# Patient Record
Sex: MALE | Race: WHITE | URBAN - METROPOLITAN AREA
[De-identification: names, ages, dates, MRNs, and addresses within clinical notes are randomized per-mention and may not be internally consistent; named-entity substitution may affect disease eponyms.]

---

## 2019-03-28 ENCOUNTER — OUTPATIENT (OUTPATIENT)
Dept: OUTPATIENT SERVICES | Facility: HOSPITAL | Age: 59
LOS: 1 days | Discharge: HOME | End: 2019-03-28

## 2019-03-29 DIAGNOSIS — N18.3 CHRONIC KIDNEY DISEASE, STAGE 3 (MODERATE): ICD-10-CM

## 2019-08-03 PROBLEM — Z00.00 ENCOUNTER FOR PREVENTIVE HEALTH EXAMINATION: Status: ACTIVE | Noted: 2019-08-03

## 2019-10-18 ENCOUNTER — APPOINTMENT (OUTPATIENT)
Dept: CARDIOLOGY | Facility: CLINIC | Age: 59
End: 2019-10-18
Payer: COMMERCIAL

## 2019-10-18 VITALS
DIASTOLIC BLOOD PRESSURE: 80 MMHG | WEIGHT: 158 LBS | SYSTOLIC BLOOD PRESSURE: 124 MMHG | BODY MASS INDEX: 24.8 KG/M2 | HEIGHT: 67 IN

## 2019-10-18 DIAGNOSIS — Z87.2 PERSONAL HISTORY OF DISEASES OF THE SKIN AND SUBCUTANEOUS TISSUE: ICD-10-CM

## 2019-10-18 DIAGNOSIS — Z87.891 PERSONAL HISTORY OF NICOTINE DEPENDENCE: ICD-10-CM

## 2019-10-18 DIAGNOSIS — Z82.3 FAMILY HISTORY OF STROKE: ICD-10-CM

## 2019-10-18 DIAGNOSIS — R91.1 SOLITARY PULMONARY NODULE: ICD-10-CM

## 2019-10-18 DIAGNOSIS — Z82.49 FAMILY HISTORY OF ISCHEMIC HEART DISEASE AND OTHER DISEASES OF THE CIRCULATORY SYSTEM: ICD-10-CM

## 2019-10-18 PROCEDURE — 99214 OFFICE O/P EST MOD 30 MIN: CPT

## 2019-10-18 PROCEDURE — 93000 ELECTROCARDIOGRAM COMPLETE: CPT

## 2019-10-18 NOTE — PHYSICAL EXAM
[General Appearance - Well Developed] : well developed [General Appearance - Well Nourished] : well nourished [Well Groomed] : well groomed [Normal Appearance] : normal appearance [Normal Conjunctiva] : the conjunctiva exhibited no abnormalities [General Appearance - In No Acute Distress] : no acute distress [No Deformities] : no deformities [Normal Oral Mucosa] : normal oral mucosa [FreeTextEntry1] : no JVD [No Oral Pallor] : no oral pallor [Heart Sounds] : normal S1 and S2 [Heart Rate And Rhythm] : heart rate and rhythm were normal [Murmurs] : no murmurs present [Respiration, Rhythm And Depth] : normal respiratory rhythm and effort [Edema] : no peripheral edema present [] : no respiratory distress [Auscultation Breath Sounds / Voice Sounds] : lungs were clear to auscultation bilaterally [Bowel Sounds] : normal bowel sounds [Abdomen Tenderness] : non-tender [Abdomen Soft] : soft [Abnormal Walk] : normal gait [Nail Clubbing] : no clubbing of the fingernails [Cyanosis, Localized] : no localized cyanosis [Skin Color & Pigmentation] : normal skin color and pigmentation [No Venous Stasis] : no venous stasis [Oriented To Time, Place, And Person] : oriented to person, place, and time [Affect] : the affect was normal

## 2019-10-18 NOTE — ASSESSMENT
[FreeTextEntry1] : Echo noted - moderate aortic valve disease - will repeat next year.\par BP control. C/w current therapy.\par No recurrent syncope. Continue to monitor clinically. If recurrent events will obtain an event monitor.\par Dyslipidemia - diet discussed. F/u labs with the PMD - if still elevated, may need to add statin.\par MORALES, abnormal ECG - obtain stress test. Will use stress echo, as baseline ECG is abnormal.\par F/u after the stress test.

## 2019-10-18 NOTE — HISTORY OF PRESENT ILLNESS
[FreeTextEntry1] : 60 y/o male presenting for f/u of syncope. Had several episodes of syncope or near syncope, no palpitations, no CP, no dyspnea. No edema. Has systolic murmur. He restarted Norvasc and his BP is now controlled. Echo revealed normal LV function, mild AS and mod AI. He reports fatigue and MORALES. No active chest pain. Abnormal ECG - ST-T changes laterally. Chest CT noted.

## 2019-11-01 ENCOUNTER — APPOINTMENT (OUTPATIENT)
Dept: CARDIOLOGY | Facility: CLINIC | Age: 59
End: 2019-11-01
Payer: COMMERCIAL

## 2019-11-01 VITALS — HEART RATE: 70 BPM | SYSTOLIC BLOOD PRESSURE: 118 MMHG | DIASTOLIC BLOOD PRESSURE: 82 MMHG

## 2019-11-01 PROCEDURE — 93351 STRESS TTE COMPLETE: CPT

## 2019-11-01 PROCEDURE — 99213 OFFICE O/P EST LOW 20 MIN: CPT | Mod: 25

## 2019-11-01 NOTE — ASSESSMENT
[FreeTextEntry1] : Echo noted - moderate aortic valve disease - will repeat next year (May 2020).\par Stress test today - negative for ischemia.\par BP control. C/w current therapy.\par No recurrent syncope. Continue to monitor clinically. If recurrent events will obtain an event monitor.\par Dyslipidemia - diet discussed. F/u labs with the PMD - if still elevated, may need to add statin.\par F/u in 3-4 months.

## 2019-11-01 NOTE — HISTORY OF PRESENT ILLNESS
[FreeTextEntry1] : 60 y/o male presenting for f/u of syncope. Had several episodes of syncope or near syncope, no palpitations, no CP, no dyspnea. No edema. Has systolic murmur. He restarted Norvasc and his BP is now controlled. Echo revealed normal LV function, mild AS and mod AI. He reports fatigue and MORALES. No active chest pain. Abnormal ECG - ST-T changes laterally. Chest CT noted. had a stress echo today - no ischemia, preserved LV function.

## 2019-11-01 NOTE — PHYSICAL EXAM
[General Appearance - Well Developed] : well developed [Normal Appearance] : normal appearance [Well Groomed] : well groomed [General Appearance - Well Nourished] : well nourished [No Deformities] : no deformities [General Appearance - In No Acute Distress] : no acute distress [Normal Conjunctiva] : the conjunctiva exhibited no abnormalities [Normal Oral Mucosa] : normal oral mucosa [No Oral Pallor] : no oral pallor [FreeTextEntry1] : no JVD [Heart Rate And Rhythm] : heart rate and rhythm were normal [Heart Sounds] : normal S1 and S2 [Murmurs] : no murmurs present [Edema] : no peripheral edema present [] : no respiratory distress [Respiration, Rhythm And Depth] : normal respiratory rhythm and effort [Auscultation Breath Sounds / Voice Sounds] : lungs were clear to auscultation bilaterally [Bowel Sounds] : normal bowel sounds [Abdomen Soft] : soft [Abdomen Tenderness] : non-tender [Abnormal Walk] : normal gait [Nail Clubbing] : no clubbing of the fingernails [Cyanosis, Localized] : no localized cyanosis [Skin Color & Pigmentation] : normal skin color and pigmentation [No Venous Stasis] : no venous stasis [Oriented To Time, Place, And Person] : oriented to person, place, and time [Affect] : the affect was normal

## 2020-04-27 ENCOUNTER — INPATIENT (INPATIENT)
Facility: HOSPITAL | Age: 60
LOS: 1 days | Discharge: HOME HEALTH | End: 2020-04-29
Attending: INTERNAL MEDICINE | Admitting: INTERNAL MEDICINE
Payer: COMMERCIAL

## 2020-04-27 VITALS
OXYGEN SATURATION: 100 % | HEART RATE: 62 BPM | HEIGHT: 65 IN | DIASTOLIC BLOOD PRESSURE: 93 MMHG | WEIGHT: 154.98 LBS | SYSTOLIC BLOOD PRESSURE: 208 MMHG | RESPIRATION RATE: 18 BRPM | TEMPERATURE: 97 F

## 2020-04-27 DIAGNOSIS — E83.52 HYPERCALCEMIA: ICD-10-CM

## 2020-04-27 DIAGNOSIS — I63.9 CEREBRAL INFARCTION, UNSPECIFIED: ICD-10-CM

## 2020-04-27 DIAGNOSIS — I10 ESSENTIAL (PRIMARY) HYPERTENSION: ICD-10-CM

## 2020-04-27 DIAGNOSIS — L40.50 ARTHROPATHIC PSORIASIS, UNSPECIFIED: ICD-10-CM

## 2020-04-27 LAB
ALBUMIN SERPL ELPH-MCNC: 4.9 G/DL — SIGNIFICANT CHANGE UP (ref 3.5–5.2)
ALP SERPL-CCNC: 112 U/L — SIGNIFICANT CHANGE UP (ref 30–115)
ALT FLD-CCNC: 16 U/L — SIGNIFICANT CHANGE UP (ref 0–41)
ANION GAP SERPL CALC-SCNC: 13 MMOL/L — SIGNIFICANT CHANGE UP (ref 7–14)
APTT BLD: 34.5 SEC — SIGNIFICANT CHANGE UP (ref 27–39.2)
AST SERPL-CCNC: 14 U/L — SIGNIFICANT CHANGE UP (ref 0–41)
BASOPHILS # BLD AUTO: 0.1 K/UL — SIGNIFICANT CHANGE UP (ref 0–0.2)
BASOPHILS NFR BLD AUTO: 1 % — SIGNIFICANT CHANGE UP (ref 0–1)
BILIRUB SERPL-MCNC: 0.5 MG/DL — SIGNIFICANT CHANGE UP (ref 0.2–1.2)
BUN SERPL-MCNC: 20 MG/DL — SIGNIFICANT CHANGE UP (ref 10–20)
CALCIUM SERPL-MCNC: 10.3 MG/DL — HIGH (ref 8.5–10.1)
CHLORIDE SERPL-SCNC: 101 MMOL/L — SIGNIFICANT CHANGE UP (ref 98–110)
CO2 SERPL-SCNC: 28 MMOL/L — SIGNIFICANT CHANGE UP (ref 17–32)
CREAT SERPL-MCNC: 1.4 MG/DL — SIGNIFICANT CHANGE UP (ref 0.7–1.5)
EOSINOPHIL # BLD AUTO: 0.27 K/UL — SIGNIFICANT CHANGE UP (ref 0–0.7)
EOSINOPHIL NFR BLD AUTO: 2.6 % — SIGNIFICANT CHANGE UP (ref 0–8)
GLUCOSE SERPL-MCNC: 95 MG/DL — SIGNIFICANT CHANGE UP (ref 70–99)
HCT VFR BLD CALC: 48.8 % — SIGNIFICANT CHANGE UP (ref 42–52)
HGB BLD-MCNC: 15.6 G/DL — SIGNIFICANT CHANGE UP (ref 14–18)
IMM GRANULOCYTES NFR BLD AUTO: 0.4 % — HIGH (ref 0.1–0.3)
INR BLD: 1.1 RATIO — SIGNIFICANT CHANGE UP (ref 0.65–1.3)
LYMPHOCYTES # BLD AUTO: 3.67 K/UL — HIGH (ref 1.2–3.4)
LYMPHOCYTES # BLD AUTO: 35.7 % — SIGNIFICANT CHANGE UP (ref 20.5–51.1)
MAGNESIUM SERPL-MCNC: 2.2 MG/DL — SIGNIFICANT CHANGE UP (ref 1.8–2.4)
MCHC RBC-ENTMCNC: 28.3 PG — SIGNIFICANT CHANGE UP (ref 27–31)
MCHC RBC-ENTMCNC: 32 G/DL — SIGNIFICANT CHANGE UP (ref 32–37)
MCV RBC AUTO: 88.4 FL — SIGNIFICANT CHANGE UP (ref 80–94)
MONOCYTES # BLD AUTO: 0.82 K/UL — HIGH (ref 0.1–0.6)
MONOCYTES NFR BLD AUTO: 8 % — SIGNIFICANT CHANGE UP (ref 1.7–9.3)
NEUTROPHILS # BLD AUTO: 5.39 K/UL — SIGNIFICANT CHANGE UP (ref 1.4–6.5)
NEUTROPHILS NFR BLD AUTO: 52.3 % — SIGNIFICANT CHANGE UP (ref 42.2–75.2)
NRBC # BLD: 0 /100 WBCS — SIGNIFICANT CHANGE UP (ref 0–0)
PLATELET # BLD AUTO: 346 K/UL — SIGNIFICANT CHANGE UP (ref 130–400)
POTASSIUM SERPL-MCNC: 3.9 MMOL/L — SIGNIFICANT CHANGE UP (ref 3.5–5)
POTASSIUM SERPL-SCNC: 3.9 MMOL/L — SIGNIFICANT CHANGE UP (ref 3.5–5)
PROT SERPL-MCNC: 8.1 G/DL — HIGH (ref 6–8)
PROTHROM AB SERPL-ACNC: 12.6 SEC — SIGNIFICANT CHANGE UP (ref 9.95–12.87)
RBC # BLD: 5.52 M/UL — SIGNIFICANT CHANGE UP (ref 4.7–6.1)
RBC # FLD: 12.9 % — SIGNIFICANT CHANGE UP (ref 11.5–14.5)
SODIUM SERPL-SCNC: 142 MMOL/L — SIGNIFICANT CHANGE UP (ref 135–146)
TROPONIN T SERPL-MCNC: <0.01 NG/ML — SIGNIFICANT CHANGE UP
WBC # BLD: 10.29 K/UL — SIGNIFICANT CHANGE UP (ref 4.8–10.8)
WBC # FLD AUTO: 10.29 K/UL — SIGNIFICANT CHANGE UP (ref 4.8–10.8)

## 2020-04-27 PROCEDURE — 70498 CT ANGIOGRAPHY NECK: CPT | Mod: 26

## 2020-04-27 PROCEDURE — 99232 SBSQ HOSP IP/OBS MODERATE 35: CPT

## 2020-04-27 PROCEDURE — 99223 1ST HOSP IP/OBS HIGH 75: CPT

## 2020-04-27 PROCEDURE — 99285 EMERGENCY DEPT VISIT HI MDM: CPT

## 2020-04-27 PROCEDURE — 71045 X-RAY EXAM CHEST 1 VIEW: CPT | Mod: 26

## 2020-04-27 PROCEDURE — 70496 CT ANGIOGRAPHY HEAD: CPT | Mod: 26

## 2020-04-27 PROCEDURE — 70450 CT HEAD/BRAIN W/O DYE: CPT | Mod: 26,59

## 2020-04-27 RX ORDER — ATORVASTATIN CALCIUM 80 MG/1
80 TABLET, FILM COATED ORAL AT BEDTIME
Refills: 0 | Status: DISCONTINUED | OUTPATIENT
Start: 2020-04-28 | End: 2020-04-29

## 2020-04-27 RX ORDER — LOSARTAN POTASSIUM 100 MG/1
25 TABLET, FILM COATED ORAL DAILY
Refills: 0 | Status: DISCONTINUED | OUTPATIENT
Start: 2020-04-27 | End: 2020-04-29

## 2020-04-27 RX ORDER — ATORVASTATIN CALCIUM 80 MG/1
80 TABLET, FILM COATED ORAL ONCE
Refills: 0 | Status: COMPLETED | OUTPATIENT
Start: 2020-04-27 | End: 2020-04-27

## 2020-04-27 RX ORDER — HYDROCHLOROTHIAZIDE 25 MG
12.5 TABLET ORAL DAILY
Refills: 0 | Status: DISCONTINUED | OUTPATIENT
Start: 2020-04-27 | End: 2020-04-29

## 2020-04-27 RX ORDER — ASPIRIN/CALCIUM CARB/MAGNESIUM 324 MG
325 TABLET ORAL ONCE
Refills: 0 | Status: COMPLETED | OUTPATIENT
Start: 2020-04-27 | End: 2020-04-27

## 2020-04-27 RX ORDER — HEPARIN SODIUM 5000 [USP'U]/ML
5000 INJECTION INTRAVENOUS; SUBCUTANEOUS
Refills: 0 | Status: DISCONTINUED | OUTPATIENT
Start: 2020-04-27 | End: 2020-04-29

## 2020-04-27 RX ORDER — AMLODIPINE BESYLATE 2.5 MG/1
2.5 TABLET ORAL DAILY
Refills: 0 | Status: DISCONTINUED | OUTPATIENT
Start: 2020-04-27 | End: 2020-04-29

## 2020-04-27 RX ORDER — ASPIRIN/CALCIUM CARB/MAGNESIUM 324 MG
81 TABLET ORAL DAILY
Refills: 0 | Status: DISCONTINUED | OUTPATIENT
Start: 2020-04-28 | End: 2020-04-29

## 2020-04-27 RX ADMIN — ATORVASTATIN CALCIUM 80 MILLIGRAM(S): 80 TABLET, FILM COATED ORAL at 22:05

## 2020-04-27 RX ADMIN — Medication 325 MILLIGRAM(S): at 22:05

## 2020-04-27 NOTE — ED PROVIDER NOTE - CHPI ED SYMPTOMS NEG
no nausea/no numbness/no confusion/no dizziness/no fever/no vomiting/no blurred vision/no loss of consciousness/no change in level of consciousness

## 2020-04-27 NOTE — ED PROVIDER NOTE - CLINICAL SUMMARY MEDICAL DECISION MAKING FREE TEXT BOX
D/w neuro and rec adm for cont eval and tx.  Pt with cont sx during ed stay.  Adm to monitored setting.

## 2020-04-27 NOTE — H&P ADULT - CRANIAL NERVE
2-12 to me are grossly intact 2-12 to me are grossly intact except for element of left lower facial droop

## 2020-04-27 NOTE — H&P ADULT - NSHPLABSRESULTS_GEN_ALL_CORE
< from: CT Head No Cont (04.27.20 @ 20:10) >      EXAM:  CT BRAIN            PROCEDURE DATE:  04/27/2020        < from: CT Head No Cont (04.27.20 @ 20:10) >    Impression:     1. Bilateral caudate lobe/basal ganglia lacunar infarcts, chronic appearing.   2. Small left cerebellar infarct of indeterminate age.  3. Additional chronic microvascular ischemic changes.    Dr. Marisol Donald discussed preliminary findingswith GUSTAVO ASENCIO on 4/27/2020 8:03 PM with readback.      MARISOL DONALD M.D., RESIDENT RADIOLOGIST  This document has been electronically signed.  BAKARI GREEN M.D., ATTENDING RADIOLOGIST  This document has been electronically signed. Apr 27 2020  8:33PM      < end of copied text > < from: CT Head No Cont (04.27.20 @ 20:10) >      EXAM:  CT BRAIN            PROCEDURE DATE:  04/27/2020        < from: CT Head No Cont (04.27.20 @ 20:10) >    Impression:     1. Bilateral caudate lobe/basal ganglia lacunar infarcts, chronic appearing.   2. Small left cerebellar infarct of indeterminate age.  3. Additional chronic microvascular ischemic changes.    Dr. Marisol Donald discussed preliminary findingswith GUSTAVO ASENCIO on 4/27/2020 8:03 PM with readback.      MARISOL DONALD M.D., RESIDENT RADIOLOGIST  This document has been electronically signed.    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    No large vessel occlusions.              MARISOL DONALD M.D., RESIDENT RADIOLOGIST  This document has been electronically signed.  MONICA ESTES M.D., ATTENDING RADIOLOGIST  This document has been electronically signed. Apr 272020  8:44PM              < end of copied text >                          15.6   10.29 )-----------( 346      ( 27 Apr 2020 18:19 )             48.8     04-27    142  |  101  |  20  ----------------------------<  95  3.9   |  28  |  1.4    Ca    10.3<H>      27 Apr 2020 18:19  Mg     2.2     04-27    TPro  8.1<H>  /  Alb  4.9  /  TBili  0.5  /  DBili  x   /  AST  14  /  ALT  16  /  AlkPhos  112  04-27            PT/INR - ( 27 Apr 2020 18:19 )   PT: 12.60 sec;   INR: 1.10 ratio         PTT - ( 27 Apr 2020 18:19 )  PTT:34.5 sec  Lactate Trend    CARDIAC MARKERS ( 27 Apr 2020 18:19 )  x     / <0.01 ng/mL / x     / x     / x          CAPILLARY BLOOD GLUCOSE  114 (27 Apr 2020 17:41)          BAKARI GREEN M.D., ATTENDING RADIOLOGIST  This document has been electronically signed. Apr 27 2020  8:33PM      < end of copied text >

## 2020-04-27 NOTE — H&P ADULT - HISTORY OF PRESENT ILLNESS
[age]yo male whose PMH includes hypertension, presents to the ER due to left sided weakness associated with left sided facial droop over 3 day period 61yo male whose PMH includes psoriatic arthritis and hypertension, presents to the ER due to left sided weakness associated with left sided facial droop over 3 day period 59yo male whose PMH includes psoriatic arthritis and hypertension, presents to the ER due to left sided weakness associated with left sided facial droop over 3 day period. He denies headache, chest pain, palpitations or shortness of breath however walking is now difficult. No modifying factors identified 59yo male whose PMH includes psoriatic arthritis and hypertension, presents to the ER due to left sided weakness associated with left sided facial droop over 3 day period. He denies headache, chest pain, palpitations, fevers or shortness of breath however walking is now difficult. No modifying factors identified. Currently no pain

## 2020-04-27 NOTE — H&P ADULT - PROBLEM SELECTOR PLAN 1
To me seems subacute, continue aspirin and Lipitor (will use instead of patient's Zocor) with rehab consult pending neurology rview

## 2020-04-27 NOTE — ED PROVIDER NOTE - OBJECTIVE STATEMENT
59 yo male, pmh of htn, presents to ed for weakness. Pt states 3 days ago, noticed weakness to left arm/leg, and lef tlower face droop, did not want to come to ed at that time. Pt denies fever, chills, cp, le swelling, nvd, abd pain, dizziness, visual changes, neck pain. 61 yo male, pmh of htn, presents to ed for weakness. Pt states 3 days ago, noticed weakness to left arm/leg, and left lower face droop, did not want to come to ed at that time. Pt denies fever, chills, cp, le swelling, nvd, abd pain, dizziness, visual changes, neck pain.

## 2020-04-27 NOTE — ED PROVIDER NOTE - ATTENDING CONTRIBUTION TO CARE
Pt with c/o left weakness for several days with difficulty standing.  LUE 4/5 mm str.  LLE 3/5.  NL sens.  a/p: labs, imaging, reassess

## 2020-04-27 NOTE — ED PROVIDER NOTE - NS ED ROS FT
Constitutional: (-) fever, (-) chills, (+) weakness  Eyes: (-) visual changes  ENT: (-) nasal congestions  Cardiovascular: (-) chest pain, (-) syncope  Respiratory: (-) cough, (-) shortness of breath, (-) dyspnea,   Gastrointestinal: (-) vomiting, (-) diarrhea, (-)nausea,  Musculoskeletal: (-) neck pain, (-) back pain, (-) joint pain,  Integumentary: (-) rash, (-) edema, (-) bruises  Neurological: (-) headache, (-) loc, (-) dizziness, (-) tingling, (-)numbness,  Peripheral Vascular: (-) leg swelling  :  (-)dysuria,  (-) hematuria  Allergic/Immunologic: (-) pruritus

## 2020-04-27 NOTE — ED ADULT TRIAGE NOTE - CHIEF COMPLAINT QUOTE
Per wife, patient has left arm and leg weakness and numbness since Friday. Pt also fell on Friday and Sunday, no LOC. FS in ER is 114

## 2020-04-27 NOTE — ED PROVIDER NOTE - PHYSICAL EXAMINATION
Physical Exam    Vital Signs: I have reviewed the initial vital signs.  Constitutional: well-nourished, appears stated age, no acute distress  Eyes: Conjunctiva pink, Sclera clear, PERRLA, EOMI.  Cardiovascular: S1 and S2, regular rate, regular rhythm, well-perfused extremities, radial pulses equal and 2+  Respiratory: unlabored respiratory effort, clear to auscultation bilaterally no wheezing, rales and rhonchi  Gastrointestinal: soft, non-tender abdomen, no pulsatile mass, normal bowl sounds  Musculoskeletal: supple neck, no lower extremity edema, no midline tenderness  Integumentary: warm, dry, no rash  Neurologic: awake, alert, cranial nerves II-XII grossly intact, normal gait, left upper arm with mild drift, left lower face with droop, normal speech, left aerm and leg strength decreased Physical Exam    Vital Signs: I have reviewed the initial vital signs.  Constitutional: well-nourished, appears stated age, no acute distress  Eyes: Conjunctiva pink, Sclera clear, PERRLA, EOMI.  Cardiovascular: S1 and S2, regular rate, regular rhythm, well-perfused extremities, radial pulses equal and 2+  Respiratory: unlabored respiratory effort, clear to auscultation bilaterally no wheezing, rales and rhonchi  Gastrointestinal: soft, non-tender abdomen, no pulsatile mass, normal bowl sounds  Musculoskeletal: supple neck, no lower extremity edema, no midline tenderness  Integumentary: warm, dry, no rash  Neurologic: awake, alert, cranial nerves II-XII grossly intact, left upper arm with mild drift, left lower face with droop, normal speech, left arm and leg strength decreased

## 2020-04-28 LAB
ANION GAP SERPL CALC-SCNC: 12 MMOL/L — SIGNIFICANT CHANGE UP (ref 7–14)
BUN SERPL-MCNC: 21 MG/DL — HIGH (ref 10–20)
CALCIUM SERPL-MCNC: 9.7 MG/DL — SIGNIFICANT CHANGE UP (ref 8.5–10.1)
CHLORIDE SERPL-SCNC: 102 MMOL/L — SIGNIFICANT CHANGE UP (ref 98–110)
CHOLEST SERPL-MCNC: 201 MG/DL — HIGH (ref 100–200)
CO2 SERPL-SCNC: 27 MMOL/L — SIGNIFICANT CHANGE UP (ref 17–32)
CREAT SERPL-MCNC: 1.3 MG/DL — SIGNIFICANT CHANGE UP (ref 0.7–1.5)
GLUCOSE SERPL-MCNC: 84 MG/DL — SIGNIFICANT CHANGE UP (ref 70–99)
HCT VFR BLD CALC: 43.8 % — SIGNIFICANT CHANGE UP (ref 42–52)
HCV AB S/CO SERPL IA: 0.03 COI — SIGNIFICANT CHANGE UP
HCV AB SERPL-IMP: SIGNIFICANT CHANGE UP
HDLC SERPL-MCNC: 39 MG/DL — LOW
HGB BLD-MCNC: 14.2 G/DL — SIGNIFICANT CHANGE UP (ref 14–18)
LIPID PNL WITH DIRECT LDL SERPL: 149 MG/DL — HIGH (ref 4–129)
MCHC RBC-ENTMCNC: 28.7 PG — SIGNIFICANT CHANGE UP (ref 27–31)
MCHC RBC-ENTMCNC: 32.4 G/DL — SIGNIFICANT CHANGE UP (ref 32–37)
MCV RBC AUTO: 88.7 FL — SIGNIFICANT CHANGE UP (ref 80–94)
NRBC # BLD: 0 /100 WBCS — SIGNIFICANT CHANGE UP (ref 0–0)
PLATELET # BLD AUTO: 308 K/UL — SIGNIFICANT CHANGE UP (ref 130–400)
POTASSIUM SERPL-MCNC: 4.5 MMOL/L — SIGNIFICANT CHANGE UP (ref 3.5–5)
POTASSIUM SERPL-SCNC: 4.5 MMOL/L — SIGNIFICANT CHANGE UP (ref 3.5–5)
RBC # BLD: 4.94 M/UL — SIGNIFICANT CHANGE UP (ref 4.7–6.1)
RBC # FLD: 13 % — SIGNIFICANT CHANGE UP (ref 11.5–14.5)
SODIUM SERPL-SCNC: 141 MMOL/L — SIGNIFICANT CHANGE UP (ref 135–146)
TOTAL CHOLESTEROL/HDL RATIO MEASUREMENT: 5.2 RATIO — SIGNIFICANT CHANGE UP (ref 4–5.5)
TRIGL SERPL-MCNC: 78 MG/DL — SIGNIFICANT CHANGE UP (ref 10–149)
WBC # BLD: 10.84 K/UL — HIGH (ref 4.8–10.8)
WBC # FLD AUTO: 10.84 K/UL — HIGH (ref 4.8–10.8)

## 2020-04-28 PROCEDURE — 99233 SBSQ HOSP IP/OBS HIGH 50: CPT

## 2020-04-28 PROCEDURE — 99254 IP/OBS CNSLTJ NEW/EST MOD 60: CPT

## 2020-04-28 RX ADMIN — HEPARIN SODIUM 5000 UNIT(S): 5000 INJECTION INTRAVENOUS; SUBCUTANEOUS at 17:12

## 2020-04-28 RX ADMIN — AMLODIPINE BESYLATE 2.5 MILLIGRAM(S): 2.5 TABLET ORAL at 05:56

## 2020-04-28 RX ADMIN — LOSARTAN POTASSIUM 25 MILLIGRAM(S): 100 TABLET, FILM COATED ORAL at 05:56

## 2020-04-28 RX ADMIN — ATORVASTATIN CALCIUM 80 MILLIGRAM(S): 80 TABLET, FILM COATED ORAL at 21:07

## 2020-04-28 RX ADMIN — HEPARIN SODIUM 5000 UNIT(S): 5000 INJECTION INTRAVENOUS; SUBCUTANEOUS at 05:56

## 2020-04-28 RX ADMIN — Medication 12.5 MILLIGRAM(S): at 05:56

## 2020-04-28 RX ADMIN — Medication 81 MILLIGRAM(S): at 11:45

## 2020-04-28 NOTE — PROGRESS NOTE ADULT - ASSESSMENT
61 Y/O M with a past medical Hx of HTN and psoriatic arthritis presents with transient Left sided weakness and falls     CVA     - aspirin, Lipitor   - tele   - pt consult   - check echo   - neurology consult     HTN / psoriatic arthritis     - continue home meds

## 2020-04-28 NOTE — CONSULT NOTE ADULT - SUBJECTIVE AND OBJECTIVE BOX
HPI: 60 y o male whose PMH includes psoriatic arthritis and hypertension, presents to the ER due to left sided weakness associated with left sided facial droop over 3 day period. He denies headache, chest pain, palpitations, fevers or shortness of breath however walking is now difficult. No modifying factors identified. Currently no pain .ptn  seen and  examed  with  PT team  ptn  is  aox3 NAD f/u  command  .    PTN  REFERRED TO ACUTE  REHAB  FOR  EVAL AND  TX   PAST MEDICAL & SURGICAL HISTORY:  Psoriasis  HTN (hypertension)  Hospital Course:    TODAY'S SUBJECTIVE & REVIEW OF SYMPTOMS:     Constitutional WNL   Cardio WNL   Resp WNL   GI WNL  Heme WNL  Endo WNL  Skin WNL  MSK WNL  Neuro WNL  Cognitive WNL  Psych WNL      MEDICATIONS  (STANDING):  amLODIPine   Tablet 2.5 milliGRAM(s) Oral daily  aspirin  chewable 81 milliGRAM(s) Oral daily  atorvastatin 80 milliGRAM(s) Oral at bedtime  heparin   Injectable 5000 Unit(s) SubCutaneous two times a day  hydrochlorothiazide 12.5 milliGRAM(s) Oral daily  losartan 25 milliGRAM(s) Oral daily    MEDICATIONS  (PRN):      FAMILY HISTORY:      Allergies    No Known Allergies    Intolerances        SOCIAL HISTORY:    [  ] Etoh  [  ] Smoking  [  ] Substance abuse     Home Environment:  [  ] Home Alone  [ x ] Lives with Family wife  [  ] Home Health Aid    Dwelling:  [  ] Apartment  [x  ] Private House  [  ] Adult Home  [  ] Skilled Nursing Facility      [  ] Short Term  [  ] Long Term  [ x ] Stairs       Elevator [  ]    FUNCTIONAL STATUS PTA: (Check all that apply)  Ambulation: [ x  ]Independent    [  ] Dependent     [  ] Non-Ambulatory  Assistive Device: [  ] SA Cane  [  ]  Q Cane  [  ] Walker  [  ]  Wheelchair  ADL : [x  ] Independent  [  ]  Dependent       Vital Signs Last 24 Hrs  T(C): 36.2 (28 Apr 2020 05:00), Max: 36.7 (27 Apr 2020 23:30)  T(F): 97.1 (28 Apr 2020 05:00), Max: 98.1 (27 Apr 2020 23:30)  HR: 45 (28 Apr 2020 05:00) (45 - 62)  BP: 143/86 (28 Apr 2020 05:00) (143/86 - 208/93)  BP(mean): --  RR: 18 (28 Apr 2020 05:00) (18 - 18)  SpO2: 99% (27 Apr 2020 22:07) (99% - 100%)      PHYSICAL EXAM: Alert & Oriented X3  GENERAL: NAD, well-groomed, well-developed  HEAD:  Atraumatic, Normocephalic  EYES: EOMI, PERRLA, conjunctiva and sclera clear  NECK: Supple, No JVD, Normal thyroid  CHEST/LUNG: Clear to percussion bilaterally; No rales, rhonchi, wheezing, or rubs  HEART: Regular rate and rhythm; No murmurs, rubs, or gallops  ABDOMEN: Soft, Nontender, Nondistended; Bowel sounds present  EXTREMITIES:  2+ Peripheral Pulses, No clubbing, cyanosis, or edema    NERVOUS SYSTEM:  Cranial Nerves 2-12 intact [x  ] Abnormal  [  ]  ROM: WFL all extremities [ x ]  Abnormal [  ]  Motor Strength: WFL all extremities  [ x ]  Abnormal [  ]  Sensation: intact to light touch [ x ] Abnormal [  ]  Reflexes: Symmetric [ x ]  Abnormal [  ]    FUNCTIONAL STATUS:  Bed Mobility: Independent [  ]  Supervision [ x ]  Needs Assistance [  ]  N/A [  ]  Transfers: Independent [  ]  Supervision [  ]  Needs Assistance [ cga ]  N/A [  ]   Ambulation: Independent [  ]  Supervision [  ]  Needs Assistance [ cga ]  N/A [  ]  ADL: Independent [  ] Requires Assistance [ cg ] N/A [  ]  SEE PT/OT IE NOTES    LABS:                        14.2   10.84 )-----------( 308      ( 28 Apr 2020 05:44 )             43.8     04-28    141  |  102  |  21<H>  ----------------------------<  84  4.5   |  27  |  1.3    Ca    9.7      28 Apr 2020 05:44  Mg     2.2     04-27    TPro  8.1<H>  /  Alb  4.9  /  TBili  0.5  /  DBili  x   /  AST  14  /  ALT  16  /  AlkPhos  112  04-27    PT/INR - ( 27 Apr 2020 18:19 )   PT: 12.60 sec;   INR: 1.10 ratio         PTT - ( 27 Apr 2020 18:19 )  PTT:34.5 sec      RADIOLOGY & ADDITIONAL STUDIES:< from: CT Head No Cont (04.27.20 @ 20:10) >  Impression:     1. Bilateral caudate lobe/basal ganglia lacunar infarcts, chronic appearing.   2. Small left cerebellar infarct of indeterminate age.  3. Additional chronic microvascular ischemic changes.    Dr. Otis Chavez discussed preliminary findingswith GUSTAVO ASENCIO on 4/27/2020 8:03 PM with readback.        < end of copied text >      Assesment:

## 2020-04-28 NOTE — CONSULT NOTE ADULT - ASSESSMENT
59 yo M w/ h/o HTN currently p/w 3 days LHP s/o acute CVA.      Recommendations:  - ASA 81 mg QD  - Lipitor 80 mg QD  - Echo   - cardiac telemetry  - PT/OT/SLP eval  - check HgbA1c 61 yo LHM w/ h/o HTN currently p/w 3 days LHP s/o acute lacunar CVA.      Recommendations:  - ASA 81 mg QD  - Lipitor 80 mg QD  - Echo   - cardiac telemetry  - PT/OT/SLP eval  - check HgbA1c  - MRI Brain NC

## 2020-04-28 NOTE — CONSULT NOTE ADULT - SUBJECTIVE AND OBJECTIVE BOX
Neurology Consult    Patient is a 60y old  Male who presents with a chief complaint of weakness and fall (28 Apr 2020 09:37)    HPI:  59yo male whose PMH includes psoriatic arthritis and hypertension, presents to the ER due to left sided weakness associated with left sided facial droop over 3 day period. He denies headache, chest pain, palpitations, fevers or shortness of breath however walking is now difficult. No modifying factors identified. Currently no pain (27 Apr 2020 22:15)    PAST MEDICAL & SURGICAL HISTORY:  Psoriasis  HTN (hypertension)    FAMILY HISTORY:    Social History: (-) x 3    Allergies    No Known Allergies    Intolerances    MEDICATIONS  (STANDING):  amLODIPine   Tablet 2.5 milliGRAM(s) Oral daily  aspirin  chewable 81 milliGRAM(s) Oral daily  atorvastatin 80 milliGRAM(s) Oral at bedtime  heparin   Injectable 5000 Unit(s) SubCutaneous two times a day  hydrochlorothiazide 12.5 milliGRAM(s) Oral daily  losartan 25 milliGRAM(s) Oral daily    MEDICATIONS  (PRN):    Review of systems:    Constitutional: as per HPI  Eyes: No eye pain or discharge  ENMT:  No difficulty hearing; No sinus or throat pain  Neck: No pain or stiffness  Respiratory: No cough, wheezing, chills or hemoptysis  Cardiovascular: No chest pain, palpitations, shortness of breath, dyspnea on exertion  Gastrointestinal: No abdominal pain, nausea, vomiting or hematemesis; No diarrhea or constipation.   Genitourinary: No dysuria, frequency, hematuria or incontinence  Neurological: As per HPI  Skin: No rashes or lesions   Endocrine: No heat or cold intolerance; No hair loss  Musculoskeletal: No joint pain or swelling  Psychiatric: No depression, anxiety, mood swings  Heme/Lymph: No easy bruising or bleeding gums    Vital Signs Last 24 Hrs  T(C): 35.8 (28 Apr 2020 14:04), Max: 36.7 (27 Apr 2020 23:30)  T(F): 96.5 (28 Apr 2020 14:04), Max: 98.1 (27 Apr 2020 23:30)  HR: 50 (28 Apr 2020 14:04) (45 - 62)  BP: 112/59 (28 Apr 2020 14:04) (112/59 - 208/93)  BP(mean): --  RR: 18 (28 Apr 2020 05:00) (18 - 18)  SpO2: 99% (27 Apr 2020 22:07) (99% - 100%)    Examination:  General:  Appearance is consistent with chronologic age.  No abnormal facies.  Gross skin survey within normal limits.    Cognitive/Language:  The patient is oriented to person, place, time and date.  Recent and remote memory intact.  Fund of knowledge is intact and normal.  Language with normal repetition, comprehension and naming.  Nondysarthric.    Eyes: intact VA, VFF.  EOMI w/o nystagmus, skew or reported double vision.  PERRL.  No ptosis/weakness of eyelid closure.    Face:  Facial sensation normal V1 - 3, no facial asymmetry.    Ears/Nose/Throat:  Hearing grossly intact b/l.  Palate elevates midline.  Tongue and uvula midline.   Motor examination:   Normal tone, bulk and range of motion.  No tenderness, twitching, tremors or involuntary movements.  Formal Muscle Strength Testing: (MRC grade R/L) 5/5 UE; 5/5 LE.  No observable drift.  Reflexes:   2+ b/l pectoralis, biceps, triceps, brachioradialis, patella and Achilles.  Plantar response downgoing b/l.  Jaw jerk, Scarlet, clonus absent.  Sensory examination:   Intact to light touch and pinprick, pain, temperature and proprioception and vibration in all extremities.  Cerebellum:   FTN/HKS intact with normal CHET in all limbs.  No dysmetria or dysdiadokinesia.  Gait narrow based and normal.    Respiratory:  no audible wheezing or inspiratory stridor.  no use of accessory muscles.   Cardiac: pulse palpable, no audible bruits  Abdomen: supple, no guarding, no TTP    Labs:   CBC Full  -  ( 28 Apr 2020 05:44 )  WBC Count : 10.84 K/uL  RBC Count : 4.94 M/uL  Hemoglobin : 14.2 g/dL  Hematocrit : 43.8 %  Platelet Count - Automated : 308 K/uL  Mean Cell Volume : 88.7 fL  Mean Cell Hemoglobin : 28.7 pg  Mean Cell Hemoglobin Concentration : 32.4 g/dL    04-28    141  |  102  |  21<H>  ----------------------------<  84  4.5   |  27  |  1.3    Ca    9.7      28 Apr 2020 05:44  Mg     2.2     04-27    TPro  8.1<H>  /  Alb  4.9  /  TBili  0.5  /  DBili  x   /  AST  14  /  ALT  16  /  AlkPhos  112  04-27    LIVER FUNCTIONS - ( 27 Apr 2020 18:19 )  Alb: 4.9 g/dL / Pro: 8.1 g/dL / ALK PHOS: 112 U/L / ALT: 16 U/L / AST: 14 U/L / GGT: x           PT/INR - ( 27 Apr 2020 18:19 )   PT: 12.60 sec;   INR: 1.10 ratio       PTT - ( 27 Apr 2020 18:19 )  PTT:34.5 sec    Neuroimaging:  NCHCT: CT Head No Cont:   EXAM:  CT BRAIN      PROCEDURE DATE:  04/27/2020      INTERPRETATION:  Clinical History: Left-sided weakness for 4 days.    Technique: Multiple contiguous axial CT images of the head were obtained  from the base of the skull to the vertex without administration of intravenous contrast. Coronal and sagittal reformats were obtained.    Comparison: None available.    Findings:     The ventricles, basal cisterns and sulcal pattern are prominent consistent with parenchymal volume loss.    Bilateral caudate lobe/basal ganglia lacunar infarcts, chronic appearing. Small left cerebellar infarct of indeterminate age. Patchy areas of hypoattenuation in the periventricular and subcortical white matter compatible with chronic microvascular ischemic changes.    No significant mass effect, midline shift or intracranial hemorrhage.      The calvarium is intact.    Visualized paranasal sinuses and bilateral mastoid complexes are unremarkable. The globes and orbits are unremarkable.    Impression:     1. Bilateral caudate lobe/basal ganglia lacunar infarcts, chronic appearing.   2. Small left cerebellar infarct of indeterminate age.  3. Additional chronic microvascular ischemic changes.    Dr. Marisol Donald discussed preliminary findingswith GUSTAVO ASENCIO on 4/27/2020 8:03 PM with readback.      MARISOL DONALD M.D., RESIDENT RADIOLOGIST  This document has been electronically signed.  BAKARI GREEN M.D., ATTENDING RADIOLOGIST  This document has been electronically signed. Apr 27 2020  8:33PM         (04-27-20 @ 20:10)      04-28-20 @ 15:05    < from: CT Angio Neck w/ IV Cont (04.27.20 @ 20:11) >  IMPRESSION:     The proximal right MCA (M1 segment) and right NORMAN (A1 segment) are hypoplastic.    No large vessel occlusions.    MARISOL DONALD M.D., RESIDENT RADIOLOGIST  This document has been electronically signed.  MONICA ESTES M.D., ATTENDING RADIOLOGIST  This document has been electronically signed. Apr 272020  8:44PM        < end of copied text >    < from: CT Angio Head w/ IV Cont (04.27.20 @ 20:11) >    IMPRESSION:     The proximal right MCA (M1 segment) and right NORMAN (A1 segment) are hypoplastic.    No large vessel occlusions.    MARISOL DONALD M.D., RESIDENT RADIOLOGIST  This document has been electronically signed.  MONICA ESTES M.D., ATTENDING RADIOLOGIST  This document has been electronically signed. Apr 272020  8:44PM    < end of copied text >    Lipid Profile in AM (04.28.20 @ 05:44)    Total Cholesterol/HDL Ratio Measurement: 5.2 Ratio    Cholesterol, Serum: 201 mg/dL    Triglycerides, Serum: 78 mg/dL    HDL Cholesterol, Serum: 39: HDL Levels >/= 60 mg/dL are considered beneficial and a "negative" risk  factor.  Effective 08/15/2018: New reference range and interpretive comment. mg/dL    Direct LDL: 149: LDL Cholesterol (mg/dL) --- Interpretive Comment (for adults 18 and over)  Optimal LDL Level may vary based on clinical situation  Below 70                   Ideal for people at very high risk of heart  disease  Below 100                  Ideal for people at risk of heart disease  100 - 129                    [place]  130 - 159                    Borderline high  160 - 189                    High  190 and Above           Very high mg/dL Neurology Consult    Patient is a 60y old  Male who presents with a chief complaint of weakness and fall (28 Apr 2020 09:37)    HPI:  59yo male whose PMH includes psoriatic arthritis and hypertension p/w acute L-sided ataxia/incoordination that started while he was in the bathroom 4 days ago.  Symptoms persistent with some improvement last few days but not back to baseline.  Still unable to ambulate unassisted due to incoordination.  Denies any diplopia, visual loss, vertigo of focal weakness/numbness.  Denies any prior episodes.  Was otherwise in usual state of health.  COVID screening questions negative.  No CP or palpitations.     PAST MEDICAL & SURGICAL HISTORY:  Psoriasis  HTN (hypertension)    FAMILY HISTORY:    Social History: (-) x 3    Allergies    No Known Allergies    Intolerances    MEDICATIONS  (STANDING):  amLODIPine   Tablet 2.5 milliGRAM(s) Oral daily  aspirin  chewable 81 milliGRAM(s) Oral daily  atorvastatin 80 milliGRAM(s) Oral at bedtime  heparin   Injectable 5000 Unit(s) SubCutaneous two times a day  hydrochlorothiazide 12.5 milliGRAM(s) Oral daily  losartan 25 milliGRAM(s) Oral daily    MEDICATIONS  (PRN):    Review of systems:    Constitutional: as per HPI  Eyes: No eye pain or discharge  ENMT:  No difficulty hearing; No sinus or throat pain  Neck: No pain or stiffness  Respiratory: No cough, wheezing, chills or hemoptysis  Cardiovascular: No chest pain, palpitations, shortness of breath, dyspnea on exertion  Gastrointestinal: No abdominal pain, nausea, vomiting or hematemesis; No diarrhea or constipation.   Genitourinary: No dysuria, frequency, hematuria or incontinence  Neurological: As per HPI  Skin: No rashes or lesions   Endocrine: No heat or cold intolerance; No hair loss  Musculoskeletal: No joint pain or swelling  Psychiatric: No depression, anxiety, mood swings  Heme/Lymph: No easy bruising or bleeding gums    Vital Signs Last 24 Hrs  T(C): 35.8 (28 Apr 2020 14:04), Max: 36.7 (27 Apr 2020 23:30)  T(F): 96.5 (28 Apr 2020 14:04), Max: 98.1 (27 Apr 2020 23:30)  HR: 50 (28 Apr 2020 14:04) (45 - 62)  BP: 112/59 (28 Apr 2020 14:04) (112/59 - 208/93)  BP(mean): --  RR: 18 (28 Apr 2020 05:00) (18 - 18)  SpO2: 99% (27 Apr 2020 22:07) (99% - 100%)    Examination:  General:  Appearance is consistent with chronologic age.  No abnormal facies.  Gross skin survey within normal limits.    Cognitive/Language:  The patient is oriented to person, place, time and date.  Recent and remote memory intact.  Fund of knowledge is intact and normal.  Language with normal repetition, comprehension and naming.  Nondysarthric.    Eyes: intact VA, VFF.  EOMI w/o nystagmus, skew or reported double vision.  PERRL.  No ptosis/weakness of eyelid closure.    Face:  Facial sensation normal V1 - 3, no facial asymmetry.    Ears/Nose/Throat:  Hearing grossly intact b/l.  Palate elevates midline.  Tongue and uvula midline.   Motor examination:   Normal tone, bulk and range of motion.  No tenderness, twitching, tremors or involuntary movements.  Formal Muscle Strength Testing: (MRC grade R/L) 5/5 UE; 5/5 LE. LUE pronation, LLE dysdiadokinesia.   Reflexes:   2+ b/l pectoralis, biceps, triceps, brachioradialis, patella and Achilles.  Plantar response downgoing b/l.  Jaw jerk, Scarlet, clonus absent.  Sensory examination:   Intact to light touch and pinprick, pain, temperature and proprioception and vibration in all extremities.  Cerebellum:   FTN/HKS intact with normal CHET in all limbs.  No dysmetria.  Gait narrow based and normal.    NIHSS 2    Labs:   CBC Full  -  ( 28 Apr 2020 05:44 )  WBC Count : 10.84 K/uL  RBC Count : 4.94 M/uL  Hemoglobin : 14.2 g/dL  Hematocrit : 43.8 %  Platelet Count - Automated : 308 K/uL  Mean Cell Volume : 88.7 fL  Mean Cell Hemoglobin : 28.7 pg  Mean Cell Hemoglobin Concentration : 32.4 g/dL    04-28    141  |  102  |  21<H>  ----------------------------<  84  4.5   |  27  |  1.3    Ca    9.7      28 Apr 2020 05:44  Mg     2.2     04-27    TPro  8.1<H>  /  Alb  4.9  /  TBili  0.5  /  DBili  x   /  AST  14  /  ALT  16  /  AlkPhos  112  04-27    LIVER FUNCTIONS - ( 27 Apr 2020 18:19 )  Alb: 4.9 g/dL / Pro: 8.1 g/dL / ALK PHOS: 112 U/L / ALT: 16 U/L / AST: 14 U/L / GGT: x           PT/INR - ( 27 Apr 2020 18:19 )   PT: 12.60 sec;   INR: 1.10 ratio       PTT - ( 27 Apr 2020 18:19 )  PTT:34.5 sec    Neuroimaging:  NCHCT: CT Head No Cont:   EXAM:  CT BRAIN      PROCEDURE DATE:  04/27/2020      INTERPRETATION:  Clinical History: Left-sided weakness for 4 days.    Technique: Multiple contiguous axial CT images of the head were obtained  from the base of the skull to the vertex without administration of intravenous contrast. Coronal and sagittal reformats were obtained.    Comparison: None available.    Findings:     The ventricles, basal cisterns and sulcal pattern are prominent consistent with parenchymal volume loss.    Bilateral caudate lobe/basal ganglia lacunar infarcts, chronic appearing. Small left cerebellar infarct of indeterminate age. Patchy areas of hypoattenuation in the periventricular and subcortical white matter compatible with chronic microvascular ischemic changes.    No significant mass effect, midline shift or intracranial hemorrhage.      The calvarium is intact.    Visualized paranasal sinuses and bilateral mastoid complexes are unremarkable. The globes and orbits are unremarkable.    Impression:     1. Bilateral caudate lobe/basal ganglia lacunar infarcts, chronic appearing.   2. Small left cerebellar infarct of indeterminate age.  3. Additional chronic microvascular ischemic changes.    Dr. Marisol Donald discussed preliminary findingswith GUSTAVO ASENCIO on 4/27/2020 8:03 PM with readback.      MARISOL DONALD M.D., RESIDENT RADIOLOGIST  This document has been electronically signed.  BAKARI GREEN M.D., ATTENDING RADIOLOGIST  This document has been electronically signed. Apr 27 2020  8:33PM         (04-27-20 @ 20:10)      04-28-20 @ 15:05    < from: CT Angio Neck w/ IV Cont (04.27.20 @ 20:11) >  IMPRESSION:     The proximal right MCA (M1 segment) and right NORMAN (A1 segment) are hypoplastic.    No large vessel occlusions.    MARISOL DONALD M.D., RESIDENT RADIOLOGIST  This document has been electronically signed.  MONICA ESTES M.D., ATTENDING RADIOLOGIST  This document has been electronically signed. Apr 272020  8:44PM        < end of copied text >    < from: CT Angio Head w/ IV Cont (04.27.20 @ 20:11) >    IMPRESSION:     The proximal right MCA (M1 segment) and right NORMAN (A1 segment) are hypoplastic.    No large vessel occlusions.    MARISOL DONALD M.D., RESIDENT RADIOLOGIST  This document has been electronically signed.  MONICA ESTES M.D., ATTENDING RADIOLOGIST  This document has been electronically signed. Apr 272020  8:44PM    < end of copied text >    Lipid Profile in AM (04.28.20 @ 05:44)    Total Cholesterol/HDL Ratio Measurement: 5.2 Ratio    Cholesterol, Serum: 201 mg/dL    Triglycerides, Serum: 78 mg/dL    HDL Cholesterol, Serum: 39: HDL Levels >/= 60 mg/dL are considered beneficial and a "negative" risk  factor.  Effective 08/15/2018: New reference range and interpretive comment. mg/dL    Direct LDL: 149: LDL Cholesterol (mg/dL) --- Interpretive Comment (for adults 18 and over)  Optimal LDL Level may vary based on clinical situation  Below 70                   Ideal for people at very high risk of heart  disease  Below 100                  Ideal for people at risk of heart disease  100 - 129                    [place]  130 - 159                    Borderline high  160 - 189                    High  190 and Above           Very high mg/dL

## 2020-04-28 NOTE — PHYSICAL THERAPY INITIAL EVALUATION ADULT - ADDITIONAL COMMENTS
pt reports living in a house with his wife +stairs pt reports being independent with ADL's and no AD required

## 2020-04-28 NOTE — PHYSICAL THERAPY INITIAL EVALUATION ADULT - PLANNED THERAPY INTERVENTIONS, PT EVAL
neuromuscular re-education/transfer training/bed mobility training/postural re-education/strengthening/balance training/gait training

## 2020-04-28 NOTE — CONSULT NOTE ADULT - ASSESSMENT
IMPRESSION: Rehab of 59 y/o  m  rehab  for  cva  lt  side  weakness    PRECAUTIONS: [  ] Cardiac  [  ] Respiratory  [  ] Seizures [  ] Contact Isolation  [  ] Droplet Isolation  [ FALL ] Other    Weight Bearing Status:     RECOMMENDATION:    Out of Bed to Chair     DVT/Decubiti Prophylaxis    REHAB PLAN:     [  xx ] Bedside P/T 3-5 times a week   [   ]   Bedside O/T  2-3 times a week             [   ] No Rehab Therapy Indicated                   [   ]  Speech Therapy   Conditioning/ROM                                    ADL  Bed Mobility                                               Conditioning/ROM  Transfers                                                     Bed Mobility  Sitting /Standing Balance                         Transfers                                        Gait Training                                               Sitting/Standing Balance  Stair Training [   ]Applicable                    Home equipment Eval                                                                        Splinting  [   ] Only      GOALS:   ADL   [ x  ]   Independent                    Transfers  [ x ] Independent                          Ambulation  [ x  ] Independent     [x    ] With device                            [  x ]  CG                                                         [   ]  CG                                                                  [  x ] CG                            [    ] Min A                                                   [   ] Min A                                                              [   ] Min  A          DISCHARGE PLAN:   [   ]  Good candidate for Intensive Rehabilitation/Hospital based-[facility]A [facility]                                             Will tolerate 3hrs Intensive Rehab Daily                                       [    ]  Short Term Rehab in Skilled Nursing Facility                                       [   xx ]  Home with Outpatient or  services cont current care                                          [    ]  Possible Candidate for Intensive Hospital based Rehab

## 2020-04-28 NOTE — PROGRESS NOTE ADULT - SUBJECTIVE AND OBJECTIVE BOX
Patient reported left sided weakness 4 days ago which resolved after one day, but he had 2 falls over past 2 days and his wife brought him into the hospital.     T(F): 97.1 (04-28-20 @ 05:00), Max: 98.1 (04-27-20 @ 23:30)  HR: 45 (04-28-20 @ 05:00)  BP: 143/86 (04-28-20 @ 05:00)  RR: 18 (04-28-20 @ 05:00)  SpO2: 99% (04-27-20 @ 22:07) (99% - 100%)    PHYSICAL EXAM:  GENERAL: NAD  HEAD:  Atraumatic, Normocephalic  NERVOUS SYSTEM:  Alert & Oriented x 3; mild left sided weakness 4/5 motor   CHEST/LUNG: Clear to percussion bilaterally; No rales, rhonchi, wheezing, or rubs  HEART: Regular rate and rhythm; No murmurs, rubs, or gallops  ABDOMEN: Soft, Nontender, Nondistended; Bowel sounds present  EXTREMITIES:  2+ Peripheral Pulses, No clubbing, cyanosis, or edema  LYMPH: No lymphadenopathy noted  SKIN: No rashes or lesions    LABS  04-28    141  |  102  |  21<H>  ----------------------------<  84  4.5   |  27  |  1.3    Ca    9.7      28 Apr 2020 05:44  Mg     2.2     04-27    TPro  8.1<H>  /  Alb  4.9  /  TBili  0.5  /  DBili  x   /  AST  14  /  ALT  16  /  AlkPhos  112  04-27                          14.2   10.84 )-----------( 308      ( 28 Apr 2020 05:44 )             43.8     PT/INR - ( 27 Apr 2020 18:19 )   PT: 12.60 sec;   INR: 1.10 ratio         PTT - ( 27 Apr 2020 18:19 )  PTT:34.5 sec    CARDIAC ENZYMES    Troponin T, Serum: <0.01 ng/mL (04-27-20 @ 18:19)    < from: 12 Lead ECG (04.27.20 @ 17:46) >  Diagnosis Line    Sinus bradycardia  Otherwise normal ECG    < end of copied text >    RADIOLOGY  < from: CT Head No Cont (04.27.20 @ 20:10) >  Impression:     1. Bilateral caudate lobe/basal ganglia lacunar infarcts, chronic appearing.   2. Small left cerebellar infarct of indeterminate age.  3. Additional chronic microvascular ischemic changes.      < end of copied text >  < from: CT Angio Neck w/ IV Cont (04.27.20 @ 20:11) >  No large vessel occlusions.    < end of copied text >    MEDICATIONS  (STANDING):  amLODIPine   Tablet 2.5 milliGRAM(s) Oral daily  aspirin  chewable 81 milliGRAM(s) Oral daily  atorvastatin 80 milliGRAM(s) Oral at bedtime  heparin   Injectable 5000 Unit(s) SubCutaneous two times a day  hydrochlorothiazide 12.5 milliGRAM(s) Oral daily  losartan 25 milliGRAM(s) Oral daily    MEDICATIONS  (PRN):

## 2020-04-28 NOTE — PHYSICAL THERAPY INITIAL EVALUATION ADULT - GENERAL OBSERVATIONS, REHAB EVAL
8:40-8:57. Chart reviewed. pt available to be seen by PT, confirmed with nurse. pt encountered semi-reclined in bed, denies pain at rest, and would like to work with PT now.

## 2020-04-29 ENCOUNTER — TRANSCRIPTION ENCOUNTER (OUTPATIENT)
Age: 60
End: 2020-04-29

## 2020-04-29 VITALS
DIASTOLIC BLOOD PRESSURE: 73 MMHG | TEMPERATURE: 97 F | SYSTOLIC BLOOD PRESSURE: 172 MMHG | RESPIRATION RATE: 18 BRPM | HEART RATE: 50 BPM

## 2020-04-29 PROCEDURE — 70551 MRI BRAIN STEM W/O DYE: CPT | Mod: 26

## 2020-04-29 PROCEDURE — 99232 SBSQ HOSP IP/OBS MODERATE 35: CPT

## 2020-04-29 PROCEDURE — 99233 SBSQ HOSP IP/OBS HIGH 50: CPT

## 2020-04-29 RX ORDER — ENOXAPARIN SODIUM 100 MG/ML
40 INJECTION SUBCUTANEOUS DAILY
Refills: 0 | Status: DISCONTINUED | OUTPATIENT
Start: 2020-04-29 | End: 2020-04-29

## 2020-04-29 RX ORDER — ATORVASTATIN CALCIUM 80 MG/1
1 TABLET, FILM COATED ORAL
Qty: 30 | Refills: 0
Start: 2020-04-29

## 2020-04-29 RX ORDER — ASPIRIN/CALCIUM CARB/MAGNESIUM 324 MG
1 TABLET ORAL
Qty: 30 | Refills: 0
Start: 2020-04-29

## 2020-04-29 RX ADMIN — HEPARIN SODIUM 5000 UNIT(S): 5000 INJECTION INTRAVENOUS; SUBCUTANEOUS at 05:45

## 2020-04-29 RX ADMIN — Medication 81 MILLIGRAM(S): at 11:42

## 2020-04-29 RX ADMIN — AMLODIPINE BESYLATE 2.5 MILLIGRAM(S): 2.5 TABLET ORAL at 05:45

## 2020-04-29 RX ADMIN — Medication 12.5 MILLIGRAM(S): at 05:45

## 2020-04-29 RX ADMIN — LOSARTAN POTASSIUM 25 MILLIGRAM(S): 100 TABLET, FILM COATED ORAL at 05:45

## 2020-04-29 RX ADMIN — ENOXAPARIN SODIUM 40 MILLIGRAM(S): 100 INJECTION SUBCUTANEOUS at 11:43

## 2020-04-29 NOTE — DISCHARGE NOTE PROVIDER - NSDCCPCAREPLAN_GEN_ALL_CORE_FT
PRINCIPAL DISCHARGE DIAGNOSIS  Diagnosis: CVA (cerebral vascular accident)  Assessment and Plan of Treatment: Take your medications as prescribed. Follow up with your primary care doctor in 1 week. Reutrn to hospital if increasing dizziness, loss of balance, abnormal speech, or weakness/numbness. PRINCIPAL DISCHARGE DIAGNOSIS  Diagnosis: CVA (cerebral vascular accident)  Assessment and Plan of Treatment: Take your medications as prescribed. Follow up with your primary care doctor in 1 week. Follow up with neurology in 2 weeks. Reutrn to hospital if increasing dizziness, loss of balance, abnormal speech, or weakness/numbness.      SECONDARY DISCHARGE DIAGNOSES  Diagnosis: Aortic regurgitation  Assessment and Plan of Treatment: Follow up with a cardiologist in 1-2  weeks. Call doctor if any chest pain, swelling, or shortness of breath.

## 2020-04-29 NOTE — PROGRESS NOTE ADULT - ASSESSMENT
59 yo LHM w/ h/o HTN currently p/w 3 days LHP s/o acute lacunar CVA.      Recommendations:  - ASA 81 mg QD  - Lipitor 80 mg QD  - f/u Echo   - continue cardiac telemetry  - PT/OT/SLP eval  - if Echo (-), may d/c w/ outpt f/u in stroke clinic in 2 wks ([phone])

## 2020-04-29 NOTE — PROGRESS NOTE ADULT - SUBJECTIVE AND OBJECTIVE BOX
Neurology Progress Note    Interval History:      HPI:  59yo male whose PMH includes psoriatic arthritis and hypertension, presents to the ER due to left sided weakness associated with left sided facial droop over 3 day period. He denies headache, chest pain, palpitations, fevers or shortness of breath however walking is now difficult. No modifying factors identified. Currently no pain (27 Apr 2020 22:15)      PAST MEDICAL & SURGICAL HISTORY:  Psoriasis  HTN (hypertension)          Medications:  amLODIPine   Tablet 2.5 milliGRAM(s) Oral daily  aspirin  chewable 81 milliGRAM(s) Oral daily  atorvastatin 80 milliGRAM(s) Oral at bedtime  enoxaparin Injectable 40 milliGRAM(s) SubCutaneous daily  hydrochlorothiazide 12.5 milliGRAM(s) Oral daily  LORazepam   Injectable 1 milliGRAM(s) IV Push every 5 minutes PRN  losartan 25 milliGRAM(s) Oral daily      Vital Signs Last 24 Hrs  T(C): 36.1 (29 Apr 2020 14:36), Max: 36.2 (29 Apr 2020 05:15)  T(F): 97 (29 Apr 2020 14:36), Max: 97.2 (29 Apr 2020 05:15)  HR: 50 (29 Apr 2020 14:36) (50 - 51)  BP: 172/73 (29 Apr 2020 14:36) (130/63 - 172/73)  BP(mean): --  RR: 18 (29 Apr 2020 14:36) (17 - 18)  SpO2: --    Neurological Exam:   Mental status: Awake, alert and oriented x3.  Recent and remote memory intact.  Naming, repetition and comprehension intact.  Attention/concentration intact.  No dysarthria, no aphasia.  Fund of knowledge appropriate.    Cranial nerves: Pupils equally round and reactive to light, visual fields full, no nystagmus, extraocular muscles intact, V1 through V3 intact bilaterally and symmetric, face symmetric, hearing intact to finger rub, palate elevation symmetric, tongue was midline.  Motor:  MRC grading 5/5 b/l UE/LE.   strength 5/5.  Normal tone and bulk.  No abnormal movements.    Sensation: Intact to light touch, proprioception, and pinprick.   Coordination: No dysmetria on finger-to-nose and heel-to-shin.  No dysdiadokinesia.  Reflexes: 2+ in bilateral UE/LE, downgoing toes bilaterally. (-) [place].  Gait: Narrow and steady. No ataxia.  Romberg negative    Labs:  CBC Full  -  ( 28 Apr 2020 05:44 )  WBC Count : 10.84 K/uL  RBC Count : 4.94 M/uL  Hemoglobin : 14.2 g/dL  Hematocrit : 43.8 %  Platelet Count - Automated : 308 K/uL  Mean Cell Volume : 88.7 fL  Mean Cell Hemoglobin : 28.7 pg  Mean Cell Hemoglobin Concentration : 32.4 g/dL    04-28    141  |  102  |  21<H>  ----------------------------<  84  4.5   |  27  |  1.3    Ca    9.7      28 Apr 2020 05:44  Mg     2.2     04-27    TPro  8.1<H>  /  Alb  4.9  /  TBili  0.5  /  DBili  x   /  AST  14  /  ALT  16  /  AlkPhos  112  04-27    LIVER FUNCTIONS - ( 27 Apr 2020 18:19 )  Alb: 4.9 g/dL / Pro: 8.1 g/dL / ALK PHOS: 112 U/L / ALT: 16 U/L / AST: 14 U/L / GGT: x           PT/INR - ( 27 Apr 2020 18:19 )   PT: 12.60 sec;   INR: 1.10 ratio         PTT - ( 27 Apr 2020 18:19 )  PTT:34.5 sec    < from: MR Head No Cont (04.29.20 @ 14:02) >  IMPRESSION:     1.  Multiple small and punctate acute infarcts within the right medial frontal and parietal lobes (NORMAN distribution). No significant mass effect. No acute intracranial hemorrhage.    2.  Chronic lacunar infarcts within bilateral caudate/periventricular regions, the left cerebellum and the right aspect of the madhu.    RAHAT BREWER M.D., ATTENDING RADIOLOGIST  This document has been electronically signed. Apr 29 2020  2:31PM    < end of copied text >    < from: CT Angio Neck w/ IV Cont (04.27.20 @ 20:11) >  IMPRESSION:     The proximal right MCA (M1 segment) and right NORMAN (A1 segment) are hypoplastic.    No large vessel occlusions.    MARISOL DONALD M.D., RESIDENT RADIOLOGIST  This document has been electronically signed.  MONICA ESTES M.D., ATTENDING RADIOLOGIST  This document has been electronically signed. Apr 272020  8:44PM        < end of copied text >    Lipid Profile in AM (04.28.20 @ 05:44)    Total Cholesterol/HDL Ratio Measurement: 5.2 Ratio    Cholesterol, Serum: 201 mg/dL    Triglycerides, Serum: 78 mg/dL    HDL Cholesterol, Serum: 39: HDL Levels >/= 60 mg/dL are considered beneficial and a "negative" risk  factor.  Effective 08/15/2018: New reference range and interpretive comment. mg/dL    Direct LDL: 149: LDL Cholesterol (mg/dL) --- Interpretive Comment (for adults 18 and over)  Optimal LDL Level may vary based on clinical situation  Below 70                   Ideal for people at very high risk of heart  disease  Below 100                  Ideal for people at risk of heart disease  100 - 129                    Near [place]  130 - 159                    Borderline high  160 - 189                    High  190 and Above           Very high mg/dL Neurology Progress Note    Interval History:  Pt stable.  No events on monitor.  Ambulated with PT.      HPI:  61yo male whose PMH includes psoriatic arthritis and hypertension, presents to the ER due to left sided weakness associated with left sided facial droop over 3 day period. He denies headache, chest pain, palpitations, fevers or shortness of breath however walking is now difficult. No modifying factors identified. Currently no pain (27 Apr 2020 22:15)      PAST MEDICAL & SURGICAL HISTORY:  Psoriasis  HTN (hypertension)          Medications:  amLODIPine   Tablet 2.5 milliGRAM(s) Oral daily  aspirin  chewable 81 milliGRAM(s) Oral daily  atorvastatin 80 milliGRAM(s) Oral at bedtime  enoxaparin Injectable 40 milliGRAM(s) SubCutaneous daily  hydrochlorothiazide 12.5 milliGRAM(s) Oral daily  LORazepam   Injectable 1 milliGRAM(s) IV Push every 5 minutes PRN  losartan 25 milliGRAM(s) Oral daily      Vital Signs Last 24 Hrs  T(C): 36.1 (29 Apr 2020 14:36), Max: 36.2 (29 Apr 2020 05:15)  T(F): 97 (29 Apr 2020 14:36), Max: 97.2 (29 Apr 2020 05:15)  HR: 50 (29 Apr 2020 14:36) (50 - 51)  BP: 172/73 (29 Apr 2020 14:36) (130/63 - 172/73)  BP(mean): --  RR: 18 (29 Apr 2020 14:36) (17 - 18)  SpO2: --    Examination:  General:  Appearance is consistent with chronologic age.  No abnormal facies.  Gross skin survey within normal limits.    Cognitive/Language:  The patient is oriented to person, place, time and date.  Recent and remote memory intact.  Fund of knowledge is intact and normal.  Language with normal repetition, comprehension and naming.  Nondysarthric.    Eyes: intact VA, VFF.  EOMI w/o nystagmus, skew or reported double vision.  PERRL.  No ptosis/weakness of eyelid closure.    Face:  Facial sensation normal V1 - 3, no facial asymmetry.    Ears/Nose/Throat:  Hearing grossly intact b/l.  Palate elevates midline.  Tongue and uvula midline.   Motor examination:   Normal tone, bulk and range of motion.  No tenderness, twitching, tremors or involuntary movements.  Formal Muscle Strength Testing: (MRC grade R/L) 5/5 UE; 5/5 LE. LUE pronation, LLE dysdiadokinesia.   Reflexes:   2+ b/l pectoralis, biceps, triceps, brachioradialis, patella and Achilles.  Plantar response downgoing b/l.  Jaw jerk, Scarlet, clonus absent.  Sensory examination:   Intact to light touch and pinprick, pain, temperature and proprioception and vibration in all extremities.  Cerebellum:   FTN/HKS intact with normal CHET in all limbs.  No dysmetria.  Gait narrow based and normal.    NIHSS 2    Labs:  CBC Full  -  ( 28 Apr 2020 05:44 )  WBC Count : 10.84 K/uL  RBC Count : 4.94 M/uL  Hemoglobin : 14.2 g/dL  Hematocrit : 43.8 %  Platelet Count - Automated : 308 K/uL  Mean Cell Volume : 88.7 fL  Mean Cell Hemoglobin : 28.7 pg  Mean Cell Hemoglobin Concentration : 32.4 g/dL    04-28    141  |  102  |  21<H>  ----------------------------<  84  4.5   |  27  |  1.3    Ca    9.7      28 Apr 2020 05:44  Mg     2.2     04-27    TPro  8.1<H>  /  Alb  4.9  /  TBili  0.5  /  DBili  x   /  AST  14  /  ALT  16  /  AlkPhos  112  04-27    LIVER FUNCTIONS - ( 27 Apr 2020 18:19 )  Alb: 4.9 g/dL / Pro: 8.1 g/dL / ALK PHOS: 112 U/L / ALT: 16 U/L / AST: 14 U/L / GGT: x           PT/INR - ( 27 Apr 2020 18:19 )   PT: 12.60 sec;   INR: 1.10 ratio         PTT - ( 27 Apr 2020 18:19 )  PTT:34.5 sec    < from: MR Head No Cont (04.29.20 @ 14:02) >  IMPRESSION:     1.  Multiple small and punctate acute infarcts within the right medial frontal and parietal lobes (NORMAN distribution). No significant mass effect. No acute intracranial hemorrhage.    2.  Chronic lacunar infarcts within bilateral caudate/periventricular regions, the left cerebellum and the right aspect of the madhu.    RAHAT BREWER M.D., ATTENDING RADIOLOGIST  This document has been electronically signed. Apr 29 2020  2:31PM    < end of copied text >    < from: CT Angio Neck w/ IV Cont (04.27.20 @ 20:11) >  IMPRESSION:     The proximal right MCA (M1 segment) and right NORMAN (A1 segment) are hypoplastic.    No large vessel occlusions.    MARISOL DONALD M.D., RESIDENT RADIOLOGIST  This document has been electronically signed.  MONICA ESTES M.D., ATTENDING RADIOLOGIST  This document has been electronically signed. Apr 272020  8:44PM        < end of copied text >    Lipid Profile in AM (04.28.20 @ 05:44)    Total Cholesterol/HDL Ratio Measurement: 5.2 Ratio    Cholesterol, Serum: 201 mg/dL    Triglycerides, Serum: 78 mg/dL    HDL Cholesterol, Serum: 39: HDL Levels >/= 60 mg/dL are considered beneficial and a "negative" risk  factor.  Effective 08/15/2018: New reference range and interpretive comment. mg/dL    Direct LDL: 149: LDL Cholesterol (mg/dL) --- Interpretive Comment (for adults 18 and over)  Optimal LDL Level may vary based on clinical situation  Below 70                   Ideal for people at very high risk of heart  disease  Below 100                  Ideal for people at risk of heart disease  100 - 129                    Near [place]  130 - 159                    Borderline high  160 - 189                    High  190 and Above           Very high mg/dL

## 2020-04-29 NOTE — DISCHARGE NOTE PROVIDER - HOSPITAL COURSE
61 Y/O M with a past medical Hx of HTN and psoriatic arthritis presents with transient Left sided weakness and falls         CVA         - aspirin, Lipitor     - tele     - pt      - echo      - MR brain     -          HTN / psoriatic arthritis         - continue home meds 61 Y/O M with a past medical Hx of HTN and psoriatic arthritis presents with transient Left sided weakness and falls         CVA         - aspirin, Lipitor     - tele     - pt -arranged for home     - echo-normal LV function, moderate to severe AR     - MR brain- shows multiple small acute infarcts within the medial right frontal and parietal obes.      - Spoke with neurology and patient cleared for discharge. Patient will need to follow up with a cardiologist as outpt.          HTN / psoriatic arthritis         - continue home meds

## 2020-04-29 NOTE — PROGRESS NOTE ADULT - SUBJECTIVE AND OBJECTIVE BOX
Patient feels a little better today, ambulated with physical therapy       T(F): 97.2 (04-29-20 @ 05:15), Max: 97.2 (04-29-20 @ 05:15)  HR: 51 (04-29-20 @ 05:15)  BP: 130/63 (04-29-20 @ 05:15)  RR: 18 (04-29-20 @ 05:15)      PHYSICAL EXAM:  GENERAL: NAD  HEAD:  Atraumatic, Normocephalic  EYES: EOMI, PERRLA, conjunctiva and sclera clear  NERVOUS SYSTEM:  Alert & Oriented X3, no focal deficits   CHEST/LUNG: Clear to percussion bilaterally; No rales, rhonchi, wheezing, or rubs  HEART: Regular rate and rhythm; No murmurs, rubs, or gallops  ABDOMEN: Soft, Nontender, Nondistended; Bowel sounds present  EXTREMITIES:  2+ Peripheral Pulses, No clubbing, cyanosis, or edema  LYMPH: No lymphadenopathy noted  SKIN: No rashes or lesions    LABS  04-28    141  |  102  |  21<H>  ----------------------------<  84  4.5   |  27  |  1.3    Ca    9.7      28 Apr 2020 05:44  Mg     2.2     04-27    TPro  8.1<H>  /  Alb  4.9  /  TBili  0.5  /  DBili  x   /  AST  14  /  ALT  16  /  AlkPhos  112  04-27                          14.2   10.84 )-----------( 308      ( 28 Apr 2020 05:44 )             43.8     PT/INR - ( 27 Apr 2020 18:19 )   PT: 12.60 sec;   INR: 1.10 ratio         PTT - ( 27 Apr 2020 18:19 )  PTT:34.5 sec    CARDIAC ENZYMES    Troponin T, Serum: <0.01 ng/mL (04-27-20 @ 18:19)    RADIOLOGY  < from: CT Head No Cont (04.27.20 @ 20:10) >  Impression:     1. Bilateral caudate lobe/basal ganglia lacunar infarcts, chronic appearing.   2. Small left cerebellar infarct of indeterminate age.    < end of copied text >    MEDICATIONS  (STANDING):  amLODIPine   Tablet 2.5 milliGRAM(s) Oral daily  aspirin  chewable 81 milliGRAM(s) Oral daily  atorvastatin 80 milliGRAM(s) Oral at bedtime  hydrochlorothiazide 12.5 milliGRAM(s) Oral daily  losartan 25 milliGRAM(s) Oral daily    MEDICATIONS  (PRN):

## 2020-04-29 NOTE — DISCHARGE NOTE NURSING/CASE MANAGEMENT/SOCIAL WORK - PATIENT PORTAL LINK FT
You can access the FollowMyHealth Patient Portal offered by [facility] by registering at the following website: [email or web address]. By joining [facility]’s FollowMyHealth portal, you will also be able to view your health information using other applications (apps) compatible with our system.

## 2020-04-29 NOTE — DISCHARGE NOTE NURSING/CASE MANAGEMENT/SOCIAL WORK - NSDCPEPTSTRK_GEN_ALL_CORE
Risk factors for stroke/Prescribed medications/Need for follow up after discharge/Call 911 for stroke/Stroke education booklet/Stroke warning signs and symptoms/Stroke support groups for patients, families, and friends/Signs and symptoms of stroke

## 2020-04-29 NOTE — DISCHARGE NOTE PROVIDER - PROVIDER TOKENS
FREE:[LAST:[domenic],FIRST:[toma],PHONE:[(   )    -],FAX:[(   )    -]] PROVIDER:[TOKEN:[[id number]]],FREE:[LAST:[domenic],FIRST:[toma],PHONE:[(   )    -],FAX:[(   )    -]],PROVIDER:[TOKEN:[[id number]]]

## 2020-04-29 NOTE — PROGRESS NOTE ADULT - ASSESSMENT
61 Y/O M with a past medical Hx of HTN and psoriatic arthritis presents with transient Left sided weakness and falls     CVA     - aspirin, Lipitor   - tele   - pt    - check echo and MR brain   - possible dc later today if above ok      HTN / psoriatic arthritis     - continue home meds

## 2020-04-29 NOTE — DISCHARGE NOTE PROVIDER - CARE PROVIDER_API CALL
toma sheth  Phone: (   )    -  Fax: (   )    -  Follow Up Time: Michael Sweet)  Neuromuscular Medicine  [address], [address]  [place], [place] [id number]  Phone: [phone]  Fax: [phone]  Follow Up Time:     toma sheth  Phone: (   )    -  Fax: (   )    -  Follow Up Time:     Christian Washington)  Cardiovascular Disease; Internal Medicine; Interventional Cardiology  [address] [place] [address], [address]  [place] [address]  Phone: [phone]  Fax: [phone]  Follow Up Time:

## 2020-04-29 NOTE — DISCHARGE NOTE PROVIDER - NSDCMRMEDTOKEN_GEN_ALL_CORE_FT
amlodipine/hydrochlorothiazide/olmesartan: orally once a day  Humira Pre-Filled Syringe: subcutaneous 2 times a month amlodipine/hydrochlorothiazide/olmesartan: orally once a day  Aspir 81 oral delayed release tablet: 1 tab(s) orally once a day   atorvastatin 80 mg oral tablet: 1 tab(s) orally once a day (at bedtime)  Humira Pre-Filled Syringe: subcutaneous 2 times a month

## 2020-04-29 NOTE — DISCHARGE NOTE PROVIDER - CARE PROVIDERS DIRECT ADDRESSES
,DirectAddress_Unknown ,[email or web address],DirectAddress_Unknown,[email or web address]

## 2020-04-29 NOTE — DISCHARGE NOTE PROVIDER - NSDCFUSCHEDAPPT_GEN_ALL_CORE_FT
VEENA BRANDON ; 05/22/2020 ; [facility] [address] VENEA BRANDON ; 05/22/2020 ; [facility] [address]

## 2020-05-01 DIAGNOSIS — I63.9 CEREBRAL INFARCTION, UNSPECIFIED: ICD-10-CM

## 2020-05-01 DIAGNOSIS — G81.94 HEMIPLEGIA, UNSPECIFIED AFFECTING LEFT NONDOMINANT SIDE: ICD-10-CM

## 2020-05-01 DIAGNOSIS — I35.1 NONRHEUMATIC AORTIC (VALVE) INSUFFICIENCY: ICD-10-CM

## 2020-05-01 DIAGNOSIS — E83.52 HYPERCALCEMIA: ICD-10-CM

## 2020-05-01 DIAGNOSIS — I63.81 OTHER CEREBRAL INFARCTION DUE TO OCCLUSION OR STENOSIS OF SMALL ARTERY: ICD-10-CM

## 2020-05-01 DIAGNOSIS — F17.210 NICOTINE DEPENDENCE, CIGARETTES, UNCOMPLICATED: ICD-10-CM

## 2020-05-01 DIAGNOSIS — L40.50 ARTHROPATHIC PSORIASIS, UNSPECIFIED: ICD-10-CM

## 2020-05-01 DIAGNOSIS — I10 ESSENTIAL (PRIMARY) HYPERTENSION: ICD-10-CM

## 2020-05-01 DIAGNOSIS — R29.6 REPEATED FALLS: ICD-10-CM

## 2020-05-01 DIAGNOSIS — R29.702 NIHSS SCORE 2: ICD-10-CM

## 2020-05-02 ENCOUNTER — EMERGENCY (EMERGENCY)
Facility: HOSPITAL | Age: 60
LOS: 0 days | Discharge: HOME | End: 2020-05-02
Attending: EMERGENCY MEDICINE | Admitting: EMERGENCY MEDICINE
Payer: COMMERCIAL

## 2020-05-02 VITALS
DIASTOLIC BLOOD PRESSURE: 73 MMHG | SYSTOLIC BLOOD PRESSURE: 135 MMHG | OXYGEN SATURATION: 100 % | HEART RATE: 49 BPM | WEIGHT: 154.98 LBS | HEIGHT: 66 IN | TEMPERATURE: 98 F | RESPIRATION RATE: 20 BRPM

## 2020-05-02 VITALS
DIASTOLIC BLOOD PRESSURE: 58 MMHG | HEART RATE: 65 BPM | RESPIRATION RATE: 18 BRPM | OXYGEN SATURATION: 99 % | SYSTOLIC BLOOD PRESSURE: 124 MMHG

## 2020-05-02 DIAGNOSIS — R19.7 DIARRHEA, UNSPECIFIED: ICD-10-CM

## 2020-05-02 DIAGNOSIS — R55 SYNCOPE AND COLLAPSE: ICD-10-CM

## 2020-05-02 DIAGNOSIS — R42 DIZZINESS AND GIDDINESS: ICD-10-CM

## 2020-05-02 DIAGNOSIS — Z79.891 LONG TERM (CURRENT) USE OF OPIATE ANALGESIC: ICD-10-CM

## 2020-05-02 DIAGNOSIS — Z79.82 LONG TERM (CURRENT) USE OF ASPIRIN: ICD-10-CM

## 2020-05-02 DIAGNOSIS — I10 ESSENTIAL (PRIMARY) HYPERTENSION: ICD-10-CM

## 2020-05-02 LAB
ALBUMIN SERPL ELPH-MCNC: 4.4 G/DL — SIGNIFICANT CHANGE UP (ref 3.5–5.2)
ALP SERPL-CCNC: 106 U/L — SIGNIFICANT CHANGE UP (ref 30–115)
ALT FLD-CCNC: 48 U/L — HIGH (ref 0–41)
ANION GAP SERPL CALC-SCNC: 13 MMOL/L — SIGNIFICANT CHANGE UP (ref 7–14)
AST SERPL-CCNC: 51 U/L — HIGH (ref 0–41)
BASOPHILS # BLD AUTO: 0.28 K/UL — HIGH (ref 0–0.2)
BASOPHILS NFR BLD AUTO: 1.8 % — HIGH (ref 0–1)
BILIRUB SERPL-MCNC: 0.3 MG/DL — SIGNIFICANT CHANGE UP (ref 0.2–1.2)
BUN SERPL-MCNC: 22 MG/DL — HIGH (ref 10–20)
CALCIUM SERPL-MCNC: 9.7 MG/DL — SIGNIFICANT CHANGE UP (ref 8.5–10.1)
CHLORIDE SERPL-SCNC: 106 MMOL/L — SIGNIFICANT CHANGE UP (ref 98–110)
CO2 SERPL-SCNC: 26 MMOL/L — SIGNIFICANT CHANGE UP (ref 17–32)
CREAT SERPL-MCNC: 1.6 MG/DL — HIGH (ref 0.7–1.5)
EOSINOPHIL # BLD AUTO: 0.81 K/UL — HIGH (ref 0–0.7)
EOSINOPHIL NFR BLD AUTO: 5.2 % — SIGNIFICANT CHANGE UP (ref 0–8)
GIANT PLATELETS BLD QL SMEAR: PRESENT — SIGNIFICANT CHANGE UP
GLUCOSE SERPL-MCNC: 103 MG/DL — HIGH (ref 70–99)
HCT VFR BLD CALC: 45.8 % — SIGNIFICANT CHANGE UP (ref 42–52)
HGB BLD-MCNC: 14.8 G/DL — SIGNIFICANT CHANGE UP (ref 14–18)
LYMPHOCYTES # BLD AUTO: 26.1 % — SIGNIFICANT CHANGE UP (ref 20.5–51.1)
LYMPHOCYTES # BLD AUTO: 4.07 K/UL — HIGH (ref 1.2–3.4)
MANUAL SMEAR VERIFICATION: SIGNIFICANT CHANGE UP
MCHC RBC-ENTMCNC: 28.9 PG — SIGNIFICANT CHANGE UP (ref 27–31)
MCHC RBC-ENTMCNC: 32.3 G/DL — SIGNIFICANT CHANGE UP (ref 32–37)
MCV RBC AUTO: 89.5 FL — SIGNIFICANT CHANGE UP (ref 80–94)
MONOCYTES # BLD AUTO: 1.22 K/UL — HIGH (ref 0.1–0.6)
MONOCYTES NFR BLD AUTO: 7.8 % — SIGNIFICANT CHANGE UP (ref 1.7–9.3)
NEUTROPHILS # BLD AUTO: 8.95 K/UL — HIGH (ref 1.4–6.5)
NEUTROPHILS NFR BLD AUTO: 57.4 % — SIGNIFICANT CHANGE UP (ref 42.2–75.2)
PLAT MORPH BLD: NORMAL — SIGNIFICANT CHANGE UP
PLATELET # BLD AUTO: 321 K/UL — SIGNIFICANT CHANGE UP (ref 130–400)
POLYCHROMASIA BLD QL SMEAR: SLIGHT — SIGNIFICANT CHANGE UP
POTASSIUM SERPL-MCNC: 4.2 MMOL/L — SIGNIFICANT CHANGE UP (ref 3.5–5)
POTASSIUM SERPL-SCNC: 4.2 MMOL/L — SIGNIFICANT CHANGE UP (ref 3.5–5)
PROT SERPL-MCNC: 7.6 G/DL — SIGNIFICANT CHANGE UP (ref 6–8)
RBC # BLD: 5.12 M/UL — SIGNIFICANT CHANGE UP (ref 4.7–6.1)
RBC # FLD: 12.9 % — SIGNIFICANT CHANGE UP (ref 11.5–14.5)
RBC BLD AUTO: NORMAL — SIGNIFICANT CHANGE UP
SODIUM SERPL-SCNC: 145 MMOL/L — SIGNIFICANT CHANGE UP (ref 135–146)
TROPONIN T SERPL-MCNC: <0.01 NG/ML — SIGNIFICANT CHANGE UP
TROPONIN T SERPL-MCNC: <0.01 NG/ML — SIGNIFICANT CHANGE UP
VARIANT LYMPHS # BLD: 1.7 % — SIGNIFICANT CHANGE UP (ref 0–5)
WBC # BLD: 15.59 K/UL — HIGH (ref 4.8–10.8)
WBC # FLD AUTO: 15.59 K/UL — HIGH (ref 4.8–10.8)

## 2020-05-02 PROCEDURE — 93306 TTE W/DOPPLER COMPLETE: CPT | Mod: 26

## 2020-05-02 PROCEDURE — 99236 HOSP IP/OBS SAME DATE HI 85: CPT

## 2020-05-02 PROCEDURE — 71045 X-RAY EXAM CHEST 1 VIEW: CPT | Mod: 26

## 2020-05-02 PROCEDURE — 93010 ELECTROCARDIOGRAM REPORT: CPT

## 2020-05-02 PROCEDURE — 70450 CT HEAD/BRAIN W/O DYE: CPT | Mod: 26

## 2020-05-02 RX ORDER — SODIUM CHLORIDE 9 MG/ML
1000 INJECTION INTRAMUSCULAR; INTRAVENOUS; SUBCUTANEOUS ONCE
Refills: 0 | Status: COMPLETED | OUTPATIENT
Start: 2020-05-02 | End: 2020-05-02

## 2020-05-02 RX ORDER — SIMVASTATIN 20 MG/1
1 TABLET, FILM COATED ORAL
Qty: 30 | Refills: 0
Start: 2020-05-02 | End: 2020-05-31

## 2020-05-02 RX ADMIN — SODIUM CHLORIDE 1000 MILLILITER(S): 9 INJECTION INTRAMUSCULAR; INTRAVENOUS; SUBCUTANEOUS at 07:46

## 2020-05-02 NOTE — ED CDU PROVIDER DISPOSITION NOTE - PATIENT PORTAL LINK FT
You can access the FollowMyHealth Patient Portal offered by [facility] by registering at the following website: [email or web address]. By joining [facility]’s FollowMyHealth portal, you will also be able to view your health information using other applications (apps) compatible with our system.

## 2020-05-02 NOTE — ED ADULT NURSE NOTE - NS_NURSE_DISC_TEACHING_YN_ED_ALL_ED
Is the increased dose working better? If yes, can change pill to the 20 mg dose instead of two 10 mg pills.   Yes

## 2020-05-02 NOTE — ED ADULT TRIAGE NOTE - CHIEF COMPLAINT QUOTE
[place] [facility] [place] [facility], at 3 AM he said he have to go to bathroom, water coming out, I clean him and he stand up from toilet, he said he feels very bad, I hold him and make him sit and he did not respond for few seconds - wife  EMS reports patient initially cool, pale, diaphoretic, and slightly hypotensive

## 2020-05-02 NOTE — ED PROVIDER NOTE - CLINICAL SUMMARY MEDICAL DECISION MAKING FREE TEXT BOX
60 y.o. male, PMH of HTN, psoriatic arthritis, CVA, discharged from the hospital 2 days ago where he was admitted for Left sided weakness and falls. Comes in today after he had syncopal episode earlier today witnessed by wife. Pt felt dizzy and lightheaded, felt as if going to pass out, lowered himself down. No seizure like activity. No incontinence. Pt is asymptomatic now. No HA, blurry/double vision, CP/SOB, abdominal pain, n/v/c/d. No weakness. On exam, pt in NAD, AAOx3, head NC/AT, CN II-XII intact, lungs CTA B/L, CV S1S2 regular, abdomen soft/NT/ND/(+)BS, ext (-) edema, motor 5/5x4, sensation intact, finger to nose intact. Will place pt in OBS for further evaluation.

## 2020-05-02 NOTE — ED CDU PROVIDER DISPOSITION NOTE - NSFOLLOWUPCLINICS_GEN_ALL_ED_FT
[facility]  Neurology  [address] [place] [address], [facility] [address]  [place] [address]  Phone: [phone]  Fax:   Follow Up Time:

## 2020-05-02 NOTE — ED ADULT NURSE NOTE - OBJECTIVE STATEMENT
Patient complaints of syncopy while using the bathroom. Patient denies hitting his head. Patient unknown LOC but thinks for a few seconds. Patient A&Ox4.  Patient denies chest pain, n/v, no cough and no SOB.  No fever noted

## 2020-05-02 NOTE — ED ADULT NURSE REASSESSMENT NOTE - NS ED NURSE REASSESS COMMENT FT1
pt endorsed from previous nurse. pt vitals stable. pt A&Ox3-4. no s/s of acute distress at this time. cardiac monitor and safety measures maintained. will continue to monitor.

## 2020-05-02 NOTE — ED PROVIDER NOTE - ATTENDING CONTRIBUTION TO CARE
60 y.o. male, PMH of HTN, psoriatic arthritis, CVA, discharged from the hospital 2 days ago where he was admitted for Left sided weakness and falls. Comes in today after he had syncopal episode earlier today witnessed by wife. Pt felt dizzy and lightheaded, felt as if going to pass out, lowered himself down. No seizure like activity. No incontinence. Pt is asymptomatic now. No HA, blurry/double vision, CP/SOB, abdominal pain, n/v/c/d. No weakness. On exam, pt in NAD, AAOx3, head NC/AT, CN II-XII intact, lungs CTA B/L, CV S1S2 regular, abdomen soft/NT/ND/(+)BS, ext (-) edema, motor 5/5x4, sensation intact, finger to nose intact. Will do EKG, CXR, labs, CT and reevaluate.

## 2020-05-02 NOTE — ED PROVIDER NOTE - PHYSICAL EXAMINATION
CONSTITUTIONAL: well-appearing, in NAD  SKIN: Warm dry, normal skin turgor  HEAD: NCAT  EYES: EOMI, PERRLA, no scleral icterus, conjunctiva pink  ENT: normal pharynx with no erythema or exudates  NECK: Supple; non tender. Full ROM.  CARD: RRR, no murmurs.  RESP: clear to ausculation b/l. No crackles or wheezing.  ABD: soft, non-tender, non-distended, no rebound or guarding.  EXT: Full ROM, no bony tenderness, no pedal edema, no calf tenderness  NEURO: normal motor. normal sensory. CN II-XII intact. Cerebellar testing normal.  PSYCH: Cooperative, appropriate.

## 2020-05-02 NOTE — ED ADULT NURSE NOTE - FINAL NURSING ELECTRONIC SIGNATURE
Consult to Pulmonology  Consult performed by: Mandi Whitfield ordered by: Court Amin        Pulmonary Medicine  Consult Note      Reason for consultation: COPD exacerbation      HISTORY OF PRESENT ILLNESS:    This is a 78-year-old gentleman with known history of severe COPD, atrial flutter/fibrillation, who has regular bronchodilator therapy at home which she uses when necessary. He is seen a pulmonologist at [facility]. Patient states that he had been doing well until he developed cold-like symptoms mainly affecting the upper airway a few days ago but then suddenly developed significant chest congestion and wheezing and cough. He did not improve with his usual course of treatment as an outpatient which included antibiotic and prednisone hence he came to the hospital and was admitted. Patient improved rapidly with treatment since yesterday. Past Medical History:        Diagnosis Date    A-fib [facility])     2013    [facility]rial flutter [facility]     Atrial flutter with rapid ventricular response (HCC)     BMI 40.0-44.9, adult ([facility]     CAD (coronary artery disease)     COPD (chronic obstructive pulmonary disease) ([facility]     History of tobacco abuse     Hypertension     Substance abuse     Tobacco abuse        Past Surgical History:        Procedure Laterality Date    GASTROSTOMY TUBE PLACEMENT      removed 02/11/2015    TRACHEOTOMY      removed 01/2015    TRANSTHORACIC ECHOCARDIOGRAM  5/22/2013       Social History:     reports that he quit smoking about 3 years ago. He has a 25.00 pack-year smoking history. He has never used smokeless tobacco. He reports that he drinks about 1.8 oz of alcohol per week . He reports that he does not use drugs.     Family History:       Problem Relation Age of Onset   [facility] Cancer Mother      breast    Cancer Sister      colon    Cancer Brother      colon       Allergies:  Levofloxacin        MEDICATIONS during current hospitalization:    Continuous Infusions:    Scheduled Meds:   sodium chloride flush  10 mL Intravenous 2 times per day    famotidine  20 mg Oral BID    methylPREDNISolone  40 mg Intravenous Q8H    azithromycin  500 mg Intravenous Q24H    carvedilol  12.5 mg Oral BID    apixaban  5 mg Oral BID    furosemide  40 mg Oral Daily    mometasone-formoterol  2 puff Inhalation BID    amiodarone  200 mg Oral Daily    oseltamivir  75 mg Oral BID    ipratropium-albuterol  1 ampule Inhalation 4x daily       PRN Meds:sodium chloride flush, magnesium hydroxide, ondansetron, albuterol        REVIEW OF SYSTEMS:  As in history of present illness  Other 14 point review of system is negative. PHYSICAL EXAM:    Vitals:  BP (!) 152/90   Pulse 75   Temp 97.3 °F (36.3 °C) (Oral)   Resp 20   Ht 6' (1.829 m)   Wt (!) 338 lb 3 oz (153.4 kg)   SpO2 93%   BMI 45.87 kg/m²   General: Patient is  Alert, awake . comfortable in bed, No distress. Head: Atraumatic , Normocephalic   Eyes: PERRL. No icteric sclera. No conjunctival injection. No discharge   ENT: No nasal  discharge. Pharynx clear. Neck:  Trachea midline. No thyromegaly, no JVD, No cervical adenopathy. Chest : Adequate effort, symmetric bilateral excursions  Lung : Diminished breath sounds bilaterally, inspiratory rhonchi in the right base posteriorly  Heart[de-identified] Regular rhythm and rate. No mumur ,  Rub or gallop  ABD: Benign. Non-tender. Non-distended. No masses or organmegaly. Normal bowel sounds. EXT: Mild Pitting edema both lower extremities , No Cyanosis No clubbing  Neuro: no focal weakness  Skin: Warm and dry. No erythema or rash on exposed extremities. .      Data Review  Recent Labs      02/25/18   0340  02/26/18   0509   WBC  13.5*  11.3*   HGB  15.3  14.7   HCT  45.4  44.0   PLT  171  191      Recent Labs      02/25/18   0340  02/26/18   0509   NA  138  140   K  3.5  4.1   CL  99  100   CO2  27  30*   BUN  17  17   CREATININE  0.72  0.66*   GLUCOSE  130*  198*       MV Settings: 02-May-2020 15:50

## 2020-05-02 NOTE — ED CDU PROVIDER INITIAL DAY NOTE - OBJECTIVE STATEMENT
61 y/o male with PMHX of CVA, HTN, Psoriatic Arthritis presenting for syncopal episode. Pt was discahrged April 29th from [facility] with diagnosis of CVA - (pt's initial symptoms were left sided ataxia/weakness with left sided facial droop). Pt was discharged home on Lipitor 80mg and ASA 81mg. Pt states this morning he went to the bathroom, had diarrhea, felt lightheaded and lowered himself to the ground. Pt lost consciousness. Episode witnessed by wife. Pt states since being on Lipitor he has had diarrhea - 2 episodes/day, denies blood. Denies any other associated symptoms, chest pain, palpitations, change in vision, SOB, melena ,BRBPR, head trauma, tinnitus 61 y/o male with PMHX of CVA, HTN, Psoriatic Arthritis presenting for syncopal episode. Pt was discharged April 29th from [facility] with diagnosis of CVA - (pt's initial symptoms were left sided ataxia/weakness with left sided facial droop). Pt was discharged home on Lipitor 80mg and ASA 81mg. Pt states this morning he went to the bathroom, had diarrhea, felt lightheaded and lowered himself to the ground. Pt lost consciousness. Episode witnessed by wife. Pt states since being on Lipitor he has had diarrhea - 2 episodes/day, denies blood. Denies any other associated symptoms, chest pain, palpitations, change in vision, SOB, melena ,BRBPR, head trauma, tinnitus

## 2020-05-02 NOTE — ED CDU PROVIDER DISPOSITION NOTE - CLINICAL COURSE
pt p/w syncope after diarrhea, no chi, EKG NSR, echo no acute findings, hx recent stroke, neuro cleared for outpt f/u, CTH neg, per neuro recommendations switch atorvastatin to simvastatin, strict return precautions provided. NV intact. appears mild dehydrated on exam w mild amador, ivf given and pt feels back to baseline.

## 2020-05-02 NOTE — ED CDU PROVIDER INITIAL DAY NOTE - ATTENDING CONTRIBUTION TO CARE
60M PMH HTN, psoriatic arthritis, CVA 1 week ago (presented w L sided deficits, now no residual deficits per pt), p/w syncope. states he was having diarrhea, felt lightheaded and lowered himself to ground and then had +LOC. no chi. witnessed by wife. no cp, sob. no fever, cough. no ha, neck pain, numbness, weakness, tingling, blurry vision, slurred speech, trouble ambulating. no abd pain, n/v. pt attributes his syncope to diarrhea which started after discharged on lipitor 80 mg. watery diarrhea, nonbloody, approx 2 episodes per day.     on exam, AFVSS, well mary nad, ncat, eomi, perrla, mmm, lctab, rrr nl s1s2 no mrg, abd soft ntnd, aaox3, CN 2-12 intact, No nystagmus.  5/5 motor x 4 ext, SILT x 4 extremities, No facial droop or slurred speech. No pronator drift.  Normal rapid alternating movement and finger nose finger bilaterally. No midline C/T/L tenderness to palpation or step off. Normal gait, No ataxia. no le edema or calf ttp,     a/p; Syncope while having diarrhea, recent stroke, will get labs, ekg/trop, cxr, cth, neuro consult,   EDOU for syncope pathway, echo 60M PMH HTN, psoriatic arthritis, CVA 1 week ago (presented w L sided deficits, now no residual deficits per pt), p/w syncope. states he was having diarrhea, felt lightheaded and lowered himself to ground and then had +LOC. no chi. witnessed by wife. no cp, sob. no fever, cough. no ha, neck pain, numbness, weakness, tingling, blurry vision, slurred speech, trouble ambulating. no abd pain, n/v. pt attributes his syncope to diarrhea which started after discharged on lipitor 80 mg. watery diarrhea, nonbloody, approx 2 episodes per day. pt states he feels dehydrated.     on exam, AFVSS, well mary nad, ncat, eomi, perrla, DRY mm, lctab, rrr nl s1s2 no mrg, abd soft ntnd, aaox3, CN 2-12 intact, No nystagmus.  5/5 motor x 4 ext, SILT x 4 extremities, No facial droop or slurred speech. No pronator drift.  Normal rapid alternating movement and finger nose finger bilaterally. No midline C/T/L tenderness to palpation or step off. Normal gait, No ataxia. no le edema or calf ttp,     a/p; Syncope while having diarrhea, mild amador, recent stroke, will get labs, ekg/trop, cxr, cth, neuro consult,   EDOU for syncope pathway, echo

## 2020-05-02 NOTE — ED CDU PROVIDER DISPOSITION NOTE - NSFOLLOWUPINSTRUCTIONS_ED_ALL_ED_FT
Follow up with Neurology in 1 week  Continue your home medications  YOUR MEDICATION HAS BEEN CHANGED TO SIMVASTATIN 80MG DAILY AT BEDTIME. PRESCRIPTION HAS BEEN SENT TO YOUR PHARMACY  Return to the ED if your symptoms return/worsen  Follow up with your PMD in 1 week

## 2020-05-02 NOTE — ED PROVIDER NOTE - OBJECTIVE STATEMENT
60 y.o M w/ pmhx CVA, HTN, psoriatic arthritis p/w syncopal episode at home 1 hr pta. Pt was sitting on toilet and felt lightheaded, told his wife he was about to pass out, lowered himself to the floor, and then went unconscious. Wife witnessed. Pt eventually woke up. Unknown down time. No seizure activity. Pt was d/c'd 3 days ago from [facility] for CVA. Pt states he has minimal deficits, and no gross motor loss from CVA, just slow to walk. No CP, no SOB, no n/v, no dizziness currently, no abd pain, no fevers, no cough, no HT.

## 2020-05-03 ENCOUNTER — TRANSCRIPTION ENCOUNTER (OUTPATIENT)
Age: 60
End: 2020-05-03

## 2020-05-11 ENCOUNTER — APPOINTMENT (OUTPATIENT)
Dept: NEUROLOGY | Facility: CLINIC | Age: 60
End: 2020-05-11

## 2020-05-14 ENCOUNTER — APPOINTMENT (OUTPATIENT)
Dept: NEUROLOGY | Facility: CLINIC | Age: 60
End: 2020-05-14

## 2020-05-18 ENCOUNTER — APPOINTMENT (OUTPATIENT)
Dept: NEUROLOGY | Facility: CLINIC | Age: 60
End: 2020-05-18
Payer: COMMERCIAL

## 2020-05-18 PROCEDURE — 99203 OFFICE O/P NEW LOW 30 MIN: CPT | Mod: 95

## 2020-05-20 NOTE — PLAN
[FreeTextEntry1] : VEENA BRANDON is a 60 year old M with medical history hypertension and psoriatic arthritis who is being seen via TeleHealth visit after recent hospital admission for right NORMAN stroke. He still reports left arm weakness. He is currently on ASA 81 and Lipitor 80. CT angiogram showed hypoplastic right NORMAN. The stroke etiology is likely due to hypoperfusion to right NORMAN territory in the setting of hypotension or A-A thromboembolism. Recommended to continue ASA and Lipitor. He was also recommended to avoid hypotension and dehydration. Given the recent syncopal episodes, I will obtain EEG to r/o seizure and asked them to discuss Holter monitoring this Friday at their cardiology appointment.  I will refer the patient to stroke clinic for further evaluation and care.

## 2020-05-20 NOTE — HISTORY OF PRESENT ILLNESS
[Verbal consent obtained from patient] : the patient, [unfilled] [Time Spent: ___ minutes] : I have spent [unfilled] minutes with the patient on the telephone [FreeTextEntry1] : VEENA BRANDON is a 60 year old M w history of hypertension, dyspnea on excretion and psoriatic arthritis is being seen via Tele Health service following up his recent discharge from hospital after stroke. He presented to [facility] on April 27th for left facial and left arm weakness developed over three days. Brain MRI showed Multiple small and punctate acute infarcts within the right medial frontal\par and parietal lobes (NOMRAN distribution) and chronic lacunar infarcts within bilateral caudate/periventricular regions in the left cerebellum and the right aspect of the madhu. 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 He was discharge on April 29th  with home PT. On May 2nd patient presented to ED for syncopal episode. He was sitting on toilet and felt\par lightheaded, told his wife he was about to pass out, lowered himself to the floor, and then went unconscious. Wife witnessed. Pt eventually woke up.Unknown down time with no seizure activity. He was observed in the hospital and discharged home. Today his wife stated that he probably has another episode last night since he wet his pants. His has labile blood pressure when works with PT as his SBP drops down from 140s to 90s. He has stable left arm weakness but denies any acute changes. During the interview with the camera he has no facial weakness with no pronator drift and was able to hold his legs against the gravity.

## 2020-05-22 ENCOUNTER — RESULT CHARGE (OUTPATIENT)
Age: 60
End: 2020-05-22

## 2020-05-22 ENCOUNTER — APPOINTMENT (OUTPATIENT)
Dept: CARDIOLOGY | Facility: CLINIC | Age: 60
End: 2020-05-22
Payer: COMMERCIAL

## 2020-05-22 VITALS
DIASTOLIC BLOOD PRESSURE: 80 MMHG | HEIGHT: 67 IN | SYSTOLIC BLOOD PRESSURE: 122 MMHG | BODY MASS INDEX: 24.8 KG/M2 | HEART RATE: 79 BPM | TEMPERATURE: 97.9 F | WEIGHT: 158 LBS

## 2020-05-22 PROCEDURE — 93000 ELECTROCARDIOGRAM COMPLETE: CPT

## 2020-05-22 PROCEDURE — 99214 OFFICE O/P EST MOD 30 MIN: CPT

## 2020-05-22 NOTE — ASSESSMENT
[FreeTextEntry1] : Echo noted - mild aortic valve disease\par Stress test November 2019 - negative for ischemia.\par CVA - f/u with neurology, c/w ASA.\par High dose statin.\par Obtian 3 week event monitor to rule out AF and assess for syncope.\par Syncope - likely vagal. F/u Biotel.\par BP control. D/c HCTZ - likely orthostatic. C/w current therapy otherwise. If BP is high, will increase Norvasc to 5 mg.\par \par F/u in 3-4 weeks

## 2020-05-22 NOTE — PHYSICAL EXAM
[General Appearance - Well Developed] : well developed [Normal Appearance] : normal appearance [Well Groomed] : well groomed [No Deformities] : no deformities [General Appearance - In No Acute Distress] : no acute distress [General Appearance - Well Nourished] : well nourished [Normal Conjunctiva] : the conjunctiva exhibited no abnormalities [Normal Oral Mucosa] : normal oral mucosa [No Oral Pallor] : no oral pallor [] : no respiratory distress [FreeTextEntry1] : no JVD [Respiration, Rhythm And Depth] : normal respiratory rhythm and effort [Auscultation Breath Sounds / Voice Sounds] : lungs were clear to auscultation bilaterally [Heart Rate And Rhythm] : heart rate and rhythm were normal [Heart Sounds] : normal S1 and S2 [Murmurs] : no murmurs present [Bowel Sounds] : normal bowel sounds [Edema] : no peripheral edema present [Abdomen Tenderness] : non-tender [Abdomen Soft] : soft [Cyanosis, Localized] : no localized cyanosis [Nail Clubbing] : no clubbing of the fingernails [Abnormal Walk] : normal gait [Skin Color & Pigmentation] : normal skin color and pigmentation [Oriented To Time, Place, And Person] : oriented to person, place, and time [No Venous Stasis] : no venous stasis [Affect] : the affect was normal

## 2020-05-22 NOTE — HISTORY OF PRESENT ILLNESS
[FreeTextEntry1] : 59 y/o male presenting for f/u of syncope. Had several episodes of syncope or near syncope, no palpitations, no CP, no dyspnea. Since the last visit patient had suffered a CVA, was managed in the [facility], started on high dose statin and ASA 81 mg. MRA c/w intracranial vascular disease. No edema. He restarted Norvasc and his BP is now controlled. He had repeat echo 05/2020 at [facility].  Echo revealed normal LV function, mild AS and mild AI. He reports fatigue and MORALES. No active chest pain. Abnormal ECG. He had a stress echo last year - no ischemia, preserved LV function.

## 2020-05-28 ENCOUNTER — APPOINTMENT (OUTPATIENT)
Dept: NEUROLOGY | Facility: CLINIC | Age: 60
End: 2020-05-28

## 2020-05-28 ENCOUNTER — APPOINTMENT (OUTPATIENT)
Dept: NEUROLOGY | Facility: CLINIC | Age: 60
End: 2020-05-28
Payer: COMMERCIAL

## 2020-05-28 PROCEDURE — 95816 EEG AWAKE AND DROWSY: CPT

## 2020-06-09 ENCOUNTER — APPOINTMENT (OUTPATIENT)
Dept: CARDIOLOGY | Facility: CLINIC | Age: 60
End: 2020-06-09
Payer: COMMERCIAL

## 2020-06-09 PROCEDURE — 99442: CPT

## 2020-06-11 ENCOUNTER — APPOINTMENT (OUTPATIENT)
Dept: CARDIOLOGY | Facility: CLINIC | Age: 60
End: 2020-06-11
Payer: COMMERCIAL

## 2020-06-11 VITALS
WEIGHT: 156 LBS | HEIGHT: 66 IN | TEMPERATURE: 97.3 F | BODY MASS INDEX: 25.07 KG/M2 | HEART RATE: 76 BPM | DIASTOLIC BLOOD PRESSURE: 84 MMHG | SYSTOLIC BLOOD PRESSURE: 134 MMHG

## 2020-06-11 PROCEDURE — 99213 OFFICE O/P EST LOW 20 MIN: CPT

## 2020-06-11 RX ORDER — HYDROCHLOROTHIAZIDE 12.5 MG/1
12.5 TABLET ORAL
Qty: 30 | Refills: 0 | Status: COMPLETED | COMMUNITY
Start: 2019-08-02 | End: 2020-06-11

## 2020-06-11 NOTE — PHYSICAL EXAM
[No Oral Pallor] : no oral pallor [No Oral Cyanosis] : no oral cyanosis [Normal Oral Mucosa] : normal oral mucosa [Normal Jugular Venous A Waves Present] : normal jugular venous A waves present [Normal Jugular Venous V Waves Present] : normal jugular venous V waves present [Heart Rate And Rhythm] : heart rate and rhythm were normal [Heart Sounds] : normal S1 and S2 [No Jugular Venous [place] A Waves] : no jugular venous [place] A waves [Murmurs] : no murmurs present [Respiration, Rhythm And Depth] : normal respiratory rhythm and effort [Exaggerated Use Of Accessory Muscles For Inspiration] : no accessory muscle use [Auscultation Breath Sounds / Voice Sounds] : lungs were clear to auscultation bilaterally [Cyanosis, Localized] : no localized cyanosis [Nail Clubbing] : no clubbing of the fingernails [Petechial Hemorrhages (___cm)] : no petechial hemorrhages [] : no ischemic changes

## 2020-06-19 ENCOUNTER — NON-APPOINTMENT (OUTPATIENT)
Age: 60
End: 2020-06-19

## 2020-06-25 ENCOUNTER — OUTPATIENT (OUTPATIENT)
Dept: ADMINISTRATIVE | Facility: HOSPITAL | Age: 60
LOS: 1 days | Discharge: HOME | End: 2020-06-25
Payer: COMMERCIAL

## 2020-06-25 ENCOUNTER — RESULT REVIEW (OUTPATIENT)
Age: 60
End: 2020-06-25

## 2020-06-25 DIAGNOSIS — I47.2 VENTRICULAR TACHYCARDIA: ICD-10-CM

## 2020-06-25 PROCEDURE — 75574 CT ANGIO HRT W/3D IMAGE: CPT | Mod: 26

## 2020-07-10 ENCOUNTER — APPOINTMENT (OUTPATIENT)
Dept: CARDIOLOGY | Facility: CLINIC | Age: 60
End: 2020-07-10
Payer: COMMERCIAL

## 2020-07-10 VITALS
WEIGHT: 155 LBS | SYSTOLIC BLOOD PRESSURE: 132 MMHG | HEART RATE: 76 BPM | DIASTOLIC BLOOD PRESSURE: 80 MMHG | BODY MASS INDEX: 25.02 KG/M2 | TEMPERATURE: 97.6 F

## 2020-07-10 PROCEDURE — 93000 ELECTROCARDIOGRAM COMPLETE: CPT

## 2020-07-10 PROCEDURE — 99214 OFFICE O/P EST MOD 30 MIN: CPT

## 2020-07-10 NOTE — ASSESSMENT
[FreeTextEntry1] : Echo noted - mild aortic valve disease\par Stress test November 2019 - negative for ischemia.\par CCTA - non-obstructive CAD\par NSVT on MCOT - EP evaluation appreciated.\par C/w Metoprolol. Will use 12.5 mg, given the bradycardia episodes. Restart Norvasc 2.5 mg QD.\par CVA - f/u with neurology, c/w ASA.\par High dose statin.\par C/w current therapy otherwise. If BP is high, will increase Norvasc to 5 mg.\par \par F/u in 3 months.

## 2020-07-10 NOTE — HISTORY OF PRESENT ILLNESS
[FreeTextEntry1] : 59 y/o male presenting for f/u of NSVT. S/p CVA, was managed in the [facility], started on high dose statin and ASA 81 mg. MRA c/w intracranial vascular disease. Had no further syncope or near syncope, no palpitations, no CP, no dyspnea. MCOT revealed NSVT.  He had a stress echo last year - no ischemia, preserved LV function. CCTA was done to further assess for CAD and revealed mild stenosis only. No edema. He is off Norvasc and on Metoprolol and his BP is now occasionally elevated, with HR down to 50 range, on one occasion down to 40's as per his wife. He had repeat echo 05/2020 at [facility].  Echo revealed normal LV function, mild AS and mild AI. He reports fatigue and MORALES.

## 2020-07-10 NOTE — PHYSICAL EXAM
[General Appearance - Well Developed] : well developed [Normal Appearance] : normal appearance [Well Groomed] : well groomed [General Appearance - Well Nourished] : well nourished [No Deformities] : no deformities [General Appearance - In No Acute Distress] : no acute distress [Normal Conjunctiva] : the conjunctiva exhibited no abnormalities [Normal Oral Mucosa] : normal oral mucosa [No Oral Pallor] : no oral pallor [FreeTextEntry1] : no JVD [] : no respiratory distress [Respiration, Rhythm And Depth] : normal respiratory rhythm and effort [Auscultation Breath Sounds / Voice Sounds] : lungs were clear to auscultation bilaterally [Heart Rate And Rhythm] : heart rate and rhythm were normal [Heart Sounds] : normal S1 and S2 [Murmurs] : no murmurs present [Edema] : no peripheral edema present [Bowel Sounds] : normal bowel sounds [Abdomen Soft] : soft [Abdomen Tenderness] : non-tender [Abnormal Walk] : normal gait [Nail Clubbing] : no clubbing of the fingernails [Cyanosis, Localized] : no localized cyanosis [Skin Color & Pigmentation] : normal skin color and pigmentation [No Venous Stasis] : no venous stasis [Oriented To Time, Place, And Person] : oriented to person, place, and time [Affect] : the affect was normal

## 2020-07-14 DIAGNOSIS — I47.2 VENTRICULAR TACHYCARDIA: ICD-10-CM

## 2020-07-15 RX ORDER — SIMVASTATIN 20 MG/1
0 TABLET, FILM COATED ORAL
Qty: 0 | Refills: 0 | DISCHARGE

## 2020-07-15 RX ORDER — OLMESARTAN MEDOXOMIL / AMLODIPINE BESYLATE / HYDROCHLOROTHIAZIDE 40; 10; 25 MG/1; MG/1; MG/1
0 TABLET, FILM COATED ORAL
Qty: 0 | Refills: 0 | DISCHARGE

## 2020-07-20 ENCOUNTER — APPOINTMENT (OUTPATIENT)
Dept: NEUROLOGY | Facility: CLINIC | Age: 60
End: 2020-07-20
Payer: COMMERCIAL

## 2020-07-20 VITALS
HEART RATE: 70 BPM | WEIGHT: 155 LBS | HEIGHT: 66 IN | SYSTOLIC BLOOD PRESSURE: 142 MMHG | OXYGEN SATURATION: 98 % | DIASTOLIC BLOOD PRESSURE: 86 MMHG | BODY MASS INDEX: 24.91 KG/M2 | TEMPERATURE: 97.6 F

## 2020-07-20 PROBLEM — I10 ESSENTIAL (PRIMARY) HYPERTENSION: Chronic | Status: ACTIVE | Noted: 2020-04-27

## 2020-07-20 PROBLEM — I63.9 CEREBRAL INFARCTION, UNSPECIFIED: Chronic | Status: ACTIVE | Noted: 2020-05-02

## 2020-07-20 PROCEDURE — 99214 OFFICE O/P EST MOD 30 MIN: CPT

## 2020-07-20 NOTE — HISTORY OF PRESENT ILLNESS
[FreeTextEntry1] : Mr. Oscar is a 59yo man here for follow up for lacunar stroke back in April 2020.  He was amdited to the hospital on 4/27-4/29/2020 and was found to have acute lacunar infarcts in the right MCA territory.  He has hypoplastic right MCA.  He was placed on aspirin and high dose statin and discharged with PT/OT.  He has been doing well since then however over last few days he had episodes of left hand weakness transiently and feels he is weaker on the left.  He has been worked up for syncope and recently has been having low blood pressure and episodes of bradycardia.  He is not drinking much fluids daily and is not eating well according to his wife.

## 2020-07-20 NOTE — DISCUSSION/SUMMARY
[Antithrombotic therapy with ___] : antithrombotic therapy with  [unfilled] [Intensive Blood Pressure Control] : intensive blood pressure control [Goals and Counseling] : I have reviewed the goals of stroke risk factor modification. I counseled the patient on measures to reduce stroke risk, including the importance of medication compliance, risk factor control, exercise, healthy diet and avoidance of smoking. I reviewed stroke warning signs and symptoms and appropriate actions to take if such occur. [Patient encouraged to discuss with Primary MD] : I encouraged the patient to discuss these important issues with ~his/her~ primary care doctor [Lipid Lowering Therapy] : lipid lowering therapy [FreeTextEntry1] : Mr. Oscar is  59yo man with recent stroke in 4/2020 thought to be due to his vascular risk factors and hyperlipidemia and blood pressure.\par 1. Continue aspirin and statin goal LDL 70\par 2. BP management (goal SBP <140\par 3. OT for gait improvement\par 4. f/u in 3-4 months\par 5. 48 hour aEEG (due too abnormal REEG and multiple syncopal episodes)

## 2020-07-20 NOTE — PHYSICAL EXAM
[FreeTextEntry1] : a+Ox3 language and attention chaparrita\par Left faical tongue midline\par EOMI, facial sensation symmetric\par No drift power 5-/5 in LUE proximally otherwise 5/5\par LLE 5/5\par Sensory symmetric to Temp, Vib\par FTN NL\par Gait slightly hemiparetic mostly due too speed of left leg when walking\par \par NIHSS 1\par mrankin 2\par

## 2020-09-06 NOTE — ASSESSMENT
[FreeTextEntry1] : NSVT- asymptomatic\par - pt has preserved LV function\par - r/o CAD with CTA\par - cont monitoring\par - cont BB

## 2020-09-06 NOTE — HISTORY OF PRESENT ILLNESS
[FreeTextEntry1] : 61 y/o male presenting for f/u of syncope. Had several episodes of syncope or near syncope, no palpitations, no CP, no dyspnea. No edema. Has systolic murmur. He restarted Norvasc and his BP is now controlled. Echo revealed normal LV function, mild AS and mod AI. He reports fatigue and MORALES. No active chest pain. Abnormal ECG - ST-T changes laterally. Chest CT noted. had a stress echo today - no ischemia, preserved LV function. \par \par EKG NSR

## 2020-09-25 ENCOUNTER — APPOINTMENT (OUTPATIENT)
Dept: CARDIOLOGY | Facility: CLINIC | Age: 60
End: 2020-09-25

## 2020-10-09 ENCOUNTER — APPOINTMENT (OUTPATIENT)
Dept: CARDIOLOGY | Facility: CLINIC | Age: 60
End: 2020-10-09
Payer: COMMERCIAL

## 2020-10-09 VITALS
RESPIRATION RATE: 12 BRPM | BODY MASS INDEX: 25.99 KG/M2 | OXYGEN SATURATION: 100 % | WEIGHT: 161 LBS | HEART RATE: 63 BPM | SYSTOLIC BLOOD PRESSURE: 130 MMHG | DIASTOLIC BLOOD PRESSURE: 75 MMHG

## 2020-10-09 PROCEDURE — 99214 OFFICE O/P EST MOD 30 MIN: CPT

## 2020-10-09 PROCEDURE — 93000 ELECTROCARDIOGRAM COMPLETE: CPT

## 2020-10-09 NOTE — ASSESSMENT
[FreeTextEntry1] : Echo noted - mild to moderate aortic valve disease\par Stress test November 2019 - negative for ischemia.\par CCTA - non-obstructive CAD\par NSVT on MCOT - EP evaluation appreciated.\par C/w Metoprolol. Will use 12.5 mg, given the bradycardia episodes. Restart Norvasc 2.5 mg QD (at night).\par CVA - f/u with neurology, c/w ASA.\par High dose statin.\par C/w current therapy otherwise.\par Repeat 2d echo to re-assess the dasha function (AI), assess for any change in LV function, given the NSVT.\par EP follow-up - consider Loop recorder, given CVA, NSVT and Event monitor findings.\par F/u after  the echo and EP evaluation.\par \par

## 2020-10-09 NOTE — HISTORY OF PRESENT ILLNESS
[FreeTextEntry1] : 59 y/o male presenting for f/u of NSVT. S/p CVA, was managed in the [facility], started on high dose statin and ASA 81 mg. MRA c/w intracranial vascular disease. Had no further syncope or near syncope, no palpitations, no CP, no dyspnea. MCOT revealed NSVT.  He had a stress echo last year - no ischemia, preserved LV function. CCTA was done to further assess for CAD and revealed mild stenosis only. No edema.He had repeat echo 05/2020 at [facility].  Echo revealed normal LV function, mild AS and moderate AI. He reports fatigue and MORALES, worse with exertion. + palpitations, near-syncope, but no syncope. He has a neurology f/u scheduled.

## 2020-10-09 NOTE — PHYSICAL EXAM
[General Appearance - Well Developed] : well developed [Normal Appearance] : normal appearance [Well Groomed] : well groomed [General Appearance - Well Nourished] : well nourished [No Deformities] : no deformities [General Appearance - In No Acute Distress] : no acute distress [Normal Conjunctiva] : the conjunctiva exhibited no abnormalities [FreeTextEntry1] : no JVD [] : no respiratory distress [Respiration, Rhythm And Depth] : normal respiratory rhythm and effort [Auscultation Breath Sounds / Voice Sounds] : lungs were clear to auscultation bilaterally [Heart Rate And Rhythm] : heart rate and rhythm were normal [Heart Sounds] : normal S1 and S2 [Murmurs] : no murmurs present [Edema] : no peripheral edema present [Bowel Sounds] : normal bowel sounds [Abdomen Soft] : soft [Abdomen Tenderness] : non-tender [Abnormal Walk] : normal gait [Nail Clubbing] : no clubbing of the fingernails [Cyanosis, Localized] : no localized cyanosis [Skin Color & Pigmentation] : normal skin color and pigmentation [No Venous Stasis] : no venous stasis [Oriented To Time, Place, And Person] : oriented to person, place, and time [Affect] : the affect was normal

## 2020-10-12 ENCOUNTER — APPOINTMENT (OUTPATIENT)
Dept: NEUROLOGY | Facility: CLINIC | Age: 60
End: 2020-10-12
Payer: COMMERCIAL

## 2020-10-12 PROCEDURE — 95721 EEG PHY/QHP>36<60 HR W/O VID: CPT

## 2020-10-12 PROCEDURE — 95708 EEG WO VID EA 12-26HR UNMNTR: CPT

## 2020-10-12 PROCEDURE — 95700 EEG CONT REC W/VID EEG TECH: CPT

## 2020-10-14 ENCOUNTER — APPOINTMENT (OUTPATIENT)
Dept: NEUROLOGY | Facility: CLINIC | Age: 60
End: 2020-10-14
Payer: COMMERCIAL

## 2020-10-14 PROCEDURE — ZZZZZ: CPT

## 2020-10-19 ENCOUNTER — APPOINTMENT (OUTPATIENT)
Dept: NEUROLOGY | Facility: CLINIC | Age: 60
End: 2020-10-19
Payer: COMMERCIAL

## 2020-10-19 VITALS
OXYGEN SATURATION: 98 % | HEIGHT: 66 IN | DIASTOLIC BLOOD PRESSURE: 85 MMHG | SYSTOLIC BLOOD PRESSURE: 156 MMHG | BODY MASS INDEX: 25.88 KG/M2 | TEMPERATURE: 97.8 F | WEIGHT: 161 LBS | HEART RATE: 58 BPM

## 2020-10-19 PROCEDURE — 99214 OFFICE O/P EST MOD 30 MIN: CPT

## 2020-10-19 PROCEDURE — 99072 ADDL SUPL MATRL&STAF TM PHE: CPT

## 2020-10-19 NOTE — PHYSICAL EXAM
[FreeTextEntry1] : a+Ox3 language and attention normal\par CN 2-12 normal\par power 5/5\par Temp, Vib symmetric\par FTN NL gait slow and cautious\par No neglect \par \par NIHSS 0\par mrankin 0\par

## 2020-10-19 NOTE — HISTORY OF PRESENT ILLNESS
[FreeTextEntry1] : Mr. Oscar is a 61yo man here for follow up of his right MCA-NORMAN watershed infarcts.  He was found to have stenosis in his right mca and NORMAN origins.  Since his last visit his wife has said he has had multiple episodes of syncope and multiple episodes of transient weakness on the left side and also episodes of slurred speech.  He has been monitoring his BP and pulse frequently and sometime he will hold his metoprolol if his pulse is less than 55.  He is not drinking much water because he says it makes him urinate frequently.\par He is compliant with his medications

## 2020-10-19 NOTE — REVIEW OF SYSTEMS
[Feeling Tired] : feeling tired [As Noted in HPI] : as noted in HPI [Depression] : depression [Negative] : Eyes [de-identified] : recently started effexor 3 weeks ago

## 2020-10-19 NOTE — DISCUSSION/SUMMARY
[Antithrombotic therapy with ___] : antithrombotic therapy with  [unfilled] [Patient encouraged to discuss with Primary MD] : I encouraged the patient to discuss these important issues with ~his/her~ primary care doctor [Goals and Counseling] : I have reviewed the goals of stroke risk factor modification. I counseled the patient on measures to reduce stroke risk, including the importance of medication compliance, risk factor control, exercise, healthy diet and avoidance of smoking. I reviewed stroke warning signs and symptoms and appropriate actions to take if such occur. [FreeTextEntry1] : Mr. Oscar is a 59yo man with right MCA/NORMAN watershed stroke in 4/2020 who has been having multiple TIA episodes involving weakness on the left side and slurred speech. He has also had multiple syncopal episodes over last few months.  I educated on adequate hydration and potential risk of new strokes given these syncopal episodes.\par 1. MRI brain w/o JOSE\par 2. Repeat CTA H+N\par 3. Refer to Dr. Guajardo neuroendovascular\par 4. If any progression of disease in vessels would switch to plavix 75mg QD\par 5. Drink 3L of water per day\par 6. f/u with Dr. Staton for BP and pulse evaluation\par

## 2020-11-11 ENCOUNTER — APPOINTMENT (OUTPATIENT)
Dept: CARDIOLOGY | Facility: CLINIC | Age: 60
End: 2020-11-11
Payer: COMMERCIAL

## 2020-11-11 PROCEDURE — 93306 TTE W/DOPPLER COMPLETE: CPT

## 2020-11-11 PROCEDURE — 99072 ADDL SUPL MATRL&STAF TM PHE: CPT

## 2020-11-11 PROCEDURE — 99213 OFFICE O/P EST LOW 20 MIN: CPT | Mod: 25

## 2020-11-11 NOTE — ASSESSMENT
[FreeTextEntry1] : Echo noted - mild to moderate aortic valve disease - stable.\par Stress test November 2019 - negative for ischemia.\par CCTA - non-obstructive CAD\par NSVT on MCOT - EP evaluation appreciated.\par Off Metoprolol - could not tolerate due to bradycardia. C/w Norvasc 2.5 mg QD (at night), increase to 5 mg if BP is elevated.\par CVA - f/u with neurology, c/w Plavix.\par High dose statin.\par C/w current therapy otherwise.\par \par EP follow-up - consider Loop recorder, given CVA, NSVT and Event monitor findings. F/u with Dr. Santoyo.\par F/u after  the EP evaluation.\par \par

## 2020-11-11 NOTE — HISTORY OF PRESENT ILLNESS
[FreeTextEntry1] : 59 y/o male presenting for f/u of NSVT, s/p CVA, was managed in the [facility], started on high dose statin and ASA 81 mg. Switched to Plavix by neurology recently. MRA c/w intracranial vascular disease. Had no further syncope or near syncope, no palpitations, no CP, no dyspnea. MCOT revealed NSVT, but no AF.  He was started on b-blocker, but this was stoopped due to sinus bradycardia. He had a stress echo last year - no ischemia, preserved LV function. CCTA was done to further assess for CAD and revealed mild stenosis only. No edema.He had repeat echo 05/2020 at [facility].  Echo revealed normal LV function, mild AS and moderate AI. He reports fatigue and MORALES, worse with exertion. + palpitations, near-syncope, but no syncope. He has a neurology and EP f/u scheduled.\par Had echo today - results reviewed with the patient and wife. No significant change from prior.

## 2020-11-17 ENCOUNTER — APPOINTMENT (OUTPATIENT)
Dept: NEUROLOGY | Facility: CLINIC | Age: 60
End: 2020-11-17

## 2020-12-15 ENCOUNTER — APPOINTMENT (OUTPATIENT)
Dept: NEUROLOGY | Facility: CLINIC | Age: 60
End: 2020-12-15
Payer: COMMERCIAL

## 2020-12-15 VITALS
HEIGHT: 66 IN | BODY MASS INDEX: 27.16 KG/M2 | SYSTOLIC BLOOD PRESSURE: 131 MMHG | OXYGEN SATURATION: 99 % | WEIGHT: 169 LBS | HEART RATE: 91 BPM | DIASTOLIC BLOOD PRESSURE: 93 MMHG | TEMPERATURE: 97.7 F

## 2020-12-15 PROCEDURE — 99072 ADDL SUPL MATRL&STAF TM PHE: CPT

## 2020-12-15 PROCEDURE — 99204 OFFICE O/P NEW MOD 45 MIN: CPT

## 2020-12-15 RX ORDER — ASPIRIN 81 MG/1
81 TABLET, COATED ORAL
Qty: 30 | Refills: 0 | Status: DISCONTINUED | COMMUNITY
Start: 2020-06-02 | End: 2020-12-15

## 2021-01-18 ENCOUNTER — OUTPATIENT (OUTPATIENT)
Dept: OUTPATIENT SERVICES | Facility: HOSPITAL | Age: 61
LOS: 1 days | Discharge: HOME | End: 2021-01-18
Payer: MEDICAID

## 2021-01-18 VITALS
RESPIRATION RATE: 18 BRPM | TEMPERATURE: 98 F | WEIGHT: 160.94 LBS | OXYGEN SATURATION: 100 % | HEIGHT: 66 IN | DIASTOLIC BLOOD PRESSURE: 83 MMHG | SYSTOLIC BLOOD PRESSURE: 139 MMHG | HEART RATE: 73 BPM

## 2021-01-18 DIAGNOSIS — Z01.818 ENCOUNTER FOR OTHER PREPROCEDURAL EXAMINATION: ICD-10-CM

## 2021-01-18 DIAGNOSIS — I67.9 CEREBROVASCULAR DISEASE, UNSPECIFIED: ICD-10-CM

## 2021-01-18 LAB
ALBUMIN SERPL ELPH-MCNC: 4.3 G/DL — SIGNIFICANT CHANGE UP (ref 3.5–5.2)
ALP SERPL-CCNC: 113 U/L — SIGNIFICANT CHANGE UP (ref 30–115)
ALT FLD-CCNC: 30 U/L — SIGNIFICANT CHANGE UP (ref 0–41)
ANION GAP SERPL CALC-SCNC: 12 MMOL/L — SIGNIFICANT CHANGE UP (ref 7–14)
APTT BLD: 31.3 SEC — SIGNIFICANT CHANGE UP (ref 27–39.2)
AST SERPL-CCNC: 17 U/L — SIGNIFICANT CHANGE UP (ref 0–41)
BASOPHILS # BLD AUTO: 0.1 K/UL — SIGNIFICANT CHANGE UP (ref 0–0.2)
BASOPHILS NFR BLD AUTO: 1 % — SIGNIFICANT CHANGE UP (ref 0–1)
BILIRUB SERPL-MCNC: <0.2 MG/DL — SIGNIFICANT CHANGE UP (ref 0.2–1.2)
BUN SERPL-MCNC: 25 MG/DL — HIGH (ref 10–20)
CALCIUM SERPL-MCNC: 9.9 MG/DL — SIGNIFICANT CHANGE UP (ref 8.5–10.1)
CHLORIDE SERPL-SCNC: 105 MMOL/L — SIGNIFICANT CHANGE UP (ref 98–110)
CO2 SERPL-SCNC: 25 MMOL/L — SIGNIFICANT CHANGE UP (ref 17–32)
CREAT SERPL-MCNC: 1.2 MG/DL — SIGNIFICANT CHANGE UP (ref 0.7–1.5)
EOSINOPHIL # BLD AUTO: 0.18 K/UL — SIGNIFICANT CHANGE UP (ref 0–0.7)
EOSINOPHIL NFR BLD AUTO: 1.8 % — SIGNIFICANT CHANGE UP (ref 0–8)
GLUCOSE SERPL-MCNC: 74 MG/DL — SIGNIFICANT CHANGE UP (ref 70–99)
HCT VFR BLD CALC: 46.8 % — SIGNIFICANT CHANGE UP (ref 42–52)
HGB BLD-MCNC: 14.5 G/DL — SIGNIFICANT CHANGE UP (ref 14–18)
IMM GRANULOCYTES NFR BLD AUTO: 0.9 % — HIGH (ref 0.1–0.3)
INR BLD: 0.99 RATIO — SIGNIFICANT CHANGE UP (ref 0.65–1.3)
LYMPHOCYTES # BLD AUTO: 3.4 K/UL — SIGNIFICANT CHANGE UP (ref 1.2–3.4)
LYMPHOCYTES # BLD AUTO: 34.6 % — SIGNIFICANT CHANGE UP (ref 20.5–51.1)
MCHC RBC-ENTMCNC: 28.6 PG — SIGNIFICANT CHANGE UP (ref 27–31)
MCHC RBC-ENTMCNC: 31 G/DL — LOW (ref 32–37)
MCV RBC AUTO: 92.3 FL — SIGNIFICANT CHANGE UP (ref 80–94)
MONOCYTES # BLD AUTO: 0.72 K/UL — HIGH (ref 0.1–0.6)
MONOCYTES NFR BLD AUTO: 7.3 % — SIGNIFICANT CHANGE UP (ref 1.7–9.3)
NEUTROPHILS # BLD AUTO: 5.33 K/UL — SIGNIFICANT CHANGE UP (ref 1.4–6.5)
NEUTROPHILS NFR BLD AUTO: 54.4 % — SIGNIFICANT CHANGE UP (ref 42.2–75.2)
NRBC # BLD: 0 /100 WBCS — SIGNIFICANT CHANGE UP (ref 0–0)
PLATELET # BLD AUTO: 384 K/UL — SIGNIFICANT CHANGE UP (ref 130–400)
POTASSIUM SERPL-MCNC: 4.5 MMOL/L — SIGNIFICANT CHANGE UP (ref 3.5–5)
POTASSIUM SERPL-SCNC: 4.5 MMOL/L — SIGNIFICANT CHANGE UP (ref 3.5–5)
PROT SERPL-MCNC: 7.5 G/DL — SIGNIFICANT CHANGE UP (ref 6–8)
PROTHROM AB SERPL-ACNC: 11.4 SEC — SIGNIFICANT CHANGE UP (ref 9.95–12.87)
RBC # BLD: 5.07 M/UL — SIGNIFICANT CHANGE UP (ref 4.7–6.1)
RBC # FLD: 13.9 % — SIGNIFICANT CHANGE UP (ref 11.5–14.5)
SODIUM SERPL-SCNC: 142 MMOL/L — SIGNIFICANT CHANGE UP (ref 135–146)
WBC # BLD: 9.82 K/UL — SIGNIFICANT CHANGE UP (ref 4.8–10.8)
WBC # FLD AUTO: 9.82 K/UL — SIGNIFICANT CHANGE UP (ref 4.8–10.8)

## 2021-01-18 PROCEDURE — 93010 ELECTROCARDIOGRAM REPORT: CPT

## 2021-01-18 RX ORDER — OLMESARTAN MEDOXOMIL / AMLODIPINE BESYLATE / HYDROCHLOROTHIAZIDE 40; 10; 25 MG/1; MG/1; MG/1
0 TABLET, FILM COATED ORAL
Qty: 0 | Refills: 0 | DISCHARGE

## 2021-01-18 NOTE — H&P PST ADULT - HISTORY OF PRESENT ILLNESS
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 Currently he is on Plavix and Statin for stroke. On reviewing his CTA of head and neck performed on April 2020, to my eyes he has an excentric filling defect of proximal right M1, concentric point stenosis of distal right M1, and occlusion or very severe stenosis of right A1. These CTA findings are isolated and was not seen in the other cerebrovasculature. His Coronary Ct on 6/25/2020 reported mild atherosclerosis. He denied any head trauma, infection or inflammation explaining the above solitary findings.  I reviewed the brain MRI images and CTA images with the patient and his son and explained to them what the problem is. They expressed their understanding and agreed to do the following plans:    -Avoid dehydration, diuretics and hypotension.  -Continue Plavix and Statin for now  -Daily aerobic exercise for about 30min and stop smoking.  -Obtain the copy of brain MRI obtained in the other Center for comparison with the brain MRI in April 2020.  -Undergo diagnostic cerebral angiogram for better characterization of the lesion and plan of management.   -Medical management per PMD.    1/18/2021  pt w hx cva, neuro wants to know how bad cerebral stenosis is so now for planned procedure    PATIENT CURRENTLY DENIES CHEST PAIN  SHORTNESS OF BREATH  PALPITATIONS,  DYSURIA, OR UPPER RESPIRATORY INFECTION IN PAST 2 WEEKS  EXERCISE  TOLERANCE  1-2 FLIGHT OF STAIRS  WITHOUT SHORTNESS OF BREATH  denies travel outside the [place] in the past 30 days  pt denies any covid s/s, or tested positive in the past  pt advised self quarantine till day of procedure  Anesthesia Alert  NO--Difficult Airway  NO--History of neck surgery or radiation  NO--Limited ROM of neck  NO--History of Malignant hyperthermia  NO--No personal or family history of Pseudocholinesterase deficiency.  NO--Prior Anesthesia Complication  NO--Latex Allergy  NO--Loose teeth  NO--History of Rheumatoid Arthritis  NO--BENSON  NO--Other_____     Encounter for other preprocedural examination    Cerebrovascular disease    ^CEREBRAL ANGIOGRAM I67.9                                                      RADIOLOGY 2/3/21    No pertinent family history in first degree relatives    Encounter for other preprocedural examination    Cerebrovascular disease    CVA (cerebral vascular accident)    Psoriasis    HTN (hypertension)    No significant past surgical history

## 2021-01-18 NOTE — H&P PST ADULT - NSANTHOSAYNRD_GEN_A_CORE
No. BENSON screening performed.  STOP BANG Legend: 0-2 = LOW Risk; 3-4 = INTERMEDIATE Risk; 5-8 = HIGH Risk

## 2021-01-31 ENCOUNTER — LABORATORY RESULT (OUTPATIENT)
Age: 61
End: 2021-01-31

## 2021-01-31 ENCOUNTER — OUTPATIENT (OUTPATIENT)
Dept: OUTPATIENT SERVICES | Facility: HOSPITAL | Age: 61
LOS: 1 days | Discharge: HOME | End: 2021-01-31

## 2021-01-31 DIAGNOSIS — Z11.59 ENCOUNTER FOR SCREENING FOR OTHER VIRAL DISEASES: ICD-10-CM

## 2021-02-03 ENCOUNTER — RESULT REVIEW (OUTPATIENT)
Age: 61
End: 2021-02-03

## 2021-02-03 ENCOUNTER — OUTPATIENT (OUTPATIENT)
Dept: OUTPATIENT SERVICES | Facility: HOSPITAL | Age: 61
LOS: 1 days | Discharge: HOME | End: 2021-02-03

## 2021-02-03 ENCOUNTER — APPOINTMENT (OUTPATIENT)
Dept: NEUROLOGY | Facility: HOSPITAL | Age: 61
End: 2021-02-03
Payer: COMMERCIAL

## 2021-02-03 VITALS — HEIGHT: 66 IN | WEIGHT: 160.94 LBS

## 2021-02-03 PROCEDURE — 36224 PLACE CATH CAROTD ART: CPT | Mod: 50

## 2021-02-03 PROCEDURE — 36226 PLACE CATH VERTEBRAL ART: CPT | Mod: 50

## 2021-02-03 PROCEDURE — 36227 PLACE CATH XTRNL CAROTID: CPT | Mod: 50

## 2021-02-03 PROCEDURE — 76377 3D RENDER W/INTRP POSTPROCES: CPT | Mod: 26

## 2021-02-03 PROCEDURE — 76937 US GUIDE VASCULAR ACCESS: CPT | Mod: 26

## 2021-02-03 NOTE — CHART NOTE - NSCHARTNOTEFT_GEN_A_CORE
PACU ANESTHESIA ADMISSION NOTE      Procedure:   Post op diagnosis:      ____  Intubated  TV:______       Rate: ______      FiO2: ______    __x__  Patent Airway    __x__  Full return of protective reflexes    __x__  Full recovery from anesthesia / back to baseline status    Vitals:  T(C): --  HR: --  BP: --  RR: --  SpO2: --    Mental Status:  __x__ Awake   ___x__ Alert   _____ Drowsy   _____ Sedated    Nausea/Vomiting:  __x__ NO  ______Yes,   See Post - Op Orders          Pain Scale (0-10):  _____    Treatment: ____ None    __x__ See Post - Op/PCA Orders    Post - Operative Fluids:   ____ Oral   __x__ See Post - Op Orders    Plan: Discharge:   __X__Home       _____Floor     _____Critical Care    _____  Other:_________________    Comments: Patient had smooth intraoperative event, no anesthesia complication.  PACU Vital signs: HR:  91            BP:  147     /86          RR:  18           O2 Sat:   98    %     Temp 97.5f

## 2021-02-05 NOTE — ASSESSMENT
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 Currently he is on Plavix and Statin for stroke. On reviewing his CTA of head and neck performed on April 2020, to my eyes he has an excentric filling defect of proximal right M1, concentric point stenosis of distal right M1, and occlusion or very severe stenosis of right A1. These CTA findings are isolated and was not seen in the other cerebrovasculature. His Coronary Ct on 6/25/2020 reported mild atherosclerosis. He denied any head trauma, infection or inflammation explaining the above solitary findings.\par I reviewed the brain MRI images and CTA images with the patient and his son and explained to them what the problem is. They expressed their understanding and agreed to do the following plans:\par \par -Avoid dehydration, diuretics and hypotension.\par -Continue Plavix and Statin for now\par -Daily aerobic exercise for about 30min and stop smoking.\par -Obtain the copy of brain MRI obtained in the other Center for comparison with the brain MRI in April 2020.\par -Undergo diagnostic cerebral angiogram for better characterization of the lesion and plan of management. \par -Medical management per PMD.\par -Stroke management per Dr. Martínez.\par -Call 911 if any acute neurological or medical problems. \par \par

## 2021-02-05 NOTE — PHYSICAL EXAM
[FreeTextEntry1] : NIH STROKE SCALE\par \par Item	                                                        Score\par 1 a.	Level of Consciousness	            0\par 1 b.          LOC Questions	                            0\par 1 c.	LOC Commands	                            0\par 2.	Best Gaze	                            0\par 3.	Visual	                                            0\par 4.	Facial Palsy                                         0\par 5 a.	Motor Arm - Left	                            0\par 5 b.	Motor Arm - Right	                            0\par 6 a.	Motor Leg - Left	                            0\par 6 b.	Motor Leg - Right	                            0\par 7.	Limb Ataxia	                            0\par 8.	Sensory	                                            0\par 9.	Language	                            0\par 10.	Dysarthria	                            0\par 11.	Extinction and Inattention  	            0\par ___________________________________________\par TOTAL	                                                            0\par \par mRS: 0 No symptoms at all\par \par Neurologic Exam:\par Mental status: Awake, alert and oriented x 4. Recent and remote memory intact. \par Language: Follows all commands. Naming, repetition and comprehension intact. Attention/concentration intact. No dysarthria, no aphasia.  Fund of knowledge appropriate.  \par Cranial nerves: Pupils equally round and reactive to light, visual fields full, no nystagmus, EOMI, face symmetric, hearing intact bilaterally, palate elevation symmetric, tongue was midline, sternocleidomastoid/ shoulder shrug strength bilaterally 5/5.  \par \par

## 2021-02-09 ENCOUNTER — APPOINTMENT (OUTPATIENT)
Dept: NEUROLOGY | Facility: CLINIC | Age: 61
End: 2021-02-09

## 2021-02-10 DIAGNOSIS — I10 ESSENTIAL (PRIMARY) HYPERTENSION: ICD-10-CM

## 2021-02-10 DIAGNOSIS — Z79.02 LONG TERM (CURRENT) USE OF ANTITHROMBOTICS/ANTIPLATELETS: ICD-10-CM

## 2021-02-10 DIAGNOSIS — I35.1 NONRHEUMATIC AORTIC (VALVE) INSUFFICIENCY: ICD-10-CM

## 2021-02-10 DIAGNOSIS — I66.9 OCCLUSION AND STENOSIS OF UNSPECIFIED CEREBRAL ARTERY: ICD-10-CM

## 2021-02-23 ENCOUNTER — APPOINTMENT (OUTPATIENT)
Dept: NEUROLOGY | Facility: CLINIC | Age: 61
End: 2021-02-23
Payer: COMMERCIAL

## 2021-02-23 VITALS
BODY MASS INDEX: 27 KG/M2 | SYSTOLIC BLOOD PRESSURE: 145 MMHG | TEMPERATURE: 96.9 F | HEART RATE: 101 BPM | WEIGHT: 168 LBS | OXYGEN SATURATION: 99 % | DIASTOLIC BLOOD PRESSURE: 97 MMHG | HEIGHT: 66 IN

## 2021-02-23 PROCEDURE — 99072 ADDL SUPL MATRL&STAF TM PHE: CPT

## 2021-02-23 PROCEDURE — 99214 OFFICE O/P EST MOD 30 MIN: CPT

## 2021-02-23 NOTE — PHYSICAL EXAM
[FreeTextEntry1] : NIH STROKE SCALE\par \par Item	                                                        Score\par 1 a.	Level of Consciousness	            0\par 1 b.          LOC Questions	                            0\par 1 c.	LOC Commands	                            0\par 2.	Best Gaze	                            0\par 3.	Visual	                                            0\par 4.	Facial Palsy                                         0\par 5 a.	Motor Arm - Left	                            0\par 5 b.	Motor Arm - Right	                            0\par 6 a.	Motor Leg - Left	                            0\par 6 b.	Motor Leg - Right	                            0\par 7.	Limb Ataxia	                            0\par 8.	Sensory	                                            0\par 9.	Language	                            0\par 10.	Dysarthria	                            0\par 11.	Extinction and Inattention  	            0\par ___________________________________________\par TOTAL	                                                            0\par \par mRS: 0 No symptoms at all\par \par Neurologic Exam:\par Mental status: Awake, alert and oriented x 4. Recent and remote memory intact. \par Language: Follows all commands. Naming, repetition and comprehension intact. Attention/concentration intact. No dysarthria, no aphasia.  Fund of knowledge appropriate.  \par Cranial nerves: Pupils equally round and reactive to light, visual fields full, no nystagmus, EOMI, face symmetric, hearing intact bilaterally, palate elevation symmetric, tongue was midline, sternocleidomastoid/ shoulder shrug strength bilaterally 5/5.  \par \par No groin tenderness or hematoma.\par

## 2021-02-23 NOTE — ASSESSMENT
[FreeTextEntry1] : Mr. Oscar is a 61yo right handed man w/h/o HTN and smoking who suffered from right hemispheric watershed infarction on April 2020, when his non-invasive cerebrovascular imaging showed right M1 and right A1 steno-occlussive disease. He has been experiencing tandem TIAs of the same side during past months. He underwent diagnostic cerebral imaging which not only confirmed the noninvasive imaging findings but also showed small short segment severe stenotic lesions in the left MCA territory as well. Today he is in the clinc with his wife and he denies any groin/leg problem or new neurological issues. Per his wife he is getting fatigue easily and not exercisising or drinking enough water. He denies FH of ischemic disease in young age and lately he had a cardiac test which has been reported normal. However he was found to have arhythmia and is receiving loop recorder.  He denies h/o head trauma, any type serious or chronic infection of the head or chronic inflammatory disease. His exam is unchanged in compare to prior exam.\par \par -Avoid dehydration, diuretics and hypotension.\par -Continue Plavix and Statin for now\par -Daily aerobic exercise for about 30min and stop smoking.\par -High resolution MR study of the right MCA/NORMAN as well as left M2/3 segment and Return To Clinic then. If patient cannot obtain the test within 2w we need to know.\par -Medical management per PMD.\par -Stroke management per Dr. Martínez.\par -Call 911 if any acute neurological or medical problems. \par \par

## 2021-02-23 NOTE — HISTORY OF PRESENT ILLNESS
[FreeTextEntry1] : Mr. Oscar is a 61yo right handed man w/h/o HTN and smoking who suffered from right hemispheric watershed infarction on April 2020, when his non-invasive cerebrovascular imaging showed right M1 and right A1 steno-occlussive disease. He has been experiencing tandem TIAs of the same side during past months. He underwent diagnostic cerebral imaging which not only confirmed the noninvasive imaging findings but also showed small short segment severe stenotic lesions in the left MCA territory as well. Today he is in the clinc with his wife and he denies any groin/leg problem or new neurological issues. Per his wife he is getting fatigue easily and not exercisising or drinking enough water. He denies FH of ischemic disease in young age and lately he had a cardiac test which has been reported normal. However he was found to have arhythmia and is receiving loop recorder.  He denies h/o head trauma, any type serious or chronic infection of the head or chronic inflammatory disease.\par

## 2021-02-24 NOTE — PHYSICAL THERAPY INITIAL EVALUATION ADULT - ASSISTIVE DEVICE:SUPINE/SIT, REHAB EVAL
Spoke with patient.  Labs are reassuring.  Feet seem to be stable or improving.  Will keep appointment next week.   bed rails

## 2021-04-15 ENCOUNTER — RX RENEWAL (OUTPATIENT)
Age: 61
End: 2021-04-15

## 2021-06-23 ENCOUNTER — INPATIENT (INPATIENT)
Facility: HOSPITAL | Age: 61
LOS: 13 days | Discharge: NURSING, REHAB OR LONG-TERM CARE | End: 2021-07-07
Attending: INTERNAL MEDICINE | Admitting: INTERNAL MEDICINE
Payer: MEDICAID

## 2021-06-23 VITALS
DIASTOLIC BLOOD PRESSURE: 91 MMHG | SYSTOLIC BLOOD PRESSURE: 137 MMHG | HEART RATE: 90 BPM | OXYGEN SATURATION: 97 % | HEIGHT: 66 IN | TEMPERATURE: 98 F | RESPIRATION RATE: 19 BRPM

## 2021-06-23 LAB
ALBUMIN SERPL ELPH-MCNC: 3.9 G/DL — SIGNIFICANT CHANGE UP (ref 3.5–5.2)
ALP SERPL-CCNC: 93 U/L — SIGNIFICANT CHANGE UP (ref 30–115)
ALT FLD-CCNC: 24 U/L — SIGNIFICANT CHANGE UP (ref 0–41)
ANION GAP SERPL CALC-SCNC: 6 MMOL/L — LOW (ref 7–14)
AST SERPL-CCNC: 19 U/L — SIGNIFICANT CHANGE UP (ref 0–41)
BASOPHILS # BLD AUTO: 0.1 K/UL — SIGNIFICANT CHANGE UP (ref 0–0.2)
BASOPHILS NFR BLD AUTO: 1.2 % — HIGH (ref 0–1)
BILIRUB SERPL-MCNC: <0.2 MG/DL — SIGNIFICANT CHANGE UP (ref 0.2–1.2)
BUN SERPL-MCNC: 18 MG/DL — SIGNIFICANT CHANGE UP (ref 10–20)
CALCIUM SERPL-MCNC: 9.1 MG/DL — SIGNIFICANT CHANGE UP (ref 8.5–10.1)
CHLORIDE SERPL-SCNC: 107 MMOL/L — SIGNIFICANT CHANGE UP (ref 98–110)
CO2 SERPL-SCNC: 28 MMOL/L — SIGNIFICANT CHANGE UP (ref 17–32)
CREAT SERPL-MCNC: 1.2 MG/DL — SIGNIFICANT CHANGE UP (ref 0.7–1.5)
EOSINOPHIL # BLD AUTO: 0.29 K/UL — SIGNIFICANT CHANGE UP (ref 0–0.7)
EOSINOPHIL NFR BLD AUTO: 3.6 % — SIGNIFICANT CHANGE UP (ref 0–8)
GLUCOSE SERPL-MCNC: 84 MG/DL — SIGNIFICANT CHANGE UP (ref 70–99)
HCT VFR BLD CALC: 40.9 % — LOW (ref 42–52)
HGB BLD-MCNC: 13.2 G/DL — LOW (ref 14–18)
IMM GRANULOCYTES NFR BLD AUTO: 0.4 % — HIGH (ref 0.1–0.3)
LYMPHOCYTES # BLD AUTO: 3.02 K/UL — SIGNIFICANT CHANGE UP (ref 1.2–3.4)
LYMPHOCYTES # BLD AUTO: 37.5 % — SIGNIFICANT CHANGE UP (ref 20.5–51.1)
MCHC RBC-ENTMCNC: 28.1 PG — SIGNIFICANT CHANGE UP (ref 27–31)
MCHC RBC-ENTMCNC: 32.3 G/DL — SIGNIFICANT CHANGE UP (ref 32–37)
MCV RBC AUTO: 87 FL — SIGNIFICANT CHANGE UP (ref 80–94)
MONOCYTES # BLD AUTO: 0.79 K/UL — HIGH (ref 0.1–0.6)
MONOCYTES NFR BLD AUTO: 9.8 % — HIGH (ref 1.7–9.3)
NEUTROPHILS # BLD AUTO: 3.83 K/UL — SIGNIFICANT CHANGE UP (ref 1.4–6.5)
NEUTROPHILS NFR BLD AUTO: 47.5 % — SIGNIFICANT CHANGE UP (ref 42.2–75.2)
NRBC # BLD: 0 /100 WBCS — SIGNIFICANT CHANGE UP (ref 0–0)
PLATELET # BLD AUTO: 344 K/UL — SIGNIFICANT CHANGE UP (ref 130–400)
POTASSIUM SERPL-MCNC: 4.4 MMOL/L — SIGNIFICANT CHANGE UP (ref 3.5–5)
POTASSIUM SERPL-SCNC: 4.4 MMOL/L — SIGNIFICANT CHANGE UP (ref 3.5–5)
PROT SERPL-MCNC: 6.1 G/DL — SIGNIFICANT CHANGE UP (ref 6–8)
RBC # BLD: 4.7 M/UL — SIGNIFICANT CHANGE UP (ref 4.7–6.1)
RBC # FLD: 13.4 % — SIGNIFICANT CHANGE UP (ref 11.5–14.5)
SARS-COV-2 RNA SPEC QL NAA+PROBE: SIGNIFICANT CHANGE UP
SODIUM SERPL-SCNC: 141 MMOL/L — SIGNIFICANT CHANGE UP (ref 135–146)
TROPONIN T SERPL-MCNC: <0.01 NG/ML — SIGNIFICANT CHANGE UP
WBC # BLD: 8.06 K/UL — SIGNIFICANT CHANGE UP (ref 4.8–10.8)
WBC # FLD AUTO: 8.06 K/UL — SIGNIFICANT CHANGE UP (ref 4.8–10.8)

## 2021-06-23 PROCEDURE — 70450 CT HEAD/BRAIN W/O DYE: CPT | Mod: 26,MA

## 2021-06-23 PROCEDURE — 0042T: CPT

## 2021-06-23 PROCEDURE — 99285 EMERGENCY DEPT VISIT HI MDM: CPT

## 2021-06-23 PROCEDURE — 93010 ELECTROCARDIOGRAM REPORT: CPT

## 2021-06-23 PROCEDURE — 70498 CT ANGIOGRAPHY NECK: CPT | Mod: 26,MA

## 2021-06-23 PROCEDURE — 70496 CT ANGIOGRAPHY HEAD: CPT | Mod: 26,MA

## 2021-06-23 RX ORDER — SODIUM CHLORIDE 9 MG/ML
1000 INJECTION INTRAMUSCULAR; INTRAVENOUS; SUBCUTANEOUS
Refills: 0 | Status: DISCONTINUED | OUTPATIENT
Start: 2021-06-23 | End: 2021-06-25

## 2021-06-23 RX ORDER — VENLAFAXINE HCL 75 MG
37.5 CAPSULE, EXT RELEASE 24 HR ORAL AT BEDTIME
Refills: 0 | Status: DISCONTINUED | OUTPATIENT
Start: 2021-06-23 | End: 2021-07-07

## 2021-06-23 RX ORDER — SODIUM CHLORIDE 9 MG/ML
1000 INJECTION INTRAMUSCULAR; INTRAVENOUS; SUBCUTANEOUS ONCE
Refills: 0 | Status: COMPLETED | OUTPATIENT
Start: 2021-06-23 | End: 2021-06-23

## 2021-06-23 RX ORDER — ASPIRIN/CALCIUM CARB/MAGNESIUM 324 MG
325 TABLET ORAL ONCE
Refills: 0 | Status: COMPLETED | OUTPATIENT
Start: 2021-06-23 | End: 2021-06-23

## 2021-06-23 RX ORDER — ASPIRIN/CALCIUM CARB/MAGNESIUM 324 MG
325 TABLET ORAL DAILY
Refills: 0 | Status: DISCONTINUED | OUTPATIENT
Start: 2021-06-24 | End: 2021-07-07

## 2021-06-23 RX ORDER — CLOPIDOGREL BISULFATE 75 MG/1
75 TABLET, FILM COATED ORAL DAILY
Refills: 0 | Status: DISCONTINUED | OUTPATIENT
Start: 2021-06-23 | End: 2021-07-07

## 2021-06-23 RX ORDER — PANTOPRAZOLE SODIUM 20 MG/1
40 TABLET, DELAYED RELEASE ORAL
Refills: 0 | Status: DISCONTINUED | OUTPATIENT
Start: 2021-06-23 | End: 2021-07-07

## 2021-06-23 RX ORDER — ATORVASTATIN CALCIUM 80 MG/1
80 TABLET, FILM COATED ORAL AT BEDTIME
Refills: 0 | Status: DISCONTINUED | OUTPATIENT
Start: 2021-06-23 | End: 2021-06-24

## 2021-06-23 RX ADMIN — Medication 325 MILLIGRAM(S): at 21:55

## 2021-06-23 RX ADMIN — ATORVASTATIN CALCIUM 80 MILLIGRAM(S): 80 TABLET, FILM COATED ORAL at 21:55

## 2021-06-23 RX ADMIN — Medication 37.5 MILLIGRAM(S): at 21:55

## 2021-06-23 RX ADMIN — SODIUM CHLORIDE 100 MILLILITER(S): 9 INJECTION INTRAMUSCULAR; INTRAVENOUS; SUBCUTANEOUS at 21:54

## 2021-06-23 NOTE — ED PROVIDER NOTE - PROGRESS NOTE DETAILS
CODE STROKE ACTIVATED Dr. Olivia (neurologist/telestroke) aware of the patient CODE NI ALERT ACTIVATED

## 2021-06-23 NOTE — ED PROVIDER NOTE - ATTENDING CONTRIBUTION TO CARE
I personally evaluated the patient. I reviewed the Resident’s or Physician Assistant’s note (as assigned above), and agree with the findings and plan except as documented in my note.    61 year old M with hx of HTN, CVA x 1 year ago with residual LUE weakness on plavix c/o gradually worsening LUE weakness x past month. Pt also sts has had episode of fluctuating LLE weakness x 1 hour ago. Sts had 2 similar episodes on Saturday. No facial droop, speech changes, headache, dizziness, visual changes, nausea, vomiting, decreased sensation, abd pain. CODE STROKE ACTIVATED IMMEDIATELY.    Patient taken to CT. NI alert also activated. Tele-neurology and neuro-interventiolist involved in care as well.     CONSTITUTIONAL: Well-developed; well-nourished; in no acute distress. Sitting up and providing appropriate history and physical examination  SKIN: skin exam is warm and dry, no acute rash.  HEAD: Normocephalic; atraumatic.  EYES: PERRL, 3 mm bilateral, no nystagmus, EOM intact; conjunctiva and sclera clear.  ENT: No nasal discharge; airway clear.  NECK: Supple; non tender.+ full passive ROM in all directions. No JVD  CARD: S1, S2 normal; no murmurs, gallops, or rubs. Regular rate and rhythm. + Symmetric Strong Pulses  RESP: No wheezes, rales or rhonchi. Good air movement bilaterally  ABD: soft; non-distended; non-tender. No Rebound, No Gaurding, No signs of peritnitis, No CVA tenderness  EXT: Normal ROM. No clubbing, cyanosis or edema. Dp and Pt Pulses intact. Cap refill less than 3 seconds  NEURO: Left sided facial droop (chronic accoridng to the wife). LUE 4/5 (baseline according to the wife). LLE 5/5. RLE 5/5. LUE 5/5 Intact sensation.     Plan-f/u CT, CT-A, discuss the case w neuro team and reassess

## 2021-06-23 NOTE — ED ADULT NURSE NOTE - OBJECTIVE STATEMENT
Pt progressively weaker over past week, as per wife R sided weakness when she came home after work. Has history of CVA w left sided weakness.

## 2021-06-23 NOTE — CONSULT NOTE ADULT - SUBJECTIVE AND OBJECTIVE BOX
Stroke Consult Note:    VEENA BRANDON    1. Chief Complaint: worsening left sided weakness    HPI: 60 yo male with PMH of R NORMAN stroke in 2020, and R M1 and A1 severe stenosis follows outpatient with Dr. Guajardo, (last angiogram on 2021), on Plavix 75 presents with worsening left arm weakness over janet past month, and episode of fluctuating left leg weakness to the point of stumbling, as per wife had 2 episodes on Saturday and 1 episode today.  In ED stroke code was called with NIHSS of 3, not a tpa candidate due to unknown LWN. CTP shows large penumbra in R MCA territory , code NI alert was activated and images are being reviewed by Dr. Marroquin.      2. Relevant PMH:   Prior ischemic stroke/TIA[ ], Afib [ ], CAD [ ], HTN [ ], DLD [ ], DM [ ], PVD [ ], Obesity [ ],   Sedentary lifestyle [ ], CHF [ ], BENSON [ ], Cancer Hx [ ].    3. Social History: Smoking [ ], Drug Use [ ], Alcohol Use [ ], Other [ ]    4. Possible Location of Stroke:    5. Relevant Brain Tissue Imagin. Relevant Cerebrovascular Imagin. Relevant blood tests:      8. Relevant cardiac rhythm monitorin. Relevant Cardiac Structure: (TTE/GIULIANA +/-):[ ]No intracardiac thrombus/[ ] no vegetation/[ ]no [place]ia/EF:    Home Medications:  amLODIPine 2.5 mg oral tablet: 1 tab(s) orally once a day (2021 09:40)  clopidogrel 75 mg oral tablet: 1 tab(s) orally once a day (2021 09:40)  Humira Pre-Filled Syringe: subcutaneous 2 times a month (2021 09:40)  losartan 50 mg oral tablet: 1 tab(s) orally once a day (2021 09:40)  venlafaxine 37.5 mg oral tablet: 1 tab(s) orally once a day (at bedtime) (2021 09:40)  Vitamin B12:  (2021 09:40)      MEDICATIONS  (STANDING):      10. PT/OT/Speech/Rehab/S&Sw/ Cognitive eval results and recommendations:    11. Exam:    Vital Signs Last 24 Hrs  T(C): 36.6 (2021 17:43), Max: 36.6 (2021 17:43)  T(F): 97.8 (2021 17:43), Max: 97.8 (2021 17:43)  HR: 90 (2021 17:43) (90 - 90)  BP: 137/91 (2021 17:43) (137/91 - 137/91)  BP(mean): --  RR: 19 (2021 17:43) (19 - 19)  SpO2: 97% (2021 17:43) (97% - 97%)    12.   NIH STROKE SCALE  Item	                                                        Score  1 a.	Level of Consciousness	               	0  1 b. LOC Questions	                                0  1 c.	LOC Commands	                               	0  2.	Best Gaze	                                        0  3.	Visual	                                                0  4.	Facial Palsy	                                        1  5 a.	Motor Arm - Left	                                2  5 b.	Motor Arm - Right	                        0  6 a.	Motor Leg - Left	                                0  6 b.	Motor Leg - Right	                                0  7.	Limb Ataxia	                                        0  8.	Sensory	                                                0  9.	Language	                                        0  10.	Dysarthria	                                        0  11.	Extinction and Inattention  	        0  ______________________________________  TOTAL	                                                        0    Total NIHSS on admission:      NIHSS yesterday:      NIHSS today: 3    mRS:  0 No symptoms at all  1 No significant disability despite symptoms; able to carry out all usual duties and activities without assistance  2 Slight disability; unable to carry out all previous activities, but able to look after own affairs  3 Moderate disability; requiring some help, but able to walk without assistance  4 Moderately severe disability; unable to walk without assistance and unable to attend to own bodily needs without assistance  5 Severe disability; bedridden, incontinent and requiring constant nursing care and attention  6 Dead      13. Impression:      14. Probable cause/s of Stroke:      15. Suggestions:   Routine stroke workup includin. Disposition:

## 2021-06-23 NOTE — ED PROVIDER NOTE - NS ED ROS FT
Constitutional: no fever, chills, no recent weight loss, change in appetite or malaise  Cardiac: No chest pain, SOB or edema.  Respiratory: No cough or respiratory distress  GI: No nausea, vomiting, diarrhea or abdominal pain.  : No dysuria, frequency, urgency or hematuria  MS: no pain to back or extremities, no loss of ROM, no weakness  Neuro: + LUE weakness x 1 month. + LLE weakness. No headache. No LOC.  Skin: No skin rash.  Endocrine: No history of thyroid disease or diabetes.  Except as documented in the HPI, all other systems are negative.

## 2021-06-23 NOTE — H&P ADULT - NSHPLABSRESULTS_GEN_ALL_CORE
13.2   8.06  )-----------( 344      ( 23 Jun 2021 19:10 )             40.9     06-23    141  |  107  |  18  ----------------------------<  84  4.4   |  28  |  1.2    Ca    9.1      23 Jun 2021 19:10    TPro  6.1  /  Alb  3.9  /  TBili  <0.2  /  DBili  x   /  AST  19  /  ALT  24  /  AlkPhos  93  06-23

## 2021-06-23 NOTE — H&P ADULT - HISTORY OF PRESENT ILLNESS
62 yo male with PMHx of R NORMAN stroke (April 2020) and R M1 and A1 severe stenosis follows outpatient with Dr. Guajardo, (last angiogram on February 2021), on Plavix     CC: fluctuating left leg weakness to the point of stumbling today    History goes back to: 1 month ago when patient started notice episodic left arm weakness    ROS is positive for:     ROS is negative for:     Of note:    In the ED: VS all WNL  Labs significant for: WNL  Troponin: negative  CT head: Acute infarct involving the right frontal lobe in the precentral gyrus.  Chronic infarct of the right periventricular white matter, new from prior exams detailed above.  Additional chronic infarcts of the bilateral basal ganglia and left cerebellum, stable.    CTA head and neck: Severe stenosis and/or nonocclusive thrombus of the M1 segment of the right MCA, with diminished flow of the distal right MCA circulation, new from prior CTA 4/27/2020. 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    CT PERFUSION:, there are 229 cc of ischemic penumbra in the right frontoparietal lobes, partially accounted for by chronic infarcts of the right frontoparietal white matter.    NIHSS in ED=3  stroke code called. Not a candidate for tPA due to unknown last normal LWN       62 yo male with PMHx of R NORMAN stroke (April 2020) and R M1 and A1 severe stenosis follows outpatient with Dr. Guajardo, (last angiogram on February 2021), on Plavix     CC: fluctuating left leg weakness resulting in stumbling.      History goes back to: 1 month ago when patient started notice episodic left arm weakness.     ROS is negative for: no fever, no chills, no SOB, chest pains or palpitations, no abdominal pain, nausea or vomiting. Patient denies facial droop, difficulty swallowing     In the ED: VS all WNL  Labs significant for: WNL  Troponin: negative  CT head: Acute infarct involving the right frontal lobe in the precentral gyrus.  Chronic infarct of the right periventricular white matter, new from prior exams detailed above.  Additional chronic infarcts of the bilateral basal ganglia and left cerebellum, stable.    CTA head and neck: Severe stenosis and/or nonocclusive thrombus of the M1 segment of the right MCA, with diminished flow of the distal right MCA circulation, new from prior CTA 4/27/2020. 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    CT PERFUSION:, there are 229 cc of ischemic penumbra in the right frontoparietal lobes, partially accounted for by chronic infarcts of the right frontoparietal white matter.    NIHSS in ED=3  stroke code called. Not a candidate for tPA due to unknown last normal LWN

## 2021-06-23 NOTE — ED PROVIDER NOTE - CLINICAL SUMMARY MEDICAL DECISION MAKING FREE TEXT BOX
Pt is not a tpa candidate according to neuro team. They recommend admission to ICU for q1 hr neuro checks. Pt admitted.

## 2021-06-23 NOTE — H&P ADULT - ASSESSMENT
62 yo male with PMHx of R NORMAN stroke (April 2020) and R M1 and A1 severe stenosis follows outpatient with Dr. Guajardo, (last angiogram on February 2021), on Plavix.   Presenting for left arm and leg weakness    IMPRESSION:   # acute right MCS stroke      PLAN:    CNS: Avoid CNS depressants.  - admit to ICU for q1 neuro checks  - Keep syst -180  - Start Aspirin 325 in addition to Plavix  - NS IVF at 100 cc/hour  - MRI brain NC  - TTE  - lipid profile and hba1c in AM  - PT/OT, Speech and swallow eval   - F/u NI recommendation, as of now, no plan for acute intervention     HEENT:  Oral care.     PULMONARY:  HOB @ 45 degree.    CARDIOVASCULAR: Rate control.  I=O.  NS 10 100cc/hr    GI: GI prophylaxis.  NGT.                   RENAL:  F/u  lytes.  Correct as needed. accurate I/O, repeat CMP, RENAL F/UP    INFECTIOUS DISEASE: N/A     HEMATOLOGICAL:  holding DVT prophylaxis given large penumbra in R MCA territory    ENDOCRINE:  Follow up FS.  Insulin protocol if needed.    DISPOSITION: ICU     62 yo male with PMHx of R NORMAN stroke (April 2020) and R M1 and A1 severe stenosis follows outpatient with Dr. Guajardo, (last angiogram on February 2021), on Plavix.   Presenting for left arm and leg weakness    IMPRESSION:   # acute right MCS stroke      PLAN:    CNS: Avoid CNS depressants.  - admit to ICU for q1 neuro checks  - Keep syst -180, hold home losartan and amlodipine   - Start Aspirin 325 in addition to Plavix  - NS IVF at 100 cc/hour  - MRI brain NC  - TTE  - lipid profile and hba1c in AM  - PT/OT, Speech and swallow eval   - F/u NI recommendation, as of now, no plan for acute intervention     HEENT:  Oral care.     PULMONARY:  HOB @ 45 degree.    CARDIOVASCULAR: Rate control.  I=O.  NS 10 100cc/hr    GI: GI prophylaxis.  NGT.                   RENAL:  F/u  lytes.  Correct as needed. accurate I/O, repeat CMP, RENAL F/UP    INFECTIOUS DISEASE: N/A     HEMATOLOGICAL:  holding DVT prophylaxis given large penumbra in R MCA territory    ENDOCRINE:  Follow up FS.  Insulin protocol if needed.    DISPOSITION: ICU     60 yo male with PMHx of R NORMAN stroke (April 2020) and R M1 and A1 severe stenosis follows outpatient with Dr. Guajardo, (last angiogram on February 2021), on Plavix.   Presenting for left arm and leg weakness    IMPRESSION:   # acute right MCS stroke      PLAN:    CNS: Avoid CNS depressants.  - admit to ICU for q1 neuro checks  - Keep syst -180, hold home losartan and amlodipine   - Start Aspirin 325 in addition to Plavix  - NS IVF at 100 cc/hour  - MRI brain NC  - TTE  - lipid profile and hba1c in AM  - PT/OT, Speech and swallow eval   - F/u NI recommendation, as of now, no plan for acute intervention, but likely warrants neurointervention during this admission     HEENT:  Oral care.     PULMONARY:  HOB @ 45 degree.    CARDIOVASCULAR: Rate control.  I=O.  NS 10 100cc/hr    GI: GI prophylaxis.  NGT.                   RENAL:  F/u  lytes.  Correct as needed. accurate I/O, repeat CMP, RENAL F/UP    INFECTIOUS DISEASE: N/A     HEMATOLOGICAL:  holding DVT prophylaxis given large penumbra in R MCA territory    ENDOCRINE:  Follow up FS.  Insulin protocol if needed.    DISPOSITION: ICU     60 yo male with PMHx of R NORMAN stroke (April 2020) and R M1 and A1 severe stenosis follows outpatient with Dr. Guajardo, (last angiogram on February 2021), on Plavix.   Presenting for left arm and leg weakness    IMPRESSION:   # acute right MCA stroke  # Severe stenosis and/or nonocclusive thrombus of the M1 segment of the right MCA,      PLAN:    CNS: acute stroke, etiology likely thrombotic given CTA findings   - admit to ICU for q1 neuro checks  - Keep syst -180, hold home losartan and amlodipine   - Start Aspirin 325 in addition to Plavix  - NS IVF at 100 cc/hour  - MRI brain NC  - TTE  - lipid profile and hba1c in AM  - PT/OT, Speech and swallow eval   - F/u NI recommendation, as of now, no plan for acute intervention, but likely warrants neurointervention during this admission     HEENT:  Oral care.     PULMONARY:  HOB @ 45 degree.    CARDIOVASCULAR: Rate control.  I=O.  NS 10 100cc/hr    GI: GI prophylaxis.  NGT.                   RENAL:  F/u  lytes.  Correct as needed. accurate I/O, repeat CMP, RENAL F/UP    INFECTIOUS DISEASE: N/A     HEMATOLOGICAL:  holding DVT prophylaxis given large penumbra in R MCA territory    ENDOCRINE:  Follow up FS.  Insulin protocol if needed.    DISPOSITION: ICU

## 2021-06-23 NOTE — H&P ADULT - NSHPPHYSICALEXAM_GEN_ALL_CORE
Physical Exam:  General: NAD,   Neurology: A&Ox3, nonfocal, follows commands  Eyes: PERRLA/ EOMI  ENT/Neck: Neck supple, trachea midline, No JVD  Respiratory: B/L basilar rales, No wheezing, rhonchi  Cardiovascular: Normal rate regular rhythm, S1S2, no murmurs, rubs or gallops  Abdominal: Soft, non-tender, non-distended  Extremities: no pedal edema, + peripheral pulses  Musculoskeletal: 5/5 BUE and BLE muscle strength  Skin: No Rashes, Hematoma, Ecchymosis Physical Exam:  General: NAD,   Neurology: A&Ox3, nonfocal, follows commands  Eyes: PERRLA/ EOMI  ENT/Neck: Neck supple, trachea midline, No JVD  Respiratory: B/L basilar rales, No wheezing, rhonchi  Cardiovascular: Normal rate regular rhythm, S1S2, no murmurs, rubs or gallops  Abdominal: Soft, non-tender, non-distended  Extremities: no pedal edema, + peripheral pulses  Musculoskeletal: 5/5 RUE and RLE muscle strength, 4/5 LUE AND LLE muscle strength  Skin: No Rashes, Hematoma, Ecchymosis

## 2021-06-23 NOTE — STROKE CODE NOTE - MRS SCORE
(1) No significant disability. Able to carry out all usual activities, despite some symptoms.
(1) No significant disability. Able to carry out all usual activities, despite some symptoms.

## 2021-06-23 NOTE — ED PROVIDER NOTE - OBJECTIVE STATEMENT
61 year old M with hx of HTN, CVA x 1 year ago with residual LUE weakness on plavix c/o gradually worsening LUE weakness x past month. Pt also sts has had episode of fluctuating LLE weakness x 1 hour ago. Sts had 2 similar episodes on Saturday. No facial droop, speech changes, headache, dizziness, visual changes, nausea, vomiting, decreased sensation, abd pain.

## 2021-06-23 NOTE — ED PROVIDER NOTE - PHYSICAL EXAMINATION
CONSTITUTIONAL: Well-appearing; well-nourished; in no apparent distress.   EYES: PERRL; EOM intact.   ENT: normal nose; no rhinorrhea; normal pharynx with no tonsillar hypertrophy.   NECK: Supple; non-tender; no cervical lymphadenopathy.   CARDIOVASCULAR: Normal S1, S2; no murmurs, rubs, or gallops.   RESPIRATORY: Normal chest excursion with respiration; breath sounds clear and equal bilaterally; no wheezes, rhonchi, or rales.  GI/: Normal bowel sounds; non-distended; non-tender; no palpable organomegaly.   MS: No evidence of trauma or deformity.  Normal ROM in all four extremities; non-tender to palpation; distal pulses are normal.   SKIN: Normal for age and race; warm; dry; good turgor; no apparent lesions or exudate.   NEURO/PSYCH: A & O x 4; + LUE pronator drift. + decreased strength 4/5 to LLE. No facial droop. No tongue deviation. Cerebellar intact. Sensation intact. Normal gait.

## 2021-06-23 NOTE — STROKE CODE NOTE - NIH STROKE SCALE: 5A. MOTOR ARM, LEFT, QM
(1) Drift; limb holds 90 (or 45) degrees, but drifts down before full 10 seconds; does not hit bed or other support
(2) Some effort against gravity; limb cannot get to or maintain (if cued) 90 (or 45) degrees, drifts down to bed, but has some effort against gravity

## 2021-06-23 NOTE — STROKE CODE NOTE - ASSESSMENT/PLAN
I was able to conference with the patient and his significant other. Patient reports he began having progressive L UE weakness 3-4 weeks ago. About 4 days ago, he had a transient episode of L LE weakness that made it difficult for him to walk. Symptoms resolved. He had another episode of LE weakness today which lasted 2-3 minutes before resolving. Thus he presented to the ED.     Note the patient is on Plavix for prior R MCA stroke several years ago related to intracranial stenosis. He is also on high-intensity statin. I was able to conference with the patient and his significant other via tele device. Patient is a 61 year old male with HTN and prior R MCA/NORMAN stroke with residual L UE weakness.  He reports he began having progressive L UE weakness 3-4 weeks ago. About 4 days ago, he had a transient episode of L LE weakness that made it difficult for him to walk. Symptoms resolved. He had another episode of LE weakness today which lasted 2-3 minutes before resolving. Thus he presented to the ED.  CTH showed chronic infarcts in a watershed distribution. CTA shows severe narrowing of the R MCA and NORMAN. CTP shows significant perfusion abnormality without core infarct.     Note the patient is on Plavix for prior R NORMAN stroke in April 2020 ago related to intracranial stenosis (R NORMAN and R MCA hypoplastic/stenotic). He is also on high-intensity statin.    NIHSS as above.  /91    ASSESSMENT/RECOMMENDATIONS    The patient is a 61 year old male with suspected stroke (progressive UE weakness) and TIA (transient LE weakness) secondary to severe high-grade stenosis of the R MCA/NORMAN.  His CTP shows significant hypoperfusion but clinically he is at his recent baseline so no urgent intervention is necessary at this time.  I have recommended he be given 325 mg of Aspirin, continue home Plavix and statin for now. He should be given IVF and have bed rest with frequent (q1h) neuro checks. Permissive HTN.  Stroke ACP, ED ACP, and Neuro-interventionalist all aware of patient and agree with the plan.  Additional workup per admitting team but consider MRI brain, angiogram, and Plavix metabolism testing. I was able to conference with the patient and his significant other via tele device. Patient is a 61 year old male with HTN and prior R MCA/NORMAN stroke with residual L UE weakness.  He reports he began having progressive L UE weakness 3-4 weeks ago. About 4 days ago, he had a transient episode of L LE weakness that made it difficult for him to walk. Symptoms resolved. He had another episode of LE weakness today which lasted 2-3 minutes before resolving. Thus he presented to the ED.  CTH showed chronic infarcts in a watershed distribution. CTA shows severe narrowing of the R MCA and NORMAN. CTP shows significant perfusion abnormality without core infarct.     Note the patient is on Plavix for prior R NORMAN stroke in April 2020 ago related to intracranial stenosis (R NORMAN and R MCA hypoplastic/stenotic). He is also on high-intensity statin.    NIHSS as above.  /91    ASSESSMENT/RECOMMENDATIONS    The patient is a 61 year old male with suspected stroke (progressive UE weakness) and TIA (transient LE weakness) secondary to severe high-grade stenosis of the R MCA/NORMAN.  His CTP shows significant hypoperfusion but clinically he is at his recent baseline.  I spoke with neuro intervention. Given NIHSS of 3 and patient at recent baseline, no urgent intervention is necessary at this time.  I have recommended he be given 325 mg of Aspirin, continue home Plavix and statin for now. He should be given IVF and have bed rest with frequent (q1h) neuro checks. Permissive HTN.  Stroke ACP, ED ACP, and Neuro-interventionalist all aware of patient and agree with the plan.  Additional workup per admitting team but consider MRI brain, angiogram, and Plavix metabolism testing. I was able to conference with the patient and his significant other via tele device. Patient is a 61 year old male with HTN and prior R MCA/NORMAN stroke with residual L UE weakness.  He reports he began having progressive L UE weakness 3-4 weeks ago. About 4 days ago, he had a transient episode of L LE weakness that made it difficult for him to walk. Symptoms resolved. He had another episode of LE weakness today which lasted 2-3 minutes before resolving. Thus he presented to the ED.  CTH showed acute R frontal/precentral gyrus infarct and more chronic infarcts in a watershed distribution. CTA shows severe narrowing of the R MCA and NORMAN. CTP shows significant perfusion abnormality without core infarct.     Note the patient is on Plavix for prior R NORMAN stroke in April 2020 ago related to intracranial stenosis (R NORMAN and R MCA hypoplastic/stenotic). He is also on high-intensity statin.    NIHSS as above.  /91    ASSESSMENT/RECOMMENDATIONS    The patient is a 61 year old male with suspected stroke (progressive UE weakness) and TIA (transient LE weakness) secondary to severe high-grade stenosis of the R MCA/NORMAN.  His CTP shows significant hypoperfusion but clinically he is at his recent baseline.  I spoke with neuro intervention. Given NIHSS of 3 and patient at recent baseline, no urgent intervention is necessary at this time.  I have recommended he be given 325 mg of Aspirin, continue home Plavix and statin for now. He should be given IVF and have bed rest with frequent (q1h) neuro checks. Permissive HTN.  Stroke ACP, ED ACP, and Neuro-interventionalist all aware of patient and agree with the plan.  Additional workup per admitting team but consider MRI brain, angiogram, and Plavix metabolism testing. I was able to conference with the patient and his significant other via tele device. Patient is a 61 year old male with HTN and prior R MCA/NORMAN stroke with residual L UE weakness.  He reports he began having progressive L UE weakness 3-4 weeks ago. About 4 days ago, he had a transient episode of L LE weakness that made it difficult for him to walk. Symptoms resolved. He had another episode of LE weakness today which lasted 2-3 minutes before resolving. Thus he presented to the ED.  CTH showed acute/subacute R frontal/precentral gyrus infarct and more chronic infarcts in a watershed distribution and basal ganglia. CTA shows severe narrowing of the R MCA and NORMAN. CTP shows significant perfusion abnormality without core infarct.     Note the patient is on Plavix for prior R NORMAN stroke in April 2020 ago related to intracranial stenosis (R NORMAN and R MCA hypoplastic/stenotic). He is also on high-intensity statin.    NIHSS as above.  /91    ASSESSMENT/RECOMMENDATIONS    The patient is a 61 year old male with suspected stroke (progressive UE weakness) and TIA (transient LE weakness) secondary to severe high-grade stenosis of the R MCA/NORMAN.  His CTP shows significant hypoperfusion but clinically he is at his recent baseline.  I spoke with neuro intervention. Given NIHSS of 3 and patient at recent baseline, no urgent intervention is necessary at this time.  I have recommended he be given 325 mg of Aspirin, continue home Plavix and statin for now. He should be given IVF and have bed rest with frequent (q1h) neuro checks. Permissive HTN.  Stroke ACP, ED ACP, and Neuro-interventionalist all aware of patient and agree with the plan.  Additional workup per admitting team but consider MRI brain, angiogram, and Plavix metabolism testing. I was able to conference with the patient and his significant other via tele device. Patient is a 61 year old male with HTN and prior R MCA/NORMAN stroke with residual L UE weakness.  He reports he began having progressive L UE weakness 3-4 weeks ago. About 4 days ago, he had a transient episode of L LE weakness that made it difficult for him to walk. Symptoms resolved. He had another episode of LE weakness today which lasted 2-3 minutes before resolving. Thus he presented to the ED.  CTH showed acute/subacute R frontal/precentral gyrus infarct and more chronic infarcts in a watershed distribution and basal ganglia. CTA shows severe narrowing of the R MCA and NORMAN. CTP shows significant perfusion abnormality without core infarct.     Note the patient is on Plavix for prior R NORMAN stroke in April 2020 ago related to intracranial stenosis (R NORMAN and R MCA hypoplastic/stenotic). He is also on high-intensity statin.    NIHSS as above.  /91    ASSESSMENT/RECOMMENDATIONS    The patient is a 61 year old male with suspected stroke (progressive UE weakness) and TIA (transient LE weakness) secondary to severe high-grade stenosis of the R MCA/NORMAN.  His CTP shows significant hypoperfusion but clinically he is at his recent baseline.  I spoke with neuro intervention on call (Dr. Guajardo). Given NIHSS of 3 and patient at recent baseline, no urgent intervention is necessary at this time.  I have recommended he be given 325 mg of Aspirin, continue home Plavix and statin for now. He should be given IVF and have bed rest with frequent (q1h) neuro checks. Permissive HTN.  Stroke ACP, ED ACP, and Neuro-interventionalist all aware of patient and agree with the plan.  Additional workup per admitting team but consider MRI brain, angiogram, and Plavix metabolism testing.

## 2021-06-23 NOTE — ED ADULT TRIAGE NOTE - CHIEF COMPLAINT QUOTE
as per ems, patient's wife states patient has been getting progressively weaker over the past 6-7 days. has hx of cva last year so has left side weakness at baseline

## 2021-06-24 LAB
ANION GAP SERPL CALC-SCNC: 8 MMOL/L — SIGNIFICANT CHANGE UP (ref 7–14)
BUN SERPL-MCNC: 15 MG/DL — SIGNIFICANT CHANGE UP (ref 10–20)
CALCIUM SERPL-MCNC: 9 MG/DL — SIGNIFICANT CHANGE UP (ref 8.5–10.1)
CHLORIDE SERPL-SCNC: 108 MMOL/L — SIGNIFICANT CHANGE UP (ref 98–110)
CHOLEST SERPL-MCNC: 135 MG/DL — SIGNIFICANT CHANGE UP
CO2 SERPL-SCNC: 24 MMOL/L — SIGNIFICANT CHANGE UP (ref 17–32)
COVID-19 SPIKE DOMAIN AB INTERP: NEGATIVE — SIGNIFICANT CHANGE UP
COVID-19 SPIKE DOMAIN ANTIBODY RESULT: 0.4 U/ML — SIGNIFICANT CHANGE UP
CREAT SERPL-MCNC: 1.1 MG/DL — SIGNIFICANT CHANGE UP (ref 0.7–1.5)
GLUCOSE SERPL-MCNC: 79 MG/DL — SIGNIFICANT CHANGE UP (ref 70–99)
HCT VFR BLD CALC: 41 % — LOW (ref 42–52)
HDLC SERPL-MCNC: 48 MG/DL — SIGNIFICANT CHANGE UP
HGB BLD-MCNC: 13.4 G/DL — LOW (ref 14–18)
LIPID PNL WITH DIRECT LDL SERPL: 79 MG/DL — SIGNIFICANT CHANGE UP
MCHC RBC-ENTMCNC: 28.5 PG — SIGNIFICANT CHANGE UP (ref 27–31)
MCHC RBC-ENTMCNC: 32.7 G/DL — SIGNIFICANT CHANGE UP (ref 32–37)
MCV RBC AUTO: 87 FL — SIGNIFICANT CHANGE UP (ref 80–94)
NON HDL CHOLESTEROL: 87 MG/DL — SIGNIFICANT CHANGE UP
NRBC # BLD: 0 /100 WBCS — SIGNIFICANT CHANGE UP (ref 0–0)
PLATELET # BLD AUTO: 336 K/UL — SIGNIFICANT CHANGE UP (ref 130–400)
POTASSIUM SERPL-MCNC: 4.4 MMOL/L — SIGNIFICANT CHANGE UP (ref 3.5–5)
POTASSIUM SERPL-SCNC: 4.4 MMOL/L — SIGNIFICANT CHANGE UP (ref 3.5–5)
RBC # BLD: 4.71 M/UL — SIGNIFICANT CHANGE UP (ref 4.7–6.1)
RBC # FLD: 13.2 % — SIGNIFICANT CHANGE UP (ref 11.5–14.5)
SARS-COV-2 IGG+IGM SERPL QL IA: 0.4 U/ML — SIGNIFICANT CHANGE UP
SARS-COV-2 IGG+IGM SERPL QL IA: NEGATIVE — SIGNIFICANT CHANGE UP
SODIUM SERPL-SCNC: 140 MMOL/L — SIGNIFICANT CHANGE UP (ref 135–146)
TRIGL SERPL-MCNC: 47 MG/DL — SIGNIFICANT CHANGE UP
WBC # BLD: 10.84 K/UL — HIGH (ref 4.8–10.8)
WBC # FLD AUTO: 10.84 K/UL — HIGH (ref 4.8–10.8)

## 2021-06-24 PROCEDURE — 71045 X-RAY EXAM CHEST 1 VIEW: CPT | Mod: 26

## 2021-06-24 PROCEDURE — 70450 CT HEAD/BRAIN W/O DYE: CPT | Mod: 26

## 2021-06-24 PROCEDURE — 99222 1ST HOSP IP/OBS MODERATE 55: CPT

## 2021-06-24 PROCEDURE — 99232 SBSQ HOSP IP/OBS MODERATE 35: CPT

## 2021-06-24 PROCEDURE — 99291 CRITICAL CARE FIRST HOUR: CPT

## 2021-06-24 RX ORDER — CHLORHEXIDINE GLUCONATE 213 G/1000ML
1 SOLUTION TOPICAL DAILY
Refills: 0 | Status: DISCONTINUED | OUTPATIENT
Start: 2021-06-24 | End: 2021-07-07

## 2021-06-24 RX ORDER — SODIUM CHLORIDE 9 MG/ML
1000 INJECTION INTRAMUSCULAR; INTRAVENOUS; SUBCUTANEOUS ONCE
Refills: 0 | Status: COMPLETED | OUTPATIENT
Start: 2021-06-24 | End: 2021-06-24

## 2021-06-24 RX ORDER — SIMVASTATIN 20 MG/1
40 TABLET, FILM COATED ORAL AT BEDTIME
Refills: 0 | Status: DISCONTINUED | OUTPATIENT
Start: 2021-06-24 | End: 2021-07-07

## 2021-06-24 RX ORDER — CLOPIDOGREL BISULFATE 75 MG/1
75 TABLET, FILM COATED ORAL ONCE
Refills: 0 | Status: COMPLETED | OUTPATIENT
Start: 2021-06-24 | End: 2021-06-24

## 2021-06-24 RX ADMIN — SODIUM CHLORIDE 1000 MILLILITER(S): 9 INJECTION INTRAMUSCULAR; INTRAVENOUS; SUBCUTANEOUS at 00:10

## 2021-06-24 RX ADMIN — Medication 37.5 MILLIGRAM(S): at 23:29

## 2021-06-24 RX ADMIN — CLOPIDOGREL BISULFATE 75 MILLIGRAM(S): 75 TABLET, FILM COATED ORAL at 12:26

## 2021-06-24 RX ADMIN — SODIUM CHLORIDE 1000 MILLILITER(S): 9 INJECTION INTRAMUSCULAR; INTRAVENOUS; SUBCUTANEOUS at 05:51

## 2021-06-24 RX ADMIN — Medication 325 MILLIGRAM(S): at 12:26

## 2021-06-24 RX ADMIN — SODIUM CHLORIDE 100 MILLILITER(S): 9 INJECTION INTRAMUSCULAR; INTRAVENOUS; SUBCUTANEOUS at 16:35

## 2021-06-24 RX ADMIN — CLOPIDOGREL BISULFATE 75 MILLIGRAM(S): 75 TABLET, FILM COATED ORAL at 00:24

## 2021-06-24 RX ADMIN — SIMVASTATIN 40 MILLIGRAM(S): 20 TABLET, FILM COATED ORAL at 23:28

## 2021-06-24 RX ADMIN — PANTOPRAZOLE SODIUM 40 MILLIGRAM(S): 20 TABLET, DELAYED RELEASE ORAL at 07:06

## 2021-06-24 NOTE — PROGRESS NOTE ADULT - SUBJECTIVE AND OBJECTIVE BOX
LENGTH OF HOSPITAL STAY: 1d      CHIEF COMPLAINT: Patient is a 61y old  Male who presents with a chief complaint of left leg weakness (24 Jun 2021 11:46)      OVER Past 24hrs:  No acute overnight events.     HISTORY OF PRESENTING ILLNESS:   62 yo male with PMHx of R NORMAN stroke (April 2020) and R M1 and A1 severe stenosis follows outpatient with Dr. Guajardo, (last angiogram on February 2021), on Plavix     CC: fluctuating left leg weakness resulting in stumbling.      History goes back to: 1 month ago when patient started notice episodic left arm weakness.     ROS is negative for: no fever, no chills, no SOB, chest pains or palpitations, no abdominal pain, nausea or vomiting. Patient denies facial droop, difficulty swallowing     In the ED: VS all WNL  Labs significant for: WNL  Troponin: negative  CT head: Acute infarct involving the right frontal lobe in the precentral gyrus.  Chronic infarct of the right periventricular white matter, new from prior exams detailed above.  Additional chronic infarcts of the bilateral basal ganglia and left cerebellum, stable.    CTA head and neck: Severe stenosis and/or nonocclusive thrombus of the M1 segment of the right MCA, with diminished flow of the distal right MCA circulation, new from prior CTA 4/27/2020. 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    CT PERFUSION:, there are 229 cc of ischemic penumbra in the right frontoparietal lobes, partially accounted for by chronic infarcts of the right frontoparietal white matter.    NIHSS in ED=3  stroke code called. Not a candidate for tPA due to unknown last normal LWN       (23 Jun 2021 20:39)    PAST MEDICAL & SURGICAL HISTORY  PAST MEDICAL & SURGICAL HISTORY:  HTN (hypertension)    Psoriasis    CVA (cerebral vascular accident)    No significant past surgical history          REVIEW OF SYSTEMS  Negative, except as in Physical exam    ALLERGIES:  No Known Allergies    MEDICATIONS:  STANDING MEDICATIONS  aspirin 325 milliGRAM(s) Oral daily  atorvastatin 80 milliGRAM(s) Oral at bedtime  chlorhexidine 4% Liquid 1 Application(s) Topical daily  clopidogrel Tablet 75 milliGRAM(s) Oral daily  pantoprazole    Tablet 40 milliGRAM(s) Oral before breakfast  sodium chloride 0.9%. 1000 milliLiter(s) IV Continuous <Continuous>  venlafaxine 37.5 milliGRAM(s) Oral at bedtime      PRN MEDICATIONS    VITALS:   T(F): 96  HR: 74  BP: 161/88  RR: 29  SpO2: 96%    PHYSICAL EXAM:  General: No acute distress.  AOx3  HEENT: Sclear in clear;  PULM: Clear to auscultation bilaterally.  CVS: RRR; No murmurs  Abdomen: Soft, nondistended, nontender.  Extremities: No edema, nontender; Peripheral pulse  Neuro:  LUE weakness;  slight L sided facial asymmetry    LABS:                        13.4   10.84 )-----------( 336      ( 24 Jun 2021 05:08 )             41.0     06-24    140  |  108  |  15  ----------------------------<  79  4.4   |  24  |  1.1    Ca    9.0      24 Jun 2021 05:08    TPro  6.1  /  Alb  3.9  /  TBili  <0.2  /  DBili  x   /  AST  19  /  ALT  24  /  AlkPhos  93  06-23          Troponin T, Serum: <0.01 ng/mL (06-23-21 @ 19:10)      CARDIAC MARKERS ( 23 Jun 2021 19:10 )  x     / <0.01 ng/mL / x     / x     / x          RADIOLOGY:    Assessment/Plan:  62 yo male with PMHx of R NORMAN stroke (April 2020) and R M1 and A1 severe stenosis on Plavix; p/w LLE weakness;     ED: VS wnl; Labs unremarkable; CTH Acute Rt. frontal infarct; CTA Rt. MCA severe stenosis; CTP showed ischemic area in the Rt. frontoparietal lobes; NIHSS=3 >>CODE STROKE called>> Out of window for tPA.    # Acute Rt. Frontal lobe Infarct - In the precentral gyrus.   CTA H&N(6/23): New severe stenoses along Rt. NORMAN & Rt. MCA; Worsening chonic moderate stenosis along Lf. NORMAN;  CT Perfusion (6/23): Ischemic penumbra in Rt. MCA territory without core infarct.   - c/w Neurochecks q1hr  - c/w Aspirin 81m Daily, Plavix 75mg Daily. Lipitor 80mg Daily  - LDL(6/24): 79    > fu Neuro IR consult  > fu MRI Brain & TTE    # hx of Depression/Anxiety - c/w Venlafaxine 37.5mg Daily    # GERD - c/w Protonix 40mg Daily    # DLD - c/w Lipitor 80mg Daily      Diet: Dysphagia 3 w/ Thins  Activity: OOB w/ Assistance  DVT ppx: n/a  GI ppx: Protonix 40mg Daily  FULL CODE    Dispo: Acute

## 2021-06-24 NOTE — PHYSICAL THERAPY INITIAL EVALUATION ADULT - GENERAL OBSERVATIONS, REHAB EVAL
pt. encountered in bed in NAD +tele, +IV, locked by DARRIAN Christianson prior to session. Pt speaks english and appropriately responds to commands. Yemeni speaking speech therapy student was available to confirm that pt was appropriately understand me.  [phone]

## 2021-06-24 NOTE — OCCUPATIONAL THERAPY INITIAL EVALUATION ADULT - PLANNED THERAPY INTERVENTIONS, OT EVAL
ADL retraining/balance training/bed mobility training/cognitive, visual perceptual/fine motor coordination training/motor coordination training/neuromuscular re-education/parent/caregiver training.../ROM/strengthening/stretching/transfer training

## 2021-06-24 NOTE — CHART NOTE - NSCHARTNOTEFT_GEN_A_CORE
Discussed patients ct angio and ctp results with neurointerventional Dr Pleitez . Patient with right a1 and m1 segment severe stenosis which has worsened on this scan and possible spasms of these vessels which could be the reason for patients fluctuating left sided symptoms. Has a large ischemic penumbra off 229cc in the right frontoparietal lobe partially accounted for by chronic infarcts  in this region. There is no acute thrombus. As per Dr pleitez , no acute neurointervention for now. Code NI alert was initially activated but was stopped after discussion with Dr pleitez.      Plan  Patient will need ICU monitoring with q 1 neurochecks   keep sbp 160-180  Hydration  Call for any acute change in neuro status Discussed patients ct angio and ctp results with neurointerventional Dr Pleitez . Patient with right a1 and m1 segment severe stenosis which has worsened on this scan and possible spasms of these vessels which could be the reason for patients fluctuating left sided symptoms. Has a large ischemic penumbra off 229cc in the right frontoparietal lobe partially accounted for by chronic infarcts  in this region. There is no acute thrombus. As per Dr pleitez , no acute neurointervention for now. Code NI alert was initially activated but was stopped after discussion with Dr pleitez.      Plan  Patient will need ICU monitoring with q 1 neurochecks   keep sbp 160-180  Hydration  Neuroendovascular consult for for right M1 and A1 stenosis  Call for any acute change in neuro status

## 2021-06-24 NOTE — PHYSICAL THERAPY INITIAL EVALUATION ADULT - GAIT TRAINING, PT EVAL
Goal: pt will ambulate with least restrictive assistive device 50 feet with supervision by discharge to facilitate return to PLOF.

## 2021-06-24 NOTE — SWALLOW BEDSIDE ASSESSMENT ADULT - SLP GENERAL OBSERVATIONS
Pt encountered awake and alert on RA. Pt able to engage in verbal discourse, follow commands, speech is mildly dysarthric + left facial dropp Pt encountered awake and alert on RA. Pt able to engage in verbal discourse, follow commands, speech is mildly dysarthric + left facial droop

## 2021-06-24 NOTE — PHYSICAL THERAPY INITIAL EVALUATION ADULT - PERTINENT HX OF CURRENT PROBLEM, REHAB EVAL
60 yo male with PMHx of R NORMAN stroke (April 2020) and R M1 and A1 severe stenosis presented to ED with  cc fluctuating left leg weakness resulting in stumbling. 1 month ago when patient started notice episodic left arm weakness

## 2021-06-24 NOTE — CONSULT NOTE ADULT - SUBJECTIVE AND OBJECTIVE BOX
HPI:  60 yo male with PMHx of R NORMAN stroke (April 2020) and R M1 and A1 severe stenosis follows outpatient with Dr. Guajardo, (last angiogram on February 2021), on Plavix     CC: fluctuating left leg weakness resulting in stumbling.      History goes back to: 1 month ago when patient started notice episodic left arm weakness.     ROS is negative for: no fever, no chills, no SOB, chest pains or palpitations, no abdominal pain, nausea or vomiting. Patient denies facial droop, difficulty swallowing     In the ED: VS all WNL  Labs significant for: WNL  Troponin: negative  CT head: Acute infarct involving the right frontal lobe in the precentral gyrus.  Chronic infarct of the right periventricular white matter, new from prior exams detailed above.  Additional chronic infarcts of the bilateral basal ganglia and left cerebellum, stable.    CTA head and neck: Severe stenosis and/or nonocclusive thrombus of the M1 segment of the right MCA, with diminished flow of the distal right MCA circulation, new from prior CTA 4/27/2020. 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    CT PERFUSION:, there are 229 cc of ischemic penumbra in the right frontoparietal lobes, partially accounted for by chronic infarcts of the right frontoparietal white matter.    NIHSS in ED=3  stroke code called. Not a candidate for tPA due to unknown last normal LWN             PAST MEDICAL & SURGICAL HISTORY:  HTN (hypertension)    Psoriasis    CVA (cerebral vascular accident)    No significant past surgical history        Hospital Course:    TODAY'S SUBJECTIVE & REVIEW OF SYMPTOMS:     Constitutional WNL   Cardio WNL   Resp WNL   GI WNL  Heme WNL  Endo WNL  Skin WNL  MSK WNL  Neuro left side weakness  Cognitive WNL  Psych WNL      MEDICATIONS  (STANDING):  aspirin 325 milliGRAM(s) Oral daily  atorvastatin 80 milliGRAM(s) Oral at bedtime  chlorhexidine 4% Liquid 1 Application(s) Topical daily  clopidogrel Tablet 75 milliGRAM(s) Oral daily  pantoprazole    Tablet 40 milliGRAM(s) Oral before breakfast  sodium chloride 0.9%. 1000 milliLiter(s) (100 mL/Hr) IV Continuous <Continuous>  venlafaxine 37.5 milliGRAM(s) Oral at bedtime    MEDICATIONS  (PRN):      FAMILY HISTORY:  No pertinent family history in first degree relatives        Allergies    No Known Allergies    Intolerances        SOCIAL HISTORY:    [  ] Etoh  [  ] Smoking  [  ] Substance abuse     Home Environment:  [   ] Home Alone  [  x ] Lives with Family  [   ] Home Health Aid    Dwelling:  [   ] Apartment  [x   ] Private House  [   ] Adult Home  [   ] Skilled Nursing Facility      [   ] Short Term  [   ] Long Term  [ x  ] Stairs       Elevator [   ]    FUNCTIONAL STATUS PTA: (Check all that apply)  Ambulation: [   x ]Independent    [   ] Dependent     [   ] Non-Ambulatory  Assistive Device: [   ] SA Cane  [   ]  Q Cane  [   ] Walker  [   ]  Wheelchair  ADL : [   x] Independent  [    ]  Dependent       Vital Signs Last 24 Hrs  T(C): 35.6 (24 Jun 2021 08:00), Max: 36.6 (23 Jun 2021 17:43)  T(F): 96 (24 Jun 2021 08:00), Max: 97.8 (23 Jun 2021 17:43)  HR: 52 (24 Jun 2021 10:00) (50 - 90)  BP: 181/81 (24 Jun 2021 10:00) (137/91 - 181/81)  BP(mean): 123 (24 Jun 2021 10:00) (98 - 134)  RR: 12 (24 Jun 2021 10:00) (9 - 25)  SpO2: 99% (24 Jun 2021 10:00) (96% - 99%)      PHYSICAL EXAM: Awake & Alert  GENERAL: NAD  HEAD:  Normocephalic  CHEST/LUNG: Clear   HEART: S1S2+  ABDOMEN: Soft, Nontender  EXTREMITIES:  no calf tenderness    NERVOUS SYSTEM:  Cranial Nerves 2-12 intact [   ] Abnormal  [   ]  ROM: WFL all extremities [   ]  Abnormal [x   ]limited LUE  Motor Strength: WFL all extremities  [   ]  Abnormal [x   ]2-4/5 left side LE stronger  Sensation: intact to light touch [x   ] Abnormal [   ]    FUNCTIONAL STATUS:  Bed Mobility: Independent [   ]  Supervision [   ]  Needs Assistance [  x ]  N/A [   ]  Transfers: Independent [   ]  Supervision [   ]  Needs Assistance [ x  ]  N/A [   ]   Ambulation: Independent [   ]  Supervision [   ]  Needs Assistance [   ]  N/A [   ]  ADL: Independent [   ] Requires Assistance [   ] N/A [   ]      LABS:                        13.4   10.84 )-----------( 336      ( 24 Jun 2021 05:08 )             41.0     06-24    140  |  108  |  15  ----------------------------<  79  4.4   |  24  |  1.1    Ca    9.0      24 Jun 2021 05:08    TPro  6.1  /  Alb  3.9  /  TBili  <0.2  /  DBili  x   /  AST  19  /  ALT  24  /  AlkPhos  93  06-23          RADIOLOGY & ADDITIONAL STUDIES:

## 2021-06-24 NOTE — OCCUPATIONAL THERAPY INITIAL EVALUATION ADULT - TRANSFER TRAINING, PT EVAL
Patient will perform all transfers with supervision with use of appropriate assistive device by discharge.

## 2021-06-24 NOTE — CONSULT NOTE ADULT - ATTENDING COMMENTS
Patient is a 62 yo Turkmen speaking male with hx of known severe stenosis of R M1, R A1, L M2 vessels based on cerebral DSA performed on 2/3/21. He was supposed to obtain high resolution vessel wall imaging MRI after the angiogram to better understand the reason of those solitary cerebral stenosis without having obvious atherosclerotic disease in other organs, including the coronary arteries. Per patient statement today, he has been feeling weaker on the left arm for past two weeks which has been fluctuating. Therefore he may benefit from recanalization of the right MCA or NORMAN if MRI confirms that, based on the [facility] Registry. The imaging findings was explained to Dr. Mcclendon as well as the plan of management. This also was discussed with our NI team, Steff, as well as NCC team, Dr. Garcia.

## 2021-06-24 NOTE — PROGRESS NOTE ADULT - ATTENDING COMMENTS
62 yo gentleman known to have R NORMAN stroke in April 2020, and R M1 and A1 severe stenosis, presents with worsening left arm weakness over  and episode of fluctuating left leg weakness to the point of stumbling. At present, patient appears to be at baseline (of last 1 year) at SBP of 150. Plan is to continue on dual antiplatelets and avoid all antihypertensives. NI consultations have already been called by primary team and we will wait for recommendations.

## 2021-06-24 NOTE — OCCUPATIONAL THERAPY INITIAL EVALUATION ADULT - GENERAL OBSERVATIONS, REHAB EVAL
Pt received semi marie in bed in NAD, agreeable to OT evaluation, +tele, +BP cuff, +pulse oxi, +IV drop, requesting to return to bed 2* to not sleeping last night, left semi marie in bed, RN aware

## 2021-06-24 NOTE — OCCUPATIONAL THERAPY INITIAL EVALUATION ADULT - ADL RETRAINING, OT EVAL
Patient will perform upper body dressing with minimal assistance by discharge. ; Patient will perform lower body dressing with minimal assistance with use of appropriate adaptive equipment as needed by discharge.

## 2021-06-24 NOTE — PROGRESS NOTE ADULT - SUBJECTIVE AND OBJECTIVE BOX
HPI:  60 yo male with PMHx of R NORMAN stroke (April 2020) and R M1 and A1 severe stenosis follows outpatient with Dr. Guajardo, (last angiogram on February 2021), on Plavix     CC: fluctuating left leg weakness resulting in stumbling.      History goes back to: 1 month ago when patient started notice episodic left arm weakness.     ROS is negative for: no fever, no chills, no SOB, chest pains or palpitations, no abdominal pain, nausea or vomiting. Patient denies facial droop, difficulty swallowing     In the ED: VS all WNL  Labs significant for: WNL  Troponin: negative  CT head: Acute infarct involving the right frontal lobe in the precentral gyrus.  Chronic infarct of the right periventricular white matter, new from prior exams detailed above.  Additional chronic infarcts of the bilateral basal ganglia and left cerebellum, stable.    CTA head and neck: Severe stenosis and/or nonocclusive thrombus of the M1 segment of the right MCA, with diminished flow of the distal right MCA circulation, new from prior CTA 4/27/2020. 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    CT PERFUSION:, there are 229 cc of ischemic penumbra in the right frontoparietal lobes, partially accounted for by chronic infarcts of the right frontoparietal white matter.    NIHSS in ED=3  stroke code called. Not a candidate for tPA due to unknown last normal LWN       (23 Jun 2021 20:39)    Overnight Events: Patient transferred to ICU for further work up.    T(C): 35.6 (06-24-21 @ 08:00), Max: 36.6 (06-23-21 @ 17:43)  HR: 58 (06-24-21 @ 13:00) (50 - 90)  BP: 174/86 (06-24-21 @ 13:00) (130/68 - 181/81)  BP(mean): 105 (06-24-21 @ 13:00) (88 - 134)  ABP: --  ABP(mean): --  RR: 21 (06-24-21 @ 13:00) (9 - 29)  SpO2: 98% (06-24-21 @ 13:00) (96% - 99%)      I&O's Detail    23 Jun 2021 07:01  -  24 Jun 2021 07:00  --------------------------------------------------------  IN:    sodium chloride 0.9%: 800 mL    Sodium Chloride 0.9% Bolus: 2000 mL  Total IN: 2800 mL    OUT:    Voided (mL): 1200 mL  Total OUT: 1200 mL    Total NET: 1600 mL      24 Jun 2021 07:01  -  24 Jun 2021 13:19  --------------------------------------------------------  IN:    sodium chloride 0.9%: 300 mL  Total IN: 300 mL    OUT:    Voided (mL): 900 mL  Total OUT: 900 mL    Total NET: -600 mL    MEDICATIONS  (STANDING):  aspirin 325 milliGRAM(s) Oral daily  atorvastatin 80 milliGRAM(s) Oral at bedtime  chlorhexidine 4% Liquid 1 Application(s) Topical daily  clopidogrel Tablet 75 milliGRAM(s) Oral daily  pantoprazole    Tablet 40 milliGRAM(s) Oral before breakfast  sodium chloride 0.9%. 1000 milliLiter(s) (100 mL/Hr) IV Continuous <Continuous>  venlafaxine 37.5 milliGRAM(s) Oral at bedtime    MEDICATIONS  (PRN):      Labs:                        13.4   10.84 )-----------( 336      ( 24 Jun 2021 05:08 )             41.0     06-24    140  |  108  |  15  ----------------------------<  79  4.4   |  24  |  1.1    Ca    9.0      24 Jun 2021 05:08    TPro  6.1  /  Alb  3.9  /  TBili  <0.2  /  DBili  x   /  AST  19  /  ALT  24  /  AlkPhos  93  06-23    LIVER FUNCTIONS - ( 23 Jun 2021 19:10 )  Alb: 3.9 g/dL / Pro: 6.1 g/dL / ALK PHOS: 93 U/L / ALT: 24 U/L / AST: 19 U/L / GGT: x             06-24 Chol 135 LDL -- HDL 48 Trig 47    Radiology:  CT Perfusion w/ Maps w/ IV Cont:   EXAM:  CT ANGIO BRAIN (W)AW IC        EXAM:  CT ANGIO NECK (W)AW IC        EXAM:  CT PERFUSION W MAPS IC            PROCEDURE DATE:  06/23/2021      INTERPRETATION:  CLINICAL HISTORY / REASON FOR EXAM: Stroke code. Worsening left sided weakness.    TECHNIQUE: CT angiogram of the head and neck including RAPID perfusion study of the brain. Contiguous CT axial images of the head and neck were obtained following the administration of intravenous contrast with perfusion mapping, and multiple 3D and MIP reformats.    COMPARISON: CTA Head/Neck 4/27/2020    FINDINGS:    CT PERFUSION:    Utilizing Tmax > 6.0s and CBF < 30%, there is 229 cc of ischemic penumbra in the right frontoparietal lobes. There is a 0 cc with CBF less than 30%.. Mismatchratio is infinite.    CTA HEAD/NECK:    AORTIC ARCH:    Unremarkable patent origin of the great vessels.    RIGHT ANTERIOR CIRCULATION:    The right common carotid and external carotid arteries are patent. Calcified plaque of the carotid siphon without flow limiting stenosis. There is severe stenosis and/or nonocclusive thrombus of the M1 segment of the right MCA, with diminished flow of the distal MCA branch vasculature new from prior CTA 4/27/2020. The right A1 segment is hypoplastic though there are new multifocal severe stenoses involving the A2 segment of the NORMAN.    LEFT ANTERIOR CIRCULATION:    The left common carotid artery is patent. There is approximately 60% short segment stenosis involving the proximal left internal carotid arterydue to mixed calcified and noncalcified atherosclerotic plaque. The internal carotid artery is patent to the intracranial circulation.    Calcified plaque of the carotid siphon without flow limiting stenosis. The left anterior and middle cerebral arteries are patent.    POSTERIOR CIRCULATION:    There is moderate stenosis at the origin of the left vertebral artery which is diminutive along its course though patent. The right vertebral artery is unremarkable The basilar artery is patent. The branch vasculature of the posterior circulation is within normal limits. The bilateral posterior cerebral arteries are patent. There is a small developmental venous anomaly within the right cerebellar hemisphere      IMPRESSION:    CT PERFUSION:  UtilizingTmax > 6.0s and CBF < 30%, there are 229 cc of ischemic penumbra in the right MCA territory without core infarct.    CTA HEAD/NECK:  High-grade stenosis versus nonocclusive thrombus is noted of the M1 segment of the right MCA, with diminished distal MCA branch opacification, new from prior CTA 4/27/2020.    Multifocal severe stenoses are noted along the right A2 segment of the NORMAN which are new since the prior examination.    Moderate (approximately 60%) short segment stenosis of the proximal left internal carotid artery due to mixed calcified and noncalcified atherosclerotic plaque which is worsened since the prior CTA of 4/27/2020.      Dr. Reginaldo Huang discussed preliminary findings with Stephy Ramirez on 6/23/2021 at 8:21 PM with readback.    REGINALDO HUANG MD; Resident Radiologist  This document has been electronically signed.  MONTY TAM MD; Attending Radiologist  This document has been electronically signed. Jun 24 2021  8:51AM (06-23-21 @ 19:06)  CT Angio Head w/ IV Cont:   EXAM:  CT ANGIO BRAIN (W)AW IC        EXAM:  CT ANGIO NECK (W)AW IC        EXAM:  CT PERFUSION W MAPS IC            PROCEDURE DATE:  06/23/2021      INTERPRETATION:  CLINICAL HISTORY / REASON FOR EXAM: Stroke code. Worsening left sided weakness.    TECHNIQUE: CT angiogram of the head and neck including RAPID perfusion study of the brain. Contiguous CT axial images of the head and neck were obtained following the administration of intravenous contrast with perfusion mapping, and multiple 3D and MIP reformats.    COMPARISON: CTA Head/Neck 4/27/2020    FINDINGS:    CT PERFUSION:    Utilizing Tmax > 6.0s and CBF < 30%, there is 229 cc of ischemic penumbra in the right frontoparietal lobes. There is a 0 cc with CBF less than 30%.. Mismatchratio is infinite.    CTA HEAD/NECK:    AORTIC ARCH:    Unremarkable patent origin of the great vessels.    RIGHT ANTERIOR CIRCULATION:    The right common carotid and external carotid arteries are patent. Calcified plaque of the carotid siphon without flow limiting stenosis. There is severe stenosis and/or nonocclusive thrombus of the M1 segment of the right MCA, with diminished flow of the distal MCA branch vasculature new from prior CTA 4/27/2020. The right A1 segment is hypoplastic though there are new multifocal severe stenoses involving the A2 segment of the NORMAN.    LEFT ANTERIOR CIRCULATION:    The left common carotid artery is patent. There is approximately 60% short segment stenosis involving the proximal left internal carotid arterydue to mixed calcified and noncalcified atherosclerotic plaque. The internal carotid artery is patent to the intracranial circulation.    Calcified plaque of the carotid siphon without flow limiting stenosis. The left anterior and middle cerebral arteries are patent.    POSTERIOR CIRCULATION:    There is moderate stenosis at the origin of the left vertebral artery which is diminutive along its course though patent. The right vertebral artery is unremarkable The basilar artery is patent. The branch vasculature of the posterior circulation is within normal limits. The bilateral posterior cerebral arteries are patent. There is a small developmental venous anomaly within the right cerebellar hemisphere    IMPRESSION:    CT PERFUSION:  UtilizingTmax > 6.0s and CBF < 30%, there are 229 cc of ischemic penumbra in the right MCA territory without core infarct.    CTA HEAD/NECK:  High-grade stenosis versus nonocclusive thrombus is noted of the M1 segment of the right MCA, with diminished distal MCA branch opacification, new from prior CTA 4/27/2020.    Multifocal severe stenoses are noted along the right A2 segment of the NORMAN which are new since the prior examination.    Moderate (approximately 60%) short segment stenosis of the proximal left internal carotid artery due to mixed calcified and noncalcified atherosclerotic plaque which is worsened since the prior CTA of 4/27/2020.      Dr. Reginaldo Huang discussed preliminary findings with Stephy Ramirez on 6/23/2021 at 8:21 PM with readback.    REGINALDO HUANG MD; Resident Radiologist  This document has been electronically signed.  MONTY TAM MD; Attending Radiologist  This document has been electronically signed. Jun 24 2021  8:51AM (06-23-21 @ 19:17)      ROS: Negative except for that of HPI      Physical Exam:    General: Well appearing male patient, NAD.    Neurological:    Mental Status: Alert and Oriented to person, place, and time, cooperative, pleasant. Affect appropriate, thought, speech, and language coherent.     Cranial Nerves:  I: Deferred  II, III, IV, VI: 4 mm PERRLA, EOM intact. Serrato intact by confrontation. No cranial nerve palsy or nystagmus noted.  V: facial sensation intact; masseter/ temporal muscles intact, corneal reflex intact bilaterally  VII: Left facial  VIII: Deferred  IX and X: no hoarseness, gag intact, palate/ uvula rise symmetrically  XI: SCM/ trapezius strength intact bilateral  XII: no dysarthria, tongue weakness/fasiculation     Motor: Normal muscle bulk/tone. Good posture. Strength - Right UE:   5/5  Right LE:  5 /5  Left UE:  4 /5  Left LE:  5 /5    Sensory: Sensation to light touch and painful stimuli.     Cerebellar Function: Normal gait including tandem, heel and toe walking. Pronator drift negative. Normal rapid alternating movements and point to point test.    Reflexes: No clonus    NIHSS:    1A: LOC 0   1B: LOC Questions 0   1C: Performs Tasks 0   2: Horizontal EOMs 0  3: Visual Fields 0   4: Facial Palsy 1  5A: LUE Drift 1  5B: RUE Drift 0   6A: LLE Drift 0   6B: RLE Drift 0   7: Limb Ataxia (heel-shin, FNF) 0   8: Sensation 0   9: Language/Aphasia 0   10: Dysarthria 0   11: Extinction/Inattention 0     NIHSS Total: 2    Assessment & Plan: 60 yo male with PMH of R NORMAN stroke in April 2020, and R M1 and A1 severe stenosis, presents with worsening left arm weakness over janet past month, and episode of fluctuating left leg weakness to the point of stumbling. In ED stroke code was called with NIHSS of 3, not a tpa candidate due to unknown LWN. CTP shows large penumbra in R MCA territory , code NI alert was activated but patient was not a NI candidate. Patient admitted to ICU for close monitoring and workup.       Plan:    Neuro:  - Neuro Checks Q1H  - STAT CT Head with change in Neuro exam  - MRI Brain  - Daily Statin  - Continue Dual AntiPLT  - Sodium Goal: Normonatremia  - Please obtain accumetrics (PRU and ARU) to assess utility of Brilinta vs Plavix   - PT/OT    CV:  - SBP goal: Permissive HTN to  (do not treat unless > 200).  - Normovolemia  - Normothermia    - TTE    Resp:  - HOB 45    GI/:  - EN as per Nutrition  - Strict Is/Os    Electrolytes:  - Replete as needed.  - Normoglycemia  - Please obtain A1c    ID:  - Per Primary    Hematology:  - SCDs and SQ Heparin    Stroke Work-up Summary:    1. Diagnostic:  - CT Head [x]  - CTA Head/Neck [x]  - CTP [x]  - MRI [ ]  - MRA w/o [ ]  - Carotid Dopplers [ ]  - TTE [ ]  - GIULIANA [ ]  - EKG [x]  - Labs: Lipids, A1c, TSH, Coags [ ]    2. Therapeutic: n/a    3. Secondary Prevention:  - Statin [x]  - AC [x]  - AntiPLT [x]  - Repeat CT head with change in exam   - Glycemic Control [x]  - Normothermia [x]  - Sodium Goal:    4. Further Management:  - SBP < 200 [x]  - Decrease use of sedatives  - Serial Neuro Exams [x]  - PT/OT/SLP [ ]  - Stroke Education [x]  - DVT Prophylaxis [x]  - Delirium Prevention [x]      Thank you for involving NCC in this patients plan of care. Please call with any questions or concerns.     Johnny Weber NP    #[id number]

## 2021-06-24 NOTE — CONSULT NOTE ADULT - ASSESSMENT
IMPRESSION: Rehab of R CVA / left hemiparesis    PRECAUTIONS: [   ] Cardiac  [   ] Respiratory  [   ] Seizures [   ] Contact Isolation  [   ] Droplet Isolation  [   ] Other    Weight Bearing Status:     RECOMMENDATION:    Out of Bed to Chair     DVT/Decubiti Prophylaxis    REHAB PLAN:     [ x   ] Bedside P/T 3-5 times a week   [x    ]   Bedside O/T  2-3 times a week             [    ] Speech Therapy               [    ]  No Rehab Therapy Indicated   Conditioning/ROM                                    ADL  Bed Mobility                                               Conditioning/ROM  Transfers                                                     Bed Mobility  Sitting /Standing Balance                         Transfers                                        Gait Training                                               Sitting/Standing Balance  Stair Training [   ]Applicable                    Home equipment Eval                                                                        Splinting  [   ] Only      GOALS:   ADL   [  x  ]   Independent                    Transfers  [  x  ] Independent                          Ambulation  [  x  ] Independent     [ x    ] With device                            [    ]  CG                                                         [    ]  CG                                                                  [    ] CG                            [    ] Min A                                                   [    ] Min A                                                              [    ] Min  A          DISCHARGE PLAN:   [    ]  Good candidate for Intensive Rehabilitation/Hospital based                                             Will tolerate 3hrs Intensive Rehab Daily                                       [     ]  Short Term Rehab in Skilled Nursing Facility                                       [     ]  Home with Outpatient or  services                                         [ x    ]  Possible Candidate for Intensive Hospital based Rehab                                       
60 yo male with PMH of R NORMAN stroke in April 2020, and R M1 and A1 severe stenosis follows outpatient with Dr. Guajardo, (last angiogram on February 2021), on Plavix 75 presents with worsening left arm weakness over janet past month, and episode of fluctuating left leg weakness to the point of stumbling, as per wife had 2 episodes on Saturday and 1 episode today.  In ED stroke code was called with NIHSS of 3, not a tpa candidate due to unknown LWN. CTP shows large penumbra in R MCA territory , code NI alert was activated and images are being reviewed by Dr. Marroquin.      Plan:  Q 1 neurochecks  Keep syst -180  Start Aspirin 325 in addition to Plavix  NS IVF at 100 cc/hour  MRI brain NC  F/u NI recommendation       Plan was discussed with Centralized telestroke doctor on call, Dr. Marroquin and ED team  
IMPRESSION:    CVA  HO NORMAN stenosis / MCA stenosis     PLAN:     CNS:  FU with Neuro IR.  Neurochecks.  repeat CTh     HEENT: Oral care    PULMONARY:  HOB @ 45 degrees.  Aspiration precautions     CARDIOVASCULAR:  I=O.      GI: GI prophylaxis.  Feeding.  Bowel regimen     RENAL:  Follow up lytes.  Correct as needed    INFECTIOUS DISEASE: Follow up cultures    HEMATOLOGICAL:  DVT prophylaxis.    ENDOCRINE:  Follow up FS.     MUSCULOSKELETAL:  Bed chair position     PT     MICU today

## 2021-06-24 NOTE — OCCUPATIONAL THERAPY INITIAL EVALUATION ADULT - NS ASR FOLLOW COMMAND OT EVAL
slightly increased processing time, however possibly due to language barrier (pt speaks English, however [place] is first language, declined need for )/100% of the time/able to follow single-step instructions

## 2021-06-24 NOTE — SWALLOW BEDSIDE ASSESSMENT ADULT - SLP PERTINENT HISTORY OF CURRENT PROBLEM
60 yo male primarily Zimbabwean speakin  presented to ED with worsening LUE weakness over 1 mo, with fluctuating LLE weakness with unsteady gait., PMHx R NORMAN stroke (Apr 2020), with known R M1,A1 severe stenosis. Patient is a current patient of Dr. Guajardo's Presents on this admission with worsening LUE weakness over 1 mo, with fluctuating LLE weakness with unsteady gait. Stroke code activated.  CTA head and neck: Severe stenosis and/or nonocclusive thrombus of the M1 segment of the right MCA, with diminished flow of the distal right MCA circulation. Pt with  PMHx of R NORMNA stroke (April 2020) and R M1 and A1 severe stenosis follows outpatient with Dr. Guajardo, on Plavix . 60 yo male primarily Guamanian speaking  presented to ED with worsening LUE weakness over 1 mo, with fluctuating LLE weakness with unsteady gait., PMHx R NORMAN stroke (Apr 2020), with known R M1,A1 severe stenosis. Patient is a current patient of Dr. Guajardo's Presents on this admission with worsening LUE weakness over 1 mo, with fluctuating LLE weakness with unsteady gait. Stroke code activated.  CTA head and neck: Severe stenosis and/or nonocclusive thrombus of the M1 segment of the right MCA, with diminished flow of the distal right MCA circulation.

## 2021-06-24 NOTE — CONSULT NOTE ADULT - SUBJECTIVE AND OBJECTIVE BOX
Neurointerventional Radiology Consult:     HPI:  Patient is a 60 yo M primarily New Zealander speaking, PMHx R NORMAN stroke (Apr 2020), with known R M1,A1 severe stenosis. Patient is a current patient of Dr. Guajardo's, last cerebral angiogram February 2021 which demonstrated R M1, A1, L inferior M2 bifurcation severe stenosis. Presents on this admission with worsening LUE weakness over 1 mo, with fluctuating LLE weakness with unsteady gait. Stroke code was activated in ED, NIHSS 3, not TPA candidate due to unknown LKW, CTP with large penumbra in R MCA territory, code NI alert activated, case reviewed by Dr. Marroquin, determined not candidate for intervention as there was no acute thrombus seen on image review. Patient was admitted to ICU for monitoring.   Neuroendovascular consulted for R M1, A1 stenosis demonstrated on imaging.  Video  service used at the time of this consult for [place]ander communication- #[id number]    PAST MEDICAL & SURGICAL HISTORY:  HTN (hypertension)  Psoriasis  CVA (cerebral vascular accident)  Diagnostic cerebral angiogram 02/03/21    MEDICATIONS  (STANDING):  aspirin 325 milliGRAM(s) Oral daily  atorvastatin 80 milliGRAM(s) Oral at bedtime  chlorhexidine 4% Liquid 1 Application(s) Topical daily  clopidogrel Tablet 75 milliGRAM(s) Oral daily  pantoprazole    Tablet 40 milliGRAM(s) Oral before breakfast  sodium chloride 0.9%. 1000 milliLiter(s) (100 mL/Hr) IV Continuous <Continuous>  venlafaxine 37.5 milliGRAM(s) Oral at bedtime    Allergies  No Known Allergies    Social History:   Smoking: Yes [ ]  No [x ]    FAMILY HISTORY:  No pertinent family history in first degree relatives    Physical Exam:   Vital Signs Last 24 Hrs  T(C): 35.6 (24 Jun 2021 08:00), Max: 36.6 (23 Jun 2021 17:43)  T(F): 96 (24 Jun 2021 08:00), Max: 97.8 (23 Jun 2021 17:43)  HR: 74 (24 Jun 2021 11:00) (50 - 90)  BP: 161/88 (24 Jun 2021 11:00) (137/91 - 181/81)  BP(mean): 113 (24 Jun 2021 11:00) (98 - 134)  RR: 29 (24 Jun 2021 11:00) (9 - 29)  SpO2: 96% (24 Jun 2021 11:00) (96% - 99%)    NIHSS:   DATE: 6/24/21  TIME: 0830   1A: Level of consciousness (0-3): 0  1B: Questions (0-2): 0    1C: Commands (0-2): 0  2: Gaze (0-2): 0  3: Visual fields (0-3): 0  4: Facial palsy (0-3): 1 - mild L facial droop   MOTOR:  5A: Left arm motor drift (0-4): 1 - mild drift, not to bed  5B: Right arm motor drift (0-4): 0  6A: Left leg motor drift (0-4): 0  6B: Right leg motor drift (0-4): 0  7: Limb ataxia (0-2): 0  SENSORY:  8: Sensation (0-2): 0  SPEECH:  9: Language (0-3): 0  10: Dysarthria (0-2): 0  EXTINCTION:  11: Extinction/inattention (0-2): 0    TOTAL SCORE: 2  General - NAD   Neuro - AAOx3, no dysarthria or aphasia, no visual deficits, no sensory deficits, slight L sided facial asymmetry, LUE weakness minor drift, not to bed, RUE/LLE/RLE no deficits, LUE 4/5 strength, RUE 5/5 strength.     Labs:                         13.4   10.84 )-----------( 336      ( 24 Jun 2021 05:08 )             41.0     06-24    140  |  108  |  15  ----------------------------<  79  4.4   |  24  |  1.1    Ca    9.0      24 Jun 2021 05:08    TPro  6.1  /  Alb  3.9  /  TBili  <0.2  /  DBili  x   /  AST  19  /  ALT  24  /  AlkPhos  93  06-23      Radiology & Additional Studies:   Radiology imaging reviewed.     < from: CT Angio Neck w/ IV Cont (06.23.21 @ 19:20) >    CT PERFUSION:  UtilizingTmax > 6.0s and CBF < 30%, there are 229 cc of ischemic penumbra in the right MCA territory without core infarct.    CTA HEAD/NECK:  High-grade stenosis versus nonocclusive thrombus is noted of the M1 segment of the right MCA, with diminished distal MCA branch opacification, new from prior CTA 4/27/2020.    Multifocal severe stenoses are noted along the right A2 segment of the NORMAN which are new since the prior examination.    Moderate (approximately 60%) short segment stenosis of the proximal left internal carotid artery due to mixed calcified and noncalcified atherosclerotic plaque which is worsened since the prior CTA of 4/27/2020.    < end of copied text >    < from: IR Neuro (02.03.21 @ 16:55) >    FINDINGS:  -The right CCA runs showed a small non-flow-limiting web at the posterior wall of the origin of the ICA.  -The right ICA runs showed a 5mm tortuous segment of concentric severe stenosis of proximal M1 segment (0.5mm vs 1.8mm). Also there was a 3mm segment of concentric severe stenosis of proximal A1 segment (0.5mm vs 1.8mm). A mild post-stenotic dilatation seen in both M1 and A1 segments. No other significant steno-occlusive disease seen during these runs.  -B/L ECA runs failed to show significant vascular abnormalities or collaterals to ICA or Vertebrobasilar system.  -The dominant right VA runs showed expansion of bilateral PCA pial territory towards the bilateral MCA territories, especially on the right side. No PCOM seen.  -The left CCA runs seemed to be unremarkable in cervical region.  -The left ICA runs showed a severe short segment (1mm) stenosis of the inferior M2 segment at its bifurcationto M3 segments (0.4mm vs 2mm). Those M3 segment seemed to have post stenosis dilatation.  -The left SCA runs showed a small caliber VA with a mild stenosis in the proximal segment.  -The left VA runs showed a small VA which is contributing in BA blood flow.    VENOUS PHASE: No significant venous abnormality observed.    IMPRESSION:  -  Severe stenosis of the right M1, right A1 and left inferior M2 bifurcation as described above.    RECOMMENDATIONS:  Avoid dehydration, hypovolemia, and hypotension.  Inflammatory, infectious process as well as dissection and atypical Moyamoya need to be considered in addition to atherosclerosis.  -High resolution MR imaging of the vessel wall is needed.  Maximal medical management and risk factor modification per stroke team.  -F/up with NI clinic in 2 to 3 weeks.    < end of copied text >    ASSESSMENT/ PLAN:     Patient is a 60 yo [place] Zealander speaking male with hx of known severe stenosis of R M1, R A1, L M2 vessels, presenting with worsening L sided weakness. Neuroendovascular consulted to evaluate intracranial stenosis.     Recommendations remain unchanged since after previous cerebral angiogram, please refer to previous IR neuro note for full recommendations regarding findings.   Patient would benefit from MRI brain. Further recommendations will follow imaging.   Avoid dehydration, hypovolemia, hypotension.   Management per NCC/ICU.     Risks, benefits, and alternatives to treatment discussed. All questions answered with understanding.  Thank you for the courtesy of this consult, please call ADRIENNE SZYMANSKI [phone] with any further questions.    Neurointerventional Radiology Consult:     HPI:  Patient is a 60 yo M primarily Georgian speaking, PMHx R NORMAN stroke (Apr 2020), with known R M1,A1 severe stenosis. Patient is a current patient of Dr. Guajardo's, last cerebral angiogram February 2021 which demonstrated R M1, A1, L inferior M2 bifurcation severe stenosis. Presents on this admission with worsening LUE weakness over 1 mo, with fluctuating LLE weakness with unsteady gait. Stroke code was activated in ED, NIHSS 3, not TPA candidate due to unknown LKW, CTP with large penumbra in R MCA territory, code NI alert activated, case reviewed by Dr. Marroquin, determined not candidate for intervention as there was no acute thrombus seen on image review. Patient was admitted to ICU for monitoring.   Neuroendovascular consulted for R M1, A1 stenosis demonstrated on imaging.  Video  service used at the time of this consult for Georgian communication- #[id number]    PAST MEDICAL & SURGICAL HISTORY:  HTN (hypertension)  Psoriasis  CVA (cerebral vascular accident)  Diagnostic cerebral angiogram 02/03/21    MEDICATIONS  (STANDING):  aspirin 325 milliGRAM(s) Oral daily  atorvastatin 80 milliGRAM(s) Oral at bedtime  chlorhexidine 4% Liquid 1 Application(s) Topical daily  clopidogrel Tablet 75 milliGRAM(s) Oral daily  pantoprazole    Tablet 40 milliGRAM(s) Oral before breakfast  sodium chloride 0.9%. 1000 milliLiter(s) (100 mL/Hr) IV Continuous <Continuous>  venlafaxine 37.5 milliGRAM(s) Oral at bedtime    Allergies  No Known Allergies    Social History:   Smoking: Yes [ ]  No [x ]    FAMILY HISTORY:  No pertinent family history in first degree relatives    Physical Exam:   Vital Signs Last 24 Hrs  T(C): 35.6 (24 Jun 2021 08:00), Max: 36.6 (23 Jun 2021 17:43)  T(F): 96 (24 Jun 2021 08:00), Max: 97.8 (23 Jun 2021 17:43)  HR: 74 (24 Jun 2021 11:00) (50 - 90)  BP: 161/88 (24 Jun 2021 11:00) (137/91 - 181/81)  BP(mean): 113 (24 Jun 2021 11:00) (98 - 134)  RR: 29 (24 Jun 2021 11:00) (9 - 29)  SpO2: 96% (24 Jun 2021 11:00) (96% - 99%)    NIHSS:   DATE: 6/24/21  TIME: 0830   1A: Level of consciousness (0-3): 0  1B: Questions (0-2): 0    1C: Commands (0-2): 0  2: Gaze (0-2): 0  3: Visual fields (0-3): 0  4: Facial palsy (0-3): 1 - mild L facial droop   MOTOR:  5A: Left arm motor drift (0-4): 1 - mild drift, not to bed  5B: Right arm motor drift (0-4): 0  6A: Left leg motor drift (0-4): 0  6B: Right leg motor drift (0-4): 0  7: Limb ataxia (0-2): 0  SENSORY:  8: Sensation (0-2): 0  SPEECH:  9: Language (0-3): 0  10: Dysarthria (0-2): 0  EXTINCTION:  11: Extinction/inattention (0-2): 0    TOTAL SCORE: 2  General - NAD   Neuro - AAOx3, no dysarthria or aphasia, no visual deficits, no sensory deficits, slight L sided facial asymmetry, LUE weakness minor drift, not to bed, RUE/LLE/RLE no deficits, LUE 4/5 strength, RUE 5/5 strength.     Labs:                         13.4   10.84 )-----------( 336      ( 24 Jun 2021 05:08 )             41.0     06-24    140  |  108  |  15  ----------------------------<  79  4.4   |  24  |  1.1    Ca    9.0      24 Jun 2021 05:08    TPro  6.1  /  Alb  3.9  /  TBili  <0.2  /  DBili  x   /  AST  19  /  ALT  24  /  AlkPhos  93  06-23      Radiology & Additional Studies:   Radiology imaging reviewed.     < from: CT Angio Neck w/ IV Cont (06.23.21 @ 19:20) >    CT PERFUSION:  UtilizingTmax > 6.0s and CBF < 30%, there are 229 cc of ischemic penumbra in the right MCA territory without core infarct.    CTA HEAD/NECK:  High-grade stenosis versus nonocclusive thrombus is noted of the M1 segment of the right MCA, with diminished distal MCA branch opacification, new from prior CTA 4/27/2020.    Multifocal severe stenoses are noted along the right A2 segment of the NORMAN which are new since the prior examination.    Moderate (approximately 60%) short segment stenosis of the proximal left internal carotid artery due to mixed calcified and noncalcified atherosclerotic plaque which is worsened since the prior CTA of 4/27/2020.    < end of copied text >    < from: IR Neuro (02.03.21 @ 16:55) >    FINDINGS:  -The right CCA runs showed a small non-flow-limiting web at the posterior wall of the origin of the ICA.  -The right ICA runs showed a 5mm tortuous segment of concentric severe stenosis of proximal M1 segment (0.5mm vs 1.8mm). Also there was a 3mm segment of concentric severe stenosis of proximal A1 segment (0.5mm vs 1.8mm). A mild post-stenotic dilatation seen in both M1 and A1 segments. No other significant steno-occlusive disease seen during these runs.  -B/L ECA runs failed to show significant vascular abnormalities or collaterals to ICA or Vertebrobasilar system.  -The dominant right VA runs showed expansion of bilateral PCA pial territory towards the bilateral MCA territories, especially on the right side. No PCOM seen.  -The left CCA runs seemed to be unremarkable in cervical region.  -The left ICA runs showed a severe short segment (1mm) stenosis of the inferior M2 segment at its bifurcationto M3 segments (0.4mm vs 2mm). Those M3 segment seemed to have post stenosis dilatation.  -The left SCA runs showed a small caliber VA with a mild stenosis in the proximal segment.  -The left VA runs showed a small VA which is contributing in BA blood flow.    VENOUS PHASE: No significant venous abnormality observed.    IMPRESSION:  -  Severe stenosis of the right M1, right A1 and left inferior M2 bifurcation as described above.    RECOMMENDATIONS:  Avoid dehydration, hypovolemia, and hypotension.  Inflammatory, infectious process as well as dissection and atypical Moyamoya need to be considered in addition to atherosclerosis.  -High resolution MR imaging of the vessel wall is needed.  Maximal medical management and risk factor modification per stroke team.  -F/up with NI clinic in 2 to 3 weeks.    < end of copied text >    ASSESSMENT/ PLAN:     Patient is a 60 yo Georgian speaking male with hx of known severe stenosis of R M1, R A1, L M2 vessels, presenting with worsening L sided weakness. Neuroendovascular consulted to evaluate intracranial stenosis.     Recommendations remain unchanged since after previous cerebral angiogram, please refer to previous diagnostic cerebral angiogram report, on 2/3/21, for full recommendations regarding findings.   Patient would benefit from MRI brain. Further recommendations will follow imaging.   Avoid dehydration, hypovolemia, hypotension.   Management per NCC/ICU.     Risks, benefits, and alternatives to treatment discussed. All questions answered with understanding.  Thank you for the courtesy of this consult, please call ADRIENNE SZYMANSKI [phone] with any further questions.

## 2021-06-24 NOTE — CONSULT NOTE ADULT - SUBJECTIVE AND OBJECTIVE BOX
Patient is a 61y old  Male who presents with a chief complaint of left leg weakness (23 Jun 2021 20:39)      HPI:  62 yo male with PMHx of R NORMAN stroke (April 2020) and R M1 and A1 severe stenosis follows outpatient with Dr. Guajardo, (last angiogram on February 2021), on Plavix     CC: fluctuating left leg weakness resulting in stumbling.      History goes back to: 1 month ago when patient started notice episodic left arm weakness.     ROS is negative for: no fever, no chills, no SOB, chest pains or palpitations, no abdominal pain, nausea or vomiting. Patient denies facial droop, difficulty swallowing     In the ED: VS all WNL  Labs significant for: WNL  Troponin: negative  CT head: Acute infarct involving the right frontal lobe in the precentral gyrus.  Chronic infarct of the right periventricular white matter, new from prior exams detailed above.  Additional chronic infarcts of the bilateral basal ganglia and left cerebellum, stable.    CTA head and neck: Severe stenosis and/or nonocclusive thrombus of the M1 segment of the right MCA, with diminished flow of the distal right MCA circulation, new from prior CTA 4/27/2020. 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    CT PERFUSION:, there are 229 cc of ischemic penumbra in the right frontoparietal lobes, partially accounted for by chronic infarcts of the right frontoparietal white matter.    NIHSS in ED=3  stroke code called. Not a candidate for tPA due to unknown last normal LWN       (23 Jun 2021 20:39)      PAST MEDICAL & SURGICAL HISTORY:  HTN (hypertension)    Psoriasis    CVA (cerebral vascular accident)    No significant past surgical history        SOCIAL HX:   Smoking     No                     ETOH                            Other    FAMILY HISTORY:  No pertinent family history in first degree relatives    :  No known cardiovacular family hisotry     Review Of Systems:     All ROS are negative except per HPI       Allergies    No Known Allergies    Intolerances          PHYSICAL EXAM    ICU Vital Signs Last 24 Hrs  T(C): 35.6 (24 Jun 2021 08:00), Max: 36.6 (23 Jun 2021 17:43)  T(F): 96 (24 Jun 2021 08:00), Max: 97.8 (23 Jun 2021 17:43)  HR: 60 (24 Jun 2021 08:00) (50 - 90)  BP: 173/85 (24 Jun 2021 08:00) (137/91 - 178/105)  BP(mean): 110 (24 Jun 2021 08:00) (98 - 134)  ABP: --  ABP(mean): --  RR: 15 (24 Jun 2021 08:00) (9 - 25)  SpO2: 99% (24 Jun 2021 08:00) (96% - 99%)      CONSTITUTIONAL:  Well nourished.   NAD    ENT:   Airway patent,   Mouth with normal mucosa.   No thrush      CARDIAC:   Normal rate,   Regular rhythm.    No edema      Vascular:   normal systolic impulse  no bruits    RESPIRATORY:   No wheezing  Bilateral BS   Not tachypneic,  No use of accessory muscles    GASTROINTESTINAL:  Abdomen soft,   Non-tender,   No guarding,   + BS      NEUROLOGICAL:   Alert and oriented   LUE drift     SKIN:   Skin normal color for race,   No evidence of rash.      HEME LYMPH: .  No cervical  lymphadenopathy.  No inguinal lymphadenopathy            06-23-21 @ 07:01  -  06-24-21 @ 07:00  --------------------------------------------------------  IN:    sodium chloride 0.9%: 800 mL    Sodium Chloride 0.9% Bolus: 2000 mL  Total IN: 2800 mL    OUT:    Voided (mL): 1200 mL  Total OUT: 1200 mL    Total NET: 1600 mL      06-24-21 @ 07:01  -  06-24-21 @ 09:38  --------------------------------------------------------  IN:    sodium chloride 0.9%: 100 mL  Total IN: 100 mL    OUT:    Voided (mL): 300 mL  Total OUT: 300 mL    Total NET: -200 mL          LABS:                          13.4   10.84 )-----------( 336      ( 24 Jun 2021 05:08 )             41.0                                               06-24    140  |  108  |  15  ----------------------------<  79  4.4   |  24  |  1.1    Ca    9.0      24 Jun 2021 05:08    TPro  6.1  /  Alb  3.9  /  TBili  <0.2  /  DBili  x   /  AST  19  /  ALT  24  /  AlkPhos  93  06-23                                                 CARDIAC MARKERS ( 23 Jun 2021 19:10 )  x     / <0.01 ng/mL / x     / x     / x                                                LIVER FUNCTIONS - ( 23 Jun 2021 19:10 )  Alb: 3.9 g/dL / Pro: 6.1 g/dL / ALK PHOS: 93 U/L / ALT: 24 U/L / AST: 19 U/L / GGT: x                                                                                                                                       X-Rays reviewed                                                                                     ECHO    CXR interpreted by me NOt done     MEDICATIONS  (STANDING):  aspirin 325 milliGRAM(s) Oral daily  atorvastatin 80 milliGRAM(s) Oral at bedtime  chlorhexidine 4% Liquid 1 Application(s) Topical daily  clopidogrel Tablet 75 milliGRAM(s) Oral daily  pantoprazole    Tablet 40 milliGRAM(s) Oral before breakfast  sodium chloride 0.9%. 1000 milliLiter(s) (100 mL/Hr) IV Continuous <Continuous>  venlafaxine 37.5 milliGRAM(s) Oral at bedtime    MEDICATIONS  (PRN):

## 2021-06-25 LAB
ALBUMIN SERPL ELPH-MCNC: 3.9 G/DL — SIGNIFICANT CHANGE UP (ref 3.5–5.2)
ALP SERPL-CCNC: 87 U/L — SIGNIFICANT CHANGE UP (ref 30–115)
ALT FLD-CCNC: 22 U/L — SIGNIFICANT CHANGE UP (ref 0–41)
ANION GAP SERPL CALC-SCNC: 10 MMOL/L — SIGNIFICANT CHANGE UP (ref 7–14)
AST SERPL-CCNC: 18 U/L — SIGNIFICANT CHANGE UP (ref 0–41)
BASOPHILS # BLD AUTO: 0.1 K/UL — SIGNIFICANT CHANGE UP (ref 0–0.2)
BASOPHILS NFR BLD AUTO: 0.9 % — SIGNIFICANT CHANGE UP (ref 0–1)
BILIRUB SERPL-MCNC: 0.3 MG/DL — SIGNIFICANT CHANGE UP (ref 0.2–1.2)
BUN SERPL-MCNC: 16 MG/DL — SIGNIFICANT CHANGE UP (ref 10–20)
CALCIUM SERPL-MCNC: 9 MG/DL — SIGNIFICANT CHANGE UP (ref 8.5–10.1)
CHLORIDE SERPL-SCNC: 109 MMOL/L — SIGNIFICANT CHANGE UP (ref 98–110)
CO2 SERPL-SCNC: 22 MMOL/L — SIGNIFICANT CHANGE UP (ref 17–32)
CREAT SERPL-MCNC: 1.2 MG/DL — SIGNIFICANT CHANGE UP (ref 0.7–1.5)
EOSINOPHIL # BLD AUTO: 0.32 K/UL — SIGNIFICANT CHANGE UP (ref 0–0.7)
EOSINOPHIL NFR BLD AUTO: 2.9 % — SIGNIFICANT CHANGE UP (ref 0–8)
GLUCOSE SERPL-MCNC: 79 MG/DL — SIGNIFICANT CHANGE UP (ref 70–99)
HCT VFR BLD CALC: 40.8 % — LOW (ref 42–52)
HGB BLD-MCNC: 13.4 G/DL — LOW (ref 14–18)
IMM GRANULOCYTES NFR BLD AUTO: 0.4 % — HIGH (ref 0.1–0.3)
LYMPHOCYTES # BLD AUTO: 3.75 K/UL — HIGH (ref 1.2–3.4)
LYMPHOCYTES # BLD AUTO: 33.4 % — SIGNIFICANT CHANGE UP (ref 20.5–51.1)
MAGNESIUM SERPL-MCNC: 2 MG/DL — SIGNIFICANT CHANGE UP (ref 1.8–2.4)
MCHC RBC-ENTMCNC: 28.7 PG — SIGNIFICANT CHANGE UP (ref 27–31)
MCHC RBC-ENTMCNC: 32.8 G/DL — SIGNIFICANT CHANGE UP (ref 32–37)
MCV RBC AUTO: 87.4 FL — SIGNIFICANT CHANGE UP (ref 80–94)
MONOCYTES # BLD AUTO: 0.96 K/UL — HIGH (ref 0.1–0.6)
MONOCYTES NFR BLD AUTO: 8.6 % — SIGNIFICANT CHANGE UP (ref 1.7–9.3)
NEUTROPHILS # BLD AUTO: 6.04 K/UL — SIGNIFICANT CHANGE UP (ref 1.4–6.5)
NEUTROPHILS NFR BLD AUTO: 53.8 % — SIGNIFICANT CHANGE UP (ref 42.2–75.2)
NRBC # BLD: 0 /100 WBCS — SIGNIFICANT CHANGE UP (ref 0–0)
PLATELET # BLD AUTO: 315 K/UL — SIGNIFICANT CHANGE UP (ref 130–400)
POTASSIUM SERPL-MCNC: 4.2 MMOL/L — SIGNIFICANT CHANGE UP (ref 3.5–5)
POTASSIUM SERPL-SCNC: 4.2 MMOL/L — SIGNIFICANT CHANGE UP (ref 3.5–5)
PROT SERPL-MCNC: 6.3 G/DL — SIGNIFICANT CHANGE UP (ref 6–8)
RBC # BLD: 4.67 M/UL — LOW (ref 4.7–6.1)
RBC # FLD: 13.2 % — SIGNIFICANT CHANGE UP (ref 11.5–14.5)
SODIUM SERPL-SCNC: 141 MMOL/L — SIGNIFICANT CHANGE UP (ref 135–146)
WBC # BLD: 11.22 K/UL — HIGH (ref 4.8–10.8)
WBC # FLD AUTO: 11.22 K/UL — HIGH (ref 4.8–10.8)

## 2021-06-25 PROCEDURE — 99232 SBSQ HOSP IP/OBS MODERATE 35: CPT

## 2021-06-25 PROCEDURE — 70551 MRI BRAIN STEM W/O DYE: CPT | Mod: 26

## 2021-06-25 RX ADMIN — CLOPIDOGREL BISULFATE 75 MILLIGRAM(S): 75 TABLET, FILM COATED ORAL at 12:31

## 2021-06-25 RX ADMIN — SIMVASTATIN 40 MILLIGRAM(S): 20 TABLET, FILM COATED ORAL at 21:49

## 2021-06-25 RX ADMIN — PANTOPRAZOLE SODIUM 40 MILLIGRAM(S): 20 TABLET, DELAYED RELEASE ORAL at 06:57

## 2021-06-25 RX ADMIN — Medication 325 MILLIGRAM(S): at 12:31

## 2021-06-25 RX ADMIN — Medication 37.5 MILLIGRAM(S): at 21:50

## 2021-06-25 NOTE — PROGRESS NOTE ADULT - SUBJECTIVE AND OBJECTIVE BOX
Specialty: Neurocritical Care     HPI:  60 yo male with PMHx of R NORMAN stroke (April 2020) and R M1 and A1 severe stenosis follows outpatient with Dr. Guajardo, (last angiogram on February 2021), on Plavix     CC: fluctuating left leg weakness resulting in stumbling.      History goes back to: 1 month ago when patient started notice episodic left arm weakness.     ROS is negative for: no fever, no chills, no SOB, chest pains or palpitations, no abdominal pain, nausea or vomiting. Patient denies facial droop, difficulty swallowing     In the ED: VS all WNL  Labs significant for: WNL  Troponin: negative  CT head: Acute infarct involving the right frontal lobe in the precentral gyrus.  Chronic infarct of the right periventricular white matter, new from prior exams detailed above.  Additional chronic infarcts of the bilateral basal ganglia and left cerebellum, stable.    CTA head and neck: Severe stenosis and/or nonocclusive thrombus of the M1 segment of the right MCA, with diminished flow of the distal right MCA circulation, new from prior CTA 4/27/2020. 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    CT PERFUSION:, there are 229 cc of ischemic penumbra in the right frontoparietal lobes, partially accounted for by chronic infarcts of the right frontoparietal white matter.    NIHSS in ED=3  stroke code called. Not a candidate for tPA due to unknown last normal LWN        Past Medical and Surgical Hx:  PAST MEDICAL & SURGICAL HISTORY:  HTN (hypertension)  Psoriasis  CVA (cerebral vascular accident)    No significant past surgical history      Allergies: No Known Allergies      ROS: Patient endorses no complaints at this time. Otherwise, 10-point ROS is negative.     Physical Exam:  Neurological:  Mental Status: Alert and Oriented to person, place, and time, cooperative, pleasant. Affect appropriate, thought, speech, and language coherent. Short- and long-term memory, abstract thinking and calculation intact.  Cranial Nerves:  II, III, IV, VI: PERRLA, EOM intact. No cranial nerve palsy or nystagmus noted.  V: facial sensation intact; masseter/ temporal muscles intact, corneal reflex intact bilaterally  VII: face symmetrical at rest and with expression  IX and X: no hoarseness, gag intact, palate/ uvula rise symmetrically  XI: SCM/ trapezius strength intact bilateral  XII: no dysarthria, tongue weakness/fasiculation   Motor: Normal muscle bulk/tone. Good posture. Strength 5+/5+ LE b/l. 5/5 strength for RUE. 3/5 strength for LUE.   Sensory: Sensation to light touch, painintact to trunk and bilaterally to both upper and lower extremities.   Cerebellar Function:  pronator drift + for LUE and mild dysmetria noted on finger to nose test notable with LUE.      Vital Signs:  ICU Vital Signs Last 24 Hrs  T(C): 36 (25 Jun 2021 08:00), Max: 36.6 (24 Jun 2021 20:00)  T(F): 96.8 (25 Jun 2021 08:00), Max: 97.9 (24 Jun 2021 20:00)  HR: 88 (25 Jun 2021 12:00) (50 - 88)  BP: 198/97 (25 Jun 2021 12:00) (137/60 - 198/97)  BP(mean): 132 (25 Jun 2021 12:00) (92 - 132)  ABP: --  ABP(mean): --  RR: 28 (25 Jun 2021 12:00) (9 - 28)  SpO2: 98% (25 Jun 2021 12:00) (97% - 99%)      Ventilator:      I/Os:  I&O's Detail    24 Jun 2021 07:01  -  25 Jun 2021 07:00  --------------------------------------------------------  IN:    sodium chloride 0.9%: 2000 mL  Total IN: 2000 mL    OUT:    Voided (mL): 2400 mL  Total OUT: 2400 mL    Total NET: -400 mL      25 Jun 2021 07:01  -  25 Jun 2021 14:41  --------------------------------------------------------  IN:    sodium chloride 0.9%: 300 mL  Total IN: 300 mL    OUT:    Voided (mL): 700 mL  Total OUT: 700 mL    Total NET: -400 mL          Labs:  06-25    141  |  109  |  16  ----------------------------<  79  4.2   |  22  |  1.2    Ca    9.0      25 Jun 2021 04:45  Mg     2.0     06-25    TPro  6.3  /  Alb  3.9  /  TBili  0.3  /  DBili  x   /  AST  18  /  ALT  22  /  AlkPhos  87  06-25    CBC Full  -  ( 25 Jun 2021 04:45 )  WBC Count : 11.22 K/uL  RBC Count : 4.67 M/uL  Hemoglobin : 13.4 g/dL  Hematocrit : 40.8 %  Platelet Count - Automated : 315 K/uL  Mean Cell Volume : 87.4 fL  Mean Cell Hemoglobin : 28.7 pg  Mean Cell Hemoglobin Concentration : 32.8 g/dL  Auto Neutrophil # : 6.04 K/uL  Auto Lymphocyte # : 3.75 K/uL  Auto Monocyte # : 0.96 K/uL  Auto Eosinophil # : 0.32 K/uL  Auto Basophil # : 0.10 K/uL  Auto Neutrophil % : 53.8 %  Auto Lymphocyte % : 33.4 %  Auto Monocyte % : 8.6 %  Auto Eosinophil % : 2.9 %  Auto Basophil % : 0.9 %      LIVER FUNCTIONS - ( 25 Jun 2021 04:45 )  Alb: 3.9 g/dL / Pro: 6.3 g/dL / ALK PHOS: 87 U/L / ALT: 22 U/L / AST: 18 U/L / GGT: x                 Microbiology:        Medications Current and PRN:  MEDICATIONS  (STANDING):  aspirin 325 milliGRAM(s) Oral daily  chlorhexidine 4% Liquid 1 Application(s) Topical daily  clopidogrel Tablet 75 milliGRAM(s) Oral daily  pantoprazole    Tablet 40 milliGRAM(s) Oral before breakfast  simvastatin 40 milliGRAM(s) Oral at bedtime  sodium chloride 0.9%. 1000 milliLiter(s) (100 mL/Hr) IV Continuous <Continuous>  venlafaxine 37.5 milliGRAM(s) Oral at bedtime    MEDICATIONS  (PRN):      Radiology:  CT 6/24/2021  IMPRESSION:  Evolving acute infarcts involving the right frontal lobe likely in an MCA and NORMAN distribution. No evidence ofhemorrhagic transformation or significant mass effect.  Chronic bilateral basal ganglia as well as left cerebellar lacunar infarcts.      Assessment: 60 yo male with PMH of R NORMAN stroke in April 2020, and R M1 and A1 severe stenosis, presents with worsening left arm weakness over janet past month, and episode of fluctuating left leg weakness to the point of stumbling. In ED stroke code was called with NIHSS of 3, not a tpa candidate due to unknown LWN. CTP shows large penumbra in R MCA territory , code NI alert was activated but patient was not a NI candidate. Patient admitted to ICU for close monitoring and workup. Repeat CTH from yesterday revealed evolving acute infarct of the R. frontal lobe in the MCA and NORMAN distribution but no hemorrhagic transformation.     Plan:  Neuro:  - Neuro Checks Q1H  - STAT CT Head with change in Neuro exam  - MRI Brain w/o shayne   - Daily Statin  - Continue Dual AntiPLT  - Sodium Goal: Normonatremia  - Please obtain accumetrics (PRU and ARU) to assess utility of Brilinta vs Plavix  (completed)  - PT/OT    CV:  - SBP goal: Permissive HTN to  (do not treat unless > 200).  - Normovolemia  - Normothermia    - TTE    Resp:  - HOB 45    GI/:  - EN as per Nutrition  - Strict Is/Os    Electrolytes:  - Replete as needed.  - Normoglycemia  - Please obtain A1c    ID:  - Per Primary    Hematology:  - SCDs and SQ Heparin    Stroke Work-up Summary:    1. Diagnostic:  - CT Head [x]  - CTA Head/Neck [x]  - CTP [x]  - MRA w/o [ ]  - TTE [x ]  - EKG [x]    2. Therapeutic: n/a    3. Secondary Prevention:  - Statin [x]  - AC [x]  - AntiPLT [x]  - Repeat CT head with change in exam   - Glycemic Control [x]  - Normothermia [x]  - Sodium Goal:    4. Further Management:  - SBP < 200 [x]  - Decrease use of sedatives  - Serial Neuro Exams [x]  - PT/OT/SLP   - Stroke Education [x]  - DVT Prophylaxis [x]  - Delirium Prevention [x]    Dispo: ICU    STEPHON Hopson  Please feel free to contact for any questions or concerns. Thank you. Please see attending attestation.  Extension: #[phone]

## 2021-06-25 NOTE — PROGRESS NOTE ADULT - ATTENDING COMMENTS
IMPRESSION:    CVA  HO NORMAN stenosis / MCA stenosis s/p CTH- shows evolving acute infarcts in right frontal lobe     Plan as outlined above

## 2021-06-25 NOTE — PROGRESS NOTE ADULT - ASSESSMENT
IMPRESSION:    CVA  HO NORMAN stenosis / MCA stenosis s/p CTH- shows evolving acute infarcts in right frontal lobe     PLAN:     CNS:  FU with Neuro IR.  Neurochecks. f/u MRI as per neuro IR     HEENT: Oral care    PULMONARY:  HOB @ 45 degrees.  Aspiration precautions     CARDIOVASCULAR:  NS @100     GI: GI prophylaxis.  Feeding.  Bowel regimen      RENAL:  Follow up lytes.  Correct as needed    INFECTIOUS DISEASE: Follow up cultures    HEMATOLOGICAL:  DVT prophylaxis.    ENDOCRINE:  Follow up FS.     MUSCULOSKELETAL:  Bed chair position     PT     MICU today          IMPRESSION:    CVA  HO NORMAN stenosis / MCA stenosis s/p CTH- shows evolving acute infarcts in right frontal lobe     PLAN:     CNS:  FU with Neuro IR.  Neurochecks. f/u MRI as per neuro IR     HEENT: Oral care    PULMONARY:  HOB @ 45 degrees.  Aspiration precautions     CARDIOVASCULAR:  Avoid volume overload     GI: GI prophylaxis.  Feeding.  Bowel regimen      RENAL:  Follow up lytes.  Correct as needed    INFECTIOUS DISEASE: Follow up cultures    HEMATOLOGICAL:  DVT prophylaxis.    ENDOCRINE:  Follow up FS.     MUSCULOSKELETAL:  Bed chair position     PT     MICU today

## 2021-06-25 NOTE — PROGRESS NOTE ADULT - SUBJECTIVE AND OBJECTIVE BOX
Patient is a 61y old  Male who presents with a chief complaint of left leg weakness (24 Jun 2021 13:19)        Over Night Events: No acute events overnight; on NS @100      ROS:     All ROS are negative except HPI         PHYSICAL EXAM    ICU Vital Signs Last 24 Hrs  T(C): 36 (25 Jun 2021 08:00), Max: 36.6 (24 Jun 2021 20:00)  T(F): 96.8 (25 Jun 2021 08:00), Max: 97.9 (24 Jun 2021 20:00)  HR: 60 (25 Jun 2021 10:00) (52 - 74)  BP: 161/83 (25 Jun 2021 10:00) (130/68 - 184/88)  BP(mean): 122 (25 Jun 2021 10:00) (88 - 144)  ABP: --  ABP(mean): --  RR: 17 (25 Jun 2021 10:00) (10 - 29)  SpO2: 97% (25 Jun 2021 10:00) (96% - 99%)      CONSTITUTIONAL:  Well nourished.  NAD    ENT:   Airway patent,   Mouth with normal mucosa.   No thrush    EYES:   Pupils equal,   Round and reactive to light.    CARDIAC:   Normal rate,   Regular rhythm.    No edema    Vascular:  Normal systolic impulse  No Carotid bruits    RESPIRATORY:   No wheezing  Bilateral BS  Normal chest expansion  Not tachypneic,  No use of accessory muscles    GASTROINTESTINAL:  Abdomen soft,   Non-tender,   No guarding,   + BS    MUSCULOSKELETAL:   Range of motion is not limited,  No clubbing, cyanosis    NEUROLOGICAL:   Alert and awake   Non focal     SKIN:   Skin normal color for race,   Warm and dry and intact.   No evidence of rash.    PSYCHIATRIC:   Normal mood and affect.   No apparent risk to self or others.    06-24-21 @ 07:01  -  06-25-21 @ 07:00  --------------------------------------------------------  IN:    sodium chloride 0.9%: 2000 mL  Total IN: 2000 mL    OUT:    Voided (mL): 2400 mL  Total OUT: 2400 mL    Total NET: -400 mL      06-25-21 @ 07:01  -  06-25-21 @ 10:50  --------------------------------------------------------  IN:    sodium chloride 0.9%: 300 mL  Total IN: 300 mL    OUT:    Voided (mL): 700 mL  Total OUT: 700 mL    Total NET: -400 mL          LABS:                            13.4   11.22 )-----------( 315      ( 25 Jun 2021 04:45 )             40.8                                               06-25    141  |  109  |  16  ----------------------------<  79  4.2   |  22  |  1.2    Ca    9.0      25 Jun 2021 04:45  Mg     2.0     06-25    TPro  6.3  /  Alb  3.9  /  TBili  0.3  /  DBili  x   /  AST  18  /  ALT  22  /  AlkPhos  87  06-25                                                 CARDIAC MARKERS ( 23 Jun 2021 19:10 )  x     / <0.01 ng/mL / x     / x     / x                                                LIVER FUNCTIONS - ( 25 Jun 2021 04:45 )  Alb: 3.9 g/dL / Pro: 6.3 g/dL / ALK PHOS: 87 U/L / ALT: 22 U/L / AST: 18 U/L / GGT: x                                                                                                                                       MEDICATIONS  (STANDING):  aspirin 325 milliGRAM(s) Oral daily  chlorhexidine 4% Liquid 1 Application(s) Topical daily  clopidogrel Tablet 75 milliGRAM(s) Oral daily  pantoprazole    Tablet 40 milliGRAM(s) Oral before breakfast  simvastatin 40 milliGRAM(s) Oral at bedtime  sodium chloride 0.9%. 1000 milliLiter(s) (100 mL/Hr) IV Continuous <Continuous>  venlafaxine 37.5 milliGRAM(s) Oral at bedtime    MEDICATIONS  (PRN):                                                                                             ECHO < from: TTE Echo Complete w/o Contrast w/ Doppler (06.24.21 @ 12:27) >   1. LV Ejection Fraction by Poe's Method with a biplane EF of 58 %.   2. Mildly increased LV wall thickness.   3. Spectral Doppler shows impaired relaxation pattern of left ventricular myocardial filling (Grade I diastolic dysfunction).   4. Normal left atrial size.   5. Normal right atrial size.   6. Mildmitral valve regurgitation.   7. Mild thickening of the anter< from: Xray Chest 1 View- PORTABLE-Urgent (Xray Chest 1 View- PORTABLE-Urgent .) (06.24.21 @ 12:14) >  No radiographic evidence of acute cardiopulmonary disease.  ior and posterior mitral valve leaflets.   8. Mild tricuspid regurgitation.   9. Aortic valve is bicuspid.  10. Mild to moderate aortic regurgitation.  11. Mild dilatation of the aortic root.    < end of copied text >    < from: CT Head No Cont (06.24.21 @ 23:18) >  Evolving acute infarcts involving the right frontal lobe likely in an MCA and NORMAN distribution. No evidence ofhemorrhagic transformation or significant mass effect.    Chronic bilateral basal ganglia as well as left cerebellar lacunar infarcts.    < end of copied text >          Patient is a 61y old  Male who presents with a chief complaint of left leg weakness (24 Jun 2021 13:19)        Over Night Events: No acute events overnight;      ROS:     All ROS are negative except HPI         PHYSICAL EXAM    ICU Vital Signs Last 24 Hrs  T(C): 36 (25 Jun 2021 08:00), Max: 36.6 (24 Jun 2021 20:00)  T(F): 96.8 (25 Jun 2021 08:00), Max: 97.9 (24 Jun 2021 20:00)  HR: 60 (25 Jun 2021 10:00) (52 - 74)  BP: 161/83 (25 Jun 2021 10:00) (130/68 - 184/88)  BP(mean): 122 (25 Jun 2021 10:00) (88 - 144)  ABP: --  ABP(mean): --  RR: 17 (25 Jun 2021 10:00) (10 - 29)  SpO2: 97% (25 Jun 2021 10:00) (96% - 99%)      CONSTITUTIONAL:  Well nourished.  NAD    ENT:   Airway patent,   Mouth with normal mucosa.   No thrush    EYES:   Pupils equal,   Round and reactive to light.    CARDIAC:   Normal rate,   Regular rhythm.    No edema    Vascular:  Normal systolic impulse  No Carotid bruits    RESPIRATORY:   No wheezing  Bilateral BS  Normal chest expansion  Not tachypneic,  No use of accessory muscles    GASTROINTESTINAL:  Abdomen soft,   Non-tender,   No guarding,   + BS    MUSCULOSKELETAL:   Range of motion is not limited,  No clubbing, cyanosis    NEUROLOGICAL:   Alert and awake   Non focal     SKIN:   Skin normal color for race,   Warm and dry and intact.   No evidence of rash.    PSYCHIATRIC:   Normal mood and affect.   No apparent risk to self or others.    06-24-21 @ 07:01  -  06-25-21 @ 07:00  --------------------------------------------------------  IN:    sodium chloride 0.9%: 2000 mL  Total IN: 2000 mL    OUT:    Voided (mL): 2400 mL  Total OUT: 2400 mL    Total NET: -400 mL      06-25-21 @ 07:01  -  06-25-21 @ 10:50  --------------------------------------------------------  IN:    sodium chloride 0.9%: 300 mL  Total IN: 300 mL    OUT:    Voided (mL): 700 mL  Total OUT: 700 mL    Total NET: -400 mL          LABS:                            13.4   11.22 )-----------( 315      ( 25 Jun 2021 04:45 )             40.8                                               06-25    141  |  109  |  16  ----------------------------<  79  4.2   |  22  |  1.2    Ca    9.0      25 Jun 2021 04:45  Mg     2.0     06-25    TPro  6.3  /  Alb  3.9  /  TBili  0.3  /  DBili  x   /  AST  18  /  ALT  22  /  AlkPhos  87  06-25                                                 CARDIAC MARKERS ( 23 Jun 2021 19:10 )  x     / <0.01 ng/mL / x     / x     / x                                                LIVER FUNCTIONS - ( 25 Jun 2021 04:45 )  Alb: 3.9 g/dL / Pro: 6.3 g/dL / ALK PHOS: 87 U/L / ALT: 22 U/L / AST: 18 U/L / GGT: x                                                                                                                                       MEDICATIONS  (STANDING):  aspirin 325 milliGRAM(s) Oral daily  chlorhexidine 4% Liquid 1 Application(s) Topical daily  clopidogrel Tablet 75 milliGRAM(s) Oral daily  pantoprazole    Tablet 40 milliGRAM(s) Oral before breakfast  simvastatin 40 milliGRAM(s) Oral at bedtime  sodium chloride 0.9%. 1000 milliLiter(s) (100 mL/Hr) IV Continuous <Continuous>  venlafaxine 37.5 milliGRAM(s) Oral at bedtime    MEDICATIONS  (PRN):                                                                                             ECHO < from: TTE Echo Complete w/o Contrast w/ Doppler (06.24.21 @ 12:27) >   1. LV Ejection Fraction by Poe's Method with a biplane EF of 58 %.   2. Mildly increased LV wall thickness.   3. Spectral Doppler shows impaired relaxation pattern of left ventricular myocardial filling (Grade I diastolic dysfunction).   4. Normal left atrial size.   5. Normal right atrial size.   6. Mildmitral valve regurgitation.   7. Mild thickening of the anter< from: Xray Chest 1 View- PORTABLE-Urgent (Xray Chest 1 View- PORTABLE-Urgent .) (06.24.21 @ 12:14) >  No radiographic evidence of acute cardiopulmonary disease.  ior and posterior mitral valve leaflets.   8. Mild tricuspid regurgitation.   9. Aortic valve is bicuspid.  10. Mild to moderate aortic regurgitation.  11. Mild dilatation of the aortic root.    < end of copied text >    < from: CT Head No Cont (06.24.21 @ 23:18) >  Evolving acute infarcts involving the right frontal lobe likely in an MCA and NORMAN distribution. No evidence ofhemorrhagic transformation or significant mass effect.    Chronic bilateral basal ganglia as well as left cerebellar lacunar infarcts.    < end of copied text >

## 2021-06-25 NOTE — PROGRESS NOTE ADULT - SUBJECTIVE AND OBJECTIVE BOX
LENGTH OF HOSPITAL STAY: 2d      CHIEF COMPLAINT: Patient is a 61y old  Male who presents with a chief complaint of left leg weakness (25 Jun 2021 10:49)      OVER Past 24hrs:  No acute overnight events.     HISTORY OF PRESENTING ILLNESS:   60 yo male with PMHx of R NORMAN stroke (April 2020) and R M1 and A1 severe stenosis follows outpatient with Dr. Guajardo, (last angiogram on February 2021), on Plavix     CC: fluctuating left leg weakness resulting in stumbling.      History goes back to: 1 month ago when patient started notice episodic left arm weakness.     ROS is negative for: no fever, no chills, no SOB, chest pains or palpitations, no abdominal pain, nausea or vomiting. Patient denies facial droop, difficulty swallowing     In the ED: VS all WNL  Labs significant for: WNL  Troponin: negative  CT head: Acute infarct involving the right frontal lobe in the precentral gyrus.  Chronic infarct of the right periventricular white matter, new from prior exams detailed above.  Additional chronic infarcts of the bilateral basal ganglia and left cerebellum, stable.    CTA head and neck: Severe stenosis and/or nonocclusive thrombus of the M1 segment of the right MCA, with diminished flow of the distal right MCA circulation, new from prior CTA 4/27/2020. 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    CT PERFUSION:, there are 229 cc of ischemic penumbra in the right frontoparietal lobes, partially accounted for by chronic infarcts of the right frontoparietal white matter.    NIHSS in ED=3  stroke code called. Not a candidate for tPA due to unknown last normal LWN       (23 Jun 2021 20:39)    PAST MEDICAL & SURGICAL HISTORY  PAST MEDICAL & SURGICAL HISTORY:  HTN (hypertension)    Psoriasis    CVA (cerebral vascular accident)    No significant past surgical history          REVIEW OF SYSTEMS  Negative, except as in Physical exam    ALLERGIES:  No Known Allergies    MEDICATIONS:  STANDING MEDICATIONS  aspirin 325 milliGRAM(s) Oral daily  chlorhexidine 4% Liquid 1 Application(s) Topical daily  clopidogrel Tablet 75 milliGRAM(s) Oral daily  pantoprazole    Tablet 40 milliGRAM(s) Oral before breakfast  simvastatin 40 milliGRAM(s) Oral at bedtime  sodium chloride 0.9%. 1000 milliLiter(s) IV Continuous <Continuous>  venlafaxine 37.5 milliGRAM(s) Oral at bedtime      PRN MEDICATIONS    VITALS:   T(F): 96.8  HR: 60  BP: 161/83  RR: 17  SpO2: 97%    PHYSICAL EXAM:  General: No acute distress.  AOx3  HEENT: Sclear in clear;  PULM: Clear to auscultation bilaterally.  CVS: RRR; No murmurs  Abdomen: Soft, nondistended, nontender.  Neuro: No focal sensory/motor deficits;     LABS:                        13.4   11.22 )-----------( 315      ( 25 Jun 2021 04:45 )             40.8     06-25    141  |  109  |  16  ----------------------------<  79  4.2   |  22  |  1.2    Ca    9.0      25 Jun 2021 04:45  Mg     2.0     06-25    TPro  6.3  /  Alb  3.9  /  TBili  0.3  /  DBili  x   /  AST  18  /  ALT  22  /  AlkPhos  87  06-25              CARDIAC MARKERS ( 23 Jun 2021 19:10 )  x     / <0.01 ng/mL / x     / x     / x          RADIOLOGY:    Assessment/Plan:  60 yo male with PMHx of R NORMAN stroke (April 2020) and R M1 and A1 severe stenosis on Plavix; p/w LLE weakness;     ED: VS wnl; Labs unremarkable; CTH Acute Rt. frontal infarct; CTA Rt. MCA severe stenosis; CTP showed ischemic area in the Rt. frontoparietal lobes; NIHSS=3 >>CODE STROKE called>> Out of window for tPA.    # Evolving Acute Rt. Frontal lobe Infarct - In the precentral gyrus.   CTA H&N(6/23): New severe stenoses along Rt. NORMAN & Rt. MCA; Worsening chonic moderate stenosis along Lf. NORMAN;  CT Perfusion (6/23): Ischemic penumbra in Rt. MCA territory without core infarct.   Repeat CTH on 6/24>> Evolving acute infarct;   - Neurology & NeuroIR on board>> As per Dr. Ugarte, decision for intervention pending MRI confirmation of stenoses.  - LDL(6/24): 79 / TTE(6/24): EF 58%.  - c/w Neurochecks q1hr  - c/w Aspirin 81m Daily, Plavix 75mg Daily. Lipitor 80mg Daily  > fu NeuroIR  > fu MRI Brain     # Grade I Diastolic HFpEF - Compensated; EF 58%    # hx of Depression/Anxiety - c/w Venlafaxine 37.5mg Daily    # GERD - c/w Protonix 40mg Daily    # DLD - c/w Lipitor 80mg Daily      Diet: Dysphagia 3 w/ Thins  Activity: OOB w/ Assistance  DVT ppx: n/a  GI ppx: Protonix 40mg Daily  FULL CODE    Dispo: Acute

## 2021-06-25 NOTE — PROGRESS NOTE ADULT - ATTENDING COMMENTS
60 yo gentleman with PMH of R NORMAN stroke in April 2020, and R M1 and A1 severe stenosis, presents with worsening left arm weakness over the past month, and episode of fluctuating left leg weakness to the point of stumbling. Plan is to continue ICU monitoring for neurochecks and hemodynamic monitoring. MRI brain to further delineate stroke distribution before NI procedure as clinically he has symptomatic stenosis of both R MCA and NORMAN.

## 2021-06-26 LAB
ALBUMIN SERPL ELPH-MCNC: 4 G/DL — SIGNIFICANT CHANGE UP (ref 3.5–5.2)
ALP SERPL-CCNC: 91 U/L — SIGNIFICANT CHANGE UP (ref 30–115)
ALT FLD-CCNC: 22 U/L — SIGNIFICANT CHANGE UP (ref 0–41)
ANION GAP SERPL CALC-SCNC: 11 MMOL/L — SIGNIFICANT CHANGE UP (ref 7–14)
AST SERPL-CCNC: 19 U/L — SIGNIFICANT CHANGE UP (ref 0–41)
BASOPHILS # BLD AUTO: 0.09 K/UL — SIGNIFICANT CHANGE UP (ref 0–0.2)
BASOPHILS NFR BLD AUTO: 0.8 % — SIGNIFICANT CHANGE UP (ref 0–1)
BILIRUB SERPL-MCNC: 0.5 MG/DL — SIGNIFICANT CHANGE UP (ref 0.2–1.2)
BLD GP AB SCN SERPL QL: SIGNIFICANT CHANGE UP
BUN SERPL-MCNC: 26 MG/DL — HIGH (ref 10–20)
CALCIUM SERPL-MCNC: 9.1 MG/DL — SIGNIFICANT CHANGE UP (ref 8.5–10.1)
CHLORIDE SERPL-SCNC: 107 MMOL/L — SIGNIFICANT CHANGE UP (ref 98–110)
CO2 SERPL-SCNC: 22 MMOL/L — SIGNIFICANT CHANGE UP (ref 17–32)
CREAT SERPL-MCNC: 1.2 MG/DL — SIGNIFICANT CHANGE UP (ref 0.7–1.5)
EOSINOPHIL # BLD AUTO: 0.41 K/UL — SIGNIFICANT CHANGE UP (ref 0–0.7)
EOSINOPHIL NFR BLD AUTO: 3.6 % — SIGNIFICANT CHANGE UP (ref 0–8)
GLUCOSE SERPL-MCNC: 83 MG/DL — SIGNIFICANT CHANGE UP (ref 70–99)
HCT VFR BLD CALC: 41 % — LOW (ref 42–52)
HGB BLD-MCNC: 13.5 G/DL — LOW (ref 14–18)
IMM GRANULOCYTES NFR BLD AUTO: 0.3 % — SIGNIFICANT CHANGE UP (ref 0.1–0.3)
LYMPHOCYTES # BLD AUTO: 3.54 K/UL — HIGH (ref 1.2–3.4)
LYMPHOCYTES # BLD AUTO: 31.1 % — SIGNIFICANT CHANGE UP (ref 20.5–51.1)
MAGNESIUM SERPL-MCNC: 2 MG/DL — SIGNIFICANT CHANGE UP (ref 1.8–2.4)
MCHC RBC-ENTMCNC: 28.6 PG — SIGNIFICANT CHANGE UP (ref 27–31)
MCHC RBC-ENTMCNC: 32.9 G/DL — SIGNIFICANT CHANGE UP (ref 32–37)
MCV RBC AUTO: 86.9 FL — SIGNIFICANT CHANGE UP (ref 80–94)
MONOCYTES # BLD AUTO: 1.03 K/UL — HIGH (ref 0.1–0.6)
MONOCYTES NFR BLD AUTO: 9 % — SIGNIFICANT CHANGE UP (ref 1.7–9.3)
NEUTROPHILS # BLD AUTO: 6.29 K/UL — SIGNIFICANT CHANGE UP (ref 1.4–6.5)
NEUTROPHILS NFR BLD AUTO: 55.2 % — SIGNIFICANT CHANGE UP (ref 42.2–75.2)
NRBC # BLD: 0 /100 WBCS — SIGNIFICANT CHANGE UP (ref 0–0)
PLATELET # BLD AUTO: 318 K/UL — SIGNIFICANT CHANGE UP (ref 130–400)
POTASSIUM SERPL-MCNC: 4 MMOL/L — SIGNIFICANT CHANGE UP (ref 3.5–5)
POTASSIUM SERPL-SCNC: 4 MMOL/L — SIGNIFICANT CHANGE UP (ref 3.5–5)
PROT SERPL-MCNC: 6.5 G/DL — SIGNIFICANT CHANGE UP (ref 6–8)
RBC # BLD: 4.72 M/UL — SIGNIFICANT CHANGE UP (ref 4.7–6.1)
RBC # FLD: 13.1 % — SIGNIFICANT CHANGE UP (ref 11.5–14.5)
SODIUM SERPL-SCNC: 140 MMOL/L — SIGNIFICANT CHANGE UP (ref 135–146)
WBC # BLD: 11.39 K/UL — HIGH (ref 4.8–10.8)
WBC # FLD AUTO: 11.39 K/UL — HIGH (ref 4.8–10.8)

## 2021-06-26 PROCEDURE — 99232 SBSQ HOSP IP/OBS MODERATE 35: CPT

## 2021-06-26 PROCEDURE — 99233 SBSQ HOSP IP/OBS HIGH 50: CPT

## 2021-06-26 RX ORDER — PHENYLEPHRINE HYDROCHLORIDE 10 MG/ML
0.1 INJECTION INTRAVENOUS
Qty: 160 | Refills: 0 | Status: DISCONTINUED | OUTPATIENT
Start: 2021-06-26 | End: 2021-06-28

## 2021-06-26 RX ADMIN — Medication 325 MILLIGRAM(S): at 12:21

## 2021-06-26 RX ADMIN — CLOPIDOGREL BISULFATE 75 MILLIGRAM(S): 75 TABLET, FILM COATED ORAL at 12:21

## 2021-06-26 RX ADMIN — Medication 37.5 MILLIGRAM(S): at 21:54

## 2021-06-26 RX ADMIN — PANTOPRAZOLE SODIUM 40 MILLIGRAM(S): 20 TABLET, DELAYED RELEASE ORAL at 12:21

## 2021-06-26 RX ADMIN — SIMVASTATIN 40 MILLIGRAM(S): 20 TABLET, FILM COATED ORAL at 21:53

## 2021-06-26 NOTE — PROGRESS NOTE ADULT - ASSESSMENT
IMPRESSION:    CVA  HO NORMAN stenosis / MCA stenosis s/p CTH- shows evolving acute infarcts in right frontal lobe MRI done further plans as per neurology     PLAN:     CNS:  FU with Neuro IR.  Neurochecks. f/u MRI as per neuro IR     HEENT: Oral care    PULMONARY:  HOB @ 45 degrees.  Aspiration precautions     CARDIOVASCULAR:  Avoid volume overload     GI: GI prophylaxis.  Feeding.  Bowel regimen      RENAL:  Follow up lytes.  Correct as needed    INFECTIOUS DISEASE: Follow up cultures    HEMATOLOGICAL:  DVT prophylaxis.    ENDOCRINE:  Follow up FS.     MUSCULOSKELETAL:  Bed chair position     PT     MICU today

## 2021-06-26 NOTE — PROGRESS NOTE ADULT - SUBJECTIVE AND OBJECTIVE BOX
VEENA BRANDON 61y Male  MRN#: [id number]   CODE STATUSL: FULL   Hospital Day: 3d    Pt is currently admitted with the primary diagnosis of CVA    SUBJECTIVE  Hospital Course: 60 yo male with PMHx of R NORMAN stroke (April 2020) and R M1 and A1 severe stenosis follows outpatient with Dr. Guajardo, (last angiogram on February 2021), on Plavix. Pt has had episodic left arm weakness for the last month that progressed to worsening left arm and leg weekness for a few days PTA. On presentation stroke code was activated, CTA demonstrated high grade stenosis vs nonocclusive thrombus in segments of the RCA as well as the MCA and some moderate stenosis of the left internal carotid artery. CTP showed large penumbra in R MCA territory. AT this time, neurointerventional decided pt was not a candidate for acute intervention since there was no acute thrombus seen on imaging. Pt was then admitted to ICU for stroke monitoring. MRI was done and showed acute right MCA infarcts.     Overnight events: No overnight events     Subjective complaints: Pt feels well and has no complaints. He denies any headaches, dizziness, CP, SOB, or palpitations. He also denies any transient neurological deficits since admission     Present Today:   - Carola:  No [ x ], Yes [   ] : Indication:     - Type of IV Access:       .. CVC/Piccline:  No [ x ], Yes [   ] : Indication:       .. Midline: No [ x ], Yes [   ] : Indication:                                             ----------------------------------------------------------  OBJECTIVE  PAST MEDICAL & SURGICAL HISTORY  HTN (hypertension)    Psoriasis    CVA (cerebral vascular accident)    No significant past surgical history                                              -----------------------------------------------------------  ALLERGIES:  No Known Allergies                                            ------------------------------------------------------------    HOME MEDICATIONS  Home Medications:  amLODIPine 2.5 mg oral tablet: 1 tab(s) orally once a day (18 Jan 2021 09:40)  clopidogrel 75 mg oral tablet: 1 tab(s) orally once a day (18 Jan 2021 09:40)  Humira Pre-Filled Syringe: subcutaneous 2 times a month (18 Jan 2021 09:40)  losartan 50 mg oral tablet: 1 tab(s) orally once a day (18 Jan 2021 09:40)  venlafaxine 37.5 mg oral tablet: 1 tab(s) orally once a day (at bedtime) (18 Jan 2021 09:40)  Vitamin B12:  (18 Jan 2021 09:40)                           MEDICATIONS:  STANDING MEDICATIONS  aspirin 325 milliGRAM(s) Oral daily  chlorhexidine 4% Liquid 1 Application(s) Topical daily  clopidogrel Tablet 75 milliGRAM(s) Oral daily  pantoprazole    Tablet 40 milliGRAM(s) Oral before breakfast  simvastatin 40 milliGRAM(s) Oral at bedtime  venlafaxine 37.5 milliGRAM(s) Oral at bedtime    PRN MEDICATIONS                                            ------------------------------------------------------------  VITAL SIGNS: Last 24 Hours  T(C): 36.1 (26 Jun 2021 08:00), Max: 36.7 (25 Jun 2021 20:00)  T(F): 97 (26 Jun 2021 08:00), Max: 98 (25 Jun 2021 20:00)  HR: 50 (26 Jun 2021 09:30) (48 - 76)  BP: 161/81 (26 Jun 2021 09:00) (137/78 - 182/93)  BP(mean): 119 (26 Jun 2021 09:00) (99 - 140)  RR: 15 (26 Jun 2021 09:30) (10 - 27)  SpO2: 96% (26 Jun 2021 09:30) (91% - 99%)      06-25-21 @ 07:01  -  06-26-21 @ 07:00  --------------------------------------------------------  IN: 900 mL / OUT: 1950 mL / NET: -1050 mL                                             --------------------------------------------------------------  LABS:                        13.5   11.39 )-----------( 318      ( 26 Jun 2021 04:35 )             41.0     06-26    140  |  107  |  26<H>  ----------------------------<  83  4.0   |  22  |  1.2    Ca    9.1      26 Jun 2021 04:35  Mg     2.0     06-26    TPro  6.5  /  Alb  4.0  /  TBili  0.5  /  DBili  x   /  AST  19  /  ALT  22  /  AlkPhos  91  06-26                                              -------------------------------------------------------------  RADIOLOGY:  < from: MR Head No Cont (06.25.21 @ 16:58) >  IMPRESSION: Acute right MCA infarcts.    < end of copied text >  < from: CT Head No Cont (06.24.21 @ 23:18) >    IMPRESSION:  Evolving acute infarcts involving the right frontal lobe likely in an MCA and NORMAN distribution. No evidence ofhemorrhagic transformation or significant mass effect.    Chronic bilateral basal ganglia as well as left cerebellar lacunar infarcts.      < end of copied text >                                            --------------------------------------------------------------    PHYSICAL EXAM:  General: Well appearing man laying in bed in NAD   HEENT: NC/AT, PERRL, MMM, no thrush   LUNGS: CTAB  HEART: RRR, no m/r/g  ABDOMEN: soft, nt/nd, BS+, no hsm appreciated   EXT: WWP, no cyanosis, clubbing, or edema  NEURO: AOx4, strength 5/5 all extremities, no FND   SKIN: Intact                                            --------------------------------------------------------------    ASSESSMENT & PLAN  60 yo male with PMHx of R NORMAN stroke (April 2020) and R M1 and A1 severe stenosis on Plavix; p/w LLE weakness    # Evolving Acute Rt. Frontal lobe Infarct   - In the precentral gyrus.   - CTA H&N(6/23): New severe stenoses along Rt. NORMAN & Rt. MCA; Worsening chonic moderate stenosis along Lf. NORMAN;  - CT Perfusion (6/23): Ischemic penumbra in Rt. MCA territory without core infarct.   - Repeat CTH on 6/24>> Evolving acute infarct;   - MRI 6/25: Acute right MCA infarcts.  - Neurology & NeuroIR on board, MRI completed, possible intervention planned for monday, follow neuroIR recommendations   - LDL(6/24): 79 / TTE(6/24): EF 58%.  - c/w Neurochecks q1hr  - c/w Aspirin 81m Daily, Plavix 75mg Daily. Lipitor 80mg Daily    # Grade I Diastolic HFpEF   - Compensated; EF 58%    # hx of Depression/Anxiety   - c/w Venlafaxine 37.5mg Daily    # GERD   - c/w Protonix 40mg Daily    # DLD   - c/w Lipitor 80mg Daily      Diet: Dysphagia 3 w/ Thins  Activity: OOB w/ Assistance  DVT ppx: n/a  GI ppx: Protonix 40mg Daily  FULL CODE  Dispo: MICU for monitoring

## 2021-06-26 NOTE — PROGRESS NOTE ADULT - SUBJECTIVE AND OBJECTIVE BOX
Specialty: Neurocritical Care    HPI: 62 yo male with PMHx of R NORMAN stroke (April 2020) and R M1 and A1 severe stenosis follows outpatient with Dr. Guajardo, (last angiogram on February 2021), on Plavix   CC: fluctuating left leg weakness resulting in stumbling.   History goes back to: 1 month ago when patient started notice episodic left arm weakness.     In the ED: VS all WNL  Labs significant for: WNL  Troponin: negative  CT head: Acute infarct involving the right frontal lobe in the precentral gyrus.  Chronic infarct of the right periventricular white matter, new from prior exams detailed above.  Additional chronic infarcts of the bilateral basal ganglia and left cerebellum, stable.    CTA head and neck: Severe stenosis and/or nonocclusive thrombus of the M1 segment of the right MCA, with diminished flow of the distal right MCA circulation, new from prior CTA 4/27/2020. 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    CT PERFUSION:, there are 229 cc of ischemic penumbra in the right frontoparietal lobes, partially accounted for by chronic infarcts of the right frontoparietal white matter.    NIHSS in ED=3  stroke code called. Not a candidate for tPA due to unknown last normal LWN        Past Medical and Surgical Hx:  PAST MEDICAL & SURGICAL HISTORY:  HTN (hypertension)  Psoriasis  CVA (cerebral vascular accident)    No significant past surgical history        Allergies: No Known Allergies      ROS: Patient endorses  no complaints at this time. Otherwise, 10-point ROS is negative.     PE:  Neurological:  Mental Status: Alert and Oriented to person, place, and time, cooperative, pleasant. Affect appropriate, thought, speech, and language coherent. Short- and long-term memory, abstract thinking and calculation intact.  Cranial Nerves:  II, III, IV, VI: PERRLA, EOM intact. No cranial nerve palsy or nystagmus noted.  V: facial sensation intact; masseter/ temporal muscles intact, corneal reflex intact bilaterally  VII: face symmetrical at rest and with expression  IX and X: no hoarseness, gag intact, palate/ uvula rise symmetrically  XI: SCM/ trapezius strength intact bilateral  XII: no dysarthria, tongue weakness/fasiculation   Motor: Normal muscle bulk/tone. Good posture. Strength 5+/5+ LE b/l. 5/5 strength for RUE. 4-5/5 strength for LUE.   Sensory: Sensation to light touch, pain intact to trunk and bilaterally to both upper and lower extremities.   Cerebellar Function:  no pronator drift      Vital Signs:  ICU Vital Signs Last 24 Hrs  T(C): 36.1 (26 Jun 2021 08:00), Max: 36.7 (25 Jun 2021 20:00)  T(F): 97 (26 Jun 2021 08:00), Max: 98 (25 Jun 2021 20:00)  HR: 50 (26 Jun 2021 09:30) (48 - 76)  BP: 161/81 (26 Jun 2021 09:00) (137/78 - 182/93)  BP(mean): 119 (26 Jun 2021 09:00) (99 - 140)  ABP: --  ABP(mean): --  RR: 15 (26 Jun 2021 09:30) (10 - 27)  SpO2: 96% (26 Jun 2021 09:30) (91% - 99%)          I/Os:  I&O's Detail    25 Jun 2021 07:01  -  26 Jun 2021 07:00  --------------------------------------------------------  IN:    sodium chloride 0.9%: 900 mL  Total IN: 900 mL    OUT:    Voided (mL): 1950 mL  Total OUT: 1950 mL    Total NET: -1050 mL      Labs:  06-26    140  |  107  |  26<H>  ----------------------------<  83  4.0   |  22  |  1.2    Ca    9.1      26 Jun 2021 04:35  Mg     2.0     06-26    TPro  6.5  /  Alb  4.0  /  TBili  0.5  /  DBili  x   /  AST  19  /  ALT  22  /  AlkPhos  91  06-26    CBC Full  -  ( 26 Jun 2021 04:35 )  WBC Count : 11.39 K/uL  RBC Count : 4.72 M/uL  Hemoglobin : 13.5 g/dL  Hematocrit : 41.0 %  Platelet Count - Automated : 318 K/uL  Mean Cell Volume : 86.9 fL  Mean Cell Hemoglobin : 28.6 pg  Mean Cell Hemoglobin Concentration : 32.9 g/dL  Auto Neutrophil # : 6.29 K/uL  Auto Lymphocyte # : 3.54 K/uL  Auto Monocyte # : 1.03 K/uL  Auto Eosinophil # : 0.41 K/uL  Auto Basophil # : 0.09 K/uL  Auto Neutrophil % : 55.2 %  Auto Lymphocyte % : 31.1 %  Auto Monocyte % : 9.0 %  Auto Eosinophil % : 3.6 %  Auto Basophil % : 0.8 %      LIVER FUNCTIONS - ( 26 Jun 2021 04:35 )  Alb: 4.0 g/dL / Pro: 6.5 g/dL / ALK PHOS: 91 U/L / ALT: 22 U/L / AST: 19 U/L / GGT: x             Medications Current and PRN:  MEDICATIONS  (STANDING):  aspirin 325 milliGRAM(s) Oral daily  chlorhexidine 4% Liquid 1 Application(s) Topical daily  clopidogrel Tablet 75 milliGRAM(s) Oral daily  pantoprazole    Tablet 40 milliGRAM(s) Oral before breakfast  simvastatin 40 milliGRAM(s) Oral at bedtime  venlafaxine 37.5 milliGRAM(s) Oral at bedtime    MEDICATIONS  (PRN):      Radiology:  MR Head No Contrast 6/25/2021  IMPRESSION: Acute right MCA infarcts.    CT Head No Contrast 6/24/2021    IMPRESSION:  Evolving acute infarcts involving the right frontal lobe likely in an MCA and NORMAN distribution. No evidence ofhemorrhagic transformation or significant mass effect.  Chronic bilateral basal ganglia as well as left cerebellar lacunar infarcts.    Echo 6/24/2021  Summary:   1. LV Ejection Fraction by Poe's Method with a biplane EF of 58 %.   2. Mildly increased LV wall thickness.   3. Spectral Doppler shows impaired relaxation pattern of left ventricular myocardial filling (Grade I diastolic dysfunction).   4. Normal left atrial size.   5. Normal right atrial size.   6. Mildmitral valve regurgitation.   7. Mild thickening of the anterior and posterior mitral valve leaflets.   8. Mild tricuspid regurgitation.   9. Aortic valve is bicuspid.  10. Mild to moderate aortic regurgitation.  11. Mild dilatation of the aortic root.    PHYSICIAN INTERPRETATION:  Left Ventricle: The left ventricular internal cavity size is normal. Left ventricular wall thickness is mildly increased. Spectral Doppler shows impaired relaxation pattern of left ventricular myocardial filling (GradeI diastolic dysfunction).  Right Ventricle: Normal right ventricular size and function.  Left Atrium: Normal left atrial size.  Right Atrium: Normal right atrial size.  Pericardium: There is no evidence of pericardial effusion. There is a significantpericardial fat pad present.  Mitral Valve: Structurally normal mitral valve, with normal leaflet excursion. The mitral valve is normal in structure. Mild thickening of the anterior and posterior mitral valve leaflets. Mild mitral valve regurgitationis seen.  Tricuspid Valve: Structurally normal tricuspid valve, with normal leaflet excursion. The tricuspid valve is normal in structure. Mild tricuspid regurgitation is visualized.  Aortic Valve: The aortic valve is bicuspid. No evidence of aortic stenosis. Aortic valve thickening with normal leaflet opening. Peak transaortic gradient equals 11.5 mmHg, mean transaortic gradient equals 6.7 mmHg, the calculated aortic valve area equals 3.37 cm² by the continuity equation consistent with normally opening aortic valve. Mild to moderate aortic valve regurgitation is seen.  Pulmonic Valve: Structurally normal pulmonic valve, with normal leaflet excursion. The pulmonic valve is normal. No indication of pulmonic valve regurgitation.  Aorta: There is mild dilatation of the aortic root.  Pulmonary Artery: The main pulmonary artery is normal in size. Normal pulmonary artery pressure.      Assessment: 62 yo male with PMH of R NORMAN stroke in April 2020, and R M1 and A1 severe stenosis, presents with worsening left arm weakness over janet past month, and episode of fluctuating left leg weakness to the point of stumbling. In ED stroke code was called with NIHSS of 3, not a tpa candidate due to unknown LWN. CTP shows large penumbra in R MCA territory , code NI alert was activated but patient was not a NI candidate. Patient admitted to ICU for close monitoring and workup. MRI performed today revealed acute R MCA infarcts and the wife at bedside revealed MRI results that were performed recently at [place] which revealed stroke within that same territory. Medical management does not seem to benefit the patient at this time and the patient would benefit from NI recommendations.    Plan:  Neuro:  - Neuro Checks Q1H  - STAT CT Head with change in Neuro exam  - Daily Statin  - Continue Dual AntiPLT  - Sodium Goal: Normonatremia  - PT/OT  - SBP goal: 140 - 180 mm Hg  - Normovolemia  - Normothermia      Ischemic Stroke Work-up:  CTH: completed   Lipid panel:  Serum Cholesterol: 135  Triglycerides: 47  HDL: 48  Non-HDL Cholesterol: 87  LDL: 79  HgA1C: please obtain A1C  2D echo/TTE: completed   A/C: dual antiplatelet with aspirin and clopidogrel  Statin: atorvastatin       STEPHON Hopson  Please feel free to contact for any questions or concerns. Thank you.  Extension: #[phone]

## 2021-06-26 NOTE — PROGRESS NOTE ADULT - ATTENDING COMMENTS
Patient seen and examined and agree with above except as noted.  Patients history, notes, labs, imaging and vitals reviewed by me personally.  Patient has no new complaints and feels slightly better.  MRI brain reviewed and shows strokes in the right MCA territory.  Plan was for possible neurointervention on Monday with angioplasty and stenting of right MCA.  If patient does have procedure would then send for repeat CTP to see if NORMAN territory also reperfuses with right MCA stenting.    Plan as above

## 2021-06-26 NOTE — PROGRESS NOTE ADULT - SUBJECTIVE AND OBJECTIVE BOX
ICU Attending Progress Daily Note     26 Jun 2021 12:53  MRI done seen by neurology today  He has history of HTN (hypertension)    Psoriasis    CVA (cerebral vascular accident)      Interval event for past 24 hr:  VEENA BRANDON  61y had no event.   Current Complains:  VEENA BRANDON has no new complains  HPI:  60 yo male with PMHx of R NORMAN stroke (April 2020) and R M1 and A1 severe stenosis follows outpatient with Dr. Guajardo, (last angiogram on February 2021), on Plavix     CC: fluctuating left leg weakness resulting in stumbling.      History goes back to: 1 month ago when patient started notice episodic left arm weakness.     ROS is negative for: no fever, no chills, no SOB, chest pains or palpitations, no abdominal pain, nausea or vomiting. Patient denies facial droop, difficulty swallowing     In the ED: VS all WNL  Labs significant for: WNL  Troponin: negative  CT head: Acute infarct involving the right frontal lobe in the precentral gyrus.  Chronic infarct of the right periventricular white matter, new from prior exams detailed above.  Additional chronic infarcts of the bilateral basal ganglia and left cerebellum, stable.    CTA head and neck: Severe stenosis and/or nonocclusive thrombus of the M1 segment of the right MCA, with diminished flow of the distal right MCA circulation, new from prior CTA 4/27/2020. 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    CT PERFUSION:, there are 229 cc of ischemic penumbra in the right frontoparietal lobes, partially accounted for by chronic infarcts of the right frontoparietal white matter.    NIHSS in ED=3  stroke code called. Not a candidate for tPA due to unknown last normal LWN       (23 Jun 2021 20:39)    OBJECTIVE:  ICU Vital Signs Last 24 Hrs  T(C): 36.1 (26 Jun 2021 08:00), Max: 36.7 (25 Jun 2021 20:00)  T(F): 97 (26 Jun 2021 08:00), Max: 98 (25 Jun 2021 20:00)  HR: 50 (26 Jun 2021 09:30) (48 - 76)  BP: 161/81 (26 Jun 2021 09:00) (137/78 - 182/93)  BP(mean): 119 (26 Jun 2021 09:00) (99 - 140)  ABP: --  ABP(mean): --  RR: 15 (26 Jun 2021 09:30) (10 - 27)  SpO2: 96% (26 Jun 2021 09:30) (91% - 99%)    I&O's Summary    25 Jun 2021 07:01  -  26 Jun 2021 07:00  --------------------------------------------------------  IN: 900 mL / OUT: 1950 mL / NET: -1050 mL      I&O's Detail    25 Jun 2021 07:01  -  26 Jun 2021 07:00  --------------------------------------------------------  IN:    sodium chloride 0.9%: 900 mL  Total IN: 900 mL    OUT:    Voided (mL): 1950 mL  Total OUT: 1950 mL    Total NET: -1050 mL        Adult Advanced Hemodynamics Last 24 Hrs  CVP(mm Hg): --      CAPILLARY BLOOD GLUCOSE        LABS:                          13.5   11.39 )-----------( 318      ( 26 Jun 2021 04:35 )             41.0     06-26    140  |  107  |  26<H>  ----------------------------<  83  4.0   |  22  |  1.2    Ca    9.1      26 Jun 2021 04:35  Mg     2.0     06-26    TPro  6.5  /  Alb  4.0  /  TBili  0.5  /  DBili  x   /  AST  19  /  ALT  22  /  AlkPhos  91  06-26          Home Medications:  amLODIPine 2.5 mg oral tablet: 1 tab(s) orally once a day (18 Jan 2021 09:40)  clopidogrel 75 mg oral tablet: 1 tab(s) orally once a day (18 Jan 2021 09:40)  Humira Pre-Filled Syringe: subcutaneous 2 times a month (18 Jan 2021 09:40)  losartan 50 mg oral tablet: 1 tab(s) orally once a day (18 Jan 2021 09:40)  venlafaxine 37.5 mg oral tablet: 1 tab(s) orally once a day (at bedtime) (18 Jan 2021 09:40)  Vitamin B12:  (18 Jan 2021 09:40)    HOSPITAL MEDICATIONS:  MEDICATIONS  (STANDING):  aspirin 325 milliGRAM(s) Oral daily  chlorhexidine 4% Liquid 1 Application(s) Topical daily  clopidogrel Tablet 75 milliGRAM(s) Oral daily  pantoprazole    Tablet 40 milliGRAM(s) Oral before breakfast  simvastatin 40 milliGRAM(s) Oral at bedtime  venlafaxine 37.5 milliGRAM(s) Oral at bedtime    MEDICATIONS  (PRN):      REVIEW OF SYSTEMS:  CONSTITUTIONAL: [X] all negative; [ ] weakness, [ ] fevers, [ ] chills  EYES/ENT: [X] all negative; [ ] visual changes, [ ] vertigo, [ ] throat pain   NECK: [X] all negative; [ ] pain, [ ] stiffness  RESPIRATORY: [] all negative, [ ] cough, [ ] wheezing, [ ] hemoptysis, [ ] shortness of breath  CARDIOVASCULAR: [] all negative; [ ] chest pain, [ ] palpitations, [ ] orthopnea  GASTROINTESTINAL: [X] all negative; [ ]abdominal pain, [ ] nausea, [ ] vomiting, [ ] hematemesis, [ ] diarrhea, [ ] constipation, [ ] melena, [ ] hematochezia.  GENITOURINARY: [X] all negative; [ ] dysuria, [ ] frequency, [ ] hematuria  NEUROLOGICAL: [X] all negative; [ ] numbness, [ ] weakness  SKIN: [X] all negative; [ ] itching, [ ] burning, [ ] rashes, [ ] lesions   All other review of systems is negative unless indicated above.    [  ] Unable to assess ROS because     PHYSICAL EXAM:          CONSTITUTIONAL: Well-developed; well-nourished; in no acute distress.   	SKIN: warm, dry  	HEAD: Normocephalic; atraumatic.  	EYES: PERRL, EOM, no conj injection, sclera clear  	ENT: No nasal discharge; airway clear.  	NECK: Supple; non tender.  No midline ttp ctls  	CARD: S1, S2 normal; no murmurs, gallops, or rubs. Regular rate and rhythm. 2+ RPs and DPs bilat, no carotid bruits, no pedal   edema, no calf pain b/l  	RESP: CTA  bilat good air movement No wheezes, rales or rhonchi.  	ABD: Soft, not tender, not distended, no CVA ttp no rebound or guarding, bowel sounds present  	EXT: Normal ROM.  No clubbing, cyanosis or edema.   	  	NEURO: Alert, awake, motor 5/5 R, 5/5 L        RADIOLOGY:  xray  < from: MR Head No Cont (06.25.21 @ 16:58) >      INTERPRETATION:  Clinical History / Reason for exam: Clinical indication: Left-sided weakness.    MRI of the brain was performed using sagittal T1, axial T1 T2 T2 FLAIR diffusion andgradient echo sequence.    This exam is compared with prior head CT performed on June 24, 2021 and prior brain MRI performed on April 29, 2020.    Old infarcts involving the bilateral basal ganglia/corona radiata region identified. Old lacunar infarcts are also seen involving the left cerebellum, posterior right corona radiata and right centrum semiovale region.    There are small areas of abnormal T2 prolongation with restricted diffusion seen involving the right frontal subcortical and right posterior frontal subcortical region. Involvement of the precentral gyrus region is also identified. These findings are compatible areas of acute right MCA infarcts. No hemorrhagic transformation is seen. No significant shift or herniation seen.    The large vessels demonstrate normal flow voids from    Right maxillary sinus mucosal thickening with small polyp versus retention cyst. Left frontal sinus mucosal thickening is seen.    Inflammatory change is seen involving the right mastoid.    IMPRESSION: Acute right MCA infarcts.          < end of copied text >    I spent 45 minutes of critical care time examining patient, reviewing vitals, labs, medications, imaging and discussing with the team goals of care to prevent life-threatening in this patient who is at high risk for deterioration or death due to:

## 2021-06-27 LAB
ALBUMIN SERPL ELPH-MCNC: 3.8 G/DL — SIGNIFICANT CHANGE UP (ref 3.5–5.2)
ALP SERPL-CCNC: 104 U/L — SIGNIFICANT CHANGE UP (ref 30–115)
ALT FLD-CCNC: 30 U/L — SIGNIFICANT CHANGE UP (ref 0–41)
ANION GAP SERPL CALC-SCNC: 12 MMOL/L — SIGNIFICANT CHANGE UP (ref 7–14)
ANISOCYTOSIS BLD QL: SLIGHT — SIGNIFICANT CHANGE UP
AST SERPL-CCNC: 30 U/L — SIGNIFICANT CHANGE UP (ref 0–41)
BASOPHILS # BLD AUTO: 0.09 K/UL — SIGNIFICANT CHANGE UP (ref 0–0.2)
BASOPHILS NFR BLD AUTO: 0.9 % — SIGNIFICANT CHANGE UP (ref 0–1)
BILIRUB SERPL-MCNC: 0.3 MG/DL — SIGNIFICANT CHANGE UP (ref 0.2–1.2)
BUN SERPL-MCNC: 25 MG/DL — HIGH (ref 10–20)
CALCIUM SERPL-MCNC: 9.2 MG/DL — SIGNIFICANT CHANGE UP (ref 8.5–10.1)
CHLORIDE SERPL-SCNC: 107 MMOL/L — SIGNIFICANT CHANGE UP (ref 98–110)
CO2 SERPL-SCNC: 22 MMOL/L — SIGNIFICANT CHANGE UP (ref 17–32)
CREAT SERPL-MCNC: 1.2 MG/DL — SIGNIFICANT CHANGE UP (ref 0.7–1.5)
EOSINOPHIL # BLD AUTO: 0.81 K/UL — HIGH (ref 0–0.7)
EOSINOPHIL NFR BLD AUTO: 7.8 % — SIGNIFICANT CHANGE UP (ref 0–8)
GIANT PLATELETS BLD QL SMEAR: PRESENT — SIGNIFICANT CHANGE UP
GLUCOSE SERPL-MCNC: 81 MG/DL — SIGNIFICANT CHANGE UP (ref 70–99)
HCT VFR BLD CALC: 40.9 % — LOW (ref 42–52)
HGB BLD-MCNC: 13.3 G/DL — LOW (ref 14–18)
LYMPHOCYTES # BLD AUTO: 3.81 K/UL — HIGH (ref 1.2–3.4)
LYMPHOCYTES # BLD AUTO: 36.5 % — SIGNIFICANT CHANGE UP (ref 20.5–51.1)
MACROCYTES BLD QL: SLIGHT — SIGNIFICANT CHANGE UP
MAGNESIUM SERPL-MCNC: 2.1 MG/DL — SIGNIFICANT CHANGE UP (ref 1.8–2.4)
MANUAL SMEAR VERIFICATION: SIGNIFICANT CHANGE UP
MCHC RBC-ENTMCNC: 28.2 PG — SIGNIFICANT CHANGE UP (ref 27–31)
MCHC RBC-ENTMCNC: 32.5 G/DL — SIGNIFICANT CHANGE UP (ref 32–37)
MCV RBC AUTO: 86.7 FL — SIGNIFICANT CHANGE UP (ref 80–94)
MONOCYTES # BLD AUTO: 0.64 K/UL — HIGH (ref 0.1–0.6)
MONOCYTES NFR BLD AUTO: 6.1 % — SIGNIFICANT CHANGE UP (ref 1.7–9.3)
NEUTROPHILS # BLD AUTO: 4.81 K/UL — SIGNIFICANT CHANGE UP (ref 1.4–6.5)
NEUTROPHILS NFR BLD AUTO: 46.1 % — SIGNIFICANT CHANGE UP (ref 42.2–75.2)
PLAT MORPH BLD: ABNORMAL
PLATELET # BLD AUTO: 319 K/UL — SIGNIFICANT CHANGE UP (ref 130–400)
POLYCHROMASIA BLD QL SMEAR: SLIGHT — SIGNIFICANT CHANGE UP
POTASSIUM SERPL-MCNC: 4.3 MMOL/L — SIGNIFICANT CHANGE UP (ref 3.5–5)
POTASSIUM SERPL-SCNC: 4.3 MMOL/L — SIGNIFICANT CHANGE UP (ref 3.5–5)
PROT SERPL-MCNC: 6.5 G/DL — SIGNIFICANT CHANGE UP (ref 6–8)
RBC # BLD: 4.72 M/UL — SIGNIFICANT CHANGE UP (ref 4.7–6.1)
RBC # FLD: 13.2 % — SIGNIFICANT CHANGE UP (ref 11.5–14.5)
RBC BLD AUTO: ABNORMAL
SODIUM SERPL-SCNC: 141 MMOL/L — SIGNIFICANT CHANGE UP (ref 135–146)
VARIANT LYMPHS # BLD: 2.6 % — SIGNIFICANT CHANGE UP (ref 0–5)
WBC # BLD: 10.44 K/UL — SIGNIFICANT CHANGE UP (ref 4.8–10.8)
WBC # FLD AUTO: 10.44 K/UL — SIGNIFICANT CHANGE UP (ref 4.8–10.8)

## 2021-06-27 PROCEDURE — 99232 SBSQ HOSP IP/OBS MODERATE 35: CPT

## 2021-06-27 PROCEDURE — 99231 SBSQ HOSP IP/OBS SF/LOW 25: CPT

## 2021-06-27 RX ORDER — LANOLIN ALCOHOL/MO/W.PET/CERES
5 CREAM (GRAM) TOPICAL AT BEDTIME
Refills: 0 | Status: DISCONTINUED | OUTPATIENT
Start: 2021-06-27 | End: 2021-07-07

## 2021-06-27 RX ORDER — SODIUM CHLORIDE 9 MG/ML
1000 INJECTION INTRAMUSCULAR; INTRAVENOUS; SUBCUTANEOUS
Refills: 0 | Status: DISCONTINUED | OUTPATIENT
Start: 2021-06-27 | End: 2021-07-01

## 2021-06-27 RX ADMIN — SODIUM CHLORIDE 50 MILLILITER(S): 9 INJECTION INTRAMUSCULAR; INTRAVENOUS; SUBCUTANEOUS at 18:44

## 2021-06-27 RX ADMIN — Medication 37.5 MILLIGRAM(S): at 22:03

## 2021-06-27 RX ADMIN — Medication 325 MILLIGRAM(S): at 12:01

## 2021-06-27 RX ADMIN — CLOPIDOGREL BISULFATE 75 MILLIGRAM(S): 75 TABLET, FILM COATED ORAL at 12:01

## 2021-06-27 RX ADMIN — SIMVASTATIN 40 MILLIGRAM(S): 20 TABLET, FILM COATED ORAL at 22:03

## 2021-06-27 RX ADMIN — PANTOPRAZOLE SODIUM 40 MILLIGRAM(S): 20 TABLET, DELAYED RELEASE ORAL at 06:21

## 2021-06-27 NOTE — PROGRESS NOTE ADULT - ATTENDING COMMENTS
Patient seen and examined and agree with above except as noted.  Patients history, notes, labs, imaging, vitals and meds reviewed personally.  Patient has no new complaints.  Spoke with neurointerventional to check if he is scheduled tomorrow and will go for DSA sometime tomorrow.  Should be NPO except meds and maintain IVF and slightly elevated BP    Plan as above

## 2021-06-27 NOTE — PROGRESS NOTE ADULT - SUBJECTIVE AND OBJECTIVE BOX
Stroke Progress Note:    VEENA BRANDON    1. Chief Complaint: left sided weakness    HPI:  60 yo male with PMHx of R NORMAN stroke (2020) and R M1 and A1 severe stenosis follows outpatient with Dr. Guajardo, (last angiogram on 2021), on Plavix     CC: fluctuating left leg weakness resulting in stumbling.      History goes back to: 1 month ago when patient started notice episodic left arm weakness.     ROS is negative for: no fever, no chills, no SOB, chest pains or palpitations, no abdominal pain, nausea or vomiting. Patient denies facial droop, difficulty swallowing     In the ED: VS all WNL  Labs significant for: WNL  Troponin: negative  CT head: Acute infarct involving the right frontal lobe in the precentral gyrus.  Chronic infarct of the right periventricular white matter, new from prior exams detailed above.  Additional chronic infarcts of the bilateral basal ganglia and left cerebellum, stable.    CTA head and neck: Severe stenosis and/or nonocclusive thrombus of the M1 segment of the right MCA, with diminished flow of the distal right MCA circulation, new from prior CTA 2020. 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    CT PERFUSION:, there are 229 cc of ischemic penumbra in the right frontoparietal lobes, partially accounted for by chronic infarcts of the right frontoparietal white matter.    NIHSS in ED=3  stroke code called. Not a candidate for tPA due to unknown last normal LWN       (2021 20:39)      2. Relevant PMH:   Prior ischemic stroke/TIA[ ], Afib [ ], CAD [ ], HTN [ ], DLD [ ], DM [ ], PVD [ ], Obesity [ ],   Sedentary lifestyle [ ], CHF [ ], BENSON [ ], Cancer Hx [ ].    3. Social History: Smoking [ ], Drug Use [ ], Alcohol Use [ ], Other [ ]    4. Possible Location of Stroke:    5. Relevant Brain Tissue Imaging: < from: MR Head No Cont (21 @ 16:58) >    EXAM:  MR BRAIN            PROCEDURE DATE:  2021            INTERPRETATION:  Clinical History / Reason for exam: Clinical indication: Left-sided weakness.    MRI of the brain was performed using sagittal T1, axial T1 T2 T2 FLAIR diffusion andgradient echo sequence.    This exam is compared with prior head CT performed on 2021 and prior brain MRI performed on 2020.    Old infarcts involving the bilateral basal ganglia/corona radiata region identified. Old lacunar infarcts are also seen involving the left cerebellum, posterior right corona radiata and right centrum semiovale region.    There are small areas of abnormal T2 prolongation with restricted diffusion seen involving the right frontal subcortical and right posterior frontal subcortical region. Involvement of the precentral gyrus region is also identified. These findings are compatible areas of acute right MCA infarcts. No hemorrhagic transformation is seen. No significant shift or herniation seen.    The large vessels demonstrate normal flow voids from    Right maxillary sinus mucosal thickening with small polyp versus retention cyst. Left frontal sinus mucosal thickening is seen.    Inflammatory change is seen involving the right mastoid.    IMPRESSION: Acute right MCA infarcts.      < end of copied text >      6. Relevant Cerebrovascular Imaging:   CT Angio Neck w/ IV Cont:   EXAM:  CT ANGIO BRAIN (W)AW IC        EXAM:  CT ANGIO NECK (W)AW IC        EXAM:  CT PERFUSION W MAPS IC            PROCEDURE DATE:  2021            INTERPRETATION:  CLINICAL HISTORY / REASON FOR EXAM: Stroke code. Worsening left sided weakness.    TECHNIQUE: CT angiogram of the head and neck including RAPID perfusion study of the brain. Contiguous CT axial images of the head and neck were obtained following the administration of intravenous contrast with perfusion mapping, and multiple 3D and MIP reformats.    COMPARISON: CTA Head/Neck 2020    FINDINGS:    CT PERFUSION:    Utilizing Tmax > 6.0s and CBF < 30%, there is 229 cc of ischemic penumbra in the right frontoparietal lobes. There is a 0 cc with CBF less than 30%.. Mismatchratio is infinite.    CTA HEAD/NECK:    AORTIC ARCH:    Unremarkable patent origin of the great vessels.    RIGHT ANTERIOR CIRCULATION:    The right common carotid and external carotid arteries are patent. Calcified plaque of the carotid siphon without flow limiting stenosis. There is severe stenosis and/or nonocclusive thrombus of the M1 segment of the right MCA, with diminished flow of the distal MCA branch vasculature new from prior CTA 2020. The right A1 segment is hypoplastic though there are new multifocal severe stenoses involving the A2 segment of the NORMAN.    LEFT ANTERIOR CIRCULATION:    The left common carotid artery is patent. There is approximately 60% short segment stenosis involving the proximal left internal carotid arterydue to mixed calcified and noncalcified atherosclerotic plaque. The internal carotid artery is patent to the intracranial circulation.    Calcified plaque of the carotid siphon without flow limiting stenosis. The left anterior and middle cerebral arteries are patent.    POSTERIOR CIRCULATION:    There is moderate stenosis at the origin of the left vertebral artery which is diminutive along its course though patent. The right vertebral artery is unremarkable The basilar artery is patent. The branch vasculature of the posterior circulation is within normal limits. The bilateral posterior cerebral arteries are patent. There is a small developmental venous anomaly within the right cerebellar hemisphere      IMPRESSION:    CT PERFUSION:  UtilizingTmax > 6.0s and CBF < 30%, there are 229 cc of ischemic penumbra in the right MCA territory without core infarct.    CTA HEAD/NECK:  High-grade stenosis versus nonocclusive thrombus is noted of the M1 segment of the right MCA, with diminished distal MCA branch opacification, new from prior CTA 2020.    Multifocal severe stenoses are noted along the right A2 segment of the NORMAN which are new since the prior examination.    Moderate (approximately 60%) short segment stenosis of the proximal left internal carotid artery due to mixed calcified and noncalcified atherosclerotic plaque which is worsened since the prior CTA of 2020.      Dr. Shreyas Huang discussed preliminary findings with Stephy Ramirez on 2021 at 8:21 PM with readback.            SHREYAS HUANG MD; Resident Radiologist  This document has been electronically signed.  MOTNY TAM MD; Attending Radiologist  This document has been electronically signed. 2021  8:51AM (21 @ 19:20)     CT Angio Head w/ IV Cont:   EXAM:  CT ANGIO BRAIN (W)AW IC        EXAM:  CT ANGIO NECK (W)AW IC        EXAM:  CT PERFUSION W MAPS IC            PROCEDURE DATE:  2021            INTERPRETATION:  CLINICAL HISTORY / REASON FOR EXAM: Stroke code. Worsening left sided weakness.    TECHNIQUE: CT angiogram of the head and neck including RAPID perfusion study of the brain. Contiguous CT axial images of the head and neck were obtained following the administration of intravenous contrast with perfusion mapping, and multiple 3D and MIP reformats.    COMPARISON: CTA Head/Neck 2020    FINDINGS:    CT PERFUSION:    Utilizing Tmax > 6.0s and CBF < 30%, there is 229 cc of ischemic penumbra in the right frontoparietal lobes. There is a 0 cc with CBF less than 30%.. Mismatchratio is infinite.    CTA HEAD/NECK:    AORTIC ARCH:    Unremarkable patent origin of the great vessels.    RIGHT ANTERIOR CIRCULATION:    The right common carotid and external carotid arteries are patent. Calcified plaque of the carotid siphon without flow limiting stenosis. There is severe stenosis and/or nonocclusive thrombus of the M1 segment of the right MCA, with diminished flow of the distal MCA branch vasculature new from prior CTA 2020. The right A1 segment is hypoplastic though there are new multifocal severe stenoses involving the A2 segment of the NORMAN.    LEFT ANTERIOR CIRCULATION:    The left common carotid artery is patent. There is approximately 60% short segment stenosis involving the proximal left internal carotid arterydue to mixed calcified and noncalcified atherosclerotic plaque. The internal carotid artery is patent to the intracranial circulation.    Calcified plaque of the carotid siphon without flow limiting stenosis. The left anterior and middle cerebral arteries are patent.    POSTERIOR CIRCULATION:    There is moderate stenosis at the origin of the left vertebral artery which is diminutive along its course though patent. The right vertebral artery is unremarkable The basilar artery is patent. The branch vasculature of the posterior circulation is within normal limits. The bilateral posterior cerebral arteries are patent. There is a small developmental venous anomaly within the right cerebellar hemisphere      IMPRESSION:    CT PERFUSION:  UtilizingTmax > 6.0s and CBF < 30%, there are 229 cc of ischemic penumbra in the right MCA territory without core infarct.    CTA HEAD/NECK:  High-grade stenosis versus nonocclusive thrombus is noted of the M1 segment of the right MCA, with diminished distal MCA branch opacification, new from prior CTA 2020.    Multifocal severe stenoses are noted along the right A2 segment of the NORMAN which are new since the prior examination.    Moderate (approximately 60%) short segment stenosis of the proximal left internal carotid artery due to mixed calcified and noncalcified atherosclerotic plaque which is worsened since the prior CTA of 2020.      Dr. Shreyas Huang discussed preliminary findings with Stephy Ramirez on 2021 at 8:21 PM with readback.            SHREYAS HUANG MD; Resident Radiologist  This document has been electronically signed.  MONYT TAM MD; Attending Radiologist  This document has been electronically signed. 2021  8:51AM (21 @ 19:17)     CT Perfusion w/ Maps w/ IV Cont:   EXAM:  CT ANGIO BRAIN (W)AW IC        EXAM:  CT ANGIO NECK (W)AW IC        EXAM:  CT PERFUSION W MAPS IC            PROCEDURE DATE:  2021            INTERPRETATION:  CLINICAL HISTORY / REASON FOR EXAM: Stroke code. Worsening left sided weakness.    TECHNIQUE: CT angiogram of the head and neck including RAPID perfusion study of the brain. Contiguous CT axial images of the head and neck were obtained following the administration of intravenous contrast with perfusion mapping, and multiple 3D and MIP reformats.    COMPARISON: CTA Head/Neck 2020    FINDINGS:    CT PERFUSION:    Utilizing Tmax > 6.0s and CBF < 30%, there is 229 cc of ischemic penumbra in the right frontoparietal lobes. There is a 0 cc with CBF less than 30%.. Mismatchratio is infinite.    CTA HEAD/NECK:    AORTIC ARCH:    Unremarkable patent origin of the great vessels.    RIGHT ANTERIOR CIRCULATION:    The right common carotid and external carotid arteries are patent. Calcified plaque of the carotid siphon without flow limiting stenosis. There is severe stenosis and/or nonocclusive thrombus of the M1 segment of the right MCA, with diminished flow of the distal MCA branch vasculature new from prior CTA 2020. The right A1 segment is hypoplastic though there are new multifocal severe stenoses involving the A2 segment of the NORMAN.    LEFT ANTERIOR CIRCULATION:    The left common carotid artery is patent. There is approximately 60% short segment stenosis involving the proximal left internal carotid arterydue to mixed calcified and noncalcified atherosclerotic plaque. The internal carotid artery is patent to the intracranial circulation.    Calcified plaque of the carotid siphon without flow limiting stenosis. The left anterior and middle cerebral arteries are patent.    POSTERIOR CIRCULATION:    There is moderate stenosis at the origin of the left vertebral artery which is diminutive along its course though patent. The right vertebral artery is unremarkable The basilar artery is patent. The branch vasculature of the posterior circulation is within normal limits. The bilateral posterior cerebral arteries are patent. There is a small developmental venous anomaly within the right cerebellar hemisphere      IMPRESSION:    CT PERFUSION:  UtilizingTmax > 6.0s and CBF < 30%, there are 229 cc of ischemic penumbra in the right MCA territory without core infarct.    CTA HEAD/NECK:  High-grade stenosis versus nonocclusive thrombus is noted of the M1 segment of the right MCA, with diminished distal MCA branch opacification, new from prior CTA 2020.    Multifocal severe stenoses are noted along the right A2 segment of the NORMAN which are new since the prior examination.    Moderate (approximately 60%) short segment stenosis of the proximal left internal carotid artery due to mixed calcified and noncalcified atherosclerotic plaque which is worsened since the prior CTA of 2020.      Dr. Shreyas Huang discussed preliminary findings with Stephy Ramirez on 2021 at 8:21 PM with readback.      SHREYAS HUANG MD; Resident Radiologist  This document has been electronically signed.  MONTY TAM MD; Attending Radiologist  This document has been electronically signed. 2021  8:51AM (21 @ 19:06)         7. Relevant blood tests:      8. Relevant cardiac rhythm monitorin. Relevant Cardiac Structure: (TTE/GIULIANA +/-):[ ]No intracardiac thrombus/[ ] no vegetation/[ ]no [place]/EF:    Home Medications:  amLODIPine 2.5 mg oral tablet: 1 tab(s) orally once a day (2021 09:40)  clopidogrel 75 mg oral tablet: 1 tab(s) orally once a day (2021 09:40)  Humira Pre-Filled Syringe: subcutaneous 2 times a month (2021 09:40)  losartan 50 mg oral tablet: 1 tab(s) orally once a day (2021 09:40)  venlafaxine 37.5 mg oral tablet: 1 tab(s) orally once a day (at bedtime) (2021 09:40)  Vitamin B12:  (2021 09:40)      MEDICATIONS  (STANDING):  aspirin 325 milliGRAM(s) Oral daily  chlorhexidine 4% Liquid 1 Application(s) Topical daily  clopidogrel Tablet 75 milliGRAM(s) Oral daily  pantoprazole    Tablet 40 milliGRAM(s) Oral before breakfast  phenylephrine    Infusion 0.1 MICROgram(s)/kG/Min (1.41 mL/Hr) IV Continuous <Continuous>  simvastatin 40 milliGRAM(s) Oral at bedtime  venlafaxine 37.5 milliGRAM(s) Oral at bedtime      10. PT/OT/Speech/Rehab/S&Sw/ Cognitive eval results and recommendations:    11. Exam:    Vital Signs Last 24 Hrs  T(C): 36.4 (2021 04:00), Max: 36.7 (2021 16:00)  T(F): 97.6 (2021 04:00), Max: 98 (2021 16:00)  HR: 54 (2021 11:00) (46 - 70)  BP: 154/70 (2021 10:30) (119/64 - 174/76)  BP(mean): 105 (2021 10:30) (82 - 130)  RR: 19 (2021 11:00) (11 - 34)  SpO2: 99% (2021 11:00) (83% - 99%)    12.   NIH STROKE SCALE  Item	                                                        Score  1 a.	Level of Consciousness	               	0  1 b. LOC Questions	                                0  1 c.	LOC Commands	                               	0  2.	Best Gaze	                                        0  3.	Visual	                                                0  4.	Facial Palsy	                                        1  5 a.	Motor Arm - Left	                                0  5 b.	Motor Arm - Right	                        0  6 a.	Motor Leg - Left	                                1  6 b.	Motor Leg - Right	                                0  7.	Limb Ataxia	                                        0  8.	Sensory	                                                0  9.	Language	                                        0  10.	Dysarthria	                                        0  11.	Extinction and Inattention  	        0  ______________________________________  TOTAL	                                                        0    Total NIHSS on admission:      NIHSS yesterday:      NIHSS today: 2    mRS:  0 No symptoms at all  1 No significant disability despite symptoms; able to carry out all usual duties and activities without assistance  2 Slight disability; unable to carry out all previous activities, but able to look after own affairs  3 Moderate disability; requiring some help, but able to walk without assistance  4 Moderately severe disability; unable to walk without assistance and unable to attend to own bodily needs without assistance  5 Severe disability; bedridden, incontinent and requiring constant nursing care and attention  6 Dead      13. Impression:  60 yo male with PMH of R NORMAN stroke in 2020, and R M1 and A1 severe stenosis, presents with worsening left arm weakness over janet past month, and episode of fluctuating left leg weakness to the point of stumbling. In ED stroke code was called with NIHSS of 3, not a tpa candidate due to unknown LWN. CTP shows large penumbra in R MCA territory , code NI alert was activated but patient was not a NI candidate. Patient admitted to ICU for close monitoring and workup. MRI performed today revealed acute R MCA infarcts and the wife at bedside revealed MRI results that were performed recently at [place] which revealed stroke within that same territory. Medical management does not seem to benefit the patient at this time and the patient would benefit from NI recommendations.    Plan:  - NPO after midnight except meds, continue IVF NS  - Neuro Checks Q1H  - STAT CT Head with change in Neuro exam  - Daily Statin  - Continue Dual AntiPLT and high dose statin  - Sodium Goal: Normonatremia  - SBP goal: 140 - 180 mm Hg      Plan discussed with neuroattending Dr. Memo Colón

## 2021-06-27 NOTE — PROGRESS NOTE ADULT - SUBJECTIVE AND OBJECTIVE BOX
Over Night Events:        ROS:  See HPI    PHYSICAL EXAM    ICU Vital Signs Last 24 Hrs  T(C): 36.4 (27 Jun 2021 04:00), Max: 36.7 (26 Jun 2021 16:00)  T(F): 97.6 (27 Jun 2021 04:00), Max: 98 (26 Jun 2021 16:00)  HR: 54 (27 Jun 2021 11:00) (46 - 70)  BP: 154/70 (27 Jun 2021 10:30) (119/64 - 174/76)  BP(mean): 105 (27 Jun 2021 10:30) (82 - 130)  ABP: --  ABP(mean): --  RR: 19 (27 Jun 2021 11:00) (11 - 34)  SpO2: 99% (27 Jun 2021 11:00) (83% - 99%)      General:  HEENT:                Lymph Nodes: NO cervical LN   Lungs: Bilateral BS  Cardiovascular: Regular   Abdomen: Soft, Positive BS  Extremities: No clubbing   Skin:   Neurological:       LABS:                          13.3   10.44 )-----------( 319      ( 27 Jun 2021 04:31 )             40.9                                               06-27    141  |  107  |  25<H>  ----------------------------<  81  4.3   |  22  |  1.2    Ca    9.2      27 Jun 2021 04:31  Mg     2.1     06-27    TPro  6.5  /  Alb  3.8  /  TBili  0.3  /  DBili  x   /  AST  30  /  ALT  30  /  AlkPhos  104  06-27                                                                                           LIVER FUNCTIONS - ( 27 Jun 2021 04:31 )  Alb: 3.8 g/dL / Pro: 6.5 g/dL / ALK PHOS: 104 U/L / ALT: 30 U/L / AST: 30 U/L / GGT: x                                                                                                                                       MEDICATIONS  (STANDING):  aspirin 325 milliGRAM(s) Oral daily  chlorhexidine 4% Liquid 1 Application(s) Topical daily  clopidogrel Tablet 75 milliGRAM(s) Oral daily  pantoprazole    Tablet 40 milliGRAM(s) Oral before breakfast  phenylephrine    Infusion 0.1 MICROgram(s)/kG/Min (1.41 mL/Hr) IV Continuous <Continuous>  simvastatin 40 milliGRAM(s) Oral at bedtime  venlafaxine 37.5 milliGRAM(s) Oral at bedtime    MEDICATIONS  (PRN):      Xrays:

## 2021-06-27 NOTE — PROGRESS NOTE ADULT - ASSESSMENT
Assessment/Plan:  continue current ICU management  Neurology f/u-for possible neuro intervention-angioplasty/stent R-MCA

## 2021-06-28 LAB
ALBUMIN SERPL ELPH-MCNC: 4.2 G/DL — SIGNIFICANT CHANGE UP (ref 3.5–5.2)
ALP SERPL-CCNC: 100 U/L — SIGNIFICANT CHANGE UP (ref 30–115)
ALT FLD-CCNC: 35 U/L — SIGNIFICANT CHANGE UP (ref 0–41)
ANION GAP SERPL CALC-SCNC: 11 MMOL/L — SIGNIFICANT CHANGE UP (ref 7–14)
APTT BLD: 21.2 SEC — CRITICAL LOW (ref 27–39.2)
AST SERPL-CCNC: 33 U/L — SIGNIFICANT CHANGE UP (ref 0–41)
BASOPHILS # BLD AUTO: 0.11 K/UL — SIGNIFICANT CHANGE UP (ref 0–0.2)
BASOPHILS NFR BLD AUTO: 1 % — SIGNIFICANT CHANGE UP (ref 0–1)
BILIRUB SERPL-MCNC: 0.4 MG/DL — SIGNIFICANT CHANGE UP (ref 0.2–1.2)
BLD GP AB SCN SERPL QL: SIGNIFICANT CHANGE UP
BUN SERPL-MCNC: 23 MG/DL — HIGH (ref 10–20)
CALCIUM SERPL-MCNC: 9.3 MG/DL — SIGNIFICANT CHANGE UP (ref 8.5–10.1)
CHLORIDE SERPL-SCNC: 106 MMOL/L — SIGNIFICANT CHANGE UP (ref 98–110)
CO2 SERPL-SCNC: 22 MMOL/L — SIGNIFICANT CHANGE UP (ref 17–32)
CREAT SERPL-MCNC: 1.3 MG/DL — SIGNIFICANT CHANGE UP (ref 0.7–1.5)
EOSINOPHIL # BLD AUTO: 0.49 K/UL — SIGNIFICANT CHANGE UP (ref 0–0.7)
EOSINOPHIL NFR BLD AUTO: 4.6 % — SIGNIFICANT CHANGE UP (ref 0–8)
GLUCOSE SERPL-MCNC: 82 MG/DL — SIGNIFICANT CHANGE UP (ref 70–99)
HCT VFR BLD CALC: 42.1 % — SIGNIFICANT CHANGE UP (ref 42–52)
HGB BLD-MCNC: 13.6 G/DL — LOW (ref 14–18)
IMM GRANULOCYTES NFR BLD AUTO: 0.4 % — HIGH (ref 0.1–0.3)
INR BLD: 1.09 RATIO — SIGNIFICANT CHANGE UP (ref 0.65–1.3)
LYMPHOCYTES # BLD AUTO: 38.2 % — SIGNIFICANT CHANGE UP (ref 20.5–51.1)
LYMPHOCYTES # BLD AUTO: 4.05 K/UL — HIGH (ref 1.2–3.4)
MAGNESIUM SERPL-MCNC: 2.1 MG/DL — SIGNIFICANT CHANGE UP (ref 1.8–2.4)
MCHC RBC-ENTMCNC: 27.6 PG — SIGNIFICANT CHANGE UP (ref 27–31)
MCHC RBC-ENTMCNC: 32.3 G/DL — SIGNIFICANT CHANGE UP (ref 32–37)
MCV RBC AUTO: 85.6 FL — SIGNIFICANT CHANGE UP (ref 80–94)
MONOCYTES # BLD AUTO: 1.06 K/UL — HIGH (ref 0.1–0.6)
MONOCYTES NFR BLD AUTO: 10 % — HIGH (ref 1.7–9.3)
NEUTROPHILS # BLD AUTO: 4.86 K/UL — SIGNIFICANT CHANGE UP (ref 1.4–6.5)
NEUTROPHILS NFR BLD AUTO: 45.8 % — SIGNIFICANT CHANGE UP (ref 42.2–75.2)
NRBC # BLD: 0 /100 WBCS — SIGNIFICANT CHANGE UP (ref 0–0)
PHOSPHATE SERPL-MCNC: 4.3 MG/DL — SIGNIFICANT CHANGE UP (ref 2.1–4.9)
PLATELET # BLD AUTO: 282 K/UL — SIGNIFICANT CHANGE UP (ref 130–400)
POTASSIUM SERPL-MCNC: 4.8 MMOL/L — SIGNIFICANT CHANGE UP (ref 3.5–5)
POTASSIUM SERPL-SCNC: 4.8 MMOL/L — SIGNIFICANT CHANGE UP (ref 3.5–5)
PROT SERPL-MCNC: 6.8 G/DL — SIGNIFICANT CHANGE UP (ref 6–8)
PROTHROM AB SERPL-ACNC: 12.5 SEC — SIGNIFICANT CHANGE UP (ref 9.95–12.87)
RBC # BLD: 4.92 M/UL — SIGNIFICANT CHANGE UP (ref 4.7–6.1)
RBC # FLD: 13.2 % — SIGNIFICANT CHANGE UP (ref 11.5–14.5)
SARS-COV-2 RNA SPEC QL NAA+PROBE: SIGNIFICANT CHANGE UP
SODIUM SERPL-SCNC: 139 MMOL/L — SIGNIFICANT CHANGE UP (ref 135–146)
WBC # BLD: 10.61 K/UL — SIGNIFICANT CHANGE UP (ref 4.8–10.8)
WBC # FLD AUTO: 10.61 K/UL — SIGNIFICANT CHANGE UP (ref 4.8–10.8)

## 2021-06-28 PROCEDURE — 99232 SBSQ HOSP IP/OBS MODERATE 35: CPT

## 2021-06-28 PROCEDURE — 71045 X-RAY EXAM CHEST 1 VIEW: CPT | Mod: 26

## 2021-06-28 RX ORDER — HEPARIN SODIUM 5000 [USP'U]/ML
5000 INJECTION INTRAVENOUS; SUBCUTANEOUS EVERY 8 HOURS
Refills: 0 | Status: DISCONTINUED | OUTPATIENT
Start: 2021-06-28 | End: 2021-07-07

## 2021-06-28 RX ADMIN — Medication 37.5 MILLIGRAM(S): at 21:55

## 2021-06-28 RX ADMIN — HEPARIN SODIUM 5000 UNIT(S): 5000 INJECTION INTRAVENOUS; SUBCUTANEOUS at 21:54

## 2021-06-28 RX ADMIN — HEPARIN SODIUM 5000 UNIT(S): 5000 INJECTION INTRAVENOUS; SUBCUTANEOUS at 14:15

## 2021-06-28 RX ADMIN — PANTOPRAZOLE SODIUM 40 MILLIGRAM(S): 20 TABLET, DELAYED RELEASE ORAL at 06:23

## 2021-06-28 RX ADMIN — CLOPIDOGREL BISULFATE 75 MILLIGRAM(S): 75 TABLET, FILM COATED ORAL at 11:46

## 2021-06-28 RX ADMIN — Medication 5 MILLIGRAM(S): at 21:54

## 2021-06-28 RX ADMIN — SIMVASTATIN 40 MILLIGRAM(S): 20 TABLET, FILM COATED ORAL at 21:54

## 2021-06-28 RX ADMIN — Medication 5 MILLIGRAM(S): at 00:33

## 2021-06-28 RX ADMIN — CHLORHEXIDINE GLUCONATE 1 APPLICATION(S): 213 SOLUTION TOPICAL at 06:23

## 2021-06-28 RX ADMIN — Medication 325 MILLIGRAM(S): at 11:46

## 2021-06-28 NOTE — PROGRESS NOTE ADULT - SUBJECTIVE AND OBJECTIVE BOX
HPI:  60 yo M primarily Stateless speaking, PMHx R NORMAN stroke (April 2020), known M1,A1 severe stenosis, current outpatient of Dr. Guajardo's, last cerebral angiogram Feb 2021 which demonstrated R M1,A1,L M2 bifurcation severe stenosis. Presents on this admission with worsening LUE weakness over 1 mo, with fluctuating LLE weakness with unsteady gait. Stroke code activated in ED NIHSS 3, CTP with large penumbra  in R MCA territory code NI alert activated, not candidate per Dr. Marroquin, admitted to ICU for monitoring.   Neuroendovascular consulted for R M1,A1 stenosis demonstrated on imaging.   MR head demonstrating acute R MCA infarcts.     PAST MEDICAL AND SURGICAL HISTORY:   HTN (hypertension)  Psoriasis  CVA (cerebral vascular accident)    Overnight Events: None    MEDICATIONS:   aspirin 325 milliGRAM(s) Oral daily  chlorhexidine 4% Liquid 1 Application(s) Topical daily  clopidogrel Tablet 75 milliGRAM(s) Oral daily  heparin   Injectable 5000 Unit(s) SubCutaneous every 8 hours  melatonin 5 milliGRAM(s) Oral at bedtime  pantoprazole    Tablet 40 milliGRAM(s) Oral before breakfast  simvastatin 40 milliGRAM(s) Oral at bedtime  sodium chloride 0.9%. 1000 milliLiter(s) IV Continuous <Continuous>  venlafaxine 37.5 milliGRAM(s) Oral at bedtime    T(F): 96.5 (06-28-21 @ 08:00), Max: 98.7 (06-27-21 @ 15:45)  HR: 58 (06-28-21 @ 11:00) (48 - 68)  BP: 154/72 (06-28-21 @ 11:00) (117/71 - 173/94)  RR: 14 (06-28-21 @ 11:00) (9 - 40)  SpO2: 98% (06-28-21 @ 11:00) (96% - 100%)                        13.6   10.61 )-----------( 282      ( 28 Jun 2021 04:33 )             42.1     06-28    139  |  106  |  23<H>  ----------------------------<  82  4.8   |  22  |  1.3    Ca    9.3      28 Jun 2021 04:33  Phos  4.3     06-28  Mg     2.1     06-28    TPro  6.8  /  Alb  4.2  /  TBili  0.4  /  DBili  x   /  AST  33  /  ALT  35  /  AlkPhos  100  06-28  PT/INR - ( 28 Jun 2021 04:33 )   PT: 12.50 sec;   INR: 1.09 ratio    PTT - ( 28 Jun 2021 04:33 )  PTT:21.2 sec  LIVER FUNCTIONS - ( 28 Jun 2021 04:33 )  Alb: 4.2 g/dL / Pro: 6.8 g/dL / ALK PHOS: 100 U/L / ALT: 35 U/L / AST: 33 U/L / GGT: x           EXAM  General - NAD   Neuro - AAOx3, no sensory deficits, no visual deficits, CN intact, mild left facial asymmetry, no aphasia or dysarthria, mild LUE drift.     Suggestions:    Patient is to be for cerebral angiogram + angioplasty with NI team this upcoming Wednesday 6/30 PENDING IR availability and anesthesia availability. Patient will need to return to ICU for monitoring overnight following procedure.   NPO after midnight except meds tomorrow 6/29 starting 23:59, on IVF 50-75 ml/hr after midnight tomorrow 6/29 starting 23:59.   Patient will need RAPID COVID PCR per IR policy, collecting team will obtain swab today.     Please contact [phone] with any questions.  HPI:  62 yo M primarily Belarusian speaking, PMHx R NORMAN stroke (April 2020), known M1,A1 severe stenosis, current outpatient of Dr. Guajardo's, last cerebral angiogram Feb 2021 which demonstrated R M1,A1,L M2 bifurcation severe stenosis. Presents on this admission with worsening LUE weakness over 1 mo, with fluctuating LLE weakness with unsteady gait. Stroke code activated in ED NIHSS 3, CTP with large penumbra  in R MCA territory code NI alert activated, not candidate per Dr. Marroquin, admitted to ICU for monitoring.   Neuroendovascular consulted for R M1,A1 stenosis demonstrated on imaging.   MR head demonstrating acute R MCA infarcts.     PAST MEDICAL AND SURGICAL HISTORY:   HTN (hypertension)  Psoriasis  CVA (cerebral vascular accident)    Overnight Events: None    MEDICATIONS:   aspirin 325 milliGRAM(s) Oral daily  chlorhexidine 4% Liquid 1 Application(s) Topical daily  clopidogrel Tablet 75 milliGRAM(s) Oral daily  heparin   Injectable 5000 Unit(s) SubCutaneous every 8 hours  melatonin 5 milliGRAM(s) Oral at bedtime  pantoprazole    Tablet 40 milliGRAM(s) Oral before breakfast  simvastatin 40 milliGRAM(s) Oral at bedtime  sodium chloride 0.9%. 1000 milliLiter(s) IV Continuous <Continuous>  venlafaxine 37.5 milliGRAM(s) Oral at bedtime    T(F): 96.5 (06-28-21 @ 08:00), Max: 98.7 (06-27-21 @ 15:45)  HR: 58 (06-28-21 @ 11:00) (48 - 68)  BP: 154/72 (06-28-21 @ 11:00) (117/71 - 173/94)  RR: 14 (06-28-21 @ 11:00) (9 - 40)  SpO2: 98% (06-28-21 @ 11:00) (96% - 100%)                        13.6   10.61 )-----------( 282      ( 28 Jun 2021 04:33 )             42.1     06-28    139  |  106  |  23<H>  ----------------------------<  82  4.8   |  22  |  1.3    Ca    9.3      28 Jun 2021 04:33  Phos  4.3     06-28  Mg     2.1     06-28    TPro  6.8  /  Alb  4.2  /  TBili  0.4  /  DBili  x   /  AST  33  /  ALT  35  /  AlkPhos  100  06-28  PT/INR - ( 28 Jun 2021 04:33 )   PT: 12.50 sec;   INR: 1.09 ratio    PTT - ( 28 Jun 2021 04:33 )  PTT:21.2 sec  LIVER FUNCTIONS - ( 28 Jun 2021 04:33 )  Alb: 4.2 g/dL / Pro: 6.8 g/dL / ALK PHOS: 100 U/L / ALT: 35 U/L / AST: 33 U/L / GGT: x           EXAM  General - NAD   Neuro - AAOx3, no sensory deficits, no visual deficits, CN intact, mild left facial asymmetry, no aphasia or dysarthria, mild LUE drift.     Suggestions:    Patient is to be for cerebral angiogram + angioplasty with NI team this upcoming Wednesday 6/30 PENDING IR availability and anesthesia availability. Patient will need to return to ICU for monitoring overnight following procedure.   NPO after midnight except meds tomorrow 6/29 starting 23:59, on IVF 50-75 ml/hr after midnight tomorrow 6/29 starting 23:59.   Patient will need RAPID COVID PCR per IR policy, collecting team will obtain swab today.     Plan discussed with Dr. Guajardo.     Management per NCC/ICU.   Please contact [phone] with any questions.

## 2021-06-28 NOTE — PROGRESS NOTE ADULT - ATTENDING COMMENTS
Patient with severe right MCA and NORMAN repeated ischemic stroke despite of being on maximal medical management is to be for cerebral angiogram + angioplasty with NI team this upcoming Wednesday 6/30 PENDING IR availability and anesthesia availability. He needs to continue dual antiplatelets and Statine. Avid dehydration, hypovolemia, and hypotension. Please check the ARU/PRU TODAY OR TOMORROW MORNING.

## 2021-06-28 NOTE — PROGRESS NOTE ADULT - SUBJECTIVE AND OBJECTIVE BOX
HPI:60 yo male with PMHx of R NORMAN stroke (April 2020) and R M1 and A1 severe stenosis follows outpatient with Dr. Guajardo, (last angiogram on February 2021), on Plavix. CC: fluctuating left leg weakness resulting in stumbling.      History goes back to: 1 month ago when patient started notice episodic left arm weakness.     ROS is negative for: no fever, no chills, no SOB, chest pains or palpitations, no abdominal pain, nausea or vomiting. Patient denies facial droop, difficulty swallowing CTA head and neck: Severe stenosis and/or nonocclusive thrombus of the M1 segment of the right MCA, with diminished flow of the distal right MCA circulation, new from prior CTA 4/27/2020. 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 CT PERFUSION:, there are 229 cc of ischemic penumbra in the right frontoparietal lobes, partially accounted for by chronic infarcts of the right frontoparietal white matter.NIHSS in ED=3. stroke code called. Not a candidate for tPA due to unknown last normal LWN    NCC following   (23 Jun 2021 20:39)    Overnight Events: No acute events overnight, patient remains stable.     T(C): 35.8 (06-28-21 @ 08:00), Max: 37.1 (06-27-21 @ 15:45)  HR: 62 (06-28-21 @ 10:00) (48 - 68)  BP: 173/80 (06-28-21 @ 10:00) (117/71 - 173/94)  BP(mean): 124 (06-28-21 @ 10:00) (85 - 128)  ABP: --  ABP(mean): --  RR: 17 (06-28-21 @ 10:00) (9 - 40)  SpO2: 99% (06-28-21 @ 10:00) (96% - 100%)    I&O's Detail    27 Jun 2021 07:01  -  28 Jun 2021 07:00  --------------------------------------------------------  IN:    Oral Fluid: 290 mL    sodium chloride 0.9%: 650 mL  Total IN: 940 mL    OUT:    Voided (mL): 741 mL  Total OUT: 741 mL    Total NET: 199 mL      28 Jun 2021 07:01  -  28 Jun 2021 11:02  --------------------------------------------------------  IN:    sodium chloride 0.9%: 200 mL  Total IN: 200 mL    OUT:    Voided (mL): 760 mL  Total OUT: 760 mL    Total NET: -560 mL    MEDICATIONS  (STANDING):  aspirin 325 milliGRAM(s) Oral daily  chlorhexidine 4% Liquid 1 Application(s) Topical daily  clopidogrel Tablet 75 milliGRAM(s) Oral daily  melatonin 5 milliGRAM(s) Oral at bedtime  pantoprazole    Tablet 40 milliGRAM(s) Oral before breakfast  simvastatin 40 milliGRAM(s) Oral at bedtime  sodium chloride 0.9%. 1000 milliLiter(s) (50 mL/Hr) IV Continuous <Continuous>  venlafaxine 37.5 milliGRAM(s) Oral at bedtime    MEDICATIONS  (PRN):      Labs:                        13.6   10.61 )-----------( 282      ( 28 Jun 2021 04:33 )             42.1     06-28    139  |  106  |  23<H>  ----------------------------<  82  4.8   |  22  |  1.3    Ca    9.3      28 Jun 2021 04:33  Phos  4.3     06-28  Mg     2.1     06-28    TPro  6.8  /  Alb  4.2  /  TBili  0.4  /  DBili  x   /  AST  33  /  ALT  35  /  AlkPhos  100  06-28    LIVER FUNCTIONS - ( 28 Jun 2021 04:33 )  Alb: 4.2 g/dL / Pro: 6.8 g/dL / ALK PHOS: 100 U/L / ALT: 35 U/L / AST: 33 U/L / GGT: x           PT/INR - ( 28 Jun 2021 04:33 )   PT: 12.50 sec;   INR: 1.09 ratio         PTT - ( 28 Jun 2021 04:33 )  PTT:21.2 sec  06-24 Chol 135 LDL -- HDL 48 Trig 47      Radiology: No new imaging.       ROS: Negative except for that of HPI      Physical Exam:    General: Middle aged male, lying supine in bed, NAD.     Neurological:    Mental Status: Alert and Oriented to person, place, and time, cooperative, pleasant. Affect appropriate, thought, speech, and language coherent.     Cranial Nerves:  I: Deferred  II, III, IV, VI: 3 mm PERRLA, EOM intact. Serrato intact by confrontation. No cranial nerve palsy or nystagmus noted.  V: facial sensation intact; masseter/ temporal muscles intact, corneal reflex intact bilaterally  VII: Mild left facial  VIII: Deferred  IX and X: no hoarseness, gag intact, palate/ uvula rise symmetrically  XI: SCM/ trapezius strength intact bilateral  XII: no dysarthria, tongue weakness/fasiculation     Motor: Normal muscle bulk/tone. Good posture. Strength - Right UE:  5 /5  Right LE:  5 /5  Left UE:  5 /5  Left LE:   5/5    Sensory: Sensation to light touch and painful stimuli.     Cerebellar Function: + left pronator drift (mild)    Reflexes: No clonus    NIHSS:    1A: LOC 0   1B: LOC Questions 0   1C: Performs Tasks 0   2: Horizontal EOMs 0  3: Visual Fields 0  4: Facial Palsy 1  5A: LUE Drift 1  5B: RUE Drift 0  6A: LLE Drift 0   6B: RLE Drift 0   7: Limb Ataxia (heel-shin, FNF) 0   8: Sensation 0   9: Language/Aphasia 0   10: Dysarthria 0   11: Extinction/Inattention 0     NIHSS Total: 2    Assessment & Plan:  60 yo male with PMH of R NORMAN stroke in April 2020, and R M1 and A1 severe stenosis, presents with worsening left arm weakness over the past month, and episode of fluctuating left leg weakness to the point of stumbling. In ED stroke code was called with NIHSS of 3, not a tpa candidate due to unknown LWN. CTP shows large penumbra in R MCA territory , code NI alert was activated but patient was not a NI candidate. Patient admitted to ICU for close monitoring and workup. MRI revealed acute R MCA infarcts. Patient remains with stable NIH, pending NI.       Plan:    Neuro:  - Neuro Checks Q4H  - STAT CT Head with change in Neuro exam  - Daily Statin  - Continue Dual AntiPLT  - Sodium Goal: Normonatremia  - SBP goal: 140 - 180  - Normovolemia  - Normothermia    - F/u NI recs: Pending Neurointervention on wednesday 6/30- Please see NI note regarding COVID swab and NPO status  - SCDs and SQ Heparin (Hold heparin for procedure day)  - Dispo: Stroke unit      Thank you for involving [facility] in this patients plan of care. Please call with any questions or concerns.     Johnny Weber NP    #[phone]

## 2021-06-28 NOTE — PROGRESS NOTE ADULT - SUBJECTIVE AND OBJECTIVE BOX
OVERNIGHT EVENTS: events noted, NS 50 CC/H, no events overnight    Vital Signs Last 24 Hrs  T(C): 36.2 (28 Jun 2021 04:00), Max: 37.1 (27 Jun 2021 15:45)  T(F): 97.2 (28 Jun 2021 04:00), Max: 98.7 (27 Jun 2021 15:45)  HR: 48 (28 Jun 2021 06:00) (48 - 68)  BP: 163/92 (28 Jun 2021 06:00) (117/71 - 174/76)  BP(mean): 118 (28 Jun 2021 06:00) (85 - 130)  RR: 11 (28 Jun 2021 06:00) (11 - 40)  SpO2: 98% (28 Jun 2021 06:00) (83% - 100%)    PHYSICAL EXAMINATION:    GENERAL: comfortable    HEENT: Head is normocephalic and atraumatic. Extraocular muscles are intact. Mucous membranes are moist.    NECK: Supple.    LUNGS: Clear to auscultation without wheezing, rales or rhonchi; respirations unlabored    HEART: Regular rate and rhythm without murmur.    ABDOMEN: Soft, nontender, and nondistended.      EXTREMITIES: Without any cyanosis, clubbing, rash, lesions or edema.    NEUROLOGIC: non focal    SKIN: No ulceration or induration present.      LABS:                        13.6   10.61 )-----------( 282      ( 28 Jun 2021 04:33 )             42.1     06-28    139  |  106  |  23<H>  ----------------------------<  82  4.8   |  22  |  1.3    Ca    9.3      28 Jun 2021 04:33  Phos  4.3     06-28  Mg     2.1     06-28    TPro  6.8  /  Alb  4.2  /  TBili  0.4  /  DBili  x   /  AST  33  /  ALT  35  /  AlkPhos  100  06-28    PT/INR - ( 28 Jun 2021 04:33 )   PT: 12.50 sec;   INR: 1.09 ratio         PTT - ( 28 Jun 2021 04:33 )  PTT:21.2 sec                      06-27-21 @ 07:01  -  06-28-21 @ 07:00  --------------------------------------------------------  IN: 940 mL / OUT: 741 mL / NET: 199 mL        MICROBIOLOGY:      MEDICATIONS  (STANDING):  aspirin 325 milliGRAM(s) Oral daily  chlorhexidine 4% Liquid 1 Application(s) Topical daily  clopidogrel Tablet 75 milliGRAM(s) Oral daily  melatonin 5 milliGRAM(s) Oral at bedtime  pantoprazole    Tablet 40 milliGRAM(s) Oral before breakfast  phenylephrine    Infusion 0.1 MICROgram(s)/kG/Min (1.41 mL/Hr) IV Continuous <Continuous>  simvastatin 40 milliGRAM(s) Oral at bedtime  sodium chloride 0.9%. 1000 milliLiter(s) (50 mL/Hr) IV Continuous <Continuous>  venlafaxine 37.5 milliGRAM(s) Oral at bedtime    MEDICATIONS  (PRN):      RADIOLOGY & ADDITIONAL STUDIES:

## 2021-06-28 NOTE — CHART NOTE - NSCHARTNOTEFT_GEN_A_CORE
`62 yo male with PMHx of R NORMAN stroke (April 2020) and R M1 and A1 severe stenosis follows outpatient with Dr. Guajardo, (last angiogram on February 2021), on Plavix     CC: fluctuating left leg weakness resulting in stumbling.      History goes back to: 1 month ago when patient started notice episodic left arm weakness.     ROS is negative for: no fever, no chills, no SOB, chest pains or palpitations, no abdominal pain, nausea or vomiting. Patient denies facial droop, difficulty swallowing     ED course:   VS all WNL  Labs significant for: WNL  Troponin: negative  CT head: Acute infarct involving the right frontal lobe in the precentral gyrus.  Chronic infarct of the right periventricular white matter, new from prior exams detailed above.  Additional chronic infarcts of the bilateral basal ganglia and left cerebellum, stable.    CTA head and neck: Severe stenosis and/or nonocclusive thrombus of the M1 segment of the right MCA, with diminished flow of the distal right MCA circulation, new from prior CTA 4/27/2020. 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    CT PERFUSION:, there are 229 cc of ischemic penumbra in the right frontoparietal lobes, partially accounted for by chronic infarcts of the right frontoparietal white matter.    NIHSS in ED=3  stroke code called. Not a candidate for tPA due to unknown last normal LWN    ICU course:  Initially patient was started on phenylephrine infusion then discontinued. He is on dual antiplatelet, protonix.   He was held NPO and on no DVT prophylaxis for possible NI today.   However Neurology team `recommended cerebral angiogram + angioplasty with NI team this upcoming Wednesday 6/30 PENDING IR availability and anesthesia availability. Patient will need to return to ICU for monitoring overnight following procedure.   Patient is put on SQ Heparin and diet upgraded to regular diet.     To do :   - NPO after midnight except meds tomorrow 6/29 starting 23:59, on IVF 50-75 ml/hr after midnight tomorrow 6/29 starting 23:59.   - Follow up COVID PCR   - Neuro Checks Q4H  - STAT CT Head with change in Neuro exam  - Daily Statin  - Continue Dual AntiPLT  - Sodium Goal: Normonatremia  - SBP goal: 140 - 180  - Normovolemia  - Normothermia    - Hold heparin for procedure day    Signed out to `60 yo male with PMHx of R NORMAN stroke (April 2020) and R M1 and A1 severe stenosis follows outpatient with Dr. Guajardo, (last angiogram on February 2021), on Plavix     CC: fluctuating left leg weakness resulting in stumbling.      History goes back to: 1 month ago when patient started notice episodic left arm weakness.     ROS is negative for: no fever, no chills, no SOB, chest pains or palpitations, no abdominal pain, nausea or vomiting. Patient denies facial droop, difficulty swallowing     ED course:   VS all WNL  Labs significant for: WNL  Troponin: negative  CT head: Acute infarct involving the right frontal lobe in the precentral gyrus.  Chronic infarct of the right periventricular white matter, new from prior exams detailed above.  Additional chronic infarcts of the bilateral basal ganglia and left cerebellum, stable.    CTA head and neck: Severe stenosis and/or nonocclusive thrombus of the M1 segment of the right MCA, with diminished flow of the distal right MCA circulation, new from prior CTA 4/27/2020. 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    CT PERFUSION:, there are 229 cc of ischemic penumbra in the right frontoparietal lobes, partially accounted for by chronic infarcts of the right frontoparietal white matter.    NIHSS in ED=3  stroke code called. Not a candidate for tPA due to unknown last normal LWN    ICU course:  Initially patient was started on phenylephrine infusion then discontinued. He is on dual antiplatelet, protonix.   He was held NPO and on no DVT prophylaxis for possible NI today.   However Neurology team `recommended cerebral angiogram + angioplasty with NI team this upcoming Wednesday 6/30 PENDING IR availability and anesthesia availability. Patient will need to return to ICU for monitoring overnight following procedure.   Patient is put on SQ Heparin and diet upgraded to regular diet.     To do :   - NPO after midnight except meds tomorrow 6/29 starting 23:59, on IVF 50-75 ml/hr after midnight tomorrow 6/29 starting 23:59.   - Follow up COVID PCR   - Neuro Checks Q4H  - STAT CT Head with change in Neuro exam  - Daily Statin  - Continue Dual AntiPLT  - Sodium Goal: Normonatremia  - SBP goal: 140 - 180  - Normovolemia  - Normothermia    - Hold heparin for procedure day    Signed out to 3E.

## 2021-06-28 NOTE — SPEECH LANGUAGE PATHOLOGY EVALUATION - SLP PERTINENT HISTORY OF CURRENT PROBLEM
60 yo male primarily Indian speaking  presented to ED with worsening LUE weakness over 1 mo, with fluctuating LLE weakness with unsteady gait., PMHx R NORMAN stroke (Apr 2020), with known R M1,A1 severe stenosis. Patient is a current patient of Dr. Guajardo's Presents on this admission with worsening LUE weakness over 1 mo, with fluctuating LLE weakness with unsteady gait. Stroke code activated.  CTA head and neck: Severe stenosis and/or nonocclusive thrombus of the M1 segment of the right MCA, with diminished flow of the distal right MCA circulation.

## 2021-06-28 NOTE — PROGRESS NOTE ADULT - ASSESSMENT
61 year old male known to have R NORMAN stroke and R M1 and A1 severe stenosis on Plavix presented with left leg weakness.   Imaging showed:  `< from: CT Head No Cont (06.24.21 @ 23:18) >  Evolving acute infarcts involving the right frontal lobe likely in an MCA and NORMAN distribution. No evidence ofhemorrhagic transformation or significant mass effect.  Chronic bilateral basal ganglia as well as left cerebellar lacunar infarcts.    < from: MR Head No Cont (06.25.21 @ 16:58) >  Acute right MCA infarcts.    < from: CT Angio Neck w/ IV Cont (06.23.21 @ 19:20) >  CTA HEAD/NECK:  High-grade stenosis versus nonocclusive thrombus is noted of the M1 segment of the right MCA, with diminished distal MCA branch opacification, new from prior CTA 4/27/2020.  Multifocal severe stenoses are noted along the right A2 segment of the NORMAN which are new since the prior examination.  Moderate (approximately 60%) short segment stenosis of the proximal left internal carotid artery due to mixed calcified and noncalcified atherosclerotic plaque which is worsened since the prior CTA of 4/27/2020.    #Evolving acute right frontal lobe infarct  As per Neurology  - Patient is to be for cerebral angiogram + angioplasty with NI team this upcoming Wednesday 6/30.  Patient will need to return to ICU for monitoring overnight following procedure.   NPO after midnight except meds tomorrow 6/29 starting 23:59, on IVF 50-75 ml/hr after midnight tomorrow 6/29 starting 23:59.   - Patient will need RAPID COVID PCR per IR policy, collecting team will obtain swab today.    #HCM  - GI prophylaxis  - DVT prophylaxis  - SCD's  - Continue dual antiplatelet    61 year old male known to have R NORMAN stroke and R M1 and A1 severe stenosis on Plavix presented with left leg weakness.     # R MCA and NORMAN ischemic stroke  - CT head showed: Evolving acute infarcts involving the right frontal lobe likely in an MCA and NORMAN distribution.  - MR head showed - Acute right MCA infarcts.  - CTA HEAD     High-grade stenosis versus nonocclusive thrombus is noted of the M1 segment of the right MCA, with diminished distal MCA branch opacification,     Multifocal severe stenoses are noted along the right A2 segment of the NORMAN   - CTA neck: Moderate (approximately 60%) short segment stenosis of the proximal left internal carotid artery due to mixed calcified and noncalcified atherosclerotic  - LDL(6/24): 79 / TTE(6/24): EF 58%.  PLAN:  - NI following - recommended cerebral angiogram + angioplasty this upcoming Wednesday 6/30.  Patient will need to return to ICU for monitoring overnight following procedure.  - Patient is to be for NPO after midnight except meds tomorrow 6/29 starting 23:59, on IVF 50-75 ml/hr after midnight tomorrow 6/29 starting 23:59.   - Patient will need RAPID COVID PCR per IR policy, collecting team will obtain swab today.  - c/w Neurochecks q4 hr  - c/w Aspirin 81m Daily, Plavix 75mg Daily. Lipitor 80mg Daily  - Downgraded today to 3E, Patient will need to return to ICU for monitoring overnight following procedure.    # Grade I Diastolic HFpEF   - Compensated; EF 58%    # hx of Depression/Anxiety   - c/w Venlafaxine 37.5mg Daily    # GERD   - c/w Protonix 40mg Daily    # DLD   - c/w Lipitor 80mg Daily      Diet: Reg  Activity: IAT  DVT Prophylaxis: Hep Sc  GI Prophylaxis: PPI  CHG Ordered  Code Status: FULL  Disposition: MICU - > 3E today

## 2021-06-28 NOTE — PROGRESS NOTE ADULT - ASSESSMENT
IMPRESSION:    CVA/ R MCA infarct  HO NORMAN stenosis / MCA stenosis    PLAN:     CNS:  FU with Neuro IR.  Neurochecks q 1 h    HEENT: Oral care    PULMONARY:  HOB @ 45 degrees.  Aspiration precautions     CARDIOVASCULAR:  Avoid volume overload ,      GI: GI prophylaxis.  Feeding.  Bowel regimen      RENAL:  Follow up lytes.  Correct as needed    INFECTIOUS DISEASE: Follow up cultures    HEMATOLOGICAL:  DVT prophylaxis.    ENDOCRINE:  Follow up FS.     MUSCULOSKELETAL:  Bed chair position       MICU today

## 2021-06-28 NOTE — PROGRESS NOTE ADULT - ATTENDING COMMENTS
60 yo gentleman with PMH of R NORMAN stroke in April 2020, and R M1 and A1 severe stenosis, presents with worsening left arm weakness over the past month, and episode of fluctuating left leg weakness to the point of stumbling. I. MRI revealed acute R MCA infarcts. Patient remains with stable NIH, pending NI for possible angioplasty of intracranial symptomatic stenotic lesions.

## 2021-06-28 NOTE — PROGRESS NOTE ADULT - SUBJECTIVE AND OBJECTIVE BOX
LENGTH OF HOSPITAL STAY: 5d    CHIEF COMPLAINT:   Patient is a 61y old  Male who presents with a chief complaint of left leg weakness (28 Jun 2021 12:17)      HISTORY OF PRESENTING ILLNESS:    HPI:  60 yo male with PMHx of R NORMAN stroke (April 2020) and R M1 and A1 severe stenosis follows outpatient with Dr. Guajardo, (last angiogram on February 2021), on Plavix     CC: fluctuating left leg weakness resulting in stumbling.      History goes back to: 1 month ago when patient started notice episodic left arm weakness.     ROS is negative for: no fever, no chills, no SOB, chest pains or palpitations, no abdominal pain, nausea or vomiting. Patient denies facial droop, difficulty swallowing     In the ED: VS all WNL  Labs significant for: WNL  Troponin: negative  CT head: Acute infarct involving the right frontal lobe in the precentral gyrus.  Chronic infarct of the right periventricular white matter, new from prior exams detailed above.  Additional chronic infarcts of the bilateral basal ganglia and left cerebellum, stable.    CTA head and neck: Severe stenosis and/or nonocclusive thrombus of the M1 segment of the right MCA, with diminished flow of the distal right MCA circulation, new from prior CTA 4/27/2020. 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    CT PERFUSION:, there are 229 cc of ischemic penumbra in the right frontoparietal lobes, partially accounted for by chronic infarcts of the right frontoparietal white matter.    NIHSS in ED=3  stroke code called. Not a candidate for tPA due to unknown last normal LWN       (23 Jun 2021 20:39)    No overnight events.     Subjective: Patient was seen and examined at bedside.  He is doing well with no complaints. He states his left leg weakness improved since his admission. No vertigo, no weakness.   His diet was upgraded to Regular diet.   On PPI prophylaxis.     PAST MEDICAL & SURGICAL HISTORY  PAST MEDICAL & SURGICAL HISTORY:  HTN (hypertension)    Psoriasis    CVA (cerebral vascular accident)    No significant past surgical history      SOCIAL HISTORY:    ALLERGIES:  No Known Allergies    MEDICATIONS:  STANDING MEDICATIONS  aspirin 325 milliGRAM(s) Oral daily  chlorhexidine 4% Liquid 1 Application(s) Topical daily  clopidogrel Tablet 75 milliGRAM(s) Oral daily  heparin   Injectable 5000 Unit(s) SubCutaneous every 8 hours  melatonin 5 milliGRAM(s) Oral at bedtime  pantoprazole    Tablet 40 milliGRAM(s) Oral before breakfast  simvastatin 40 milliGRAM(s) Oral at bedtime  sodium chloride 0.9%. 1000 milliLiter(s) IV Continuous <Continuous>  venlafaxine 37.5 milliGRAM(s) Oral at bedtime    PRN MEDICATIONS    VITALS:   T(F): 97  HR: 54  BP: 157/81  RR: 22  SpO2: 99%    LABS:                        13.6   10.61 )-----------( 282      ( 28 Jun 2021 04:33 )             42.1     06-28    139  |  106  |  23<H>  ----------------------------<  82  4.8   |  22  |  1.3    Ca    9.3      28 Jun 2021 04:33  Phos  4.3     06-28  Mg     2.1     06-28    TPro  6.8  /  Alb  4.2  /  TBili  0.4  /  DBili  x   /  AST  33  /  ALT  35  /  AlkPhos  100  06-28    PT/INR - ( 28 Jun 2021 04:33 )   PT: 12.50 sec;   INR: 1.09 ratio         PTT - ( 28 Jun 2021 04:33 )  PTT:21.2 sec              RADIOLOGY:    PHYSICAL EXAM:  GEN: No acute distress  LUNGS: Clear to auscultation bilaterally   HEART: S1/S2 present. RRR.   ABD: Soft, non-tender, non-distended. Bowel sounds present  EXT: no edema, no swelling, no erythema  NEURO: AAOX3, reactive bilateral pupils, +2 motor power, no diziness no vertigo

## 2021-06-29 ENCOUNTER — APPOINTMENT (OUTPATIENT)
Dept: NEUROLOGY | Facility: CLINIC | Age: 61
End: 2021-06-29

## 2021-06-29 ENCOUNTER — TRANSCRIPTION ENCOUNTER (OUTPATIENT)
Age: 61
End: 2021-06-29

## 2021-06-29 PROCEDURE — 99232 SBSQ HOSP IP/OBS MODERATE 35: CPT

## 2021-06-29 RX ORDER — LANOLIN ALCOHOL/MO/W.PET/CERES
3 CREAM (GRAM) TOPICAL AT BEDTIME
Refills: 0 | Status: DISCONTINUED | OUTPATIENT
Start: 2021-06-29 | End: 2021-07-05

## 2021-06-29 RX ADMIN — SODIUM CHLORIDE 75 MILLILITER(S): 9 INJECTION INTRAMUSCULAR; INTRAVENOUS; SUBCUTANEOUS at 10:00

## 2021-06-29 RX ADMIN — Medication 37.5 MILLIGRAM(S): at 21:01

## 2021-06-29 RX ADMIN — HEPARIN SODIUM 5000 UNIT(S): 5000 INJECTION INTRAVENOUS; SUBCUTANEOUS at 21:00

## 2021-06-29 RX ADMIN — CHLORHEXIDINE GLUCONATE 1 APPLICATION(S): 213 SOLUTION TOPICAL at 06:07

## 2021-06-29 RX ADMIN — PANTOPRAZOLE SODIUM 40 MILLIGRAM(S): 20 TABLET, DELAYED RELEASE ORAL at 06:09

## 2021-06-29 RX ADMIN — Medication 325 MILLIGRAM(S): at 11:04

## 2021-06-29 RX ADMIN — HEPARIN SODIUM 5000 UNIT(S): 5000 INJECTION INTRAVENOUS; SUBCUTANEOUS at 06:08

## 2021-06-29 RX ADMIN — SODIUM CHLORIDE 50 MILLILITER(S): 9 INJECTION INTRAMUSCULAR; INTRAVENOUS; SUBCUTANEOUS at 06:37

## 2021-06-29 RX ADMIN — Medication 5 MILLIGRAM(S): at 21:00

## 2021-06-29 RX ADMIN — HEPARIN SODIUM 5000 UNIT(S): 5000 INJECTION INTRAVENOUS; SUBCUTANEOUS at 13:26

## 2021-06-29 RX ADMIN — CLOPIDOGREL BISULFATE 75 MILLIGRAM(S): 75 TABLET, FILM COATED ORAL at 11:04

## 2021-06-29 RX ADMIN — SIMVASTATIN 40 MILLIGRAM(S): 20 TABLET, FILM COATED ORAL at 21:00

## 2021-06-29 NOTE — PROGRESS NOTE ADULT - ASSESSMENT
--------------------------------------------------------------    ASSESSMENT & PLAN    Past medical history and hospital course                                                                                                           ----------------------------------------------------  # DVT prophylaxis     # GI prophylaxis     # Diet     # Activity Score (AM-PAC)    # Code status     # Disposition                                                                              --------------------------------------------------------    # Handoff        ASSESSMENT & PLAN  61 year old male known to have R NORMAN stroke and R M1 and A1 severe stenosis on Plavix presented with left leg weakness.     # R MCA and NORMAN ischemic stroke  > CT head showed: Evolving acute infarcts involving the right frontal lobe likely in an MCA and NORMAN distribution.  > MR head showed - Acute right MCA infarcts.  > CTA HEAD     High-grade stenosis versus nonocclusive thrombus is noted of the M1 segment of the right MCA, with diminished distal MCA branch opacification,     Multifocal severe stenoses are noted along the right A2 segment of the NORMAN   > CTA neck: Moderate (approximately 60%) short segment stenosis of the proximal left internal carotid artery due to mixed calcified and noncalcified atherosclerotic  > LDL(6/24): 79 / TTE(6/24): EF 58%.  > Keep BP in range : 145/74 - 185/87  > Scheduled for angioplasty 6/30 : NPO except med at Midnight   > ASA. Plavix sens done 6/29: Plavix : 50 , ASA : 394 (get report from cardio [place])    PLAN:  - NI following - recommended cerebral angiogram + angioplasty this upcoming Wednesday 6/30.  Patient will need to return to ICU for monitoring overnight following procedure.  - Patient is to be for NPO after midnight except meds tomorrow 6/29 starting 23:59, on IVF 50-75 ml/hr after midnight tomorrow 6/29 starting 23:59.   - Patient will need RAPID COVID PCR per IR policy, collecting team will obtain swab today.  - c/w Neurochecks q4 hr  - c/w Aspirin 81m Daily, Plavix 75mg Daily. Lipitor 80mg Daily  - Downgraded today to 3E, Patient will need to return to ICU for monitoring overnight following procedure.    # Grade I Diastolic HFpEF   > Compensated; EF 58%    # hx of Depression/Anxiety   > c/w Venlafaxine 37.5mg Daily    # GERD   >  c/w Protonix 40mg Daily    # DLD   > c/w Lipitor 80mg Daily      Diet: Reg  Activity: IAT  DVT Prophylaxis: Hep Sc  GI Prophylaxis: PPI  CHG Ordered  Code Status: FULL  Disposition: MICU - > 3E today                                                                             --------------------------------------------------------  # Handoff   > NPO after midnight except meds : angioplasty   > Keep BP in range : 145/74 - 185/87

## 2021-06-29 NOTE — PROGRESS NOTE ADULT - ATTENDING COMMENTS
Patient examined this am in stroke unit.  Neurologic exam stable.  Pending angiogram with angioplasty due to high grade intracranial stenosis and large area of ischemic penumbra.  Continue dual antiplatelet therapy and high intensity statin.  Continue BP target 145-185.

## 2021-06-29 NOTE — PROGRESS NOTE ADULT - SUBJECTIVE AND OBJECTIVE BOX
VEENA BRANDON 61y Male  MRN#: [id number]   CODE STATUS: full code    Hospital Day: 6d    Pt is currently admitted with the primary diagnosis of : leg weakness     SUBJECTIVE  Hospital Course  60 yo male   PMHx of R NORMAN stroke (April 2020) and R M1 and A1 severe stenosis follows outpatient with Dr. Guajardo, (last angiogram on February 2021), on Plavix   CC: fluctuating left leg weakness resulting in stumbling. 6/23  HPI : History goes back to: 1 month ago when patient started notice episodic left arm weakness.   ROS is negative for: no fever, no chills, no SOB, chest pains or palpitations, no abdominal pain, nausea or vomiting. Patient denies facial droop, difficulty swallowing     In the ED:   VS all WNL  Labs WNL  Troponin: negative  CT head: Acute infarct involving the right frontal lobe in the precentral gyrus.  Chronic infarct of the right periventricular white matter, new from prior exams detailed above.  Additional chronic infarcts of the bilateral basal ganglia and left cerebellum, stable.  CTA head and neck: Severe stenosis and/or nonocclusive thrombus of the M1 segment of the right MCA, with diminished flow of the distal right MCA circulation, new from prior CTA 4/27/2020. 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  CT PERFUSION:, there are 229 cc of ischemic penumbra in the right frontoparietal lobes, partially accounted for by chronic infarcts of the right frontoparietal white matter.  NIHSS in ED=3  stroke code called. Not a candidate for tPA due to unknown last normal LWN    Overnight events   None    Subjective complaints   None, able to walk without issue per patient    Present Today:   - Srivastava:  No [ x ], Yes [   ] : Indication:     - Type of IV Access:       .. CVC/Piccline:  No [ x ], Yes [   ] : Indication:       .. Midline: No [ x ], Yes [   ] : Indication:                                             ----------------------------------------------------------  OBJECTIVE  PAST MEDICAL & SURGICAL HISTORY  HTN (hypertension)    Psoriasis    CVA (cerebral vascular accident)    No significant past surgical history                                              -----------------------------------------------------------  ALLERGIES:  No Known Allergies                                            ------------------------------------------------------------    HOME MEDICATIONS  Home Medications:  amLODIPine 2.5 mg oral tablet: 1 tab(s) orally once a day (18 Jan 2021 09:40)  clopidogrel 75 mg oral tablet: 1 tab(s) orally once a day (18 Jan 2021 09:40)  Humira Pre-Filled Syringe: subcutaneous 2 times a month (18 Jan 2021 09:40)  losartan 50 mg oral tablet: 1 tab(s) orally once a day (18 Jan 2021 09:40)  venlafaxine 37.5 mg oral tablet: 1 tab(s) orally once a day (at bedtime) (18 Jan 2021 09:40)  Vitamin B12:  (18 Jan 2021 09:40)                           MEDICATIONS:  STANDING MEDICATIONS  aspirin 325 milliGRAM(s) Oral daily  chlorhexidine 4% Liquid 1 Application(s) Topical daily  clopidogrel Tablet 75 milliGRAM(s) Oral daily  heparin   Injectable 5000 Unit(s) SubCutaneous every 8 hours  melatonin 5 milliGRAM(s) Oral at bedtime  pantoprazole    Tablet 40 milliGRAM(s) Oral before breakfast  simvastatin 40 milliGRAM(s) Oral at bedtime  sodium chloride 0.9%. 1000 milliLiter(s) IV Continuous <Continuous>  venlafaxine 37.5 milliGRAM(s) Oral at bedtime    PRN MEDICATIONS                                            ------------------------------------------------------------  VITAL SIGNS: Last 24 Hours  T(C): 35.7 (29 Jun 2021 05:30), Max: 36.8 (29 Jun 2021 02:52)  T(F): 96.3 (29 Jun 2021 05:30), Max: 98.2 (29 Jun 2021 02:52)  HR: 68 (29 Jun 2021 05:30) (48 - 68)  BP: 179/86 (29 Jun 2021 05:30) (144/84 - 188/80)  BP(mean): 115 (29 Jun 2021 05:30) (91 - 134)  RR: 18 (29 Jun 2021 05:30) (9 - 35)  SpO2: 98% (29 Jun 2021 05:30) (97% - 100%)      06-28-21 @ 07:01  -  06-29-21 @ 07:00  --------------------------------------------------------  IN: 1230 mL / OUT: 2185 mL / NET: -955 mL                                             --------------------------------------------------------------  LABS:                        13.6   10.61 )-----------( 282      ( 28 Jun 2021 04:33 )             42.1     06-28    139  |  106  |  23<H>  ----------------------------<  82  4.8   |  22  |  1.3    Ca    9.3      28 Jun 2021 04:33  Phos  4.3     06-28  Mg     2.1     06-28    TPro  6.8  /  Alb  4.2  /  TBili  0.4  /  DBili  x   /  AST  33  /  ALT  35  /  AlkPhos  100  06-28    PT/INR - ( 28 Jun 2021 04:33 )   PT: 12.50 sec;   INR: 1.09 ratio         PTT - ( 28 Jun 2021 04:33 )  PTT:21.2 sec                                              -------------------------------------------------------------  RADIOLOGY:  < from: CT Brain Stroke Protocol (06.23.21 @ 18:50) >  Acute infarct involving the right frontal lobe in the precentral gyrus.  Chronic infarct of the right periventricular white matter, new from prior exams detailed above.  Additional chronic infarcts of the bilateral basal ganglia and left cerebellum, stable.  Dr. Reginaldo Elkins discussed preliminary findings with Dr. Kristian Hood on 6/23/2021 at 7:40 PM with readback.    < from: CT Perfusion w/ Maps w/ IV Cont (06.23.21 @ 19:06) >  CT PERFUSION:  UtilizingTmax > 6.0s and CBF < 30%, there are 229 cc of ischemic penumbra in the right MCA territory without core infarct.  CTA HEAD/NECK:  High-grade stenosis versus nonocclusive thrombus is noted of the M1 segment of the right MCA, with diminished distal MCA branch opacification, new from prior CTA 4/27/2020.  Multifocal severe stenoses are noted along the right A2 segment of the NORMAN which are new since the prior examination.  Moderate (approximately 60%) short segment stenosis of the proximal left internal carotid artery due to mixed calcified and noncalcified atherosclerotic plaque which is worsened since the prior CTA of 4/27/2020.  Dr. Reginaldo Elkins discussed preliminary findings with Stephy Ramirez on 6/23/2021 at 8:21 PM with readback.      < from: CT Angio Head w/ IV Cont (06.23.21 @ 19:17) >  CT PERFUSION:  UtilizingTmax > 6.0s and CBF < 30%, there are 229 cc of ischemic penumbra in the right MCA territory without core infarct.  CTA HEAD/NECK:  High-grade stenosis versus nonocclusive thrombus is noted of the M1 segment of the right MCA, with diminished distal MCA branch opacification, new from prior CTA 4/27/2020.  Multifocal severe stenoses are noted along the right A2 segment of the NORMAN which are new since the prior examination.  Moderate (approximately 60%) short segment stenosis of the proximal left internal carotid artery due to mixed calcified and noncalcified atherosclerotic plaque which is worsened since the prior CTA of 4/27/2020.        < from: CT Angio Neck w/ IV Cont (06.23.21 @ 19:20) >  CT PERFUSION:  UtilizingTmax > 6.0s and CBF < 30%, there are 229 cc of ischemic penumbra in the right MCA territory without core infarct.  CTA HEAD/NECK:  High-grade stenosis versus nonocclusive thrombus is noted of the M1 segment of the right MCA, with diminished distal MCA branch opacification, new from prior CTA 4/27/2020.  Multifocal severe stenoses are noted along the right A2 segment of the NORMAN which are new since the prior examination.  Moderate (approximately 60%) short segment stenosis of the proximal left internal carotid artery due to mixed calcified and noncalcified atherosclerotic plaque which is worsened since the prior CTA of 4/27/2020.    < from: Xray Chest 1 View- PORTABLE-Urgent (Xray Chest 1 View- PORTABLE-Urgent .) (06.24.21 @ 12:14) >  No radiographic evidence of acute cardiopulmonary disease.    < from: CT Head No Cont (06.24.21 @ 23:18) >  Evolving acute infarcts involving the right frontal lobe likely in an MCA and NORMAN distribution. No evidence ofhemorrhagic transformation or significant mass effect.    Chronic bilateral basal ganglia as well as left cerebellar lacunar infarcts.    < from: MR Head No Cont (06.25.21 @ 16:58) >  Acute right MCA infarcts.    < from: Xray Chest 1 View- PORTABLE-Routine (Xray Chest 1 View- PORTABLE-Routine in AM.) (06.28.21 @ 06:23) >  No radiographic evidence of acute cardiopulmonary disease.                                              --------------------------------------------------------------    PHYSICAL EXAM:  GENERAL: NAD, lying in bed comfortably  HEAD:  Atraumatic, Normocephalic  EYES:  NECK: Supple,  CHEST/LUNG: Clear to auscultation bilaterally; No rales, rhonchi, wheezing, or rubs. Unlabored respirations  HEART: Regular rate and rhythm; No murmurs, rubs, or gallops  ABDOMEN:  Soft, Nontender, Nondistended.   EXTREMITIES:  2+ Peripheral Pulses, brisk capillary refill. No clubbing, cyanosis, or edema  NERVOUS SYSTEM:  Alert & Oriented X3, speech clear.   MSK: L arm weakness on flexion, extension and                                          VEENA BRANDON 61y Male  MRN#: [id number]   CODE STATUS: full code    Hospital Day: 6d    Pt is currently admitted with the primary diagnosis of : leg weakness     SUBJECTIVE  Hospital Course  60 yo male   PMHx of R NORMAN stroke (April 2020) and R M1 and A1 severe stenosis follows outpatient with Dr. Guajardo, (last angiogram on February 2021), on Plavix   CC: fluctuating left leg weakness resulting in stumbling. 6/23  HPI : History goes back to: 1 month ago when patient started notice episodic left arm weakness.   ROS is negative for: no fever, no chills, no SOB, chest pains or palpitations, no abdominal pain, nausea or vomiting. Patient denies facial droop, difficulty swallowing     In the ED:   VS all WNL  Labs WNL  Troponin: negative  CT head: Acute infarct involving the right frontal lobe in the precentral gyrus.  Chronic infarct of the right periventricular white matter, new from prior exams detailed above.  Additional chronic infarcts of the bilateral basal ganglia and left cerebellum, stable.  CTA head and neck: Severe stenosis and/or nonocclusive thrombus of the M1 segment of the right MCA, with diminished flow of the distal right MCA circulation, new from prior CTA 4/27/2020. 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  CT PERFUSION:, there are 229 cc of ischemic penumbra in the right frontoparietal lobes, partially accounted for by chronic infarcts of the right frontoparietal white matter.  NIHSS in ED=3  stroke code called. Not a candidate for tPA due to unknown last normal LWN    Overnight events   None    Subjective complaints   None, able to walk without issue per patient    Present Today:   - Srivastava:  No [ x ], Yes [   ] : Indication:     - Type of IV Access:       .. CVC/Piccline:  No [ x ], Yes [   ] : Indication:       .. Midline: No [ x ], Yes [   ] : Indication:                                             ----------------------------------------------------------  OBJECTIVE  PAST MEDICAL & SURGICAL HISTORY  HTN (hypertension)    Psoriasis    CVA (cerebral vascular accident)    No significant past surgical history                                              -----------------------------------------------------------  ALLERGIES:  No Known Allergies                                            ------------------------------------------------------------    HOME MEDICATIONS  Home Medications:  amLODIPine 2.5 mg oral tablet: 1 tab(s) orally once a day (18 Jan 2021 09:40)  clopidogrel 75 mg oral tablet: 1 tab(s) orally once a day (18 Jan 2021 09:40)  Humira Pre-Filled Syringe: subcutaneous 2 times a month (18 Jan 2021 09:40)  losartan 50 mg oral tablet: 1 tab(s) orally once a day (18 Jan 2021 09:40)  venlafaxine 37.5 mg oral tablet: 1 tab(s) orally once a day (at bedtime) (18 Jan 2021 09:40)  Vitamin B12:  (18 Jan 2021 09:40)                           MEDICATIONS:  STANDING MEDICATIONS  aspirin 325 milliGRAM(s) Oral daily  chlorhexidine 4% Liquid 1 Application(s) Topical daily  clopidogrel Tablet 75 milliGRAM(s) Oral daily  heparin   Injectable 5000 Unit(s) SubCutaneous every 8 hours  melatonin 5 milliGRAM(s) Oral at bedtime  pantoprazole    Tablet 40 milliGRAM(s) Oral before breakfast  simvastatin 40 milliGRAM(s) Oral at bedtime  sodium chloride 0.9%. 1000 milliLiter(s) IV Continuous <Continuous>  venlafaxine 37.5 milliGRAM(s) Oral at bedtime    PRN MEDICATIONS                                            ------------------------------------------------------------  VITAL SIGNS: Last 24 Hours  T(C): 35.7 (29 Jun 2021 05:30), Max: 36.8 (29 Jun 2021 02:52)  T(F): 96.3 (29 Jun 2021 05:30), Max: 98.2 (29 Jun 2021 02:52)  HR: 68 (29 Jun 2021 05:30) (48 - 68)  BP: 179/86 (29 Jun 2021 05:30) (144/84 - 188/80)  BP(mean): 115 (29 Jun 2021 05:30) (91 - 134)  RR: 18 (29 Jun 2021 05:30) (9 - 35)  SpO2: 98% (29 Jun 2021 05:30) (97% - 100%)      06-28-21 @ 07:01  -  06-29-21 @ 07:00  --------------------------------------------------------  IN: 1230 mL / OUT: 2185 mL / NET: -955 mL                                             --------------------------------------------------------------  LABS:                        13.6   10.61 )-----------( 282      ( 28 Jun 2021 04:33 )             42.1     06-28    139  |  106  |  23<H>  ----------------------------<  82  4.8   |  22  |  1.3    Ca    9.3      28 Jun 2021 04:33  Phos  4.3     06-28  Mg     2.1     06-28    TPro  6.8  /  Alb  4.2  /  TBili  0.4  /  DBili  x   /  AST  33  /  ALT  35  /  AlkPhos  100  06-28    PT/INR - ( 28 Jun 2021 04:33 )   PT: 12.50 sec;   INR: 1.09 ratio         PTT - ( 28 Jun 2021 04:33 )  PTT:21.2 sec                                              -------------------------------------------------------------  RADIOLOGY:  < from: CT Brain Stroke Protocol (06.23.21 @ 18:50) >  Acute infarct involving the right frontal lobe in the precentral gyrus.  Chronic infarct of the right periventricular white matter, new from prior exams detailed above.  Additional chronic infarcts of the bilateral basal ganglia and left cerebellum, stable.  Dr. Reginaldo Elkins discussed preliminary findings with Dr. Kristian Hood on 6/23/2021 at 7:40 PM with readback.    < from: CT Perfusion w/ Maps w/ IV Cont (06.23.21 @ 19:06) >  CT PERFUSION:  UtilizingTmax > 6.0s and CBF < 30%, there are 229 cc of ischemic penumbra in the right MCA territory without core infarct.  CTA HEAD/NECK:  High-grade stenosis versus nonocclusive thrombus is noted of the M1 segment of the right MCA, with diminished distal MCA branch opacification, new from prior CTA 4/27/2020.  Multifocal severe stenoses are noted along the right A2 segment of the NORMAN which are new since the prior examination.  Moderate (approximately 60%) short segment stenosis of the proximal left internal carotid artery due to mixed calcified and noncalcified atherosclerotic plaque which is worsened since the prior CTA of 4/27/2020.  Dr. Reginaldo Elkins discussed preliminary findings with Stephy Ramirez on 6/23/2021 at 8:21 PM with readback.      < from: CT Angio Head w/ IV Cont (06.23.21 @ 19:17) >  CT PERFUSION:  UtilizingTmax > 6.0s and CBF < 30%, there are 229 cc of ischemic penumbra in the right MCA territory without core infarct.  CTA HEAD/NECK:  High-grade stenosis versus nonocclusive thrombus is noted of the M1 segment of the right MCA, with diminished distal MCA branch opacification, new from prior CTA 4/27/2020.  Multifocal severe stenoses are noted along the right A2 segment of the NORMAN which are new since the prior examination.  Moderate (approximately 60%) short segment stenosis of the proximal left internal carotid artery due to mixed calcified and noncalcified atherosclerotic plaque which is worsened since the prior CTA of 4/27/2020.        < from: CT Angio Neck w/ IV Cont (06.23.21 @ 19:20) >  CT PERFUSION:  UtilizingTmax > 6.0s and CBF < 30%, there are 229 cc of ischemic penumbra in the right MCA territory without core infarct.  CTA HEAD/NECK:  High-grade stenosis versus nonocclusive thrombus is noted of the M1 segment of the right MCA, with diminished distal MCA branch opacification, new from prior CTA 4/27/2020.  Multifocal severe stenoses are noted along the right A2 segment of the NORMAN which are new since the prior examination.  Moderate (approximately 60%) short segment stenosis of the proximal left internal carotid artery due to mixed calcified and noncalcified atherosclerotic plaque which is worsened since the prior CTA of 4/27/2020.    < from: Xray Chest 1 View- PORTABLE-Urgent (Xray Chest 1 View- PORTABLE-Urgent .) (06.24.21 @ 12:14) >  No radiographic evidence of acute cardiopulmonary disease.    < from: CT Head No Cont (06.24.21 @ 23:18) >  Evolving acute infarcts involving the right frontal lobe likely in an MCA and NORMAN distribution. No evidence ofhemorrhagic transformation or significant mass effect.    Chronic bilateral basal ganglia as well as left cerebellar lacunar infarcts.    < from: MR Head No Cont (06.25.21 @ 16:58) >  Acute right MCA infarcts.    < from: Xray Chest 1 View- PORTABLE-Routine (Xray Chest 1 View- PORTABLE-Routine in AM.) (06.28.21 @ 06:23) >  No radiographic evidence of acute cardiopulmonary disease.                                              --------------------------------------------------------------    PHYSICAL EXAM:  GENERAL: NAD, lying in bed comfortably  HEAD:  Atraumatic, Normocephalic, Mouth droop L side   EYES: full visual fields   NECK: Supple  CHEST/LUNG: Clear to auscultation bilaterally; No rales, rhonchi, wheezing, or rubs. Unlabored respirations  HEART: Regular rate and rhythm; No murmurs, rubs, or gallops  ABDOMEN:  Soft, Nontender, Nondistended.   EXTREMITIES:  2+ Peripheral Pulses, L upper extremity drift , Sensory deficit L arm   NERVOUS SYSTEM:  Alert & Oriented X3, speech clear. NIHSS 3  MSK: L arm weakness on flexion, extension and

## 2021-06-29 NOTE — DISCHARGE NOTE NURSING/CASE MANAGEMENT/SOCIAL WORK - PATIENT PORTAL LINK FT
You can access the FollowMyHealth Patient Portal offered by [facility] by registering at the following website: [email or web address]. By joining [email or web address]’s FollowMyHealth portal, you will also be able to view your health information using other applications (apps) compatible with our system.

## 2021-06-30 ENCOUNTER — APPOINTMENT (OUTPATIENT)
Dept: NEUROLOGY | Facility: HOSPITAL | Age: 61
End: 2021-06-30
Payer: MEDICAID

## 2021-06-30 PROCEDURE — 61635 INTRACRAN ANGIOPLSTY W/STENT: CPT

## 2021-06-30 PROCEDURE — 76377 3D RENDER W/INTRP POSTPROCES: CPT | Mod: 26

## 2021-06-30 PROCEDURE — 70450 CT HEAD/BRAIN W/O DYE: CPT | Mod: 26

## 2021-06-30 PROCEDURE — 99232 SBSQ HOSP IP/OBS MODERATE 35: CPT

## 2021-06-30 PROCEDURE — 36573 INSJ PICC RS&I 5 YR+: CPT | Mod: LT

## 2021-06-30 PROCEDURE — 70450 CT HEAD/BRAIN W/O DYE: CPT | Mod: 26,77

## 2021-06-30 RX ORDER — ACETAMINOPHEN 500 MG
650 TABLET ORAL ONCE
Refills: 0 | Status: COMPLETED | OUTPATIENT
Start: 2021-06-30 | End: 2021-06-30

## 2021-06-30 RX ORDER — AMLODIPINE BESYLATE 2.5 MG/1
5 TABLET ORAL ONCE
Refills: 0 | Status: COMPLETED | OUTPATIENT
Start: 2021-06-30 | End: 2021-06-30

## 2021-06-30 RX ORDER — NICARDIPINE HYDROCHLORIDE 30 MG/1
5 CAPSULE, EXTENDED RELEASE ORAL
Qty: 40 | Refills: 0 | Status: DISCONTINUED | OUTPATIENT
Start: 2021-06-30 | End: 2021-07-01

## 2021-06-30 RX ADMIN — Medication 37.5 MILLIGRAM(S): at 23:07

## 2021-06-30 RX ADMIN — Medication 650 MILLIGRAM(S): at 22:12

## 2021-06-30 RX ADMIN — Medication 650 MILLIGRAM(S): at 22:42

## 2021-06-30 RX ADMIN — Medication 5 MILLIGRAM(S): at 23:07

## 2021-06-30 RX ADMIN — AMLODIPINE BESYLATE 5 MILLIGRAM(S): 2.5 TABLET ORAL at 04:31

## 2021-06-30 RX ADMIN — SIMVASTATIN 40 MILLIGRAM(S): 20 TABLET, FILM COATED ORAL at 23:06

## 2021-06-30 NOTE — CHART NOTE - NSCHARTNOTEFT_GEN_A_CORE
1. Subarachnoid Hemorrhage  - Patient is status post stenting to right MCA  - I was contacted by radiologist over the phone regarding the repeat CT Head that was performed post operatively that was suggestive of subarachnoid hemorrhage  - Neurocritical (Dr Guajardo's) team was contacted      - Plan  --> Will hold off aspirin and plavix for now  --> Will repeat CT head without contrast 3 hours after the prior CT scan (at 20:00 PM)  --> Monitor BP closely and keep SBP between 120-140 mmHg  --> Will start Nicardipine drip to keep SBP between 120-140 mmHg  --> Will continue neurochecks Q1h at ICU

## 2021-06-30 NOTE — CHART NOTE - NSCHARTNOTEFT_GEN_A_CORE
PACU ANESTHESIA ADMISSION NOTE      Procedure: Diagnostic cerebral angio, recanalization angioplasty and stent   Post op diagnosis:  CVA    ____  Intubated  TV:______       Rate: ______      FiO2: ______    _x___  Patent Airway    _x___  Full return of protective reflexes    __  Full recovery from anesthesia / back to baseline status    Vitals:  T-37  HR: 53  BP: 119/58  RR: 18 (06-30-21 @ 07:44) (18 - 18)  SpO2: 97% (06-30-21 @ 07:44) (97% - 99%)    Mental Status:  _x___ Awake   _____ Alert   _____ Drowsy   _____ Sedated    Nausea/Vomiting:  _x___  NO       ______Yes,   See Post - Op Orders         Pain Scale (0-10):  __0___    Treatment: _x___ None    ____ See Post - Op/PCA Orders    Post - Operative Fluids:   __x__ Oral   ____ See Post - Op Orders    Plan: Discharge:   ___Home       _____Floor     __x___Critical Care    _____  Other:_________________    Comments: Pt brought to ICU Fully monitored, spontaneously breathing hemodynamically stable. Report giving to ICU team.    No anesthesia issues or complications noted.  Discharge when criteria met.

## 2021-06-30 NOTE — CHART NOTE - NSCHARTNOTEFT_GEN_A_CORE
Interventional Neuro Radiology  Pre-Procedure Note PA-C    This is a 61y R hand dominant Male      HPI:  62 yo male with PMHx of R NORMAN stroke (April 2020) and R M1 and A1 severe stenosis follows outpatient with Dr. Guajardo, (last angiogram on February 2021 which showed R M1,A1,LM2 bifurcation severe stenosis. Presented on this admission with worsening LUE weakness over 1 mo, fluctuating LLE weakness with unsteady gait.  Here in neurointervention today for a diagnostic cerebral angiogram +/- angioplasty of stenosed intracranial arteries.   NIHSS 0 pre-procedure, not appreciating LUE drift as per last exam nor L facial.   Patient has been NPO since midnight on IVF 75/hr. COVID negative from recent swab.      Allergies: No Known Allergies      PAST MEDICAL & SURGICAL HISTORY:  HTN (hypertension)  Psoriasis  CVA (cerebral vascular accident)  No significant past surgical history    FAMILY HISTORY:  No pertinent family history in first degree relatives    Current Medications: aspirin 325 milliGRAM(s) Oral daily  chlorhexidine 4% Liquid 1 Application(s) Topical daily  clopidogrel Tablet 75 milliGRAM(s) Oral daily  heparin   Injectable 5000 Unit(s) SubCutaneous every 8 hours  melatonin 3 milliGRAM(s) Oral at bedtime  melatonin 5 milliGRAM(s) Oral at bedtime  pantoprazole    Tablet 40 milliGRAM(s) Oral before breakfast  simvastatin 40 milliGRAM(s) Oral at bedtime  sodium chloride 0.9%. 1000 milliLiter(s) IV Continuous <Continuous>  venlafaxine 37.5 milliGRAM(s) Oral at bedtime    Blood Bank: 06-28-21  --  AB NEG  --    Assessment/Plan:     This is a 61y  year old right  hand dominant Male presents to neuro-IR for diagnostic cerebral angiogram + angioplasty.   Procedure, goals, risks, benefits and alternatives  were discussed with patient and patient's family.  All questions were answered to best understanding.   Risks discussed include but are not limited to stroke, vessel injury, hemorrhage, and or right  groin hematoma.  Patient demonstrates understanding  of all risks involved with this procedure and wishes to continue.     Appropriate consent was obtained from patient and consent is in the patient's chart.  Patient's wife Anny was spoken to as well during consent, with all appropriate questions answered. Her number is in the chart and will be contacted post-procedure for updates.   Patient will be admitted to ICU following procedure for monitoring, NCC aware. Interventional Neuro Radiology  Pre-Procedure Note PA-C    This is a 61y R hand dominant Male      HPI:  62 yo male with PMHx of R NORMAN stroke (April 2020) and R M1 and A1 severe stenosis follows outpatient with Dr. Guajardo, (last angiogram on February 2021 which showed R M1,A1,LM2 bifurcation severe stenosis. Presented on this admission with worsening LUE weakness over 1 mo, fluctuating LLE weakness with unsteady gait.  Here in neurointervention today for a diagnostic cerebral angiogram +/- angioplasty of stenosed intracranial arteries.   NIHSS 0 pre-procedure, not appreciating LUE drift as per last exam nor L facial.   Patient has been NPO since midnight on IVF 75/hr. COVID negative from recent swab.  ARU repeat taken this AM     Allergies: No Known Allergies      PAST MEDICAL & SURGICAL HISTORY:  HTN (hypertension)  Psoriasis  CVA (cerebral vascular accident)  No significant past surgical history    FAMILY HISTORY:  No pertinent family history in first degree relatives    Current Medications: aspirin 325 milliGRAM(s) Oral daily  chlorhexidine 4% Liquid 1 Application(s) Topical daily  clopidogrel Tablet 75 milliGRAM(s) Oral daily  heparin   Injectable 5000 Unit(s) SubCutaneous every 8 hours  melatonin 3 milliGRAM(s) Oral at bedtime  melatonin 5 milliGRAM(s) Oral at bedtime  pantoprazole    Tablet 40 milliGRAM(s) Oral before breakfast  simvastatin 40 milliGRAM(s) Oral at bedtime  sodium chloride 0.9%. 1000 milliLiter(s) IV Continuous <Continuous>  venlafaxine 37.5 milliGRAM(s) Oral at bedtime    Blood Bank: 06-28-21  --  AB NEG  --    Assessment/Plan:     This is a 61y  year old right  hand dominant Male presents to neuro-IR for diagnostic cerebral angiogram + angioplasty.   Procedure, goals, risks, benefits and alternatives  were discussed with patient and patient's family.  All questions were answered to best understanding.   Risks discussed include but are not limited to stroke, vessel injury, hemorrhage, and or right  groin hematoma.  Patient demonstrates understanding  of all risks involved with this procedure and wishes to continue.     Appropriate consent was obtained from patient and consent is in the patient's chart.  Patient's wife Anny was spoken to as well during consent, with all appropriate questions answered. Her number is in the chart and will be contacted post-procedure for updates.   Patient will be admitted to ICU following procedure for monitoring, NCC aware. Interventional Neuro Radiology  Pre-Procedure Note PA-C    This is a 61y R hand dominant Male      HPI:  60 yo male with PMHx of R NORMAN stroke (April 2020) and R M1 and A1 severe stenosis follows outpatient with Dr. Guajardo, (last angiogram on February 2021 which showed R M1,A1,LM2 bifurcation severe stenosis. Presented on this admission with worsening LUE weakness over 1 mo, fluctuating LLE weakness with unsteady gait.  Here in neurointervention today for a diagnostic cerebral angiogram +/- angioplasty of stenosed intracranial arteries.   NIHSS 0 pre-procedure, not appreciating LUE drift as per last exam nor L facial.   Patient has been NPO since midnight on IVF 75/hr. COVID negative from recent swab.  PRU repeat taken this AM resulting 90.    Patinet required midline placement in IR pre-procedure hold to obtain accurate blood draw for PRU.     Allergies: No Known Allergies      PAST MEDICAL & SURGICAL HISTORY:  HTN (hypertension)  Psoriasis  CVA (cerebral vascular accident)  No significant past surgical history    FAMILY HISTORY:  No pertinent family history in first degree relatives    Current Medications: aspirin 325 milliGRAM(s) Oral daily  chlorhexidine 4% Liquid 1 Application(s) Topical daily  clopidogrel Tablet 75 milliGRAM(s) Oral daily  heparin   Injectable 5000 Unit(s) SubCutaneous every 8 hours  melatonin 3 milliGRAM(s) Oral at bedtime  melatonin 5 milliGRAM(s) Oral at bedtime  pantoprazole    Tablet 40 milliGRAM(s) Oral before breakfast  simvastatin 40 milliGRAM(s) Oral at bedtime  sodium chloride 0.9%. 1000 milliLiter(s) IV Continuous <Continuous>  venlafaxine 37.5 milliGRAM(s) Oral at bedtime    Blood Bank: 06-28-21  --  AB NEG  --    Assessment/Plan:     This is a 61y  year old right  hand dominant Male presents to neuro-IR for diagnostic cerebral angiogram + angioplasty.   Procedure, goals, risks, benefits and alternatives  were discussed with patient and patient's family.  All questions were answered to best understanding.   Risks discussed include but are not limited to stroke, vessel injury, hemorrhage, and or right  groin hematoma.  Patient demonstrates understanding  of all risks involved with this procedure and wishes to continue.     Appropriate consent was obtained from patient and consent is in the patient's chart.  Patient's wife Anny was spoken to as well during consent, with all appropriate questions answered. Her number is in the chart and will be contacted post-procedure for updates.   Patient will be admitted to ICU following procedure for monitoring, NCC aware.

## 2021-06-30 NOTE — CHART NOTE - NSCHARTNOTEFT_GEN_A_CORE
Patient completed repeat CTH tonight to r/o hemorrhagic transformation vs contrast staining attributable to stenting procedure. Images reviewed by Dr. Guajardo with the following plan:    1) Continue to hold DAPT (ASA/Plavix) (restart pending tomorrows images)  2) Hold heparin SQ tonight's dose. May resume DVT prophylaxes dose in the AM. Patient already loaded in the procedure room and does not additional heparin dose for the rest of the night  3) Repeat CTH brain w/o tomorrow at 6AM [place] for continuation of assessment for blood vs contrast  4) Continue IVF hydration to flush contrast  5) Continue to keep SBP goal 120-140 w/ Cardene gtt PRN     < from: CT Head No Cont (06.30.21 @ 21:03) >    IMPRESSION:    Since CT head dated 6/30/2021 at 4:54 PM,    No significant interval change in right parietal sulcus and right sylvian fissure extra-axial hyperdensities, likely mixed extravasated contrast with subarachnoid hemorrhage.    Slightly decreased petechial hemorrhages along the right precentral gyrus.    Unchanged evolving infarct in the right frontal lobe.    < end of copied text > Patient completed repeat CTH tonight to r/o hemorrhagic transformation vs contrast staining attributable to stenting procedure. Images reviewed by Dr. Guajardo with the following plan:    1) Continue to hold DAPT (ASA/Plavix) (restart pending tomorrows images)  2) Hold heparin SQ tonight's dose. May resume DVT prophylaxes dose in the AM. Patient already loaded in the procedure room and does not additional heparin dose for the rest of the night  3) Repeat CTH brain w/o tomorrow at 6AM [place] for continuation of assessment for blood vs contrast  4) Continue IVF hydration to flush contrast  5) Continue to keep SBP goal 120-140 w/ Cardene gtt PRN   6) Continue neuro checks Q1 with STAT CTH brain if acute change in neurological status     < from: CT Head No Cont (06.30.21 @ 21:03) >    IMPRESSION:    Since CT head dated 6/30/2021 at 4:54 PM,    No significant interval change in right parietal sulcus and right sylvian fissure extra-axial hyperdensities, likely mixed extravasated contrast with subarachnoid hemorrhage.    Slightly decreased petechial hemorrhages along the right precentral gyrus.    Unchanged evolving infarct in the right frontal lobe.    < end of copied text >

## 2021-06-30 NOTE — BRIEF OPERATIVE NOTE - OPERATION/FINDINGS
Patient is a 62 yo M s/p cerebral angiogram + angioplasty with recanalization of R MCA and R NORMAN vessels by neuroendovascular team today 6/30/21. Stents were placed in R MCA and R NORMAN. Patient received 7000u Heparin during the procedure as well as an Integrilin bolus.   NIHSS 2 - LUE drift and mild L facial, consistent with exams during hospital course - exam done at bedside by NCC with team present.   R groin dressing is CDI with quick clot and tegaderm without signs of hematoma or bleeding at the site.   Keep patient in reverse Trendelenburg for 24 hours.   IVF 100ml/hr.   BP parameters 120-140.   CTH performed immediately post-procedure with preliminary read of possible subarachnoid hemorrhage which was read to [id number]. Plan for now to hold current orders of ASA/plavix. Repeat CTH noncon 3 hours post-initial CTH (8pm tonight). Following rpt CTH NCC ACP knows to contact Dr. Guajardo directly with results.   Please follow post NI orders for a minimum of 24 hr with close ICU monitoring.   Management per NCC and ICU.     [id number]   Anny, wife, was spoken to post-procedure with update.

## 2021-06-30 NOTE — PROGRESS NOTE ADULT - ATTENDING COMMENTS
61 year old gentleman immediate post endovascular procedure (angioplasty and stenting of symptomatic R MCA and NORMAN stenosis). Neurologically stable, unchanged. CT head showed small hemorrhagic conversion of R MCA recent infarction.  ICU monitoring for BP goals, groin checks and frequent neurochecks. Resumption of dual antiplatelets if subsequent CT negative for hematoma expansion.

## 2021-06-30 NOTE — BRIEF OPERATIVE NOTE - COMMENTS
Patient is to remain in ICU for at least 24 hours post procedure for monitoring.   Please follow recs above.     Pending:   jing CT 8pm tonight.     NCC/ICU aware of plan.

## 2021-06-30 NOTE — PROGRESS NOTE ADULT - SUBJECTIVE AND OBJECTIVE BOX
Pt. seen and examined post-angioplasty by Dr. Guajardo. Case discussed w/ NI __ chart reviewed/ no significant events reported overnight.     Vitals:   T(F): 98.2 (06-30-21 @ 05:41), Max: 98.2 (06-30-21 @ 05:41)  HR: 60 (06-30-21 @ 07:44) (53 - 67)  BP: 163/88 (06-30-21 @ 07:44) (159/83 - 188/92)  RR: 18 (06-30-21 @ 07:44) (18 - 18)  SpO2: 97% (06-30-21 @ 07:44) (97% - 99%)    24hr I/O's:   29 Jun 2021 07:01  -  30 Jun 2021 07:00  --------------------------------------------------------  IN:  Total IN: 0 mL    OUT:    Voided (mL): 600 mL  Total OUT: 600 mL    Total NET: -600 mL  - - - - - - - - - - - - - - - - - - - - - -    Labs: Reviewed __none for 6/3021    Neuroimaging:   < from: CT Angio Head w/ IV Cont (06.23.21 @ 19:17) >  IMPRESSION:    CT PERFUSION:  UtilizingTmax > 6.0s and CBF < 30%, there are 229 cc of ischemic penumbra in the right MCA territory without core infarct.    CTA HEAD/NECK:  High-grade stenosis versus nonocclusive thrombus is noted of the M1 segment of the right MCA, with diminished distal MCA branch opacification, new from prior CTA 4/27/2020.    Multifocal severe stenoses are noted along the right A2 segment of the NORMAN which are new since the prior examination.    Moderate (approximately 60%) short segment stenosis of the proximal left internal carotid artery due to mixed calcified and noncalcified atherosclerotic plaque which is worsened since the prior CTA of 4/27/2020.      Dr. Reginaldo Elkins discussed preliminary findings with Stephy Ramirez on 6/23/2021 at 8:21 PM with readback.    < end of copied text >  - - - - - - - - - - - - - - - - - - - - - - - - - -    Current Medications:  aspirin 325 milliGRAM(s) Oral daily  chlorhexidine 4% Liquid 1 Application(s) Topical daily  clopidogrel Tablet 75 milliGRAM(s) Oral daily  heparin   Injectable 5000 Unit(s) SubCutaneous every 8 hours  melatonin 3 milliGRAM(s) Oral at bedtime  melatonin 5 milliGRAM(s) Oral at bedtime  pantoprazole    Tablet 40 milliGRAM(s) Oral before breakfast  simvastatin 40 milliGRAM(s) Oral at bedtime  sodium chloride 0.9%. 1000 milliLiter(s) IV Continuous <Continuous>  venlafaxine 37.5 milliGRAM(s) Oral at bedtime  ________________________________________________________________      Neuro Examination:   Pt. awake, alert, attends to examiner, keenly/ appropriately responsive to questioning  Oriented to self/ surroundings/ time/ age   PERRL, EOMI, no visual field deficits noted  Face symmetric, normal speech fluidity/ enunciation  Full active ROM x4 extrm anti-grav to command w/ no drifts noted  No ataxia or dysmetria noted __ reflexes brisk/ symmetric    [ ] NIHSS:   - - - - - - - - - - - - - - - - - - - - - - - - - -    Impression:   62y/o M w/ known Rt. M1/ A1 severe stenosis, admitted 6/23 for worsening Lf. hemiparesis sec. to acute Rt. MCA territorial infarct on MRI/ CTH sec. to severe R1 stenosis on CTA. Pt. now POD # 0 s/p DSA w/ Rt. M1 angioplasty/ stenting by Dr. Guajardo earlier today.     Recommendations:   [ ] Cont. DAPT [ASA + Plavis] + statin [Lipitor 40mg qHS]   [ ] Cont. q1h neuro checks                                                 NIHSS:    Neuro Exam:    Neurological:  Mental Status: Alert and Oriented to person, place, and time, cooperative, pleasant. Affect appropriate, thought, speech, and language coherent. Short- and long-term memory, abstract thinking and calculation intact.  Cranial Nerves:  I: intact  II, III, IV, VI: PERRLA, EOM intact. Serrato intact by confrontation. No cranial nerve palsy or nystagmus noted.  V: facial sensation intact; masseter/ temporal muscles intact, corneal reflex intact bilaterally  VII: face symmetrical at rest and with expression  VIII: intact to finger rub in the right ear and left ear  IX and X: no hoarseness, gag intact, palate/ uvula rise symmetrically  XI: SCM/ trapezius strength intact bilateral  XII: no dysarthria, tongue weakness/fasiculation   Motor: Normal muscle bulk/tone. Good posture. Strength 5+/5+ upper & lower extremities  Sensory: Sensation to light touch, pain, vibration, proprioception, and stereognosis intact to trunk and bilaterally to both upper and lower extremities. Normal two point discrimination.   Cerebellar Function: Normal gait including tandem, heel and toe walking. Romberg and pronator drift negative. Normal rapid alternating movements and point to point test.  Reflexes: No clonus      EVD ICP:             Level(cm):          24hr(ml):                             CSF:   W:      R:     C:    P:     G:     LA:      Last CTH:    Last CTA/MRA:    Last CTP:    Last MRI:    Last EEG:    Last Echo:        ABG:            Prophalaxis:     GI:   DVT:     Pt. seen and examined this afternoon, post-angioplasty by Dr. Guajardo. Case discussed w/ NI and medical team __ intra-op report reviewed/ no untoward events or complications noted.     Vitals:   T(F): 98.2 (21 @ 05:41), Max: 98.2 (21 @ 05:41)  HR: 60 (21 @ 07:44) (53 - 67)  BP: 163/88 (21 @ 07:44) (159/83 - 188/92)  RR: 18 (21 @ 07:44) (18 - 18)  SpO2: 97% (21 @ 07:44) (97% - 99%)    24hr I/O's:   2021 07:01  -  2021 07:00  --------------------------------------------------------  IN:  Total IN: 0 mL    OUT:    Voided (mL): 600 mL  Total OUT: 600 mL    Total NET: -600 mL  - - - - - - - - - - - - - - - - - - - - - -    Labs: Reviewed __none for 3021    Neuroimagin/30 post-NI CTH (prelim): Rt. parietal hyperdensity extending into Rt. sylvian fissure concerning for hemorrhagic transformation  - - - - - - - - - - - - - -     < from: CT Angio Head w/ IV Cont (21 @ 19:17) >  IMPRESSION:    CT PERFUSION:  UtilizingTmax > 6.0s and CBF < 30%, there are 229 cc of ischemic penumbra in the right MCA territory without core infarct.    CTA HEAD/NECK:  High-grade stenosis versus nonocclusive thrombus is noted of the M1 segment of the right MCA, with diminished distal MCA branch opacification, new from prior CTA 2020.    Multifocal severe stenoses are noted along the right A2 segment of the NORMAN which are new since the prior examination.    Moderate (approximately 60%) short segment stenosis of the proximal left internal carotid artery due to mixed calcified and noncalcified atherosclerotic plaque which is worsened since the prior CTA of 2020.      Dr. Reginaldo Elkins discussed preliminary findings with Stephy Ramirez on 2021 at 8:21 PM with readback.    < end of copied text >  - - - - - - - - - - - - - - - - - - - - - - - - - -    Current Medications:  aspirin 325 milliGRAM(s) Oral daily  chlorhexidine 4% Liquid 1 Application(s) Topical daily  clopidogrel Tablet 75 milliGRAM(s) Oral daily  heparin   Injectable 5000 Unit(s) SubCutaneous every 8 hours  melatonin 3 milliGRAM(s) Oral at bedtime  melatonin 5 milliGRAM(s) Oral at bedtime  pantoprazole    Tablet 40 milliGRAM(s) Oral before breakfast  simvastatin 40 milliGRAM(s) Oral at bedtime  sodium chloride 0.9%. 1000 milliLiter(s) IV Continuous <Continuous>  venlafaxine 37.5 milliGRAM(s) Oral at bedtime  ________________________________________________________________      Neuro Examination:   Pt. awake, alert, attends to examiner, keenly/ appropriately responsive to questioning  Oriented to self/ surroundings/ time/ age   PERRL, EOMI, no visual field deficits noted  Flattened Lf. nasolabial fold, normal speech fluidity/ enunciation  Pt. moves BUE/ LLE anti-grav to command w/ slight LUE pronator drift noted   RLE extended/ immobilized post-NI __ no active bleeding or palpable hematoma at Rt. fem puncture site/ distal pulses palpable  Mild LUE dysmetria noted/ no apparent ataxia noted __ reflexes brisk/ symmetric    [ ] NIHSS: 2 [mild Lf. facial palsy; LUE pronato  - - - - - - - - - - - - - - - - - - - - - - - - - -    Impression:   60y/o M w/ known Rt. M1/ A1 severe stenosis, admitted  for worsening Lf. hemiparesis sec. to acute Rt. MCA territorial infarct on MRI/ CTH sec. to severe R1 stenosis on CTA. Pt. now POD # 0 s/p DSA w/ angioplasty + stenting of severely stenosed Rt. M1/ A1, by Dr. Guajardo earlier today. Post-op CTH revealed likely hemorrhagic transformation in the Rt. parietal lobe extending into the Rt. sylvian fissure, unlikely attributable to contrast staining.      Recommendations:   [ ] Hold DAPT  [ASA + Plavis] pending rpt. CTH at 8pm __ 3hr from post-op CTH to eval for worsening hemorrhage  [ ] Cont. post-angio neurovascular checks per protocol __ then cont. q1h neuro checks  [ ] Resume high-dose statin therapy  [ ] Obtain stat CTH if pt. has any interval deterioration in neurological status  [ ] Keep RLE fully extended for 6hr post-NI  [ ] Maintain  - 140mmHg w/ Cardene gtt as warranted    [ ] Nursing dysphagia evaluation prior to the resumption of diet  [ ] Maintain SpO2 > 92% w/ supplemental O2 as warranted  [ ] Maintain euglycemia 120 - 180mg/dL  [ ] Hold HSQ pending rpt. CTH __ SCDs for now                                                NIHSS:    Neuro Exam:    Neurological:  Mental Status: Alert and Oriented to person, place, and time, cooperative, pleasant. Affect appropriate, thought, speech, and language coherent. Short- and long-term memory, abstract thinking and calculation intact.  Cranial Nerves:  I: intact  II, III, IV, VI: PERRLA, EOM intact. Serrato intact by confrontation. No cranial nerve palsy or nystagmus noted.  V: facial sensation intact; masseter/ temporal muscles intact, corneal reflex intact bilaterally  VII: face symmetrical at rest and with expression  VIII: intact to finger rub in the right ear and left ear  IX and X: no hoarseness, gag intact, palate/ uvula rise symmetrically  XI: SCM/ trapezius strength intact bilateral  XII: no dysarthria, tongue weakness/fasiculation   Motor: Normal muscle bulk/tone. Good posture. Strength 5+/5+ upper & lower extremities  Sensory: Sensation to light touch, pain, vibration, proprioception, and stereognosis intact to trunk and bilaterally to both upper and lower extremities. Normal two point discrimination.   Cerebellar Function: Normal gait including tandem, heel and toe walking. Romberg and pronator drift negative. Normal rapid alternating movements and point to point test.  Reflexes: No clonus      EVD ICP:             Level(cm):          24hr(ml):                             CSF:   W:      R:     C:    P:     G:     LA:      Last CTH:    Last CTA/MRA:    Last CTP:    Last MRI:    Last EEG:    Last Echo:        ABG:            Prophalaxis:     GI:   DVT:

## 2021-07-01 LAB
ANION GAP SERPL CALC-SCNC: 10 MMOL/L — SIGNIFICANT CHANGE UP (ref 7–14)
BASOPHILS # BLD AUTO: 0.06 K/UL — SIGNIFICANT CHANGE UP (ref 0–0.2)
BASOPHILS NFR BLD AUTO: 0.5 % — SIGNIFICANT CHANGE UP (ref 0–1)
BUN SERPL-MCNC: 17 MG/DL — SIGNIFICANT CHANGE UP (ref 10–20)
CALCIUM SERPL-MCNC: 7.7 MG/DL — LOW (ref 8.5–10.1)
CHLORIDE SERPL-SCNC: 110 MMOL/L — SIGNIFICANT CHANGE UP (ref 98–110)
CO2 SERPL-SCNC: 21 MMOL/L — SIGNIFICANT CHANGE UP (ref 17–32)
CREAT SERPL-MCNC: 1.1 MG/DL — SIGNIFICANT CHANGE UP (ref 0.7–1.5)
EOSINOPHIL # BLD AUTO: 0.03 K/UL — SIGNIFICANT CHANGE UP (ref 0–0.7)
EOSINOPHIL NFR BLD AUTO: 0.2 % — SIGNIFICANT CHANGE UP (ref 0–8)
GLUCOSE SERPL-MCNC: 99 MG/DL — SIGNIFICANT CHANGE UP (ref 70–99)
HCT VFR BLD CALC: 34.3 % — LOW (ref 42–52)
HGB BLD-MCNC: 11 G/DL — LOW (ref 14–18)
IMM GRANULOCYTES NFR BLD AUTO: 0.4 % — HIGH (ref 0.1–0.3)
LYMPHOCYTES # BLD AUTO: 2.61 K/UL — SIGNIFICANT CHANGE UP (ref 1.2–3.4)
LYMPHOCYTES # BLD AUTO: 20.8 % — SIGNIFICANT CHANGE UP (ref 20.5–51.1)
MCHC RBC-ENTMCNC: 27.8 PG — SIGNIFICANT CHANGE UP (ref 27–31)
MCHC RBC-ENTMCNC: 32.1 G/DL — SIGNIFICANT CHANGE UP (ref 32–37)
MCV RBC AUTO: 86.6 FL — SIGNIFICANT CHANGE UP (ref 80–94)
MONOCYTES # BLD AUTO: 0.81 K/UL — HIGH (ref 0.1–0.6)
MONOCYTES NFR BLD AUTO: 6.4 % — SIGNIFICANT CHANGE UP (ref 1.7–9.3)
NEUTROPHILS # BLD AUTO: 9.01 K/UL — HIGH (ref 1.4–6.5)
NEUTROPHILS NFR BLD AUTO: 71.7 % — SIGNIFICANT CHANGE UP (ref 42.2–75.2)
NRBC # BLD: 0 /100 WBCS — SIGNIFICANT CHANGE UP (ref 0–0)
PLATELET # BLD AUTO: 271 K/UL — SIGNIFICANT CHANGE UP (ref 130–400)
POTASSIUM SERPL-MCNC: 4.1 MMOL/L — SIGNIFICANT CHANGE UP (ref 3.5–5)
POTASSIUM SERPL-SCNC: 4.1 MMOL/L — SIGNIFICANT CHANGE UP (ref 3.5–5)
RBC # BLD: 3.96 M/UL — LOW (ref 4.7–6.1)
RBC # FLD: 13.5 % — SIGNIFICANT CHANGE UP (ref 11.5–14.5)
SODIUM SERPL-SCNC: 141 MMOL/L — SIGNIFICANT CHANGE UP (ref 135–146)
WBC # BLD: 12.57 K/UL — HIGH (ref 4.8–10.8)
WBC # FLD AUTO: 12.57 K/UL — HIGH (ref 4.8–10.8)

## 2021-07-01 PROCEDURE — 70450 CT HEAD/BRAIN W/O DYE: CPT | Mod: 26

## 2021-07-01 PROCEDURE — 99232 SBSQ HOSP IP/OBS MODERATE 35: CPT

## 2021-07-01 PROCEDURE — 99233 SBSQ HOSP IP/OBS HIGH 50: CPT

## 2021-07-01 RX ORDER — ASPIRIN/CALCIUM CARB/MAGNESIUM 324 MG
325 TABLET ORAL ONCE
Refills: 0 | Status: COMPLETED | OUTPATIENT
Start: 2021-07-01 | End: 2021-07-01

## 2021-07-01 RX ORDER — NOREPINEPHRINE BITARTRATE/D5W 8 MG/250ML
0.05 PLASTIC BAG, INJECTION (ML) INTRAVENOUS
Qty: 8 | Refills: 0 | Status: DISCONTINUED | OUTPATIENT
Start: 2021-07-01 | End: 2021-07-01

## 2021-07-01 RX ORDER — SODIUM CHLORIDE 9 MG/ML
1000 INJECTION INTRAMUSCULAR; INTRAVENOUS; SUBCUTANEOUS
Refills: 0 | Status: DISCONTINUED | OUTPATIENT
Start: 2021-07-01 | End: 2021-07-05

## 2021-07-01 RX ORDER — SODIUM CHLORIDE 9 MG/ML
500 INJECTION INTRAMUSCULAR; INTRAVENOUS; SUBCUTANEOUS ONCE
Refills: 0 | Status: COMPLETED | OUTPATIENT
Start: 2021-07-01 | End: 2021-07-01

## 2021-07-01 RX ORDER — CLOPIDOGREL BISULFATE 75 MG/1
75 TABLET, FILM COATED ORAL ONCE
Refills: 0 | Status: COMPLETED | OUTPATIENT
Start: 2021-07-01 | End: 2021-07-01

## 2021-07-01 RX ORDER — SODIUM CHLORIDE 9 MG/ML
1000 INJECTION INTRAMUSCULAR; INTRAVENOUS; SUBCUTANEOUS
Refills: 0 | Status: DISCONTINUED | OUTPATIENT
Start: 2021-07-01 | End: 2021-07-01

## 2021-07-01 RX ADMIN — SODIUM CHLORIDE 75 MILLILITER(S): 9 INJECTION INTRAMUSCULAR; INTRAVENOUS; SUBCUTANEOUS at 01:01

## 2021-07-01 RX ADMIN — SODIUM CHLORIDE 1000 MILLILITER(S): 9 INJECTION INTRAMUSCULAR; INTRAVENOUS; SUBCUTANEOUS at 10:10

## 2021-07-01 RX ADMIN — PANTOPRAZOLE SODIUM 40 MILLIGRAM(S): 20 TABLET, DELAYED RELEASE ORAL at 05:18

## 2021-07-01 RX ADMIN — CHLORHEXIDINE GLUCONATE 1 APPLICATION(S): 213 SOLUTION TOPICAL at 05:07

## 2021-07-01 RX ADMIN — HEPARIN SODIUM 5000 UNIT(S): 5000 INJECTION INTRAVENOUS; SUBCUTANEOUS at 15:24

## 2021-07-01 RX ADMIN — SODIUM CHLORIDE 500 MILLILITER(S): 9 INJECTION INTRAMUSCULAR; INTRAVENOUS; SUBCUTANEOUS at 03:31

## 2021-07-01 RX ADMIN — Medication 5 MILLIGRAM(S): at 21:04

## 2021-07-01 RX ADMIN — HEPARIN SODIUM 5000 UNIT(S): 5000 INJECTION INTRAVENOUS; SUBCUTANEOUS at 21:04

## 2021-07-01 RX ADMIN — Medication 325 MILLIGRAM(S): at 07:55

## 2021-07-01 RX ADMIN — SODIUM CHLORIDE 500 MILLILITER(S): 9 INJECTION INTRAMUSCULAR; INTRAVENOUS; SUBCUTANEOUS at 01:01

## 2021-07-01 RX ADMIN — SIMVASTATIN 40 MILLIGRAM(S): 20 TABLET, FILM COATED ORAL at 21:04

## 2021-07-01 RX ADMIN — Medication 37.5 MILLIGRAM(S): at 21:06

## 2021-07-01 RX ADMIN — CLOPIDOGREL BISULFATE 75 MILLIGRAM(S): 75 TABLET, FILM COATED ORAL at 07:55

## 2021-07-01 RX ADMIN — HEPARIN SODIUM 5000 UNIT(S): 5000 INJECTION INTRAVENOUS; SUBCUTANEOUS at 05:18

## 2021-07-01 NOTE — PROGRESS NOTE ADULT - ATTENDING COMMENTS
events noted, sp cerebral angio sp stent R NORMAN/ MCA, repeat CT head reviewed, DAPT, dc pressors, IVF, stroke unit if cleared by Neuro

## 2021-07-01 NOTE — PROVIDER CONTACT NOTE (OTHER) - BACKGROUND
s/p rt jaquan & mca. pt s/p cardene and levo drip. pt to be downgraded to 3E, recommended to stay off of pressure medications as per MD Baird. s/p rt jaquan & mca. pt s/p cardene and levo drip. pt to be downgraded to 3E, recommended to be off pressors if possible as mentioned in rounds.

## 2021-07-01 NOTE — PROGRESS NOTE ADULT - SUBJECTIVE AND OBJECTIVE BOX
Over Night Events: s/p Angiogram and 2 stents placement by neuro IR. On NS @75; on levophed 0.04; s/p repeat CTH this am       ROS:  See HPI    PHYSICAL EXAM    ICU Vital Signs Last 24 Hrs  T(C): 36.5 (01 Jul 2021 08:00), Max: 36.5 (01 Jul 2021 08:00)  T(F): 97.7 (01 Jul 2021 08:00), Max: 97.7 (01 Jul 2021 08:00)  HR: 68 (01 Jul 2021 09:00) (54 - 78)  BP: 116/57 (01 Jul 2021 01:15) (116/57 - 116/57)  BP(mean): 74 (01 Jul 2021 01:15) (74 - 74)  ABP: 132/52 (01 Jul 2021 09:00) (110/52 - 146/64)  ABP(mean): 78 (01 Jul 2021 09:00) (70 - 90)  RR: 18 (01 Jul 2021 09:00) (6 - 30)  SpO2: 98% (01 Jul 2021 09:00) (94% - 99%)      General: NAD  HEENT: ASIA             Lungs: Bilateral BS  Cardiovascular: Regular   Abdomen: Soft, Positive BS  Extremities: No clubbing   Skin: Warm  Neurological: Left arm weakness and left facial droop      06-30-21 @ 07:01  -  07-01-21 @ 07:00  --------------------------------------------------------  IN:    NiCARdipine: 130 mL    Norepinephrine: 18.3 mL    sodium chloride 0.9%: 1125 mL    Sodium Chloride 0.9% Bolus: 1000 mL  Total IN: 2273.3 mL    OUT:    Indwelling Catheter - Urethral (mL): 1085 mL  Total OUT: 1085 mL    Total NET: 1188.3 mL      07-01-21 @ 07:01  -  07-01-21 @ 10:01  --------------------------------------------------------  IN:    Norepinephrine: 16.8 mL    Oral Fluid: 250 mL    sodium chloride 0.9%: 225 mL  Total IN: 491.8 mL    OUT:    Indwelling Catheter - Urethral (mL): 100 mL  Total OUT: 100 mL    Total NET: 391.8 mL          LABS:                          11.0   12.57 )-----------( 271      ( 01 Jul 2021 04:40 )             34.3                                               07-01    141  |  110  |  17  ----------------------------<  99  4.1   |  21  |  1.1    Ca    7.7<L>      01 Jul 2021 04:40                                                                                                                                                                                                                           MEDICATIONS  (STANDING):  aspirin 325 milliGRAM(s) Oral daily  chlorhexidine 4% Liquid 1 Application(s) Topical daily  clopidogrel Tablet 75 milliGRAM(s) Oral daily  heparin   Injectable 5000 Unit(s) SubCutaneous every 8 hours  melatonin 3 milliGRAM(s) Oral at bedtime  melatonin 5 milliGRAM(s) Oral at bedtime  niCARdipine Infusion 5 mG/Hr (25 mL/Hr) IV Continuous <Continuous>  norepinephrine Infusion 0.05 MICROgram(s)/kG/Min (7.05 mL/Hr) IV Continuous <Continuous>  pantoprazole    Tablet 40 milliGRAM(s) Oral before breakfast  simvastatin 40 milliGRAM(s) Oral at bedtime  sodium chloride 0.9%. 1000 milliLiter(s) (75 mL/Hr) IV Continuous <Continuous>  venlafaxine 37.5 milliGRAM(s) Oral at bedtime    MEDICATIONS  (PRN):                                                                                          ECHO < from: TTE Echo Complete w/o Contrast w/ Doppler (06.24.21 @ 12:27) >     1. LV Ejection Fraction by Poe's Method with a biplane EF of 58 %.   2. Mildly increased LV wall thickness.   3. Spectral Doppler shows impaired relaxation pattern of left ventricular myocardial filling (Grade I diastolic dysfunction).   4. Normal left atrial size.   5. Normal right atrial size.   6. Mildmitral valve regurgitation.   7. Mild thickening of the anterior and posterior mitral valve leaflets.   8. Mild tricuspid regurgitation.   9. Aortic valve is bicuspid.  10. Mild to moderate aortic regurgitation.  11. Mild dilatation of the aortic root.    < end of copied text >       Over Night Events: s/p Angiogram and 2 stents placement / R NORMAN/ MCA, by neuro IR. On NS @75; on levophed 0.04; s/p repeat CTH this am       ROS:  See HPI    PHYSICAL EXAM    ICU Vital Signs Last 24 Hrs  T(C): 36.5 (01 Jul 2021 08:00), Max: 36.5 (01 Jul 2021 08:00)  T(F): 97.7 (01 Jul 2021 08:00), Max: 97.7 (01 Jul 2021 08:00)  HR: 68 (01 Jul 2021 09:00) (54 - 78)  BP: 116/57 (01 Jul 2021 01:15) (116/57 - 116/57)  BP(mean): 74 (01 Jul 2021 01:15) (74 - 74)  ABP: 132/52 (01 Jul 2021 09:00) (110/52 - 146/64)  ABP(mean): 78 (01 Jul 2021 09:00) (70 - 90)  RR: 18 (01 Jul 2021 09:00) (6 - 30)  SpO2: 98% (01 Jul 2021 09:00) (94% - 99%)      General: NAD  HEENT: ASIA             Lungs: Bilateral BS  Cardiovascular: Regular   Abdomen: Soft, Positive BS  Extremities: No clubbing   Skin: Warm  Neurological: Left arm weakness and left facial droop      06-30-21 @ 07:01  -  07-01-21 @ 07:00  --------------------------------------------------------  IN:    NiCARdipine: 130 mL    Norepinephrine: 18.3 mL    sodium chloride 0.9%: 1125 mL    Sodium Chloride 0.9% Bolus: 1000 mL  Total IN: 2273.3 mL    OUT:    Indwelling Catheter - Urethral (mL): 1085 mL  Total OUT: 1085 mL    Total NET: 1188.3 mL      07-01-21 @ 07:01  -  07-01-21 @ 10:01  --------------------------------------------------------  IN:    Norepinephrine: 16.8 mL    Oral Fluid: 250 mL    sodium chloride 0.9%: 225 mL  Total IN: 491.8 mL    OUT:    Indwelling Catheter - Urethral (mL): 100 mL  Total OUT: 100 mL    Total NET: 391.8 mL          LABS:                          11.0   12.57 )-----------( 271      ( 01 Jul 2021 04:40 )             34.3                                               07-01    141  |  110  |  17  ----------------------------<  99  4.1   |  21  |  1.1    Ca    7.7<L>      01 Jul 2021 04:40                                                                                                                                                                                                                           MEDICATIONS  (STANDING):  aspirin 325 milliGRAM(s) Oral daily  chlorhexidine 4% Liquid 1 Application(s) Topical daily  clopidogrel Tablet 75 milliGRAM(s) Oral daily  heparin   Injectable 5000 Unit(s) SubCutaneous every 8 hours  melatonin 3 milliGRAM(s) Oral at bedtime  melatonin 5 milliGRAM(s) Oral at bedtime  niCARdipine Infusion 5 mG/Hr (25 mL/Hr) IV Continuous <Continuous>  norepinephrine Infusion 0.05 MICROgram(s)/kG/Min (7.05 mL/Hr) IV Continuous <Continuous>  pantoprazole    Tablet 40 milliGRAM(s) Oral before breakfast  simvastatin 40 milliGRAM(s) Oral at bedtime  sodium chloride 0.9%. 1000 milliLiter(s) (75 mL/Hr) IV Continuous <Continuous>  venlafaxine 37.5 milliGRAM(s) Oral at bedtime    MEDICATIONS  (PRN):                                                                                          ECHO < from: TTE Echo Complete w/o Contrast w/ Doppler (06.24.21 @ 12:27) >     1. LV Ejection Fraction by Poe's Method with a biplane EF of 58 %.   2. Mildly increased LV wall thickness.   3. Spectral Doppler shows impaired relaxation pattern of left ventricular myocardial filling (Grade I diastolic dysfunction).   4. Normal left atrial size.   5. Normal right atrial size.   6. Mildmitral valve regurgitation.   7. Mild thickening of the anterior and posterior mitral valve leaflets.   8. Mild tricuspid regurgitation.   9. Aortic valve is bicuspid.  10. Mild to moderate aortic regurgitation.  11. Mild dilatation of the aortic root.    < end of copied text >

## 2021-07-01 NOTE — PROGRESS NOTE ADULT - ASSESSMENT
# Evolving Acute Rt. Frontal lobe Infarct in the precentral gyrus s/p recanalization of R MCA and R NORMAN vessels by REINALDO (06/30)  - CTA H&N(6/23): New severe stenoses along Rt. NORMAN & Rt. MCA; Worsening chonic moderate stenosis along Lf. NORMAN  - CT Perfusion (6/23): Ischemic penumbra in Rt. MCA territory without core infarct.   - Repeat CTH on 6/24 -> Evolving acute infarct;   - MRI 6/25: Acute right MCA infarcts.  - Neurology & NeuroIR on board, MRI completed  - LDL(6/24): 79 / TTE(6/24): EF 58%.  - Continue Clopidogrel, ASA, Effexor   - PT/OT, physiatry c/s  - OOB to chair, ambulate as tolerated  - Stat CTH if any worsening or deterioration in neurological examination and call NCC/Neurology    # Grade I Diastolic HFpEF   - Compensated; EF 58%    # hx of Depression/Anxiety   - c/w Venlafaxine 37.5mg Daily    # GERD   - c/w Protonix 40mg Daily    # DLD   - Simvastatin 40 mg PO [id number]     Diet: Regular  Activity: OOB to chair  DVT ppx: Heparin 5000 units subq every 8 hours  GI ppx: PPI  FULL CODE  Dispo: MICU -> stroke unit     # Evolving Acute Rt. Frontal lobe Infarct in the precentral gyrus s/p recanalization of R MCA and R NORMAN vessels by REINALDO (06/30)  - CTA H&N(6/23): New severe stenoses along Rt. NORMAN & Rt. MCA; Worsening chonic moderate stenosis along Lf. NORMAN  - CT Perfusion (6/23): Ischemic penumbra in Rt. MCA territory without core infarct.   - Repeat CTH on 6/24 -> Evolving acute infarct;   - MRI 6/25: Acute right MCA infarcts.  - Neurology & NeuroIR on board, MRI completed  - LDL(6/24): 79 / TTE(6/24): EF 58%.  - Continue Clopidogrel, ASA  - PT/OT, physiatry c/s  - OOB to chair, ambulate as tolerated  - Stat CTH if any worsening or deterioration in neurological examination and call NCC/Neurology    # Grade I Diastolic HFpEF   - Compensated; EF 58%    # hx of Depression/Anxiety   - c/w Venlafaxine 37.5mg Daily    # GERD   - c/w Protonix 40mg Daily    # DLD   - Simvastatin 40 mg PO [id number]    Diet: Regular  Activity: OOB to chair  DVT ppx: Heparin 5000 units subq every 8 hours  GI ppx: PPI  FULL CODE  Dispo: MICU -> stroke unit

## 2021-07-01 NOTE — PROGRESS NOTE ADULT - ASSESSMENT
IMPRESSION:    CVA/ R MCA infarct   HO NORMAN stenosis / MCA stenosis s/p angiogram and stenting by Neuro IR   SAH     PLAN:     CNS:  FU with Neuro IR.  Neurology follow up    HEENT: Oral care     PULMONARY:  HOB @ 45 degrees.  Aspiration precautions     CARDIOVASCULAR:  Avoid volume overload , BP control. Bolus IVF and c/w NS @100.     GI: GI prophylaxis.  Feeding as tolerated.  Bowel regimen       RENAL:  Follow up lytes.  Correct as needed    INFECTIOUS DISEASE: Follow up cultures    HEMATOLOGICAL:  DVT prophylaxis.    ENDOCRINE:  Follow up FS.     MUSCULOSKELETAL:  OOB to chair .      D/C A-line   D/C jackson        Downgrade to stroke unit  D/W Dr. Guajardo         IMPRESSION:    CVA/ R MCA infarct   HO NORMAN stenosis / MCA stenosis s/p angiogram and stenting by Neuro IR       PLAN:     CNS:  FU with Neuro IR.  Neurology follow up, DAPT    HEENT: Oral care     PULMONARY:  HOB @ 45 degrees.  Aspiration precautions     CARDIOVASCULAR:  Avoid volume overload , BP control. Bolus IVF and c/w NS, Taper pressors    GI: GI prophylaxis.  Feeding as tolerated.  Bowel regimen       RENAL:  Follow up lytes.  Correct as needed    INFECTIOUS DISEASE: Follow up cultures    HEMATOLOGICAL:  DVT prophylaxis.    ENDOCRINE:  Follow up FS.     D/C A-line   D/C jackson        Downgrade to stroke unit when cleared by neuro  D/W Dr. Guajardo

## 2021-07-01 NOTE — PROGRESS NOTE ADULT - ATTENDING COMMENTS
62 yo patient with h/o psoriasis and multiple bihemispheric ischemic strokes who previously had an ischemic 60 yo patient with h/o psoriasis and multiple bihemispheric ischemic strokes who previously had an symptomatic ischemic stroke in the right MCA territory, had angiogram about 2m ago which showed severe stenosis of right M1 and right A1 as well as left A2. His HRMR vessel wall imaging showed eccentric enhancement around the stenotic segments. On this admission his CC was left arm weakness for few weeks as well as episodes of left leg weakness. His repeat angiogram on this admission showed significant worsening of the stenotic segments, especially the M1 segment. He is s/p angioplasty and stenting of A1 and M1, which resulted significant improvement of stenotic segments and distal perfusion. However given the history of psoriasis, not having significant atherosclerotic diseases anywhere else, and rapidly worsening of the MCA and NORMAN stenosis over two months, the likelihood of Secondary CNS vasculitis may be considered higher than atherosclerotic disease in this patient.

## 2021-07-01 NOTE — PROGRESS NOTE ADULT - SUBJECTIVE AND OBJECTIVE BOX
61yMale    HPI:  Patient is a 62 yo M s/p cerebral angiogram + angioplasty w recanalization of R MCA and R NORMAN with two stents with NI team POD 1. Patient received 7000u Heparin as well as an Integrilin bolus during procedure. NIHSS post-procedure 2 for mild LUE drift and mild L facial, consistent with exams during hospital course. R groin dressing CDI with quick clot and tegaderm without signs of hematoma or bleeding at the site.   Immediate post-procedure CTH showing likely mixed extravasated contrast with subarachnoid hemorrhage. Repeat CTH 3 hr afterwards showing no significant change.     Post-procedure SBP parameters 120-140, recommended IVF 100ml/hr, repeat CTH 3 hr post initial post-procedure CTH as well as again this AM at 6. ASA/Plavix held yesterday dt possible subarachnoid hemorrhage post-procedure.     Patient was admitted to ICU for post-procedure monitoring.   Today, exam remains the same, patient without HA, change in vision, dizziness.     PAST MEDICAL AND SURGICAL HISTORY:   HTN (hypertension)  Psoriasis  CVA (cerebral vascular accident)    MEDICATIONS:   aspirin 325 milliGRAM(s) Oral once  aspirin 325 milliGRAM(s) Oral daily  chlorhexidine 4% Liquid 1 Application(s) Topical daily  clopidogrel Tablet 75 milliGRAM(s) Oral daily  clopidogrel Tablet 75 milliGRAM(s) Oral once  heparin   Injectable 5000 Unit(s) SubCutaneous every 8 hours  melatonin 3 milliGRAM(s) Oral at bedtime  melatonin 5 milliGRAM(s) Oral at bedtime  niCARdipine Infusion 5 mG/Hr IV Continuous <Continuous>  norepinephrine Infusion 0.05 MICROgram(s)/kG/Min IV Continuous <Continuous>  pantoprazole    Tablet 40 milliGRAM(s) Oral before breakfast  simvastatin 40 milliGRAM(s) Oral at bedtime  sodium chloride 0.9%. 1000 milliLiter(s) IV Continuous <Continuous>  venlafaxine 37.5 milliGRAM(s) Oral at bedtime    VITALS:   T(F): 97 (07-01-21 @ 04:00), Max: 97.1 (07-01-21 @ 00:00)  HR: 66 (07-01-21 @ 07:00) (54 - 78)  BP: 116/57 (07-01-21 @ 01:15) (116/57 - 116/57)  RR: 20 (07-01-21 @ 07:00) (6 - 30)  SpO2: 97% (07-01-21 @ 07:00) (94% - 99%)    Labs:                      11.0   12.57 )-----------( 271      ( 01 Jul 2021 04:40 )             34.3     07-01    141  |  110  |  17  ----------------------------<  99  4.1   |  21  |  1.1    Ca    7.7<L>      01 Jul 2021 04:40    General: NAD   Neuro: NIHSS 2 for mild LUE drift and mild L facial, unchanged from yesterday. AAOx3, no visual deficits, no sensory deficits, no aphasia, no dysarthria, no dysmetria.   Extremities: R groin dressing CDI with quick clot and tegaderm, no evidence of bleeding or hematoma. Distal pulses intact b/l.   Abd: NTND     Radiology:   < from: CT Head No Cont (06.30.21 @ 17:04) >    IMPRESSION:    Interval placement of right middle cerebral artery M1 and right anterior cerebral artery A1 branch stents, with subarachnoid hemorrhage extending from the right parietal region superiorly to the right sylvian fissure inferiorly.    Increased attenuation of the arterial and venous vasculature, possibly related to same day neurointervention angiography.    Evolving infarct of theright frontal lobe.    Dr. Reginaldo Elkins discussed preliminary findings with medical ICU team on 6/30/2021 at 5:31 PM with readback.      ATTENDING ADDENDUM:  Agree with the resident report. The patient is status post interval stent placements in the right M1 and A1 segments. There is contrast staining of the intracranial vessels and dural leaflets.    Linear hyperdensity along the right precentral gyrus likely represents petechial hemorrhage associated with the known acute infarct. Hyperdense extra-axial collection along the right parietal sulcus and right sylvian fissure likely represents mixed extravasated contrast with subarachnoid hemorrhage.    Redemonstration of scattered evolving acute infarct in the right frontal lobe.    < end of copied text >    < from: CT Head No Cont (06.30.21 @ 21:03) >    IMPRESSION:    Since CT head dated 6/30/2021 at 4:54 PM,    No significant interval change in right parietal sulcus and right sylvian fissure extra-axial hyperdensities, likely mixed extravasated contrast with subarachnoid hemorrhage.    Slightly decreased petechial hemorrhages along the right precentral gyrus.    Unchanged evolving infarct in the right frontal lobe.    < end of copied text >      Suggestions:  Patient is now POD1 neuroendovascular recanalization of R MCA and R NORMAN with 2 stents by NI team.     F/u read on repeat CTH this AM.   ICU fellow informed by Dr. Guajardo to give  and Plavix 75 STAT now, orders appreciated.    Please continue to follow post NI order sets for total of 24 hr post-procedure.   -140.  Management per NCC and ICU.     [id number]      61yMale    HPI:  Patient is a 62 yo M s/p cerebral angiogram + angioplasty w recanalization of R MCA and R NORMAN with two stents with NI team POD 1. Patient received 7000u Heparin as well as an Integrilin bolus during procedure. NIHSS post-procedure 2 for mild LUE drift and mild L facial, consistent with exams during hospital course. R groin dressing CDI with quick clot and tegaderm without signs of hematoma or bleeding at the site.   Immediate post-procedure CTH showing likely mixed extravasated contrast with subarachnoid hemorrhage. Repeat CTH 3 hr afterwards showing no significant change.     Post-procedure SBP parameters 120-140, recommended IVF 100ml/hr, repeat CTH 3 hr post initial post-procedure CTH as well as again this AM at 6. ASA/Plavix held yesterday dt possible subarachnoid hemorrhage post-procedure.     Patient was admitted to ICU for post-procedure monitoring.   Today, exam remains the same, patient without HA, change in vision, dizziness.     PAST MEDICAL AND SURGICAL HISTORY:   HTN (hypertension)  Psoriasis  CVA (cerebral vascular accident)    MEDICATIONS:   aspirin 325 milliGRAM(s) Oral once  aspirin 325 milliGRAM(s) Oral daily  chlorhexidine 4% Liquid 1 Application(s) Topical daily  clopidogrel Tablet 75 milliGRAM(s) Oral daily  clopidogrel Tablet 75 milliGRAM(s) Oral once  heparin   Injectable 5000 Unit(s) SubCutaneous every 8 hours  melatonin 3 milliGRAM(s) Oral at bedtime  melatonin 5 milliGRAM(s) Oral at bedtime  niCARdipine Infusion 5 mG/Hr IV Continuous <Continuous>  norepinephrine Infusion 0.05 MICROgram(s)/kG/Min IV Continuous <Continuous>  pantoprazole    Tablet 40 milliGRAM(s) Oral before breakfast  simvastatin 40 milliGRAM(s) Oral at bedtime  sodium chloride 0.9%. 1000 milliLiter(s) IV Continuous <Continuous>  venlafaxine 37.5 milliGRAM(s) Oral at bedtime    VITALS:   T(F): 97 (07-01-21 @ 04:00), Max: 97.1 (07-01-21 @ 00:00)  HR: 66 (07-01-21 @ 07:00) (54 - 78)  BP: 116/57 (07-01-21 @ 01:15) (116/57 - 116/57)  RR: 20 (07-01-21 @ 07:00) (6 - 30)  SpO2: 97% (07-01-21 @ 07:00) (94% - 99%)    Labs:                      11.0   12.57 )-----------( 271      ( 01 Jul 2021 04:40 )             34.3     07-01    141  |  110  |  17  ----------------------------<  99  4.1   |  21  |  1.1    Ca    7.7<L>      01 Jul 2021 04:40    General: NAD   Neuro: NIHSS 2 for mild LUE drift and mild L facial, unchanged from yesterday. AAOx3, no visual deficits, no sensory deficits, no aphasia, no dysarthria, no dysmetria.   Extremities: R groin dressing CDI with quick clot and tegaderm, no evidence of bleeding or hematoma. Distal pulses intact b/l.   Abd: NTND     Radiology:   < from: CT Head No Cont (06.30.21 @ 17:04) >    IMPRESSION:    Interval placement of right middle cerebral artery M1 and right anterior cerebral artery A1 branch stents, with subarachnoid hemorrhage extending from the right parietal region superiorly to the right sylvian fissure inferiorly.    Increased attenuation of the arterial and venous vasculature, possibly related to same day neurointervention angiography.    Evolving infarct of theright frontal lobe.    Dr. Reginaldo Elkins discussed preliminary findings with medical ICU team on 6/30/2021 at 5:31 PM with readback.      ATTENDING ADDENDUM:  Agree with the resident report. The patient is status post interval stent placements in the right M1 and A1 segments. There is contrast staining of the intracranial vessels and dural leaflets.    Linear hyperdensity along the right precentral gyrus likely represents petechial hemorrhage associated with the known acute infarct. Hyperdense extra-axial collection along the right parietal sulcus and right sylvian fissure likely represents mixed extravasated contrast with subarachnoid hemorrhage.    Redemonstration of scattered evolving acute infarct in the right frontal lobe.    < end of copied text >    < from: CT Head No Cont (06.30.21 @ 21:03) >    IMPRESSION:    Since CT head dated 6/30/2021 at 4:54 PM,    No significant interval change in right parietal sulcus and right sylvian fissure extra-axial hyperdensities, likely mixed extravasated contrast with subarachnoid hemorrhage.    Slightly decreased petechial hemorrhages along the right precentral gyrus.    Unchanged evolving infarct in the right frontal lobe.    < end of copied text >    Suggestions:  Patient is now POD1 neuroendovascular recanalization of R MCA and R NORMAN with 2 stents by NI team.     F/u official report on repeat CTH this AM.   Patient has received ASA/Plavix this AM and can continue on DAPT.   Recommending reduce levophed gtt to monitor physiologic BP.    Please continue to follow post NI order sets for total of 24 hr post-procedure.   Management per NCC and ICU.     [id number]

## 2021-07-01 NOTE — PROGRESS NOTE ADULT - ASSESSMENT
Imp : R CVA / left hemiparesis / s/p cerebral angiogram with R MCA angioplasty  Plan : continue bedside therapy as tolerated           good 4a candidate once medically stable           will follow

## 2021-07-01 NOTE — PROGRESS NOTE ADULT - ATTENDING COMMENTS
61 y.o. gentlemen s/p R MCA and R NORMAN stent placement due to symptomatic stenosis in these vessels. Post op course is uneventful except for asymptomatic hemorrhagic transformation of recent MCA infarction. Continue on dual antiplatelets as follow CT is stable and patient is neurologically unchanged.

## 2021-07-01 NOTE — PROGRESS NOTE ADULT - SUBJECTIVE AND OBJECTIVE BOX
LENGTH OF HOSPITAL STAY: 8d    CHIEF COMPLAINT:   Patient is a 61y old  Male who presents with a chief complaint of left leg weakness (01 Jul 2021 13:30)      HISTORY OF PRESENTING ILLNESS:   HPI:  62 yo male with PMHx of R NORMAN stroke (April 2020) and R M1 and A1 severe stenosis follows outpatient with Dr. Guajardo, (last angiogram on February 2021), on Plavix     CC: fluctuating left leg weakness resulting in stumbling.      History goes back to: 1 month ago when patient started notice episodic left arm weakness.     ROS is negative for: no fever, no chills, no SOB, chest pains or palpitations, no abdominal pain, nausea or vomiting. Patient denies facial droop, difficulty swallowing     In the ED: VS all WNL  Labs significant for: WNL  Troponin: negative  CT head: Acute infarct involving the right frontal lobe in the precentral gyrus.  Chronic infarct of the right periventricular white matter, new from prior exams detailed above.  Additional chronic infarcts of the bilateral basal ganglia and left cerebellum, stable.    CTA head and neck: Severe stenosis and/or nonocclusive thrombus of the M1 segment of the right MCA, with diminished flow of the distal right MCA circulation, new from prior CTA 4/27/2020. 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    CT PERFUSION:, there are 229 cc of ischemic penumbra in the right frontoparietal lobes, partially accounted for by chronic infarcts of the right frontoparietal white matter.    NIHSS in ED=3  stroke code called. Not a candidate for tPA due to unknown last normal LWN  Patient underwent recanalization of R MCA and R NORMAN vessels by REINALDO (06/30). CTH performed immediately post-procedure with preliminary read of possible subarachnoid hemorrhage.ASA/plavix were held. Repeat CTH noncontrast 3 hours and 10 hours post-intial CT.    No overnight events.     Subjective: Patient is seen and examined at bedside.   Patient is doing better. His left side weakness improved post-recanalization of R MCA and R NORMAN vessels.     PAST MEDICAL & SURGICAL HISTORY  PAST MEDICAL & SURGICAL HISTORY:  HTN (hypertension)    Psoriasis    CVA (cerebral vascular accident)    No significant past surgical history      SOCIAL HISTORY:    ALLERGIES:  No Known Allergies    MEDICATIONS:  STANDING MEDICATIONS  aspirin 325 milliGRAM(s) Oral daily  chlorhexidine 4% Liquid 1 Application(s) Topical daily  clopidogrel Tablet 75 milliGRAM(s) Oral daily  heparin   Injectable 5000 Unit(s) SubCutaneous every 8 hours  melatonin 3 milliGRAM(s) Oral at bedtime  melatonin 5 milliGRAM(s) Oral at bedtime  pantoprazole    Tablet 40 milliGRAM(s) Oral before breakfast  simvastatin 40 milliGRAM(s) Oral at bedtime  sodium chloride 0.9%. 1000 milliLiter(s) IV Continuous <Continuous>  venlafaxine 37.5 milliGRAM(s) Oral at bedtime    PRN MEDICATIONS    VITALS:   T(F): 97.7  HR: 70  BP: 140/57  RR: 22  SpO2: 98%    LABS:                        11.0   12.57 )-----------( 271      ( 01 Jul 2021 04:40 )             34.3     07-01    141  |  110  |  17  ----------------------------<  99  4.1   |  21  |  1.1    Ca    7.7<L>      01 Jul 2021 04:40                    RADIOLOGY:  1. Patient is again noted to be status post stent placement within the right A1 and M1 segments.    2.  Slight interval decrease in the hyperdensity (likely combination of contrast and hemorrhage) within a right parietal sulcus and right sylvian fissure.    3.  Stable focal right frontal gyriform hyperdensity likely reflecting petechial hemorrhage of an acute infarct.    4.  Stable patchy hypodensities within the right frontal lobe consistent with scattered acute infarcts.    5.  Stable chronic lacunar infarcts within the basal ganglia and left cerebellum.      PHYSICAL EXAM:  GEN: No acute distress  LUNGS: Clear to auscultation bilaterally   HEART: S1/S2 present. RRR.   ABD: Soft, non-tender, non-distended. Bowel sounds present  EXT: no peripheral edema, +2 peripheral pulses  NEURO: AAOX2, Left side motor power +2 vs. right side +3, no vertigo, no confusion

## 2021-07-01 NOTE — CHART NOTE - NSCHARTNOTEFT_GEN_A_CORE
HPI:  62 yo male with recent right NORMAN CVA, 4/2020, with right M1/A1 severe stenosis, followed outpatient by Dr. Estrada, s/p angiogram, 2/2021, on Plavix, presented with fluctuating left leg weakness resulting in stumbling.     ROS is negative for: no fever, no chills, no SOB, chest pains or palpitations, no abdominal pain, nausea or vomiting. Patient denies facial droop, difficulty swallowing.    In the ED: VS all WNL  Labs significant for: WNL  Troponin: negative  CT head: Acute infarct involving the right frontal lobe in the precentral gyrus.  CTA head and neck: Severe stenosis and/or nonocclusive thrombus of the M1 segment of the right MCA, with diminished flow of the distal right MCA circulation.  CT PERFUSION:, there are 229 cc of ischemic penumbra in the right frontoparietal lobes, partially accounted for by chronic infarcts of the right frontoparietal white matter.  NIHSS in ED=3  Stroke code called. Not a candidate for tPA due to unknown last normal LWN.  He was admitted to the MICU from 06/23 till 28/06 where he was transferred to . On 06/30, patient underwent cerebral angiogram and angioplasty w/ recanalization of right MCA and right NORMAN with 2 stents. Immediate post-procedure CTH showing likely mixed extravasated contrast with subarachnoid hemorrhage. Repeat CTH 3 hr afterwards showing no significant change. He was sent to MICU for monitoring.     ICU course:  · Assessment	  # Evolving Acute Rt. Frontal lobe Infarct in the precentral gyrus s/p recanalization of R MCA and R NORMAN vessels by REINALDO (06/30)  - Continue Clopidogrel 75 mg q24 PO ,  mg q24 PO  - Follow up with PT/OT, physiatry c/s  - OOB to chair, ambulate as tolerated  - Stat CTH if any worsening or deterioration in neurological examination and call NCC/Neurology  - Keep -140    # Grade I Diastolic HFpEF   - Compensated; EF 58%    # hx of Depression/Anxiety   - c/w Venlafaxine 37.5mg Daily    # GERD   - c/w Protonix 40mg Daily    # DLD   - c/w Simvastatin 40 mg PO [id number]    #Resp:  - Aggressive chest PT/pulmonary toileting/NT suctioning as needed   - Incentive spirometry 10x/hr while awake  - HOB > 35 degrees  - Aspiration precautions  - Keep SaO2 > 95%    #Renal/Fluid/Electrolytes:  - Strict I/Os  - Daily weights  - Keep euvolemic  - Keep normoglycemic  - Keep normonatremic   - Keep Magnesium level > 2  - Monitor lytes, replete as needed     Diet: Regular  Activity: OOB to chair  DVT ppx: Heparin 5000 units subq every 8 hours  GI ppx: PPI  FULL CODE  Dispo: MICU -> stroke unit HPI:  62 yo male with recent right NORMAN CVA, 4/2020, with right M1/A1 severe stenosis, followed outpatient by Dr. Estrada, s/p angiogram, 2/2021, on Plavix, presented with fluctuating left leg weakness resulting in stumbling.     ROS is negative for: no fever, no chills, no SOB, chest pains or palpitations, no abdominal pain, nausea or vomiting. Patient denies facial droop, difficulty swallowing.    In the ED: VS all WNL  Labs significant for: WNL  Troponin: negative  CT head: Acute infarct involving the right frontal lobe in the precentral gyrus.  CTA head and neck: Severe stenosis and/or nonocclusive thrombus of the M1 segment of the right MCA, with diminished flow of the distal right MCA circulation.  CT PERFUSION:, there are 229 cc of ischemic penumbra in the right frontoparietal lobes, partially accounted for by chronic infarcts of the right frontoparietal white matter.  NIHSS in ED=3  Stroke code called. Not a candidate for tPA due to unknown last normal LWN.  He was admitted to the MICU from 06/23 till 28/06 where he was transferred to . On 06/30, patient underwent cerebral angiogram and angioplasty w/ recanalization of right MCA and right NORMAN with 2 stents. Immediate post-procedure CTH showing likely mixed extravasated contrast with subarachnoid hemorrhage. Repeat CTH 3 hr afterwards showing no significant change. He was sent to MICU for monitoring.     ICU course:  · Assessment	  # Evolving Acute Rt. Frontal lobe Infarct in the precentral gyrus s/p recanalization of R MCA and R NORMAN vessels by REINALDO (06/30)  - Continue Clopidogrel 75 mg q24 PO ,  mg q24 PO  - Follow up with PT/OT, physiatry c/s  - OOB to chair, ambulate as tolerated  - Stat CTH if any worsening or deterioration in neurological examination and call NCC/Neurology  - Keep -140    # Grade I Diastolic HFpEF   - Compensated; EF 58%    # hx of Depression/Anxiety   - c/w Venlafaxine 37.5mg Daily    # GERD   - c/w Protonix 40mg Daily    # DLD   - c/w Simvastatin 40 mg PO [id number]    #Respiratory  - Aggressive chest PT/pulmonary toileting/NT suctioning as needed   - Incentive spirometry 10x/hr while awake  - HOB > 35 degrees  - Aspiration precautions  - Keep SaO2 > 95%    Diet: Regular  Activity: OOB to chair  DVT ppx: Heparin 5000 units subq every 8 hours  GI ppx: PPI  FULL CODE  Dispo: MICU -> stroke unit HPI:  60 yo male with recent right NORMAN CVA, 4/2020, with right M1/A1 severe stenosis, followed outpatient by Dr. Estrada, s/p angiogram, 2/2021, on Plavix, presented with fluctuating left leg weakness resulting in stumbling.     ROS is negative for: no fever, no chills, no SOB, chest pains or palpitations, no abdominal pain, nausea or vomiting. Patient denies facial droop, difficulty swallowing.    In the ED: VS all WNL  Labs significant for: WNL  Troponin: negative  CT head: Acute infarct involving the right frontal lobe in the precentral gyrus.  CTA head and neck: Severe stenosis and/or nonocclusive thrombus of the M1 segment of the right MCA, with diminished flow of the distal right MCA circulation.  CT PERFUSION:, there are 229 cc of ischemic penumbra in the right frontoparietal lobes, partially accounted for by chronic infarcts of the right frontoparietal white matter.  NIHSS in ED=3  Stroke code called. Not a candidate for tPA due to unknown last normal LWN.  He was admitted to the MICU from 06/23 till 28/06 where he was transferred to . On 06/30, patient underwent cerebral angiogram and angioplasty w/ recanalization of right MCA and right NORMAN with 2 stents. Immediate post-procedure CTH showing likely mixed extravasated contrast with subarachnoid hemorrhage. Repeat CTH 3 hr afterwards showing no significant change. He was sent to MICU for monitoring.     ICU course:  · Assessment	  # Evolving Acute Rt. Frontal lobe Infarct in the precentral gyrus s/p recanalization of R MCA and R NORMAN vessels by REINALDO (06/30)  - Continue Clopidogrel 75 mg q24 PO ,  mg q24 PO  - Follow up with PT/OT, physiatry c/s  - OOB to chair, ambulate as tolerated  - Stat CTH if any worsening or deterioration in neurological examination and call NCC/Neurology  - Keep -180 mmHg    #HTN  - BP high - 171/82 - restarted on home medications (Amlodipine and Losartan)  - Keep -180 mmHg    # Grade I Diastolic HFpEF   - Compensated; EF 58%    # hx of Depression/Anxiety   - c/w Venlafaxine 37.5mg Daily    # GERD   - c/w Protonix 40mg Daily    # DLD   - c/w Simvastatin 40 mg PO [id number]    #Respiratory  - HOB > 35 degrees  - Aspiration precautions  - Keep SaO2 > 92%    Diet: Regular  Activity: OOB to chair  DVT ppx: Heparin 5000 units subq every 8 hours  GI ppx: PPI  FULL CODE  Dispo: MICU -> stroke unit

## 2021-07-01 NOTE — PROGRESS NOTE ADULT - SUBJECTIVE AND OBJECTIVE BOX
Specialty: Neuro Critical Care     Interval Hx: 61-year-old male with recent right NORMAN CVA, 4/2020, with right M1/A1 severe stenosis, followed outpatient by Dr. Guajardo, s/p angiogram, 2/2021, on Plavix, p/w fluctuating left leg weakness, resulting in stumbling. CTH, revealed acute infarct right frontal lobe. CTA head/neck, revealed high-grade stenosis versus thrombus M1 segment right MCA, multifocal severe stenoses are noted along the right A2 segment of the NORMAN, and moderate stenosis proximal left ICA. CTP, 6/23/2021, revealed 229 cc of ischemic penumbra in the right MCA territory without core infarct. Patient now POD 1 s/p cerebral angiogram and angioplasty w/ recanalization of right MCA and right NORMAN with 2 stents, 6/30/2021. Intra-procedure, received Heparin 7000 units and Integrilin bolus. Post-procedure NIHSS = 2 for left facial droop and LUE pronator drift. Immediate post-procedure CTH showing likely mixed extravasated contrast with subarachnoid hemorrhage. Repeat CTH 3 hr afterwards showing no significant change. No acute events overnight.     HPI:  62 yo male with PMHx of R NORMAN stroke (April 2020) and R M1 and A1 severe stenosis follows outpatient with Dr. Guajardo, (last angiogram on February 2021), on Plavix   CC: fluctuating left leg weakness resulting in stumbling.   History goes back to: 1 month ago when patient started notice episodic left arm weakness.   ROS is negative for: no fever, no chills, no SOB, chest pains or palpitations, no abdominal pain, nausea or vomiting. Patient denies facial droop, difficulty swallowing   In the ED: VS all WNL  Labs significant for: WNL  Troponin: negative  CT head: Acute infarct involving the right frontal lobe in the precentral gyrus.  Chronic infarct of the right periventricular white matter, new from prior exams detailed above.  Additional chronic infarcts of the bilateral basal ganglia and left cerebellum, stable.  CTA head and neck: Severe stenosis and/or nonocclusive thrombus of the M1 segment of the right MCA, with diminished flow of the distal right MCA circulation, new from prior CTA 4/27/2020. 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  CT PERFUSION:, there are 229 cc of ischemic penumbra in the right frontoparietal lobes, partially accounted for by chronic infarcts of the right frontoparietal white matter.  NIHSS in ED=3  stroke code called. Not a candidate for tPA due to unknown last normal LWN  (23 Jun 2021 20:39)    Past Medical and Surgical History:  HTN (hypertension)  Psoriasis  CVA (cerebral vascular accident)  No significant past surgical history    Allergies: No Known Allergies    ROS: Patient denies headache, nausea/vomiting, blurred vision, double vision, or dizziness. Otherwise, 10-point ROS is negative.     PE:  General: well-nourished, well-groomed male of appropriately stated age, lying supine in bed. NAD.   Neurologic:  Mental status: Awake, alert, oriented to person, place, and time. Speech is fluent, able to name objects. Follows two- and three-step commands. Attention/concentration intact. No dysarthria, no aphasia.  Cranial Nerves:  II: Visual fields are full to confrontation. Pupils equally round and reactive to light, 3mm brisk bilaterally  III, IV, VI: EOMI without nystagmus  V:  V1-V3 sensation intact bilaterally   VII: Slight left facial droop, with loss of nasolabial folds  XI: Head turning and shoulder shrug are intact  XII: Tongue midline with no deviation   Motor: Normal bulk and tone. Strength 5/5 in RUE and B/L LE,  strength 5/5. Slight LUE pronator drift noted, but able to hold antigravity for 10 seconds, strength 4-/5.   Sensation: Intact to light touch. No neglect.  Coordination: No dysmetria on finger-to-nose bilaterally     Vital Signs:  ICU Vital Signs Last 24 Hrs  T(C): 36.5 (01 Jul 2021 08:00), Max: 36.5 (01 Jul 2021 08:00)  T(F): 97.7 (01 Jul 2021 08:00), Max: 97.7 (01 Jul 2021 08:00)  HR: 70 (01 Jul 2021 12:00) (54 - 78)  BP: 124/65 (01 Jul 2021 12:00) (115/65 - 124/65)  BP(mean): 79 (01 Jul 2021 12:00) (74 - 92)  ABP: 134/56 (01 Jul 2021 12:00) (110/52 - 146/64)  ABP(mean): 84 (01 Jul 2021 12:00) (70 - 90)  RR: 14 (01 Jul 2021 12:00) (6 - 30)  SpO2: 97% (01 Jul 2021 12:00) (94% - 99%)    I&O's Detail:  30 Jun 2021 07:01  -  01 Jul 2021 07:00  --------------------------------------------------------  IN:    NiCARdipine: 130 mL    Norepinephrine: 18.3 mL    sodium chloride 0.9%: 1125 mL    Sodium Chloride 0.9% Bolus: 1000 mL  Total IN: 2273.3 mL    OUT:    Indwelling Catheter - Urethral (mL): 1085 mL  Total OUT: 1085 mL    Total NET: 1188.3 mL    01 Jul 2021 07:01  -  01 Jul 2021 13:30  --------------------------------------------------------  IN:    Norepinephrine: 14 mL    Oral Fluid: 500 mL    sodium chloride 0.9%: 450 mL  Total IN: 964 mL    OUT:    Indwelling Catheter - Urethral (mL): 165 mL  Total OUT: 165 mL    Total NET: 799 mL    Labs:  07-01    141  |  110  |  17  ----------------------------<  99  4.1   |  21  |  1.1    Ca    7.7<L>      01 Jul 2021 04:40                        11.0   12.57 )-----------( 271      ( 01 Jul 2021 04:40 )             34.3     Medications Current and PRN:  MEDICATIONS  (STANDING):  aspirin 325 milliGRAM(s) Oral daily  chlorhexidine 4% Liquid 1 Application(s) Topical daily  clopidogrel Tablet 75 milliGRAM(s) Oral daily  heparin   Injectable 5000 Unit(s) SubCutaneous every 8 hours  melatonin 3 milliGRAM(s) Oral at bedtime  melatonin 5 milliGRAM(s) Oral at bedtime  pantoprazole    Tablet 40 milliGRAM(s) Oral before breakfast  simvastatin 40 milliGRAM(s) Oral at bedtime  sodium chloride 0.9%. 1000 milliLiter(s) (100 mL/Hr) IV Continuous <Continuous>  venlafaxine 37.5 milliGRAM(s) Oral at bedtime    Imaging:  EXAM:  CT BRAIN (7/1/2021):   IMPRESSION:  Since the CT head performed on the prior day:  1.  Patient is again noted to be status post stent placement within the right A1 and M1 segments.  2.  Slight interval decrease in the hyperdensity (likely combination of contrast and hemorrhage) within a right parietal sulcus and right sylvian fissure.  3.  Stable focal right frontal gyriform hyperdensity likely reflecting petechial hemorrhage of an acute infarct.  4.  Stable patchy hypodensities within the right frontal lobe consistent with scattered acute infarcts.  5.  Stable chronic lacunar infarcts within the basal ganglia and left cerebellum.    EXAM:  CT BRAIN (6/30/2021):   IMPRESSION:  Since CT head dated 6/30/2021 at 4:54 PM,  No significant interval change in right parietal sulcus and right sylvian fissure extra-axial hyperdensities, likely mixed extravasated contrast with subarachnoid hemorrhage.  Slightly decreased petechial hemorrhages along the right precentral gyrus.  Unchanged evolving infarct in the right frontal lobe.    EXAM:  CT BRAIN (6/30/2021):   IMPRESSION:  Interval placement of right middle cerebral artery M1 and right anterior cerebral artery A1 branch stents, with subarachnoid hemorrhage extending from the right parietal region superiorly to the right sylvian fissure inferiorly.  Increased attenuation of the arterial and venous vasculature, possibly related to same day neurointervention angiography.  Evolving infarct of the right frontal lobe.  ATTENDING ADDENDUM:  Agree with the resident report. The patient is status post interval stent placements in the right M1 and A1 segments. There is contrast staining of the intracranial vessels and dural leaflets.  Linear hyperdensity along the right precentral gyrus likely represents petechial hemorrhage associated with the known acute infarct. Hyperdense extra-axial collection along the right parietal sulcus and right sylvian fissure likely represents mixed extravasated contrast with subarachnoid hemorrhage.  Redemonstration of scattered evolving acute infarct in the right frontal lobe.    EXAM:  MR BRAIN (6/25/2021):   IMPRESSION: Acute right MCA infarcts.    EXAM:  ECHO TTE WO CON COMP W DOPP (6/24/2021):   Summary:   1. LV Ejection Fraction by Poe's Method with a biplane EF of 58 %.   2. Mildly increased LV wall thickness.   3. Spectral Doppler shows impaired relaxation pattern of left ventricular myocardial filling (Grade I diastolic dysfunction).   4. Normal left atrial size.   5. Normal right atrial size.   6. Mildmitral valve regurgitation.   7. Mild thickening of the anterior and posterior mitral valve leaflets.   8. Mild tricuspid regurgitation.   9. Aortic valve is bicuspid.  10. Mild to moderate aortic regurgitation.  11. Mild dilatation of the aortic root.    EXAM:  CT BRAIN (6/24/2021):   IMPRESSION:  Evolving acute infarcts involving the right frontal lobe likely in an MCA and NORMAN distribution. No evidence ofhemorrhagic transformation or significant mass effect.  Chronic bilateral basal ganglia as well as left cerebellar lacunar infarcts.    EXAM:  CT ANGIO BRAIN (W)AW IC/CT ANGIO NECK (W)AW IC/CT PERFUSION W MAPS IC (6/23/2021):   IMPRESSION:  CT PERFUSION:  UtilizingTmax > 6.0s and CBF < 30%, there are 229 cc of ischemic penumbra in the right MCA territory without core infarct.  CTA HEAD/NECK:  High-grade stenosis versus nonocclusive thrombus is noted of the M1 segment of the right MCA, with diminished distal MCA branch opacification, new from prior CTA 4/27/2020.  Multifocal severe stenoses are noted along the right A2 segment of the NORMAN which are new since the prior examination.  Moderate (approximately 60%) short segment stenosis of the proximal left internal carotid artery due to mixed calcified and noncalcified atherosclerotic plaque which is worsened since the prior CTA of 4/27/2020.    EXAM:  CT BRAIN STROKE PROTOCOL (6/23/2021):   IMPRESSION:  Acute infarct involving the right frontal lobe in the precentral gyrus.  Chronic infarct of the right periventricular white matter, new from prior exams detailed above.  Additional chronic infarcts of the bilateral basal ganglia and left cerebellum, stable.    Assessment:  61-year-old male with PMHx HTN, psoriasis, and recent right NORMAN CVA, 4/2020, with right M1/A1 severe stenosis, followed outpatient by Dr. Guajardo, s/p angiogram, 2/2021, on Plavix, p/w fluctuating left leg weakness, resulting in stumbling. CTH, 6/23/2021, revealed an acute infarct right frontal lobe in the precentral gyrus, with chronic infarct of right periventricular white matter, with additional chronic infarcts of the bilateral basal ganglia and left cerebellum, stable. CTA head/neck, 6/23/2021, revealed high-grade stenosis versus nonocclusive thrombus is noted of the M1 segment of the right MCA, with diminished distal MCA branch opacification, new from prior CTA 4/27/2020, with multifocal severe stenoses are noted along the right A2 segment of the NORMAN which are new since the prior examination, and moderate (approximately 60%) short segment stenosis of the proximal left internal carotid artery due to mixed calcified and noncalcified atherosclerotic plaque which is worsened since the prior CTA of 4/27/2020. CTP, 6/23/2021, revealed 229 cc of ischemic penumbra in the right MCA territory without core infarct. Not a tPA candidate 2/2 unknown LKW. Patient now POD 1 s/p cerebral angiogram and angioplasty w/ recanalization of right MCA and right NORMAN with 2 stents, 6/30/2021. Intra-procedure, received Heparin 7000 units and Integrilin bolus. Post-procedure NIHSS = 2 for left facial droop and LUE pronator drift. Immediate post-procedure CTH showing likely mixed extravasated contrast with subarachnoid hemorrhage. Repeat CTH 3 hr afterwards showing no significant change. CTH, 7/1/2021, revealed s/p stent placement within the right A1 and M1 segments, with slight interval decrease in the hyperdensity (likely combination of contrast and hemorrhage) within a right parietal sulcus and right sylvian fissure, stable focal right frontal gyriform hyperdensity likely reflecting petechial hemorrhage of an acute infarct, stable patchy hypodensities within the right frontal lobe consistent with scattered acute infarcts, and stable chronic lacunar infarcts within the basal ganglia and left cerebellum. On examination today, patient is awake, A/Ox3, with slight left facial droop and LUE pronator drift, with no other neurological deficits appreciated.     Plan:  #Neuro:  - Okay to be downgraded to the stroke unit  - Continue Clopidogrel 75mg PO q24hrs   - Continue ASA 325mg PO q24hrs  - Continue Simvastatin 40mg PO q24hrs HS  - Continue Effexor 37.5mg PO q24hrs HS  - PT/OT, physiatry c/s  - OOB to chair, ambulate as tolerated  - Stat CTH if any worsening or deterioration in neurological examination and call [facility]/Neurology    #CV:  - Keep -140    #Resp:  - Aggressive chest PT/pulmonary toileting/NT suctioning as needed   - Incentive spirometry 10x/hr while awake  - HOB > 35 degrees  - Aspiration precautions  - Keep SaO2 > 95%    #Renal/Fluid/Electrolytes:  - Strict I/Os  - Daily weights  - Keep euvolemic  - Keep normoglycemic  - Keep normonatremic   - Keep Magnesium level > 2  - Monitor lytes, replete as needed    #GI:  - EN as per primary team  - Continue Protonix 40mg PO q24hrs    #Heme/Onc:  - SCS while in bed  - Continue Heparin 5000 units SQ q8hrs    #ID:  - Keep normothermic; avoid fevers      Ischemic Stroke Work-up:  CTH: completed, see above  MRI: completed, see above  Lipid panel: completed, 6/24/2021:  Serum Cholesterol: 135  Triglycerides: 47  HDL: 48  Non-HDL Cholesterol: 87  LDL: 79  HgA1C: pending  2D echo/TTE: completed, 6/24/2021:   Summary:   1. LV Ejection Fraction by Poe's Method with a biplane EF of 58 %.   2. Mildly increased LV wall thickness.   3. Spectral Doppler shows impaired relaxation pattern of left ventricular myocardial filling (Grade I diastolic dysfunction).   4. Normal left atrial size.   5. Normal right atrial size.   6. Mildmitral valve regurgitation.   7. Mild thickening of the anterior and posterior mitral valve leaflets.   8. Mild tricuspid regurgitation.   9. Aortic valve is bicuspid.  10. Mild to moderate aortic regurgitation.  11. Mild dilatation of the aortic root.  A/C: Clopidogrel 75mg PO q24hrs and ASA 325mg PO q24hrs  Statin: Simvastatin 40mg PO q24hrs       Yumiko Mckeon, [facility]-BC  Please feel free to contact for any questions or concerns   #[phone]

## 2021-07-01 NOTE — PROGRESS NOTE ADULT - SUBJECTIVE AND OBJECTIVE BOX
Patient is a 61y old  Male who presents with a chief complaint of left leg weakness (01 Jul 2021 14:24)      HPI:  62 yo male with PMHx of R NORMAN stroke (April 2020) and R M1 and A1 severe stenosis follows outpatient with Dr. Guajardo, (last angiogram on February 2021), on Plavix     CC: fluctuating left leg weakness resulting in stumbling.      History goes back to: 1 month ago when patient started notice episodic left arm weakness.     ROS is negative for: no fever, no chills, no SOB, chest pains or palpitations, no abdominal pain, nausea or vomiting. Patient denies facial droop, difficulty swallowing     In the ED: VS all WNL  Labs significant for: WNL  Troponin: negative  CT head: Acute infarct involving the right frontal lobe in the precentral gyrus.  Chronic infarct of the right periventricular white matter, new from prior exams detailed above.  Additional chronic infarcts of the bilateral basal ganglia and left cerebellum, stable.    CTA head and neck: Severe stenosis and/or nonocclusive thrombus of the M1 segment of the right MCA, with diminished flow of the distal right MCA circulation, new from prior CTA 4/27/2020. 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    CT PERFUSION:, there are 229 cc of ischemic penumbra in the right frontoparietal lobes, partially accounted for by chronic infarcts of the right frontoparietal white matter.    NIHSS in ED=3  stroke code called. Not a candidate for tPA, s/p cerebral angiogram and angioplasty of right mca          PHYSICAL EXAM    Vital Signs Last 24 Hrs  T(C): 36.1 (01 Jul 2021 12:00), Max: 36.5 (01 Jul 2021 08:00)  T(F): 97 (01 Jul 2021 12:00), Max: 97.7 (01 Jul 2021 08:00)  HR: 74 (01 Jul 2021 16:00) (54 - 78)  BP: 119/61 (01 Jul 2021 16:00) (116/56 - 140/57)  BP(mean): 77 (01 Jul 2021 16:00) (73 - 92)  RR: 20 (01 Jul 2021 16:00) (6 - 30)  SpO2: 97% (01 Jul 2021 16:00) (94% - 99%)    Constitutional - NAD  Chest - CTA  Cardiovascular - S1S2+  Abdomen -  Soft  Extremities -  No calf tenderness   Function : transfer CG / ambulate 30'RW CG      aspirin 325 milliGRAM(s) Oral daily  chlorhexidine 4% Liquid 1 Application(s) Topical daily  clopidogrel Tablet 75 milliGRAM(s) Oral daily  heparin   Injectable 5000 Unit(s) SubCutaneous every 8 hours  melatonin 3 milliGRAM(s) Oral at bedtime  melatonin 5 milliGRAM(s) Oral at bedtime  pantoprazole    Tablet 40 milliGRAM(s) Oral before breakfast  simvastatin 40 milliGRAM(s) Oral at bedtime  sodium chloride 0.9%. 1000 milliLiter(s) IV Continuous <Continuous>  venlafaxine 37.5 milliGRAM(s) Oral at bedtime      RECENT LABS/IMAGING                        11.0   12.57 )-----------( 271      ( 01 Jul 2021 04:40 )             34.3     07-01    141  |  110  |  17  ----------------------------<  99  4.1   |  21  |  1.1    Ca    7.7<L>      01 Jul 2021 04:40

## 2021-07-02 LAB
ALBUMIN SERPL ELPH-MCNC: 3.4 G/DL — LOW (ref 3.5–5.2)
ALP SERPL-CCNC: 93 U/L — SIGNIFICANT CHANGE UP (ref 30–115)
ALT FLD-CCNC: 26 U/L — SIGNIFICANT CHANGE UP (ref 0–41)
ANION GAP SERPL CALC-SCNC: 8 MMOL/L — SIGNIFICANT CHANGE UP (ref 7–14)
APTT BLD: 29.4 SEC — SIGNIFICANT CHANGE UP (ref 27–39.2)
AST SERPL-CCNC: 28 U/L — SIGNIFICANT CHANGE UP (ref 0–41)
BASOPHILS # BLD AUTO: 0.07 K/UL — SIGNIFICANT CHANGE UP (ref 0–0.2)
BASOPHILS NFR BLD AUTO: 0.7 % — SIGNIFICANT CHANGE UP (ref 0–1)
BILIRUB SERPL-MCNC: 0.3 MG/DL — SIGNIFICANT CHANGE UP (ref 0.2–1.2)
BUN SERPL-MCNC: 14 MG/DL — SIGNIFICANT CHANGE UP (ref 10–20)
CALCIUM SERPL-MCNC: 8.3 MG/DL — LOW (ref 8.5–10.1)
CHLORIDE SERPL-SCNC: 109 MMOL/L — SIGNIFICANT CHANGE UP (ref 98–110)
CO2 SERPL-SCNC: 23 MMOL/L — SIGNIFICANT CHANGE UP (ref 17–32)
CREAT SERPL-MCNC: 1.1 MG/DL — SIGNIFICANT CHANGE UP (ref 0.7–1.5)
EOSINOPHIL # BLD AUTO: 0.7 K/UL — SIGNIFICANT CHANGE UP (ref 0–0.7)
EOSINOPHIL NFR BLD AUTO: 6.7 % — SIGNIFICANT CHANGE UP (ref 0–8)
GLUCOSE SERPL-MCNC: 95 MG/DL — SIGNIFICANT CHANGE UP (ref 70–99)
HCT VFR BLD CALC: 35.7 % — LOW (ref 42–52)
HGB BLD-MCNC: 11.5 G/DL — LOW (ref 14–18)
IMM GRANULOCYTES NFR BLD AUTO: 0.4 % — HIGH (ref 0.1–0.3)
INR BLD: 1.09 RATIO — SIGNIFICANT CHANGE UP (ref 0.65–1.3)
LYMPHOCYTES # BLD AUTO: 2.84 K/UL — SIGNIFICANT CHANGE UP (ref 1.2–3.4)
LYMPHOCYTES # BLD AUTO: 27.3 % — SIGNIFICANT CHANGE UP (ref 20.5–51.1)
MAGNESIUM SERPL-MCNC: 1.7 MG/DL — LOW (ref 1.8–2.4)
MCHC RBC-ENTMCNC: 28.1 PG — SIGNIFICANT CHANGE UP (ref 27–31)
MCHC RBC-ENTMCNC: 32.2 G/DL — SIGNIFICANT CHANGE UP (ref 32–37)
MCV RBC AUTO: 87.3 FL — SIGNIFICANT CHANGE UP (ref 80–94)
MONOCYTES # BLD AUTO: 0.91 K/UL — HIGH (ref 0.1–0.6)
MONOCYTES NFR BLD AUTO: 8.8 % — SIGNIFICANT CHANGE UP (ref 1.7–9.3)
NEUTROPHILS # BLD AUTO: 5.84 K/UL — SIGNIFICANT CHANGE UP (ref 1.4–6.5)
NEUTROPHILS NFR BLD AUTO: 56.1 % — SIGNIFICANT CHANGE UP (ref 42.2–75.2)
NRBC # BLD: 0 /100 WBCS — SIGNIFICANT CHANGE UP (ref 0–0)
PLATELET # BLD AUTO: 262 K/UL — SIGNIFICANT CHANGE UP (ref 130–400)
POTASSIUM SERPL-MCNC: 3.9 MMOL/L — SIGNIFICANT CHANGE UP (ref 3.5–5)
POTASSIUM SERPL-SCNC: 3.9 MMOL/L — SIGNIFICANT CHANGE UP (ref 3.5–5)
PROT SERPL-MCNC: 5.4 G/DL — LOW (ref 6–8)
PROTHROM AB SERPL-ACNC: 12.5 SEC — SIGNIFICANT CHANGE UP (ref 9.95–12.87)
RBC # BLD: 4.09 M/UL — LOW (ref 4.7–6.1)
RBC # FLD: 13.8 % — SIGNIFICANT CHANGE UP (ref 11.5–14.5)
SODIUM SERPL-SCNC: 140 MMOL/L — SIGNIFICANT CHANGE UP (ref 135–146)
WBC # BLD: 10.4 K/UL — SIGNIFICANT CHANGE UP (ref 4.8–10.8)
WBC # FLD AUTO: 10.4 K/UL — SIGNIFICANT CHANGE UP (ref 4.8–10.8)

## 2021-07-02 PROCEDURE — 93306 TTE W/DOPPLER COMPLETE: CPT | Mod: 26

## 2021-07-02 PROCEDURE — 99233 SBSQ HOSP IP/OBS HIGH 50: CPT

## 2021-07-02 PROCEDURE — 99232 SBSQ HOSP IP/OBS MODERATE 35: CPT

## 2021-07-02 RX ORDER — LOSARTAN POTASSIUM 100 MG/1
50 TABLET, FILM COATED ORAL DAILY
Refills: 0 | Status: DISCONTINUED | OUTPATIENT
Start: 2021-07-02 | End: 2021-07-07

## 2021-07-02 RX ORDER — AMLODIPINE BESYLATE 2.5 MG/1
2.5 TABLET ORAL DAILY
Refills: 0 | Status: DISCONTINUED | OUTPATIENT
Start: 2021-07-02 | End: 2021-07-07

## 2021-07-02 RX ADMIN — HEPARIN SODIUM 5000 UNIT(S): 5000 INJECTION INTRAVENOUS; SUBCUTANEOUS at 05:38

## 2021-07-02 RX ADMIN — SODIUM CHLORIDE 50 MILLILITER(S): 9 INJECTION INTRAMUSCULAR; INTRAVENOUS; SUBCUTANEOUS at 16:57

## 2021-07-02 RX ADMIN — Medication 5 MILLIGRAM(S): at 21:10

## 2021-07-02 RX ADMIN — CLOPIDOGREL BISULFATE 75 MILLIGRAM(S): 75 TABLET, FILM COATED ORAL at 12:14

## 2021-07-02 RX ADMIN — AMLODIPINE BESYLATE 2.5 MILLIGRAM(S): 2.5 TABLET ORAL at 12:14

## 2021-07-02 RX ADMIN — HEPARIN SODIUM 5000 UNIT(S): 5000 INJECTION INTRAVENOUS; SUBCUTANEOUS at 21:10

## 2021-07-02 RX ADMIN — PANTOPRAZOLE SODIUM 40 MILLIGRAM(S): 20 TABLET, DELAYED RELEASE ORAL at 06:44

## 2021-07-02 RX ADMIN — Medication 37.5 MILLIGRAM(S): at 21:46

## 2021-07-02 RX ADMIN — LOSARTAN POTASSIUM 50 MILLIGRAM(S): 100 TABLET, FILM COATED ORAL at 12:14

## 2021-07-02 RX ADMIN — HEPARIN SODIUM 5000 UNIT(S): 5000 INJECTION INTRAVENOUS; SUBCUTANEOUS at 13:30

## 2021-07-02 RX ADMIN — CHLORHEXIDINE GLUCONATE 1 APPLICATION(S): 213 SOLUTION TOPICAL at 05:38

## 2021-07-02 RX ADMIN — SIMVASTATIN 40 MILLIGRAM(S): 20 TABLET, FILM COATED ORAL at 21:10

## 2021-07-02 RX ADMIN — Medication 325 MILLIGRAM(S): at 12:14

## 2021-07-02 NOTE — PROGRESS NOTE ADULT - ATTENDING COMMENTS
61 y.o. gentlemen s/p R MCA and R NORMAN stent placement due to symptomatic stenosis in these vessels. Post op course is uneventful except for asymptomatic hemorrhagic transformation of recent MCA infarction. Neurologically stable and unchanged and today complains of R groin pain since after angio. Continued observation as patient has stable h/h and good femoral and peripheral pulses.

## 2021-07-02 NOTE — PROGRESS NOTE ADULT - SUBJECTIVE AND OBJECTIVE BOX
LENGTH OF HOSPITAL STAY: 9d    CHIEF COMPLAINT:   Patient is a 61y old  Male who presents with a chief complaint of Lf. facial droop/ LUE wkness (02 Jul 2021 12:06)      HISTORY OF PRESENTING ILLNESS:    HPI:  60 yo male with PMHx of R NORMAN stroke (April 2020) and R M1 and A1 severe stenosis follows outpatient with Dr. Guajardo, (last angiogram on February 2021), on Plavix     CC: fluctuating left leg weakness resulting in stumbling.      History goes back to: 1 month ago when patient started notice episodic left arm weakness.     ROS is negative for: no fever, no chills, no SOB, chest pains or palpitations, no abdominal pain, nausea or vomiting. Patient denies facial droop, difficulty swallowing     In the ED: VS all WNL  Labs significant for: WNL  Troponin: negative  CT head: Acute infarct involving the right frontal lobe in the precentral gyrus.  Chronic infarct of the right periventricular white matter, new from prior exams detailed above.  Additional chronic infarcts of the bilateral basal ganglia and left cerebellum, stable.    CTA head and neck: Severe stenosis and/or nonocclusive thrombus of the M1 segment of the right MCA, with diminished flow of the distal right MCA circulation, new from prior CTA 4/27/2020. 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    CT PERFUSION:, there are 229 cc of ischemic penumbra in the right frontoparietal lobes, partially accounted for by chronic infarcts of the right frontoparietal white matter.    NIHSS in ED=3  stroke code called. Not a candidate for tPA due to unknown last normal LWN    No overnight events.    Subjective: Patient is seen and examined at bedside.  Patient is doing well, he has no complaints. His weakness improved compared to admission date.   He has no confusion, he is alert and conscious.     PAST MEDICAL & SURGICAL HISTORY  PAST MEDICAL & SURGICAL HISTORY:  HTN (hypertension)    Psoriasis    CVA (cerebral vascular accident)    No significant past surgical history      SOCIAL HISTORY:    ALLERGIES:  No Known Allergies    MEDICATIONS:  STANDING MEDICATIONS  amLODIPine   Tablet 2.5 milliGRAM(s) Oral daily  aspirin 325 milliGRAM(s) Oral daily  chlorhexidine 4% Liquid 1 Application(s) Topical daily  clopidogrel Tablet 75 milliGRAM(s) Oral daily  heparin   Injectable 5000 Unit(s) SubCutaneous every 8 hours  losartan 50 milliGRAM(s) Oral daily  melatonin 5 milliGRAM(s) Oral at bedtime  melatonin 3 milliGRAM(s) Oral at bedtime  pantoprazole    Tablet 40 milliGRAM(s) Oral before breakfast  simvastatin 40 milliGRAM(s) Oral at bedtime  sodium chloride 0.9%. 1000 milliLiter(s) IV Continuous <Continuous>  venlafaxine 37.5 milliGRAM(s) Oral at bedtime    PRN MEDICATIONS    VITALS:   T(F): 98.6  HR: 74  BP: 137/74  RR: 100  SpO2: 99%    LABS:                        11.5   10.40 )-----------( 262      ( 02 Jul 2021 04:30 )             35.7     07-02    140  |  109  |  14  ----------------------------<  95  3.9   |  23  |  1.1    Ca    8.3<L>      02 Jul 2021 04:30  Mg     1.7     07-02    TPro  5.4<L>  /  Alb  3.4<L>  /  TBili  0.3  /  DBili  x   /  AST  28  /  ALT  26  /  AlkPhos  93  07-02    PT/INR - ( 02 Jul 2021 04:30 )   PT: 12.50 sec;   INR: 1.09 ratio         PTT - ( 02 Jul 2021 04:30 )  PTT:29.4 sec              RADIOLOGY:    PHYSICAL EXAM:  GEN: No acute distress  LUNGS: Clear to auscultation bilaterally   HEART: S1/S2 present. RRR.   ABD: Soft, non-tender, non-distended. Bowel sounds present  EXT: no peripheral edema, +2 peripheral pulses  NEURO: AAOX2, Left side motor power +2 vs. right side +3, no vertigo, no confusion

## 2021-07-02 NOTE — CHART NOTE - NSCHARTNOTEFT_GEN_A_CORE
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 Estrada, s/p angiogram, 2/2021, on Plavix, presented with fluctuating left leg weakness resulting in stumbling.     ROS is negative for: no fever, no chills, no SOB, chest pains or palpitations, no abdominal pain, nausea or vomiting. Patient denies facial droop, difficulty swallowing.    In the ED: VS all WNL  Labs significant for: WNL  Troponin: negative  CT head: Acute infarct involving the right frontal lobe in the precentral gyrus.  CTA head and neck: Severe stenosis and/or nonocclusive thrombus of the M1 segment of the right MCA, with diminished flow of the distal right MCA circulation.  CT PERFUSION:, there are 229 cc of ischemic penumbra in the right frontoparietal lobes, partially accounted for by chronic infarcts of the right frontoparietal white matter.  NIHSS in ED=3  Stroke code called. Not a candidate for tPA due to unknown last normal LWN.  He was admitted to the MICU from 06/23 till 28/06 where he was transferred to . On 06/30, patient underwent cerebral angiogram and angioplasty w/ recanalization of right MCA and right NORMAN with 2 stents. Immediate post-procedure CTH showing likely mixed extravasated contrast with subarachnoid hemorrhage. Repeat CTH 3 hr afterwards showing no significant change. He was sent to MICU for monitoring.     ICU course:  · Assessment	  # Evolving Acute Rt. Frontal lobe Infarct in the precentral gyrus s/p recanalization of R MCA and R NORMAN vessels by REINALDO (06/30)  - Continue Clopidogrel 75 mg q24 PO ,  mg q24 PO  - Follow up with PT/OT, physiatry c/s  - OOB to chair, ambulate as tolerated  - Stat CTH if any worsening or deterioration in neurological examination and call NCC/Neurology  - Keep -180 mmHg    #HTN  - BP high - 171/82 - restarted on home medications (Amlodipine and Losartan)  - Keep -180 mmHg    # Grade I Diastolic HFpEF   - Compensated; EF 58%    # hx of Depression/Anxiety   - c/w Venlafaxine 37.5mg Daily    # GERD   - c/w Protonix 40mg Daily    # DLD   - c/w Simvastatin 40 mg PO [id number]    #Respiratory  - HOB > 35 degrees  - Aspiration precautions  - Keep SaO2 > 92%    Diet: Regular  Activity: OOB to chair  DVT ppx: Heparin 5000 units subq every 8 hours  GI ppx: PPI  FULL CODE  Dispo: MICU -> stroke unit.    Sign out to HPI:  62 yo male with recent right NORMAN CVA, 4/2020, with right M1/A1 severe stenosis, followed outpatient by Dr. Estrada, s/p angiogram, 2/2021, on Plavix, presented with fluctuating left leg weakness resulting in stumbling.     ROS is negative for: no fever, no chills, no SOB, chest pains or palpitations, no abdominal pain, nausea or vomiting. Patient denies facial droop, difficulty swallowing.    In the ED: VS all WNL  Labs significant for: WNL  Troponin: negative  CT head: Acute infarct involving the right frontal lobe in the precentral gyrus.  CTA head and neck: Severe stenosis and/or nonocclusive thrombus of the M1 segment of the right MCA, with diminished flow of the distal right MCA circulation.  CT PERFUSION:, there are 229 cc of ischemic penumbra in the right frontoparietal lobes, partially accounted for by chronic infarcts of the right frontoparietal white matter.  NIHSS in ED=3  Stroke code called. Not a candidate for tPA due to unknown last normal LWN.  He was admitted to the MICU from 06/23 till 28/06 where he was transferred to . On 06/30, patient underwent cerebral angiogram and angioplasty w/ recanalization of right MCA and right NORMAN with 2 stents. Immediate post-procedure CTH showing likely mixed extravasated contrast with subarachnoid hemorrhage. Repeat CTH 3 hr afterwards showing no significant change. He was sent to MICU for monitoring.     ICU course:  · Assessment	  # Evolving Acute Rt. Frontal lobe Infarct in the precentral gyrus s/p recanalization of R MCA and R NORMAN vessels by REINALDO (06/30)  - Continue Clopidogrel 75 mg q24 PO ,  mg q24 PO  - Follow up with PT/OT, physiatry c/s  - OOB to chair, ambulate as tolerated  - Stat CTH if any worsening or deterioration in neurological examination and call NCC/Neurology  - Keep -180 mmHg    #HTN  - BP high - 171/82 - restarted on home medications (Amlodipine and Losartan)  - Keep -180 mmHg    # Grade I Diastolic HFpEF   - Compensated; EF 58%    # hx of Depression/Anxiety   - c/w Venlafaxine 37.5mg Daily    # GERD   - c/w Protonix 40mg Daily    # DLD   - c/w Simvastatin 40 mg PO [id number]    #Respiratory  - HOB > 35 degrees  - Aspiration precautions  - Keep SaO2 > 92%    Diet: Regular  Activity: OOB to chair  DVT ppx: Heparin 5000 units subq every 8 hours  GI ppx: PPI  FULL CODE  Dispo: MICU -> stroke unit.    Sign out to Dr. Eli #[id number].

## 2021-07-02 NOTE — PROGRESS NOTE ADULT - SUBJECTIVE AND OBJECTIVE BOX
OVERNIGHT EVENTS: evens noted, NS 50 CC/H    Vital Signs Last 24 Hrs  T(C): 37.6 (02 Jul 2021 04:00), Max: 37.6 (01 Jul 2021 20:00)  T(F): 99.6 (02 Jul 2021 04:00), Max: 99.6 (01 Jul 2021 20:00)  HR: 68 (02 Jul 2021 07:00) (62 - 88)  BP: 131/67 (02 Jul 2021 06:00) (116/56 - 167/61)  BP(mean): 86 (02 Jul 2021 06:00) (73 - 101)  RR: 19 (02 Jul 2021 07:00) (10 - 46)  SpO2: 99% (02 Jul 2021 07:00) (96% - 99%)    PHYSICAL EXAMINATION:    GENERAL: ill looking  HEENT: Head is normocephalic and atraumatic.    NECK: Supple.    LUNGS: dec bs both bases    HEART: Regular rate and rhythm without murmur.    ABDOMEN: Soft, nontender, and nondistended.      EXTREMITIES: Without any cyanosis, clubbing, rash, lesions or edema.    NEUROLOGIC: Grossly intact.    SKIN: No ulceration or induration present.      LABS:                        11.5   10.40 )-----------( 262      ( 02 Jul 2021 04:30 )             35.7     07-02    140  |  109  |  14  ----------------------------<  95  3.9   |  23  |  1.1    Ca    8.3<L>      02 Jul 2021 04:30  Mg     1.7     07-02    TPro  5.4<L>  /  Alb  3.4<L>  /  TBili  0.3  /  DBili  x   /  AST  28  /  ALT  26  /  AlkPhos  93  07-02    PT/INR - ( 02 Jul 2021 04:30 )   PT: 12.50 sec;   INR: 1.09 ratio         PTT - ( 02 Jul 2021 04:30 )  PTT:29.4 sec                      07-01-21 @ 07:01  -  07-02-21 @ 07:00  --------------------------------------------------------  IN: 2599 mL / OUT: 1840 mL / NET: 759 mL        MICROBIOLOGY:      MEDICATIONS  (STANDING):  aspirin 325 milliGRAM(s) Oral daily  chlorhexidine 4% Liquid 1 Application(s) Topical daily  clopidogrel Tablet 75 milliGRAM(s) Oral daily  heparin   Injectable 5000 Unit(s) SubCutaneous every 8 hours  melatonin 3 milliGRAM(s) Oral at bedtime  melatonin 5 milliGRAM(s) Oral at bedtime  pantoprazole    Tablet 40 milliGRAM(s) Oral before breakfast  simvastatin 40 milliGRAM(s) Oral at bedtime  sodium chloride 0.9%. 1000 milliLiter(s) (50 mL/Hr) IV Continuous <Continuous>  venlafaxine 37.5 milliGRAM(s) Oral at bedtime    MEDICATIONS  (PRN):      RADIOLOGY & ADDITIONAL STUDIES:

## 2021-07-02 NOTE — PROGRESS NOTE ADULT - ASSESSMENT
61 year old male known to have HTN, psoriasis, R NORMAN CVA (4/2020) with severe R M1,A1 stenosis, presented with  LLE weakness, CTH revealing acute R frontal lobe infarct, CTA revealed high grade stenosis vs thrombus M1 segment R MCA, multifocal severe stenoses along R A2 segment of NORMAN and moderate stenosis proximal L ICA s/p cerebral angiogram + angioplasty with recanalization of R MCA and R NORMAN with two stents, s/p subarachnoid hemorrhage      #Evolving Acute Rt. Frontal lobe Infarct in the precentral gyrus s/p recanalization of R MCA and R NORMAN vessels by REINALDO (06/30) s/p subarachnoid hemorrhage (06/30)  - Repeat CT (07/01) -  Slight interval decrease in the hyperdensity (likely combination of contrast and hemorrhage) within a right parietal sulcus and right sylvian fissure. Stable focal right frontal gyriform hyperdensity likely reflecting petechial hemorrhage of an acute infarct  - Continue Clopidogrel 75 mg q24 PO ,  mg q24 PO  - Follow up with PT/OT, physiatry c/s  - OOB to chair, ambulate as tolerated  - Stat CTH if any worsening or deterioration in neurological examination and call NCC/Neurology  - Keep -180 mmHg    #HTN  - BP high - 171/82 - restarted on home medications (Amlodipine and Losartan)  - Keep -180 mmHg      # Grade I Diastolic HFpEF   - Compensated; EF 58%    # hx of Depression/Anxiety   - c/w Venlafaxine 37.5mg Daily    # GERD   - c/w Protonix 40mg Daily    # DLD   - Simvastatin 40 mg PO [id number]    Diet: Regular  Activity: OOB to chair  DVT ppx: Heparin 5000 units subq every 8 hours  GI ppx: PPI  FULL CODE  Dispo: MICU -> stroke unit

## 2021-07-02 NOTE — PROGRESS NOTE ADULT - SUBJECTIVE AND OBJECTIVE BOX
HPI:  Patient is a 60 yo M PMHx HTN, psoriasis, R NORMAN CVA 4/2020 with severe R M1,A1 stenosis as shown on previous diagnostic cerebral angiogram with Dr. Guajardo Feb 2021. P/w LLE weakness, gait disturbance, CTH revealing acute R frontal lobe infarct, CTA revealed high grade stenosis vs thrombus M1 segment R MCA, multifocal severe stenoses along R A2 segment of NORMAN and moderate stenosis proximal L ICA. Patient is now POD2 s/p cerebral angiogram + angioplasty w. recanalization of R MCA and R NORMAN with two stents. Patient received 7000u Heparin and an integrilin bolus intra-op. NIHSS post-procedure 2 for mild LUE drift and mild L facial, consistent with exams during hospital course. R groin dressing CDI following procedure without evidence of hematoma or bleeding. Postprocedure CTH showing likely mixed extravasated contrast with subarachnoid hemorrhage. Repeat CTH 3 hr afterwards showing no significant change. Repeat CTH the following AM revealing slight decrease in the hyperdensity.   No overnight events.   Neuroendovascular following for post-procedure monitoring. Patient denies HA, dizziness, change in vision, loss of sensation, sudden weakness, nausea, LE pain, and abd pain.     PAST MEDICAL AND SURGICAL HISTORY   HTN (hypertension)  Psoriasis  CVA (cerebral vascular accident)    Overnight Events: None    MEDICATIONS:   aspirin 325 milliGRAM(s) Oral daily  chlorhexidine 4% Liquid 1 Application(s) Topical daily  clopidogrel Tablet 75 milliGRAM(s) Oral daily  heparin   Injectable 5000 Unit(s) SubCutaneous every 8 hours  melatonin 3 milliGRAM(s) Oral at bedtime  melatonin 5 milliGRAM(s) Oral at bedtime  pantoprazole    Tablet 40 milliGRAM(s) Oral before breakfast  simvastatin 40 milliGRAM(s) Oral at bedtime  sodium chloride 0.9%. 1000 milliLiter(s) IV Continuous <Continuous>  venlafaxine 37.5 milliGRAM(s) Oral at bedtime    VITALS:   T(F): 98.3 (07-02-21 @ 07:43), Max: 99.6 (07-01-21 @ 20:00)  HR: 64 (07-02-21 @ 08:00) (62 - 88)  BP: 156/81 (07-02-21 @ 08:00) (120/59 - 170/84)  RR: 15 (07-02-21 @ 08:00) (10 - 46)  SpO2: 99% (07-02-21 @ 08:00) (96% - 100%)    LABS:                      11.5   10.40 )-----------( 262      ( 02 Jul 2021 04:30 )             35.7     07-02    140  |  109  |  14  ----------------------------<  95  3.9   |  23  |  1.1    Ca    8.3<L>      02 Jul 2021 04:30  Mg     1.7     07-02    TPro  5.4<L>  /  Alb  3.4<L>  /  TBili  0.3  /  DBili  x   /  AST  28  /  ALT  26  /  AlkPhos  93  07-02  PT/INR - ( 02 Jul 2021 04:30 )   PT: 12.50 sec;   INR: 1.09 ratio    PTT - ( 02 Jul 2021 04:30 )  PTT:29.4 sec  LIVER FUNCTIONS - ( 02 Jul 2021 04:30 )  Alb: 3.4 g/dL / Pro: 5.4 g/dL / ALK PHOS: 93 U/L / ALT: 26 U/L / AST: 28 U/L / GGT: x           Exam:   General - NAD.   Neuro - NIHSS 2 for LUE minor drift and mild L facial. AAOx3, no sensory deficits, no visual deficits, no aphasia, no dysarthria, no dysmetria. LUE strength 4/5, RUE/LLE/RLE stength 5/5   Extremities - RLE groin dressing CDI with quickclot and tegaderm. No evidence of bleeding or hematoma at site. Distal pulses palpable b/l.   Abd: NTND.     Radiology:     < from: CT Head No Cont (07.01.21 @ 07:18) >  IMPRESSION:  Since the CT head performed on the prior day:    1.  Patient is again noted to be status post stent placement within the right A1 and M1 segments.    2.  Slight interval decrease in the hyperdensity (likely combination of contrast and hemorrhage) within a right parietal sulcus and right sylvian fissure.    3.  Stable focal right frontal gyriform hyperdensity likely reflecting petechial hemorrhage of an acute infarct.    4.  Stable patchy hypodensities within the right frontal lobe consistent with scattered acute infarcts.    5.  Stable chronic lacunar infarcts within the basal ganglia and left cerebellum.    < end of copied text >  < from: CT Head No Cont (06.30.21 @ 21:03) >  IMPRESSION:    Since CT head dated 6/30/2021 at 4:54 PM,    No significant interval change in right parietal sulcus and right sylvian fissure extra-axial hyperdensities, likely mixed extravasated contrast with subarachnoid hemorrhage.    Slightly decreased petechial hemorrhages along the right precentral gyrus.    Unchanged evolving infarct in the right frontal lobe.    < end of copied text >  < from: CT Head No Cont (06.30.21 @ 17:04) >    IMPRESSION:    Interval placement of right middle cerebral artery M1 and right anterior cerebral artery A1 branch stents, with subarachnoid hemorrhage extending from the right parietal region superiorly to the right sylvian fissure inferiorly.    Increased attenuation of the arterial and venous vasculature, possibly related to same day neurointervention angiography.    Evolving infarct of theright frontal lobe.    Dr. Reginaldo Elkins discussed preliminary findings with medical ICU team on 6/30/2021 at 5:31 PM with readback.      ATTENDING ADDENDUM:  Agree with the resident report. The patient is status post interval stent placements in the right M1 and A1 segments. There is contrast staining of the intracranial vessels and dural leaflets.    Linear hyperdensity along the right precentral gyrus likely represents petechial hemorrhage associated with the known acute infarct. Hyperdense extra-axial collection along the right parietal sulcus and right sylvian fissure likely represents mixed extravasated contrast with subarachnoid hemorrhage.    Redemonstration of scattered evolving acute infarct in the right frontal lobe.    < end of copied text >  < from: CT Angio Neck w/ IV Cont (06.23.21 @ 19:20) >    IMPRESSION:    CT PERFUSION:  UtilizingTmax > 6.0s and CBF < 30%, there are 229 cc of ischemic penumbra in the right MCA territory without core infarct.    CTA HEAD/NECK:  High-grade stenosis versus nonocclusive thrombus is noted of the M1 segment of the right MCA, with diminished distal MCA branch opacification, new from prior CTA 4/27/2020.    Multifocal severe stenoses are noted along the right A2 segment of the NORMAN which are new since the prior examination.    Moderate (approximately 60%) short segment stenosis of the proximal left internal carotid artery due to mixed calcified and noncalcified atherosclerotic plaque which is worsened since the prior CTA of 4/27/2020.    < end of copied text >    Suggestions:    Patient stable from NI perspective based on labs, vitals, and clinical exam.   Patient is to remain on DAPT and high dose statin until clinic follow up.     Recommending OOB to chair and walking today.   Recommending healthy lifestyle, Mediterranean diet, smoking cessation, alcohol cessation, avoid caffeinated beverages, avoid dehydration.    Patient will f/u outpatient NI clinic in 1-2 weeks for evaluation and further management discussions. Patient and wife were notified that they will be contacted with an appointment.     Management per NCC and ICU.     [phone]

## 2021-07-02 NOTE — PROGRESS NOTE ADULT - ASSESSMENT
IMPRESSION:    CVA/ R MCA infarct   HO NORMAN stenosis / MCA stenosis s/p angiogram and stenting by Neuro IR       PLAN:     CNS:  FU with Neuro IR.  Neurology follow up, DAPT    HEENT: Oral care     PULMONARY:  HOB @ 45 degrees.  Aspiration precautions     CARDIOVASCULAR:  Avoid volume overload , restart BP meds, DC IVF    GI: GI prophylaxis.  Feeding as tolerated.  Bowel regimen       RENAL:  Follow up lytes.  Correct as needed    INFECTIOUS DISEASE: Follow up cultures    HEMATOLOGICAL:  DVT prophylaxis.    ENDOCRINE:  Follow up FS.     D/C A-line   D/C jackson        Downgrade to stroke unit/ FLOOR when cleared by neuro  D/W Dr. Guajardo

## 2021-07-02 NOTE — PROGRESS NOTE ADULT - ATTENDING COMMENTS
62 yo patient with h/o psoriasis and multiple bihemispheric ischemic strokes who previously had an symptomatic ischemic stroke in the right MCA territory, had angiogram about 2m ago which showed severe stenosis of right M1 and right A1 as well as left A2. His HRMR vessel wall imaging showed eccentric enhancement around the stenotic segments. On this admission his CC was left arm weakness for few weeks as well as episodes of left leg weakness. His repeat angiogram on this admission showed significant worsening of the stenotic segments, especially the M1 segment. He is s/p angioplasty and stenting of A1 and M1, which resulted significant improvement of stenotic segments and distal perfusion. However given the history of psoriasis, not having significant atherosclerotic diseases anywhere else, and rapidly worsening of the MCA and NORMAN stenosis over two months, the likelihood of Secondary CNS vasculitis may be considered higher than atherosclerotic disease in this patient. Today patient is moving the left hand more and using it. He also expressed that he feels much better today. Will try to contact his Rheumatologist for management.

## 2021-07-03 LAB
ALBUMIN SERPL ELPH-MCNC: 3.8 G/DL — SIGNIFICANT CHANGE UP (ref 3.5–5.2)
ALP SERPL-CCNC: 114 U/L — SIGNIFICANT CHANGE UP (ref 30–115)
ALT FLD-CCNC: 58 U/L — HIGH (ref 0–41)
ANION GAP SERPL CALC-SCNC: 10 MMOL/L — SIGNIFICANT CHANGE UP (ref 7–14)
AST SERPL-CCNC: 49 U/L — HIGH (ref 0–41)
BASOPHILS # BLD AUTO: 0.1 K/UL — SIGNIFICANT CHANGE UP (ref 0–0.2)
BASOPHILS NFR BLD AUTO: 1 % — SIGNIFICANT CHANGE UP (ref 0–1)
BILIRUB SERPL-MCNC: 0.4 MG/DL — SIGNIFICANT CHANGE UP (ref 0.2–1.2)
BUN SERPL-MCNC: 13 MG/DL — SIGNIFICANT CHANGE UP (ref 10–20)
CALCIUM SERPL-MCNC: 8.8 MG/DL — SIGNIFICANT CHANGE UP (ref 8.5–10.1)
CHLORIDE SERPL-SCNC: 107 MMOL/L — SIGNIFICANT CHANGE UP (ref 98–110)
CO2 SERPL-SCNC: 23 MMOL/L — SIGNIFICANT CHANGE UP (ref 17–32)
CREAT SERPL-MCNC: 1 MG/DL — SIGNIFICANT CHANGE UP (ref 0.7–1.5)
EOSINOPHIL # BLD AUTO: 0.99 K/UL — HIGH (ref 0–0.7)
EOSINOPHIL NFR BLD AUTO: 10.3 % — HIGH (ref 0–8)
GLUCOSE SERPL-MCNC: 89 MG/DL — SIGNIFICANT CHANGE UP (ref 70–99)
HCT VFR BLD CALC: 36.9 % — LOW (ref 42–52)
HGB BLD-MCNC: 11.9 G/DL — LOW (ref 14–18)
IMM GRANULOCYTES NFR BLD AUTO: 0.6 % — HIGH (ref 0.1–0.3)
LYMPHOCYTES # BLD AUTO: 3.17 K/UL — SIGNIFICANT CHANGE UP (ref 1.2–3.4)
LYMPHOCYTES # BLD AUTO: 33.1 % — SIGNIFICANT CHANGE UP (ref 20.5–51.1)
MAGNESIUM SERPL-MCNC: 1.8 MG/DL — SIGNIFICANT CHANGE UP (ref 1.8–2.4)
MCHC RBC-ENTMCNC: 28.1 PG — SIGNIFICANT CHANGE UP (ref 27–31)
MCHC RBC-ENTMCNC: 32.2 G/DL — SIGNIFICANT CHANGE UP (ref 32–37)
MCV RBC AUTO: 87 FL — SIGNIFICANT CHANGE UP (ref 80–94)
MONOCYTES # BLD AUTO: 0.93 K/UL — HIGH (ref 0.1–0.6)
MONOCYTES NFR BLD AUTO: 9.7 % — HIGH (ref 1.7–9.3)
NEUTROPHILS # BLD AUTO: 4.34 K/UL — SIGNIFICANT CHANGE UP (ref 1.4–6.5)
NEUTROPHILS NFR BLD AUTO: 45.3 % — SIGNIFICANT CHANGE UP (ref 42.2–75.2)
NRBC # BLD: 0 /100 WBCS — SIGNIFICANT CHANGE UP (ref 0–0)
PLATELET # BLD AUTO: 284 K/UL — SIGNIFICANT CHANGE UP (ref 130–400)
POTASSIUM SERPL-MCNC: 3.9 MMOL/L — SIGNIFICANT CHANGE UP (ref 3.5–5)
POTASSIUM SERPL-SCNC: 3.9 MMOL/L — SIGNIFICANT CHANGE UP (ref 3.5–5)
PROT SERPL-MCNC: 5.8 G/DL — LOW (ref 6–8)
RBC # BLD: 4.24 M/UL — LOW (ref 4.7–6.1)
RBC # FLD: 13.9 % — SIGNIFICANT CHANGE UP (ref 11.5–14.5)
SODIUM SERPL-SCNC: 140 MMOL/L — SIGNIFICANT CHANGE UP (ref 135–146)
WBC # BLD: 9.59 K/UL — SIGNIFICANT CHANGE UP (ref 4.8–10.8)
WBC # FLD AUTO: 9.59 K/UL — SIGNIFICANT CHANGE UP (ref 4.8–10.8)

## 2021-07-03 PROCEDURE — 99232 SBSQ HOSP IP/OBS MODERATE 35: CPT

## 2021-07-03 RX ORDER — SENNA PLUS 8.6 MG/1
2 TABLET ORAL AT BEDTIME
Refills: 0 | Status: DISCONTINUED | OUTPATIENT
Start: 2021-07-03 | End: 2021-07-07

## 2021-07-03 RX ORDER — POLYETHYLENE GLYCOL 3350 17 G/17G
17 POWDER, FOR SOLUTION ORAL DAILY
Refills: 0 | Status: DISCONTINUED | OUTPATIENT
Start: 2021-07-03 | End: 2021-07-07

## 2021-07-03 RX ORDER — MAGNESIUM SULFATE 500 MG/ML
2 VIAL (ML) INJECTION ONCE
Refills: 0 | Status: COMPLETED | OUTPATIENT
Start: 2021-07-03 | End: 2021-07-03

## 2021-07-03 RX ORDER — MAGNESIUM HYDROXIDE 400 MG/1
30 TABLET, CHEWABLE ORAL ONCE
Refills: 0 | Status: COMPLETED | OUTPATIENT
Start: 2021-07-03 | End: 2021-07-03

## 2021-07-03 RX ORDER — NICOTINE POLACRILEX 2 MG
1 GUM BUCCAL DAILY
Refills: 0 | Status: DISCONTINUED | OUTPATIENT
Start: 2021-07-03 | End: 2021-07-07

## 2021-07-03 RX ADMIN — HEPARIN SODIUM 5000 UNIT(S): 5000 INJECTION INTRAVENOUS; SUBCUTANEOUS at 21:00

## 2021-07-03 RX ADMIN — Medication 5 MILLIGRAM(S): at 21:00

## 2021-07-03 RX ADMIN — Medication 325 MILLIGRAM(S): at 11:32

## 2021-07-03 RX ADMIN — HEPARIN SODIUM 5000 UNIT(S): 5000 INJECTION INTRAVENOUS; SUBCUTANEOUS at 13:07

## 2021-07-03 RX ADMIN — MAGNESIUM HYDROXIDE 30 MILLILITER(S): 400 TABLET, CHEWABLE ORAL at 18:42

## 2021-07-03 RX ADMIN — SIMVASTATIN 40 MILLIGRAM(S): 20 TABLET, FILM COATED ORAL at 21:00

## 2021-07-03 RX ADMIN — PANTOPRAZOLE SODIUM 40 MILLIGRAM(S): 20 TABLET, DELAYED RELEASE ORAL at 06:10

## 2021-07-03 RX ADMIN — Medication 50 GRAM(S): at 13:02

## 2021-07-03 RX ADMIN — Medication 37.5 MILLIGRAM(S): at 21:00

## 2021-07-03 RX ADMIN — CLOPIDOGREL BISULFATE 75 MILLIGRAM(S): 75 TABLET, FILM COATED ORAL at 11:32

## 2021-07-03 RX ADMIN — LOSARTAN POTASSIUM 50 MILLIGRAM(S): 100 TABLET, FILM COATED ORAL at 05:39

## 2021-07-03 RX ADMIN — HEPARIN SODIUM 5000 UNIT(S): 5000 INJECTION INTRAVENOUS; SUBCUTANEOUS at 05:40

## 2021-07-03 RX ADMIN — SENNA PLUS 2 TABLET(S): 8.6 TABLET ORAL at 21:00

## 2021-07-03 RX ADMIN — Medication 1 PATCH: at 20:22

## 2021-07-03 RX ADMIN — SODIUM CHLORIDE 50 MILLILITER(S): 9 INJECTION INTRAMUSCULAR; INTRAVENOUS; SUBCUTANEOUS at 05:43

## 2021-07-03 RX ADMIN — AMLODIPINE BESYLATE 2.5 MILLIGRAM(S): 2.5 TABLET ORAL at 05:39

## 2021-07-03 RX ADMIN — CHLORHEXIDINE GLUCONATE 1 APPLICATION(S): 213 SOLUTION TOPICAL at 05:40

## 2021-07-03 RX ADMIN — Medication 1 PATCH: at 13:02

## 2021-07-03 NOTE — PROGRESS NOTE ADULT - ASSESSMENT
ASSESSMENT & PLAN    # Evolving Acute Rt. Frontal lobe Infarct in the precentral gyrus s/p recanalization of R MCA and R NORMAN vessels by REINALDO (06/30)  > CTA H&N(6/23): New severe stenoses along Rt. NORMAN & Rt. MCA; Worsening chonic moderate stenosis along Lf. NORMAN  > CT Perfusion (6/23): Ischemic penumbra in Rt. MCA territory without core infarct.   > Repeat CTH on 6/24 -> Evolving acute infarct;   > MRI 6/25: Acute right MCA infarcts.  > CT (3/60) : subarachnoid hemorrhage extending from the right parietal region superiorly to the right sylvian fissure inferiorly.  > CT(7/1) : Slight interval decrease in the hyperdensity,   > LDL(6/23): 79    > TTE(7/2): EF 66%, bicuspid aortic valve , mild mitral and tricuspid regurg   > Continue Clopidogrel, ASA  > PT/OT, physiatry c/s  > OOB to chair, ambulate as tolerated    # Grade I Diastolic HFpEF   - Compensated; EF 66%    # hx of Depression/Anxiety   - c/w Venlafaxine 37.5mg Daily    # GERD   - c/w Protonix 40mg Daily    # DLD   - Simvastatin 40 mg PO [id number]    # smoking cravings   - on nicotine patch     Diet: Regular  Activity: OOB to chair  DVT ppx: Heparin 5000 units subq every 8 hours  GI ppx: PPI  FULL CODE  Dispo:                                                                              --------------------------------------------------------    # Handoff   > ASSESSMENT & PLAN    # Evolving Acute Rt. Frontal lobe Infarct in the precentral gyrus s/p recanalization of R MCA and R NORMAN vessels by REINALDO (06/30)  > CTA H&N(6/23): New severe stenoses along Rt. NORMAN & Rt. MCA; Worsening chonic moderate stenosis along Lf. NORMAN  > CT Perfusion (6/23): Ischemic penumbra in Rt. MCA territory without core infarct.   > Repeat CTH on 6/24 -> Evolving acute infarct;   > MRI 6/25: Acute right MCA infarcts.  > CT (3/60) : subarachnoid hemorrhage extending from the right parietal region superiorly to the right sylvian fissure inferiorly.  > CT(7/1) : Slight interval decrease in the hyperdensity,   > LDL(6/23): 79    > TTE(7/2): EF 66%, bicuspid aortic valve , mild mitral and tricuspid regurg   > Continue Clopidogrel, ASA  > PT/OT, physiatry f/u  > OOB to chair, ambulate as tolerated      # Grade I Diastolic HFpEF   - Compensated; EF 66%    # hx of Depression/Anxiety   - c/w Venlafaxine 37.5mg Daily    # GERD   - c/w Protonix 40mg Daily    # DLD   - Simvastatin 40 mg PO [id number]    # smoking  - on nicotine patch     # Depression  - on venlafaxine     Diet: Regular  Activity: OOB to chair  DVT ppx: Heparin 5000 units subq every 8 hours  GI ppx: PPI  FULL CODE  Dispo: home                                                                              --------------------------------------------------------    # Handoff   > monitor BP , can go home 7/4 if stable

## 2021-07-03 NOTE — PROGRESS NOTE ADULT - SUBJECTIVE AND OBJECTIVE BOX
VEENA BRANDON 61y Male  MRN#: [id number]   CODE STATUS: full code     Hospital Day: 10d    Pt is currently admitted with the primary diagnosis of : Left Legg Weakness    SUBJECTIVE  Hospital Course  HPI:  60 yo male with PMHx of R NORMAN stroke (April 2020) and R M1 and A1 severe stenosis follows outpatient with Dr. Guajardo, (last angiogram on February 2021), on Plavix   CC: fluctuating left leg weakness resulting in stumbling.   HPI : 1 month ago when patient started notice episodic left arm weakness.   ROS is negative for: no fever, no chills, no SOB, chest pains or palpitations, no abdominal pain, nausea or vomiting. Patient denies facial droop, difficulty swallowing   In the ED: VS all WNL  Labs significant for: WNL  Troponin: negative  CT head:   Acute infarct involving the right frontal lobe in the precentral gyrus.  Chronic infarct of the right periventricular white matter, new from prior exams detailed above.  Additional chronic infarcts of the bilateral basal ganglia and left cerebellum, stable.  CTA head and neck:   Severe stenosis and/or nonocclusive thrombus of the M1 segment of the right MCA, with diminished flow of the distal right MCA circulation, new from prior CTA 4/27/2020. 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  CT PERFUSION:,   there are 229 cc of ischemic penumbra in the right frontoparietal lobes, partially accounted for by chronic infarcts of the right frontoparietal white matter.  NIHSS in ED=3  Stroke code called. Not a candidate for tPA due to unknown last normal LWN    Overnight events   None     Subjective complaints  Patient is doing well, he has no complaints. His weakness improved compared to admission date.   He has no confusion, he is alert and conscious.     Present Today:   - Srivastava:  No [ x ], Yes [   ] : Indication:     - Type of IV Access:       .. CVC/Piccline:  No [ x ], Yes [   ] : Indication:       .. Midline: No [ x ], Yes [   ] : Indication:                                             ----------------------------------------------------------  OBJECTIVE  PAST MEDICAL & SURGICAL HISTORY  HTN (hypertension)    Psoriasis    CVA (cerebral vascular accident)    No significant past surgical history                                              -----------------------------------------------------------  ALLERGIES:  No Known Allergies                                            ------------------------------------------------------------    HOME MEDICATIONS  Home Medications:  amLODIPine 2.5 mg oral tablet: 1 tab(s) orally once a day (18 Jan 2021 09:40)  clopidogrel 75 mg oral tablet: 1 tab(s) orally once a day (18 Jan 2021 09:40)  Humira Pre-Filled Syringe: subcutaneous 2 times a month (18 Jan 2021 09:40)  losartan 50 mg oral tablet: 1 tab(s) orally once a day (18 Jan 2021 09:40)  venlafaxine 37.5 mg oral tablet: 1 tab(s) orally once a day (at bedtime) (18 Jan 2021 09:40)  Vitamin B12:  (18 Jan 2021 09:40)                           MEDICATIONS:  STANDING MEDICATIONS  amLODIPine   Tablet 2.5 milliGRAM(s) Oral daily  aspirin 325 milliGRAM(s) Oral daily  chlorhexidine 4% Liquid 1 Application(s) Topical daily  clopidogrel Tablet 75 milliGRAM(s) Oral daily  heparin   Injectable 5000 Unit(s) SubCutaneous every 8 hours  losartan 50 milliGRAM(s) Oral daily  melatonin 3 milliGRAM(s) Oral at bedtime  melatonin 5 milliGRAM(s) Oral at bedtime  pantoprazole    Tablet 40 milliGRAM(s) Oral before breakfast  simvastatin 40 milliGRAM(s) Oral at bedtime  sodium chloride 0.9%. 1000 milliLiter(s) IV Continuous <Continuous>  venlafaxine 37.5 milliGRAM(s) Oral at bedtime    PRN MEDICATIONS                                            ------------------------------------------------------------  VITAL SIGNS: Last 24 Hours  T(C): 36.3 (03 Jul 2021 08:05), Max: 37.8 (02 Jul 2021 21:18)  T(F): 97.3 (03 Jul 2021 08:05), Max: 100.1 (02 Jul 2021 21:18)  HR: 69 (03 Jul 2021 08:05) (59 - 77)  BP: 151/72 (03 Jul 2021 08:05) (136/75 - 171/82)  BP(mean): 99 (03 Jul 2021 08:05) (89 - 136)  RR: 18 (03 Jul 2021 08:05) (16 - 20)  SpO2: 99% (03 Jul 2021 08:05) (98% - 99%)      07-02-21 @ 07:01  -  07-03-21 @ 07:00  --------------------------------------------------------  IN: 1000 mL / OUT: 2370 mL / NET: -1370 mL                                             --------------------------------------------------------------  LABS:                        11.9   9.59  )-----------( 284      ( 03 Jul 2021 06:28 )             36.9     07-03    140  |  107  |  13  ----------------------------<  89  3.9   |  23  |  1.0    Ca    8.8      03 Jul 2021 06:28  Mg     1.8     07-03    TPro  5.8<L>  /  Alb  3.8  /  TBili  0.4  /  DBili  x   /  AST  49<H>  /  ALT  58<H>  /  AlkPhos  114  07-03    PT/INR - ( 02 Jul 2021 04:30 )   PT: 12.50 sec;   INR: 1.09 ratio         PTT - ( 02 Jul 2021 04:30 )  PTT:29.4 sec                                              -------------------------------------------------------------  RADIOLOGY:  < from: CT Head No Cont (07.01.21 @ 07:18) >  IMPRESSION:  Since the CT head performed on the prior day:  1.  Patient is again noted to be status post stent placement within the right A1 and M1 segments.  2.  Slight interval decrease in the hyperdensity (likely combination of contrast and hemorrhage) within a right parietal sulcus and right sylvian fissure.  3.  Stable focal right frontal gyriform hyperdensity likely reflecting petechial hemorrhage of an acute infarct.  4.  Stable patchy hypodensities within the right frontal lobe consistent with scattered acute infarcts.  5.  Stable chronic lacunar infarcts within the basal ganglia and left cerebellum.    < from: CT Head No Cont (06.30.21 @ 17:04) >  Interval placement of right middle cerebral artery M1 and right anterior cerebral artery A1 branch stents, with subarachnoid hemorrhage extending from the right parietal region superiorly to the right sylvian fissure inferiorly.  Increased attenuation of the arterial and venous vasculature, possibly related to same day neurointervention angiography.  chronic lacunar infarcts within the basal ganglia and left cerebellum.    < from: CT Head No Cont (06.30.21 @ 21:03) >  No significant interval change in right parietal sulcus and right sylvian fissure extra-axial hyperdensities, likely mixed extravasated contrast with subarachnoid hemorrhage.    Slightly decreased petechial hemorrhages along the right precentral gyrus.    Unchanged evolving infarct in the right frontal lobe.    < end of copied text >    < from: CT Brain Stroke Protocol (06.23.21 @ 18:50) >  Acute infarct involving the right frontal lobe in the precentral gyrus.  Chronic infarct of the right periventricular white matter, new from prior exams detailed above.  Additional chronic infarcts of the bilateral basal ganglia and left cerebellum, stable.  Dr. Reginaldo Elkins discussed preliminary findings with Dr. Kristian Hood on 6/23/2021 at 7:40 PM with readback.    < from: CT Perfusion w/ Maps w/ IV Cont (06.23.21 @ 19:06) >  CT PERFUSION:  UtilizingTmax > 6.0s and CBF < 30%, there are 229 cc of ischemic penumbra in the right MCA territory without core infarct.  CTA HEAD/NECK:  High-grade stenosis versus nonocclusive thrombus is noted of the M1 segment of the right MCA, with diminished distal MCA branch opacification, new from prior CTA 4/27/2020.  Multifocal severe stenoses are noted along the right A2 segment of the NORMAN which are new since the prior examination.  Moderate (approximately 60%) short segment stenosis of the proximal left internal carotid artery due to mixed calcified and noncalcified atherosclerotic plaque which is worsened since the prior CTA of 4/27/2020.  Dr. Reginaldo Elkins discussed preliminary findings with Stephy Ramirez on 6/23/2021 at 8:21 PM with readback.      < from: CT Angio Head w/ IV Cont (06.23.21 @ 19:17) >  CT PERFUSION:  UtilizingTmax > 6.0s and CBF < 30%, there are 229 cc of ischemic penumbra in the right MCA territory without core infarct.  CTA HEAD/NECK:  High-grade stenosis versus nonocclusive thrombus is noted of the M1 segment of the right MCA, with diminished distal MCA branch opacification, new from prior CTA 4/27/2020.  Multifocal severe stenoses are noted along the right A2 segment of the NORMAN which are new since the prior examination.  Moderate (approximately 60%) short segment stenosis of the proximal left internal carotid artery due to mixed calcified and noncalcified atherosclerotic plaque which is worsened since the prior CTA of 4/27/2020.    < from: CT Angio Neck w/ IV Cont (06.23.21 @ 19:20) >  CT PERFUSION:  UtilizingTmax > 6.0s and CBF < 30%, there are 229 cc of ischemic penumbra in the right MCA territory without core infarct.  CTA HEAD/NECK:  High-grade stenosis versus nonocclusive thrombus is noted of the M1 segment of the right MCA, with diminished distal MCA branch opacification, new from prior CTA 4/27/2020.  Multifocal severe stenoses are noted along the right A2 segment of the NORMAN which are new since the prior examination.  Moderate (approximately 60%) short segment stenosis of the proximal left internal carotid artery due to mixed calcified and noncalcified atherosclerotic plaque which is worsened since the prior CTA of 4/27/2020.    < from: Xray Chest 1 View- PORTABLE-Urgent (Xray Chest 1 View- PORTABLE-Urgent .) (06.24.21 @ 12:14) >  No radiographic evidence of acute cardiopulmonary disease.    < from: CT Head No Cont (06.24.21 @ 23:18) >  Evolving acute infarcts involving the right frontal lobe likely in an MCA and NORMAN distribution. No evidence ofhemorrhagic transformation or significant mass effect.    Chronic bilateral basal ganglia as well as left cerebellar lacunar infarcts.    < from: MR Head No Cont (06.25.21 @ 16:58) >  Acute right MCA infarcts.    < from: Xray Chest 1 View- PORTABLE-Routine (Xray Chest 1 View- PORTABLE-Routine in AM.) (06.28.21 @ 06:23) >  No radiographic evidence of acute cardiopulmonary disease.                                                --------------------------------------------------------------    PHYSICAL EXAM:  GENERAL: NAD, lying in bed comfortably  HEAD:  Atraumatic, Normocephalic, no obvious mouth droop   EYES: full visual fields , PERRLA   NECK: Supple   CHEST/LUNG: Clear to auscultation bilaterally; No rales, rhonchi, wheezing, or rubs. Unlabored respirations  HEART: Regular rate and rhythm; No murmurs, rubs, or gallops  ABDOMEN:  Soft, Nontender, Nondistended.   EXTREMITIES:  2+ Peripheral Pulses, L upper extremity drift , Sensory deficit L arm , L arm drift   NERVOUS SYSTEM:  Alert & Oriented X3, speech clear. NIHSS 3  MSK: L arm weakness on flexion, extension and  , no noticeable weakness L leg                                           --------------------------------------------------------------    ASSESSMENT & PLAN    # Evolving Acute Rt. Frontal lobe Infarct in the precentral gyrus s/p recanalization of R MCA and R NORMAN vessels by REINALDO (06/30)  > CTA H&N(6/23): New severe stenoses along Rt. NORMAN & Rt. MCA; Worsening chonic moderate stenosis along Lf. NORMAN  > CT Perfusion (6/23): Ischemic penumbra in Rt. MCA territory without core infarct.   > Repeat CTH on 6/24 -> Evolving acute infarct;   > MRI 6/25: Acute right MCA infarcts.  > CT (3/60) : subarachnoid hemorrhage extending from the right parietal region superiorly to the right sylvian fissure inferiorly.  > CT(7/1) : Slight interval decrease in the hyperdensity,   > LDL(6): 79 /   > TTE(7/2): EF 66%, bicuspid aortic valve , mild mitral and tricuspid regurg   > Continue Clopidogrel, ASA  > PT/OT, physiatry c/s  > OOB to chair, ambulate as tolerated    # Grade I Diastolic HFpEF   - Compensated; EF 66%    # hx of Depression/Anxiety   - c/w Venlafaxine 37.5mg Daily    # GERD   - c/w Protonix 40mg Daily    # DLD   - Simvastatin 40 mg PO [id number]    Diet: Regular  Activity: OOB to chair  DVT ppx: Heparin 5000 units subq every 8 hours  GI ppx: PPI  FULL CODE  Dispo:                                                                              --------------------------------------------------------    # Handoff      VEENA BRANDON 61y Male  MRN#: [id number]   CODE STATUS: full code     Hospital Day: 10d    Pt is currently admitted with the primary diagnosis of : Left Legg Weakness    SUBJECTIVE  Hospital Course  HPI:  62 yo male with PMHx of R NORMAN stroke (April 2020) and R M1 and A1 severe stenosis follows outpatient with Dr. Guajardo, (last angiogram on February 2021), on Plavix   CC: fluctuating left leg weakness resulting in stumbling.   HPI : 1 month ago when patient started notice episodic left arm weakness.   ROS is negative for: no fever, no chills, no SOB, chest pains or palpitations, no abdominal pain, nausea or vomiting. Patient denies facial droop, difficulty swallowing   In the ED: VS all WNL  Labs significant for: WNL  Troponin: negative  CT head:   Acute infarct involving the right frontal lobe in the precentral gyrus.  Chronic infarct of the right periventricular white matter, new from prior exams detailed above.  Additional chronic infarcts of the bilateral basal ganglia and left cerebellum, stable.  CTA head and neck:   Severe stenosis and/or nonocclusive thrombus of the M1 segment of the right MCA, with diminished flow of the distal right MCA circulation, new from prior CTA 4/27/2020. 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  CT PERFUSION:,   there are 229 cc of ischemic penumbra in the right frontoparietal lobes, partially accounted for by chronic infarcts of the right frontoparietal white matter.  NIHSS in ED=3  Stroke code called. Not a candidate for tPA due to unknown last normal LWN    Overnight events   None     Subjective complaints  Patient is doing well, he has no complaints. His weakness improved compared to admission date.   He has no confusion, he is alert and conscious.     Present Today:   - Srivastava:  No [ x ], Yes [   ] : Indication:     - Type of IV Access:       .. CVC/Piccline:  No [ x ], Yes [   ] : Indication:       .. Midline: No [ x ], Yes [   ] : Indication:                                             ----------------------------------------------------------  OBJECTIVE  PAST MEDICAL & SURGICAL HISTORY  HTN (hypertension)    Psoriasis    CVA (cerebral vascular accident)    No significant past surgical history                                              -----------------------------------------------------------  ALLERGIES:  No Known Allergies                                            ------------------------------------------------------------    HOME MEDICATIONS  Home Medications:  amLODIPine 2.5 mg oral tablet: 1 tab(s) orally once a day (18 Jan 2021 09:40)  clopidogrel 75 mg oral tablet: 1 tab(s) orally once a day (18 Jan 2021 09:40)  Humira Pre-Filled Syringe: subcutaneous 2 times a month (18 Jan 2021 09:40)  losartan 50 mg oral tablet: 1 tab(s) orally once a day (18 Jan 2021 09:40)  venlafaxine 37.5 mg oral tablet: 1 tab(s) orally once a day (at bedtime) (18 Jan 2021 09:40)  Vitamin B12:  (18 Jan 2021 09:40)                           MEDICATIONS:  STANDING MEDICATIONS  amLODIPine   Tablet 2.5 milliGRAM(s) Oral daily  aspirin 325 milliGRAM(s) Oral daily  chlorhexidine 4% Liquid 1 Application(s) Topical daily  clopidogrel Tablet 75 milliGRAM(s) Oral daily  heparin   Injectable 5000 Unit(s) SubCutaneous every 8 hours  losartan 50 milliGRAM(s) Oral daily  melatonin 3 milliGRAM(s) Oral at bedtime  melatonin 5 milliGRAM(s) Oral at bedtime  pantoprazole    Tablet 40 milliGRAM(s) Oral before breakfast  simvastatin 40 milliGRAM(s) Oral at bedtime  sodium chloride 0.9%. 1000 milliLiter(s) IV Continuous <Continuous>  venlafaxine 37.5 milliGRAM(s) Oral at bedtime    PRN MEDICATIONS                                            ------------------------------------------------------------  VITAL SIGNS: Last 24 Hours  T(C): 36.3 (03 Jul 2021 08:05), Max: 37.8 (02 Jul 2021 21:18)  T(F): 97.3 (03 Jul 2021 08:05), Max: 100.1 (02 Jul 2021 21:18)  HR: 69 (03 Jul 2021 08:05) (59 - 77)  BP: 151/72 (03 Jul 2021 08:05) (136/75 - 171/82)  BP(mean): 99 (03 Jul 2021 08:05) (89 - 136)  RR: 18 (03 Jul 2021 08:05) (16 - 20)  SpO2: 99% (03 Jul 2021 08:05) (98% - 99%)      07-02-21 @ 07:01  -  07-03-21 @ 07:00  --------------------------------------------------------  IN: 1000 mL / OUT: 2370 mL / NET: -1370 mL                                             --------------------------------------------------------------  LABS:                        11.9   9.59  )-----------( 284      ( 03 Jul 2021 06:28 )             36.9     07-03    140  |  107  |  13  ----------------------------<  89  3.9   |  23  |  1.0    Ca    8.8      03 Jul 2021 06:28  Mg     1.8     07-03    TPro  5.8<L>  /  Alb  3.8  /  TBili  0.4  /  DBili  x   /  AST  49<H>  /  ALT  58<H>  /  AlkPhos  114  07-03    PT/INR - ( 02 Jul 2021 04:30 )   PT: 12.50 sec;   INR: 1.09 ratio         PTT - ( 02 Jul 2021 04:30 )  PTT:29.4 sec                                              -------------------------------------------------------------  RADIOLOGY:  < from: CT Head No Cont (07.01.21 @ 07:18) >  IMPRESSION:  Since the CT head performed on the prior day:  1.  Patient is again noted to be status post stent placement within the right A1 and M1 segments.  2.  Slight interval decrease in the hyperdensity (likely combination of contrast and hemorrhage) within a right parietal sulcus and right sylvian fissure.  3.  Stable focal right frontal gyriform hyperdensity likely reflecting petechial hemorrhage of an acute infarct.  4.  Stable patchy hypodensities within the right frontal lobe consistent with scattered acute infarcts.  5.  Stable chronic lacunar infarcts within the basal ganglia and left cerebellum.    < from: CT Head No Cont (06.30.21 @ 17:04) >  Interval placement of right middle cerebral artery M1 and right anterior cerebral artery A1 branch stents, with subarachnoid hemorrhage extending from the right parietal region superiorly to the right sylvian fissure inferiorly.  Increased attenuation of the arterial and venous vasculature, possibly related to same day neurointervention angiography.  chronic lacunar infarcts within the basal ganglia and left cerebellum.    < from: CT Head No Cont (06.30.21 @ 21:03) >  No significant interval change in right parietal sulcus and right sylvian fissure extra-axial hyperdensities, likely mixed extravasated contrast with subarachnoid hemorrhage.    Slightly decreased petechial hemorrhages along the right precentral gyrus.    Unchanged evolving infarct in the right frontal lobe.    < end of copied text >    < from: CT Brain Stroke Protocol (06.23.21 @ 18:50) >  Acute infarct involving the right frontal lobe in the precentral gyrus.  Chronic infarct of the right periventricular white matter, new from prior exams detailed above.  Additional chronic infarcts of the bilateral basal ganglia and left cerebellum, stable.  Dr. Reginaldo Elkins discussed preliminary findings with Dr. Kristian Hood on 6/23/2021 at 7:40 PM with readback.    < from: CT Perfusion w/ Maps w/ IV Cont (06.23.21 @ 19:06) >  CT PERFUSION:  UtilizingTmax > 6.0s and CBF < 30%, there are 229 cc of ischemic penumbra in the right MCA territory without core infarct.  CTA HEAD/NECK:  High-grade stenosis versus nonocclusive thrombus is noted of the M1 segment of the right MCA, with diminished distal MCA branch opacification, new from prior CTA 4/27/2020.  Multifocal severe stenoses are noted along the right A2 segment of the NORMAN which are new since the prior examination.  Moderate (approximately 60%) short segment stenosis of the proximal left internal carotid artery due to mixed calcified and noncalcified atherosclerotic plaque which is worsened since the prior CTA of 4/27/2020.  Dr. Reginaldo Elkins discussed preliminary findings with Stephy Ramirez on 6/23/2021 at 8:21 PM with readback.      < from: CT Angio Head w/ IV Cont (06.23.21 @ 19:17) >  CT PERFUSION:  UtilizingTmax > 6.0s and CBF < 30%, there are 229 cc of ischemic penumbra in the right MCA territory without core infarct.  CTA HEAD/NECK:  High-grade stenosis versus nonocclusive thrombus is noted of the M1 segment of the right MCA, with diminished distal MCA branch opacification, new from prior CTA 4/27/2020.  Multifocal severe stenoses are noted along the right A2 segment of the NORMAN which are new since the prior examination.  Moderate (approximately 60%) short segment stenosis of the proximal left internal carotid artery due to mixed calcified and noncalcified atherosclerotic plaque which is worsened since the prior CTA of 4/27/2020.    < from: CT Angio Neck w/ IV Cont (06.23.21 @ 19:20) >  CT PERFUSION:  UtilizingTmax > 6.0s and CBF < 30%, there are 229 cc of ischemic penumbra in the right MCA territory without core infarct.  CTA HEAD/NECK:  High-grade stenosis versus nonocclusive thrombus is noted of the M1 segment of the right MCA, with diminished distal MCA branch opacification, new from prior CTA 4/27/2020.  Multifocal severe stenoses are noted along the right A2 segment of the NORMAN which are new since the prior examination.  Moderate (approximately 60%) short segment stenosis of the proximal left internal carotid artery due to mixed calcified and noncalcified atherosclerotic plaque which is worsened since the prior CTA of 4/27/2020.    < from: Xray Chest 1 View- PORTABLE-Urgent (Xray Chest 1 View- PORTABLE-Urgent .) (06.24.21 @ 12:14) >  No radiographic evidence of acute cardiopulmonary disease.    < from: CT Head No Cont (06.24.21 @ 23:18) >  Evolving acute infarcts involving the right frontal lobe likely in an MCA and NORMAN distribution. No evidence ofhemorrhagic transformation or significant mass effect.    Chronic bilateral basal ganglia as well as left cerebellar lacunar infarcts.    < from: MR Head No Cont (06.25.21 @ 16:58) >  Acute right MCA infarcts.    < from: Xray Chest 1 View- PORTABLE-Routine (Xray Chest 1 View- PORTABLE-Routine in AM.) (06.28.21 @ 06:23) >  No radiographic evidence of acute cardiopulmonary disease.                                                --------------------------------------------------------------    PHYSICAL EXAM:  GENERAL: NAD, lying in bed comfortably  HEAD:  Atraumatic, Normocephalic, no obvious mouth droop   EYES: full visual fields , PERRLA   NECK: Supple   CHEST/LUNG: Clear to auscultation bilaterally; No rales, rhonchi, wheezing, or rubs. Unlabored respirations  HEART: Regular rate and rhythm; No murmurs, rubs, or gallops  ABDOMEN:  Soft, Nontender, Nondistended.   EXTREMITIES:  2+ Peripheral Pulses, L upper extremity drift , Sensory deficit L arm , L arm drift   NERVOUS SYSTEM:  Alert & Oriented X3, speech clear. NIHSS 3  MSK: L arm weakness on flexion, extension and  , no noticeable weakness L leg                                           --------------------------------------------------------------    ASSESSMENT & PLAN    # Evolving Acute Rt. Frontal lobe Infarct in the precentral gyrus s/p recanalization of R MCA and R NORMAN vessels by REINALDO (06/30)  > CTA H&N(6/23): New severe stenoses along Rt. NORMAN & Rt. MCA; Worsening chonic moderate stenosis along Lf. NORMAN  > CT Perfusion (6/23): Ischemic penumbra in Rt. MCA territory without core infarct.   > Repeat CTH on 6/24 -> Evolving acute infarct;   > MRI 6/25: Acute right MCA infarcts.  > CT (3/60) : subarachnoid hemorrhage extending from the right parietal region superiorly to the right sylvian fissure inferiorly.  > CT(7/1) : Slight interval decrease in the hyperdensity,   > LDL(6): 79 /   > TTE(7/2): EF 66%, bicuspid aortic valve , mild mitral and tricuspid regurg   > Continue Clopidogrel, ASA  > PT/OT, physiatry c/s  > OOB to chair, ambulate as tolerated    # Grade I Diastolic HFpEF   - Compensated; EF 66%    # hx of Depression/Anxiety   - c/w Venlafaxine 37.5mg Daily    # GERD   - c/w Protonix 40mg Daily    # DLD   - Simvastatin 40 mg PO [id number]    Diet: Regular  Activity: OOB to chair  DVT ppx: Heparin 5000 units subq every 8 hours  GI ppx: PPI  FULL CODE  Dispo:                                                                              --------------------------------------------------------    # Handoff   >     VEENA BRANDON 61y Male  MRN#: [id number]   CODE STATUS: full code     Hospital Day: 10d    Pt is currently admitted with the primary diagnosis of : Left Legg Weakness    SUBJECTIVE  Hospital Course  HPI:  60 yo male with PMHx of R NORMAN stroke (April 2020) and R M1 and A1 severe stenosis follows outpatient with Dr. Guajardo, (last angiogram on February 2021), on Plavix   CC: fluctuating left leg weakness resulting in stumbling.   HPI : 1 month ago when patient started notice episodic left arm weakness.   ROS is negative for: no fever, no chills, no SOB, chest pains or palpitations, no abdominal pain, nausea or vomiting. Patient denies facial droop, difficulty swallowing   In the ED: VS all WNL  Labs significant for: WNL  Troponin: negative  CT head:   Acute infarct involving the right frontal lobe in the precentral gyrus.  Chronic infarct of the right periventricular white matter, new from prior exams detailed above.  Additional chronic infarcts of the bilateral basal ganglia and left cerebellum, stable.  CTA head and neck:   Severe stenosis and/or nonocclusive thrombus of the M1 segment of the right MCA, with diminished flow of the distal right MCA circulation, new from prior CTA 4/27/2020. 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  CT PERFUSION:,   there are 229 cc of ischemic penumbra in the right frontoparietal lobes, partially accounted for by chronic infarcts of the right frontoparietal white matter.  NIHSS in ED=3  Stroke code called. Not a candidate for tPA due to unknown last normal LWN    Overnight events   None     Subjective complaints  Patient is doing well, he has no complaints. His weakness improved compared to admission date.   He has no confusion, he is alert and conscious.     Present Today:   - Srivastava:  No [ x ], Yes [   ] : Indication:     - Type of IV Access:       .. CVC/Piccline:  No [ x ], Yes [   ] : Indication:       .. Midline: No [ x ], Yes [   ] : Indication:                                             ----------------------------------------------------------  OBJECTIVE  PAST MEDICAL & SURGICAL HISTORY  HTN (hypertension)    Psoriasis    CVA (cerebral vascular accident)    No significant past surgical history                                              -----------------------------------------------------------  ALLERGIES:  No Known Allergies                                            ------------------------------------------------------------    HOME MEDICATIONS  Home Medications:  amLODIPine 2.5 mg oral tablet: 1 tab(s) orally once a day (18 Jan 2021 09:40)  clopidogrel 75 mg oral tablet: 1 tab(s) orally once a day (18 Jan 2021 09:40)  Humira Pre-Filled Syringe: subcutaneous 2 times a month (18 Jan 2021 09:40)  losartan 50 mg oral tablet: 1 tab(s) orally once a day (18 Jan 2021 09:40)  venlafaxine 37.5 mg oral tablet: 1 tab(s) orally once a day (at bedtime) (18 Jan 2021 09:40)  Vitamin B12:  (18 Jan 2021 09:40)                           MEDICATIONS:  STANDING MEDICATIONS  amLODIPine   Tablet 2.5 milliGRAM(s) Oral daily  aspirin 325 milliGRAM(s) Oral daily  chlorhexidine 4% Liquid 1 Application(s) Topical daily  clopidogrel Tablet 75 milliGRAM(s) Oral daily  heparin   Injectable 5000 Unit(s) SubCutaneous every 8 hours  losartan 50 milliGRAM(s) Oral daily  melatonin 3 milliGRAM(s) Oral at bedtime  melatonin 5 milliGRAM(s) Oral at bedtime  pantoprazole    Tablet 40 milliGRAM(s) Oral before breakfast  simvastatin 40 milliGRAM(s) Oral at bedtime  sodium chloride 0.9%. 1000 milliLiter(s) IV Continuous <Continuous>  venlafaxine 37.5 milliGRAM(s) Oral at bedtime    PRN MEDICATIONS                                            ------------------------------------------------------------  VITAL SIGNS: Last 24 Hours  T(C): 36.3 (03 Jul 2021 08:05), Max: 37.8 (02 Jul 2021 21:18)  T(F): 97.3 (03 Jul 2021 08:05), Max: 100.1 (02 Jul 2021 21:18)  HR: 69 (03 Jul 2021 08:05) (59 - 77)  BP: 151/72 (03 Jul 2021 08:05) (136/75 - 171/82)  BP(mean): 99 (03 Jul 2021 08:05) (89 - 136)  RR: 18 (03 Jul 2021 08:05) (16 - 20)  SpO2: 99% (03 Jul 2021 08:05) (98% - 99%)      07-02-21 @ 07:01  -  07-03-21 @ 07:00  --------------------------------------------------------  IN: 1000 mL / OUT: 2370 mL / NET: -1370 mL                                             --------------------------------------------------------------  LABS:                        11.9   9.59  )-----------( 284      ( 03 Jul 2021 06:28 )             36.9     07-03    140  |  107  |  13  ----------------------------<  89  3.9   |  23  |  1.0    Ca    8.8      03 Jul 2021 06:28  Mg     1.8     07-03    TPro  5.8<L>  /  Alb  3.8  /  TBili  0.4  /  DBili  x   /  AST  49<H>  /  ALT  58<H>  /  AlkPhos  114  07-03    PT/INR - ( 02 Jul 2021 04:30 )   PT: 12.50 sec;   INR: 1.09 ratio         PTT - ( 02 Jul 2021 04:30 )  PTT:29.4 sec                                              -------------------------------------------------------------  RADIOLOGY:  < from: CT Head No Cont (07.01.21 @ 07:18) >  IMPRESSION:  Since the CT head performed on the prior day:  1.  Patient is again noted to be status post stent placement within the right A1 and M1 segments.  2.  Slight interval decrease in the hyperdensity (likely combination of contrast and hemorrhage) within a right parietal sulcus and right sylvian fissure.  3.  Stable focal right frontal gyriform hyperdensity likely reflecting petechial hemorrhage of an acute infarct.  4.  Stable patchy hypodensities within the right frontal lobe consistent with scattered acute infarcts.  5.  Stable chronic lacunar infarcts within the basal ganglia and left cerebellum.    < from: CT Head No Cont (06.30.21 @ 17:04) >  Interval placement of right middle cerebral artery M1 and right anterior cerebral artery A1 branch stents, with subarachnoid hemorrhage extending from the right parietal region superiorly to the right sylvian fissure inferiorly.  Increased attenuation of the arterial and venous vasculature, possibly related to same day neurointervention angiography.  chronic lacunar infarcts within the basal ganglia and left cerebellum.    < from: CT Head No Cont (06.30.21 @ 21:03) >  No significant interval change in right parietal sulcus and right sylvian fissure extra-axial hyperdensities, likely mixed extravasated contrast with subarachnoid hemorrhage.    Slightly decreased petechial hemorrhages along the right precentral gyrus.    Unchanged evolving infarct in the right frontal lobe.    < end of copied text >    < from: CT Brain Stroke Protocol (06.23.21 @ 18:50) >  Acute infarct involving the right frontal lobe in the precentral gyrus.  Chronic infarct of the right periventricular white matter, new from prior exams detailed above.  Additional chronic infarcts of the bilateral basal ganglia and left cerebellum, stable.  Dr. Reginaldo Elkins discussed preliminary findings with Dr. Kristian Hood on 6/23/2021 at 7:40 PM with readback.    < from: CT Perfusion w/ Maps w/ IV Cont (06.23.21 @ 19:06) >  CT PERFUSION:  UtilizingTmax > 6.0s and CBF < 30%, there are 229 cc of ischemic penumbra in the right MCA territory without core infarct.  CTA HEAD/NECK:  High-grade stenosis versus nonocclusive thrombus is noted of the M1 segment of the right MCA, with diminished distal MCA branch opacification, new from prior CTA 4/27/2020.  Multifocal severe stenoses are noted along the right A2 segment of the NORMAN which are new since the prior examination.  Moderate (approximately 60%) short segment stenosis of the proximal left internal carotid artery due to mixed calcified and noncalcified atherosclerotic plaque which is worsened since the prior CTA of 4/27/2020.  Dr. Reginaldo Elkins discussed preliminary findings with Stephy Ramirez on 6/23/2021 at 8:21 PM with readback.      < from: CT Angio Head w/ IV Cont (06.23.21 @ 19:17) >  CT PERFUSION:  UtilizingTmax > 6.0s and CBF < 30%, there are 229 cc of ischemic penumbra in the right MCA territory without core infarct.  CTA HEAD/NECK:  High-grade stenosis versus nonocclusive thrombus is noted of the M1 segment of the right MCA, with diminished distal MCA branch opacification, new from prior CTA 4/27/2020.  Multifocal severe stenoses are noted along the right A2 segment of the NORMAN which are new since the prior examination.  Moderate (approximately 60%) short segment stenosis of the proximal left internal carotid artery due to mixed calcified and noncalcified atherosclerotic plaque which is worsened since the prior CTA of 4/27/2020.    < from: CT Angio Neck w/ IV Cont (06.23.21 @ 19:20) >  CT PERFUSION:  UtilizingTmax > 6.0s and CBF < 30%, there are 229 cc of ischemic penumbra in the right MCA territory without core infarct.  CTA HEAD/NECK:  High-grade stenosis versus nonocclusive thrombus is noted of the M1 segment of the right MCA, with diminished distal MCA branch opacification, new from prior CTA 4/27/2020.  Multifocal severe stenoses are noted along the right A2 segment of the NORMAN which are new since the prior examination.  Moderate (approximately 60%) short segment stenosis of the proximal left internal carotid artery due to mixed calcified and noncalcified atherosclerotic plaque which is worsened since the prior CTA of 4/27/2020.    < from: Xray Chest 1 View- PORTABLE-Urgent (Xray Chest 1 View- PORTABLE-Urgent .) (06.24.21 @ 12:14) >  No radiographic evidence of acute cardiopulmonary disease.    < from: CT Head No Cont (06.24.21 @ 23:18) >  Evolving acute infarcts involving the right frontal lobe likely in an MCA and NORMAN distribution. No evidence ofhemorrhagic transformation or significant mass effect.    Chronic bilateral basal ganglia as well as left cerebellar lacunar infarcts.    < from: MR Head No Cont (06.25.21 @ 16:58) >  Acute right MCA infarcts.    < from: Xray Chest 1 View- PORTABLE-Routine (Xray Chest 1 View- PORTABLE-Routine in AM.) (06.28.21 @ 06:23) >  No radiographic evidence of acute cardiopulmonary disease.                                                --------------------------------------------------------------    PHYSICAL EXAM:  GENERAL: NAD, lying in bed comfortably  HEAD:  Atraumatic, Normocephalic, no obvious mouth droop   EYES: full visual fields , PERRLA   NECK: Supple   CHEST/LUNG: Clear to auscultation bilaterally; No rales, rhonchi, wheezing, or rubs. Unlabored respirations  HEART: Regular rate and rhythm; No murmurs, rubs, or gallops  ABDOMEN:  Soft, Nontender, Nondistended.   EXTREMITIES:  2+ Peripheral Pulses, L upper extremity drift , Sensory deficit L arm , L arm drift   NERVOUS SYSTEM:  Alert & Oriented X3, speech clear. NIHSS 3  MSK: L arm weakness on flexion, extension and  , no noticeable weakness L leg                                           --------------------------------------------------------------         VEENA BRANDON 61y Male  MRN#: [id number]   CODE STATUS: full code     Hospital Day: 10d    Pt is currently admitted with the primary diagnosis of : Left Leg Weakness    SUBJECTIVE  Hospital Course  HPI:  62 yo male with PMHx of R NORMAN stroke (April 2020) and R M1 and A1 severe stenosis follows outpatient with Dr. Guajardo, (last angiogram on February 2021), on Plavix   CC: fluctuating left leg weakness resulting in stumbling.   HPI : 1 month ago when patient started notice episodic left arm weakness.   ROS is negative for: no fever, no chills, no SOB, chest pains or palpitations, no abdominal pain, nausea or vomiting. Patient denies facial droop, difficulty swallowing   In the ED:   VS all WNL  Labs significant for: WNL  Troponin: negative  CT head:   Acute infarct involving the right frontal lobe in the precentral gyrus.  Chronic infarct of the right periventricular white matter, new from prior exams detailed above.  Additional chronic infarcts of the bilateral basal ganglia and left cerebellum, stable.  CTA head and neck:   Severe stenosis and/or nonocclusive thrombus of the M1 segment of the right MCA, with diminished flow of the distal right MCA circulation, new from prior CTA 4/27/2020. 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  CT PERFUSION:,   there are 229 cc of ischemic penumbra in the right frontoparietal lobes, partially accounted for by chronic infarcts of the right frontoparietal white matter.  NIHSS in ED=3  Stroke code called. Not a candidate for tPA due to unknown last normal LWN    Overnight events   None     Subjective complaints  Patient is doing well, he has no complaints. His weakness improved compared to admission date.   He has no confusion, he is alert and conscious.     Present Today:   - Srivastava:  No [ x ], Yes [   ] : Indication:     - Type of IV Access:       .. CVC/Piccline:  No [ x ], Yes [   ] : Indication:       .. Midline: No [ x ], Yes [   ] : Indication:                                             ----------------------------------------------------------  OBJECTIVE  PAST MEDICAL & SURGICAL HISTORY  HTN (hypertension)    Psoriasis    CVA (cerebral vascular accident)    No significant past surgical history                                              -----------------------------------------------------------  ALLERGIES:  No Known Allergies                                            ------------------------------------------------------------    HOME MEDICATIONS  Home Medications:  amLODIPine 2.5 mg oral tablet: 1 tab(s) orally once a day (18 Jan 2021 09:40)  clopidogrel 75 mg oral tablet: 1 tab(s) orally once a day (18 Jan 2021 09:40)  Humira Pre-Filled Syringe: subcutaneous 2 times a month (18 Jan 2021 09:40)  losartan 50 mg oral tablet: 1 tab(s) orally once a day (18 Jan 2021 09:40)  venlafaxine 37.5 mg oral tablet: 1 tab(s) orally once a day (at bedtime) (18 Jan 2021 09:40)  Vitamin B12:  (18 Jan 2021 09:40)                           MEDICATIONS:  STANDING MEDICATIONS  amLODIPine   Tablet 2.5 milliGRAM(s) Oral daily  aspirin 325 milliGRAM(s) Oral daily  chlorhexidine 4% Liquid 1 Application(s) Topical daily  clopidogrel Tablet 75 milliGRAM(s) Oral daily  heparin   Injectable 5000 Unit(s) SubCutaneous every 8 hours  losartan 50 milliGRAM(s) Oral daily  melatonin 3 milliGRAM(s) Oral at bedtime  melatonin 5 milliGRAM(s) Oral at bedtime  pantoprazole    Tablet 40 milliGRAM(s) Oral before breakfast  simvastatin 40 milliGRAM(s) Oral at bedtime  sodium chloride 0.9%. 1000 milliLiter(s) IV Continuous <Continuous>  venlafaxine 37.5 milliGRAM(s) Oral at bedtime    PRN MEDICATIONS                                            ------------------------------------------------------------  VITAL SIGNS: Last 24 Hours  T(C): 36.3 (03 Jul 2021 08:05), Max: 37.8 (02 Jul 2021 21:18)  T(F): 97.3 (03 Jul 2021 08:05), Max: 100.1 (02 Jul 2021 21:18)  HR: 69 (03 Jul 2021 08:05) (59 - 77)  BP: 151/72 (03 Jul 2021 08:05) (136/75 - 171/82)  BP(mean): 99 (03 Jul 2021 08:05) (89 - 136)  RR: 18 (03 Jul 2021 08:05) (16 - 20)  SpO2: 99% (03 Jul 2021 08:05) (98% - 99%)      07-02-21 @ 07:01  -  07-03-21 @ 07:00  --------------------------------------------------------  IN: 1000 mL / OUT: 2370 mL / NET: -1370 mL                                             --------------------------------------------------------------  LABS:                        11.9   9.59  )-----------( 284      ( 03 Jul 2021 06:28 )             36.9     07-03    140  |  107  |  13  ----------------------------<  89  3.9   |  23  |  1.0    Ca    8.8      03 Jul 2021 06:28  Mg     1.8     07-03    TPro  5.8<L>  /  Alb  3.8  /  TBili  0.4  /  DBili  x   /  AST  49<H>  /  ALT  58<H>  /  AlkPhos  114  07-03    PT/INR - ( 02 Jul 2021 04:30 )   PT: 12.50 sec;   INR: 1.09 ratio         PTT - ( 02 Jul 2021 04:30 )  PTT:29.4 sec                                              -------------------------------------------------------------  RADIOLOGY:  < from: CT Head No Cont (07.01.21 @ 07:18) >  IMPRESSION:  Since the CT head performed on the prior day:  1.  Patient is again noted to be status post stent placement within the right A1 and M1 segments.  2.  Slight interval decrease in the hyperdensity (likely combination of contrast and hemorrhage) within a right parietal sulcus and right sylvian fissure.  3.  Stable focal right frontal gyriform hyperdensity likely reflecting petechial hemorrhage of an acute infarct.  4.  Stable patchy hypodensities within the right frontal lobe consistent with scattered acute infarcts.  5.  Stable chronic lacunar infarcts within the basal ganglia and left cerebellum.    < from: CT Head No Cont (06.30.21 @ 17:04) >  Interval placement of right middle cerebral artery M1 and right anterior cerebral artery A1 branch stents, with subarachnoid hemorrhage extending from the right parietal region superiorly to the right sylvian fissure inferiorly.  Increased attenuation of the arterial and venous vasculature, possibly related to same day neurointervention angiography.  chronic lacunar infarcts within the basal ganglia and left cerebellum.    < from: CT Head No Cont (06.30.21 @ 21:03) >  No significant interval change in right parietal sulcus and right sylvian fissure extra-axial hyperdensities, likely mixed extravasated contrast with subarachnoid hemorrhage.    Slightly decreased petechial hemorrhages along the right precentral gyrus.    Unchanged evolving infarct in the right frontal lobe.    < end of copied text >    < from: CT Brain Stroke Protocol (06.23.21 @ 18:50) >  Acute infarct involving the right frontal lobe in the precentral gyrus.  Chronic infarct of the right periventricular white matter, new from prior exams detailed above.  Additional chronic infarcts of the bilateral basal ganglia and left cerebellum, stable.  Dr. Reginaldo Elkins discussed preliminary findings with Dr. Kristian Hood on 6/23/2021 at 7:40 PM with readback.    < from: CT Perfusion w/ Maps w/ IV Cont (06.23.21 @ 19:06) >  CT PERFUSION:  UtilizingTmax > 6.0s and CBF < 30%, there are 229 cc of ischemic penumbra in the right MCA territory without core infarct.  CTA HEAD/NECK:  High-grade stenosis versus nonocclusive thrombus is noted of the M1 segment of the right MCA, with diminished distal MCA branch opacification, new from prior CTA 4/27/2020.  Multifocal severe stenoses are noted along the right A2 segment of the NORMAN which are new since the prior examination.  Moderate (approximately 60%) short segment stenosis of the proximal left internal carotid artery due to mixed calcified and noncalcified atherosclerotic plaque which is worsened since the prior CTA of 4/27/2020.  Dr. Reginaldo Elkins discussed preliminary findings with Stephy Ramirez on 6/23/2021 at 8:21 PM with readback.      < from: CT Angio Head w/ IV Cont (06.23.21 @ 19:17) >  CT PERFUSION:  UtilizingTmax > 6.0s and CBF < 30%, there are 229 cc of ischemic penumbra in the right MCA territory without core infarct.  CTA HEAD/NECK:  High-grade stenosis versus nonocclusive thrombus is noted of the M1 segment of the right MCA, with diminished distal MCA branch opacification, new from prior CTA 4/27/2020.  Multifocal severe stenoses are noted along the right A2 segment of the NORMAN which are new since the prior examination.  Moderate (approximately 60%) short segment stenosis of the proximal left internal carotid artery due to mixed calcified and noncalcified atherosclerotic plaque which is worsened since the prior CTA of 4/27/2020.    < from: CT Angio Neck w/ IV Cont (06.23.21 @ 19:20) >  CT PERFUSION:  UtilizingTmax > 6.0s and CBF < 30%, there are 229 cc of ischemic penumbra in the right MCA territory without core infarct.  CTA HEAD/NECK:  High-grade stenosis versus nonocclusive thrombus is noted of the M1 segment of the right MCA, with diminished distal MCA branch opacification, new from prior CTA 4/27/2020.  Multifocal severe stenoses are noted along the right A2 segment of the NORMAN which are new since the prior examination.  Moderate (approximately 60%) short segment stenosis of the proximal left internal carotid artery due to mixed calcified and noncalcified atherosclerotic plaque which is worsened since the prior CTA of 4/27/2020.    < from: Xray Chest 1 View- PORTABLE-Urgent (Xray Chest 1 View- PORTABLE-Urgent .) (06.24.21 @ 12:14) >  No radiographic evidence of acute cardiopulmonary disease.    < from: CT Head No Cont (06.24.21 @ 23:18) >  Evolving acute infarcts involving the right frontal lobe likely in an MCA and NORMAN distribution. No evidence ofhemorrhagic transformation or significant mass effect.    Chronic bilateral basal ganglia as well as left cerebellar lacunar infarcts.    < from: MR Head No Cont (06.25.21 @ 16:58) >  Acute right MCA infarcts.    < from: Xray Chest 1 View- PORTABLE-Routine (Xray Chest 1 View- PORTABLE-Routine in AM.) (06.28.21 @ 06:23) >  No radiographic evidence of acute cardiopulmonary disease.                                            --------------------------------------------------------------    PHYSICAL EXAM:  GENERAL: NAD, lying in bed comfortably  HEAD:  Atraumatic, Normocephalic, no obvious mouth droop   EYES: full visual fields , PERRLA   NECK: Supple   CHEST/LUNG: Clear to auscultation bilaterally; No rales, rhonchi, wheezing, or rubs. Unlabored respirations  HEART: Regular rate and rhythm; No murmurs, rubs, or gallops  ABDOMEN:  Soft, Nontender, Nondistended.   EXTREMITIES:  2+ Peripheral Pulses, L upper extremity drift , Sensory deficit L arm , L arm drift   NERVOUS SYSTEM:  Alert & Oriented X3, speech clear. NIHSS 3  MSK: L arm weakness on flexion, extension and  , no noticeable weakness L leg                                           --------------------------------------------------------------

## 2021-07-03 NOTE — PROGRESS NOTE ADULT - ATTENDING COMMENTS
I have personally seen and examined this patient on 7/3 I have fully participated in the care of this patient.  I have reviewed all pertinent clinical information, including history, physical exam, plan and note.  Stable exam. No overnight event. Ready for discharge tomorrow on DAPT and Lipitpr.  I have reviewed all pertinent clinical information and reviewed all relevant imaging and diagnostic studies personally.  Recommendations as above.  Agree with above assessment except as noted.

## 2021-07-03 NOTE — PROGRESS NOTE ADULT - SUBJECTIVE AND OBJECTIVE BOX
Specialty: Neuro Critical Care     Interval Hx:    GCS:  Thacker-Snowden:  modified Krishnamurthy:  ICH score:  NIHSS:    HPI:  60 yo male with PMHx of R NORMAN stroke (April 2020) and R M1 and A1 severe stenosis follows outpatient with Dr. Guajardo, (last angiogram on February 2021), on Plavix     CC: fluctuating left leg weakness resulting in stumbling.      History goes back to: 1 month ago when patient started notice episodic left arm weakness.     ROS is negative for: no fever, no chills, no SOB, chest pains or palpitations, no abdominal pain, nausea or vomiting. Patient denies facial droop, difficulty swallowing     In the ED: VS all WNL  Labs significant for: WNL  Troponin: negative  CT head: Acute infarct involving the right frontal lobe in the precentral gyrus.  Chronic infarct of the right periventricular white matter, new from prior exams detailed above.  Additional chronic infarcts of the bilateral basal ganglia and left cerebellum, stable.    CTA head and neck: Severe stenosis and/or nonocclusive thrombus of the M1 segment of the right MCA, with diminished flow of the distal right MCA circulation, new from prior CTA 4/27/2020. 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    CT PERFUSION:, there are 229 cc of ischemic penumbra in the right frontoparietal lobes, partially accounted for by chronic infarcts of the right frontoparietal white matter.    NIHSS in ED=3  stroke code called. Not a candidate for tPA due to unknown last normal LWN       (23 Jun 2021 20:39)      Past Medical and Surgical Hx:  PAST MEDICAL & SURGICAL HISTORY:  HTN (hypertension)    Psoriasis    CVA (cerebral vascular accident)    No significant past surgical history        Allergies: No Known Allergies      ROS: Patient endorses no complaints at this time. Otherwise, 10-point ROS is negative.     PE:  Neurological:  Mental Status: Alert and Oriented to person, place, and time, cooperative, pleasant. Affect appropriate, thought, speech, and language coherent. Short- and long-term memory, abstract thinking and calculation intact.  Cranial Nerves:  II, III, IV, VI: PERRLA, EOM intact. No cranial nerve palsy or nystagmus noted.  V: facial sensation intact; masseter/ temporal muscles intact, corneal reflex intact bilaterally  VII: face symmetrical at rest and with expression  IX and X: no hoarseness, gag intact, palate/ uvula rise symmetrically  XI: SCM/ trapezius strength intact bilateral  XII: no dysarthria, tongue weakness/fasiculation   Motor: Normal muscle bulk/tone. Good posture. Strength 5/5 RUE and RLE and LLE.  strength on the LUE (3-4/5) is slightly less than the RUE.   Sensory: Sensation to light touch intact to trunk and bilaterally to both upper and lower extremities.   Cerebellar Function:  pronator drift negative.       Vital Signs:  ICU Vital Signs Last 24 Hrs  T(C): 36.3 (03 Jul 2021 08:05), Max: 37.8 (02 Jul 2021 21:18)  T(F): 97.3 (03 Jul 2021 08:05), Max: 100.1 (02 Jul 2021 21:18)  HR: 69 (03 Jul 2021 08:05) (59 - 77)  BP: 151/72 (03 Jul 2021 08:05) (136/75 - 171/82)  BP(mean): 99 (03 Jul 2021 08:05) (89 - 136)  ABP: --  ABP(mean): --  RR: 18 (03 Jul 2021 08:05) (16 - 20)  SpO2: 99% (03 Jul 2021 08:05) (98% - 99%)      Ventilator:      I/Os:  I&O's Detail    02 Jul 2021 07:01  -  03 Jul 2021 07:00  --------------------------------------------------------  IN:    sodium chloride 0.9%: 1000 mL  Total IN: 1000 mL    OUT:    Indwelling Catheter - Urethral (mL): 750 mL    Voided (mL): 1620 mL  Total OUT: 2370 mL    Total NET: -1370 mL          Labs:  07-03    140  |  107  |  13  ----------------------------<  89  3.9   |  23  |  1.0    Ca    8.8      03 Jul 2021 06:28  Mg     1.8     07-03    TPro  5.8<L>  /  Alb  3.8  /  TBili  0.4  /  DBili  x   /  AST  49<H>  /  ALT  58<H>  /  AlkPhos  114  07-03    CBC Full  -  ( 03 Jul 2021 06:28 )  WBC Count : 9.59 K/uL  RBC Count : 4.24 M/uL  Hemoglobin : 11.9 g/dL  Hematocrit : 36.9 %  Platelet Count - Automated : 284 K/uL  Mean Cell Volume : 87.0 fL  Mean Cell Hemoglobin : 28.1 pg  Mean Cell Hemoglobin Concentration : 32.2 g/dL  Auto Neutrophil # : 4.34 K/uL  Auto Lymphocyte # : 3.17 K/uL  Auto Monocyte # : 0.93 K/uL  Auto Eosinophil # : 0.99 K/uL  Auto Basophil # : 0.10 K/uL  Auto Neutrophil % : 45.3 %  Auto Lymphocyte % : 33.1 %  Auto Monocyte % : 9.7 %  Auto Eosinophil % : 10.3 %  Auto Basophil % : 1.0 %    PT/INR - ( 02 Jul 2021 04:30 )   PT: 12.50 sec;   INR: 1.09 ratio         PTT - ( 02 Jul 2021 04:30 )  PTT:29.4 sec  LIVER FUNCTIONS - ( 03 Jul 2021 06:28 )  Alb: 3.8 g/dL / Pro: 5.8 g/dL / ALK PHOS: 114 U/L / ALT: 58 U/L / AST: 49 U/L / GGT: x               Microbiology:        Medications Current and PRN:  MEDICATIONS  (STANDING):  amLODIPine   Tablet 2.5 milliGRAM(s) Oral daily  aspirin 325 milliGRAM(s) Oral daily  chlorhexidine 4% Liquid 1 Application(s) Topical daily  clopidogrel Tablet 75 milliGRAM(s) Oral daily  heparin   Injectable 5000 Unit(s) SubCutaneous every 8 hours  losartan 50 milliGRAM(s) Oral daily  melatonin 3 milliGRAM(s) Oral at bedtime  melatonin 5 milliGRAM(s) Oral at bedtime  pantoprazole    Tablet 40 milliGRAM(s) Oral before breakfast  simvastatin 40 milliGRAM(s) Oral at bedtime  sodium chloride 0.9%. 1000 milliLiter(s) (50 mL/Hr) IV Continuous <Continuous>  venlafaxine 37.5 milliGRAM(s) Oral at bedtime    MEDICATIONS  (PRN):      Radiology:  [facility] w/o contrast 7/1  IMPRESSION:  Since the CT head performed on the prior day:    1.  Patient is again noted to be status post stent placement within the right A1 and M1 segments.    2.  Slight interval decrease in the hyperdensity (likely combination of contrast and hemorrhage) within a right parietal sulcus and right sylvian fissure.    3.  Stable focal right frontal gyriform hyperdensity likely reflecting petechial hemorrhage of an acute infarct.    4.  Stable patchy hypodensities within the right frontal lobe consistent with scattered acute infarcts.    5.  Stable chronic lacunar infarcts within the basal ganglia and left cerebellum.      Assessment:       Recommendations:   [ ] Cont. DAPT  [ASA + Plavis] + Resume high-dose statin therapy  [ ] Pt. is stable for transfer to Neuro floor __ may dcr. freq. of neurochecks to q4-6h   [ ] SBP parameters liberalized to 120 - 160mmHg  [ ] PT/ OT eval/ treatment for mobilization __ OOB to chair   [ ] Warm compress to Rt. groin hematoma __ Acetaminophen prn as warranted  [ ] Pls restart Effexor for depression mgmt __ requested by patient and spouse  [ ] Pls restart  bi-monthly Humira for psoriatic arthritis__ next due 7/3 [wife can bring in medication if nonformulary/ unavailable per pharmarcy]   [ ] Maintain SpO2 > 92% w/ supplemental O2 as warranted  [ ] Maintain euglycemia 120 - 180mg/dL  [ ] Cont. HSQ/ SCDs prophylactically.   [ ] Outpatient f/u w/ Rheumatologist for vasculitic work, given h/o of psoriasis, and focal/ insidious worsening of  cerebral atherosclerosis.   [ ] Outpatient f/u w/ Dr. Guajardo in 1-2wks after discharge.       STEPHON Hopson     Specialty: Neurocritical Care     HPI: 62 yo male with PMHx of R NORMAN stroke (April 2020) and R M1 and A1 severe stenosis follows outpatient with Dr. Guajardo, (last angiogram on February 2021), on Plavix   CC: fluctuating left leg weakness resulting in stumbling.   History goes back to: 1 month ago when patient started notice episodic left arm weakness.   ROS is negative for: no fever, no chills, no SOB, chest pains or palpitations, no abdominal pain, nausea or vomiting. Patient denies facial droop, difficulty swallowing   In the ED: VS all WNL  Labs significant for: WNL  Troponin: negative  CT head: Acute infarct involving the right frontal lobe in the precentral gyrus.  Chronic infarct of the right periventricular white matter, new from prior exams detailed above.  Additional chronic infarcts of the bilateral basal ganglia and left cerebellum, stable.  CTA head and neck: Severe stenosis and/or nonocclusive thrombus of the M1 segment of the right MCA, with diminished flow of the distal right MCA circulation, new from prior CTA 4/27/2020. 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  CT PERFUSION:, there are 229 cc of ischemic penumbra in the right frontoparietal lobes, partially accounted for by chronic infarcts of the right frontoparietal white matter.  NIHSS in ED=3  stroke code called. Not a candidate for tPA due to unknown last normal LWN      Past Medical and Surgical Hx:  PAST MEDICAL & SURGICAL HISTORY:  HTN (hypertension)  Psoriasis  CVA (cerebral vascular accident)    No significant past surgical history      Allergies: No Known Allergies      ROS: Patient endorses no complaints at this time. Otherwise, 10-point ROS is negative.     PE:  Neurological:  Mental Status: Alert and Oriented to person, place, and time, cooperative, pleasant. Affect appropriate, thought, speech, and language coherent. Short- and long-term memory, abstract thinking and calculation intact.  Cranial Nerves:  II, III, IV, VI: PERRLA, EOM intact. No cranial nerve palsy or nystagmus noted.  V: facial sensation intact; masseter/ temporal muscles intact, corneal reflex intact bilaterally  VII: face symmetrical at rest and with expression  IX and X: no hoarseness, gag intact, palate/ uvula rise symmetrically  XI: SCM/ trapezius strength intact bilateral  XII: no dysarthria, tongue weakness/fasiculation   Motor: Normal muscle bulk/tone. Good posture. Strength 5/5 RUE and RLE and LLE.  strength on the LUE (3-4/5) is slightly less than the RUE.   Sensory: Sensation to light touch intact to trunk and bilaterally to both upper and lower extremities.   Cerebellar Function:  pronator drift negative.       Vital Signs:  ICU Vital Signs Last 24 Hrs  T(C): 36.3 (03 Jul 2021 08:05), Max: 37.8 (02 Jul 2021 21:18)  T(F): 97.3 (03 Jul 2021 08:05), Max: 100.1 (02 Jul 2021 21:18)  HR: 69 (03 Jul 2021 08:05) (59 - 77)  BP: 151/72 (03 Jul 2021 08:05) (136/75 - 171/82)  BP(mean): 99 (03 Jul 2021 08:05) (89 - 136)  ABP: --  ABP(mean): --  RR: 18 (03 Jul 2021 08:05) (16 - 20)  SpO2: 99% (03 Jul 2021 08:05) (98% - 99%)      I/Os:  I&O's Detail    02 Jul 2021 07:01  -  03 Jul 2021 07:00  --------------------------------------------------------  IN:    sodium chloride 0.9%: 1000 mL  Total IN: 1000 mL    OUT:    Indwelling Catheter - Urethral (mL): 750 mL    Voided (mL): 1620 mL  Total OUT: 2370 mL    Total NET: -1370 mL          Labs:  07-03    140  |  107  |  13  ----------------------------<  89  3.9   |  23  |  1.0    Ca    8.8      03 Jul 2021 06:28  Mg     1.8     07-03    TPro  5.8<L>  /  Alb  3.8  /  TBili  0.4  /  DBili  x   /  AST  49<H>  /  ALT  58<H>  /  AlkPhos  114  07-03    CBC Full  -  ( 03 Jul 2021 06:28 )  WBC Count : 9.59 K/uL  RBC Count : 4.24 M/uL  Hemoglobin : 11.9 g/dL  Hematocrit : 36.9 %  Platelet Count - Automated : 284 K/uL  Mean Cell Volume : 87.0 fL  Mean Cell Hemoglobin : 28.1 pg  Mean Cell Hemoglobin Concentration : 32.2 g/dL  Auto Neutrophil # : 4.34 K/uL  Auto Lymphocyte # : 3.17 K/uL  Auto Monocyte # : 0.93 K/uL  Auto Eosinophil # : 0.99 K/uL  Auto Basophil # : 0.10 K/uL  Auto Neutrophil % : 45.3 %  Auto Lymphocyte % : 33.1 %  Auto Monocyte % : 9.7 %  Auto Eosinophil % : 10.3 %  Auto Basophil % : 1.0 %    PT/INR - ( 02 Jul 2021 04:30 )   PT: 12.50 sec;   INR: 1.09 ratio         PTT - ( 02 Jul 2021 04:30 )  PTT:29.4 sec  LIVER FUNCTIONS - ( 03 Jul 2021 06:28 )  Alb: 3.8 g/dL / Pro: 5.8 g/dL / ALK PHOS: 114 U/L / ALT: 58 U/L / AST: 49 U/L / GGT: x               Medications Current and PRN:  MEDICATIONS  (STANDING):  amLODIPine   Tablet 2.5 milliGRAM(s) Oral daily  aspirin 325 milliGRAM(s) Oral daily  chlorhexidine 4% Liquid 1 Application(s) Topical daily  clopidogrel Tablet 75 milliGRAM(s) Oral daily  heparin   Injectable 5000 Unit(s) SubCutaneous every 8 hours  losartan 50 milliGRAM(s) Oral daily  melatonin 3 milliGRAM(s) Oral at bedtime  melatonin 5 milliGRAM(s) Oral at bedtime  pantoprazole    Tablet 40 milliGRAM(s) Oral before breakfast  simvastatin 40 milliGRAM(s) Oral at bedtime  sodium chloride 0.9%. 1000 milliLiter(s) (50 mL/Hr) IV Continuous <Continuous>  venlafaxine 37.5 milliGRAM(s) Oral at bedtime        Radiology:  [facility] w/o contrast 7/1  IMPRESSION:  Since the CT head performed on the prior day:  1.  Patient is again noted to be status post stent placement within the right A1 and M1 segments.  2.  Slight interval decrease in the hyperdensity (likely combination of contrast and hemorrhage) within a right parietal sulcus and right sylvian fissure.  3.  Stable focal right frontal gyriform hyperdensity likely reflecting petechial hemorrhage of an acute infarct.  4.  Stable patchy hypodensities within the right frontal lobe consistent with scattered acute infarcts.  5.  Stable chronic lacunar infarcts within the basal ganglia and left cerebellum.    Assessment: 60y/o M w/ known Rt. M1/ A1 severe stenosis, admitted 6/23 for worsening Lf. hemiparesis __ w/ acute Rt. MCA territorial infarct sec. to severe Rt. M1/A1 stenosis __ now POD # 3 s/p DSA w/ angioplasty + stenting of severely stenosed Rt. M1/ A1 by Dr. Guajardo.  Pt. had interval dev. of Rt. frontoparietal petechial hemorrhage s/p revascularization __ stable on rpt. imaging.       Recommendations:   [ ] Cont. DAPT  [ASA + Plavix] + Resume high-dose statin therapy  [ ] Pt. is stable for transfer to Neuro floor __ may dcr. freq. of neurochecks to q4-6h   [ ] SBP parameters liberalized to 120 - 160mmHg  [ ] PT/ OT eval/ treatment for mobilization __ OOB to chair   [ ] Warm compress to Rt. groin hematoma __ Acetaminophen prn as warranted  [ ] Pls restart Effexor for depression mgmt __ requested by patient and spouse  [ ] Pls restart  bi-monthly Humira for psoriatic arthritis__ next due 7/3 [wife can bring in medication if nonformulary/ unavailable per pharmarcy]   [ ] Maintain SpO2 > 92% w/ supplemental O2 as warranted  [ ] Maintain euglycemia 120 - 180mg/dL  [ ] Cont. HSQ/ SCDs prophylactically.   [ ] Outpatient f/u w/ Rheumatologist for vasculitic work, given h/o of psoriasis, and focal/ insidious worsening of  cerebral atherosclerosis.   [ ] Outpatient f/u w/ Dr. Guajardo in 1-2wks after discharge.       STEPHON Hopson     Specialty: Neurocritical Care     HPI: 60 yo male with PMHx of R NORMAN stroke (April 2020) and R M1 and A1 severe stenosis follows outpatient with Dr. Guajardo, (last angiogram on February 2021), on Plavix   CC: fluctuating left leg weakness resulting in stumbling.   History goes back to: 1 month ago when patient started notice episodic left arm weakness.   ROS is negative for: no fever, no chills, no SOB, chest pains or palpitations, no abdominal pain, nausea or vomiting. Patient denies facial droop, difficulty swallowing   In the ED: VS all WNL  Labs significant for: WNL  Troponin: negative  CT head: Acute infarct involving the right frontal lobe in the precentral gyrus.  Chronic infarct of the right periventricular white matter, new from prior exams detailed above.  Additional chronic infarcts of the bilateral basal ganglia and left cerebellum, stable.  CTA head and neck: Severe stenosis and/or nonocclusive thrombus of the M1 segment of the right MCA, with diminished flow of the distal right MCA circulation, new from prior CTA 4/27/2020. 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  CT PERFUSION:, there are 229 cc of ischemic penumbra in the right frontoparietal lobes, partially accounted for by chronic infarcts of the right frontoparietal white matter.  NIHSS in ED=3  stroke code called. Not a candidate for tPA due to unknown last normal LWN      Past Medical and Surgical Hx:  PAST MEDICAL & SURGICAL HISTORY:  HTN (hypertension)  Psoriasis  CVA (cerebral vascular accident)    No significant past surgical history      Allergies: No Known Allergies      ROS: Patient endorses no complaints at this time. Otherwise, 10-point ROS is negative.     PE:  Neurological:  Mental Status: Alert and Oriented to person, place, and time, cooperative, pleasant. Affect appropriate, thought, speech, and language coherent. Short- and long-term memory, abstract thinking and calculation intact.  Cranial Nerves:  II, III, IV, VI: PERRLA, EOM intact. No cranial nerve palsy or nystagmus noted.  V: facial sensation intact; masseter/ temporal muscles intact, corneal reflex intact bilaterally  VII: face symmetrical at rest and with expression  IX and X: no hoarseness, gag intact, palate/ uvula rise symmetrically  XI: SCM/ trapezius strength intact bilateral  XII: no dysarthria, tongue weakness/fasiculation   Motor: Normal muscle bulk/tone. Good posture. Strength 5/5 RUE and RLE and LLE.  strength on the LUE (3-4/5) is slightly less than the RUE.   Sensory: Sensation to light touch intact to trunk and bilaterally to both upper and lower extremities.   Cerebellar Function:  very slight LUE pronator drift       Vital Signs:  ICU Vital Signs Last 24 Hrs  T(C): 36.3 (03 Jul 2021 08:05), Max: 37.8 (02 Jul 2021 21:18)  T(F): 97.3 (03 Jul 2021 08:05), Max: 100.1 (02 Jul 2021 21:18)  HR: 69 (03 Jul 2021 08:05) (59 - 77)  BP: 151/72 (03 Jul 2021 08:05) (136/75 - 171/82)  BP(mean): 99 (03 Jul 2021 08:05) (89 - 136)  ABP: --  ABP(mean): --  RR: 18 (03 Jul 2021 08:05) (16 - 20)  SpO2: 99% (03 Jul 2021 08:05) (98% - 99%)      I/Os:  I&O's Detail    02 Jul 2021 07:01  -  03 Jul 2021 07:00  --------------------------------------------------------  IN:    sodium chloride 0.9%: 1000 mL  Total IN: 1000 mL    OUT:    Indwelling Catheter - Urethral (mL): 750 mL    Voided (mL): 1620 mL  Total OUT: 2370 mL    Total NET: -1370 mL          Labs:  07-03    140  |  107  |  13  ----------------------------<  89  3.9   |  23  |  1.0    Ca    8.8      03 Jul 2021 06:28  Mg     1.8     07-03    TPro  5.8<L>  /  Alb  3.8  /  TBili  0.4  /  DBili  x   /  AST  49<H>  /  ALT  58<H>  /  AlkPhos  114  07-03    CBC Full  -  ( 03 Jul 2021 06:28 )  WBC Count : 9.59 K/uL  RBC Count : 4.24 M/uL  Hemoglobin : 11.9 g/dL  Hematocrit : 36.9 %  Platelet Count - Automated : 284 K/uL  Mean Cell Volume : 87.0 fL  Mean Cell Hemoglobin : 28.1 pg  Mean Cell Hemoglobin Concentration : 32.2 g/dL  Auto Neutrophil # : 4.34 K/uL  Auto Lymphocyte # : 3.17 K/uL  Auto Monocyte # : 0.93 K/uL  Auto Eosinophil # : 0.99 K/uL  Auto Basophil # : 0.10 K/uL  Auto Neutrophil % : 45.3 %  Auto Lymphocyte % : 33.1 %  Auto Monocyte % : 9.7 %  Auto Eosinophil % : 10.3 %  Auto Basophil % : 1.0 %    PT/INR - ( 02 Jul 2021 04:30 )   PT: 12.50 sec;   INR: 1.09 ratio         PTT - ( 02 Jul 2021 04:30 )  PTT:29.4 sec  LIVER FUNCTIONS - ( 03 Jul 2021 06:28 )  Alb: 3.8 g/dL / Pro: 5.8 g/dL / ALK PHOS: 114 U/L / ALT: 58 U/L / AST: 49 U/L / GGT: x               Medications Current and PRN:  MEDICATIONS  (STANDING):  amLODIPine   Tablet 2.5 milliGRAM(s) Oral daily  aspirin 325 milliGRAM(s) Oral daily  chlorhexidine 4% Liquid 1 Application(s) Topical daily  clopidogrel Tablet 75 milliGRAM(s) Oral daily  heparin   Injectable 5000 Unit(s) SubCutaneous every 8 hours  losartan 50 milliGRAM(s) Oral daily  melatonin 3 milliGRAM(s) Oral at bedtime  melatonin 5 milliGRAM(s) Oral at bedtime  pantoprazole    Tablet 40 milliGRAM(s) Oral before breakfast  simvastatin 40 milliGRAM(s) Oral at bedtime  sodium chloride 0.9%. 1000 milliLiter(s) (50 mL/Hr) IV Continuous <Continuous>  venlafaxine 37.5 milliGRAM(s) Oral at bedtime        Radiology:  [facility] w/o contrast 7/1  IMPRESSION:  Since the CT head performed on the prior day:  1.  Patient is again noted to be status post stent placement within the right A1 and M1 segments.  2.  Slight interval decrease in the hyperdensity (likely combination of contrast and hemorrhage) within a right parietal sulcus and right sylvian fissure.  3.  Stable focal right frontal gyriform hyperdensity likely reflecting petechial hemorrhage of an acute infarct.  4.  Stable patchy hypodensities within the right frontal lobe consistent with scattered acute infarcts.  5.  Stable chronic lacunar infarcts within the basal ganglia and left cerebellum.    Assessment: 60y/o M w/ known Rt. M1/ A1 severe stenosis, admitted 6/23 for worsening Lf. hemiparesis __ w/ acute Rt. MCA territorial infarct sec. to severe Rt. M1/A1 stenosis __ now POD # 3 s/p DSA w/ angioplasty + stenting of severely stenosed Rt. M1/ A1 by Dr. Guajardo.  Pt. had interval dev. of Rt. frontoparietal petechial hemorrhage s/p revascularization __ stable on rpt. imaging.       Recommendations:   [ ] Cont. DAPT  [ASA + Plavix] + Resume high-dose statin therapy  [ ] Pt. is stable for transfer to Neuro floor __ may dcr. freq. of neurochecks to q4-6h   [ ] SBP parameters liberalized to 120 - 160mmHg  [ ] PT/ OT eval/ treatment for mobilization __ OOB to chair   [ ] Warm compress to Rt. groin hematoma __ Acetaminophen prn as warranted  [ ] Pls restart Effexor for depression mgmt __ requested by patient and spouse  [ ] Pls restart  bi-monthly Humira for psoriatic arthritis__ next due 7/3 [wife can bring in medication if nonformulary/ unavailable per pharmarcy]   [ ] Maintain SpO2 > 92% w/ supplemental O2 as warranted  [ ] Maintain euglycemia 120 - 180mg/dL  [ ] Cont. HSQ/ SCDs prophylactically.   [ ] Outpatient f/u w/ Rheumatologist for vasculitic work, given h/o of psoriasis, and focal/ insidious worsening of  cerebral atherosclerosis.   [ ] Outpatient f/u w/ Dr. Guajardo in 1-2wks after discharge.       STEPHON Hopson         HPI: 60 yo male with PMHx of R NORMAN stroke (April 2020) and R M1 and A1 severe stenosis follows outpatient with Dr. Guajardo, (last angiogram on February 2021), on Plavix   CC: fluctuating left leg weakness resulting in stumbling.   History goes back to: 1 month ago when patient started notice episodic left arm weakness.   ROS is negative for: no fever, no chills, no SOB, chest pains or palpitations, no abdominal pain, nausea or vomiting. Patient denies facial droop, difficulty swallowing   In the ED: VS all WNL  Labs significant for: WNL  Troponin: negative  CT head: Acute infarct involving the right frontal lobe in the precentral gyrus.  Chronic infarct of the right periventricular white matter, new from prior exams detailed above.  Additional chronic infarcts of the bilateral basal ganglia and left cerebellum, stable.  CTA head and neck: Severe stenosis and/or nonocclusive thrombus of the M1 segment of the right MCA, with diminished flow of the distal right MCA circulation, new from prior CTA 4/27/2020. 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  CT PERFUSION:, there are 229 cc of ischemic penumbra in the right frontoparietal lobes, partially accounted for by chronic infarcts of the right frontoparietal white matter.  NIHSS in ED=3  stroke code called. Not a candidate for tPA due to unknown last normal LWN      Past Medical and Surgical Hx:  PAST MEDICAL & SURGICAL HISTORY:  HTN (hypertension)  Psoriasis  CVA (cerebral vascular accident)    No significant past surgical history      Allergies: No Known Allergies      ROS: Patient endorses no complaints at this time. Otherwise, 10-point ROS is negative.     PE:  Neurological:  Mental Status: Alert and Oriented to person, place, and time, cooperative, pleasant. Affect appropriate, thought, speech, and language coherent. Short- and long-term memory, abstract thinking and calculation intact.  Cranial Nerves:  II, III, IV, VI: PERRLA, EOM intact. No cranial nerve palsy or nystagmus noted.  V: facial sensation intact; masseter/ temporal muscles intact, corneal reflex intact bilaterally  VII: face symmetrical at rest and with expression  IX and X: no hoarseness, gag intact, palate/ uvula rise symmetrically  XI: SCM/ trapezius strength intact bilateral  XII: no dysarthria, tongue weakness/fasiculation   Motor: Normal muscle bulk/tone. Good posture. Strength 5/5 RUE and RLE and LLE.  strength on the LUE (3-4/5) is slightly less than the RUE.   Sensory: Sensation to light touch intact to trunk and bilaterally to both upper and lower extremities.   Cerebellar Function:  very slight LUE pronator drift       Vital Signs:  ICU Vital Signs Last 24 Hrs  T(C): 36.3 (03 Jul 2021 08:05), Max: 37.8 (02 Jul 2021 21:18)  T(F): 97.3 (03 Jul 2021 08:05), Max: 100.1 (02 Jul 2021 21:18)  HR: 69 (03 Jul 2021 08:05) (59 - 77)  BP: 151/72 (03 Jul 2021 08:05) (136/75 - 171/82)  BP(mean): 99 (03 Jul 2021 08:05) (89 - 136)  ABP: --  ABP(mean): --  RR: 18 (03 Jul 2021 08:05) (16 - 20)  SpO2: 99% (03 Jul 2021 08:05) (98% - 99%)      I/Os:  I&O's Detail    02 Jul 2021 07:01  -  03 Jul 2021 07:00  --------------------------------------------------------  IN:    sodium chloride 0.9%: 1000 mL  Total IN: 1000 mL    OUT:    Indwelling Catheter - Urethral (mL): 750 mL    Voided (mL): 1620 mL  Total OUT: 2370 mL    Total NET: -1370 mL          Labs:  07-03    140  |  107  |  13  ----------------------------<  89  3.9   |  23  |  1.0    Ca    8.8      03 Jul 2021 06:28  Mg     1.8     07-03    TPro  5.8<L>  /  Alb  3.8  /  TBili  0.4  /  DBili  x   /  AST  49<H>  /  ALT  58<H>  /  AlkPhos  114  07-03    CBC Full  -  ( 03 Jul 2021 06:28 )  WBC Count : 9.59 K/uL  RBC Count : 4.24 M/uL  Hemoglobin : 11.9 g/dL  Hematocrit : 36.9 %  Platelet Count - Automated : 284 K/uL  Mean Cell Volume : 87.0 fL  Mean Cell Hemoglobin : 28.1 pg  Mean Cell Hemoglobin Concentration : 32.2 g/dL  Auto Neutrophil # : 4.34 K/uL  Auto Lymphocyte # : 3.17 K/uL  Auto Monocyte # : 0.93 K/uL  Auto Eosinophil # : 0.99 K/uL  Auto Basophil # : 0.10 K/uL  Auto Neutrophil % : 45.3 %  Auto Lymphocyte % : 33.1 %  Auto Monocyte % : 9.7 %  Auto Eosinophil % : 10.3 %  Auto Basophil % : 1.0 %    PT/INR - ( 02 Jul 2021 04:30 )   PT: 12.50 sec;   INR: 1.09 ratio         PTT - ( 02 Jul 2021 04:30 )  PTT:29.4 sec  LIVER FUNCTIONS - ( 03 Jul 2021 06:28 )  Alb: 3.8 g/dL / Pro: 5.8 g/dL / ALK PHOS: 114 U/L / ALT: 58 U/L / AST: 49 U/L / GGT: x               Medications Current and PRN:  MEDICATIONS  (STANDING):  amLODIPine   Tablet 2.5 milliGRAM(s) Oral daily  aspirin 325 milliGRAM(s) Oral daily  chlorhexidine 4% Liquid 1 Application(s) Topical daily  clopidogrel Tablet 75 milliGRAM(s) Oral daily  heparin   Injectable 5000 Unit(s) SubCutaneous every 8 hours  losartan 50 milliGRAM(s) Oral daily  melatonin 3 milliGRAM(s) Oral at bedtime  melatonin 5 milliGRAM(s) Oral at bedtime  pantoprazole    Tablet 40 milliGRAM(s) Oral before breakfast  simvastatin 40 milliGRAM(s) Oral at bedtime  sodium chloride 0.9%. 1000 milliLiter(s) (50 mL/Hr) IV Continuous <Continuous>  venlafaxine 37.5 milliGRAM(s) Oral at bedtime        Radiology:  [facility] w/o contrast 7/1  IMPRESSION:  Since the CT head performed on the prior day:  1.  Patient is again noted to be status post stent placement within the right A1 and M1 segments.  2.  Slight interval decrease in the hyperdensity (likely combination of contrast and hemorrhage) within a right parietal sulcus and right sylvian fissure.  3.  Stable focal right frontal gyriform hyperdensity likely reflecting petechial hemorrhage of an acute infarct.  4.  Stable patchy hypodensities within the right frontal lobe consistent with scattered acute infarcts.  5.  Stable chronic lacunar infarcts within the basal ganglia and left cerebellum.    Assessment: 60y/o M w/ known Rt. M1/ A1 severe stenosis, admitted 6/23 for worsening Lf. hemiparesis __ w/ acute Rt. MCA territorial infarct sec. to severe Rt. M1/A1 stenosis __ now POD # 3 s/p DSA w/ angioplasty + stenting of severely stenosed Rt. M1/ A1 by Dr. Guajardo.  Pt. had interval dev. of Rt. frontoparietal petechial hemorrhage s/p revascularization __ stable on rpt. imaging.       Recommendations:   [ ] Cont. DAPT  [ASA + Plavix] + Resume high-dose statin therapy  [ ] Pt. is stable for transfer to Neuro floor __ may dcr. freq. of neurochecks to q4-6h   [ ] SBP parameters liberalized to 120 - 160mmHg  [ ] PT/ OT eval/ treatment for mobilization __ OOB to chair   [ ] Warm compress to Rt. groin hematoma __ Acetaminophen prn as warranted  [ ] Pls restart Effexor for depression mgmt __ requested by patient and spouse  [ ] Pls restart  bi-monthly Humira for psoriatic arthritis__ next due 7/3 [wife can bring in medication if nonformulary/ unavailable per pharmarcy]   [ ] Maintain SpO2 > 92% w/ supplemental O2 as warranted  [ ] Maintain euglycemia 120 - 180mg/dL  [ ] Cont. HSQ/ SCDs prophylactically.   [ ] Outpatient f/u w/ Rheumatologist for vasculitic work, given h/o of psoriasis, and focal/ insidious worsening of  cerebral atherosclerosis.   [ ] Outpatient f/u w/ Dr. Guajardo in 1-2wks after discharge.       STEPHON Hopson

## 2021-07-04 ENCOUNTER — TRANSCRIPTION ENCOUNTER (OUTPATIENT)
Age: 61
End: 2021-07-04

## 2021-07-04 PROCEDURE — 99232 SBSQ HOSP IP/OBS MODERATE 35: CPT

## 2021-07-04 RX ADMIN — Medication 1 PATCH: at 11:04

## 2021-07-04 RX ADMIN — LOSARTAN POTASSIUM 50 MILLIGRAM(S): 100 TABLET, FILM COATED ORAL at 05:00

## 2021-07-04 RX ADMIN — Medication 325 MILLIGRAM(S): at 11:04

## 2021-07-04 RX ADMIN — SODIUM CHLORIDE 50 MILLILITER(S): 9 INJECTION INTRAMUSCULAR; INTRAVENOUS; SUBCUTANEOUS at 05:01

## 2021-07-04 RX ADMIN — HEPARIN SODIUM 5000 UNIT(S): 5000 INJECTION INTRAVENOUS; SUBCUTANEOUS at 13:25

## 2021-07-04 RX ADMIN — HEPARIN SODIUM 5000 UNIT(S): 5000 INJECTION INTRAVENOUS; SUBCUTANEOUS at 21:20

## 2021-07-04 RX ADMIN — Medication 37.5 MILLIGRAM(S): at 21:19

## 2021-07-04 RX ADMIN — AMLODIPINE BESYLATE 2.5 MILLIGRAM(S): 2.5 TABLET ORAL at 05:00

## 2021-07-04 RX ADMIN — Medication 5 MILLIGRAM(S): at 21:20

## 2021-07-04 RX ADMIN — HEPARIN SODIUM 5000 UNIT(S): 5000 INJECTION INTRAVENOUS; SUBCUTANEOUS at 05:00

## 2021-07-04 RX ADMIN — Medication 1 PATCH: at 06:09

## 2021-07-04 RX ADMIN — SIMVASTATIN 40 MILLIGRAM(S): 20 TABLET, FILM COATED ORAL at 21:20

## 2021-07-04 RX ADMIN — PANTOPRAZOLE SODIUM 40 MILLIGRAM(S): 20 TABLET, DELAYED RELEASE ORAL at 06:24

## 2021-07-04 RX ADMIN — CHLORHEXIDINE GLUCONATE 1 APPLICATION(S): 213 SOLUTION TOPICAL at 05:00

## 2021-07-04 RX ADMIN — CLOPIDOGREL BISULFATE 75 MILLIGRAM(S): 75 TABLET, FILM COATED ORAL at 11:04

## 2021-07-04 RX ADMIN — Medication 1 PATCH: at 19:00

## 2021-07-04 NOTE — PROGRESS NOTE ADULT - SUBJECTIVE AND OBJECTIVE BOX
Neurology Stroke Progress Note    INTERVAL HPI/OVERNIGHT EVENTS:  Patient seen and examined. No overnight events. No new symptoms or concerns.    MEDICATIONS  (STANDING):  amLODIPine   Tablet 2.5 milliGRAM(s) Oral daily  aspirin 325 milliGRAM(s) Oral daily  chlorhexidine 4% Liquid 1 Application(s) Topical daily  clopidogrel Tablet 75 milliGRAM(s) Oral daily  heparin   Injectable 5000 Unit(s) SubCutaneous every 8 hours  losartan 50 milliGRAM(s) Oral daily  melatonin 3 milliGRAM(s) Oral at bedtime  melatonin 5 milliGRAM(s) Oral at bedtime  nicotine -   7 mG/24Hr(s) Patch 1 patch Transdermal daily  pantoprazole    Tablet 40 milliGRAM(s) Oral before breakfast  senna 2 Tablet(s) Oral at bedtime  simvastatin 40 milliGRAM(s) Oral at bedtime  sodium chloride 0.9%. 1000 milliLiter(s) (50 mL/Hr) IV Continuous <Continuous>  venlafaxine 37.5 milliGRAM(s) Oral at bedtime    MEDICATIONS  (PRN):  polyethylene glycol 3350 17 Gram(s) Oral daily PRN Constipation      Allergies    No Known Allergies    Intolerances      ROS: 10-point ROS reviewed and negative.       Vital Signs Last 24 Hrs  T(C): 36.6 (04 Jul 2021 12:00), Max: 36.7 (03 Jul 2021 17:29)  T(F): 97.9 (04 Jul 2021 12:00), Max: 98.1 (03 Jul 2021 17:29)  HR: 59 (04 Jul 2021 12:00) (55 - 80)  BP: 148/71 (04 Jul 2021 12:00) (142/60 - 172/74)  BP(mean): 90 (04 Jul 2021 12:00) (79 - 107)  RR: 18 (04 Jul 2021 12:00) (18 - 18)  SpO2: 97% (04 Jul 2021 12:00) (97% - 98%)    Physical exam:  General: No acute distress, awake and alert    Neurologic:  -Mental status: Awake, alert, oriented to person, place, and time. Speech is fluent with intact naming, repetition, and comprehension, no dysarthria. Follows commands. Attention/concentration intact.   -Cranial nerves:   II: Visual fields are full to confrontation.  III, IV, VI: Extraocular movements are intact without nystagmus. Pupils equally round and reactive to light  V:  Facial sensation V1-V3 equal and intact   VII: Face is symmetric with normal eye closure and smile  Motor: Slight pronator drift LUE. 5/5 strength RUE, RLE, LLE  Sensation: Intact to light touch bilaterally. No neglect or extinction on double simultaneous testing.  Coordination: No dysmetria on finger-to-nose and heel-to-shin bilaterally      ·  1a Level of Consciousness	 0   ·  1b Level of Consciousness	 0	  ·  1c Level of Consciousness	 0	  ·  2 Best Gaze	 	 0   ·  3 Visual	 	                  0  ·  4 Facial Palsy	 	 0  ·  5a Motor Left Arm	                 1  ·  5b Motor Right Arm	 0 	  ·  6a Motor Left Leg	                   0  ·  6b Motor Right Leg	 0    ·  7 Limb Ataxia	 	0  ·  8 Sensory	 	0   ·  9 Best language	 	0   ·  10 Dysarthria	 	0  ·  11 Extinction and Inattention 0 	    Total NIHSS Score = 1      mRS:  0 No symptoms at all  1 No significant disability despite symptoms; able to carry out all usual duties and activities without assistance  2 Slight disability; unable to carry out all previous activities, but able to look after own affairs  3 Moderate disability; requiring some help, but able to walk without assistance  4 Moderately severe disability; unable to walk without assistance and unable to attend to own bodily needs without assistance  5 Severe disability; bedridden, incontinent and requiring constant nursing care and attention  6 Dead      LABS:                        11.9   9.59  )-----------( 284      ( 03 Jul 2021 06:28 )             36.9     07-03    140  |  107  |  13  ----------------------------<  89  3.9   |  23  |  1.0    Ca    8.8      03 Jul 2021 06:28  Mg     1.8     07-03    TPro  5.8<L>  /  Alb  3.8  /  TBili  0.4  /  DBili  x   /  AST  49<H>  /  ALT  58<H>  /  AlkPhos  114  07-03

## 2021-07-04 NOTE — DISCHARGE NOTE PROVIDER - NSDCCPCAREPLAN_GEN_ALL_CORE_FT
PRINCIPAL DISCHARGE DIAGNOSIS  Diagnosis: Stroke  Assessment and Plan of Treatment: A stroke happens when blood flow to a specific part of the brain is blocked. The block is usally caused by blood clot that gets stuck in a narrow blood vessel. When oxygen cannot get through the blood vessel to that part of the brain the area gets demaged and leads to disfuction. The demage can cause loss of function that is controlled by that part of the brain.   You were treated for stroke. Please continue to take Aspirin, plavix, and Statin. Follow up with Dr. Guajardo in 1 to 2 weeks. Follow up with Rheumatologist as soon as possible.   Seek help if you experience any unusual symptoms.

## 2021-07-04 NOTE — DISCHARGE NOTE PROVIDER - NSDCMRMEDTOKEN_GEN_ALL_CORE_FT
amLODIPine 2.5 mg oral tablet: 1 tab(s) orally once a day  clopidogrel 75 mg oral tablet: 1 tab(s) orally once a day  Humira Pre-Filled Syringe: subcutaneous 2 times a month  losartan 50 mg oral tablet: 1 tab(s) orally once a day  simvastatin 80 mg oral tablet: 1 tab(s) orally once a day (at bedtime)   venlafaxine 37.5 mg oral tablet: 1 tab(s) orally once a day (at bedtime)  Vitamin B12:    amLODIPine 2.5 mg oral tablet: 1 tab(s) orally once a day  aspirin 325 mg oral tablet: 1 tab(s) orally once a day  clopidogrel 75 mg oral tablet: 1 tab(s) orally once a day  Humira Pre-Filled Syringe: subcutaneous 2 times a month  losartan 50 mg oral tablet: 1 tab(s) orally once a day  simvastatin 40 mg oral tablet: 1 tab(s) orally once a day (at bedtime)  venlafaxine 37.5 mg oral tablet: 1 tab(s) orally once a day (at bedtime)  Vitamin B12:

## 2021-07-04 NOTE — DISCHARGE NOTE PROVIDER - HOSPITAL COURSE
62y/o M with known Rt. M1/ A1 severe stenosis, admitted 6/23 for worsening Lf. hemiparesis with acute Rt. MCA territorial infarct sec. to severe Rt. M1/A1 stenosis.   Patient presented to the ED with CC of fluctuating left leg weakness resulting in stumbling for a month. In the ED: VS all WNL, labs WNL, troponin: negative. CT head showed acute infarct involving the right frontal lobe in the precentral gyrus, chronic infarct of the right periventricular white matter, new from prior exams detailed above, and additional chronic infarcts of the bilateral basal ganglia and left cerebellum, stable. CTA head and neck: Severe stenosis and/or nonocclusive thrombus of the M1 segment of the right MCA, with diminished flow of the distal right MCA circulation, new from prior CTA 4/27/2020. 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 CT PERFUSION:, there are 229 cc of ischemic penumbra in the right frontoparietal lobes, partially accounted for by chronic infarcts of the right frontoparietal white matter. NIHSS in ED=3 and stroke code called. Patient was not a candidate for tPA due to unknown last normal LWN.   Patient is now POD # 3 s/p DSA w/ angioplasty + stenting of severely stenosed Rt. M1/ A1 by Dr. Guajardo.  Pt. had interval dev. of Rt. frontoparietal petechial hemorrhage s/p revascularization, stable on rpt. imaging.   Patient is stable for discharge. Patient will Continue with DAPT  [ASA + Plavix] + Resume high-dose statin therapy, effexor for depression management requested by patient and spouse, will restart  bi-monthly Humira for psoriatic arthritis. Outpatient f/u w/ Rheumatologist for vasculitic work, given h/o of psoriasis, and focal/ insidious worsening of  cerebral atherosclerosis is recommended and Outpatient f/u w/ Dr. Guajardo in 1-2wks after discharge.      62y/o M with known Rt. M1/ A1 severe stenosis, admitted 6/23 for worsening Lf. hemiparesis with acute Rt. MCA territorial infarct sec. to severe Rt. M1/A1 stenosis.   Patient presented to the ED with CC of fluctuating left leg weakness resulting in stumbling for a month. In the ED: VS all WNL, labs WNL, troponin: negative. CT head showed acute infarct involving the right frontal lobe in the precentral gyrus, chronic infarct of the right periventricular white matter, new from prior exams detailed above, and additional chronic infarcts of the bilateral basal ganglia and left cerebellum, stable. CTA head and neck: Severe stenosis and/or nonocclusive thrombus of the M1 segment of the right MCA, with diminished flow of the distal right MCA circulation, new from prior CTA 4/27/2020. 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 CT PERFUSION:, there are 229 cc of ischemic penumbra in the right frontoparietal lobes, partially accounted for by chronic infarcts of the right frontoparietal white matter. NIHSS in ED=3 and stroke code called. Patient was not a candidate for tPA due to unknown last normal LWN.   Patient is s/p DSA w/ angioplasty + stenting of severely stenosed Rt. M1/ A1 by Dr. Guajardo.  Pt. had interval dev. of Rt. frontoparietal petechial hemorrhage s/p revascularization, stable on rpt. imaging.   Patient is stable for discharge. Patient will Continue with DAPT  [ASA + Plavix] + Resume high-dose statin therapy, effexor for depression management requested by patient and spouse, will restart  bi-monthly Humira for psoriatic arthritis. Outpatient f/u w/ Rheumatologist for vasculitic work, given h/o of psoriasis, and focal/ insidious worsening of  cerebral atherosclerosis is recommended and Outpatient f/u w/ Dr. Guajardo in 1-2wks after discharge.

## 2021-07-04 NOTE — PROGRESS NOTE ADULT - ASSESSMENT
Assessment: 62y/o M w/ known Rt. M1/ A1 severe stenosis, admitted 6/23 for worsening Lf. hemiparesis __ w/ acute Rt. MCA territorial infarct sec. to severe Rt. M1/A1 stenosis __ now POD # 4 s/p DSA w/ angioplasty + stenting of severely stenosed Rt. M1/ A1 by Dr. Guajardo.  Pt. had interval dev. of Rt. frontoparietal petechial hemorrhage s/p revascularization __ stable on rpt. imaging. NIHSS 1 for LUE drift, mRS 1.    Plan:  - Okay to discharge home  - Continue DAPT, 80mg atorvastatin  - Will need outpatient f/u with Dr Guajardo and stroke clinic

## 2021-07-04 NOTE — DISCHARGE NOTE PROVIDER - CARE PROVIDER_API CALL
Rosy Guajardo)  Neurology; Vascular Neurology  [address] [place] [address], [address]  [place], [place] [id number]  Phone: [phone]  Fax: [phone]  Established Patient  Follow Up Time: 1 week    RUDY HARRIS  [facility]  [address] [place] [address] [address]  [place] [address]  Phone: [phone]  Fax: [phone]  Established Patient  Follow Up Time: 1 week   Rosy Guajardo)  Neurology; Vascular Neurology  [address] [facility], [address]  [place], [place] [id number]  Phone: [phone]  Fax: [phone]  Established Patient  Follow Up Time: 1 week    RUDY HARRIS  [facility]  [address] [facility] [address]  [place] [address]  Phone: [phone]  Fax: [phone]  Established Patient  Follow Up Time: 1 week    Jasper Kumar)  EEGEpilepsy; Neurology  [address] [facility], [address]  [place] [address]  Phone: [phone]  Fax: [phone]  Follow Up Time: 2 weeks

## 2021-07-04 NOTE — PROGRESS NOTE ADULT - ATTENDING COMMENTS
I have personally seen and examined this patient.  I have fully participated in the care of this patient.  I have reviewed all pertinent clinical information, including history, physical exam, plan and note. Patient is stable. Ready to be discharged on DAPT and Lipitor. Follow up with stroke clinic and Endovascular clinic in 3-4 weeks.   I have reviewed all pertinent clinical information and reviewed all relevant imaging and diagnostic studies personally.  Recommendations as above.  Agree with above assessment except as noted.

## 2021-07-04 NOTE — DISCHARGE NOTE PROVIDER - PROVIDER TOKENS
PROVIDER:[TOKEN:[[id number]],FOLLOWUP:[1 week],ESTABLISHEDPATIENT:[T]],PROVIDER:[TOKEN:[[id number]],FOLLOWUP:[1 week],ESTABLISHEDPATIENT:[T]] PROVIDER:[TOKEN:[[id number]],FOLLOWUP:[1 week],ESTABLISHEDPATIENT:[T]],PROVIDER:[TOKEN:[[id number]],FOLLOWUP:[1 week],ESTABLISHEDPATIENT:[T]],PROVIDER:[TOKEN:[[id number]],FOLLOWUP:[2 weeks]]

## 2021-07-05 PROCEDURE — 99231 SBSQ HOSP IP/OBS SF/LOW 25: CPT

## 2021-07-05 RX ADMIN — PANTOPRAZOLE SODIUM 40 MILLIGRAM(S): 20 TABLET, DELAYED RELEASE ORAL at 06:35

## 2021-07-05 RX ADMIN — Medication 325 MILLIGRAM(S): at 12:03

## 2021-07-05 RX ADMIN — Medication 1 PATCH: at 12:06

## 2021-07-05 RX ADMIN — HEPARIN SODIUM 5000 UNIT(S): 5000 INJECTION INTRAVENOUS; SUBCUTANEOUS at 21:20

## 2021-07-05 RX ADMIN — Medication 5 MILLIGRAM(S): at 21:20

## 2021-07-05 RX ADMIN — Medication 37.5 MILLIGRAM(S): at 21:20

## 2021-07-05 RX ADMIN — Medication 1 PATCH: at 20:47

## 2021-07-05 RX ADMIN — HEPARIN SODIUM 5000 UNIT(S): 5000 INJECTION INTRAVENOUS; SUBCUTANEOUS at 05:01

## 2021-07-05 RX ADMIN — SENNA PLUS 2 TABLET(S): 8.6 TABLET ORAL at 21:19

## 2021-07-05 RX ADMIN — HEPARIN SODIUM 5000 UNIT(S): 5000 INJECTION INTRAVENOUS; SUBCUTANEOUS at 13:56

## 2021-07-05 RX ADMIN — AMLODIPINE BESYLATE 2.5 MILLIGRAM(S): 2.5 TABLET ORAL at 05:01

## 2021-07-05 RX ADMIN — CHLORHEXIDINE GLUCONATE 1 APPLICATION(S): 213 SOLUTION TOPICAL at 05:01

## 2021-07-05 RX ADMIN — CLOPIDOGREL BISULFATE 75 MILLIGRAM(S): 75 TABLET, FILM COATED ORAL at 12:03

## 2021-07-05 RX ADMIN — SIMVASTATIN 40 MILLIGRAM(S): 20 TABLET, FILM COATED ORAL at 21:19

## 2021-07-05 RX ADMIN — LOSARTAN POTASSIUM 50 MILLIGRAM(S): 100 TABLET, FILM COATED ORAL at 05:01

## 2021-07-05 RX ADMIN — Medication 1 PATCH: at 06:34

## 2021-07-05 RX ADMIN — Medication 1 PATCH: at 12:03

## 2021-07-05 NOTE — PROGRESS NOTE ADULT - SUBJECTIVE AND OBJECTIVE BOX
VEENA BRANDON 61y Male  MRN#: [id number]   Hospital Day: 12d    HPI:  62 yo male with PMHx of R NORMAN stroke (April 2020) and R M1 and A1 severe stenosis follows outpatient with Dr. Guajardo, (last angiogram on February 2021), on Plavix     CC: fluctuating left leg weakness resulting in stumbling.      History goes back to: 1 month ago when patient started notice episodic left arm weakness.     ROS is negative for: no fever, no chills, no SOB, chest pains or palpitations, no abdominal pain, nausea or vomiting. Patient denies facial droop, difficulty swallowing     In the ED: VS all WNL  Labs significant for: WNL  Troponin: negative  CT head: Acute infarct involving the right frontal lobe in the precentral gyrus.  Chronic infarct of the right periventricular white matter, new from prior exams detailed above.  Additional chronic infarcts of the bilateral basal ganglia and left cerebellum, stable.    CTA head and neck: Severe stenosis and/or nonocclusive thrombus of the M1 segment of the right MCA, with diminished flow of the distal right MCA circulation, new from prior CTA 4/27/2020. 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    CT PERFUSION:, there are 229 cc of ischemic penumbra in the right frontoparietal lobes, partially accounted for by chronic infarcts of the right frontoparietal white matter.    NIHSS in ED=3  stroke code called. Not a candidate for tPA due to unknown last normal LWN       (23 Jun 2021 20:39)      SUBJECTIVE  Patient is a 61y old Male who presents with a chief complaint of left leg weakness. (04 Jul 2021 15:22). He is currently admitted to medicine with the primary diagnosis of Stroke.    INTERVAL HPI AND OVERNIGHT EVENTS:  Patient was examined and seen at bedside. This morning he is resting comfortably in bed and reports no issues or overnight events. He denies any chest pain, abdominal pain. He denies any nausea, chills or vomiting. Patient says he is eating okay and moving his bowel.     OBJECTIVE  PAST MEDICAL & SURGICAL HISTORY  HTN (hypertension)    Psoriasis    CVA (cerebral vascular accident)    No significant past surgical history      ALLERGIES:  No Known Allergies    MEDICATIONS:  STANDING MEDICATIONS  amLODIPine   Tablet 2.5 milliGRAM(s) Oral daily  aspirin 325 milliGRAM(s) Oral daily  chlorhexidine 4% Liquid 1 Application(s) Topical daily  clopidogrel Tablet 75 milliGRAM(s) Oral daily  heparin   Injectable 5000 Unit(s) SubCutaneous every 8 hours  losartan 50 milliGRAM(s) Oral daily  melatonin 5 milliGRAM(s) Oral at bedtime  nicotine -   7 mG/24Hr(s) Patch 1 patch Transdermal daily  pantoprazole    Tablet 40 milliGRAM(s) Oral before breakfast  senna 2 Tablet(s) Oral at bedtime  simvastatin 40 milliGRAM(s) Oral at bedtime  venlafaxine 37.5 milliGRAM(s) Oral at bedtime    PRN MEDICATIONS  polyethylene glycol 3350 17 Gram(s) Oral daily PRN      VITAL SIGNS: Last 24 Hours  T(C): 36.4 (05 Jul 2021 04:20), Max: 36.7 (04 Jul 2021 20:26)  T(F): 97.5 (05 Jul 2021 04:20), Max: 98 (04 Jul 2021 20:26)  HR: 66 (05 Jul 2021 08:28) (58 - 67)  BP: 140/72 (05 Jul 2021 08:28) (140/72 - 162/69)  BP(mean): 101 (05 Jul 2021 04:20) (93 - 101)  RR: 18 (05 Jul 2021 04:20) (18 - 18)  SpO2: 98% (05 Jul 2021 08:28) (98% - 98%)    LABS:    RADIOLOGY:  < from: CT Head No Cont (07.01.21 @ 07:18) >  IMPRESSION:  Since the CT head performed on the prior day:  1.  Patient is again noted to be status post stent placement within the right A1 and M1 segments.  2.  Slight interval decrease in the hyperdensity (likely combination of contrast and hemorrhage) within a right parietal sulcus and right sylvian fissure.  3.  Stable focal right frontal gyriform hyperdensity likely reflecting petechial hemorrhage of an acute infarct.  4.  Stable patchy hypodensities within the right frontal lobe consistent with scattered acute infarcts.  5.  Stable chronic lacunar infarcts within the basal ganglia and left cerebellum.    < from: CT Head No Cont (06.30.21 @ 17:04) >  Interval placement of right middle cerebral artery M1 and right anterior cerebral artery A1 branch stents, with subarachnoid hemorrhage extending from the right parietal region superiorly to the right sylvian fissure inferiorly.  Increased attenuation of the arterial and venous vasculature, possibly related to same day neurointervention angiography.  chronic lacunar infarcts within the basal ganglia and left cerebellum.    < from: CT Head No Cont (06.30.21 @ 21:03) >  No significant interval change in right parietal sulcus and right sylvian fissure extra-axial hyperdensities, likely mixed extravasated contrast with subarachnoid hemorrhage.    Slightly decreased petechial hemorrhages along the right precentral gyrus.    Unchanged evolving infarct in the right frontal lobe.    < end of copied text >    < from: CT Brain Stroke Protocol (06.23.21 @ 18:50) >  Acute infarct involving the right frontal lobe in the precentral gyrus.  Chronic infarct of the right periventricular white matter, new from prior exams detailed above.  Additional chronic infarcts of the bilateral basal ganglia and left cerebellum, stable.  Dr. Reginaldo Elkins discussed preliminary findings with Dr. Kristian Hood on 6/23/2021 at 7:40 PM with readback.    < from: CT Perfusion w/ Maps w/ IV Cont (06.23.21 @ 19:06) >  CT PERFUSION:  UtilizingTmax > 6.0s and CBF < 30%, there are 229 cc of ischemic penumbra in the right MCA territory without core infarct.  CTA HEAD/NECK:  High-grade stenosis versus nonocclusive thrombus is noted of the M1 segment of the right MCA, with diminished distal MCA branch opacification, new from prior CTA 4/27/2020.  Multifocal severe stenoses are noted along the right A2 segment of the NORMAN which are new since the prior examination.  Moderate (approximately 60%) short segment stenosis of the proximal left internal carotid artery due to mixed calcified and noncalcified atherosclerotic plaque which is worsened since the prior CTA of 4/27/2020.  Dr. Reginaldo Elkins discussed preliminary findings with Stephy Ramirez on 6/23/2021 at 8:21 PM with readback.      < from: CT Angio Head w/ IV Cont (06.23.21 @ 19:17) >  CT PERFUSION:  UtilizingTmax > 6.0s and CBF < 30%, there are 229 cc of ischemic penumbra in the right MCA territory without core infarct.  CTA HEAD/NECK:  High-grade stenosis versus nonocclusive thrombus is noted of the M1 segment of the right MCA, with diminished distal MCA branch opacification, new from prior CTA 4/27/2020.  Multifocal severe stenoses are noted along the right A2 segment of the NORMAN which are new since the prior examination.  Moderate (approximately 60%) short segment stenosis of the proximal left internal carotid artery due to mixed calcified and noncalcified atherosclerotic plaque which is worsened since the prior CTA of 4/27/2020.    < from: CT Angio Neck w/ IV Cont (06.23.21 @ 19:20) >  CT PERFUSION:  UtilizingTmax > 6.0s and CBF < 30%, there are 229 cc of ischemic penumbra in the right MCA territory without core infarct.  CTA HEAD/NECK:  High-grade stenosis versus nonocclusive thrombus is noted of the M1 segment of the right MCA, with diminished distal MCA branch opacification, new from prior CTA 4/27/2020.  Multifocal severe stenoses are noted along the right A2 segment of the NORMAN which are new since the prior examination.  Moderate (approximately 60%) short segment stenosis of the proximal left internal carotid artery due to mixed calcified and noncalcified atherosclerotic plaque which is worsened since the prior CTA of 4/27/2020.    < from: Xray Chest 1 View- PORTABLE-Urgent (Xray Chest 1 View- PORTABLE-Urgent .) (06.24.21 @ 12:14) >  No radiographic evidence of acute cardiopulmonary disease.    < from: CT Head No Cont (06.24.21 @ 23:18) >  Evolving acute infarcts involving the right frontal lobe likely in an MCA and NORMAN distribution. No evidence ofhemorrhagic transformation or significant mass effect.    Chronic bilateral basal ganglia as well as left cerebellar lacunar infarcts.    < from: MR Head No Cont (06.25.21 @ 16:58) >  Acute right MCA infarcts.    < from: Xray Chest 1 View- PORTABLE-Routine (Xray Chest 1 View- PORTABLE-Routine in AM.) (06.28.21 @ 06:23) >  No radiographic evidence of acute cardiopulmonary disease.      PHYSICAL EXAM:  CONSTITUTIONAL: No acute distress, well-groomed, AAOx3  HEAD: Atraumatic, normocephalic  PULMONARY: Clear to auscultation bilaterally; no wheezes  CARDIOVASCULAR: Regular rate and rhythm;  GASTROINTESTINAL: Soft, non-tender, non-distended; bowel sounds present  MUSCULOSKELETAL:  no edema       VEENA BRANDON 61y Male  MRN#: [id number]   Hospital Day: 12d    SUBJECTIVE  Patient is a 61y old Male who presents with a chief complaint of left leg weakness. (04 Jul 2021 15:22). He is currently admitted to medicine with the primary diagnosis of Stroke.    Today is day 12 of admission    INTERVAL HPI AND OVERNIGHT EVENTS:  Patient was examined and seen at bedside. This morning he is resting comfortably in bed and reports no issues or overnight events. He denies any chest pain, abdominal pain. He denies any nausea, chills or vomiting. Patient says he is eating okay and moving his bowel.     OBJECTIVE  PAST MEDICAL & SURGICAL HISTORY  HTN (hypertension)    Psoriasis    CVA (cerebral vascular accident)    No significant past surgical history      ALLERGIES:  No Known Allergies    MEDICATIONS:  STANDING MEDICATIONS  amLODIPine   Tablet 2.5 milliGRAM(s) Oral daily  aspirin 325 milliGRAM(s) Oral daily  chlorhexidine 4% Liquid 1 Application(s) Topical daily  clopidogrel Tablet 75 milliGRAM(s) Oral daily  heparin   Injectable 5000 Unit(s) SubCutaneous every 8 hours  losartan 50 milliGRAM(s) Oral daily  melatonin 5 milliGRAM(s) Oral at bedtime  nicotine -   7 mG/24Hr(s) Patch 1 patch Transdermal daily  pantoprazole    Tablet 40 milliGRAM(s) Oral before breakfast  senna 2 Tablet(s) Oral at bedtime  simvastatin 40 milliGRAM(s) Oral at bedtime  venlafaxine 37.5 milliGRAM(s) Oral at bedtime    PRN MEDICATIONS  polyethylene glycol 3350 17 Gram(s) Oral daily PRN      VITAL SIGNS: Last 24 Hours  T(C): 36.4 (05 Jul 2021 04:20), Max: 36.7 (04 Jul 2021 20:26)  T(F): 97.5 (05 Jul 2021 04:20), Max: 98 (04 Jul 2021 20:26)  HR: 66 (05 Jul 2021 08:28) (58 - 67)  BP: 140/72 (05 Jul 2021 08:28) (140/72 - 162/69)  BP(mean): 101 (05 Jul 2021 04:20) (93 - 101)  RR: 18 (05 Jul 2021 04:20) (18 - 18)  SpO2: 98% (05 Jul 2021 08:28) (98% - 98%)    LABS:    RADIOLOGY:  < from: CT Head No Cont (07.01.21 @ 07:18) >  IMPRESSION:  Since the CT head performed on the prior day:  1.  Patient is again noted to be status post stent placement within the right A1 and M1 segments.  2.  Slight interval decrease in the hyperdensity (likely combination of contrast and hemorrhage) within a right parietal sulcus and right sylvian fissure.  3.  Stable focal right frontal gyriform hyperdensity likely reflecting petechial hemorrhage of an acute infarct.  4.  Stable patchy hypodensities within the right frontal lobe consistent with scattered acute infarcts.  5.  Stable chronic lacunar infarcts within the basal ganglia and left cerebellum.    < from: CT Head No Cont (06.30.21 @ 17:04) >  Interval placement of right middle cerebral artery M1 and right anterior cerebral artery A1 branch stents, with subarachnoid hemorrhage extending from the right parietal region superiorly to the right sylvian fissure inferiorly.  Increased attenuation of the arterial and venous vasculature, possibly related to same day neurointervention angiography.  chronic lacunar infarcts within the basal ganglia and left cerebellum.    < from: CT Head No Cont (06.30.21 @ 21:03) >  No significant interval change in right parietal sulcus and right sylvian fissure extra-axial hyperdensities, likely mixed extravasated contrast with subarachnoid hemorrhage.    Slightly decreased petechial hemorrhages along the right precentral gyrus.    Unchanged evolving infarct in the right frontal lobe.    < end of copied text >    < from: CT Brain Stroke Protocol (06.23.21 @ 18:50) >  Acute infarct involving the right frontal lobe in the precentral gyrus.  Chronic infarct of the right periventricular white matter, new from prior exams detailed above.  Additional chronic infarcts of the bilateral basal ganglia and left cerebellum, stable.  Dr. Reginaldo Elkins discussed preliminary findings with Dr. Kristian Hood on 6/23/2021 at 7:40 PM with readback.    < from: CT Perfusion w/ Maps w/ IV Cont (06.23.21 @ 19:06) >  CT PERFUSION:  UtilizingTmax > 6.0s and CBF < 30%, there are 229 cc of ischemic penumbra in the right MCA territory without core infarct.  CTA HEAD/NECK:  High-grade stenosis versus nonocclusive thrombus is noted of the M1 segment of the right MCA, with diminished distal MCA branch opacification, new from prior CTA 4/27/2020.  Multifocal severe stenoses are noted along the right A2 segment of the NORMAN which are new since the prior examination.  Moderate (approximately 60%) short segment stenosis of the proximal left internal carotid artery due to mixed calcified and noncalcified atherosclerotic plaque which is worsened since the prior CTA of 4/27/2020.  Dr. Reginaldo Elkins discussed preliminary findings with Stephy Ramirez on 6/23/2021 at 8:21 PM with readback.      < from: CT Angio Head w/ IV Cont (06.23.21 @ 19:17) >  CT PERFUSION:  UtilizingTmax > 6.0s and CBF < 30%, there are 229 cc of ischemic penumbra in the right MCA territory without core infarct.  CTA HEAD/NECK:  High-grade stenosis versus nonocclusive thrombus is noted of the M1 segment of the right MCA, with diminished distal MCA branch opacification, new from prior CTA 4/27/2020.  Multifocal severe stenoses are noted along the right A2 segment of the NORMAN which are new since the prior examination.  Moderate (approximately 60%) short segment stenosis of the proximal left internal carotid artery due to mixed calcified and noncalcified atherosclerotic plaque which is worsened since the prior CTA of 4/27/2020.    < from: CT Angio Neck w/ IV Cont (06.23.21 @ 19:20) >  CT PERFUSION:  UtilizingTmax > 6.0s and CBF < 30%, there are 229 cc of ischemic penumbra in the right MCA territory without core infarct.  CTA HEAD/NECK:  High-grade stenosis versus nonocclusive thrombus is noted of the M1 segment of the right MCA, with diminished distal MCA branch opacification, new from prior CTA 4/27/2020.  Multifocal severe stenoses are noted along the right A2 segment of the NORMAN which are new since the prior examination.  Moderate (approximately 60%) short segment stenosis of the proximal left internal carotid artery due to mixed calcified and noncalcified atherosclerotic plaque which is worsened since the prior CTA of 4/27/2020.    < from: Xray Chest 1 View- PORTABLE-Urgent (Xray Chest 1 View- PORTABLE-Urgent .) (06.24.21 @ 12:14) >  No radiographic evidence of acute cardiopulmonary disease.    < from: CT Head No Cont (06.24.21 @ 23:18) >  Evolving acute infarcts involving the right frontal lobe likely in an MCA and NORMAN distribution. No evidence ofhemorrhagic transformation or significant mass effect.    Chronic bilateral basal ganglia as well as left cerebellar lacunar infarcts.    < from: MR Head No Cont (06.25.21 @ 16:58) >  Acute right MCA infarcts.    < from: Xray Chest 1 View- PORTABLE-Routine (Xray Chest 1 View- PORTABLE-Routine in AM.) (06.28.21 @ 06:23) >  No radiographic evidence of acute cardiopulmonary disease.      PHYSICAL EXAM:  CONSTITUTIONAL: No acute distress, well-groomed, AAOx3  HEAD: Atraumatic, normocephalic  PULMONARY: Clear to auscultation bilaterally; no wheezes  CARDIOVASCULAR: Regular rate and rhythm;  GASTROINTESTINAL: Soft, non-tender, non-distended; bowel sounds present  MUSCULOSKELETAL:  no edema

## 2021-07-05 NOTE — PROGRESS NOTE ADULT - ASSESSMENT
# Evolving Acute Rt. Frontal lobe Infarct in the precentral gyrus s/p recanalization of R MCA and R NORMAN vessels by REINALDO (06/30)  > CTA H&N(6/23): New severe stenoses along Rt. NORMAN & Rt. MCA; Worsening chonic moderate stenosis along Lf. NORMAN  > CT Perfusion (6/23): Ischemic penumbra in Rt. MCA territory without core infarct.   > Repeat CTH on 6/24 -> Evolving acute infarct;   > MRI 6/25: Acute right MCA infarcts.  > CT (3/60) : subarachnoid hemorrhage extending from the right parietal region superiorly to the right sylvian fissure inferiorly.  > CT(7/1) : Slight interval decrease in the hyperdensity,   > LDL(6/23): 79    > TTE(7/2): EF 66%, bicuspid aortic valve , mild mitral and tricuspid regurg   > Continue Clopidogrel, ASA  > PT/OT, physiatry f/u  > Patient is being transferred to , currently no beds so patient being downgraded from stoke unit  > OOB to chair, ambulate as tolerated      # Grade I Diastolic HFpEF   - Compensated; EF 66%    # hx of Depression/Anxiety   - c/w Venlafaxine 37.5mg Daily    # GERD   - c/w Protonix 40mg Daily    # DLD   - Simvastatin 40 mg PO [id number]    # smoking  - on nicotine patch     # Depression  - on venlafaxine     Diet: Regular  Activity: OOB to chair  DVT ppx: Heparin 5000 units subq every 8 hours  GI ppx: PPI  FULL CODE  Dispo: to rehab , as soon as bed is available   # Evolving Acute Rt. Frontal lobe Infarct in the precentral gyrus s/p recanalization of R MCA and R NORMAN vessels by REINALDO (06/30)  > CTA H&N(6/23): New severe stenoses along Rt. NORMAN & Rt. MCA; Worsening chonic moderate stenosis along Lf. NORMAN  > CT Perfusion (6/23): Ischemic penumbra in Rt. MCA territory without core infarct.   > Repeat CTH on 6/24 -> Evolving acute infarct;   > MRI 6/25: Acute right MCA infarcts.  > CT (3/60) : subarachnoid hemorrhage extending from the right parietal region superiorly to the right sylvian fissure inferiorly.  > CT(7/1) : Slight interval decrease in the hyperdensity,   > LDL(6/23): 79    > TTE(7/2): EF 66%, bicuspid aortic valve , mild mitral and tricuspid regurg   > Continue Clopidogrel, ASA  > PT/OT, physiatry f/u  > Patient is being transferred to , currently no beds so patient being downgraded from stoke unit  > OOB to chair, ambulate as tolerated      # Grade I Diastolic HFpEF   - Compensated; EF 66%    # hx of Depression/Anxiety   - c/w Venlafaxine 37.5mg Daily    # GERD   - c/w Protonix 40mg Daily    # DLD   - Simvastatin 40 mg PO [id number]    # smoking  - on nicotine patch     # Depression  - on venlafaxine     Diet: Regular  Activity: OOB to chair  DVT ppx: Heparin 5000 units subq every 8 hours  GI ppx: PPI  FULL CODE  Dispo: to rehab , as soon as bed is available

## 2021-07-05 NOTE — PROGRESS NOTE ADULT - ATTENDING COMMENTS
I have personally seen and examined this patient.  I have fully participated in the care of this patient.  I have reviewed all pertinent clinical information, including history, physical exam, plan and note. No overnight issues. Stop IVF. Ready to be discharged to rehab. [place].   I have reviewed all pertinent clinical information and reviewed all relevant imaging and diagnostic studies personally.  Recommendations as above.  Agree with above assessment except as noted.

## 2021-07-06 LAB
ALBUMIN SERPL ELPH-MCNC: 4 G/DL — SIGNIFICANT CHANGE UP (ref 3.5–5.2)
ALP SERPL-CCNC: 159 U/L — HIGH (ref 30–115)
ALT FLD-CCNC: 93 U/L — HIGH (ref 0–41)
ANION GAP SERPL CALC-SCNC: 9 MMOL/L — SIGNIFICANT CHANGE UP (ref 7–14)
AST SERPL-CCNC: 50 U/L — HIGH (ref 0–41)
BILIRUB SERPL-MCNC: 0.3 MG/DL — SIGNIFICANT CHANGE UP (ref 0.2–1.2)
BUN SERPL-MCNC: 20 MG/DL — SIGNIFICANT CHANGE UP (ref 10–20)
CALCIUM SERPL-MCNC: 9.5 MG/DL — SIGNIFICANT CHANGE UP (ref 8.5–10.1)
CHLORIDE SERPL-SCNC: 104 MMOL/L — SIGNIFICANT CHANGE UP (ref 98–110)
CO2 SERPL-SCNC: 26 MMOL/L — SIGNIFICANT CHANGE UP (ref 17–32)
CREAT SERPL-MCNC: 1.2 MG/DL — SIGNIFICANT CHANGE UP (ref 0.7–1.5)
GLUCOSE SERPL-MCNC: 84 MG/DL — SIGNIFICANT CHANGE UP (ref 70–99)
HCT VFR BLD CALC: 37.3 % — LOW (ref 42–52)
HGB BLD-MCNC: 12.3 G/DL — LOW (ref 14–18)
MAGNESIUM SERPL-MCNC: 2.1 MG/DL — SIGNIFICANT CHANGE UP (ref 1.8–2.4)
MCHC RBC-ENTMCNC: 28.6 PG — SIGNIFICANT CHANGE UP (ref 27–31)
MCHC RBC-ENTMCNC: 33 G/DL — SIGNIFICANT CHANGE UP (ref 32–37)
MCV RBC AUTO: 86.7 FL — SIGNIFICANT CHANGE UP (ref 80–94)
NRBC # BLD: 0 /100 WBCS — SIGNIFICANT CHANGE UP (ref 0–0)
PLATELET # BLD AUTO: 338 K/UL — SIGNIFICANT CHANGE UP (ref 130–400)
POTASSIUM SERPL-MCNC: 4.5 MMOL/L — SIGNIFICANT CHANGE UP (ref 3.5–5)
POTASSIUM SERPL-SCNC: 4.5 MMOL/L — SIGNIFICANT CHANGE UP (ref 3.5–5)
PROT SERPL-MCNC: 6.6 G/DL — SIGNIFICANT CHANGE UP (ref 6–8)
RBC # BLD: 4.3 M/UL — LOW (ref 4.7–6.1)
RBC # FLD: 14.3 % — SIGNIFICANT CHANGE UP (ref 11.5–14.5)
SODIUM SERPL-SCNC: 139 MMOL/L — SIGNIFICANT CHANGE UP (ref 135–146)
WBC # BLD: 9.55 K/UL — SIGNIFICANT CHANGE UP (ref 4.8–10.8)
WBC # FLD AUTO: 9.55 K/UL — SIGNIFICANT CHANGE UP (ref 4.8–10.8)

## 2021-07-06 PROCEDURE — 99231 SBSQ HOSP IP/OBS SF/LOW 25: CPT

## 2021-07-06 RX ADMIN — SIMVASTATIN 40 MILLIGRAM(S): 20 TABLET, FILM COATED ORAL at 21:05

## 2021-07-06 RX ADMIN — SENNA PLUS 2 TABLET(S): 8.6 TABLET ORAL at 21:05

## 2021-07-06 RX ADMIN — Medication 325 MILLIGRAM(S): at 11:54

## 2021-07-06 RX ADMIN — Medication 37.5 MILLIGRAM(S): at 21:05

## 2021-07-06 RX ADMIN — HEPARIN SODIUM 5000 UNIT(S): 5000 INJECTION INTRAVENOUS; SUBCUTANEOUS at 05:34

## 2021-07-06 RX ADMIN — Medication 1 PATCH: at 11:15

## 2021-07-06 RX ADMIN — HEPARIN SODIUM 5000 UNIT(S): 5000 INJECTION INTRAVENOUS; SUBCUTANEOUS at 21:05

## 2021-07-06 RX ADMIN — CHLORHEXIDINE GLUCONATE 1 APPLICATION(S): 213 SOLUTION TOPICAL at 05:37

## 2021-07-06 RX ADMIN — Medication 1 PATCH: at 11:54

## 2021-07-06 RX ADMIN — CLOPIDOGREL BISULFATE 75 MILLIGRAM(S): 75 TABLET, FILM COATED ORAL at 11:54

## 2021-07-06 RX ADMIN — Medication 5 MILLIGRAM(S): at 21:05

## 2021-07-06 RX ADMIN — AMLODIPINE BESYLATE 2.5 MILLIGRAM(S): 2.5 TABLET ORAL at 05:34

## 2021-07-06 RX ADMIN — Medication 1 PATCH: at 14:02

## 2021-07-06 RX ADMIN — PANTOPRAZOLE SODIUM 40 MILLIGRAM(S): 20 TABLET, DELAYED RELEASE ORAL at 05:34

## 2021-07-06 RX ADMIN — HEPARIN SODIUM 5000 UNIT(S): 5000 INJECTION INTRAVENOUS; SUBCUTANEOUS at 13:43

## 2021-07-06 RX ADMIN — LOSARTAN POTASSIUM 50 MILLIGRAM(S): 100 TABLET, FILM COATED ORAL at 05:34

## 2021-07-06 RX ADMIN — Medication 1 PATCH: at 21:11

## 2021-07-06 NOTE — PROGRESS NOTE ADULT - SUBJECTIVE AND OBJECTIVE BOX
VEENA BRANDON 61y Male  MRN#: [id number]   Hospital Day: 13d      SUBJECTIVE  Patient is a 61y old Male who presents with a chief complaint of left leg weakness. (04 Jul 2021 15:22). He is currently admitted to medicine with the primary diagnosis of Stroke.    Today is day 13 of admission    INTERVAL HPI AND OVERNIGHT EVENTS:  Patient was examined and seen at bedside. This morning he is resting comfortably in bed and reports no issues or overnight events. Denies any pain or discomfort.     OBJECTIVE  PAST MEDICAL & SURGICAL HISTORY  HTN (hypertension)    Psoriasis    CVA (cerebral vascular accident)    No significant past surgical history      ALLERGIES:  No Known Allergies    MEDICATIONS:  STANDING MEDICATIONS  amLODIPine   Tablet 2.5 milliGRAM(s) Oral daily  aspirin 325 milliGRAM(s) Oral daily  chlorhexidine 4% Liquid 1 Application(s) Topical daily  clopidogrel Tablet 75 milliGRAM(s) Oral daily  heparin   Injectable 5000 Unit(s) SubCutaneous every 8 hours  losartan 50 milliGRAM(s) Oral daily  melatonin 5 milliGRAM(s) Oral at bedtime  nicotine -   7 mG/24Hr(s) Patch 1 patch Transdermal daily  pantoprazole    Tablet 40 milliGRAM(s) Oral before breakfast  senna 2 Tablet(s) Oral at bedtime  simvastatin 40 milliGRAM(s) Oral at bedtime  venlafaxine 37.5 milliGRAM(s) Oral at bedtime    PRN MEDICATIONS  polyethylene glycol 3350 17 Gram(s) Oral daily PRN      VITAL SIGNS: Last 24 Hours  T(C): 36.4 (06 Jul 2021 13:14), Max: 36.9 (05 Jul 2021 20:34)  T(F): 97.6 (06 Jul 2021 13:14), Max: 98.5 (05 Jul 2021 20:34)  HR: 69 (06 Jul 2021 13:14) (59 - 73)  BP: 138/68 (06 Jul 2021 13:14) (138/68 - 190/90)  BP(mean): 111 (05 Jul 2021 20:34) (111 - 111)  RR: 18 (06 Jul 2021 13:14) (18 - 18)  SpO2: --    LABS:                        12.3   9.55  )-----------( 338      ( 06 Jul 2021 11:10 )             37.3     07-06    139  |  104  |  20  ----------------------------<  84  4.5   |  26  |  1.2    Ca    9.5      06 Jul 2021 11:10  Mg     2.1     07-06    TPro  6.6  /  Alb  4.0  /  TBili  0.3  /  DBili  x   /  AST  50<H>  /  ALT  93<H>  /  AlkPhos  159<H>  07-06    RADIOLOGY:        PHYSICAL EXAM:  CONSTITUTIONAL: No acute distress, AAOx3  HEAD: Atraumatic, normocephalic  PULMONARY: Clear to auscultation bilaterally; no wheezes  CARDIOVASCULAR: Regular rate and rhythm; no murmurs  GASTROINTESTINAL: Soft, non-tender, non-distended; bowel sounds present  MUSCULOSKELETAL: no edema

## 2021-07-06 NOTE — PROGRESS NOTE ADULT - NSICDXPILOT_GEN_ALL_CORE
[place]

## 2021-07-06 NOTE — PROGRESS NOTE ADULT - ATTENDING COMMENTS
I have personally seen and examined this patient on 7/6.  I have fully participated in the care of this patient.  I have reviewed all pertinent clinical information, including history, physical exam, plan and note. Exam unchanged. Continue DAPT. Awaiting for bed in 4A.   I have reviewed all pertinent clinical information and reviewed all relevant imaging and diagnostic studies personally.  Recommendations as above.  Agree with above assessment except as noted.

## 2021-07-06 NOTE — PROGRESS NOTE ADULT - ASSESSMENT
Patient is a 61y old Male who presents with a chief complaint of left leg weakness. (04 Jul 2021 15:22). He is currently admitted to medicine with the primary diagnosis of Stroke.    Today is day 13 of admission    # Evolving Acute Rt. Frontal lobe Infarct in the precentral gyrus s/p recanalization of R MCA and R NORMAN vessels by REINALDO (06/30)  > CTA H&N(6/23): New severe stenoses along Rt. NORMAN & Rt. MCA; Worsening chonic moderate stenosis along Lf. NORMAN  > CT Perfusion (6/23): Ischemic penumbra in Rt. MCA territory without core infarct.   > Repeat CTH on 6/24 -> Evolving acute infarct;   > MRI 6/25: Acute right MCA infarcts.  > CT (3/60) : subarachnoid hemorrhage extending from the right parietal region superiorly to the right sylvian fissure inferiorly.  > CT(7/1) : Slight interval decrease in the hyperdensity,   > LDL(6/23): 79    > TTE(7/2): EF 66%, bicuspid aortic valve , mild mitral and tricuspid regurg   > Continue Clopidogrel, ASA  > PT/OT, physiatry f/u  > Awating bed in 4A for transfer      # Grade I Diastolic HFpEF   - Compensated; EF 66%    # hx of Depression/Anxiety   - c/w Venlafaxine 37.5mg Daily    # GERD   - c/w Protonix 40mg Daily    # DLD   - Simvastatin 40 mg PO [id number]    # smoking  - on nicotine patch     # Depression  - on venlafaxine     Diet: Regular  Activity: OOB to chair  DVT ppx: Heparin 5000 units subq every 8 hours  GI ppx: PPI  FULL CODE  Dispo: to rehab 4A, as soon as bed is available

## 2021-07-06 NOTE — PROGRESS NOTE ADULT - PROVIDER SPECIALTY LIST ADULT
Critical Care
Internal Medicine
Internal Medicine
Neurology
Critical Care
Internal Medicine
Internal Medicine
Neurology
Critical Care
Critical Care
Internal Medicine
Internal Medicine
Neurology
Critical Care
Neurology
Critical Care
Physiatry
Critical Care

## 2021-07-06 NOTE — PROGRESS NOTE ADULT - REASON FOR ADMISSION
left leg weakness
Lf. facial droop/ LUE wkness
left leg weakness

## 2021-07-07 ENCOUNTER — INPATIENT (INPATIENT)
Facility: HOSPITAL | Age: 61
LOS: 5 days | Discharge: HOME | End: 2021-07-13
Attending: PHYSICAL MEDICINE & REHABILITATION | Admitting: PHYSICAL MEDICINE & REHABILITATION
Payer: MEDICAID

## 2021-07-07 VITALS
HEART RATE: 64 BPM | SYSTOLIC BLOOD PRESSURE: 137 MMHG | TEMPERATURE: 98 F | DIASTOLIC BLOOD PRESSURE: 70 MMHG | RESPIRATION RATE: 18 BRPM

## 2021-07-07 VITALS
HEIGHT: 66 IN | TEMPERATURE: 98 F | WEIGHT: 158.73 LBS | HEART RATE: 62 BPM | RESPIRATION RATE: 18 BRPM | DIASTOLIC BLOOD PRESSURE: 83 MMHG | SYSTOLIC BLOOD PRESSURE: 144 MMHG

## 2021-07-07 LAB
ALBUMIN SERPL ELPH-MCNC: 4 G/DL — SIGNIFICANT CHANGE UP (ref 3.5–5.2)
ALP SERPL-CCNC: 144 U/L — HIGH (ref 30–115)
ALT FLD-CCNC: 83 U/L — HIGH (ref 0–41)
ANION GAP SERPL CALC-SCNC: 8 MMOL/L — SIGNIFICANT CHANGE UP (ref 7–14)
AST SERPL-CCNC: 40 U/L — SIGNIFICANT CHANGE UP (ref 0–41)
BASOPHILS # BLD AUTO: 0.15 K/UL — SIGNIFICANT CHANGE UP (ref 0–0.2)
BASOPHILS NFR BLD AUTO: 1.4 % — HIGH (ref 0–1)
BILIRUB SERPL-MCNC: 0.4 MG/DL — SIGNIFICANT CHANGE UP (ref 0.2–1.2)
BUN SERPL-MCNC: 21 MG/DL — HIGH (ref 10–20)
CALCIUM SERPL-MCNC: 9.3 MG/DL — SIGNIFICANT CHANGE UP (ref 8.5–10.1)
CHLORIDE SERPL-SCNC: 106 MMOL/L — SIGNIFICANT CHANGE UP (ref 98–110)
CO2 SERPL-SCNC: 27 MMOL/L — SIGNIFICANT CHANGE UP (ref 17–32)
CREAT SERPL-MCNC: 1.4 MG/DL — SIGNIFICANT CHANGE UP (ref 0.7–1.5)
EOSINOPHIL # BLD AUTO: 0.84 K/UL — HIGH (ref 0–0.7)
EOSINOPHIL NFR BLD AUTO: 8 % — SIGNIFICANT CHANGE UP (ref 0–8)
GLUCOSE SERPL-MCNC: 89 MG/DL — SIGNIFICANT CHANGE UP (ref 70–99)
HCT VFR BLD CALC: 38 % — LOW (ref 42–52)
HGB BLD-MCNC: 12.3 G/DL — LOW (ref 14–18)
IMM GRANULOCYTES NFR BLD AUTO: 0.9 % — HIGH (ref 0.1–0.3)
LYMPHOCYTES # BLD AUTO: 39.3 % — SIGNIFICANT CHANGE UP (ref 20.5–51.1)
LYMPHOCYTES # BLD AUTO: 4.13 K/UL — HIGH (ref 1.2–3.4)
MAGNESIUM SERPL-MCNC: 2.1 MG/DL — SIGNIFICANT CHANGE UP (ref 1.8–2.4)
MCHC RBC-ENTMCNC: 28.1 PG — SIGNIFICANT CHANGE UP (ref 27–31)
MCHC RBC-ENTMCNC: 32.4 G/DL — SIGNIFICANT CHANGE UP (ref 32–37)
MCV RBC AUTO: 86.8 FL — SIGNIFICANT CHANGE UP (ref 80–94)
MONOCYTES # BLD AUTO: 1.01 K/UL — HIGH (ref 0.1–0.6)
MONOCYTES NFR BLD AUTO: 9.6 % — HIGH (ref 1.7–9.3)
NEUTROPHILS # BLD AUTO: 4.28 K/UL — SIGNIFICANT CHANGE UP (ref 1.4–6.5)
NEUTROPHILS NFR BLD AUTO: 40.8 % — LOW (ref 42.2–75.2)
NRBC # BLD: 0 /100 WBCS — SIGNIFICANT CHANGE UP (ref 0–0)
PLATELET # BLD AUTO: 374 K/UL — SIGNIFICANT CHANGE UP (ref 130–400)
POTASSIUM SERPL-MCNC: 4.4 MMOL/L — SIGNIFICANT CHANGE UP (ref 3.5–5)
POTASSIUM SERPL-SCNC: 4.4 MMOL/L — SIGNIFICANT CHANGE UP (ref 3.5–5)
PROT SERPL-MCNC: 6.5 G/DL — SIGNIFICANT CHANGE UP (ref 6–8)
RBC # BLD: 4.38 M/UL — LOW (ref 4.7–6.1)
RBC # FLD: 14 % — SIGNIFICANT CHANGE UP (ref 11.5–14.5)
SODIUM SERPL-SCNC: 141 MMOL/L — SIGNIFICANT CHANGE UP (ref 135–146)
WBC # BLD: 10.5 K/UL — SIGNIFICANT CHANGE UP (ref 4.8–10.8)
WBC # FLD AUTO: 10.5 K/UL — SIGNIFICANT CHANGE UP (ref 4.8–10.8)

## 2021-07-07 PROCEDURE — 99239 HOSP IP/OBS DSCHRG MGMT >30: CPT

## 2021-07-07 RX ORDER — LOSARTAN POTASSIUM 100 MG/1
1 TABLET, FILM COATED ORAL
Qty: 0 | Refills: 0 | DISCHARGE

## 2021-07-07 RX ORDER — NICOTINE POLACRILEX 2 MG
1 GUM BUCCAL DAILY
Refills: 0 | Status: DISCONTINUED | OUTPATIENT
Start: 2021-07-07 | End: 2021-07-13

## 2021-07-07 RX ORDER — VENLAFAXINE HCL 75 MG
37.5 CAPSULE, EXT RELEASE 24 HR ORAL AT BEDTIME
Refills: 0 | Status: DISCONTINUED | OUTPATIENT
Start: 2021-07-07 | End: 2021-07-13

## 2021-07-07 RX ORDER — ACETAMINOPHEN 500 MG
650 TABLET ORAL EVERY 6 HOURS
Refills: 0 | Status: DISCONTINUED | OUTPATIENT
Start: 2021-07-07 | End: 2021-07-13

## 2021-07-07 RX ORDER — ASPIRIN/CALCIUM CARB/MAGNESIUM 324 MG
1 TABLET ORAL
Qty: 0 | Refills: 0 | DISCHARGE
Start: 2021-07-07

## 2021-07-07 RX ORDER — SENNA PLUS 8.6 MG/1
2 TABLET ORAL AT BEDTIME
Refills: 0 | Status: DISCONTINUED | OUTPATIENT
Start: 2021-07-07 | End: 2021-07-13

## 2021-07-07 RX ORDER — ASPIRIN/CALCIUM CARB/MAGNESIUM 324 MG
325 TABLET ORAL DAILY
Refills: 0 | Status: DISCONTINUED | OUTPATIENT
Start: 2021-07-07 | End: 2021-07-13

## 2021-07-07 RX ORDER — SIMVASTATIN 20 MG/1
40 TABLET, FILM COATED ORAL AT BEDTIME
Refills: 0 | Status: DISCONTINUED | OUTPATIENT
Start: 2021-07-07 | End: 2021-07-13

## 2021-07-07 RX ORDER — AMLODIPINE BESYLATE 2.5 MG/1
2.5 TABLET ORAL DAILY
Refills: 0 | Status: DISCONTINUED | OUTPATIENT
Start: 2021-07-07 | End: 2021-07-13

## 2021-07-07 RX ORDER — SIMVASTATIN 20 MG/1
1 TABLET, FILM COATED ORAL
Qty: 0 | Refills: 0 | DISCHARGE
Start: 2021-07-07

## 2021-07-07 RX ORDER — CLOPIDOGREL BISULFATE 75 MG/1
1 TABLET, FILM COATED ORAL
Qty: 0 | Refills: 0 | DISCHARGE

## 2021-07-07 RX ORDER — HEPARIN SODIUM 5000 [USP'U]/ML
5000 INJECTION INTRAVENOUS; SUBCUTANEOUS EVERY 8 HOURS
Refills: 0 | Status: DISCONTINUED | OUTPATIENT
Start: 2021-07-07 | End: 2021-07-11

## 2021-07-07 RX ORDER — CLOPIDOGREL BISULFATE 75 MG/1
1 TABLET, FILM COATED ORAL
Qty: 0 | Refills: 0 | DISCHARGE
Start: 2021-07-07

## 2021-07-07 RX ORDER — CLOPIDOGREL BISULFATE 75 MG/1
75 TABLET, FILM COATED ORAL DAILY
Refills: 0 | Status: DISCONTINUED | OUTPATIENT
Start: 2021-07-07 | End: 2021-07-13

## 2021-07-07 RX ORDER — LANOLIN ALCOHOL/MO/W.PET/CERES
5 CREAM (GRAM) TOPICAL AT BEDTIME
Refills: 0 | Status: DISCONTINUED | OUTPATIENT
Start: 2021-07-07 | End: 2021-07-13

## 2021-07-07 RX ORDER — VENLAFAXINE HCL 75 MG
1 CAPSULE, EXT RELEASE 24 HR ORAL
Qty: 0 | Refills: 0 | DISCHARGE
Start: 2021-07-07

## 2021-07-07 RX ORDER — AMLODIPINE BESYLATE 2.5 MG/1
1 TABLET ORAL
Qty: 0 | Refills: 0 | DISCHARGE

## 2021-07-07 RX ORDER — VENLAFAXINE HCL 75 MG
1 CAPSULE, EXT RELEASE 24 HR ORAL
Qty: 0 | Refills: 0 | DISCHARGE

## 2021-07-07 RX ORDER — LOSARTAN POTASSIUM 100 MG/1
50 TABLET, FILM COATED ORAL DAILY
Refills: 0 | Status: DISCONTINUED | OUTPATIENT
Start: 2021-07-07 | End: 2021-07-13

## 2021-07-07 RX ORDER — AMLODIPINE BESYLATE 2.5 MG/1
1 TABLET ORAL
Qty: 0 | Refills: 0 | DISCHARGE
Start: 2021-07-07

## 2021-07-07 RX ORDER — PANTOPRAZOLE SODIUM 20 MG/1
40 TABLET, DELAYED RELEASE ORAL
Refills: 0 | Status: DISCONTINUED | OUTPATIENT
Start: 2021-07-07 | End: 2021-07-13

## 2021-07-07 RX ORDER — LOSARTAN POTASSIUM 100 MG/1
1 TABLET, FILM COATED ORAL
Qty: 0 | Refills: 0 | DISCHARGE
Start: 2021-07-07

## 2021-07-07 RX ADMIN — HEPARIN SODIUM 5000 UNIT(S): 5000 INJECTION INTRAVENOUS; SUBCUTANEOUS at 05:40

## 2021-07-07 RX ADMIN — HEPARIN SODIUM 5000 UNIT(S): 5000 INJECTION INTRAVENOUS; SUBCUTANEOUS at 13:29

## 2021-07-07 RX ADMIN — PANTOPRAZOLE SODIUM 40 MILLIGRAM(S): 20 TABLET, DELAYED RELEASE ORAL at 05:40

## 2021-07-07 RX ADMIN — LOSARTAN POTASSIUM 50 MILLIGRAM(S): 100 TABLET, FILM COATED ORAL at 05:40

## 2021-07-07 RX ADMIN — HEPARIN SODIUM 5000 UNIT(S): 5000 INJECTION INTRAVENOUS; SUBCUTANEOUS at 21:58

## 2021-07-07 RX ADMIN — SENNA PLUS 2 TABLET(S): 8.6 TABLET ORAL at 21:59

## 2021-07-07 RX ADMIN — CHLORHEXIDINE GLUCONATE 1 APPLICATION(S): 213 SOLUTION TOPICAL at 05:40

## 2021-07-07 RX ADMIN — SIMVASTATIN 40 MILLIGRAM(S): 20 TABLET, FILM COATED ORAL at 21:59

## 2021-07-07 RX ADMIN — Medication 325 MILLIGRAM(S): at 12:25

## 2021-07-07 RX ADMIN — AMLODIPINE BESYLATE 2.5 MILLIGRAM(S): 2.5 TABLET ORAL at 05:40

## 2021-07-07 RX ADMIN — CLOPIDOGREL BISULFATE 75 MILLIGRAM(S): 75 TABLET, FILM COATED ORAL at 12:25

## 2021-07-07 RX ADMIN — Medication 5 MILLIGRAM(S): at 21:59

## 2021-07-07 RX ADMIN — Medication 37.5 MILLIGRAM(S): at 22:00

## 2021-07-07 RX ADMIN — Medication 1 PATCH: at 12:21

## 2021-07-07 NOTE — H&P ADULT - ASSESSMENT
ASSESSMENT/PLAN    Rehab of Right Frontal lobe Infarct in the precentral gyrus s/p recanalization of R MCA and R NORMAN vessels by REINALDO  Patient seen and evaluated with Dr. Quigley  Patient will require at least 3 hours, 5 days per week of acute inpatient rehab including PT, OT    # Right MCA stroke   - Continue ASA/Plavix  - PT/OT eval and treat    # Grade I Diastolic HFpEF   - Compensated; EF 66%    # hx of Depression/Anxiety   - Continue with Venlafaxine 37.5mg Daily    # GERD   - Continue with Protonix 40mg Daily    # DLD   - Simvastatin 40 mg PO [id number]    # Smoking history   - on nicotine patch        -Pain control: Tylenol prn    -GI/Bowel Mgmt: No issues at this time    -Bladder management: No issues at this time     -Skin:  No active issues at this time    -FEN     - Diet: Regular           Precautions / PROPHYLAXIS:      - Falls  - Aspiration  - Seizure       - Ortho: Weight bearing status: WBAT      - DVT prophylaxis: Heparin 5000U subq Q8 hrs      MEDICAL PROGNOSIS: GOOD            REHAB POTENTIAL: GOOD             ESTIMATED DISPOSITION: HOME WITH HOME CARE       [ x ]  The goals of the IRF admission were discussed with the patient and or family member, who agreed             ELOS:  [ x    ] 7-14    [    ]  14-21    [    ]  Other    THERAPY ORDERS and INITIAL INDIVIDUALIZED PLAN OF CARE:  This initial individualized interdisciplinary plan of care, which was established by me (the attending physiatrist), is based on elements from the post admission evaluation. The interdisciplinary therapy program is to be at least 3 hrs a day, at least 5 days per week from from physical, occupational and/ or speech therapies as ordered by me below.      [ x  ] P.T. 90 mins. /day at least 5 out of 7 days:  [  x ] superficial  modalities prn, [ x  ] A/AAROM, [ x  ] PREs, [ x  ] transfer training,            [ x  ] progressive ambulation, [x   ] stairs                                               [ x  ] O.T. 90 mins. /day at least 5 out of 7 days::  [ x  ] modalities prn,  [ x  ]A/AAROM, [ x  ] PREs, [  x ] functional transfer training, [ x  ] ADL training           [   ] cognitive/ perceptual eval and training, [   ] splint eval                                                  [   ] S.L.P:  [   ] speech eval, [   ] swallow eval     [   ] Neuropsychology eval     [ x  ] Individualized rec. therapy      RATIONALE FOR INPATIENT ADMISSION - Patient demonstrates the following: (check all that apply)  [X] Medically appropriate for acute rehabilitation admission. Requires interdisiplinary therapy consisting of at least PT and OT, at least 3 hrs. a day at least 5 days a week  [X] Has attainable rehab goals with an appropriate initial discharge plan  [X] Has rehabilitation potential (expected to make a significant improvement within a reasonable period of time)  [X] Requires close medical management by a rehab physician, rehab nursing care,  and comprehensive interdisciplinary team (including PT, OT)    [X] Requires evaluation by a physiatrist at least 3 days a week to evaluate and manage and coordinate rehab and medical problems   ASSESSMENT/PLAN    Rehab of Right Frontal lobe Infarct in the precentral gyrus s/p recanalization of R MCA and R NORMAN vessels by REINALDO  Patient seen and evaluated with Dr. Quigley  Patient will require at least 3 hours, 5 days per week of acute inpatient rehab including PT, OT    # Right MCA stroke   - Continue ASA/Plavix  - PT/OT eval and treat    # Grade I Diastolic HFpEF   - Compensated; EF 66%  low sodium diet    # hx of Depression/Anxiety   - Continue with Venlafaxine 37.5mg Daily    # GERD   - Continue with Protonix 40mg Daily    # DLD   - Simvastatin 40 mg PO qhs    # Smoking history   - on nicotine patch        -Pain control: Tylenol prn    -GI/Bowel Mgmt: No issues at this time    -Bladder management: No issues at this time     -Skin:  No active issues at this time    -FEN     - Diet: Regular consistancy, low salt, DASH           Precautions / PROPHYLAXIS:      - Falls  - Aspiration  - Seizure       - Ortho: Weight bearing status: WBAT      - DVT prophylaxis: Heparin 5000U subq Q8 hrs      MEDICAL PROGNOSIS: GOOD            REHAB POTENTIAL: GOOD             ESTIMATED DISPOSITION: HOME WITH HOME CARE       [ x ]  The goals of the IRF admission were discussed with the patient and or family member, who agreed             ELOS:  [ x    ] 7-14    [    ]  14-21    [    ]  Other    THERAPY ORDERS and INITIAL INDIVIDUALIZED PLAN OF CARE:  This initial individualized interdisciplinary plan of care, which was established by me (the attending physiatrist), is based on elements from the post admission evaluation. The interdisciplinary therapy program is to be at least 3 hrs a day, at least 5 days per week from from physical, occupational and/ or speech therapies as ordered by me below.      [ x  ] P.T. 90 mins. /day at least 5 out of 7 days:  [  x ] superficial  modalities prn, [ x  ] A/AAROM, [ x  ] PREs, [ x  ] transfer training,            [ x  ] progressive ambulation, [x   ] stairs                                               [ x  ] O.T. 90 mins. /day at least 5 out of 7 days::  [ x  ] modalities prn,  [ x  ]A/AAROM, [ x  ] PREs, [  x ] functional transfer training, [ x  ] ADL training           [   ] cognitive/ perceptual eval and training, [   ] splint eval                                                  [   ] S.L.P:  [   ] speech eval, [   ] swallow eval     [ x  ] Neuropsychology eval     [ x  ] Individualized rec. therapy      RATIONALE FOR INPATIENT ADMISSION - Patient demonstrates the following: (check all that apply)  [X] Medically appropriate for acute rehabilitation admission. Requires interdisiplinary therapy consisting of at least PT and OT, at least 3 hrs. a day at least 5 days a week  [X] Has attainable rehab goals with an appropriate initial discharge plan  [X] Has rehabilitation potential (expected to make a significant improvement within a reasonable period of time)  [X] Requires close medical management by a rehab physician, rehab nursing care,  and comprehensive interdisciplinary team (including PT, OT)    [X] Requires evaluation by a physiatrist at least 3 days a week to evaluate and manage and coordinate rehab and medical problems   ASSESSMENT/PLAN    Rehab of Right Frontal lobe Infarct in the precentral gyrus s/p recanalization angioplasty of R MCA and R NORMAN vessels by Neuro Endovascular expert.   He has a left hemiparesis (nondominant).   Patient seen and evaluated with Dr. Quigley  Patient will require at least 3 hours, 5 days per week of acute inpatient rehab including PT, OT    # Right MCA stroke   - Continue ASA/Plavix  - PT/OT eval and treat    # Grade I Diastolic HFpEF   - Compensated; EF 66%  low sodium diet    # hx of Depression/Anxiety   - Continue with Venlafaxine 37.5mg Daily    # GERD   - Continue with Protonix 40mg Daily    # DLD   - Simvastatin 40 mg PO qhs  He gives a history of an allergy to atorvastatin.    # Smoking history   - on nicotine patch        -Pain control: Tylenol prn    -GI/Bowel Mgmt: No issues at this time    -Bladder management: No issues at this time     -Skin:  No active issues at this time    -FEN     - Diet: Regular consistancy           Precautions / PROPHYLAXIS:      - Falls  - Aspiration  - Seizure       - Ortho: Weight bearing status: WBAT      - DVT prophylaxis: Heparin 5000U subq Q8 hrs      MEDICAL PROGNOSIS: GOOD            REHAB POTENTIAL: GOOD             ESTIMATED DISPOSITION: HOME WITH HOME CARE       [ x ]  The goals of the IRF admission were discussed with the patient and or family member, who agreed             ELOS:  [ x    ] 7-14    [    ]  14-21    [    ]  Other    THERAPY ORDERS and INITIAL INDIVIDUALIZED PLAN OF CARE:  This initial individualized interdisciplinary plan of care, which was established by me (the attending physiatrist), is based on elements from the post admission evaluation. The interdisciplinary therapy program is to be at least 3 hrs a day, at least 5 days per week from from physical, occupational and/ or speech therapies as ordered by me below.      [ x  ] P.T. 90 mins. /day at least 5 out of 7 days:  [  x ] superficial  modalities prn, [ x  ] A/AAROM, [ x  ] PREs, [ x  ] transfer training,            [ x  ] progressive ambulation, [x   ] stairs                                               [ x  ] O.T. 90 mins. /day at least 5 out of 7 days::  [ x  ] modalities prn,  [ x  ]A/AAROM, [ x  ] PREs, [  x ] functional transfer training, [ x  ] ADL training           [   ] cognitive/ perceptual eval and training, [   ] splint eval                                                  [ x  ] S.L.P:  [ x  ] speech eval-clincally doing well, [   ] swallow eval     [ x  ] Neuropsychology eval     [ x  ] Individualized rec. therapy      RATIONALE FOR INPATIENT ADMISSION - Patient demonstrates the following: (check all that apply)  [X] Medically appropriate for acute rehabilitation admission. Requires interdisiplinary therapy consisting of at least PT and OT, at least 3 hrs. a day at least 5 days a week  [X] Has attainable rehab goals with an appropriate initial discharge plan  [X] Has rehabilitation potential (expected to make a significant improvement within a reasonable period of time)  [X] Requires close medical management by a rehab physician, rehab nursing care,  and comprehensive interdisciplinary team (including PT, OT)    [X] Requires evaluation by a physiatrist at least 3 days a week to evaluate and manage and coordinate rehab and medical problems   ASSESSMENT/PLAN    Rehab of Right Frontal lobe Infarct in the precentral gyrus s/p recanalization angioplasty of R MCA and R NORMAN vessels by Neuro Endovascular expert.   He has a left hemiparesis (nondominant).   Patient seen and evaluated with Dr. Quigley  Patient will require at least 3 hours, 5 days per week of acute inpatient rehab including PT, OT    # Right MCA stroke   - Continue ASA/Plavix  - PT/OT eval and treat    # Grade I Diastolic HFpEF   - Compensated; EF 66%  Low salt.    # hx of Depression/Anxiety   - Continue with Venlafaxine 37.5mg Daily    # GERD   - Continue with Protonix 40mg Daily    # DLD   - Simvastatin 40 mg PO qhs  He gives a history of an allergy to atorvastatin.    # Smoking history   - on nicotine patch        -Pain control: Tylenol prn    -GI/Bowel Mgmt: No issues at this time    -Bladder management: No issues at this time     -Skin:  No active issues at this time    -FEN     - Diet: Regular consistancy, low salt           Precautions / PROPHYLAXIS:      - Falls  - Aspiration  - Seizure       - Ortho: Weight bearing status: WBAT      - DVT prophylaxis: Heparin 5000U subq Q8 hrs      MEDICAL PROGNOSIS: GOOD            REHAB POTENTIAL: GOOD             ESTIMATED DISPOSITION: HOME WITH HOME CARE       [ x ]  The goals of the IRF admission were discussed with the patient and or family member, who agreed             ELOS:  [ x    ] 7-14    [    ]  14-21    [    ]  Other    THERAPY ORDERS and INITIAL INDIVIDUALIZED PLAN OF CARE:  This initial individualized interdisciplinary plan of care, which was established by me (the attending physiatrist), is based on elements from the post admission evaluation. The interdisciplinary therapy program is to be at least 3 hrs a day, at least 5 days per week from from physical, occupational and/ or speech therapies as ordered by me below.      [ x  ] P.T. 90 mins. /day at least 5 out of 7 days:  [  x ] superficial  modalities prn, [ x  ] A/AAROM, [ x  ] PREs, [ x  ] transfer training,            [ x  ] progressive ambulation, [x   ] stairs                                               [ x  ] O.T. 90 mins. /day at least 5 out of 7 days::  [ x  ] modalities prn,  [ x  ]A/AAROM, [ x  ] PREs, [  x ] functional transfer training, [ x  ] ADL training           [   ] cognitive/ perceptual eval and training, [   ] splint eval                                                  [ x  ] S.L.P:  [ x  ] speech eval-clincally doing well, [   ] swallow eval     [ x  ] Neuropsychology eval     [ x  ] Individualized rec. therapy      RATIONALE FOR INPATIENT ADMISSION - Patient demonstrates the following: (check all that apply)  [X] Medically appropriate for acute rehabilitation admission. Requires interdisiplinary therapy consisting of at least PT and OT, at least 3 hrs. a day at least 5 days a week  [X] Has attainable rehab goals with an appropriate initial discharge plan  [X] Has rehabilitation potential (expected to make a significant improvement within a reasonable period of time)  [X] Requires close medical management by a rehab physician, rehab nursing care,  and comprehensive interdisciplinary team (including PT, OT)    [X] Requires evaluation by a physiatrist at least 3 days a week to evaluate and manage and coordinate rehab and medical problems   ASSESSMENT/PLAN    Rehab of Right Frontal lobe Infarct in the precentral gyrus s/p recanalization angioplasty of R MCA and R NORMAN vessels by Neuro Endovascular expert.   He has a left hemiparesis (nondominant).   Patient seen and evaluated with Dr. Quigley  Patient will require at least 3 hours, 5 days per week of acute inpatient rehab including PT, OT    # Right MCA stroke   - Continue ASA/Plavix  - PT/OT eval and treat    # Grade I Diastolic HFpEF   - Compensated; EF 66%  Low salt.    # hx of Depression/Anxiety   - Continue with Venlafaxine 37.5mg Daily    # GERD   - Continue with Protonix 40mg Daily    # DLD   - Simvastatin 40 mg PO qhs  He gives a history of an allergy to atorvastatin.    # Smoking history   - on nicotine patch     #Psoriasis     -Pain control: Tylenol prn    -GI/Bowel Mgmt: No issues at this time    -Bladder management: No issues at this time     -Skin:  No active issues at this time    -FEN     - Diet: Regular consistancy, low salt           Precautions / PROPHYLAXIS:      - Falls  - Aspiration  - Seizure       - Ortho: Weight bearing status: WBAT      - DVT prophylaxis: Heparin 5000U subq Q8 hrs      MEDICAL PROGNOSIS: GOOD            REHAB POTENTIAL: GOOD             ESTIMATED DISPOSITION: HOME WITH HOME CARE       [ x ]  The goals of the IRF admission were discussed with the patient and or family member, who agreed             ELOS:  [ x    ] 7-14    [    ]  14-21    [    ]  Other    THERAPY ORDERS and INITIAL INDIVIDUALIZED PLAN OF CARE:  This initial individualized interdisciplinary plan of care, which was established by me (the attending physiatrist), is based on elements from the post admission evaluation. The interdisciplinary therapy program is to be at least 3 hrs a day, at least 5 days per week from from physical, occupational and/ or speech therapies as ordered by me below.      [ x  ] P.T. 90 mins. /day at least 5 out of 7 days:  [  x ] superficial  modalities prn, [ x  ] A/AAROM, [ x  ] PREs, [ x  ] transfer training,            [ x  ] progressive ambulation, [x   ] stairs                                               [ x  ] O.T. 90 mins. /day at least 5 out of 7 days::  [ x  ] modalities prn,  [ x  ]A/AAROM, [ x  ] PREs, [  x ] functional transfer training, [ x  ] ADL training           [   ] cognitive/ perceptual eval and training, [   ] splint eval                                                  [ x  ] S.L.P:  [ x  ] speech eval-clincally doing well, [   ] swallow eval     [ x  ] Neuropsychology eval     [ x  ] Individualized rec. therapy      RATIONALE FOR INPATIENT ADMISSION - Patient demonstrates the following: (check all that apply)  [X] Medically appropriate for acute rehabilitation admission. Requires interdisiplinary therapy consisting of at least PT and OT, at least 3 hrs. a day at least 5 days a week  [X] Has attainable rehab goals with an appropriate initial discharge plan  [X] Has rehabilitation potential (expected to make a significant improvement within a reasonable period of time)  [X] Requires close medical management by a rehab physician, rehab nursing care,  and comprehensive interdisciplinary team (including PT, OT)    [X] Requires evaluation by a physiatrist at least 3 days a week to evaluate and manage and coordinate rehab and medical problems

## 2021-07-07 NOTE — H&P ADULT - ATTENDING COMMENTS
Patient seen and examined with the resident. we discussed the case. I have directed the care. He has suffered a right CVA with a mild left hemiparesis. He has a history HTN and tobacco use. He clinically did well after an angioplasty. He requires acute rehab. with 3 hours of daily therapies. He has a history of an allergy to atorvastatin. He is on simvastatin. He is on 2 antihypertensives. He is on a nicotine patch. Patient seen and examined with the resident. We discussed the case. I have directed the care. He has suffered a right CVA with a mild left hemiparesis (nondominant). He has a history of HTN and tobacco use. He clinically did well after an neurology endovascular expert angioplasty. He requires acute rehab. with 3 hours of daily therapies. He has a history of an allergy to atorvastatin. He is on simvastatin. He is on 2 antihypertensives. He is on a nicotine patch to help with smoking cessation. I edited the note Patient seen and examined with the resident. We discussed the case. I have directed the care. He has suffered a right CVA with a mild left hemiparesis (nondominant). He has a history of HTN, diastolic CHF and tobacco use. He clinically did well after an neurology endovascular expert angioplasty. He requires acute rehab. with 3 hours of daily therapies. He has a history of an allergy to atorvastatin. He is on simvastatin. He is on 2 antihypertensives. He is on a nicotine patch to help with smoking cessation. I edited the note Patient seen and examined with the resident. We discussed the case. I have directed the care. He has suffered a right CVA with a mild left hemiparesis (nondominant). He has a history of HTN, diastolic CHF, psoriasis and tobacco use. He clinically did well after an neurology endovascular expert angioplasty. He requires acute rehab. with 3 hours of daily therapies. He has a history of an allergy to atorvastatin. He is on simvastatin. He is on 2 antihypertensives. He is on a nicotine patch to help with smoking cessation. I edited the note

## 2021-07-07 NOTE — CHART NOTE - NSCHARTNOTEFT_GEN_A_CORE
<<<RESIDENT DISCHARGE NOTE>>>     VEENA BRANDON  MRN-[id number]    VITAL SIGNS:  T(F): 97.8 (07-07-21 @ 13:12), Max: 97.8 (07-07-21 @ 13:12)  HR: 64 (07-07-21 @ 13:12)  BP: 137/70 (07-07-21 @ 13:12)  SpO2: --  Weight (kg): 72 (07-07-21 @ 15:37)  BMI (kg/m2): 25.6 (07-07-21 @ 15:37)    PHYSICAL EXAMINATION:  CONSTITUTIONAL: No acute distress, AAOx3  HEAD: Atraumatic, normocephalic  PULMONARY: Clear to auscultation bilaterally; no wheezes  CARDIOVASCULAR: Regular rate and rhythm; no murmurs  GASTROINTESTINAL: Soft, non-tender, non-distended; bowel sounds present  MUSCULOSKELETAL: no edema    TEST RESULTS:                        12.3   10.50 )-----------( 374      ( 07 Jul 2021 06:22 )             38.0       07-07    141  |  106  |  21<H>  ----------------------------<  89  4.4   |  27  |  1.4    Ca    9.3      07 Jul 2021 06:22  Mg     2.1     07-07    TPro  6.5  /  Alb  4.0  /  TBili  0.4  /  DBili  x   /  AST  40  /  ALT  83<H>  /  AlkPhos  144<H>  07-07      FINAL DISCHARGE INTERVIEW:  Resident(s) Present: (Name:_Dr. Johnson___), RN Present: (Name:  ___________)    DISCHARGE MEDICATION RECONCILIATION  reviewed with Attending (Name:Mahendra__________)    DISPOSITION:   [  ] Home,    [  ] Home with Visiting Nursing Services,   [    ]  SNF/ NH,    [  x ] Acute Rehab (4A),   [   ] Other (Specify:_________)

## 2021-07-07 NOTE — H&P ADULT - NSHPLABSRESULTS_GEN_ALL_CORE
12.3   10.50 )-----------( 374      ( 07 Jul 2021 06:22 )             38.0     07-07    141  |  106  |  21<H>  ----------------------------<  89  4.4   |  27  |  1.4    Ca    9.3      07 Jul 2021 06:22  Mg     2.1     07-07    TPro  6.5  /  Alb  4.0  /  TBili  0.4  /  DBili  x   /  AST  40  /  ALT  83<H>  /  AlkPhos  144<H>  07-07        > CTA H&N(6/23): New severe stenoses along Rt. NORMAN & Rt. MCA; Worsening chonic moderate stenosis along Lf. NORMAN  > CT Perfusion (6/23): Ischemic penumbra in Rt. MCA territory without core infarct.   > Repeat CTH on 6/24 -> Evolving acute infarct;   > MRI 6/25: Acute right MCA infarcts.  > CT (3/60) : subarachnoid hemorrhage extending from the right parietal region superiorly to the right sylvian fissure inferiorly.  > CT(7/1) : Slight interval decrease in the hyperdensity,   > LDL(6/23): 79    > TTE(7/2): EF 66%, bicuspid aortic valve , mild mitral and tricuspid regurg 12.3   10.50 )-----------( 374      ( 07 Jul 2021 06:22 )             38.0     07-07    141  |  106  |  21<H>  ----------------------------<  89  4.4   |  27  |  1.4    Ca    9.3      07 Jul 2021 06:22  Mg     2.1     07-07    TPro  6.5  /  Alb  4.0  /  TBili  0.4  /  DBili  x   /  AST  40  /  ALT  83<H>  /  AlkPhos  144<H>  07-07        > CTA H&N(6/23): New severe stenoses along Rt. NORMAN & Rt. MCA; Worsening chronic moderate stenosis along Lf. NORMAN  > CT Perfusion (6/23): Ischemic penumbra in Rt. MCA territory without core infarct.   > Repeat CTH on 6/24 -> Evolving acute infarct;   > MRI 6/25: Acute right MCA infarcts.  > CT (3/60) : subarachnoid hemorrhage extending from the right parietal region superiorly to the right sylvian fissure inferiorly.  > CT(7/1) : Slight interval decrease in the hyperdensity,   > LDL(6/23): 79    > TTE(7/2): EF 66%, bicuspid aortic valve , mild mitral and tricuspid regurg

## 2021-07-07 NOTE — H&P ADULT - NSHPREVIEWOFSYSTEMS_GEN_ALL_CORE
REVIEW OF SYSTEMS   Constiutional:    [ x  ] WNL           [   ] poor appetite   [   ] insomnia   [   ] tired   Cardio:                [ x  ] WNL           [   ] CP   [   ] MORALES   [   ] palpitations               Resp:                   [ x  ] WNL           [   ] SOB   [   ] cough   [   ] wheezing   GI:                        [ x  ] WNL           [   ] constipation   [   ] diarrhea   [   ] abdominal pain   [   ] nausea   [   ] emesis                                :                      [ x  ] WNL           [   ] GIBSON  [   ] dysuria   [   ] difficulty voiding             Endo:                   [ x  ] WNL          [   ] polyuria   [   ] temperature intolerance                 Skin:                     [ x  ] WNL          [   ] pain   [   ] wound   [   ] rash   MSK:                    [ x  ] WNL          [   ] muscle pain   [   ] joint pain/ stiffness   [   ] muscle tenderness   [   ] swelling   Neuro:                 [  x ] WNL          [   ] HA   [   ] change in vision   [   ] tremor   [   ] weakness   [   ]dysphagia              Cognitive:           [ x  ] WNL           [   ]confusion      Psych:                  [ x  ] WNL           [   ] hallucinations   [   ]agitation   [   ] delusion   [   ]depression

## 2021-07-07 NOTE — H&P ADULT - NSHPSOCIALHISTORY_GEN_ALL_CORE
Lives With: spouse; in a home with no steps to enter and one flight inside    Patient reports being 1 pack a day smoker for >40 years and was still smoking up to time of admission

## 2021-07-07 NOTE — CHART NOTE - NSCHARTNOTESELECT_GEN_ALL_CORE
Anesthesia
Transfer Note
Discharge/Event Note
Event Note
Event Note
Neuro IR pre-procedure
Neurology/Event Note
Transfer Note
Transfer Note

## 2021-07-07 NOTE — H&P ADULT - HISTORY OF PRESENT ILLNESS
60 yo male with PMHx of R NORMAN stroke (April 2020) and R M1 and A1 severe stenosis follows outpatient with Dr. Guajardo, (last angiogram on February 2021), on Plavix who presented with fluctuating left leg weakness resulting in stumbling. Patient was found to have R MCA stroke on imaging. Evolving Acute Rt. Frontal lobe Infarct in the precentral gyrus s/p recanalization of R MCA and R NORMAN vessels by REINALDO. TTE showed EF of 66%. Patient currently denies CP, SOB, fevers/chills, n/v/d.    Patient seen and evaluated by PMR consultant and found to be a good candidate for acute inpatient rehab.   PLOF: Patient reports being independent without assistive device prior to admission  Progress with PT on admission: Min assist with bed mobility, CGA transfers, ambulates 50ft RW CGA 60 yo male with PMHx of R NORMAN stroke (April 2020) and R M1 and A1 severe stenosis follows outpatient with Dr. Guajardo, (last angiogram on February 2021), on Plavix who presented with fluctuating left leg weakness resulting in stumbling. Patient was found to have R MCA stroke on imaging. Evolving Acute Rt. Frontal lobe Infarct in the precentral gyrus s/p recanalization of R MCA and R NORMAN vessels by REINALDO. The angioplasty was very helpful. TTE showed EF of 66%. Patient currently denies CP, SOB, fevers/chills, n/v/d.    Patient seen and evaluated by PMR consultant and found to be a good candidate for acute inpatient rehab.   PLOF: Patient reports being independent without assistive device prior to admission  Progress with PT on admission: Min assist with bed mobility, CGA transfers, ambulates 50ft RW CGA 60 yo male with PMHx of R NORMAN stroke (April 2020) and R M1 and A1 severe stenosis follows outpatient with Dr. Guajardo, (last angiogram on February 2021), on Plavix who presented with fluctuating left leg weakness resulting in stumbling. Patient was found to have R MCA stroke on imaging. Evolving Acute Rt. Frontal lobe Infarct in the precentral gyrus s/p recanalization of R MCA and R NORMAN vessels by REINALDO. The angioplasty was very helpful clinically. He has some mild petcheal asymptomatic hemorrhage. TTE showed EF of 66%. Patient currently denies CP, SOB, fevers/chills, n/v/d.    Patient seen and evaluated by PMR consultant and found to be a good candidate for acute inpatient rehab.   PLOF: Patient reports being independent without assistive device prior to admission  Progress with PT on admission: Min assist with bed mobility, CGA transfers, ambulates 50ft RW CGA

## 2021-07-07 NOTE — H&P ADULT - NSHPPHYSICALEXAM_GEN_ALL_CORE
PHYSICAL EXAMINATION   VItals: T(C): 35.9 (07-07-21 @ 05:06), Max: 36.4 (07-06-21 @ 13:14)  HR: 58 (07-07-21 @ 05:06) (58 - 69)  BP: 155/75 (07-07-21 @ 05:06) (138/68 - 155/75)  RR: 18 (07-07-21 @ 05:06) (18 - 18)  SpO2: --    General:[ x  ] NAD, Resting Comfortable,   [   ] other:                                HEENT: [  x ] NC/AT, EOMI, PERRL , Normal Conjunctivae,   [   ] other:  Cardio: [ x  ] RRR, no murmer,   [   ] other:                              Pulm: [ x  ] No Respiratory Distress,  Lungs CTAB,   [   ] other:                       Abdomen: [ x  ]ND/NT, Soft,   [   ] other:    : [x   ] NO GIBSON CATHETER, [   ] GIBSON CATHETER- no meatal tear, no discharge, [   ] other:                                            MSK: [  x ] No joint swelling, Full ROM,   [   ] other:                                         Ext: [ x  ]No C/C/E, No calf tenderness,   [   ]other:    Skin: [  x ]intact,   [   ] other:                                                                   Neurological Examination:  Cognitive: [ x   ] AAO x 3,   [    ]  other:                                                                      Attention:  [  x  ] intact,   [    ]  other:                            Memory: [    ] intact,    [  x  ]  other:   able to recall 1/3 terms after 5 minutes   Mood/Affect: [ x   ] wnl,    [    ]  other:                                                                             Communication: [ x   ]Fluent, no dysarthria, following commands:  [    ] other:   CN II - XII:  [ x   ] intact,  [    ] other:                                                                                        Motor:   RIGHT UE: [ x  ] WNL,  [   ] other:  LEFT    UE: [   ] WNL,  [  x ] other: 4+/5  RIGHT LE: [  x ] WNL,  [   ] other:   LEFT    LE: [   ] WNL,  [ x  ] other: 4+/5    Tone: [ x   ] wnl,   [    ]  other:  DTRs: [ x  ]symmetric, [   ] other:  Coordination:   [ x   ] intact,   [    ] other:                                                                           Sensory: [ x   ] Intact to light touch,   [    ] other:

## 2021-07-08 PROCEDURE — 93010 ELECTROCARDIOGRAM REPORT: CPT

## 2021-07-08 RX ADMIN — Medication 1 PATCH: at 17:42

## 2021-07-08 RX ADMIN — LOSARTAN POTASSIUM 50 MILLIGRAM(S): 100 TABLET, FILM COATED ORAL at 06:09

## 2021-07-08 RX ADMIN — Medication 325 MILLIGRAM(S): at 12:04

## 2021-07-08 RX ADMIN — Medication 37.5 MILLIGRAM(S): at 20:47

## 2021-07-08 RX ADMIN — PANTOPRAZOLE SODIUM 40 MILLIGRAM(S): 20 TABLET, DELAYED RELEASE ORAL at 06:09

## 2021-07-08 RX ADMIN — CLOPIDOGREL BISULFATE 75 MILLIGRAM(S): 75 TABLET, FILM COATED ORAL at 12:04

## 2021-07-08 RX ADMIN — SENNA PLUS 2 TABLET(S): 8.6 TABLET ORAL at 20:46

## 2021-07-08 RX ADMIN — Medication 1 PATCH: at 07:00

## 2021-07-08 RX ADMIN — HEPARIN SODIUM 5000 UNIT(S): 5000 INJECTION INTRAVENOUS; SUBCUTANEOUS at 15:09

## 2021-07-08 RX ADMIN — HEPARIN SODIUM 5000 UNIT(S): 5000 INJECTION INTRAVENOUS; SUBCUTANEOUS at 06:08

## 2021-07-08 RX ADMIN — AMLODIPINE BESYLATE 2.5 MILLIGRAM(S): 2.5 TABLET ORAL at 06:09

## 2021-07-08 RX ADMIN — Medication 5 MILLIGRAM(S): at 20:46

## 2021-07-08 RX ADMIN — SIMVASTATIN 40 MILLIGRAM(S): 20 TABLET, FILM COATED ORAL at 20:46

## 2021-07-08 RX ADMIN — Medication 1 PATCH: at 12:05

## 2021-07-08 RX ADMIN — HEPARIN SODIUM 5000 UNIT(S): 5000 INJECTION INTRAVENOUS; SUBCUTANEOUS at 20:46

## 2021-07-08 NOTE — PROGRESS NOTE ADULT - SUBJECTIVE AND OBJECTIVE BOX
Patient is a 61y old  Male who presents with a chief complaint of R MCA stroke (07 Jul 2021 12:25)      HPI:  60 yo male with PMHx of R NORMAN stroke (April 2020) and R M1 and A1 severe stenosis follows outpatient with Dr. Guajardo, (last angiogram on February 2021), on Plavix who presented with fluctuating left leg weakness resulting in stumbling. Patient was found to have R MCA stroke on imaging. Evolving Acute Rt. Frontal lobe Infarct in the precentral gyrus s/p recanalization of R MCA and R NORMAN vessels by REINALDO. The angioplasty was very helpful clinically. He has some mild petcheal asymptomatic hemorrhage. TTE showed EF of 66%. Patient currently denies CP, SOB, fevers/chills, n/v/d.    Patient seen and evaluated by PMR consultant and found to be a good candidate for acute inpatient rehab.   PLOF: Patient reports being independent without assistive device prior to admission  Progress with PT on admission: Min assist with bed mobility, CGA transfers, ambulates 50ft RW CGA (07 Jul 2021 12:25)      I examined the patient and reviewed the chart. There have been no significant changes since my history and physical except where documented below.    TODAY'S SUBJECTIVE & REVIEW OF SYMPTOMS:         Constiutional:    [   ] WNL           [   ] poor appetite   [   ] insomnia   [   ] tired   Cardio:                [   ] WNL           [   ] CP   [   ] MORALES   [   ] palpitations               Resp:                   [   ] WNL           [   ] SOB   [   ] cough   [   ] wheezing   GI:                        [   ] WNL           [   ] constipation   [   ] diarrhea   [   ] abdominal pain   [   ] nausea   [   ] emesis                                :                      [   ] WNL           [   ] GIBSON  [   ] dysuria   [   ] difficulty voiding             Endo:                   [   ] WNL          [   ] polyuria   [   ] temperature intolerance                 Skin:                     [   ] WNL          [   ] pain   [   ] wound   [   ] rash   MSK:                    [   ] WNL          [   ] muscle pain   [   ] joint pain/ stiffness   [   ] muscle tenderness   [   ] swelling   Neuro:                 [   ] WNL          [   ] HA   [   ] change in vision   [   ] tremor   [   ] weakness   [   ]dysphagia              Cognitive:           [   ] WNL           [   ]confusion      Psych:                  [   ] WNL           [   ] hallucinations   [   ]agitation   [   ] delusion   [   ]depression      PHYSICAL EXAM    Vital Signs Last 24 Hrs  T(C): 36.5 (08 Jul 2021 06:04), Max: 37.1 (07 Jul 2021 20:36)  T(F): 97.7 (08 Jul 2021 06:04), Max: 98.8 (07 Jul 2021 20:36)  HR: 62 (08 Jul 2021 06:04) (57 - 64)  BP: 156/72 (08 Jul 2021 06:04) (137/70 - 159/74)  BP(mean): --  RR: 18 (08 Jul 2021 06:04) (18 - 18)  SpO2: --    Constitutional - [ x ] NAD, Comfortable        [   ] other:  Chest - [  x ] CTA     [   ] other:  Cardiovascular - [ x ] RRR, no murmer     [   ] other:  Abdomen - [ x ] Soft, NT/ND      [   ] other:        -  [ x ] NO GIBSON CATHETER   [   ] YES  if yes: [   ] NO MEATAL TEAR OR DISCHARGE [   ] other:  Extremities - [ x ] No C/C/E, No calf tenderness       [   ] other:  ROM - [ x ] WFL     [   ] other:  Neurologic Exam -                 Cognitive - [ x ]Awake, Alert, AAO to self, place, date, year, situation         [    ] other:      Communication - [ x ]Fluent, No dysarthria       [   ] other:      Motor - No focal deficits                    Right UE -  [ x ] WNL      [    ] other:                    Left UE -     [   ] WNL      [ x ] other: 4+/5                    Right LE -   [ x ] WNL       [    ] other:                    Left LE -      [   ]WNL        [ x ] other: 4+/5      Sensory - [ x ] Intact to LT      [    ] other:          Reflexes - [ x ] wnl/ symmetric     [   ] other:     Psychiatric - [ x ]Mood stable, Affect WNL     [   ]other:     Skin - [ x ] intact      [   ] other      acetaminophen  Suppository .. 650 milliGRAM(s) Rectal every 6 hours PRN  amLODIPine   Tablet 2.5 milliGRAM(s) Oral daily  aspirin 325 milliGRAM(s) Oral daily  clopidogrel Tablet 75 milliGRAM(s) Oral daily  heparin   Injectable 5000 Unit(s) SubCutaneous every 8 hours  losartan 50 milliGRAM(s) Oral daily  melatonin 5 milliGRAM(s) Oral at bedtime  nicotine -   7 mG/24Hr(s) Patch 1 patch Transdermal daily  pantoprazole    Tablet 40 milliGRAM(s) Oral before breakfast  senna 2 Tablet(s) Oral at bedtime  simvastatin 40 milliGRAM(s) Oral at bedtime  venlafaxine 37.5 milliGRAM(s) Oral at bedtime      RECENT LABS/IMAGING                        12.3   10.50 )-----------( 374      ( 07 Jul 2021 06:22 )             38.0     07-07    141  |  106  |  21<H>  ----------------------------<  89  4.4   |  27  |  1.4    Ca    9.3      07 Jul 2021 06:22  Mg     2.1     07-07    TPro  6.5  /  Alb  4.0  /  TBili  0.4  /  DBili  x   /  AST  40  /  ALT  83<H>  /  AlkPhos  144<H>  07-07          ASSESSMENT/PLAN:    Rehab of:    Patient requires 3 hrs daily of interdisciplinary inpatient rehab at least 5 days a week.     DVT prophylaxis:    PROBLEM LIST:     Patient is a 61y old  Male who presents with a chief complaint of R MCA stroke (07 Jul 2021 12:25)      HPI:  62 yo male with PMHx of R NORMAN stroke (April 2020) and R M1 and A1 severe stenosis follows outpatient with Dr. Guajardo, (last angiogram on February 2021), on Plavix who presented with fluctuating left leg weakness resulting in stumbling. Patient was found to have R MCA stroke on imaging. Evolving Acute Rt. Frontal lobe Infarct in the precentral gyrus s/p recanalization of R MCA and R NORMAN vessels by REINALDO. The angioplasty was very helpful clinically. He has some mild petcheal asymptomatic hemorrhage. TTE showed EF of 66%. Patient currently denies CP, SOB, fevers/chills, n/v/d.    Patient seen and evaluated by PMR consultant and found to be a good candidate for acute inpatient rehab.   PLOF: Patient reports being independent without assistive device prior to admission  Progress with PT on admission: Min assist with bed mobility, CGA transfers, ambulates 50ft RW CGA (07 Jul 2021 12:25)      I examined the patient and reviewed the chart. There have been no significant changes since my history and physical except where documented below.    TODAY'S SUBJECTIVE & REVIEW OF SYMPTOMS:  Patient was seen with Dr. Quigley by bedside. No acute events overnight. Patient endorses feeling well.        Constiutional:    [ x ] WNL           [   ] poor appetite   [   ] insomnia   [   ] tired   Cardio:                [ x ] WNL           [   ] CP   [   ] MORALES   [   ] palpitations               Resp:                   [ x ] WNL           [   ] SOB   [   ] cough   [   ] wheezing   GI:                        [ x ] WNL           [   ] constipation   [   ] diarrhea   [   ] abdominal pain   [   ] nausea   [   ] emesis                                :                      [  x] WNL           [   ] GIBSON  [   ] dysuria   [   ] difficulty voiding             Endo:                   [ x ] WNL          [   ] polyuria   [   ] temperature intolerance                 Skin:                     [ x ] WNL          [   ] pain   [   ] wound   [   ] rash   MSK:                    [ x ] WNL          [   ] muscle pain   [   ] joint pain/ stiffness   [   ] muscle tenderness   [   ] swelling   Neuro:                 [ x ] WNL          [   ] HA   [   ] change in vision   [   ] tremor   [   ] weakness   [   ]dysphagia              Cognitive:           [ x ] WNL           [   ]confusion      Psych:                  [ x ] WNL           [   ] hallucinations   [   ]agitation   [   ] delusion   [   ]depression    PHYSICAL EXAM    Vital Signs Last 24 Hrs  T(C): 36.5 (08 Jul 2021 06:04), Max: 37.1 (07 Jul 2021 20:36)  T(F): 97.7 (08 Jul 2021 06:04), Max: 98.8 (07 Jul 2021 20:36)  HR: 62 (08 Jul 2021 06:04) (57 - 64)  BP: 156/72 (08 Jul 2021 06:04) (137/70 - 159/74)  BP(mean): --  RR: 18 (08 Jul 2021 06:04) (18 - 18)  SpO2: --    Constitutional - [ x ] NAD, Comfortable        [   ] other:  Chest - [  x ] CTA     [   ] other:  Cardiovascular - [ x ] RRR, no murmer     [   ] other:  Abdomen - [ x ] Soft, NT/ND      [   ] other:        -  [ x ] NO GIBSON CATHETER   [   ] YES  if yes: [   ] NO MEATAL TEAR OR DISCHARGE [   ] other:  Extremities - [ x ] No C/C/E, No calf tenderness       [   ] other:  ROM - [ x ] WFL     [   ] other:  Neurologic Exam -                 Cognitive - [ x ]Awake, Alert, AAO to self, place, date, year, situation         [    ] other:      Communication - [ x ]Fluent, No dysarthria       [   ] other:      Motor - No focal deficits                    Right UE -  [ x ] WNL      [    ] other:                    Left UE -     [   ] WNL      [ x ] other: 4+/5                    Right LE -   [ x ] WNL       [    ] other:                    Left LE -      [   ]WNL        [ x ] other: 4+/5      Sensory - [ x ] Intact to LT      [    ] other:          Reflexes - [ x ] wnl/ symmetric     [   ] other:     Psychiatric - [ x ]Mood stable, Affect WNL     [   ]other:     Skin - [ x ] intact      [   ] other      acetaminophen  Suppository .. 650 milliGRAM(s) Rectal every 6 hours PRN  amLODIPine   Tablet 2.5 milliGRAM(s) Oral daily  aspirin 325 milliGRAM(s) Oral daily  clopidogrel Tablet 75 milliGRAM(s) Oral daily  heparin   Injectable 5000 Unit(s) SubCutaneous every 8 hours  losartan 50 milliGRAM(s) Oral daily  melatonin 5 milliGRAM(s) Oral at bedtime  nicotine -   7 mG/24Hr(s) Patch 1 patch Transdermal daily  pantoprazole    Tablet 40 milliGRAM(s) Oral before breakfast  senna 2 Tablet(s) Oral at bedtime  simvastatin 40 milliGRAM(s) Oral at bedtime  venlafaxine 37.5 milliGRAM(s) Oral at bedtime      RECENT LABS/IMAGING                        12.3   10.50 )-----------( 374      ( 07 Jul 2021 06:22 )             38.0     07-07    141  |  106  |  21<H>  ----------------------------<  89  4.4   |  27  |  1.4    Ca    9.3      07 Jul 2021 06:22  Mg     2.1     07-07    TPro  6.5  /  Alb  4.0  /  TBili  0.4  /  DBili  x   /  AST  40  /  ALT  83<H>  /  AlkPhos  144<H>  07-07          ASSESSMENT/PLAN:    Rehab of:    Patient requires 3 hrs daily of interdisciplinary inpatient rehab at least 5 days a week.     DVT prophylaxis:    PROBLEM LIST:

## 2021-07-08 NOTE — PROGRESS NOTE ADULT - ATTENDING COMMENTS
Patient seen and examined with the resident. We discussed the case. I have directed the care. He has a left hemiparesis. He has comorbidities of CHF, depression, anxiety, HLD, smoking cessation, GERD and psoriasis. He requires an inpatient acute rehab admission with 3 hours of daily therapies. I have edited the note.

## 2021-07-08 NOTE — PROGRESS NOTE ADULT - ASSESSMENT
Rehab of Right Frontal lobe Infarct in the precentral gyrus s/p recanalization angioplasty of R MCA and R NORMAN vessels by Neuro Endovascular expert.   He has a left hemiparesis (nondominant).   Patient seen and evaluated with Dr. Quigley  Patient will require at least 3 hours, 5 days per week of acute inpatient rehab including PT, OT    # Right MCA stroke   - Continue ASA/Plavix  - PT/OT eval and treat    # Grade I Diastolic HFpEF   - Compensated; EF 66%  Low salt.    # hx of Depression/Anxiety   - Continue with Venlafaxine 37.5mg Daily    # GERD   - Continue with Protonix 40mg Daily    # DLD   - Simvastatin 40 mg PO qhs  He gives a history of an allergy to atorvastatin.    # Smoking history   - on nicotine patch     #Psoriasis     -Pain control: Tylenol prn    -GI/Bowel Mgmt: No issues at this time    -Bladder management: No issues at this time     -Skin:  No active issues at this time    -FEN     - Diet: Regular consistancy, low salt       Precautions / PROPHYLAXIS:      - Falls  - Aspiration  - Seizure       - Ortho: Weight bearing status: WBAT      - DVT prophylaxis: Heparin 5000U subq Q8 hrs   Rehab of Right Frontal lobe Infarct in the precentral gyrus s/p recanalization angioplasty of R MCA and R NORMAN vessels by Neuro Endovascular expert.   He has a left hemiparesis (nondominant).   Patient seen and evaluated with Dr. Quigley  Patient will require at least 3 hours, 5 days per week of acute inpatient rehab including PT, OT    # Right MCA stroke   - Continue ASA/Plavix  - PT/OT eval and treat    # Grade I Diastolic HFpEF   - Compensated; EF 66%  Low salt.    # hx of Depression/Anxiety   - Continue with Venlafaxine 37.5mg Daily    # GERD   - Continue with Protonix 40mg Daily    # HLD   - Simvastatin 40 mg PO qhs  He gives a history of an allergy to atorvastatin.    # Smoking cessation   - on nicotine patch     #Psoriasis     -Pain control: Tylenol prn    -GI/Bowel Mgmt: No issues at this time    -Bladder management: No issues at this time     -Skin:  Psoriasis, stable.     -FEN     - Diet: Regular consistancy, low salt       Precautions / PROPHYLAXIS:      - Falls  - Aspiration  - Seizure       - Ortho: Weight bearing status: WBAT      - DVT prophylaxis: Heparin 5000U subq Q8 hrs

## 2021-07-09 LAB
ALBUMIN SERPL ELPH-MCNC: 4.2 G/DL — SIGNIFICANT CHANGE UP (ref 3.5–5.2)
ALP SERPL-CCNC: 156 U/L — HIGH (ref 30–115)
ALT FLD-CCNC: 63 U/L — HIGH (ref 0–41)
ANION GAP SERPL CALC-SCNC: 13 MMOL/L — SIGNIFICANT CHANGE UP (ref 7–14)
AST SERPL-CCNC: 33 U/L — SIGNIFICANT CHANGE UP (ref 0–41)
BASOPHILS # BLD AUTO: 0.13 K/UL — SIGNIFICANT CHANGE UP (ref 0–0.2)
BASOPHILS NFR BLD AUTO: 1.3 % — HIGH (ref 0–1)
BILIRUB SERPL-MCNC: 0.3 MG/DL — SIGNIFICANT CHANGE UP (ref 0.2–1.2)
BUN SERPL-MCNC: 23 MG/DL — HIGH (ref 10–20)
CALCIUM SERPL-MCNC: 9.3 MG/DL — SIGNIFICANT CHANGE UP (ref 8.5–10.1)
CHLORIDE SERPL-SCNC: 105 MMOL/L — SIGNIFICANT CHANGE UP (ref 98–110)
CO2 SERPL-SCNC: 22 MMOL/L — SIGNIFICANT CHANGE UP (ref 17–32)
COVID-19 SPIKE DOMAIN AB INTERP: NEGATIVE — SIGNIFICANT CHANGE UP
COVID-19 SPIKE DOMAIN ANTIBODY RESULT: 0.4 U/ML — SIGNIFICANT CHANGE UP
CREAT SERPL-MCNC: 1.3 MG/DL — SIGNIFICANT CHANGE UP (ref 0.7–1.5)
EOSINOPHIL # BLD AUTO: 0.62 K/UL — SIGNIFICANT CHANGE UP (ref 0–0.7)
EOSINOPHIL NFR BLD AUTO: 6.1 % — SIGNIFICANT CHANGE UP (ref 0–8)
GLUCOSE SERPL-MCNC: 77 MG/DL — SIGNIFICANT CHANGE UP (ref 70–99)
HCT VFR BLD CALC: 42.2 % — SIGNIFICANT CHANGE UP (ref 42–52)
HGB BLD-MCNC: 13.3 G/DL — LOW (ref 14–18)
IMM GRANULOCYTES NFR BLD AUTO: 1.1 % — HIGH (ref 0.1–0.3)
LYMPHOCYTES # BLD AUTO: 4.32 K/UL — HIGH (ref 1.2–3.4)
LYMPHOCYTES # BLD AUTO: 42.5 % — SIGNIFICANT CHANGE UP (ref 20.5–51.1)
MAGNESIUM SERPL-MCNC: 2.2 MG/DL — SIGNIFICANT CHANGE UP (ref 1.8–2.4)
MCHC RBC-ENTMCNC: 27.8 PG — SIGNIFICANT CHANGE UP (ref 27–31)
MCHC RBC-ENTMCNC: 31.5 G/DL — LOW (ref 32–37)
MCV RBC AUTO: 88.3 FL — SIGNIFICANT CHANGE UP (ref 80–94)
MONOCYTES # BLD AUTO: 0.87 K/UL — HIGH (ref 0.1–0.6)
MONOCYTES NFR BLD AUTO: 8.6 % — SIGNIFICANT CHANGE UP (ref 1.7–9.3)
NEUTROPHILS # BLD AUTO: 4.11 K/UL — SIGNIFICANT CHANGE UP (ref 1.4–6.5)
NEUTROPHILS NFR BLD AUTO: 40.4 % — LOW (ref 42.2–75.2)
NRBC # BLD: 0 /100 WBCS — SIGNIFICANT CHANGE UP (ref 0–0)
PLATELET # BLD AUTO: 405 K/UL — HIGH (ref 130–400)
POTASSIUM SERPL-MCNC: 4.8 MMOL/L — SIGNIFICANT CHANGE UP (ref 3.5–5)
POTASSIUM SERPL-SCNC: 4.8 MMOL/L — SIGNIFICANT CHANGE UP (ref 3.5–5)
PROT SERPL-MCNC: 6.9 G/DL — SIGNIFICANT CHANGE UP (ref 6–8)
RBC # BLD: 4.78 M/UL — SIGNIFICANT CHANGE UP (ref 4.7–6.1)
RBC # FLD: 14.5 % — SIGNIFICANT CHANGE UP (ref 11.5–14.5)
SARS-COV-2 IGG+IGM SERPL QL IA: 0.4 U/ML — SIGNIFICANT CHANGE UP
SARS-COV-2 IGG+IGM SERPL QL IA: NEGATIVE — SIGNIFICANT CHANGE UP
SARS-COV-2 RNA SPEC QL NAA+PROBE: SIGNIFICANT CHANGE UP
SODIUM SERPL-SCNC: 140 MMOL/L — SIGNIFICANT CHANGE UP (ref 135–146)
WBC # BLD: 10.16 K/UL — SIGNIFICANT CHANGE UP (ref 4.8–10.8)
WBC # FLD AUTO: 10.16 K/UL — SIGNIFICANT CHANGE UP (ref 4.8–10.8)

## 2021-07-09 RX ADMIN — Medication 37.5 MILLIGRAM(S): at 23:07

## 2021-07-09 RX ADMIN — HEPARIN SODIUM 5000 UNIT(S): 5000 INJECTION INTRAVENOUS; SUBCUTANEOUS at 13:38

## 2021-07-09 RX ADMIN — Medication 5 MILLIGRAM(S): at 23:06

## 2021-07-09 RX ADMIN — SIMVASTATIN 40 MILLIGRAM(S): 20 TABLET, FILM COATED ORAL at 23:07

## 2021-07-09 RX ADMIN — HEPARIN SODIUM 5000 UNIT(S): 5000 INJECTION INTRAVENOUS; SUBCUTANEOUS at 05:47

## 2021-07-09 RX ADMIN — CLOPIDOGREL BISULFATE 75 MILLIGRAM(S): 75 TABLET, FILM COATED ORAL at 12:28

## 2021-07-09 RX ADMIN — Medication 325 MILLIGRAM(S): at 12:28

## 2021-07-09 RX ADMIN — LOSARTAN POTASSIUM 50 MILLIGRAM(S): 100 TABLET, FILM COATED ORAL at 05:47

## 2021-07-09 RX ADMIN — Medication 1 PATCH: at 12:30

## 2021-07-09 RX ADMIN — PANTOPRAZOLE SODIUM 40 MILLIGRAM(S): 20 TABLET, DELAYED RELEASE ORAL at 05:47

## 2021-07-09 RX ADMIN — Medication 1 PATCH: at 12:28

## 2021-07-09 RX ADMIN — SENNA PLUS 2 TABLET(S): 8.6 TABLET ORAL at 23:06

## 2021-07-09 RX ADMIN — AMLODIPINE BESYLATE 2.5 MILLIGRAM(S): 2.5 TABLET ORAL at 05:47

## 2021-07-09 RX ADMIN — HEPARIN SODIUM 5000 UNIT(S): 5000 INJECTION INTRAVENOUS; SUBCUTANEOUS at 23:06

## 2021-07-09 NOTE — PROGRESS NOTE ADULT - SUBJECTIVE AND OBJECTIVE BOX
Patient is a 61y old  Male who presents with a chief complaint of R MCA stroke (07 Jul 2021 12:25)      HPI:  62 yo male with PMHx of R NORMAN stroke (April 2020) and R M1 and A1 severe stenosis follows outpatient with Dr. Guajardo, (last angiogram on February 2021), on Plavix who presented with fluctuating left leg weakness resulting in stumbling. Patient was found to have R MCA stroke on imaging. Evolving Acute Rt. Frontal lobe Infarct in the precentral gyrus s/p recanalization of R MCA and R NORMAN vessels by REINALDO. The angioplasty was very helpful clinically. He has some mild petcheal asymptomatic hemorrhage. TTE showed EF of 66%. Patient currently denies CP, SOB, fevers/chills, n/v/d.    Patient seen and evaluated by PMR consultant and found to be a good candidate for acute inpatient rehab.   PLOF: Patient reports being independent without assistive device prior to admission  Progress with PT on admission: Min assist with bed mobility, CGA transfers, ambulates 50ft RW CGA (07 Jul 2021 12:25)      I examined the patient and reviewed the chart. There have been no significant changes since my history and physical except where documented below.    TODAY'S SUBJECTIVE & REVIEW OF SYMPTOMS:  Patient was feeling well this morning. No acute events overnight.        Constiutional:    [ x ] WNL           [   ] poor appetite   [   ] insomnia   [   ] tired   Cardio:                [ x ] WNL           [   ] CP   [   ] MORALES   [   ] palpitations               Resp:                   [ x ] WNL           [   ] SOB   [   ] cough   [   ] wheezing   GI:                        [ x ] WNL           [   ] constipation   [   ] diarrhea   [   ] abdominal pain   [   ] nausea   [   ] emesis                                :                      [  x] WNL           [   ] GIBSON  [   ] dysuria   [   ] difficulty voiding             Endo:                   [ x ] WNL          [   ] polyuria   [   ] temperature intolerance                 Skin:                     [ x ] WNL          [   ] pain   [   ] wound   [   ] rash   MSK:                    [ x ] WNL          [   ] muscle pain   [   ] joint pain/ stiffness   [   ] muscle tenderness   [   ] swelling   Neuro:                 [ x ] WNL          [   ] HA   [   ] change in vision   [   ] tremor   [   ] weakness   [   ]dysphagia              Cognitive:           [ x ] WNL           [   ]confusion      Psych:                  [ x ] WNL           [   ] hallucinations   [   ]agitation   [   ] delusion   [   ]depression      PHYSICAL EXAM    Vital Signs Last 24 Hrs  T(C): 36.4 (09 Jul 2021 05:04), Max: 36.4 (09 Jul 2021 05:04)  T(F): 97.6 (09 Jul 2021 05:04), Max: 97.6 (09 Jul 2021 05:04)  HR: 57 (09 Jul 2021 05:04) (57 - 70)  BP: 140/77 (09 Jul 2021 05:04) (132/69 - 145/75)  BP(mean): --  RR: 18 (09 Jul 2021 05:04) (18 - 20)  SpO2: 97% (08 Jul 2021 13:00) (97% - 97%)    Constitutional - [ x ] NAD, Comfortable        [   ] other:  Chest - [  x ] CTA     [   ] other:  Cardiovascular - [ x ] RRR, no murmer     [   ] other:  Abdomen - [ x ] Soft, NT/ND      [   ] other:        -  [ x ] NO GIBSON CATHETER   [   ] YES  if yes: [   ] NO MEATAL TEAR OR DISCHARGE [   ] other:  Extremities - [ x ] No C/C/E, No calf tenderness       [   ] other:  ROM - [ x ] WFL     [   ] other:  Neurologic Exam -                 Cognitive - [ x ]Awake, Alert, AAO to self, place, date, year, situation         [    ] other:      Communication - [ x ]Fluent, No dysarthria       [   ] other:      Motor - No focal deficits                    Right UE -  [ x ] WNL      [    ] other:                    Left UE -     [   ] WNL      [ x ] other: 4+/5                    Right LE -   [ x ] WNL       [    ] other:                    Left LE -      [   ]WNL        [ x ] other: 4+/5      Sensory - [ x ] Intact to LT      [    ] other:          Reflexes - [ x ] wnl/ symmetric     [   ] other:     Psychiatric - [ x ]Mood stable, Affect WNL     [   ]other:     Skin - [ x ] intact      [   ] other      MEDICATIONS  (STANDING):  amLODIPine   Tablet 2.5 milliGRAM(s) Oral daily  aspirin 325 milliGRAM(s) Oral daily  clopidogrel Tablet 75 milliGRAM(s) Oral daily  heparin   Injectable 5000 Unit(s) SubCutaneous every 8 hours  losartan 50 milliGRAM(s) Oral daily  melatonin 5 milliGRAM(s) Oral at bedtime  nicotine -   7 mG/24Hr(s) Patch 1 patch Transdermal daily  pantoprazole    Tablet 40 milliGRAM(s) Oral before breakfast  senna 2 Tablet(s) Oral at bedtime  simvastatin 40 milliGRAM(s) Oral at bedtime  venlafaxine 37.5 milliGRAM(s) Oral at bedtime    MEDICATIONS  (PRN):  acetaminophen  Suppository .. 650 milliGRAM(s) Rectal every 6 hours PRN Temp greater or equal to 38C (100.4F), Mild Pain (1 - 3), Moderate Pain (4 - 6)        RECENT LABS/IMAGING                        12.3   10.50 )-----------( 374      ( 07 Jul 2021 06:22 )             38.0     07-07    141  |  106  |  21<H>  ----------------------------<  89  4.4   |  27  |  1.4    Ca    9.3      07 Jul 2021 06:22  Mg     2.1     07-07    TPro  6.5  /  Alb  4.0  /  TBili  0.4  /  DBili  x   /  AST  40  /  ALT  83<H>  /  AlkPhos  144<H>  07-07          ASSESSMENT/PLAN:    Rehab of:    Patient requires 3 hrs daily of interdisciplinary inpatient rehab at least 5 days a week.     DVT prophylaxis:    PROBLEM LIST:     Patient is a 61y old  Male who presents with a chief complaint of R MCA stroke (07 Jul 2021 12:25)      HPI:  62 yo male with PMHx of R NORMAN stroke (April 2020) and R M1 and A1 severe stenosis follows outpatient with Dr. Guajardo, (last angiogram on February 2021), on Plavix who presented with fluctuating left leg weakness resulting in stumbling. Patient was found to have R MCA stroke on imaging. Evolving Acute Rt. Frontal lobe Infarct in the precentral gyrus s/p recanalization of R MCA and R NORMAN vessels by REINALDO. The angioplasty was very helpful clinically. He has some mild petcheal asymptomatic hemorrhage. TTE showed EF of 66%. Patient currently denies CP, SOB, fevers/chills, n/v/d.    Patient seen and evaluated by PMR consultant and found to be a good candidate for acute inpatient rehab.   PLOF: Patient reports being independent without assistive device prior to admission  Progress with PT on admission: Min assist with bed mobility, CGA transfers, ambulates 50ft RW CGA (07 Jul 2021 12:25)      I examined the patient and reviewed the chart. There have been no significant changes since my history and physical except where documented below.    TODAY'S SUBJECTIVE & REVIEW OF SYMPTOMS:  Patient was feeling well this morning. Placed on COVID droplet precautions for 10 days after exposure on 3E per infection control. He isn't vaccinated.   He is asymptomatic.        Constiutional:    [ x ] WNL           [   ] poor appetite   [   ] insomnia   [   ] tired   Cardio:                [ x ] WNL           [   ] CP   [   ] MORALES   [   ] palpitations               Resp:                   [ x ] WNL           [   ] SOB   [   ] cough   [   ] wheezing   GI:                        [ x ] WNL           [   ] constipation   [   ] diarrhea   [   ] abdominal pain   [   ] nausea   [   ] emesis                                :                      [  x] WNL           [   ] GIBSON  [   ] dysuria   [   ] difficulty voiding             Endo:                   [ x ] WNL          [   ] polyuria   [   ] temperature intolerance                 Skin:                     [ x ] WNL          [   ] pain   [   ] wound   [   ] rash   MSK:                    [ x ] WNL          [   ] muscle pain   [   ] joint pain/ stiffness   [   ] muscle tenderness   [   ] swelling   Neuro:                 [ x ] WNL          [   ] HA   [   ] change in vision   [   ] tremor   [   ] weakness   [   ]dysphagia              Cognitive:           [ x ] WNL           [   ]confusion      Psych:                  [ x ] WNL           [   ] hallucinations   [   ]agitation   [   ] delusion   [   ]depression      PHYSICAL EXAM    Vital Signs Last 24 Hrs  T(C): 36.4 (09 Jul 2021 05:04), Max: 36.4 (09 Jul 2021 05:04)  T(F): 97.6 (09 Jul 2021 05:04), Max: 97.6 (09 Jul 2021 05:04)  HR: 57 (09 Jul 2021 05:04) (57 - 70)  BP: 140/77 (09 Jul 2021 05:04) (132/69 - 145/75)  BP(mean): --  RR: 18 (09 Jul 2021 05:04) (18 - 20)  SpO2: 97% (08 Jul 2021 13:00) (97% - 97%)    Constitutional - [ x ] NAD, Comfortable        [   ] other:  Chest - [  x ] CTA     [   ] other:  Cardiovascular - [ x ] RRR, no murmer     [   ] other:  Abdomen - [ x ] Soft, NT/ND      [   ] other:        -  [ x ] NO GIBSON CATHETER   [   ] YES  if yes: [   ] NO MEATAL TEAR OR DISCHARGE [   ] other:  Extremities - [ x ] No C/C/E, No calf tenderness       [   ] other:  ROM - [ x ] WFL     [   ] other:  Neurologic Exam -                 Cognitive - [ x ]Awake, Alert, AAO to self, place, date, year, situation         [    ] other:      Communication - [ x ]Fluent, No dysarthria       [   ] other:      Motor - No focal deficits                    Right UE -  [ x ] WNL      [    ] other:                    Left UE -     [   ] WNL      [ x ] other: 4+/5                    Right LE -   [ x ] WNL       [    ] other:                    Left LE -      [   ]WNL        [ x ] other: 4+/5      Sensory - [ x ] Intact to LT      [    ] other:          Reflexes - [ x ] wnl/ symmetric     [   ] other:     Psychiatric - [ x ]Mood stable, Affect WNL     [   ]other:     Skin - [ x ] intact      [   ] other      MEDICATIONS  (STANDING):  amLODIPine   Tablet 2.5 milliGRAM(s) Oral daily  aspirin 325 milliGRAM(s) Oral daily  clopidogrel Tablet 75 milliGRAM(s) Oral daily  heparin   Injectable 5000 Unit(s) SubCutaneous every 8 hours  losartan 50 milliGRAM(s) Oral daily  melatonin 5 milliGRAM(s) Oral at bedtime  nicotine -   7 mG/24Hr(s) Patch 1 patch Transdermal daily  pantoprazole    Tablet 40 milliGRAM(s) Oral before breakfast  senna 2 Tablet(s) Oral at bedtime  simvastatin 40 milliGRAM(s) Oral at bedtime  venlafaxine 37.5 milliGRAM(s) Oral at bedtime    MEDICATIONS  (PRN):  acetaminophen  Suppository .. 650 milliGRAM(s) Rectal every 6 hours PRN Temp greater or equal to 38C (100.4F), Mild Pain (1 - 3), Moderate Pain (4 - 6)        RECENT LABS/IMAGING                        12.3   10.50 )-----------( 374      ( 07 Jul 2021 06:22 )             38.0     07-07    141  |  106  |  21<H>  ----------------------------<  89  4.4   |  27  |  1.4    Ca    9.3      07 Jul 2021 06:22  Mg     2.1     07-07    TPro  6.5  /  Alb  4.0  /  TBili  0.4  /  DBili  x   /  AST  40  /  ALT  83<H>  /  AlkPhos  144<H>  07-07

## 2021-07-09 NOTE — PROGRESS NOTE ADULT - ASSESSMENT
Rehab of Right Frontal lobe Infarct in the precentral gyrus s/p recanalization angioplasty of R MCA and R NORMAN vessels by Neuro Endovascular expert.   He has a left hemiparesis (nondominant).   Patient seen and evaluated with Dr. Quigley  Patient will require at least 3 hours, 5 days per week of acute inpatient rehab including PT, OT    # Right MCA stroke   - Continue ASA/Plavix  - PT/OT eval and treat    # Grade I Diastolic HFpEF   - Compensated; EF 66%  Low salt.    # hx of Depression/Anxiety   - Continue with Venlafaxine 37.5mg Daily    # GERD   - Continue with Protonix 40mg Daily    # HLD   - Simvastatin 40 mg PO qhs  He gives a history of an allergy to atorvastatin.    # Smoking cessation   - on nicotine patch     #Psoriasis     -Pain control: Tylenol prn    -GI/Bowel Mgmt: No issues at this time    -Bladder management: No issues at this time     -Skin:  Psoriasis, stable.     -FEN     - Diet: Regular consistancy, low salt       Precautions / PROPHYLAXIS:      - Falls  - Aspiration  - Seizure       - Ortho: Weight bearing status: WBAT      - DVT prophylaxis: Heparin 5000U subq Q8 hrs   Rehab of Right Frontal lobe Infarct in the precentral gyrus s/p recanalization angioplasty of R MCA and R NORMAN vessels by Neuro Endovascular expert.   He has a left hemiparesis (nondominant).   Patient seen and evaluated with Dr. Quigley  Patient will require at least 3 hours, 5 days per week of acute inpatient rehab including PT, OT    # Right MCA stroke   - Continue ASA/Plavix  - PT/OT eval and treat    #COVID exposure, on droplet precautions for 10 days. He is asymptomatic. He is unvaccinated.    # Grade I Diastolic HFpEF   - Compensated; EF 66%  Low salt.    # hx of Depression/Anxiety   - Continue with Venlafaxine 37.5mg Daily    # GERD   - Continue with Protonix 40mg Daily    # HLD   - Simvastatin 40 mg PO qhs  He gives a history of an allergy to atorvastatin.    # Smoking cessation   - on nicotine patch     #Psoriasis     -Pain control: Tylenol prn    -GI/Bowel Mgmt: No issues at this time    -Bladder management: No issues at this time     -Skin:  Psoriasis, stable.     -FEN     - Diet: Regular consistancy, low salt       Precautions / PROPHYLAXIS:      - Falls  - Aspiration  - Seizure       - Ortho: Weight bearing status: WBAT      - DVT prophylaxis: Heparin 5000U subq Q8 hrs

## 2021-07-09 NOTE — PROGRESS NOTE ADULT - ATTENDING COMMENTS
Patient seen and examined with the resident. We discussed the case. I have directed the care. He has a left hemiparesis. He has comorbidities of CHF, depression, anxiety, HLD, smoking cessation, GERD and psoriasis. He requires an inpatient acute rehab admission with 3 hours of daily therapies. I have edited the note. Patient seen and examined with the resident. We discussed the case. I have directed the care. He has a left hemiparesis. He has comorbidities of CHF, depression, anxiety, HLD, smoking cessation, GERD and psoriasis. He had COVID exposure on 3E before rehab admit. He is unvaccinated. He requires an inpatient acute rehab admission with 3 hours of daily therapies. I have edited the note.

## 2021-07-09 NOTE — CHART NOTE - NSCHARTNOTEFT_GEN_A_CORE
The patient has been placed on DROPLET ISOLATION due to exposure to roommate on previous unit who is + for COVID, patient is NOT VACCINATED; per Infection Control, to remain on isolation x 10 days.   COVID swab was done this morning.    The patient is a 60 yo M with hx HTN, psoriasis/psoriatic arthritis (on Humira), recent CVA and is followed by Dr. Guajardo, who was admitted on 6/23/21 with acute CVA; s/p angioplasty and stents of R MCA and R NORMAN on 6/30/21; known hx of severe stenosis:    CT head: Acute infarct involving the right frontal lobe in the precentral gyrus.  Chronic infarct of the right periventricular white matter, new from prior exams detailed above.  Additional chronic infarcts of the bilateral basal ganglia and left cerebellum, stable.  CTA head and neck: Severe stenosis and/or nonocclusive thrombus of the M1 segment of the right MCA, with diminished flow of the distal right MCA circulation, new from prior CTA 4/27/2020. 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    Pt. had interval dev. of Rt. frontoparietal petechial hemorrhage s/p revascularization, stable on rpt. imaging.     He was admitted to Rehab medicine service on 7/7/21.    Vital signs and labs are stable today:  Vital Signs Last 24 Hrs  T(C): 36.4 (09 Jul 2021 05:04), Max: 36.4 (09 Jul 2021 05:04)  T(F): 97.6 (09 Jul 2021 05:04), Max: 97.6 (09 Jul 2021 05:04)  HR: 57 (09 Jul 2021 05:04) (57 - 70)  BP: 140/77 (09 Jul 2021 05:04) (132/69 - 145/75)  BP(mean): --  RR: 18 (09 Jul 2021 05:04) (18 - 20)  SpO2: 97% (08 Jul 2021 13:00) (97% - 97%)      LABS:             13.3   10.16 )-----------( 405      ( 09 Jul 2021 07:07 )             42.2     07-09    140  |  105  |  23<H>  ----------------------------<  77  4.8   |  22  |  1.3    Ca    9.3      09 Jul 2021 07:07  Mg     2.2     07-09    TPro  6.9  /  Alb  4.2  /  TBili  0.3  /  DBili  x   /  AST  33  /  ALT  63<H>  /  AlkPhos  156<H>  07-09    CHECK results of Covid swab today  Monitor labs, VS, observe for sx--he is not vaccinated  Continue Rehab program in his room x 10 days

## 2021-07-10 RX ADMIN — LOSARTAN POTASSIUM 50 MILLIGRAM(S): 100 TABLET, FILM COATED ORAL at 05:56

## 2021-07-10 RX ADMIN — HEPARIN SODIUM 5000 UNIT(S): 5000 INJECTION INTRAVENOUS; SUBCUTANEOUS at 05:56

## 2021-07-10 RX ADMIN — PANTOPRAZOLE SODIUM 40 MILLIGRAM(S): 20 TABLET, DELAYED RELEASE ORAL at 05:56

## 2021-07-10 RX ADMIN — Medication 1 PATCH: at 11:37

## 2021-07-10 RX ADMIN — HEPARIN SODIUM 5000 UNIT(S): 5000 INJECTION INTRAVENOUS; SUBCUTANEOUS at 13:10

## 2021-07-10 RX ADMIN — HEPARIN SODIUM 5000 UNIT(S): 5000 INJECTION INTRAVENOUS; SUBCUTANEOUS at 21:48

## 2021-07-10 RX ADMIN — SIMVASTATIN 40 MILLIGRAM(S): 20 TABLET, FILM COATED ORAL at 21:47

## 2021-07-10 RX ADMIN — CLOPIDOGREL BISULFATE 75 MILLIGRAM(S): 75 TABLET, FILM COATED ORAL at 11:36

## 2021-07-10 RX ADMIN — Medication 1 PATCH: at 11:36

## 2021-07-10 RX ADMIN — Medication 1 PATCH: at 08:05

## 2021-07-10 RX ADMIN — Medication 1 PATCH: at 21:44

## 2021-07-10 RX ADMIN — Medication 325 MILLIGRAM(S): at 11:36

## 2021-07-10 RX ADMIN — Medication 5 MILLIGRAM(S): at 21:48

## 2021-07-10 RX ADMIN — Medication 37.5 MILLIGRAM(S): at 21:47

## 2021-07-10 RX ADMIN — AMLODIPINE BESYLATE 2.5 MILLIGRAM(S): 2.5 TABLET ORAL at 05:56

## 2021-07-10 NOTE — PROGRESS NOTE ADULT - SUBJECTIVE AND OBJECTIVE BOX
T(C): 36.4 (07-10-21 @ 05:44), Max: 37.1 (07-09-21 @ 13:16)  HR: 57 (07-10-21 @ 05:44) (57 - 83)  BP: 148/77 (07-10-21 @ 05:44) (141/77 - 149/72)  RR: 18 (07-10-21 @ 05:44) (18 - 18)  SpO2: --      Patient was stable overnight and expresses no new complaints     PE:    Alert   LUNGS- clear  COR- RRR  ABD- SOFT, NT  EXTR- w/o edema  NEURO- stable    07-09    140  |  105  |  23<H>  ----------------------------<  77  4.8   |  22  |  1.3    Ca    9.3      09 Jul 2021 07:07  Mg     2.2     07-09    TPro  6.9  /  Alb  4.2  /  TBili  0.3  /  DBili  x   /  AST  33  /  ALT  63<H>  /  AlkPhos  156<H>  07-09                            13.3   10.16 )-----------( 405      ( 09 Jul 2021 07:07 )             42.2

## 2021-07-11 RX ADMIN — CLOPIDOGREL BISULFATE 75 MILLIGRAM(S): 75 TABLET, FILM COATED ORAL at 12:17

## 2021-07-11 RX ADMIN — Medication 1 PATCH: at 06:17

## 2021-07-11 RX ADMIN — SENNA PLUS 2 TABLET(S): 8.6 TABLET ORAL at 22:03

## 2021-07-11 RX ADMIN — Medication 37.5 MILLIGRAM(S): at 22:03

## 2021-07-11 RX ADMIN — Medication 5 MILLIGRAM(S): at 22:03

## 2021-07-11 RX ADMIN — Medication 1 PATCH: at 12:06

## 2021-07-11 RX ADMIN — LOSARTAN POTASSIUM 50 MILLIGRAM(S): 100 TABLET, FILM COATED ORAL at 06:16

## 2021-07-11 RX ADMIN — PANTOPRAZOLE SODIUM 40 MILLIGRAM(S): 20 TABLET, DELAYED RELEASE ORAL at 06:16

## 2021-07-11 RX ADMIN — Medication 325 MILLIGRAM(S): at 12:17

## 2021-07-11 RX ADMIN — Medication 1 PATCH: at 12:16

## 2021-07-11 RX ADMIN — SIMVASTATIN 40 MILLIGRAM(S): 20 TABLET, FILM COATED ORAL at 22:03

## 2021-07-11 RX ADMIN — AMLODIPINE BESYLATE 2.5 MILLIGRAM(S): 2.5 TABLET ORAL at 06:16

## 2021-07-11 NOTE — PROGRESS NOTE ADULT - SUBJECTIVE AND OBJECTIVE BOX
T(C): 36.2 (07-11-21 @ 05:25), Max: 36.7 (07-10-21 @ 20:21)  HR: 72 (07-11-21 @ 05:25) (62 - 72)  BP: 176/83 (07-11-21 @ 05:25) (137/69 - 176/83)  RR: 18 (07-11-21 @ 05:25) (18 - 18)  SpO2: --      Patient was stable overnight and expresses no new complaints     PE:    Alert   LUNGS- clear  COR- RRR  ABD- SOFT, NT  EXTR- w/o edema  NEURO- stable

## 2021-07-12 ENCOUNTER — TRANSCRIPTION ENCOUNTER (OUTPATIENT)
Age: 61
End: 2021-07-12

## 2021-07-12 LAB
ALBUMIN SERPL ELPH-MCNC: 4.1 G/DL — SIGNIFICANT CHANGE UP (ref 3.5–5.2)
ALP SERPL-CCNC: 136 U/L — HIGH (ref 30–115)
ALT FLD-CCNC: 47 U/L — HIGH (ref 0–41)
ANION GAP SERPL CALC-SCNC: 9 MMOL/L — SIGNIFICANT CHANGE UP (ref 7–14)
AST SERPL-CCNC: 27 U/L — SIGNIFICANT CHANGE UP (ref 0–41)
BASOPHILS # BLD AUTO: 0.14 K/UL — SIGNIFICANT CHANGE UP (ref 0–0.2)
BASOPHILS NFR BLD AUTO: 1.4 % — HIGH (ref 0–1)
BILIRUB SERPL-MCNC: 0.3 MG/DL — SIGNIFICANT CHANGE UP (ref 0.2–1.2)
BUN SERPL-MCNC: 24 MG/DL — HIGH (ref 10–20)
CALCIUM SERPL-MCNC: 9.3 MG/DL — SIGNIFICANT CHANGE UP (ref 8.5–10.1)
CHLORIDE SERPL-SCNC: 105 MMOL/L — SIGNIFICANT CHANGE UP (ref 98–110)
CO2 SERPL-SCNC: 25 MMOL/L — SIGNIFICANT CHANGE UP (ref 17–32)
CREAT SERPL-MCNC: 1.5 MG/DL — SIGNIFICANT CHANGE UP (ref 0.7–1.5)
EOSINOPHIL # BLD AUTO: 0.59 K/UL — SIGNIFICANT CHANGE UP (ref 0–0.7)
EOSINOPHIL NFR BLD AUTO: 6 % — SIGNIFICANT CHANGE UP (ref 0–8)
GLUCOSE SERPL-MCNC: 88 MG/DL — SIGNIFICANT CHANGE UP (ref 70–99)
HCT VFR BLD CALC: 38.4 % — LOW (ref 42–52)
HGB BLD-MCNC: 12.2 G/DL — LOW (ref 14–18)
IMM GRANULOCYTES NFR BLD AUTO: 0.9 % — HIGH (ref 0.1–0.3)
LYMPHOCYTES # BLD AUTO: 4.11 K/UL — HIGH (ref 1.2–3.4)
LYMPHOCYTES # BLD AUTO: 42.1 % — SIGNIFICANT CHANGE UP (ref 20.5–51.1)
MAGNESIUM SERPL-MCNC: 2 MG/DL — SIGNIFICANT CHANGE UP (ref 1.8–2.4)
MCHC RBC-ENTMCNC: 28.8 PG — SIGNIFICANT CHANGE UP (ref 27–31)
MCHC RBC-ENTMCNC: 31.8 G/DL — LOW (ref 32–37)
MCV RBC AUTO: 90.6 FL — SIGNIFICANT CHANGE UP (ref 80–94)
MONOCYTES # BLD AUTO: 1.13 K/UL — HIGH (ref 0.1–0.6)
MONOCYTES NFR BLD AUTO: 11.6 % — HIGH (ref 1.7–9.3)
NEUTROPHILS # BLD AUTO: 3.7 K/UL — SIGNIFICANT CHANGE UP (ref 1.4–6.5)
NEUTROPHILS NFR BLD AUTO: 38 % — LOW (ref 42.2–75.2)
NRBC # BLD: 0 /100 WBCS — SIGNIFICANT CHANGE UP (ref 0–0)
PLATELET # BLD AUTO: 416 K/UL — HIGH (ref 130–400)
POTASSIUM SERPL-MCNC: 4.8 MMOL/L — SIGNIFICANT CHANGE UP (ref 3.5–5)
POTASSIUM SERPL-SCNC: 4.8 MMOL/L — SIGNIFICANT CHANGE UP (ref 3.5–5)
PROT SERPL-MCNC: 6.5 G/DL — SIGNIFICANT CHANGE UP (ref 6–8)
RBC # BLD: 4.24 M/UL — LOW (ref 4.7–6.1)
RBC # FLD: 14.8 % — HIGH (ref 11.5–14.5)
SODIUM SERPL-SCNC: 139 MMOL/L — SIGNIFICANT CHANGE UP (ref 135–146)
WBC # BLD: 9.76 K/UL — SIGNIFICANT CHANGE UP (ref 4.8–10.8)
WBC # FLD AUTO: 9.76 K/UL — SIGNIFICANT CHANGE UP (ref 4.8–10.8)

## 2021-07-12 RX ORDER — AMLODIPINE BESYLATE 2.5 MG/1
1 TABLET ORAL
Qty: 30 | Refills: 0
Start: 2021-07-12 | End: 2021-08-10

## 2021-07-12 RX ORDER — LOSARTAN POTASSIUM 100 MG/1
2 TABLET, FILM COATED ORAL
Qty: 60 | Refills: 0
Start: 2021-07-12 | End: 2024-08-03

## 2021-07-12 RX ORDER — PANTOPRAZOLE SODIUM 20 MG/1
1 TABLET, DELAYED RELEASE ORAL
Qty: 30 | Refills: 0
Start: 2021-07-12 | End: 2021-08-10

## 2021-07-12 RX ORDER — NICOTINE POLACRILEX 2 MG
1 GUM BUCCAL
Qty: 0 | Refills: 0 | DISCHARGE
Start: 2021-07-12

## 2021-07-12 RX ORDER — ASPIRIN/CALCIUM CARB/MAGNESIUM 324 MG
1 TABLET ORAL
Qty: 30 | Refills: 0
Start: 2021-07-12 | End: 2021-08-11

## 2021-07-12 RX ORDER — LOSARTAN POTASSIUM 100 MG/1
1 TABLET, FILM COATED ORAL
Qty: 30 | Refills: 0
Start: 2021-07-12 | End: 2021-08-10

## 2021-07-12 RX ORDER — LOSARTAN POTASSIUM 100 MG/1
1 TABLET, FILM COATED ORAL
Qty: 30 | Refills: 0
Start: 2021-07-12 | End: 2021-08-11

## 2021-07-12 RX ORDER — ASPIRIN/CALCIUM CARB/MAGNESIUM 324 MG
1 TABLET ORAL
Qty: 30 | Refills: 0
Start: 2021-07-12 | End: 2021-08-10

## 2021-07-12 RX ORDER — LANOLIN ALCOHOL/MO/W.PET/CERES
1 CREAM (GRAM) TOPICAL
Qty: 0 | Refills: 0 | DISCHARGE
Start: 2021-07-12

## 2021-07-12 RX ORDER — PANTOPRAZOLE SODIUM 20 MG/1
1 TABLET, DELAYED RELEASE ORAL
Qty: 0 | Refills: 0 | DISCHARGE
Start: 2021-07-12

## 2021-07-12 RX ORDER — NICOTINE POLACRILEX 2 MG
1 GUM BUCCAL
Qty: 30 | Refills: 0
Start: 2021-07-12 | End: 2021-08-10

## 2021-07-12 RX ADMIN — Medication 1 PATCH: at 11:31

## 2021-07-12 RX ADMIN — Medication 5 MILLIGRAM(S): at 20:38

## 2021-07-12 RX ADMIN — PANTOPRAZOLE SODIUM 40 MILLIGRAM(S): 20 TABLET, DELAYED RELEASE ORAL at 05:50

## 2021-07-12 RX ADMIN — SENNA PLUS 2 TABLET(S): 8.6 TABLET ORAL at 20:38

## 2021-07-12 RX ADMIN — Medication 1 PATCH: at 11:30

## 2021-07-12 RX ADMIN — Medication 325 MILLIGRAM(S): at 11:30

## 2021-07-12 RX ADMIN — Medication 1 PATCH: at 05:50

## 2021-07-12 RX ADMIN — SIMVASTATIN 40 MILLIGRAM(S): 20 TABLET, FILM COATED ORAL at 20:38

## 2021-07-12 RX ADMIN — CLOPIDOGREL BISULFATE 75 MILLIGRAM(S): 75 TABLET, FILM COATED ORAL at 11:30

## 2021-07-12 RX ADMIN — Medication 1 PATCH: at 07:42

## 2021-07-12 RX ADMIN — AMLODIPINE BESYLATE 2.5 MILLIGRAM(S): 2.5 TABLET ORAL at 05:49

## 2021-07-12 RX ADMIN — Medication 37.5 MILLIGRAM(S): at 20:39

## 2021-07-12 RX ADMIN — LOSARTAN POTASSIUM 50 MILLIGRAM(S): 100 TABLET, FILM COATED ORAL at 05:49

## 2021-07-12 NOTE — PROGRESS NOTE ADULT - ASSESSMENT
Rehab of Right Frontal lobe Infarct in the precentral gyrus s/p recanalization angioplasty of R MCA and R NORMAN vessels by Neuro Endovascular expert.   He has a left hemiparesis (nondominant).   Patient seen and evaluated with Dr. Quigley  Patient will require at least 3 hours, 5 days per week of acute inpatient rehab including PT, OT    # Right MCA stroke   - Continue ASA/Plavix  - PT/OT eval and treat    #COVID exposure, on droplet precautions for 10 days. He is asymptomatic. He is unvaccinated.    # Grade I Diastolic HFpEF   - Compensated; EF 66%  Low salt.    # hx of Depression/Anxiety   - Continue with Venlafaxine 37.5mg Daily    # GERD   - Continue with Protonix 40mg Daily    # HLD   - Simvastatin 40 mg PO qhs  He gives a history of an allergy to atorvastatin.    # Smoking cessation   - on nicotine patch     #Psoriasis     -Pain control: Tylenol prn    -GI/Bowel Mgmt: No issues at this time    -Bladder management: No issues at this time     -Skin:  Psoriasis, stable.     -FEN     - Diet: Regular consistancy, low salt       Precautions / PROPHYLAXIS:      - Falls  - Aspiration  - Seizure       - Ortho: Weight bearing status: WBAT      - DVT prophylaxis: Heparin 5000U subq Q8 hrs   Rehab of Right Frontal lobe Infarct in the precentral gyrus s/p recanalization angioplasty of R MCA and R NORMAN vessels by Neuro Endovascular expert.   He has a left hemiparesis (nondominant).   Patient seen and evaluated with Dr. Quigley  Patient will require at least 3 hours, 5 days per week of acute inpatient rehab including PT, OT    # Right MCA stroke   - Continue ASA/Plavix  - PT/OT eval and treat    # Grade I Diastolic HFpEF   - Compensated; EF 66%  Low salt.    # hx of Depression/Anxiety   - Continue with Venlafaxine 37.5mg Daily    # GERD   - Continue with Protonix 40mg Daily    # HLD   - Simvastatin 40 mg PO qhs  He gives a history of an allergy to atorvastatin.    # Smoking cessation   - on nicotine patch     #Psoriasis     -Pain control: Tylenol prn    -GI/Bowel Mgmt: No issues at this time    -Bladder management: No issues at this time     -Skin:  Psoriasis, stable.     -FEN     - Diet: Regular consistancy, low salt       Precautions / PROPHYLAXIS:      - Falls  - Aspiration  - Seizure       - Ortho: Weight bearing status: WBAT      - DVT prophylaxis: Heparin 5000U subq Q8 hrs

## 2021-07-12 NOTE — PROGRESS NOTE ADULT - ATTENDING COMMENTS
Patient seen and examined with the resident. We discussed the case. I have directed the care. He has a left hemiparesis. He has comorbidities of CHF, depression, anxiety, HLD, smoking cessation, GERD and psoriasis. He had COVID exposure on 3E before rehab admit. He is unvaccinated. He requires an inpatient acute rehab admission with 3 hours of daily therapies. I have edited the note. Patient seen and examined with the resident. We discussed the case. I have directed the care. He has a mild left hemiparesis. He has comorbidities of CHF, depression, anxiety, HLD, smoking cessation, GERD and psoriasis.  The patient is alert and following commands with fluent speech, mild psychomotor slowing  and blunted affect.  LUE and LLE MP is 4+/5. He is able to stand and ambulate with supervision and is medically stable.   Antic d/c home in am with outpatient PT, OT and Neuropsych eval.  Cont ASA, Plavix and Zocor.

## 2021-07-12 NOTE — DISCHARGE NOTE PROVIDER - PROVIDER TOKENS
PROVIDER:[TOKEN:[[id number]]],PROVIDER:[TOKEN:[[id number]]],FREE:[LAST:[your Rheumatologist],PHONE:[(   )    -],FAX:[(   )    -]] PROVIDER:[TOKEN:[[id number]]],PROVIDER:[TOKEN:[[id number]]],FREE:[LAST:[your Rheumatologist],PHONE:[(   )    -],FAX:[(   )    -]],PROVIDER:[TOKEN:[[id number]]]

## 2021-07-12 NOTE — DISCHARGE NOTE PROVIDER - NSDCFUADDINST_GEN_ALL_CORE_FT
Please bring this discharge instructions with you for all doctors appointments and show to all [facility] care personnels  who are involved in your care

## 2021-07-12 NOTE — PROGRESS NOTE ADULT - ASSESSMENT
Family Contact: Wife Anny Oscar  Wife stated that patient was independent in ADLs and though he was reasonably independent with IADLs, she did the financial management. She had been noticing a change in his short term memory in the last two months, even before the stroke. Patient gets upset whenever she mentions it. After the stroke cognition is baseline and she would like to follow up with speech therapy for cognitive remediation. Patient has a history of depression and is on an antidepressant. Currently, he was doing better as he is aware of discharge home. Outpatient neuropsychology services discussed in terms of cognition and mood and wife stated that she will ask for it as needed.  Stroke education provided and discharge plan discussed. Supervision of medication discussed and talked about the pill organizer. Support provided as needed.    Talked to patient in his room and he was coping well with discharge home. He denied pain and endorsed progress in therapy. Outpatient therapy discussed. All goals met and patient is going home tomorrow. Wife is very supportive.

## 2021-07-12 NOTE — DISCHARGE NOTE PROVIDER - NSDCCPCAREPLAN_GEN_ALL_CORE_FT
PRINCIPAL DISCHARGE DIAGNOSIS  Diagnosis: Stroke  Assessment and Plan of Treatment: You were tyreated for stroke with neuroendovascular procedures and by medicne, please follow up with  both neurologist in 1 to 2 weeks, caontinue asa, plavix and atorvastatin, continue rehab therapy as advised as an out patient      SECONDARY DISCHARGE DIAGNOSES  Diagnosis: Depression  Assessment and Plan of Treatment: continue effexor , follow up with your medical doctor in 2 to 4 weeks    Diagnosis: Ready to quit smoking  Assessment and Plan of Treatment: on nicotin patch  daily , continue for 4 weeks and may  stop     PRINCIPAL DISCHARGE DIAGNOSIS  Diagnosis: Stroke  Assessment and Plan of Treatment: You were treated for stroke with neuroendovascular procedures on 6/30/21 and with medications, please follow up with  both neurologist in 1 to 2 weeks, continue asa, plavix and simvastatin, continue rehab therapy as advised as an out patient      SECONDARY DISCHARGE DIAGNOSES  Diagnosis: Depression  Assessment and Plan of Treatment: continue effexor , follow up with your medical doctor in 2 to 4 weeks    Diagnosis: Ready to quit smoking  Assessment and Plan of Treatment: on nicotine patch  daily , continue for 4 weeks and may  stop    Diagnosis: Psoriasis  Assessment and Plan of Treatment: Continue Humira as directed and follow up with your Rheumatologist.

## 2021-07-12 NOTE — DISCHARGE NOTE PROVIDER - HOSPITAL COURSE
Patient is a 61y old  Male who presents with a chief complaint of R MCA stroke (07 Jul 2021 12:25)      HPI:  60 yo male with PMHx of R NORMAN stroke (April 2020) and R M1 and A1 severe stenosis follows outpatient with Dr. Guajardo, (last angiogram on February 2021), on Plavix who presented with fluctuating left leg weakness admitted on 6/23 for worsening hemiparesis( resulting in stumbling) Patient was found to have R MCA stroke on imaging. Evolving Acute Rt. Frontal lobe Infarct in the precentral gyrus s/p recanalization of R MCA and R NORMAN vessels by REINALDO.    Patient is s/p DSA w/ angioplasty + stenting of severely stenosed Rt. M1/ A1 by Dr. Guajardo.  Pt. had interval dev. of Rt. frontoparietal petechial hemorrhage s/p revascularization, stable on repeat  imaging.     He was continued on DAPT  [ASA + Plavix] + Resumedhigh-dose statin therapy, eEfexor for depression management requested by patient and spouse,  He needs to be restarted on  bi-monthly Humira for psoriatic arthritis. Outpatient f/u w/ Rheumatologist for vasculitic work, given h/o of psoriasis, and focal/ insidious worsening of  cerebral atherosclerosis is recommended and Outpatient f/u w/ Dr. Guajardo in 1-2wks after discharge.  The angioplasty was very helpful clinically. He has some mild petcheal asymptomatic hemorrhage. TTE showed EF of 66%. Patient currently denies CP, SOB, fevers/chills, n/v/d.    Patient seen and evaluated by PMR consultant and found to be a good candidate for acute inpatient rehab.   PLOF: Patient reports being independent without assistive device prior to admission  CLOF: Independent with bed mobility and supervision with transfers; patient ambulates 150 ftx2 without an assisted device, with supervision 7/11/21. She was transferred to rehab on 7/7/21   For Rehab of Right Frontal lobe Infarct in the precentral gyrus s/p recanalization angioplasty of R MCA and R NORMAN vessels by Neuro Endovascular expert.   He has a left hemiparesis (nondominant).     patient continued to participate in acute rehab therapy  3 hours, 5 days per week including PT, OT   Has  Grade I Diastolic HFpEF   - Compensated; EF 66%      # Smoking cessation   - on nicotine patch     #Psoriasis  Humira bi weekly to be resumed after seen by rheumatologist       Diet: Regular consistancy, low salt       PFollowing  recautions / PROPHYLAXIS maintained for     - Falls  - Aspiration  - Seizure     DVT prophylaxis: Heparin 5000U subq Q8 hrs   He has a mild left hemiparesis. He has comorbidities of CHF, depression, anxiety, HLD, smoking cessation, GERD and psoriasis.  The patient is alert and following commands with fluent speech, mild psychomotor slowing  and blunted affect.  LUE and LLE MP is 4+/5. He is able to stand and ambulate with supervision and is medically stable.   Antic d/c home in am with outpatient PT, OT. in am   Cont ASA, Plavix and Zocor He needs to follow up with neurologist and neuroendovascular  doctors and rheumatologist  in 2 weeks .          Patient is a 61y old  Male who presented with a chief complaint of R MCA stroke on 6/23/21      HPI:  60 yo male with PMHx of R NORMAN stroke (April 2020) and R M1 and A1 severe stenosis follows outpatient with Dr. Guajardo, (last angiogram on February 2021), on Plavix who presented with fluctuating left leg weakness admitted on 6/23 for worsening hemiparesis( resulting in stumbling) Patient was found to have R MCA stroke on imaging. Evolving Acute Rt. Frontal lobe Infarct in the precentral gyrus s/p recanalization of R MCA and R NORMAN vessels by REINALDO.    Patient is s/p DSA w/ angioplasty + stenting of severely stenosed Rt. M1/ A1 by Dr. Guajardo.  Pt. had interval dev. of Rt. frontoparietal petechial hemorrhage s/p revascularization, stable on repeat  imaging.     He was continued on DAPT  [ASA + Plavix] + Resumedhigh-dose statin therapy, eEfexor for depression management requested by patient and spouse,  He needs to be restarted on  bi-monthly Humira for psoriatic arthritis. Outpatient f/u w/ Rheumatologist for vasculitic work, given h/o of psoriasis, and focal/ insidious worsening of  cerebral atherosclerosis is recommended and Outpatient f/u w/ Dr. Guajardo in 1-2wks after discharge.  The angioplasty was very helpful clinically. He has some mild petcheal asymptomatic hemorrhage. TTE showed EF of 66%. Patient currently denies CP, SOB, fevers/chills, n/v/d.    Patient seen and evaluated by PMR consultant and found to be a good candidate for acute inpatient rehab.   PLOF: Patient reports being independent without assistive device prior to admission  CLOF: Independent with bed mobility and supervision with transfers; patient ambulates 150 ftx2 without an assisted device, with supervision 7/11/21. She was transferred to rehab on 7/7/21   For Rehab of Right Frontal lobe Infarct in the precentral gyrus s/p recanalization angioplasty of R MCA and R NORMAN vessels by Neuro Endovascular expert.   He has a left hemiparesis (nondominant).     patient continued to participate in acute rehab therapy  3 hours, 5 days per week including PT, OT   Has  Grade I Diastolic HFpEF   - Compensated; EF 66%      # Smoking cessation   - on nicotine patch     #Psoriasis  Humira bi weekly to be resumed after seen by rheumatologist       Diet: Regular consistancy, low salt       PFollowing  recautions / PROPHYLAXIS maintained for     - Falls  - Aspiration  - Seizure     DVT prophylaxis: Heparin 5000U subq Q8 hrs   He has a mild left hemiparesis. He has comorbidities of CHF, depression, anxiety, HLD, smoking cessation, GERD and psoriasis.  The patient is alert and following commands with fluent speech, mild psychomotor slowing  and blunted affect.  LUE and LLE MP is 4+/5. He is able to stand and ambulate with supervision and is medically stable.   He is being discharged home on 7/13/21 and will continue therapy at out-patient [facility].  Cont ASA, Plavix and Zocor He needs to follow up with neurologist and neuroendovascular  doctors and rheumatologist  in 2 weeks . He has an appointment with Dr. Guajardo today after he is discharged.

## 2021-07-12 NOTE — PROGRESS NOTE ADULT - SUBJECTIVE AND OBJECTIVE BOX
Patient is a 61y old  Male who presents with a chief complaint of R MCA stroke (07 Jul 2021 12:25)      HPI:  62 yo male with PMHx of R NORMAN stroke (April 2020) and R M1 and A1 severe stenosis follows outpatient with Dr. Guajardo, (last angiogram on February 2021), on Plavix who presented with fluctuating left leg weakness resulting in stumbling. Patient was found to have R MCA stroke on imaging. Evolving Acute Rt. Frontal lobe Infarct in the precentral gyrus s/p recanalization of R MCA and R NORMAN vessels by REINALDO. The angioplasty was very helpful clinically. He has some mild petcheal asymptomatic hemorrhage. TTE showed EF of 66%. Patient currently denies CP, SOB, fevers/chills, n/v/d.    Patient seen and evaluated by PMR consultant and found to be a good candidate for acute inpatient rehab.   PLOF: Patient reports being independent without assistive device prior to admission  CLOF: Independent with bed mobility and supervision with transfers; patient ambulates 150 ftx2 without an assisted device, with supervision 7/11/21      I examined the patient and reviewed the chart. There have been no significant changes since my history and physical except where documented below.    TODAY'S SUBJECTIVE & REVIEW OF SYMPTOMS:  Patient was seen at bedside with Dr. Rodriguez. Patient is feeling well. No acute events overnight.       Constiutional:    [ x ] WNL           [   ] poor appetite   [   ] insomnia   [   ] tired   Cardio:                [ x ] WNL           [   ] CP   [   ] MORALES   [   ] palpitations               Resp:                   [ x ] WNL           [   ] SOB   [   ] cough   [   ] wheezing   GI:                        [ x ] WNL           [   ] constipation   [   ] diarrhea   [   ] abdominal pain   [   ] nausea   [   ] emesis                                :                      [  x] WNL           [   ] GIBSON  [   ] dysuria   [   ] difficulty voiding             Endo:                   [ x ] WNL          [   ] polyuria   [   ] temperature intolerance                 Skin:                     [ x ] WNL          [   ] pain   [   ] wound   [   ] rash   MSK:                    [ x ] WNL          [   ] muscle pain   [   ] joint pain/ stiffness   [   ] muscle tenderness   [   ] swelling   Neuro:                 [ x ] WNL          [   ] HA   [   ] change in vision   [   ] tremor   [   ] weakness   [   ]dysphagia              Cognitive:           [ x ] WNL           [   ]confusion      Psych:                  [ x ] WNL           [   ] hallucinations   [   ]agitation   [   ] delusion   [   ]depression      PHYSICAL EXAM    Vital Signs Last 24 Hrs  T(C): 36.4 (12 Jul 2021 06:26), Max: 36.7 (11 Jul 2021 15:17)  T(F): 97.6 (12 Jul 2021 06:26), Max: 98 (11 Jul 2021 15:17)  HR: 60 (12 Jul 2021 06:26) (60 - 77)  BP: 139/63 (12 Jul 2021 06:26) (137/89 - 151/74)  BP(mean): --  RR: 18 (12 Jul 2021 06:26) (18 - 20)  SpO2: --    Constitutional - [ x ] NAD, Comfortable        [   ] other:  Chest - [  x ] CTA     [   ] other:  Cardiovascular - [ x ] RRR, no murmer     [   ] other:  Abdomen - [ x ] Soft, NT/ND      [   ] other:        -  [ x ] NO GIBSON CATHETER   [   ] YES  if yes: [   ] NO MEATAL TEAR OR DISCHARGE [   ] other:  Extremities - [ x ] No C/C/E, No calf tenderness       [   ] other:  ROM - [ x ] WFL     [   ] other:  Neurologic Exam -                 Cognitive - [ x ]Awake, Alert, AAO to self, place, date, year, situation         [    ] other:      Communication - [ x ]Fluent, No dysarthria       [   ] other:      Motor - No focal deficits                    Right UE -  [ x ] WNL      [    ] other:                    Left UE -     [   ] WNL      [ x ] other: 4+/5                    Right LE -   [ x ] WNL       [    ] other:                    Left LE -      [   ]WNL        [ x ] other: 4+/5      Sensory - [ x ] Intact to LT      [    ] other:          Reflexes - [ x ] wnl/ symmetric     [   ] other:     Psychiatric - [ x ]Mood stable, Affect WNL     [   ]other:     Skin - [ x ] intact      [   ] other      MEDICATIONS  (STANDING):  amLODIPine   Tablet 2.5 milliGRAM(s) Oral daily  aspirin 325 milliGRAM(s) Oral daily  clopidogrel Tablet 75 milliGRAM(s) Oral daily  losartan 50 milliGRAM(s) Oral daily  melatonin 5 milliGRAM(s) Oral at bedtime  nicotine -   7 mG/24Hr(s) Patch 1 patch Transdermal daily  pantoprazole    Tablet 40 milliGRAM(s) Oral before breakfast  senna 2 Tablet(s) Oral at bedtime  simvastatin 40 milliGRAM(s) Oral at bedtime  venlafaxine 37.5 milliGRAM(s) Oral at bedtime    MEDICATIONS  (PRN):  acetaminophen  Suppository .. 650 milliGRAM(s) Rectal every 6 hours PRN Temp greater or equal to 38C (100.4F), Mild Pain (1 - 3), Moderate Pain (4 - 6)      RECENT LABS/IMAGING                        12.3   10.50 )-----------( 374      ( 07 Jul 2021 06:22 )             38.0     07-07    141  |  106  |  21<H>  ----------------------------<  89  4.4   |  27  |  1.4    Ca    9.3      07 Jul 2021 06:22  Mg     2.1     07-07    TPro  6.5  /  Alb  4.0  /  TBili  0.4  /  DBili  x   /  AST  40  /  ALT  83<H>  /  AlkPhos  144<H>  07-07             Patient is a 61y old  Male who presents with a chief complaint of R MCA stroke (07 Jul 2021 12:25)      HPI:  60 yo male with PMHx of R NORMAN stroke (April 2020) and R M1 and A1 severe stenosis follows outpatient with Dr. Guajardo, (last angiogram on February 2021), on Plavix who presented with fluctuating left leg weakness resulting in stumbling. Patient was found to have R MCA stroke on imaging. Evolving Acute Rt. Frontal lobe Infarct in the precentral gyrus s/p recanalization of R MCA and R NORMAN vessels by REINALDO. The angioplasty was very helpful clinically. He has some mild petcheal asymptomatic hemorrhage. TTE showed EF of 66%. Patient currently denies CP, SOB, fevers/chills, n/v/d.    Patient seen and evaluated by PMR consultant and found to be a good candidate for acute inpatient rehab.   PLOF: Patient reports being independent without assistive device prior to admission  CLOF: Independent with bed mobility and supervision with transfers; patient ambulates 150 ftx2 without an assisted device, with supervision 7/11/21      I examined the patient and reviewed the chart. There have been no significant changes since my history and physical except where documented below.    TODAY'S SUBJECTIVE & REVIEW OF SYMPTOMS:  Patient was seen at bedside with Dr. Rodriguez. Patient is feeling well. No acute events overnight.       Constiutional:    [ x ] WNL           [   ] poor appetite   [   ] insomnia   [   ] tired   Cardio:                [ x ] WNL           [   ] CP   [   ] MORALES   [   ] palpitations               Resp:                   [ x ] WNL           [   ] SOB   [   ] cough   [   ] wheezing   GI:                        [ x ] WNL           [   ] constipation   [   ] diarrhea   [   ] abdominal pain   [   ] nausea   [   ] emesis                                :                      [  x] WNL           [   ] GIBSON  [   ] dysuria   [   ] difficulty voiding             Endo:                   [ x ] WNL          [   ] polyuria   [   ] temperature intolerance                 Skin:                     [ x ] WNL          [   ] pain   [   ] wound   [   ] rash   MSK:                    [ x ] WNL          [   ] muscle pain   [   ] joint pain/ stiffness   [   ] muscle tenderness   [   ] swelling   Neuro:                 [ x ] WNL          [   ] HA   [   ] change in vision   [   ] tremor   [   ] weakness   [   ]dysphagia              Cognitive:           [ x ] WNL           [   ]confusion      Psych:                  [ x ] WNL           [   ] hallucinations   [   ]agitation   [   ] delusion   [   ]depression      PHYSICAL EXAM    Vital Signs Last 24 Hrs  T(C): 36.4 (12 Jul 2021 06:26), Max: 36.7 (11 Jul 2021 15:17)  T(F): 97.6 (12 Jul 2021 06:26), Max: 98 (11 Jul 2021 15:17)  HR: 60 (12 Jul 2021 06:26) (60 - 77)  BP: 139/63 (12 Jul 2021 06:26) (137/89 - 151/74)  BP(mean): --  RR: 18 (12 Jul 2021 06:26) (18 - 20)  SpO2: --    Constitutional - [ x ] NAD, Comfortable        [   ] other:  Chest - [  x ] CTA     [   ] other:  Cardiovascular - [ x ] RRR, no murmer     [   ] other:  Abdomen - [ x ] Soft, NT/ND      [   ] other:        -  [ x ] NO GIBSON CATHETER   [   ] YES  if yes: [   ] NO MEATAL TEAR OR DISCHARGE [   ] other:  Extremities - [ x ] No C/C/E, No calf tenderness       [   ] other:  ROM - [ x ] WFL     [   ] other:  Neurologic Exam -                 Cognitive - [ x ]Awake, Alert, AAO to self, place, date, year, situation         [    ] other:      Communication - [ x ]Fluent, No dysarthria       [   ] other:      Motor - No focal deficits                    Right UE -  [ x ] WNL      [    ] other:                    Left UE -     [   ] WNL      [ x ] other: 4+/5                    Right LE -   [ x ] WNL       [    ] other:                    Left LE -      [   ]WNL        [ x ] other: 4+/5      Sensory - [ x ] Intact to LT      [    ] other:          Reflexes - [ x ] wnl/ symmetric     [   ] other:     Psychiatric - [ x ]Mood stable, Affect WNL     [   ]other:     Skin - [ x ] intact      [   ] other      MEDICATIONS  (STANDING):  amLODIPine   Tablet 2.5 milliGRAM(s) Oral daily  aspirin 325 milliGRAM(s) Oral daily  clopidogrel Tablet 75 milliGRAM(s) Oral daily  losartan 50 milliGRAM(s) Oral daily  melatonin 5 milliGRAM(s) Oral at bedtime  nicotine -   7 mG/24Hr(s) Patch 1 patch Transdermal daily  pantoprazole    Tablet 40 milliGRAM(s) Oral before breakfast  senna 2 Tablet(s) Oral at bedtime  simvastatin 40 milliGRAM(s) Oral at bedtime  venlafaxine 37.5 milliGRAM(s) Oral at bedtime    MEDICATIONS  (PRN):  acetaminophen  Suppository .. 650 milliGRAM(s) Rectal every 6 hours PRN Temp greater or equal to 38C (100.4F), Mild Pain (1 - 3), Moderate Pain (4 - 6)      RECENT LABS/IMAGING                                   12.2   9.76  )-----------( 416      ( 12 Jul 2021 06:46 )             38.4     07-12    139  |  105  |  24<H>  ----------------------------<  88  4.8   |  25  |  1.5    Ca    9.3      12 Jul 2021 06:46  Mg     2.0     07-12    TPro  6.5  /  Alb  4.1  /  TBili  0.3  /  DBili  x   /  AST  27  /  ALT  47<H>  /  AlkPhos  136<H>  07-12                   Patient is a 61y old  Male who presents with a chief complaint of R MCA stroke (07 Jul 2021 12:25)      HPI:  62 yo male with PMHx of R NORMAN stroke (April 2020) and R M1 and A1 severe stenosis follows outpatient with Dr. Guajardo, (last angiogram on February 2021), on Plavix who presented with fluctuating left leg weakness resulting in stumbling. Patient was found to have R MCA stroke on imaging. Evolving Acute Rt. Frontal lobe Infarct in the precentral gyrus s/p recanalization of R MCA and R NORMAN vessels by REINALDO. The angioplasty was very helpful clinically. He has some mild petcheal asymptomatic hemorrhage. TTE showed EF of 66%. Patient currently denies CP, SOB, fevers/chills, n/v/d.    Patient seen and evaluated by PMR consultant and found to be a good candidate for acute inpatient rehab.   PLOF: Patient reports being independent without assistive device prior to admission  CLOF: Independent with bed mobility and supervision with transfers; patient ambulates 150 ftx2 without an assisted device, with supervision 7/11/21      TODAY'S SUBJECTIVE & REVIEW OF SYMPTOMS:  Patient was seen at bedside with Dr. Rodriguez. Patient is feeling well. No acute events overnight.       Constiutional:    [ x ] WNL           [   ] poor appetite   [   ] insomnia   [   ] tired   Cardio:                [ x ] WNL           [   ] CP   [   ] MORALES   [   ] palpitations               Resp:                   [ x ] WNL           [   ] SOB   [   ] cough   [   ] wheezing   GI:                        [ x ] WNL           [   ] constipation   [   ] diarrhea   [   ] abdominal pain   [   ] nausea   [   ] emesis                                :                      [  x] WNL           [   ] GIBSON  [   ] dysuria   [   ] difficulty voiding             Endo:                   [ x ] WNL          [   ] polyuria   [   ] temperature intolerance                 Skin:                     [ x ] WNL          [   ] pain   [   ] wound   [   ] rash   MSK:                    [ x ] WNL          [   ] muscle pain   [   ] joint pain/ stiffness   [   ] muscle tenderness   [   ] swelling   Neuro:                 [ x ] WNL          [   ] HA   [   ] change in vision   [   ] tremor   [   ] weakness   [   ]dysphagia              Cognitive:           [ x ] WNL           [   ]confusion      Psych:                  [ x ] WNL           [   ] hallucinations   [   ]agitation   [   ] delusion   [   ]depression      PHYSICAL EXAM    Vital Signs Last 24 Hrs  T(C): 36.4 (12 Jul 2021 06:26), Max: 36.7 (11 Jul 2021 15:17)  T(F): 97.6 (12 Jul 2021 06:26), Max: 98 (11 Jul 2021 15:17)  HR: 60 (12 Jul 2021 06:26) (60 - 77)  BP: 139/63 (12 Jul 2021 06:26) (137/89 - 151/74)  BP(mean): --  RR: 18 (12 Jul 2021 06:26) (18 - 20)  SpO2: --    Constitutional - [ x ] NAD, Comfortable        [   ] other:  Chest - [  x ] CTA     [   ] other:  Cardiovascular - [ x ] RRR, no murmer     [   ] other:  Abdomen - [ x ] Soft, NT/ND      [   ] other:        -  [ x ] NO GIBSON CATHETER   [   ] YES  if yes: [   ] NO MEATAL TEAR OR DISCHARGE [   ] other:  Extremities - [ x ] No C/C/E, No calf tenderness       [   ] other:  ROM - [ x ] WFL     [   ] other:  Neurologic Exam -                 Cognitive - [ x ]Awake, Alert, AAO to self, place, date, year, situation         [    ] other:      Communication - [ x ]Fluent, No dysarthria       [   ] other:      Motor - No focal deficits                    Right UE -  [ x ] WNL      [    ] other:                    Left UE -     [   ] WNL      [ x ] other: 4+/5                    Right LE -   [ x ] WNL       [    ] other:                    Left LE -      [   ]WNL        [ x ] other: 4+/5      Sensory - [ x ] Intact to LT      [    ] other:          Reflexes - [ x ] wnl/ symmetric     [   ] other:     Psychiatric - [ x ]Mood stable, Affect WNL     [   ]other:     Skin - [ x ] intact      [   ] other      MEDICATIONS  (STANDING):  amLODIPine   Tablet 2.5 milliGRAM(s) Oral daily  aspirin 325 milliGRAM(s) Oral daily  clopidogrel Tablet 75 milliGRAM(s) Oral daily  losartan 50 milliGRAM(s) Oral daily  melatonin 5 milliGRAM(s) Oral at bedtime  nicotine -   7 mG/24Hr(s) Patch 1 patch Transdermal daily  pantoprazole    Tablet 40 milliGRAM(s) Oral before breakfast  senna 2 Tablet(s) Oral at bedtime  simvastatin 40 milliGRAM(s) Oral at bedtime  venlafaxine 37.5 milliGRAM(s) Oral at bedtime    MEDICATIONS  (PRN):  acetaminophen  Suppository .. 650 milliGRAM(s) Rectal every 6 hours PRN Temp greater or equal to 38C (100.4F), Mild Pain (1 - 3), Moderate Pain (4 - 6)      RECENT LABS/IMAGING                                   12.2   9.76  )-----------( 416      ( 12 Jul 2021 06:46 )             38.4     07-12    139  |  105  |  24<H>  ----------------------------<  88  4.8   |  25  |  1.5    Ca    9.3      12 Jul 2021 06:46  Mg     2.0     07-12    TPro  6.5  /  Alb  4.1  /  TBili  0.3  /  DBili  x   /  AST  27  /  ALT  47<H>  /  AlkPhos  136<H>  07-12

## 2021-07-12 NOTE — DISCHARGE NOTE PROVIDER - CARE PROVIDERS DIRECT ADDRESSES
,[email or web address],DirectAddress_Unknown ,[email or web address],DirectAddress_Unknown,DirectAddress_Unknown

## 2021-07-12 NOTE — DISCHARGE NOTE PROVIDER - NSDCFUSCHEDAPPT_GEN_ALL_CORE_FT
VEENA BRANDON ; 07/13/2021 ; [facility]  VEENA BRANDON ; 07/15/2021 ; [facility]

## 2021-07-12 NOTE — DISCHARGE NOTE PROVIDER - NSDCMRMEDTOKEN_GEN_ALL_CORE_FT
amLODIPine 2.5 mg oral tablet: 1 tab(s) orally once a day  aspirin 325 mg oral tablet: 1 tab(s) orally once a day  clopidogrel 75 mg oral tablet: 1 tab(s) orally once a day  Humira Pre-Filled Syringe: subcutaneous 2 times a month  losartan 50 mg oral tablet: 1 tab(s) orally once a day  melatonin 5 mg oral tablet: 1 tab(s) orally once a day (at bedtime)  nicotine 7 mg/24 hr transdermal film, extended release: 1 patch transdermal once a day for total of  weeks and stops  pantoprazole 40 mg oral delayed release tablet: 1 tab(s) orally once a day (before a meal)  simvastatin 40 mg oral tablet: 1 tab(s) orally once a day (at bedtime)  venlafaxine 37.5 mg oral tablet: 1 tab(s) orally once a day (at bedtime)  Vitamin B12:    amLODIPine 2.5 mg oral tablet: 1 tab(s) orally once a day  aspirin 325 mg oral tablet: 1 tab(s) orally once a day  clopidogrel 75 mg oral tablet: 1 tab(s) orally once a day  Humira Pre-Filled Syringe: subcutaneous 2 times a month  losartan 50 mg oral tablet: 1 tab(s) orally once a day  melatonin 5 mg oral tablet: 1 tab(s) orally once a day (at bedtime)  nicotine 7 mg/24 hr transdermal film, extended release: 1 patch transdermal once a day for total of  weeks and stops  simvastatin 40 mg oral tablet: 1 tab(s) orally once a day (at bedtime)  venlafaxine 37.5 mg oral tablet: 1 tab(s) orally once a day (at bedtime)  Vitamin B12:

## 2021-07-12 NOTE — DISCHARGE NOTE PROVIDER - CARE PROVIDER_API CALL
Jasper Kumar)  EEGEpilepsy; Neurology  [address] [facility] [address], [address]  [place] [address]  Phone: [phone]  Fax: [phone]  Follow Up Time:     Rosy Guajardo)  Neurology; Vascular Neurology  [address] [facility] [address], [address]  [place], [place] [id number]  Phone: [phone]  Fax: [phone]  Follow Up Time:     your Rheumatologist,   Phone: (   )    -  Fax: (   )    -  Follow Up Time:    Jasper Kumar)  EEGEpilepsy; Neurology  [address] [facility], [address]  [place] [address]  Phone: [phone]  Fax: [phone]  Follow Up Time:     Rosy Guajardo)  Neurology; Vascular Neurology  [address] [facility], [address]  [place], [place] [id number]  Phone: [phone]  Fax: [phone]  Follow Up Time:     your Rheumatologist,   Phone: (   )    -  Fax: (   )    -  Follow Up Time:     RUDY HARRIS  Family Practice  [address] [facility] [address] [facility] [address]  [place] [address]  Phone: [phone]  Fax: [phone]  Follow Up Time:

## 2021-07-13 ENCOUNTER — TRANSCRIPTION ENCOUNTER (OUTPATIENT)
Age: 61
End: 2021-07-13

## 2021-07-13 ENCOUNTER — APPOINTMENT (OUTPATIENT)
Dept: NEUROLOGY | Facility: CLINIC | Age: 61
End: 2021-07-13
Payer: MEDICAID

## 2021-07-13 VITALS
OXYGEN SATURATION: 100 % | TEMPERATURE: 97.6 F | BODY MASS INDEX: 25.88 KG/M2 | WEIGHT: 161 LBS | HEART RATE: 65 BPM | HEIGHT: 66 IN | DIASTOLIC BLOOD PRESSURE: 88 MMHG | SYSTOLIC BLOOD PRESSURE: 158 MMHG

## 2021-07-13 VITALS
RESPIRATION RATE: 18 BRPM | TEMPERATURE: 99 F | HEART RATE: 61 BPM | DIASTOLIC BLOOD PRESSURE: 71 MMHG | SYSTOLIC BLOOD PRESSURE: 131 MMHG

## 2021-07-13 PROCEDURE — 99215 OFFICE O/P EST HI 40 MIN: CPT

## 2021-07-13 RX ORDER — AMLODIPINE BESYLATE 2.5 MG/1
1 TABLET ORAL
Qty: 30 | Refills: 0
Start: 2021-07-13 | End: 2021-08-11

## 2021-07-13 RX ORDER — SIMVASTATIN 20 MG/1
1 TABLET, FILM COATED ORAL
Qty: 30 | Refills: 0
Start: 2021-07-13

## 2021-07-13 RX ORDER — CLOPIDOGREL BISULFATE 75 MG/1
1 TABLET, FILM COATED ORAL
Qty: 30 | Refills: 0
Start: 2021-07-13

## 2021-07-13 RX ORDER — VENLAFAXINE HCL 75 MG
1 CAPSULE, EXT RELEASE 24 HR ORAL
Qty: 30 | Refills: 0
Start: 2021-07-13

## 2021-07-13 RX ORDER — SIMVASTATIN 20 MG/1
2 TABLET, FILM COATED ORAL
Qty: 30 | Refills: 0 | DISCHARGE
Start: 2021-07-13

## 2021-07-13 RX ORDER — VENLAFAXINE HCL 75 MG
1 CAPSULE, EXT RELEASE 24 HR ORAL
Qty: 30 | Refills: 0 | DISCHARGE
Start: 2021-07-13

## 2021-07-13 RX ORDER — NICOTINE POLACRILEX 2 MG
1 GUM BUCCAL
Qty: 30 | Refills: 1
Start: 2021-07-13 | End: 2021-09-10

## 2021-07-13 RX ORDER — LANOLIN ALCOHOL/MO/W.PET/CERES
1 CREAM (GRAM) TOPICAL
Qty: 30 | Refills: 0
Start: 2021-07-13

## 2021-07-13 RX ADMIN — Medication 1 PATCH: at 12:27

## 2021-07-13 RX ADMIN — PANTOPRAZOLE SODIUM 40 MILLIGRAM(S): 20 TABLET, DELAYED RELEASE ORAL at 06:27

## 2021-07-13 RX ADMIN — CLOPIDOGREL BISULFATE 75 MILLIGRAM(S): 75 TABLET, FILM COATED ORAL at 12:27

## 2021-07-13 RX ADMIN — LOSARTAN POTASSIUM 50 MILLIGRAM(S): 100 TABLET, FILM COATED ORAL at 06:27

## 2021-07-13 RX ADMIN — Medication 1 PATCH: at 06:27

## 2021-07-13 RX ADMIN — AMLODIPINE BESYLATE 2.5 MILLIGRAM(S): 2.5 TABLET ORAL at 06:27

## 2021-07-13 RX ADMIN — Medication 325 MILLIGRAM(S): at 12:27

## 2021-07-13 NOTE — PROGRESS NOTE ADULT - SUBJECTIVE AND OBJECTIVE BOX
Patient is a 61y old  Male who presents with a chief complaint of R MCA stroke (07 Jul 2021 12:25)      HPI:  62 yo male with PMHx of R NORMAN stroke (April 2020) and R M1 and A1 severe stenosis follows outpatient with Dr. Guajardo, (last angiogram on February 2021), on Plavix who presented with fluctuating left leg weakness resulting in stumbling. Patient was found to have R MCA stroke on imaging. Evolving Acute Rt. Frontal lobe Infarct in the precentral gyrus s/p recanalization of R MCA and R NORMAN vessels by REINALDO. The angioplasty was very helpful clinically. He has some mild petcheal asymptomatic hemorrhage. TTE showed EF of 66%. Patient currently denies CP, SOB, fevers/chills, n/v/d.    Patient seen and evaluated by PMR consultant and found to be a good candidate for acute inpatient rehab.   PLOF: Patient reports being independent without assistive device prior to admission  CLOF: Independent with bed mobility and supervision with transfers; patient ambulates 300 ftx2 without an assisted device independently 7/12/21      TODAY'S SUBJECTIVE & REVIEW OF SYMPTOMS:  Patient was seen at bedside with Dr. Rodriguez. Patient is feeling well. No acute events overnight.       Constiutional:    [ x ] WNL           [   ] poor appetite   [   ] insomnia   [   ] tired   Cardio:                [ x ] WNL           [   ] CP   [   ] MORALES   [   ] palpitations               Resp:                   [ x ] WNL           [   ] SOB   [   ] cough   [   ] wheezing   GI:                        [ x ] WNL           [   ] constipation   [   ] diarrhea   [   ] abdominal pain   [   ] nausea   [   ] emesis                                :                      [  x] WNL           [   ] GIBSON  [   ] dysuria   [   ] difficulty voiding             Endo:                   [ x ] WNL          [   ] polyuria   [   ] temperature intolerance                 Skin:                     [ x ] WNL          [   ] pain   [   ] wound   [   ] rash   MSK:                    [ x ] WNL          [   ] muscle pain   [   ] joint pain/ stiffness   [   ] muscle tenderness   [   ] swelling   Neuro:                 [ x ] WNL          [   ] HA   [   ] change in vision   [   ] tremor   [   ] weakness   [   ]dysphagia              Cognitive:           [ x ] WNL           [   ]confusion      Psych:                  [ x ] WNL           [   ] hallucinations   [   ]agitation   [   ] delusion   [   ]depression      PHYSICAL EXAM    Vital Signs Last 24 Hrs  T(C): 36.3 (13 Jul 2021 06:04), Max: 36.3 (12 Jul 2021 12:48)  T(F): 97.4 (13 Jul 2021 06:04), Max: 97.4 (12 Jul 2021 12:48)  HR: 74 (13 Jul 2021 06:04) (68 - 74)  BP: 194/90 (13 Jul 2021 06:04) (127/78 - 194/90)  BP(mean): --  RR: 18 (13 Jul 2021 06:04) (18 - 18)  SpO2: --    Constitutional - [ x ] NAD, Comfortable        [   ] other:  Chest - [  x ] CTA     [   ] other:  Cardiovascular - [ x ] RRR, no murmer     [   ] other:  Abdomen - [ x ] Soft, NT/ND      [   ] other:        -  [ x ] NO GIBSON CATHETER   [   ] YES  if yes: [   ] NO MEATAL TEAR OR DISCHARGE [   ] other:  Extremities - [ x ] No C/C/E, No calf tenderness       [   ] other:  ROM - [ x ] WFL     [   ] other:  Neurologic Exam -                 Cognitive - [ x ]Awake, Alert, AAO to self, place, date, year, situation         [    ] other:      Communication - [ x ]Fluent, No dysarthria       [   ] other:      Motor - No focal deficits                    Right UE -  [ x ] WNL      [    ] other:                    Left UE -     [   ] WNL      [ x ] other: 4+/5                    Right LE -   [ x ] WNL       [    ] other:                    Left LE -      [   ]WNL        [ x ] other: 4+/5      Sensory - [ x ] Intact to LT      [    ] other:          Reflexes - [ x ] wnl/ symmetric     [   ] other:     Psychiatric - [ x ]Mood stable, Affect WNL     [   ]other:     Skin - [ x ] intact      [   ] other      MEDICATIONS  (STANDING):  amLODIPine   Tablet 2.5 milliGRAM(s) Oral daily  aspirin 325 milliGRAM(s) Oral daily  clopidogrel Tablet 75 milliGRAM(s) Oral daily  losartan 50 milliGRAM(s) Oral daily  melatonin 5 milliGRAM(s) Oral at bedtime  nicotine -   7 mG/24Hr(s) Patch 1 patch Transdermal daily  pantoprazole    Tablet 40 milliGRAM(s) Oral before breakfast  senna 2 Tablet(s) Oral at bedtime  simvastatin 40 milliGRAM(s) Oral at bedtime  venlafaxine 37.5 milliGRAM(s) Oral at bedtime    MEDICATIONS  (PRN):  acetaminophen  Suppository .. 650 milliGRAM(s) Rectal every 6 hours PRN Temp greater or equal to 38C (100.4F), Mild Pain (1 - 3), Moderate Pain (4 - 6)      RECENT LABS/IMAGING                                   12.2   9.76  )-----------( 416      ( 12 Jul 2021 06:46 )             38.4     07-12    139  |  105  |  24<H>  ----------------------------<  88  4.8   |  25  |  1.5    Ca    9.3      12 Jul 2021 06:46  Mg     2.0     07-12    TPro  6.5  /  Alb  4.1  /  TBili  0.3  /  DBili  x   /  AST  27  /  ALT  47<H>  /  AlkPhos  136<H>  07-12

## 2021-07-13 NOTE — PROGRESS NOTE ADULT - ASSESSMENT
Rehab of Right Frontal lobe Infarct in the precentral gyrus s/p recanalization angioplasty of R MCA and R NORMAN vessels by Neuro Endovascular expert.   He has a left hemiparesis (nondominant).   Patient seen and evaluated with Dr. Quigley  Patient will require at least 3 hours, 5 days per week of acute inpatient rehab including PT, OT    # Right MCA stroke   - Continue ASA/Plavix  - PT/OT eval and treat    # Grade I Diastolic HFpEF   - Compensated; EF 66%  Low salt.    # hx of Depression/Anxiety   - Continue with Venlafaxine 37.5mg Daily    # GERD   - Continue with Protonix 40mg Daily    # HLD   - Simvastatin 40 mg PO qhs  He gives a history of an allergy to atorvastatin.    # Smoking cessation   - on nicotine patch     #Psoriasis     -Pain control: Tylenol prn    -GI/Bowel Mgmt: No issues at this time    -Bladder management: No issues at this time     -Skin:  Psoriasis, stable.     -FEN     - Diet: Regular consistancy, low salt       Precautions / PROPHYLAXIS:      - Falls  - Aspiration  - Seizure       - Ortho: Weight bearing status: WBAT      - DVT prophylaxis: Heparin 5000U subq Q8 hrs   Rehab of Right Frontal lobe Infarct in the precentral gyrus s/p recanalization angioplasty of R MCA and R NORMAN   He has a mild left hemiparesis (nondominant).   -Appropriate for d/c home today with family with outpatient PT, OT, ST  - Continue ASA/Plavix  -Neuro f/u with Dr Guajardo today    # Grade I Diastolic HFpEF   - Compensated; EF 66%  Low salt.    # hx of Depression/Anxiety   - Continue with Venlafaxine 37.5mg Daily    # HLD   - Simvastatin 40 mg PO qhs  He gives a history of an allergy to atorvastatin.    # Smoking cessation   - on nicotine patch     #Psoriasis     -Pain control: Tylenol prn    - Diet: Regular consistancy, low salt

## 2021-07-13 NOTE — PROGRESS NOTE ADULT - ATTENDING COMMENTS
Patient seen and examined with the resident. We discussed the case. I have directed the care. He has a mild left hemiparesis. He has comorbidities of CHF, depression, anxiety, HLD, smoking cessation, GERD and psoriasis.  The patient is alert and following commands with fluent speech, mild psychomotor slowing  and blunted affect.  LUE and LLE MP is 4+/5. He is able to stand and ambulate with supervision and is medically stable.   Antic d/c home in am with outpatient PT, OT and Neuropsych eval.  Cont ASA, Plavix and Zocor. Patient seen and examined with the resident earlier today and cleared for d/c home. We discussed the case. I have directed the care. He has a mild left hemiparesis. He has comorbidities of CHF, depression, anxiety, HLD, smoking cessation, GERD and psoriasis.  The patient is alert and following commands with fluent speech, mild psychomotor slowing  and blunted affect.  LUE and LLE MP is 4+/5. He is able to stand and ambulate with supervision and is medically stable.   D/c home with outpatient PT, OT and ST.  Cont ASA, Plavix and Zocor.  F/u with neuro as scheduled and f/u with PMD.  Rehab f/u prn.  Activities as tolerated with supervision.  DASH diet.

## 2021-07-13 NOTE — DISCHARGE NOTE NURSING/CASE MANAGEMENT/SOCIAL WORK - PATIENT PORTAL LINK FT
You can access the FollowMyHealth Patient Portal offered by [facility] by registering at the following website: [email or web address]. By joining "[employer]’s FollowMyHealth portal, you will also be able to view your health information using other applications (apps) compatible with our system.

## 2021-07-16 ENCOUNTER — NON-APPOINTMENT (OUTPATIENT)
Age: 61
End: 2021-07-16

## 2021-07-16 DIAGNOSIS — I11.0 HYPERTENSIVE HEART DISEASE WITH HEART FAILURE: ICD-10-CM

## 2021-07-16 DIAGNOSIS — E78.5 HYPERLIPIDEMIA, UNSPECIFIED: ICD-10-CM

## 2021-07-16 DIAGNOSIS — I69.334 MONOPLEGIA OF UPPER LIMB FOLLOWING CEREBRAL INFARCTION AFFECTING LEFT NON-DOMINANT SIDE: ICD-10-CM

## 2021-07-16 DIAGNOSIS — I63.311 CEREBRAL INFARCTION DUE TO THROMBOSIS OF RIGHT MIDDLE CEREBRAL ARTERY: ICD-10-CM

## 2021-07-16 DIAGNOSIS — T80.818A EXTRAVASATION OF OTHER VESICANT AGENT, INITIAL ENCOUNTER: ICD-10-CM

## 2021-07-16 DIAGNOSIS — G81.94 HEMIPLEGIA, UNSPECIFIED AFFECTING LEFT NONDOMINANT SIDE: ICD-10-CM

## 2021-07-16 DIAGNOSIS — Z79.02 LONG TERM (CURRENT) USE OF ANTITHROMBOTICS/ANTIPLATELETS: ICD-10-CM

## 2021-07-16 DIAGNOSIS — I60.9 NONTRAUMATIC SUBARACHNOID HEMORRHAGE, UNSPECIFIED: ICD-10-CM
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
## 2021-07-16 DIAGNOSIS — K21.9 GASTRO-ESOPHAGEAL REFLUX DISEASE WITHOUT ESOPHAGITIS: ICD-10-CM

## 2021-07-16 DIAGNOSIS — F41.9 ANXIETY DISORDER, UNSPECIFIED: ICD-10-CM

## 2021-07-16 DIAGNOSIS — I65.22 OCCLUSION AND STENOSIS OF LEFT CAROTID ARTERY: ICD-10-CM

## 2021-07-16 DIAGNOSIS — I08.1 RHEUMATIC DISORDERS OF BOTH MITRAL AND TRICUSPID VALVES: ICD-10-CM

## 2021-07-16 DIAGNOSIS — R53.1 WEAKNESS: ICD-10-CM

## 2021-07-16 DIAGNOSIS — R29.703 NIHSS SCORE 3: ICD-10-CM
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
## 2021-07-16 DIAGNOSIS — F32.9 MAJOR DEPRESSIVE DISORDER, SINGLE EPISODE, UNSPECIFIED: ICD-10-CM

## 2021-07-16 DIAGNOSIS — R29.810 FACIAL WEAKNESS: ICD-10-CM

## 2021-07-16 DIAGNOSIS — I50.32 CHRONIC DIASTOLIC (CONGESTIVE) HEART FAILURE: ICD-10-CM

## 2021-07-16 DIAGNOSIS — L40.9 PSORIASIS, UNSPECIFIED: ICD-10-CM

## 2021-07-19 ENCOUNTER — NON-APPOINTMENT (OUTPATIENT)
Age: 61
End: 2021-07-19

## 2021-07-20 ENCOUNTER — APPOINTMENT (OUTPATIENT)
Dept: NEUROLOGY | Facility: CLINIC | Age: 61
End: 2021-07-20
Payer: MEDICAID

## 2021-07-20 VITALS
HEART RATE: 67 BPM | BODY MASS INDEX: 25.88 KG/M2 | TEMPERATURE: 98 F | WEIGHT: 161 LBS | DIASTOLIC BLOOD PRESSURE: 95 MMHG | OXYGEN SATURATION: 100 % | SYSTOLIC BLOOD PRESSURE: 165 MMHG | HEIGHT: 66 IN

## 2021-07-20 PROCEDURE — 99214 OFFICE O/P EST MOD 30 MIN: CPT

## 2021-07-20 RX ORDER — METOPROLOL SUCCINATE 25 MG/1
25 TABLET, EXTENDED RELEASE ORAL DAILY
Qty: 30 | Refills: 6 | Status: DISCONTINUED | COMMUNITY
Start: 2020-06-09 | End: 2021-07-20

## 2021-07-20 NOTE — PHYSICAL EXAM
[FreeTextEntry1] : NIH STROKE SCALE \par \par Item	 Score \par \par 1 a.	Level of Consciousness	 0 \par 1 b.          LOC Questions	 0 \par 1 c.	LOC Commands	 0 \par 2.	Best Gaze	 0 \par 3.	Visual	                 0 \par 4.	Facial Palsy              0 \par 5 a.	Motor Arm - Left	 1 \par 5 b.	Motor Arm - Right	 0 \par 6 a.	Motor Leg - Left	 0 \par 6 b.	Motor Leg - Right	 0 \par 7.	Limb Ataxia	 0 \par 8.	Sensory	                 0 \par 9.	Language	 0 \par 10.	Dysarthria	 0 \par 11.	Extinction and Inattention 	 0 \par __________________________________________ \par \par TOTAL	 1 \par \par mRS: 0 No symptoms at all \par \par \par Neurologic Exam: \par \par Mental status: Awake, alert and oriented x 4. Recent and remote memory intact.  \par \par Language: Follows all commands. Naming, repetition and comprehension intact. Attention/concentration intact. No dysarthria, no aphasia. Fund of knowledge appropriate.  \par \par Cranial nerves: Pupils equally round and reactive to light, visual fields full but he would not follow directions, no nystagmus, EOMI, face symmetric, hearing intact bilaterally, palate elevation symmetric, tongue was midline, sternocleidomastoid/ shoulder shrug strength bilaterally 5/5.  \par \par Motor: Very mild LUE drift. Normal bulk and tone, strength LUE 4+/5, RUE 5/5, LLE 5-/5, RLE 5/5.  strength 4+/5 in LUE and 5/5 RUE.  \par \par Sensation: Intact to light touch. No neglect.  \par \par Coordination: No dysmetria on finger-to-nose and heel-to-shin.  \par \par Gait: Steady.\par \par \par \par

## 2021-07-20 NOTE — ASSESSMENT
[FreeTextEntry1] : Patient is 59 yo RHM with PMHx of HTN, psoriasis, smoking, tandem TIAs, who suffered from right hemispheric watershed infarction on April 2020, when his non-invasive cerebrovascular imaging as well as DSA on 2/3/21 showed right M1 and right A1 as well as left M2 severe stenosis. His coronary tests failed to show coronary stenosis. He presented to hospital with worsening of left arm and left leg weakness, when his MRI showed new watershed infarction in the MCA territory. Reportedly he developed this new stroke despite of being on maximal medical management, risk-factor and life-style modification. Consequently he underwent an ugent cerebral angiogram and angioplasty with stenting of R MCA and R NORMAN on 6/30/21.  \par  \par Prior, he was last seen in NI clinic on 2/23/21 and was asked to c/w Plavix 75 and statin and obtain high resolution vessel wall MR study of the Right MCA/NORMAN as well as left M2/3 segment to better understand the reason of those solitary cerebral stenosis without having obvious atherosclerotic disease in other organs, including the coronary arteries.  However, on 6/23/21 he presented to the ED with c/o worsening LUE weakness over the past month and an episode of fluctuating LLE weakness to the point of stumbling.  \par Today, patient presents to NI clinic with wife after being discharged today from Rehab at [facility]. Both state that patient's Left hemiparesis is much improved and that he can now hold a bottle of water with his left hand, which he was not able to do that before this admission. He continues on  and Plavix 75 and does not report spontaneous bleeding or bruising. Patient denies pain, bruising, swelling or edema of his right groin access site. He also denies any discomfort of his b/l LEs. \par THE CAUSE OF THE INTRACRANIAL STENOSIS CAN BE DUE TO VASCULITIS OR ATHEROSCLEROSIS. THE FOLLOWING IS IN FAVOR OF VASCULITIS:\par 1-LACK OF SIGNIFICANT SYSTEMIC ATHEROSCLOROTIC CHANGES.\par 2-CONTRAST ENHANCEMENT IN THE ENTIRE VESSEL WALL AROUND THE STENOTIC SEGMENTS IN THE HIGH RESOLUTION VESSEL WALL MR.\par 3-SIGNIFICANT WORSENING OF THE STENOTIC LESIONS WITHIN ABOUT 3M.\par 4-HISTORY OF AUTOIMMUNE DISEASE, PSORIASIS.\par 5-SOFTNESS OF THE STENOTIC LESIONS DURING ANGIOPLASTY.\par \par THE FOLLOWING IS IN FAVOR OF ATHEROSCLEROSIS:\par 1-HAVING LEFT ICA ORIGIN CALCIFIED PLAQUE.\par 2-H/O SMOKING AND HTN.\par \par THEREFORE THE VASCULITIS CAUSING THE INTRACRANIAL STENOTIC REGIONS NEED TO BE MORE INVESTIGATED AND MANAGED APPROPRIATELY, WHILE THE PATIENT CONTINUES MAXIMAL MEDICAL MANAGEMENT AND RIS-FACTOR/LIFESTYLE MODIFICATION. THIS WAS DISCUSSED IN DETAIL WITH THE PATIENT'S RHEUMATOLOGIST AND NV TEAM.\par \par PLAN:\par -Follow up in clinic in 1 month\par -Obtain ARU and PRU now and we will adjust ASA dose accordingly\par -C/w  and Plavix 75 both QD for now\par -Keep hydrated with 3L/water per day. \par -PATIENT NEEDS TO FOLLOW [facility] STROKE CLINIC. THE WIFE ASKED ABOUT DR. LIBMAN AT [facility].\par -Instructed patient to call 911 or report to ED if any acute neurological changes occur, such as having severe, sudden headache\par

## 2021-07-20 NOTE — REASON FOR VISIT
[Follow-Up: _____] : a [unfilled] follow-up visit [Spouse] : spouse [FreeTextEntry1] : s/p emergent cerebral angiogram and angioplasty with stenting of R MCA and R NORMAN on 6/30/21 which was c/b possible SAH vs IV contrast extravasation after ED admission on 6/23/21 for new R MCA infarct.

## 2021-07-20 NOTE — HISTORY OF PRESENT ILLNESS
[FreeTextEntry1] : Patient is 59 yo RHM with PMHx of HTN, psoriasis, smoking, tandem TIAs, who suffered from right hemispheric watershed infarction on April 2020, when his non-invasive cerebrovascular imaging as well as DSA on 2/3/21 showed right M1 and right A1 as well as left M2 severe stenosis. His coronary tests failed to show coronary stenosis. He presented to hospital with worsening of left arm and left leg weakness, when his MRI showed new watershed infarction in the MCA territory. Reportedly he developed this new stroke despite of being on maximal medical management, risk-factor and life-style modification. Consequently he underwent an urgent cerebral angiogram and angioplasty with stenting of R MCA and R NORMAN on 6/30/21. Today, he presents for follow up as PRU shows supratherapeutic PRU.  He was last seen in clinic on 7/13/21 when we asked him to repeat ARU and PRU because he was on  and Plavix 75. Currently, he is on  and Plavix 75 and has never reported spontaneous bruising or bleeding on DAPT.  \par \par ARU trend: 371 (on ), 547 (on ), 438 \par PRU trend on Plavix 75: 9, 8, 6 \par \par Today, patient presents to clinic with wife and denies any spontaneous bleeding or bruising. Even when his dog jumps on him, there is no bruising. He has not been able to see the rheumatologist.  he has an appointment with Dr. Martínez in October. He has stopped smoking. He is complaints with  and Plavix 75. He is pending PT at home.

## 2021-07-20 NOTE — ASSESSMENT
[FreeTextEntry1] : Patient is 59 yo RHM with PMHx of HTN, psoriasis, smoking, tandem TIAs, who suffered from right hemispheric watershed infarction on April 2020, when his non-invasive cerebrovascular imaging as well as DSA on 2/3/21 showed right M1 and right A1 as well as left M2 severe stenosis. He developed this new stroke despite of being on maximal medical management, risk-factor and life-style modification; likely because of rheumatologic/inflammatory disease. Consequently he underwent an urgent cerebral angiogram and angioplasty with stenting of R MCA and R NORMAN on 6/30/21. Today, he presents for follow up as PRU shows supratherapeutic PRU.  He was last seen in clinic on 7/13/21 and after several verify now exams, he was placed on  and Plavix 75. Today, patient presents to clinic with wife and denies any spontaneous bleeding or bruising, despite of having 3 PRU ranging in one digits. Even when his dog jumps on him, there is no bruising. He has not been able to see the rheumatologist or Stroke specialist yet. He has stopped smoking. He is complaints with  and Plavix 75. He is pending PT at home. We discuss the following with the patient and his wife to the level of their comfort and understanding. All their questions were answered to satisfaction. \par \par PLAN:\par -Repeat Diagnostic cerebral DSA +/- angioplasty in mid-August\par -C/w  and Plavix 75 for now and call us ASAP if he develops spontaneous bruising or bleeding.\par -Please follow up in Stroke Clinic with Dr. Libman or Dr. Martínez\par -Please follow up with rheumatologist \par -Advised to do PT at home for 3hr/day at least 3x/week. \par -Instructed patient to call 911 or report to ED if any acute neurological changes occur, such as having severe, sudden headache\par

## 2021-07-20 NOTE — PHYSICAL EXAM
[FreeTextEntry1] : Right groin access site is c/d/i, no bruising, erythema, edema or pain. \par \par NIH STROKE SCALE \par \par Item	 Score \par \par 1 a.	Level of Consciousness	 0 \par 1 b.          LOC Questions	 0 \par 1 c.	LOC Commands	 0 \par 2.	Best Gaze	 0 \par 3.	Visual	                 0 \par 4.	Facial Palsy              0 \par 5 a.	Motor Arm - Left	 1 \par 5 b.	Motor Arm - Right	 0 \par 6 a.	Motor Leg - Left	 0 \par 6 b.	Motor Leg - Right	 0 \par 7.	Limb Ataxia	 0 \par 8.	Sensory	                 0 \par 9.	Language	 0 \par 10.	Dysarthria	 0 \par 11.	Extinction and Inattention 	 0 \par __________________________________________ \par \par TOTAL	 1 \par \par mRS: 0 No symptoms at all \par \par \par Neurologic Exam: \par \par Mental status: Awake, alert and oriented x 4. Recent and remote memory intact.  \par \par Language: Follows all commands. Naming, repetition and comprehension intact. Attention/concentration intact. No dysarthria, no aphasia. Fund of knowledge appropriate.  \par \par Cranial nerves: Pupils equally round and reactive to light, visual fields full but he would not follow directions, no nystagmus, EOMI, face symmetric, hearing intact bilaterally, palate elevation symmetric, tongue was midline, sternocleidomastoid/ shoulder shrug strength bilaterally 5/5.  \par \par Motor: Very mild LUE drift. Normal bulk and tone, strength LUE 4+/5, RUE 5/5, LLE 5-/5, RLE 5/5.  strength 4+/5 in LUE and 5/5 RUE.  \par \par Sensation: Intact to light touch. No neglect.  \par \par Coordination: No dysmetria on finger-to-nose and heel-to-shin.  \par \par Gait: Steady.\par \par \par \par

## 2021-07-20 NOTE — HISTORY OF PRESENT ILLNESS
[FreeTextEntry1] : Patient is 61 yo RHM with PMHx of HTN, psoriasis, smoking, tandem TIAs, who suffered from right hemispheric watershed infarction on April 2020, when his non-invasive cerebrovascular imaging as well as DSA on 2/3/21 showed right M1 and right A1 as well as left M2 severe stenosis. His coronary tests failed to show coronary stenosis. He presented to hospital with worsening of left arm and left leg weakness, when his MRI showed new watershed infarction in the MCA territory. Reportedly he developed this new stroke despite of being on maximal medical management, risk-factor and life-style modification. Consequently he underwent an ugent cerebral angiogram and angioplasty with stenting of R MCA and R NORMAN on 6/30/21.  \par  \par Prior, he was last seen in NI clinic on 2/23/21 and was asked to c/w Plavix 75 and statin and obtain high resolution vessel wall MR study of the Right MCA/NORMAN as well as left M2/3 segment to better understand the reason of those solitary cerebral stenosis without having obvious atherosclerotic disease in other organs, including the coronary arteries.  However, on 6/23/21 he presented to the ED with c/o worsening LUE weakness over the past month and an episode of fluctuating LLE weakness to the point of stumbling.  \par Today, patient presents to NI clinic with wife after being discharged today from Rehab at [facility]. Both state that patient's Left hemiparesis is much improved and that he can now hold a bottle of water with his left hand, which he was not able to do that before this admission. He continues on  and Plavix 75 and does not report spontaneous bleeding or bruising. Patient denies pain, bruising, swelling or edema of his right groin access site. He also denies any discomfort of his b/l LEs. \par \par

## 2021-07-26 DIAGNOSIS — Z20.822 CONTACT WITH AND (SUSPECTED) EXPOSURE TO COVID-19: ICD-10-CM

## 2021-07-26 DIAGNOSIS — I50.32 CHRONIC DIASTOLIC (CONGESTIVE) HEART FAILURE: ICD-10-CM

## 2021-07-26 DIAGNOSIS — I34.0 NONRHEUMATIC MITRAL (VALVE) INSUFFICIENCY: ICD-10-CM

## 2021-07-26 DIAGNOSIS — F17.200 NICOTINE DEPENDENCE, UNSPECIFIED, UNCOMPLICATED: ICD-10-CM

## 2021-07-26 DIAGNOSIS — F32.9 MAJOR DEPRESSIVE DISORDER, SINGLE EPISODE, UNSPECIFIED: ICD-10-CM

## 2021-07-26 DIAGNOSIS — I69.354 HEMIPLEGIA AND HEMIPARESIS FOLLOWING CEREBRAL INFARCTION AFFECTING LEFT NON-DOMINANT SIDE: ICD-10-CM

## 2021-07-26 DIAGNOSIS — I11.0 HYPERTENSIVE HEART DISEASE WITH HEART FAILURE: ICD-10-CM

## 2021-07-26 DIAGNOSIS — Z95.5 PRESENCE OF CORONARY ANGIOPLASTY IMPLANT AND GRAFT: ICD-10-CM

## 2021-07-26 DIAGNOSIS — E78.5 HYPERLIPIDEMIA, UNSPECIFIED: ICD-10-CM

## 2021-07-26 DIAGNOSIS — F41.9 ANXIETY DISORDER, UNSPECIFIED: ICD-10-CM

## 2021-07-26 DIAGNOSIS — K21.9 GASTRO-ESOPHAGEAL REFLUX DISEASE WITHOUT ESOPHAGITIS: ICD-10-CM

## 2021-07-26 DIAGNOSIS — L40.50 ARTHROPATHIC PSORIASIS, UNSPECIFIED: ICD-10-CM

## 2021-07-26 DIAGNOSIS — I07.1 RHEUMATIC TRICUSPID INSUFFICIENCY: ICD-10-CM

## 2021-08-02 ENCOUNTER — OUTPATIENT (OUTPATIENT)
Dept: OUTPATIENT SERVICES | Facility: HOSPITAL | Age: 61
LOS: 1 days | Discharge: HOME | End: 2021-08-02

## 2021-08-02 DIAGNOSIS — G81.94 HEMIPLEGIA, UNSPECIFIED AFFECTING LEFT NONDOMINANT SIDE: ICD-10-CM

## 2021-08-02 DIAGNOSIS — I63.511 CEREBRAL INFARCTION DUE TO UNSPECIFIED OCCLUSION OR STENOSIS OF RIGHT MIDDLE CEREBRAL ARTERY: ICD-10-CM

## 2021-08-10 ENCOUNTER — NON-APPOINTMENT (OUTPATIENT)
Age: 61
End: 2021-08-10

## 2021-08-11 ENCOUNTER — NON-APPOINTMENT (OUTPATIENT)
Age: 61
End: 2021-08-11

## 2021-08-11 ENCOUNTER — OUTPATIENT (OUTPATIENT)
Dept: OUTPATIENT SERVICES | Facility: HOSPITAL | Age: 61
LOS: 1 days | Discharge: HOME | End: 2021-08-11
Payer: MEDICAID

## 2021-08-11 VITALS
OXYGEN SATURATION: 100 % | HEIGHT: 66 IN | SYSTOLIC BLOOD PRESSURE: 150 MMHG | WEIGHT: 165.13 LBS | HEART RATE: 77 BPM | TEMPERATURE: 98 F | DIASTOLIC BLOOD PRESSURE: 90 MMHG | RESPIRATION RATE: 18 BRPM

## 2021-08-11 DIAGNOSIS — I67.9 CEREBROVASCULAR DISEASE, UNSPECIFIED: ICD-10-CM

## 2021-08-11 DIAGNOSIS — I63.9 CEREBRAL INFARCTION, UNSPECIFIED: ICD-10-CM

## 2021-08-11 DIAGNOSIS — Z01.818 ENCOUNTER FOR OTHER PREPROCEDURAL EXAMINATION: ICD-10-CM

## 2021-08-11 DIAGNOSIS — Z92.89 PERSONAL HISTORY OF OTHER MEDICAL TREATMENT: Chronic | ICD-10-CM

## 2021-08-11 LAB
A1C WITH ESTIMATED AVERAGE GLUCOSE RESULT: 5.3 % — SIGNIFICANT CHANGE UP (ref 4–5.6)
ALBUMIN SERPL ELPH-MCNC: 4.4 G/DL — SIGNIFICANT CHANGE UP (ref 3.5–5.2)
ALP SERPL-CCNC: 112 U/L — SIGNIFICANT CHANGE UP (ref 30–115)
ALT FLD-CCNC: 19 U/L — SIGNIFICANT CHANGE UP (ref 0–41)
ANION GAP SERPL CALC-SCNC: 11 MMOL/L — SIGNIFICANT CHANGE UP (ref 7–14)
APTT BLD: 33 SEC — SIGNIFICANT CHANGE UP (ref 27–39.2)
AST SERPL-CCNC: 18 U/L — SIGNIFICANT CHANGE UP (ref 0–41)
BASOPHILS # BLD AUTO: 0.09 K/UL — SIGNIFICANT CHANGE UP (ref 0–0.2)
BASOPHILS NFR BLD AUTO: 1.1 % — HIGH (ref 0–1)
BILIRUB SERPL-MCNC: 0.3 MG/DL — SIGNIFICANT CHANGE UP (ref 0.2–1.2)
BUN SERPL-MCNC: 16 MG/DL — SIGNIFICANT CHANGE UP (ref 10–20)
CALCIUM SERPL-MCNC: 9.8 MG/DL — SIGNIFICANT CHANGE UP (ref 8.5–10.1)
CHLORIDE SERPL-SCNC: 105 MMOL/L — SIGNIFICANT CHANGE UP (ref 98–110)
CO2 SERPL-SCNC: 27 MMOL/L — SIGNIFICANT CHANGE UP (ref 17–32)
CREAT SERPL-MCNC: 1.4 MG/DL — SIGNIFICANT CHANGE UP (ref 0.7–1.5)
EOSINOPHIL # BLD AUTO: 0.25 K/UL — SIGNIFICANT CHANGE UP (ref 0–0.7)
EOSINOPHIL NFR BLD AUTO: 3 % — SIGNIFICANT CHANGE UP (ref 0–8)
ESTIMATED AVERAGE GLUCOSE: 105 MG/DL — SIGNIFICANT CHANGE UP (ref 68–114)
GLUCOSE SERPL-MCNC: 99 MG/DL — SIGNIFICANT CHANGE UP (ref 70–99)
HCT VFR BLD CALC: 41.6 % — LOW (ref 42–52)
HGB BLD-MCNC: 13.2 G/DL — LOW (ref 14–18)
IMM GRANULOCYTES NFR BLD AUTO: 0.5 % — HIGH (ref 0.1–0.3)
INR BLD: 1 RATIO — SIGNIFICANT CHANGE UP (ref 0.65–1.3)
LYMPHOCYTES # BLD AUTO: 2.41 K/UL — SIGNIFICANT CHANGE UP (ref 1.2–3.4)
LYMPHOCYTES # BLD AUTO: 29.1 % — SIGNIFICANT CHANGE UP (ref 20.5–51.1)
MCHC RBC-ENTMCNC: 27.9 PG — SIGNIFICANT CHANGE UP (ref 27–31)
MCHC RBC-ENTMCNC: 31.7 G/DL — LOW (ref 32–37)
MCV RBC AUTO: 87.9 FL — SIGNIFICANT CHANGE UP (ref 80–94)
MONOCYTES # BLD AUTO: 0.63 K/UL — HIGH (ref 0.1–0.6)
MONOCYTES NFR BLD AUTO: 7.6 % — SIGNIFICANT CHANGE UP (ref 1.7–9.3)
NEUTROPHILS # BLD AUTO: 4.86 K/UL — SIGNIFICANT CHANGE UP (ref 1.4–6.5)
NEUTROPHILS NFR BLD AUTO: 58.7 % — SIGNIFICANT CHANGE UP (ref 42.2–75.2)
NRBC # BLD: 0 /100 WBCS — SIGNIFICANT CHANGE UP (ref 0–0)
PLATELET # BLD AUTO: 402 K/UL — HIGH (ref 130–400)
POTASSIUM SERPL-MCNC: 4.9 MMOL/L — SIGNIFICANT CHANGE UP (ref 3.5–5)
POTASSIUM SERPL-SCNC: 4.9 MMOL/L — SIGNIFICANT CHANGE UP (ref 3.5–5)
PROT SERPL-MCNC: 7.3 G/DL — SIGNIFICANT CHANGE UP (ref 6–8)
PROTHROM AB SERPL-ACNC: 11.5 SEC — SIGNIFICANT CHANGE UP (ref 9.95–12.87)
RBC # BLD: 4.73 M/UL — SIGNIFICANT CHANGE UP (ref 4.7–6.1)
RBC # FLD: 13.7 % — SIGNIFICANT CHANGE UP (ref 11.5–14.5)
SODIUM SERPL-SCNC: 143 MMOL/L — SIGNIFICANT CHANGE UP (ref 135–146)
WBC # BLD: 8.28 K/UL — SIGNIFICANT CHANGE UP (ref 4.8–10.8)
WBC # FLD AUTO: 8.28 K/UL — SIGNIFICANT CHANGE UP (ref 4.8–10.8)

## 2021-08-11 PROCEDURE — 93010 ELECTROCARDIOGRAM REPORT: CPT

## 2021-08-11 RX ORDER — ADALIMUMAB 40MG/0.8ML
0 KIT SUBCUTANEOUS
Qty: 0 | Refills: 0 | DISCHARGE

## 2021-08-11 NOTE — H&P PST ADULT - HISTORY OF PRESENT ILLNESS
60 y/o male presents to PAST in preparation for angiogram in IR on 8/25/21    PATIENT CURRENTLY DENIES CHEST PAIN  SHORTNESS OF BREATH  PALPITATIONS,  DYSURIA, OR UPPER RESPIRATORY INFECTION IN PAST 2 WEEKS  EXERCISE  TOLERANCE  1-2 FLIGHT OF STAIRS  WITHOUT SHORTNESS OF BREATH  pt denies any covid s/s, or tested positive in the past  pt advised self quarantine till day of procedure  Patient verbalized understanding of instructions and was given the opportunity to ask questions and have them answered.  As per patient, this is their complete medical and surgical history, including medications both prescribed or over the counter.  written and verbal instructions with teach back on chlorhexidine shampoo provided,  pt verbalized understanding with returned demonstration    Anesthesia Alert  NO--Difficult Airway  NO--History of neck surgery or radiation  NO--Limited ROM of neck  NO--History of Malignant hyperthermia  NO--Personal or family history of Pseudocholinesterase deficiency.  NO--Prior Anesthesia Complication  NO--Latex Allergy  NO--Loose teeth  NO--History of Rheumatoid Arthritis  NO--BENSON  NO--Bleeding risk  NO--Other_____    I63.9, I67.9 / 52407    ^I63.9, I67.9 / [id number]    No pertinent family history in first degree relatives    HTN (hypertension)    Psoriasis    CVA (cerebral vascular accident)    No significant past surgical history     60 y/o male presents to PAST in preparation for angiogram in IR on 8/25/21  Pt states that he has a hx of cva's 1st one was 2 yrs ago and had a recent CVA 2 months ago. It was recommended to have diagnostic angiogram in IR.   PATIENT CURRENTLY DENIES CHEST PAIN  SHORTNESS OF BREATH  PALPITATIONS,  DYSURIA, OR UPPER RESPIRATORY INFECTION IN PAST 2 WEEKS  EXERCISE  TOLERANCE  2 FLIGHT OF STAIRS  WITHOUT SHORTNESS OF BREATH  pt denies any covid s/s, or tested positive in the past, vaccinated 1st dose 1 week ago, pfizer  pt advised self quarantine till day of procedure  Patient verbalized understanding of instructions and was given the opportunity to ask questions and have them answered.  As per patient, this is their complete medical and surgical history, including medications both prescribed or over the counter.  written and verbal instructions with teach back on chlorhexidine shampoo provided,  pt verbalized understanding with returned demonstration    Anesthesia Alert  NO--Difficult Airway  NO--History of neck surgery or radiation  NO--Limited ROM of neck  NO--History of Malignant hyperthermia  NO--Personal or family history of Pseudocholinesterase deficiency.  NO--Prior Anesthesia Complication  NO--Latex Allergy  NO--Loose teeth  NO--History of Rheumatoid Arthritis  NO--BENSON  Yes--Bleeding risk on ASA and plavix  NO--Other_____    I63.9, I67.9 / 04532    ^I63.9, I67.9 / [id number]    No pertinent family history in first degree relatives    HTN (hypertension)    Psoriasis    CVA (cerebral vascular accident)    No significant past surgical history

## 2021-08-12 ENCOUNTER — OUTPATIENT (OUTPATIENT)
Dept: OUTPATIENT SERVICES | Facility: HOSPITAL | Age: 61
LOS: 1 days | Discharge: HOME | End: 2021-08-12

## 2021-08-12 DIAGNOSIS — G81.94 HEMIPLEGIA, UNSPECIFIED AFFECTING LEFT NONDOMINANT SIDE: ICD-10-CM

## 2021-08-12 DIAGNOSIS — Z92.89 PERSONAL HISTORY OF OTHER MEDICAL TREATMENT: Chronic | ICD-10-CM

## 2021-08-17 ENCOUNTER — APPOINTMENT (OUTPATIENT)
Dept: NEUROLOGY | Facility: CLINIC | Age: 61
End: 2021-08-17

## 2021-08-22 ENCOUNTER — OUTPATIENT (OUTPATIENT)
Dept: OUTPATIENT SERVICES | Facility: HOSPITAL | Age: 61
LOS: 1 days | Discharge: HOME | End: 2021-08-22

## 2021-08-22 ENCOUNTER — LABORATORY RESULT (OUTPATIENT)
Age: 61
End: 2021-08-22

## 2021-08-22 DIAGNOSIS — Z11.59 ENCOUNTER FOR SCREENING FOR OTHER VIRAL DISEASES: ICD-10-CM

## 2021-08-22 DIAGNOSIS — Z92.89 PERSONAL HISTORY OF OTHER MEDICAL TREATMENT: Chronic | ICD-10-CM

## 2021-08-25 ENCOUNTER — APPOINTMENT (OUTPATIENT)
Dept: NEUROLOGY | Facility: HOSPITAL | Age: 61
End: 2021-08-25
Payer: MEDICAID

## 2021-08-25 ENCOUNTER — INPATIENT (INPATIENT)
Facility: HOSPITAL | Age: 61
LOS: 0 days | Discharge: HOME | End: 2021-08-25
Attending: STUDENT IN AN ORGANIZED HEALTH CARE EDUCATION/TRAINING PROGRAM | Admitting: STUDENT IN AN ORGANIZED HEALTH CARE EDUCATION/TRAINING PROGRAM

## 2021-08-25 DIAGNOSIS — Z92.89 PERSONAL HISTORY OF OTHER MEDICAL TREATMENT: Chronic | ICD-10-CM

## 2021-08-25 LAB
ERYTHROCYTE [SEDIMENTATION RATE] IN BLOOD: 4 MM/HR — SIGNIFICANT CHANGE UP (ref 0–10)
GLUCOSE BLDC GLUCOMTR-MCNC: 104 MG/DL — HIGH (ref 70–99)

## 2021-08-25 PROCEDURE — 36224 PLACE CATH CAROTD ART: CPT | Mod: 50

## 2021-08-25 PROCEDURE — 36227 PLACE CATH XTRNL CAROTID: CPT | Mod: 50

## 2021-08-25 PROCEDURE — 76937 US GUIDE VASCULAR ACCESS: CPT | Mod: 26

## 2021-08-25 PROCEDURE — 76377 3D RENDER W/INTRP POSTPROCES: CPT | Mod: 26

## 2021-08-25 PROCEDURE — 36226 PLACE CATH VERTEBRAL ART: CPT | Mod: RT

## 2021-08-25 NOTE — BRIEF OPERATIVE NOTE - OPERATION/FINDINGS
Procedure : Diagnostic cerebral angiogram     Amount and type of contrast : Visipaque 320 75 mL   Medications given during procedure: n/a  Implants placed: n/a  Complications : none     Post-procedure exam:   NIHSS 0   Extremity RLE groin dressing CDI no evidence of hematoma infection or bleeding at the site. Nontender to palpation. Distal pulses intact b/l to palpation.   Abd NTND    Suggestions :   Patient is to recover in IR PACU for 4 hr until 16:30   Please follow post NI orders for neuro checks, distal pulses, vitals, and groin checks placed for total of 4 hour recovery period following procedure. Please keep reverse trendelenberg, knee immobilizer, bed rest x 4 hr. Keep IVF as ordered until discharge.   -160.    Please notify provider with any signs of bleeding or hematoma at R groin site, change in mental status, vitals outside parameters, or absent distal pulses.   [phone]

## 2021-08-25 NOTE — CHART NOTE - NSCHARTNOTEFT_GEN_A_CORE
Neuroendovascular pre procedure note     Procedure Name: Diagnostic cerebral angiogram +/- angioplasty recanalization     HPI: Patient is a 61 year old R handed male, PMHx R NORMAN stroke, R M1, A1 severe stenosis, on ASA/Plavix, s/p recanalization of R MCA/NORMAN vessels by neuroendovascular team. Now presenting for elective repeat diagnostic cerebral angiogram +/- angioplasty/recanalization with neuroendovascular. Patient will be admitted to ICU if any intervention occurs. Neurocritical team aware.   NIHSS pre procedure 0. Distal pulses palpated b/l.   Patient has taken ASA/ Plavix this AM.       Allergies: atorvastatin (Diarrhea (Severe))      Consentable: [ x] Yes   [ ] No     Plan:   -61y Male presents for diagnostic cerebral angiogram +/- angioplasty/recanalization with neuroendovascular team today.   -Risks/Benefits/alternatives explained with the patient and/or healthcare proxy and witnessed informed consent obtained.  - patient to be admitted to neuro ICU under Dr. Schwab following procedure if intervention occurs.     [id number].
PACU ANESTHESIA ADMISSION NOTE      Procedure:   Post op diagnosis:      ____  Intubated  TV:______       Rate: ______      FiO2: ______    _x___  Patent Airway    _x___  Full return of protective reflexes    _x___  Full recovery from anesthesia / back to baseline status    Vitals:    See anesthesia record      Mental Status:  _x___ Awake   _____ Alert   _____ Drowsy   _____ Sedated    Nausea/Vomiting:  _x___  NO       ______Yes,   See Post - Op Orders         Pain Scale (0-10):  __0___    Treatment: _x___ None    ____ See Post - Op/PCA Orders    Post - Operative Fluids:   __x__ Oral   ____ See Post - Op Orders    Plan: Discharge:   _x___Home       _____Floor     _____Critical Care    _____  Other:_________________    Comments:  No anesthesia issues or complications noted.  Discharge when criteria met.

## 2021-08-26 LAB
CRP SERPL-MCNC: <3 MG/L — SIGNIFICANT CHANGE UP
HAV IGM SER-ACNC: SIGNIFICANT CHANGE UP
HBV CORE IGM SER-ACNC: SIGNIFICANT CHANGE UP
HBV SURFACE AG SER-ACNC: SIGNIFICANT CHANGE UP
HCV AB S/CO SERPL IA: 0.11 S/CO — SIGNIFICANT CHANGE UP (ref 0–0.99)
HCV AB SERPL-IMP: SIGNIFICANT CHANGE UP
HIV 1+2 AB+HIV1 P24 AG SERPL QL IA: SIGNIFICANT CHANGE UP

## 2021-08-27 ENCOUNTER — NON-APPOINTMENT (OUTPATIENT)
Age: 61
End: 2021-08-27

## 2021-08-28 LAB — CMV DNA CSF QL NAA+PROBE: SIGNIFICANT CHANGE UP

## 2021-08-29 LAB
ANA PAT FLD IF-IMP: ABNORMAL
ANA TITR SER: ABNORMAL

## 2021-08-31 ENCOUNTER — APPOINTMENT (OUTPATIENT)
Dept: NEUROLOGY | Facility: CLINIC | Age: 61
End: 2021-08-31
Payer: MEDICAID

## 2021-08-31 VITALS
OXYGEN SATURATION: 99 % | DIASTOLIC BLOOD PRESSURE: 86 MMHG | TEMPERATURE: 97.8 F | BODY MASS INDEX: 25.88 KG/M2 | HEIGHT: 66 IN | SYSTOLIC BLOOD PRESSURE: 132 MMHG | WEIGHT: 161 LBS | HEART RATE: 60 BPM

## 2021-08-31 DIAGNOSIS — L40.9 PSORIASIS, UNSPECIFIED: ICD-10-CM

## 2021-08-31 DIAGNOSIS — Z79.82 LONG TERM (CURRENT) USE OF ASPIRIN: ICD-10-CM

## 2021-08-31 DIAGNOSIS — I67.2 CEREBRAL ATHEROSCLEROSIS: ICD-10-CM

## 2021-08-31 DIAGNOSIS — R26.81 UNSTEADINESS ON FEET: ICD-10-CM

## 2021-08-31 DIAGNOSIS — I69.854 HEMIPLEGIA AND HEMIPARESIS FOLLOWING OTHER CEREBROVASCULAR DISEASE AFFECTING LEFT NON-DOMINANT SIDE: ICD-10-CM

## 2021-08-31 DIAGNOSIS — Z79.02 LONG TERM (CURRENT) USE OF ANTITHROMBOTICS/ANTIPLATELETS: ICD-10-CM

## 2021-08-31 DIAGNOSIS — I69.898 OTHER SEQUELAE OF OTHER CEREBROVASCULAR DISEASE: ICD-10-CM

## 2021-08-31 PROCEDURE — 99215 OFFICE O/P EST HI 40 MIN: CPT

## 2021-09-01 NOTE — REASON FOR VISIT
[Follow-Up: _____] : a [unfilled] follow-up visit [Spouse] : spouse [Family Member] : family member [FreeTextEntry1] : after second Diagnostic cerebral DSA on 8/25/21

## 2021-09-01 NOTE — ASSESSMENT
[FreeTextEntry1] : Patient is 61 yo RH man with PMHx of HTN, psoriasis, smoking (not stopped), tandem TIAs, who suffered from right hemispheric watershed infarction on April 2020, when his non-invasive cerebrovascular imaging as well as DSA on 2/3/21 showed right M1 and right A1 as well as left M2 severe stenosis. He developed this new stroke despite of being on maximal medical management, risk-factor and life-style modification; likely because of rheumatologic/inflammatory disease. Consequently he underwent an urgent cerebral angiogram and angioplasty with stenting of R MCA and R NORMAN on 6/30/21. Today, he presents for follow up after repeat diagnostic cerebral DSA without angioplasty on 8/25/21.  He was last seen in clinic on 7/13/21 when after several verify now exams, he was placed on  and Plavix 75. He was also asked to follow up with Dr. Libman or Dr. Martínez as well as rheumatologist. Before the latest angiogram we spoke to the patient rhumatologist and discussed his case, when his rheumatologist mentioned that she added Steroid to his regiment and asked him to see Dr. Cullen.\par Diagnostic cerebral DSA on 8/25/21 reports unchanged distal perfusion and intra-stent stenosis of the right M1, and right A1, but more post stenosis dilatation especially in M1, in compare to angiogram performed on 6/30/21. Worsening of stenotic lesions of left MCA. \par Today, patient presents with wife and son and states that his Left sided weakness has improved further. He is pending appointment with [facility] doctor (Dr. Vinay Cullen at [facility]) for CNS Vasculitis evaluation tomorrow. He is on Prednisone 50 QD now and c/w Humera. He is also compliant with , Plavix 75, and Simvastatin 40 with no spontaneous bleeding or bruising.  His blood work from the hospital reported YAMILETH pattern speckled and Antinuclear factor titer 1:320. His Right groin access site is c/d/i, no erythema, edema, ecchymosis or pain. He has no discomfort of b/l LEs. His LUE weakness is improved. NIHSS remains 1 for very slight Left facial. \par \par PLAN:\par -MRA NOVA scan\par -Repeat High resolution MR imaging of the vessel wall\par -After two above exams, then complete cerebrovascular reserve volume challenge test or CTA w/ Diamox\par -F/u with stroke specialist and rheumatologist for management of stroke and inflammatory disease.\par -He needs CSF work up for inflammatory markers, however I leave this to his stroke/NV physician.\par -Keep hydrates with 3L water/day\par -Instructed patient to call 911 or report to ED if any acute neurological changes occur, such as having severe, sudden headache\par

## 2021-09-01 NOTE — HISTORY OF PRESENT ILLNESS
[FreeTextEntry1] : Patient is 61 yo RH man with PMHx of HTN, psoriasis, smoking (not stopped), tandem TIAs, who suffered from right hemispheric watershed infarction on April 2020, when his non-invasive cerebrovascular imaging as well as DSA on 2/3/21 showed right M1 and right A1 as well as left M2 severe stenosis. He developed this new stroke despite of being on maximal medical management, risk-factor and life-style modification; likely because of rheumatologic/inflammatory disease. Consequently he underwent an urgent cerebral angiogram and angioplasty with stenting of R MCA and R NORMAN on 6/30/21. Today, he presents for follow up after repeat diagnostic cerebral DSA without angioplasty on 8/25/21.  He was last seen in clinic on 7/13/21 when after several verify now exams, he was placed on  and Plavix 75. He was also asked to follow up with Dr. Libman or Dr. Martínez as well as rheumatologist. Before the latest angiogram we spoke to the patient rhumatologist and discussed his case, when his rheumatologist mentioned that she added Steroid to his regiment and asked him to see Dr. Cullen.\par Diagnostic cerebral DSA on 8/25/21 reports unchanged distal perfusion and intra-stent stenosis of the right M1, and right A1, but more post stenosis dilatation especially in M1, in compare to angiogram performed on 6/30/21. Worsening of stenotic lesions of left MCA. \par Today, patient presents with wife and son and states that his Left sided weakness has improved further. He is pending appointment with [facility] doctor (Dr. Vinay Cullen at [facility]) for CNS Vasculitis evaluation tomorrow. He is on Prednisone 50 QD now and c/w Humera. He is also compliant with , Plavix 75, and Simvastatin 40 with no spontaneous bleeding or bruising.  His blood work from the hospital reported YAMILETH pattern speckled and Antinuclear factor titer 1:320. \par \par \par

## 2021-09-01 NOTE — PHYSICAL EXAM
[FreeTextEntry1] : His Right groin access site is c/d/i, no erythema, edema, ecchymosis or pain. He has no discomfort of b/l LEs. \par \par NIH STROKE SCALE \par \par Item	 Score \par \par 1 a.	Level of Consciousness	 0 \par 1 b.          LOC Questions	 0 \par 1 c.	LOC Commands	 0 \par 2.	Best Gaze	 0 \par 3.	Visual	                 0 \par 4.	Facial Palsy              1 \par 5 a.	Motor Arm - Left	 0 \par 5 b.	Motor Arm - Right	 0 \par 6 a.	Motor Leg - Left	 0 \par 6 b.	Motor Leg - Right	 0 \par 7.	Limb Ataxia	 0 \par 8.	Sensory	                 0 \par 9.	Language	 0 \par 10.	Dysarthria	 0 \par 11.	Extinction and Inattention 	 0 \par __________________________________________ \par \par TOTAL	 1 \par \par mRS: 0 No symptoms at all \par \par \par Neurologic Exam: \par \par Mental status: Awake, alert and oriented x 4. Recent and remote memory intact.  \par \par Language: Follows all commands. Naming, repetition and comprehension intact. Attention/concentration intact. No dysarthria, no aphasia. Fund of knowledge appropriate.  \par \par Cranial nerves: Pupils equally round and reactive to light, visual fields full, no nystagmus, EOMI, face symmetric, hearing intact bilaterally, palate elevation symmetric, tongue was midline, sternocleidomastoid/ shoulder shrug strength bilaterally 5/5.  \par \par Motor: No drifting in all extremities. Normal bulk and tone, strength LUE 5-/5, RUE 5/5. B/l LE 5/5. L hand has some clumsiness.   \par \par Sensation: Intact to light touch. No neglect.  \par \par Coordination: No dysmetria on finger-to-nose and heel-to-shin.  \par \par Gait: Steady.\par \par \par \par

## 2021-09-21 ENCOUNTER — APPOINTMENT (OUTPATIENT)
Dept: MRI IMAGING | Facility: HOSPITAL | Age: 61
End: 2021-09-21
Payer: MEDICAID

## 2021-09-21 ENCOUNTER — OUTPATIENT (OUTPATIENT)
Dept: OUTPATIENT SERVICES | Facility: HOSPITAL | Age: 61
LOS: 1 days | End: 2021-09-21
Payer: MEDICAID

## 2021-09-21 DIAGNOSIS — Z92.89 PERSONAL HISTORY OF OTHER MEDICAL TREATMENT: Chronic | ICD-10-CM

## 2021-09-21 PROCEDURE — 70544 MR ANGIOGRAPHY HEAD W/O DYE: CPT

## 2021-09-21 PROCEDURE — 70544 MR ANGIOGRAPHY HEAD W/O DYE: CPT | Mod: 26

## 2021-09-30 ENCOUNTER — OUTPATIENT (OUTPATIENT)
Dept: OUTPATIENT SERVICES | Facility: HOSPITAL | Age: 61
LOS: 1 days | Discharge: HOME | End: 2021-09-30

## 2021-09-30 DIAGNOSIS — Z92.89 PERSONAL HISTORY OF OTHER MEDICAL TREATMENT: Chronic | ICD-10-CM

## 2021-09-30 DIAGNOSIS — I63.511 CEREBRAL INFARCTION DUE TO UNSPECIFIED OCCLUSION OR STENOSIS OF RIGHT MIDDLE CEREBRAL ARTERY: ICD-10-CM

## 2021-09-30 DIAGNOSIS — G81.94 HEMIPLEGIA, UNSPECIFIED AFFECTING LEFT NONDOMINANT SIDE: ICD-10-CM

## 2021-10-26 ENCOUNTER — APPOINTMENT (OUTPATIENT)
Dept: NEUROLOGY | Facility: CLINIC | Age: 61
End: 2021-10-26
Payer: MEDICAID

## 2021-10-26 VITALS
BODY MASS INDEX: 27.16 KG/M2 | TEMPERATURE: 97.6 F | SYSTOLIC BLOOD PRESSURE: 132 MMHG | HEART RATE: 51 BPM | WEIGHT: 169 LBS | HEIGHT: 66 IN | OXYGEN SATURATION: 97 % | DIASTOLIC BLOOD PRESSURE: 78 MMHG

## 2021-10-26 PROCEDURE — 99215 OFFICE O/P EST HI 40 MIN: CPT

## 2021-10-27 NOTE — HISTORY OF PRESENT ILLNESS
[FreeTextEntry1] : Patient is 61 yo RH man with PMHx of HTN, psoriasis, recently quit smoking, tandem TIAs, who suffered from right hemispheric watershed infarction on April 2020, when his non-invasive cerebrovascular imaging as well as DSA on 2/3/21 showed right M1 and right A1 as well as left M2 severe stenosis. He developed a new stroke despite being on maximal medical management, risk-factor and life-style modification; likely because of rheumatologic/inflammatory disease. Consequently he underwent an urgent cerebral angiogram and angioplasty with stenting of R MCA and R NORMAN on 6/30/21. He was s/p repeat diagnostic cerebral DSA without angioplasty on 8/25/21 which reported unchanged distal perfusion and intra-stent stenosis of the right M1, and right A1, but more post stenosis dilatation especially in M1, in compare to angiogram performed on 6/30/21. Worsening of stenotic lesions of left MCA. He was last seen in clinic on 8/31/21 and asked to complete repeat MRA NOVA scan and high resolution VWI. He was placed on Prednisone 50 QD in addition to c/w Humera per his rheumatologist and was pending visit with [facility] stroke physician Dr. Vinay Cullen for CNS vasculitis.\par \par Today, patient presents to clinic with wife and states that his condition has improved month by month and only recently, in the last month, has plateaued. Repeat high resolution VWI from 10/23/21 reports that his intracranial stenosis and pattern of vessel wall enhancement is in favor of atherosclerotic disease, which was the same conclusion from VWI from 5/2021.  MRA NOVA scan from 9/21/21 reported attenuated and irregular caliber to the right carotid terminus right with poor visualization of the right middle cerebral artery with focal areas of severe narrowing involving the proximal right M1 and distal M1 segments. Focal area of mild to moderate narrowing involving the left M1 segment.  \par \par Per wife, they saw Dr. Cullen who sent him for further blood work including lipoprotein antibodies, which came back negative. Dr. Cullen ruled out vasculitis because patient does not have history of HA and stated that an LP is not needed. As a result, his rheumatologist took him off of Prednisone. He is only on Humera. He is also on , Plavix 75 and Simvastatin 40. He cannot tolerate Atorvastatin because it causes severe diarrhea. He has stopped smoking. \par \par NOVA analysis 9/21/21 demonstrates: \par \par ALMA 108 ml/min LICA 222 ml/min \par RACA LACA 122 \par RACA2 30 LACA2 95 \par \par RMCA LMCA 67 \par RM21 21 \par RM2A 20 \par RM2B 18 \par RPCOM LPCOM \par \par  ml/min \par  \par LVA 53 \par RPCA 138 \par LPCA 86

## 2021-10-27 NOTE — ASSESSMENT
[FreeTextEntry1] : Patient is 59 yo RH man with PMHx of HTN, psoriasis, recently quit smoking, tandem TIAs, who suffered from right hemispheric watershed infarction on April 2020, when his non-invasive cerebrovascular imaging as well as DSA on 2/3/21 showed right M1 and right A1 as well as left M2 severe stenosis. He developed a new stroke despite being on maximal medical management, risk-factor and life-style modification due to unknown causes/possibly inflammatory/autoimmune disease. Consequently he underwent an urgent cerebral angiogram and angioplasty with stenting of R MCA and R NORMAN on 6/30/21. He was s/p repeat diagnostic cerebral DSA without angioplasty on 8/25/21 which reported unchanged distal perfusion and intra-stent stenosis of the right M1, and right A1, but more post stenosis dilatation especially in M1, in compare to angiogram performed on 6/30/21. Worsening of stenotic lesions of left MCA. He was last seen in clinic on 8/31/21 and asked to complete repeat MRA NOVA scan and high resolution VWI. Today, patient presents to clinic with wife and states that his condition has improved month by month and only recently, in the last month, has plateaued. VWI from 10/23/21 reports that his intracranial stenosis and pattern of vessel wall enhancement is in favor of atherosclerotic disease, which was the same conclusion from VWI from 5/2021. MRA NOVA scan from 9/21/21 confirms findings on angiogram in 6/30/21. He saw stroke/vasculitis specialist Dr. Cullen from [facility] who ruled out vasculitis and did not recommend LP. As a result, his daily Prednisone was discontinued and his immunomudulating agent was decreased to once every 3w rahter than 2 .  In my opinion, based on the conclusion from both VWI reports, the patient's stenosis is related to atherosclerotic disease, but autoimmune/inflammatory cause/vasculitis cannot be ruled out simply because patient does not have HA. It is concerning that his atherosclerotic disease is progressing so quickly. He has narrowing of the Left sided intracranial vessels in multiple places and narrowing of R MCA distally. He needs aggressive risk factor management of his intracranial vascular disease to prevent further strokes. He denies spontaneous bleeding or bruising.\par \par PLAN:\par -Repeat diagnostic cerebral angiogram in few months.\par -Will check for Hgb A1c, CBC, CMP, Lipid Panel, Mg/Ca/Vitamin D3 levels.\par -C/w  and Plavix 75 for now.\par -C/w Simvastatin 40 QD for now until lipid panel results. If LDL is >70, please increase to Simvastatin 80 QD\par -Omega 3 or Fish Oil QD\par -Melatonin 10 QD \par -Vitamin D3 1TAB QD\par -Magnesium Oxide 400 TID, can reduce to BID or QD if having side effects\par -HDL goal >60 with aggressive aerobic exercise at least 30 min 5x/week\par -Hydrate with 3L of water per day\par -Instructed patient to call 911 or report to ED if any acute neurological changes occur, such as having severe, sudden headache\par -We spent more than 50min for this encounter.\par

## 2021-10-27 NOTE — PHYSICAL EXAM
[FreeTextEntry1] : NIH STROKE SCALE \par \par Item	 Score \par \par 1 a.	Level of Consciousness	 0 \par 1 b. LOC Questions	 0 \par 1 c.	LOC Commands	 0 \par 2.	Best Gaze	 0 \par 3.	Visual	 0 \par 4.	Facial Palsy 1 \par 5 a.	Motor Arm - Left	 0 \par 5 b.	Motor Arm - Right	 0 \par 6 a.	Motor Leg - Left	 0 \par 6 b.	Motor Leg - Right	 0 \par 7.	Limb Ataxia	 0 \par 8.	Sensory	 0 \par 9.	Language	 0 \par 10.	Dysarthria	 0 \par 11.	Extinction and Inattention 	 0 \par __________________________________________ \par \par TOTAL	 1 \par \par mRS: 0 No symptoms at all \par \par \par Neurologic Exam: \par \par Mental status: Awake, alert and oriented x 4. Recent and remote memory intact. \par \par Language: Follows all commands. Naming, repetition and comprehension intact. Attention/concentration intact. No dysarthria, no aphasia. Fund of knowledge appropriate. \par \par Cranial nerves: Pupils equally round and reactive to light, visual fields full, no nystagmus, EOMI, face symmetric, hearing intact bilaterally, palate elevation symmetric, tongue was midline, sternocleidomastoid/ shoulder shrug strength bilaterally 5/5. \par \par Motor: No drifting in all extremities. Normal bulk and tone, strength LUE 5-/5, RUE 5/5. B/l LE 5/5. L hand  strength 5-/5. \par \par Sensation: Intact to light touch. No neglect. \par \par Coordination: No dysmetria on finger-to-nose and heel-to-shin. \par \par Gait: Steady.\par

## 2021-11-01 ENCOUNTER — TRANSCRIPTION ENCOUNTER (OUTPATIENT)
Age: 61
End: 2021-11-01

## 2021-12-01 ENCOUNTER — APPOINTMENT (OUTPATIENT)
Dept: CARDIOLOGY | Facility: CLINIC | Age: 61
End: 2021-12-01
Payer: MEDICAID

## 2021-12-01 ENCOUNTER — NON-APPOINTMENT (OUTPATIENT)
Age: 61
End: 2021-12-01

## 2021-12-01 VITALS
HEIGHT: 66 IN | WEIGHT: 176 LBS | TEMPERATURE: 97.4 F | DIASTOLIC BLOOD PRESSURE: 80 MMHG | BODY MASS INDEX: 28.28 KG/M2 | HEART RATE: 65 BPM | SYSTOLIC BLOOD PRESSURE: 120 MMHG

## 2021-12-01 PROCEDURE — 93000 ELECTROCARDIOGRAM COMPLETE: CPT

## 2021-12-01 PROCEDURE — 99214 OFFICE O/P EST MOD 30 MIN: CPT

## 2021-12-01 RX ORDER — PREDNISONE 50 MG/1
50 TABLET ORAL DAILY
Refills: 0 | Status: DISCONTINUED | COMMUNITY
End: 2021-12-01

## 2021-12-01 RX ORDER — ASPIRIN 325 MG/1
325 TABLET, COATED ORAL
Refills: 0 | Status: DISCONTINUED | COMMUNITY
End: 2021-12-01

## 2021-12-01 RX ORDER — MELATONIN 5 MG
5 CAPSULE ORAL
Refills: 0 | Status: DISCONTINUED | COMMUNITY
End: 2021-12-01

## 2021-12-01 NOTE — HISTORY OF PRESENT ILLNESS
[FreeTextEntry1] : 62 y/o male presenting for f/u of NSVT, s/p another recurrent CVA, was managed in the [facility] and underwent angioplasty of R MCA and NORMAN with Dr. Guajardo. MRA c/w intracranial vascular disease. Had one more episode of syncope, but no palpitations, no CP, no dyspnea. MCOT revealed NSVT, but no AF.  He was started on b-blocker, but this was stopped due to sinus bradycardia. He had a stress echo last year - no ischemia, preserved LV function. CCTA was done to further assess for CAD and revealed mild stenosis only. No edema.He had repeat echo 05/2020 at [facility].  Echo revealed normal LV function, bicuspid AV, mild AS and moderate AI. He reports fatigue and MORALES, worse with exertion. + palpitations, near-syncope, but no syncope. He has a neurology and EP f/u scheduled.\par Had echo in the Summer of 2021 in the hospital - results reviewed with the patient and wife. No significant change from prior.

## 2021-12-01 NOTE — ASSESSMENT
[FreeTextEntry1] : Echo noted - bicuspid valve, mild to moderate aortic valve disease - stable.\par Stress test November 2019 - negative for ischemia.\par CCTA - non-obstructive CAD\par NSVT on MCOT - EP evaluation appreciated.\par Off Metoprolol - could not tolerate due to bradycardia. C/w Norvasc 2.5 mg QD (at night), increase to 5 mg if BP is elevated.\par CVA - f/u with neurology, neurovascular intervention.\par High dose statin.\par C/w current therapy otherwise.\par \par EP follow-up - recommend Loop recorder, given CVA, NSVT and Event monitor findings. F/u with Dr. Santoyo.\par F/u after  the EP evaluation. Discussed in detail.\par \par

## 2022-01-07 ENCOUNTER — APPOINTMENT (OUTPATIENT)
Dept: NEUROLOGY | Facility: CLINIC | Age: 62
End: 2022-01-07

## 2022-01-18 NOTE — PATIENT PROFILE ADULT - VISION (WITH CORRECTIVE LENSES IF THE PATIENT USUALLY WEARS THEM):
Last Appointment:  10/8/2021  No future appointments.
Normal vision: sees adequately in most situations; can see medication labels, newsprint

## 2022-01-20 ENCOUNTER — APPOINTMENT (OUTPATIENT)
Dept: NEUROLOGY | Facility: HOSPITAL | Age: 62
End: 2022-01-20

## 2022-01-21 ENCOUNTER — OUTPATIENT (OUTPATIENT)
Dept: OUTPATIENT SERVICES | Facility: HOSPITAL | Age: 62
LOS: 1 days | Discharge: HOME | End: 2022-01-21
Payer: MEDICAID

## 2022-01-21 VITALS
DIASTOLIC BLOOD PRESSURE: 86 MMHG | HEIGHT: 66 IN | RESPIRATION RATE: 18 BRPM | HEART RATE: 71 BPM | WEIGHT: 173.06 LBS | OXYGEN SATURATION: 97 % | SYSTOLIC BLOOD PRESSURE: 144 MMHG | TEMPERATURE: 98 F

## 2022-01-21 DIAGNOSIS — Z01.818 ENCOUNTER FOR OTHER PREPROCEDURAL EXAMINATION: ICD-10-CM

## 2022-01-21 DIAGNOSIS — I63.9 CEREBRAL INFARCTION, UNSPECIFIED: ICD-10-CM

## 2022-01-21 DIAGNOSIS — Z92.89 PERSONAL HISTORY OF OTHER MEDICAL TREATMENT: Chronic | ICD-10-CM

## 2022-01-21 DIAGNOSIS — Z86.79 PERSONAL HISTORY OF OTHER DISEASES OF THE CIRCULATORY SYSTEM: Chronic | ICD-10-CM

## 2022-01-21 LAB
ALBUMIN SERPL ELPH-MCNC: 4.2 G/DL — SIGNIFICANT CHANGE UP (ref 3.5–5.2)
ALP SERPL-CCNC: 98 U/L — SIGNIFICANT CHANGE UP (ref 30–115)
ALT FLD-CCNC: 19 U/L — SIGNIFICANT CHANGE UP (ref 0–41)
ANION GAP SERPL CALC-SCNC: 12 MMOL/L — SIGNIFICANT CHANGE UP (ref 7–14)
APTT BLD: 30.5 SEC — SIGNIFICANT CHANGE UP (ref 27–39.2)
AST SERPL-CCNC: 20 U/L — SIGNIFICANT CHANGE UP (ref 0–41)
BASOPHILS # BLD AUTO: 0.08 K/UL — SIGNIFICANT CHANGE UP (ref 0–0.2)
BASOPHILS NFR BLD AUTO: 1 % — SIGNIFICANT CHANGE UP (ref 0–1)
BILIRUB SERPL-MCNC: 0.3 MG/DL — SIGNIFICANT CHANGE UP (ref 0.2–1.2)
BUN SERPL-MCNC: 20 MG/DL — SIGNIFICANT CHANGE UP (ref 10–20)
CALCIUM SERPL-MCNC: 9.7 MG/DL — SIGNIFICANT CHANGE UP (ref 8.5–10.1)
CHLORIDE SERPL-SCNC: 106 MMOL/L — SIGNIFICANT CHANGE UP (ref 98–110)
CO2 SERPL-SCNC: 25 MMOL/L — SIGNIFICANT CHANGE UP (ref 17–32)
CREAT SERPL-MCNC: 1.4 MG/DL — SIGNIFICANT CHANGE UP (ref 0.7–1.5)
EOSINOPHIL # BLD AUTO: 0.34 K/UL — SIGNIFICANT CHANGE UP (ref 0–0.7)
EOSINOPHIL NFR BLD AUTO: 4.1 % — SIGNIFICANT CHANGE UP (ref 0–8)
GLUCOSE SERPL-MCNC: 94 MG/DL — SIGNIFICANT CHANGE UP (ref 70–99)
HCT VFR BLD CALC: 44.7 % — SIGNIFICANT CHANGE UP (ref 42–52)
HGB BLD-MCNC: 13.8 G/DL — LOW (ref 14–18)
IMM GRANULOCYTES NFR BLD AUTO: 0.2 % — SIGNIFICANT CHANGE UP (ref 0.1–0.3)
INR BLD: 0.94 RATIO — SIGNIFICANT CHANGE UP (ref 0.65–1.3)
LYMPHOCYTES # BLD AUTO: 3.16 K/UL — SIGNIFICANT CHANGE UP (ref 1.2–3.4)
LYMPHOCYTES # BLD AUTO: 38.5 % — SIGNIFICANT CHANGE UP (ref 20.5–51.1)
MCHC RBC-ENTMCNC: 27.1 PG — SIGNIFICANT CHANGE UP (ref 27–31)
MCHC RBC-ENTMCNC: 30.9 G/DL — LOW (ref 32–37)
MCV RBC AUTO: 87.6 FL — SIGNIFICANT CHANGE UP (ref 80–94)
MONOCYTES # BLD AUTO: 0.8 K/UL — HIGH (ref 0.1–0.6)
MONOCYTES NFR BLD AUTO: 9.8 % — HIGH (ref 1.7–9.3)
NEUTROPHILS # BLD AUTO: 3.8 K/UL — SIGNIFICANT CHANGE UP (ref 1.4–6.5)
NEUTROPHILS NFR BLD AUTO: 46.4 % — SIGNIFICANT CHANGE UP (ref 42.2–75.2)
NRBC # BLD: 0 /100 WBCS — SIGNIFICANT CHANGE UP (ref 0–0)
PLATELET # BLD AUTO: 402 K/UL — HIGH (ref 130–400)
POTASSIUM SERPL-MCNC: 4.7 MMOL/L — SIGNIFICANT CHANGE UP (ref 3.5–5)
POTASSIUM SERPL-SCNC: 4.7 MMOL/L — SIGNIFICANT CHANGE UP (ref 3.5–5)
PROT SERPL-MCNC: 7.1 G/DL — SIGNIFICANT CHANGE UP (ref 6–8)
PROTHROM AB SERPL-ACNC: 10.8 SEC — SIGNIFICANT CHANGE UP (ref 9.95–12.87)
RBC # BLD: 5.1 M/UL — SIGNIFICANT CHANGE UP (ref 4.7–6.1)
RBC # FLD: 13.4 % — SIGNIFICANT CHANGE UP (ref 11.5–14.5)
SODIUM SERPL-SCNC: 143 MMOL/L — SIGNIFICANT CHANGE UP (ref 135–146)
WBC # BLD: 8.2 K/UL — SIGNIFICANT CHANGE UP (ref 4.8–10.8)
WBC # FLD AUTO: 8.2 K/UL — SIGNIFICANT CHANGE UP (ref 4.8–10.8)

## 2022-01-21 PROCEDURE — 93010 ELECTROCARDIOGRAM REPORT: CPT

## 2022-01-21 NOTE — H&P PST ADULT - HISTORY OF PRESENT ILLNESS
60 y/o male presents to PAST in preparation for angiogram in IR on 8/25/21  Pt states that he has a hx of cva's 1st one was 2 yrs ago and had a recent CVA 2 months ago. It was recommended to have diagnostic angiogram in IR.   PATIENT CURRENTLY DENIES CHEST PAIN  SHORTNESS OF BREATH  PALPITATIONS,  DYSURIA, OR UPPER RESPIRATORY INFECTION IN PAST 2 WEEKS  EXERCISE  TOLERANCE  2 FLIGHT OF STAIRS  WITHOUT SHORTNESS OF BREATH  Patient had Covid on 01/5/2022 and denies any s/s at this time. Patient has appointment for repeat covid testing pre op and instructed to continue to self monitor and report any concerns to MD. Pt will continue to practice self isolation and  exposure control measures pre op   or tested positive in the past, vaccinated 1st dose 1 week ago, pfizer  pt advised self quarantine till day of procedure  Patient verbalized understanding of instructions and was given the opportunity to ask questions and have them answered.  As per patient, this is their complete medical and surgical history, including medications both prescribed or over the counter.  written and verbal instructions with teach back on chlorhexidine shampoo provided,  pt verbalized understanding with returned demonstration    Anesthesia Alert  NO--Difficult Airway  NO--History of neck surgery or radiation  NO--Limited ROM of neck  NO--History of Malignant hyperthermia  NO--Personal or family history of Pseudocholinesterase deficiency.  NO--Prior Anesthesia Complication  NO--Latex Allergy  NO--Loose teeth  NO--History of Rheumatoid Arthritis  NO--BENSON  Yes--Bleeding risk on ASA and plavix  NO--Other_____    I63.9, I67.9 / 83165    ^I63.9, I67.9 / 88312    No pertinent family history in first degree relatives    HTN (hypertension)    Psoriasis    CVA (cerebral vascular accident)    No significant past surgical history     Levi Oscar is a 60 yo male with PMH of HTN, psoriatic arthritis, CVA x 2( 04/2020) , Intracranial vascular stenosis , Moderate AI, syncope, 08/5/2021 urgent CT angio with angioplasty with stent to Right MCA and Right NORMAN on 06/30/2021 who presents to pretesting for CT angiogram due to worsening cerebral arthrosclerosis. Patient alert and oriented x 3  and denies any cp, sob, palpitations, fever, cough, URI, headache, abdominal pains, N/V, UTI, Rashes or open wounds.  As per patient exercise tolerance of 1 fos walks with out sob  Patient had Covid on 01/5/2022 and denies any s/s at this time. Patient has appointment for repeat covid testing pre op and instructed to continue to self monitor and report any concerns to MD. Pt will continue to practice self isolation and  exposure control measures pre op  Patient verbalized understanding of instructions and was given the opportunity to ask questions and have them answered.  As per patient, this is their complete medical and surgical history, including medications both prescribed or over the counter.  written and verbal instructions with teach back on chlorhexidine shampoo provided,  pt verbalized understanding with returned demonstration  Pt instructed to stop vitamins/supplements/herbal medications for one week prior to surgery  Anesthesia Alert  Class IV Airway Difficult Airway  NO--History of neck surgery or radiation  NO--Limited ROM of neck  NO--History of Malignant hyperthermia  NO--Personal or family history of Pseudocholinesterase deficiency.  NO--Prior Anesthesia Complication  NO--Latex Allergy  NO--Loose teeth  NO--History of Rheumatoid Arthritis  NO--BENSON  Yes--Bleeding risk on ASA and plavix  NO--Other_____

## 2022-01-21 NOTE — H&P PST ADULT - NSICDXPASTMEDICALHX_GEN_ALL_CORE_FT
PAST MEDICAL HISTORY:  CVA (cerebral vascular accident)     HTN (hypertension)     Psoriasis      PAST MEDICAL HISTORY:  Aortic insufficiency Moderate    CVA (cerebral vascular accident)     H/O syncope     Hemiparesis, left resolved Syncope    HTN (hypertension)     Psoriatic arthritis

## 2022-01-21 NOTE — H&P PST ADULT - REASON FOR ADMISSION
Case Type: OP Block Time  Suite: Interventional Radiology  Proceduralist: Rosy Joseph Surgery Date: 02-   Procedure: Diagnostic cerebral angiogram  Laterality: N/A  Length of Procedure: 60 Minutes  Anesthesia Type: Local Standby  Covid Testing 1/31/2022

## 2022-01-21 NOTE — H&P PST ADULT - NSICDXPASTSURGICALHX_GEN_ALL_CORE_FT
PAST SURGICAL HISTORY:  H/O angiography      PAST SURGICAL HISTORY:  H/O angiography     H/O cerebral artery stenosis Cerebral angiogram with angioplaty R MI and AI severe stenosis R MCCA stenting 08/5/2021 R NORMAN 06/30/2021

## 2022-01-31 ENCOUNTER — LABORATORY RESULT (OUTPATIENT)
Age: 62
End: 2022-01-31

## 2022-02-02 ENCOUNTER — NON-APPOINTMENT (OUTPATIENT)
Age: 62
End: 2022-02-02

## 2022-02-14 ENCOUNTER — LABORATORY RESULT (OUTPATIENT)
Age: 62
End: 2022-02-14

## 2022-02-17 ENCOUNTER — RESULT REVIEW (OUTPATIENT)
Age: 62
End: 2022-02-17

## 2022-02-17 ENCOUNTER — OUTPATIENT (OUTPATIENT)
Dept: OUTPATIENT SERVICES | Facility: HOSPITAL | Age: 62
LOS: 1 days | Discharge: HOME | End: 2022-02-17

## 2022-02-17 ENCOUNTER — APPOINTMENT (OUTPATIENT)
Dept: NEUROLOGY | Facility: HOSPITAL | Age: 62
End: 2022-02-17
Payer: MEDICAID

## 2022-02-17 VITALS — HEIGHT: 66 IN | WEIGHT: 173.06 LBS

## 2022-02-17 DIAGNOSIS — Z92.89 PERSONAL HISTORY OF OTHER MEDICAL TREATMENT: Chronic | ICD-10-CM

## 2022-02-17 DIAGNOSIS — Z86.79 PERSONAL HISTORY OF OTHER DISEASES OF THE CIRCULATORY SYSTEM: Chronic | ICD-10-CM

## 2022-02-17 PROCEDURE — 76937 US GUIDE VASCULAR ACCESS: CPT | Mod: 26

## 2022-02-17 PROCEDURE — 76377 3D RENDER W/INTRP POSTPROCES: CPT | Mod: 26

## 2022-02-17 PROCEDURE — 36226 PLACE CATH VERTEBRAL ART: CPT | Mod: RT

## 2022-02-17 PROCEDURE — 36224 PLACE CATH CAROTD ART: CPT | Mod: 50

## 2022-02-17 RX ORDER — AMLODIPINE BESYLATE 2.5 MG/1
5 TABLET ORAL ONCE
Refills: 0 | Status: DISCONTINUED | OUTPATIENT
Start: 2022-02-17 | End: 2022-03-03

## 2022-02-17 NOTE — CHART NOTE - NSCHARTNOTEFT_GEN_A_CORE
PACU ANESTHESIA ADMISSION NOTE      Procedure:   Post op diagnosis:      ____  Intubated  TV:______       Rate: ______      FiO2: ______    __x__  Patent Airway    __x__  Full return of protective reflexes    __x__  Full recovery from anesthesia / back to baseline     Vitals:   T: 97.0          R: 16                 BP:  188/90                Sat:   99%                P: 70      Mental Status:  __x__ Awake   __x___ Alert   _____ Drowsy   _____ Sedated    Nausea/Vomiting:  __x__ NO  ______Yes,   See Post - Op Orders          Pain Scale (0-10):  __0___    Treatment: ____ None    ___x_ See Post - Op/PCA Orders    Post - Operative Fluids:   ____ Oral   __x__ See Post - Op Orders    Plan: Discharge:   __x__Home       _____Floor     _____Critical Care    _____  Other:_________________    Comments:  pt tolerated procedure well, pt transferred to PACU and report was given to PACU RN, vital signs are stable and pt shows no signs of distress. no anesthesia complications, discharge pt when criteria met.

## 2022-02-17 NOTE — PRE PROCEDURE NOTE - PRE PROCEDURE EVALUATION
Interventional Neuro Radiology  Pre-Procedure Note PA-C    This is a 61-year-old right-hand dominant Male      HPI:  Levi Oscar is a 61-year-old male witha PMH of HTN, psoriatic arthritis, CVA x2, intracranial vascular stenosis, moderate AI, syncope, and prior angio w/angioplasty and stent of right MCA, Right NORMAN on 6/30/21 who now presents for diagnostic cerebral angiogram. Patient is currently AAOx3.   Allergies: atorvastatin (Diarrhea (Severe))      PAST MEDICAL & SURGICAL HISTORY:  HTN (hypertension)  CVA (cerebral vascular accident)  Psoriatic arthritis  Hemiparesis, left  resolved Syncope  H/O syncope  Aortic insufficiency  Moderate  H/O angiography  H/O cerebral artery stenosis  Cerebral angiogram with angioplaty R MI and AI severe stenosis R MCCA stenting 08/5/2021 R NORMAN 06/30/2021    FAMILY HISTORY:  Family history of stroke (cerebrovascular) (Mother)    Assessment/Plan:   This is a 60 y/o right hand dominant male who presents with right M1 and A1 severe stenosis s/p R MCA and R NORMAN stents in 2021. Here as outpatient for diagnostic cerebral angiogram.   Procedure, goals, risks, benefits and alternatives  were discussed with patient and patient's family.  All questions were answered to best understanding.   Risks discussed include but are not limited to stroke, vessel injury, hemorrhage, and or right  groin hematoma.  Patient demonstrates understanding  of all risks involved with this procedure and wishes to continue.     Appropriate  content was obtained from patient and consent is in the patient's chart.

## 2022-02-24 PROBLEM — G81.94 HEMIPLEGIA, UNSPECIFIED AFFECTING LEFT NONDOMINANT SIDE: Chronic | Status: ACTIVE | Noted: 2022-01-21

## 2022-02-24 PROBLEM — I35.1 NONRHEUMATIC AORTIC (VALVE) INSUFFICIENCY: Chronic | Status: ACTIVE | Noted: 2022-01-21

## 2022-02-24 PROBLEM — Z87.898 PERSONAL HISTORY OF OTHER SPECIFIED CONDITIONS: Chronic | Status: ACTIVE | Noted: 2022-01-21

## 2022-02-24 PROBLEM — L40.50 ARTHROPATHIC PSORIASIS, UNSPECIFIED: Chronic | Status: ACTIVE | Noted: 2022-01-21

## 2022-02-25 ENCOUNTER — APPOINTMENT (OUTPATIENT)
Dept: CARDIOLOGY | Facility: CLINIC | Age: 62
End: 2022-02-25
Payer: MEDICAID

## 2022-02-25 VITALS
HEART RATE: 64 BPM | BODY MASS INDEX: 27.32 KG/M2 | HEIGHT: 66 IN | WEIGHT: 170 LBS | TEMPERATURE: 97.5 F | DIASTOLIC BLOOD PRESSURE: 77 MMHG | SYSTOLIC BLOOD PRESSURE: 113 MMHG

## 2022-02-25 VITALS — HEART RATE: 64 BPM

## 2022-02-25 PROCEDURE — 93000 ELECTROCARDIOGRAM COMPLETE: CPT

## 2022-02-25 PROCEDURE — 99204 OFFICE O/P NEW MOD 45 MIN: CPT

## 2022-02-25 RX ORDER — VENLAFAXINE 37.5 MG/1
37.5 TABLET, EXTENDED RELEASE ORAL DAILY
Refills: 0 | Status: ACTIVE | COMMUNITY

## 2022-02-25 RX ORDER — ADALIMUMAB 40MG/0.8ML
40 KIT SUBCUTANEOUS
Refills: 0 | Status: ACTIVE | COMMUNITY
Start: 2019-07-01

## 2022-02-25 RX ORDER — ADHESIVE TAPE 3"X 2.3 YD
10 TAPE, NON-MEDICATED TOPICAL
Qty: 30 | Refills: 3 | Status: ACTIVE | COMMUNITY
Start: 2021-10-26

## 2022-02-25 RX ORDER — MAGNESIUM OXIDE 241.3 MG/1000MG
400 TABLET ORAL
Qty: 270 | Refills: 0 | Status: ACTIVE | COMMUNITY
Start: 2021-10-26

## 2022-02-25 RX ORDER — LOSARTAN POTASSIUM 50 MG/1
50 TABLET, FILM COATED ORAL DAILY
Refills: 0 | Status: ACTIVE | COMMUNITY
Start: 2019-08-02

## 2022-03-01 ENCOUNTER — APPOINTMENT (OUTPATIENT)
Dept: NEUROLOGY | Facility: CLINIC | Age: 62
End: 2022-03-01
Payer: MEDICAID

## 2022-03-01 VITALS
HEIGHT: 66 IN | DIASTOLIC BLOOD PRESSURE: 70 MMHG | WEIGHT: 168 LBS | SYSTOLIC BLOOD PRESSURE: 132 MMHG | BODY MASS INDEX: 27 KG/M2 | OXYGEN SATURATION: 99 % | TEMPERATURE: 98 F | HEART RATE: 88 BPM

## 2022-03-01 PROCEDURE — 99215 OFFICE O/P EST HI 40 MIN: CPT

## 2022-03-01 RX ORDER — SIMVASTATIN 80 MG/1
80 TABLET, FILM COATED ORAL DAILY
Qty: 90 | Refills: 0 | Status: ACTIVE | COMMUNITY
Start: 2022-03-01 | End: 1900-01-01

## 2022-03-03 NOTE — ASSESSMENT
[FreeTextEntry1] : Patient is 61 yo RH man with PMHx of HTN, psoriasis, recently quit smoking, tandem TIAs, who suffered from right hemispheric watershed infarction on April 2020, when his non-invasive cerebrovascular imaging as well as DSA on 2/3/21 showed right M1 and right A1 as well as left M2 severe stenosis. He developed a new stroke despite being on maximal medical management, risk-factor and life-style modification; likely because of rheumatologic/inflammatory disease. Consequently he underwent an urgent cerebral angiogram and angioplasty with stenting of R MCA and R NORMAN on 6/30/21. He was s/p repeat diagnostic cerebral DSA without angioplasty on 8/25/21 which reported unchanged distal perfusion and intra-stent stenosis of the right M1, and right A1, but more post stenosis dilatation especially in M1, in compare to angiogram performed on 6/30/21. Worsening of stenotic lesions of left MCA. [facility] from 10/23/21 reports that his intracranial stenosis and pattern of vessel wall enhancement is in favor of atherosclerotic disease, which was the same conclusion from [facility] from 5/2021. MRA NOVA scan from 9/21/21 confirms findings on angiogram in 6/30/21. He saw stroke/vasculitis specialist Dr. Cullen from [facility] who ruled out vasculitis and did not recommend LP. As a result, his daily Prednisone was discontinued and his immunomodulating agent was decreased to once every 3w rather than 2.  He was last seen in clinic on 10/26/21 and we recommended repeat diagnostic cerebral angiogram because he has narrowing of the Left sided intracranial vessels in multiple places and narrowing of R MCA distally. Although his stenosis is related to atherosclerotic disease, but autoimmune/inflammatory cause/vasculitis cannot be ruled out. The repeat diagnostic cerebral angiogram was done on 2/17/22 and shows that his intra-stent stenosis of the R M1 as well as post stenosis dilation had progressed slightly. His LDL was 75, HDL 56. We recommend increasing to Simvastatin 80 to reach LDL goal <70 and Brain SPECT Imagining w/ Acetazolamide Challenge to determine intracranial vascular reserve. \par \par PLAN:\par -Brain SPECT Imagining w/ Acetazolamide Challenge\par -Increase to Simvastatin 80 QD to reach LDL goal <70\par -Increase exercise to raise HDL\par -Maximal medical/Risk factor management and Lifestyle modification per [facility] Trial by PCP/Neurologist/[facility]roke [facility] (For example, Mediterranean diet, weight loss, aerobic exercise, smoking cessation etc.)\par -Avoid dehydration, hypotension, anemia, and hypoxemia \par -Instructed patient to call 911 or report to ED if any acute neurological changes occur\par \par \par \par \par \par

## 2022-03-03 NOTE — HISTORY OF PRESENT ILLNESS
[FreeTextEntry1] : Patient is 59 yo RH man with PMHx of HTN, psoriasis, recently quit smoking, tandem TIAs, who suffered from right hemispheric watershed infarction on April 2020, when his non-invasive cerebrovascular imaging as well as DSA on 2/3/21 showed right M1 and right A1 as well as left M2 severe stenosis. He developed a new stroke despite being on maximal medical management, risk-factor and life-style modification; likely because of rheumatologic/inflammatory disease. Consequently he underwent an urgent cerebral angiogram and angioplasty with stenting of R MCA and R NORMAN on 6/30/21. He was s/p repeat diagnostic cerebral DSA without angioplasty on 8/25/21 which reported unchanged distal perfusion and intra-stent stenosis of the right M1, and right A1, but more post stenosis dilatation especially in M1, in compare to angiogram performed on 6/30/21. Worsening of stenotic lesions of left MCA. [facility] from 10/23/21 reports that his intracranial stenosis and pattern of vessel wall enhancement is in favor of atherosclerotic disease, which was the same conclusion from [facility] from 5/2021. MRA NOVA scan from 9/21/21 confirms findings on angiogram in 6/30/21. He saw stroke/vasculitis specialist Dr. Cullen from [facility] who ruled out vasculitis and did not recommend LP. As a result, his daily Prednisone was discontinued and his immunomodulating agent was decreased to once every 3w rather than 2.  He was last seen in clinic on 10/26/21 and we recommended repeat diagnostic cerebral angiogram because he has narrowing of the Left sided intracranial vessels in multiple places and narrowing of R MCA distally. Although his stenosis is related to atherosclerotic disease, but autoimmune/inflammatory cause/vasculitis cannot be ruled out. The repeat diagnostic cerebral angiogram was done on 2/17/22. The report is not up yet. \par \par BW from 10/30/21 \par Hgb X6w---0.4 \par CBC—all nml  \par CMP---GFR 53 (L, normal is >60), Cr 1.41 (H, normal 0.7-1.25) --hx of renal insufficiency, had seen Nephro 3-4 yrs ago and said was age related\par Vitamin D: 63  \par Lipid Panel---LDL 75, HDL 56 (not >60 yet),  \par Magnesium--2.2 (nml) \par Calcium---9.6 (nml) \par \par Today, patient presents to clinic with wife and states that he has been stable. He still has some LUE mild weakness. He gained some weight after his COVID infection in 1/2022, was more irritable, but now back to baseline. He denies spontaneous bleeding/bruising or bleeding when brushing teeth on  and Plavix 75.  He is not smoking. Patient denies erythema, edema, pain or ecchymosis of Right groin access site and has no discomfort in the RLE.\par

## 2022-03-03 NOTE — PHYSICAL EXAM
[FreeTextEntry1] : Right groin access site is c/d/i. There is no erythema, edema, ecchymosis, or tenderness to palpation. Right pedal pulse was palpable.\par \par NIH STROKE SCALE \par \par Item	 Score \par \par 1 a.	Level of Consciousness	 0 \par 1 b.          LOC Questions	 0 \par 1 c.	LOC Commands	 0 \par 2.	Best Gaze	 0 \par 3.	Visual	                 0 \par 4.	Facial Palsy              0 \par 5 a.	Motor Arm - Left	 0 \par 5 b.	Motor Arm - Right	 0 \par 6 a.	Motor Leg - Left	 0 \par 6 b.	Motor Leg - Right	 0 \par 7.	Limb Ataxia	 0 \par 8.	Sensory	                 0 \par 9.	Language	 0 \par 10.	Dysarthria	 0 \par 11.	Extinction and Inattention 	 0 \par __________________________________________ \par \par TOTAL	 0 \par \par mRS: 0 No symptoms at all \par \par \par Neurologic Exam: \par \par Mental status: Awake, alert and oriented x 4. Recent and remote memory intact.  \par \par Language: Follows all commands. Naming, repetition and comprehension intact. Attention/concentration intact. No dysarthria, no aphasia. Fund of knowledge appropriate.  \par \par Cranial nerves: Pupils equally round and reactive to light, visual fields full, no nystagmus, EOMI, face symmetric, hearing intact bilaterally, palate elevation symmetric, tongue was midline, sternocleidomastoid/ shoulder shrug strength bilaterally 5/5.  \par \par Motor: No drifting in all extremities. Normal bulk and tone, strength LUE 5-/5 including L  strength, and 5/5 lower extremities.  strength 5/5.  \par \par Sensation: Intact to light touch. No neglect.  \par \par Coordination: No dysmetria on finger-to-nose and heel-to-shin.  \par \par Gait: Steady.\par

## 2022-03-21 ENCOUNTER — RESULT REVIEW (OUTPATIENT)
Age: 62
End: 2022-03-21

## 2022-03-23 ENCOUNTER — OUTPATIENT (OUTPATIENT)
Dept: OUTPATIENT SERVICES | Facility: HOSPITAL | Age: 62
LOS: 1 days | Discharge: HOME | End: 2022-03-23
Payer: MEDICAID

## 2022-03-23 DIAGNOSIS — Z92.89 PERSONAL HISTORY OF OTHER MEDICAL TREATMENT: Chronic | ICD-10-CM

## 2022-03-23 DIAGNOSIS — R00.2 PALPITATIONS: ICD-10-CM

## 2022-03-23 DIAGNOSIS — Z86.79 PERSONAL HISTORY OF OTHER DISEASES OF THE CIRCULATORY SYSTEM: Chronic | ICD-10-CM

## 2022-03-23 PROCEDURE — 33285 INSJ SUBQ CAR RHYTHM MNTR: CPT

## 2022-03-23 RX ORDER — PREGABALIN 225 MG/1
0 CAPSULE ORAL
Qty: 0 | Refills: 0 | DISCHARGE

## 2022-03-23 RX ORDER — CEPHALEXIN 500 MG
500 CAPSULE ORAL ONCE
Refills: 0 | Status: DISCONTINUED | OUTPATIENT
Start: 2022-03-23 | End: 2022-04-06

## 2022-03-23 NOTE — H&P ADULT - HISTORY OF PRESENT ILLNESS
60 y/o male presenting for f/u of NSVT, s/p another recurrent CVA x2, was managed in the [facility] and underwent angioplasty of R MCA and NORMAN with Dr. Guajardo. MRA c/w intracranial vascular disease. Had one more episode of syncope, but no palpitations, no CP, no dyspnea. MCOT revealed NSVT, but no AF. He was started on b-blocker, but this was stopped due to sinus bradycardia. He had a stress echo last year - no ischemia, preserved LV function. CCTA was done to further assess for CAD and revealed mild stenosis only. No edema.He had repeat echo 05/2020 at [facility]. Echo revealed normal LV function, bicuspid AV, mild AS and moderate AI. He reports fatigue and MORALES, worse with exertion. + palpitations, near-syncope, but no syncope    Patient could not tolerated BB due to bradycardia that was prescribed for NSVT. No sig CAD. Patient also had 3-4 episodes od syncope

## 2022-03-23 NOTE — H&P ADULT - ASSESSMENT
62yMale with h/o CVA, recurrent syncope, NSVT on MCOT   presents for elective implantation of loop recorder    Plan:  implant the device  Kelfex 500mg x1 periop  observe post procedure  discharge home when recovers  Remove dressing in 2 days  Do not wet site for 48hrs  f/u in the office in 1month

## 2022-03-23 NOTE — CHART NOTE - NSCHARTNOTEFT_GEN_A_CORE
Cardiac Electrophysiology Procedure Note    Procedure:  [employer]onic  Implantable Loop Recorder Implant    Indication: Palpitations  Attending:	MD BRANDI Springer, PAC    EQUIPMENT IMPLANTED      Medtronic Linq  Serial Number  [id number]        DESCRIPTION OF PROCEDURE  The patient was brought to the Procedure Room in a nonsedated and fasting state, having received preoperative antibiotics. Informed, written consent was obtained prior to the procedure.  The left shoulder region was cleaned and prepped with serial applications of Chlorhexidine. Patient was then covered with sterile drapes in the usual manner. Blood pressure, oxygenation and level of comfort were stable throughout.   	The left parasternal region was defined; 10 cc of lidocaine solution were infiltrated into the skin overlying the left deltopectoral groove. A 5 mm. incision was then made. The loop recorder was injected under the skin..     The wound margins approximated well, without any tension or overlap. The wound was closed using dermabond  A dry, sterile dressing was placed over this.     COMPLICATIONS:  The patient tolerated the procedure well. There were no immediate complications.    CONCLUSIONS:  Successful implant of loop recorder.. Cardiac Electrophysiology Procedure Note    Procedure:  Medtronic  Implantable Loop Recorder Implant    Indication: Palpitations  Attending:	MD BRAND[facility], PAC    EQUIPMENT IMPLANTED      Medtronic Linq  Serial Number  [id number]        DESCRIPTION OF PROCEDURE  The patient was brought to the Procedure Room in a nonsedated and fasting state, having received preoperative antibiotics. Informed, written consent was obtained prior to the procedure.  The left shoulder region was cleaned and prepped with serial applications of Chlorhexidine. Patient was then covered with sterile drapes in the usual manner. Blood pressure, oxygenation and level of comfort were stable throughout.   	The left parasternal region was defined; 10 cc of lidocaine solution were infiltrated into the skin overlying the left deltopectoral groove. A 5 mm. incision was then made. The loop recorder was injected under the skin..     The wound margins approximated well, without any tension or overlap. The wound was closed using dermabond  A dry, sterile dressing was placed over this.     COMPLICATIONS:  The patient tolerated the procedure well. There were no immediate complications.    CONCLUSIONS:  Successful implant of loop recorder..    Attending Attestation  I was physically present for the key portion of the evaluation and management service provide.

## 2022-03-23 NOTE — H&P ADULT - NSICDXPASTMEDICALHX_GEN_ALL_CORE_FT
no known precautions/limitations
PAST MEDICAL HISTORY:  Aortic insufficiency Moderate    CVA (cerebral vascular accident)     H/O syncope     Hemiparesis, left resolved Syncope    HTN (hypertension)     NSVT (nonsustained ventricular tachycardia)     Psoriatic arthritis

## 2022-03-23 NOTE — PROGRESS NOTE ADULT - SUBJECTIVE AND OBJECTIVE BOX
Electrophysiology Brief Post-Op Note    I have personally seen and examined the patient.  I agree with the history and physical which I have reviewed and noted any changes below.  03-23-22 @ 12:58    PRE-OP DIAGNOSIS:  Syncope    POST-OP DIAGNOSIS:  Syncope    PROCEDURE: Loop Implnat    Physician: Yarelis  Assistant: None    ESTIMATED BLOOD LOSS:  1     mL    ANESTHESIA TYPE:  [  ]General Anesthesia  [  ] Sedation  [X  ] Local/Regional    CONDITION  [  ] Critical  [  ] Serious  [  ]Fair  [ X ]Good      SPECIMENS REMOVED (IF APPLICABLE):  none    IMPLANTS (IF APPLICABLE)  Loop    FINDINGS  PLAN OF CARE  - FU 3-4 weeks

## 2022-03-23 NOTE — H&P ADULT - NSICDXPASTSURGICALHX_GEN_ALL_CORE_FT
PAST SURGICAL HISTORY:  H/O angiography     H/O cerebral artery stenosis Cerebral angiogram with angioplaty R MI and AI severe stenosis R MCCA stenting 08/5/2021 R NORMAN 06/30/2021

## 2022-03-23 NOTE — H&P ADULT - NSICDXFAMILYHX_GEN_ALL_CORE_FT
FAMILY HISTORY:  FH: CAD (coronary artery disease)  FH: hypertension    Mother  Still living? No  Family history of stroke (cerebrovascular), Age at diagnosis: Age Unknown

## 2022-03-24 PROBLEM — I47.2 VENTRICULAR TACHYCARDIA: Chronic | Status: ACTIVE | Noted: 2022-03-23

## 2022-03-28 ENCOUNTER — OUTPATIENT (OUTPATIENT)
Dept: OUTPATIENT SERVICES | Facility: HOSPITAL | Age: 62
LOS: 1 days | End: 2022-03-28
Payer: MEDICAID

## 2022-03-28 DIAGNOSIS — Z92.89 PERSONAL HISTORY OF OTHER MEDICAL TREATMENT: Chronic | ICD-10-CM

## 2022-03-28 DIAGNOSIS — Z86.79 PERSONAL HISTORY OF OTHER DISEASES OF THE CIRCULATORY SYSTEM: Chronic | ICD-10-CM

## 2022-03-28 RX ORDER — ACETAZOLAMIDE 250 MG/1
1000 TABLET ORAL ONCE
Refills: 0 | Status: DISCONTINUED | OUTPATIENT
Start: 2022-03-28 | End: 2022-04-11

## 2022-03-29 DIAGNOSIS — I10 ESSENTIAL (PRIMARY) HYPERTENSION: ICD-10-CM

## 2022-03-29 DIAGNOSIS — I47.2 VENTRICULAR TACHYCARDIA: ICD-10-CM

## 2022-03-29 DIAGNOSIS — Z86.73 PERSONAL HISTORY OF TRANSIENT ISCHEMIC ATTACK (TIA), AND CEREBRAL INFARCTION WITHOUT RESIDUAL DEFICITS: ICD-10-CM

## 2022-03-29 DIAGNOSIS — I35.1 NONRHEUMATIC AORTIC (VALVE) INSUFFICIENCY: ICD-10-CM

## 2022-03-29 DIAGNOSIS — Z88.8 ALLERGY STATUS TO OTHER DRUGS, MEDICAMENTS AND BIOLOGICAL SUBSTANCES: ICD-10-CM

## 2022-03-29 DIAGNOSIS — L40.50 ARTHROPATHIC PSORIASIS, UNSPECIFIED: ICD-10-CM

## 2022-03-29 DIAGNOSIS — Z95.828 PRESENCE OF OTHER VASCULAR IMPLANTS AND GRAFTS: ICD-10-CM

## 2022-03-29 PROCEDURE — 78832 RP LOCLZJ TUM SPECT W/CT 2: CPT | Mod: 26

## 2022-03-29 PROCEDURE — 78832 RP LOCLZJ TUM SPECT W/CT 2: CPT

## 2022-03-29 PROCEDURE — A9557: CPT

## 2022-04-15 ENCOUNTER — NON-APPOINTMENT (OUTPATIENT)
Age: 62
End: 2022-04-15

## 2022-04-15 ENCOUNTER — APPOINTMENT (OUTPATIENT)
Dept: CARDIOLOGY | Facility: CLINIC | Age: 62
End: 2022-04-15
Payer: MEDICAID

## 2022-04-15 PROCEDURE — G2066: CPT

## 2022-04-15 PROCEDURE — 93298 REM INTERROG DEV EVAL SCRMS: CPT

## 2022-04-15 NOTE — ASSESSMENT
[FreeTextEntry1] : bradycardia\par NSVT\par CVA\par syncope\par - agree with loop implant to monitor rhythm.

## 2022-04-15 NOTE — PHYSICAL EXAM
[General Appearance - Well Developed] : well developed [Normal Appearance] : normal appearance [Well Groomed] : well groomed [General Appearance - Well Nourished] : well nourished [No Deformities] : no deformities [General Appearance - In No Acute Distress] : no acute distress [Normal Conjunctiva] : the conjunctiva exhibited no abnormalities [] : no respiratory distress [Respiration, Rhythm And Depth] : normal respiratory rhythm and effort [Auscultation Breath Sounds / Voice Sounds] : lungs were clear to auscultation bilaterally [Heart Rate And Rhythm] : heart rate and rhythm were normal [Heart Sounds] : normal S1 and S2 [Murmurs] : no murmurs present [Edema] : no peripheral edema present [Bowel Sounds] : normal bowel sounds [Abdomen Soft] : soft [Abdomen Tenderness] : non-tender [Abnormal Walk] : normal gait [Nail Clubbing] : no clubbing of the fingernails [Cyanosis, Localized] : no localized cyanosis [Skin Color & Pigmentation] : normal skin color and pigmentation [No Venous Stasis] : no venous stasis [Oriented To Time, Place, And Person] : oriented to person, place, and time [Affect] : the affect was normal [FreeTextEntry1] : no JVD

## 2022-04-15 NOTE — HISTORY OF PRESENT ILLNESS
[FreeTextEntry1] : 60 y/o male presenting for f/u of NSVT, s/p another recurrent CVA x2, was managed in the [facility] and underwent angioplasty of R MCA and NORMAN with Dr. Guajardo. MRA c/w intracranial vascular disease. Had one more episode of syncope, but no palpitations, no CP, no dyspnea. MCOT revealed NSVT, but no AF.  He was started on b-blocker, but this was stopped due to sinus bradycardia. He had a stress echo last year - no ischemia, preserved LV function. CCTA was done to further assess for CAD and revealed mild stenosis only. No edema.He had repeat echo 05/2020 at [facility].  Echo revealed normal LV function, bicuspid AV, mild AS and moderate AI. He reports fatigue and MORALES, worse with exertion. + palpitations, near-syncope, but no syncope. He has a neurology and EP f/u scheduled.\par \par Patient could not tolerated BB due to bradycardia that was prescribed for NSVT. No sig CAD. Patient also had 3-4 episodes of syncope.\par \par \par EKg SR 64 bpm\par echo 11/20 - EF normal\par

## 2022-04-27 ENCOUNTER — APPOINTMENT (OUTPATIENT)
Dept: CARDIOLOGY | Facility: CLINIC | Age: 62
End: 2022-04-27

## 2022-05-03 ENCOUNTER — APPOINTMENT (OUTPATIENT)
Dept: NEUROLOGY | Facility: CLINIC | Age: 62
End: 2022-05-03
Payer: MEDICAID

## 2022-05-03 VITALS
OXYGEN SATURATION: 99 % | SYSTOLIC BLOOD PRESSURE: 148 MMHG | BODY MASS INDEX: 28.12 KG/M2 | TEMPERATURE: 98.2 F | HEART RATE: 73 BPM | DIASTOLIC BLOOD PRESSURE: 89 MMHG | HEIGHT: 66 IN | WEIGHT: 175 LBS

## 2022-05-03 PROCEDURE — 99214 OFFICE O/P EST MOD 30 MIN: CPT

## 2022-05-03 NOTE — HISTORY OF PRESENT ILLNESS
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 He developed a new stroke despite being on maximal medical management, risk-factor and life-style modification; likely because of rheumatologic/inflammatory disease. Consequently he underwent an urgent cerebral angiogram and angioplasty with stenting of R MCA and R NORMAN on 6/30/21.  \par \par He was s/p repeat diagnostic cerebral DSA without angioplasty on 8/25/21 which reported unchanged distal perfusion and intra-stent stenosis of the right M1, and right A1, but more post stenosis dilatation especially in M1, in compare to angiogram performed on 6/30/21. Worsening of stenotic lesions of left MCA. [facility] from 10/23/21 reports that his intracranial stenosis and pattern of vessel wall enhancement is in favor of atherosclerotic disease, which was the same conclusion from [facility] from 5/2021. MRA NOVA scan from 9/21/21 confirms findings on angiogram in 6/30/21. He saw stroke/vasculitis specialist Dr. Cullen from [facility] who ruled out vasculitis and did not recommend LP. As a result, his daily Prednisone was discontinued and his immunomodulating agent was decreased to once every 3w rather than 2. Repeat diagnostic cerebral angiogram was done on 2/17/22 and shows that his intra-stent stenosis of the R M1 as well as post stenosis dilation had progressed slightly.  \par \par Repeat diagnostic cerebral angiogram was done on 2/17/22 because he had narrowing of the Left sided intracranial vessels in multiple places and narrowing of R MCA distally which needed monitoring.  It showed that his intra-stent stenosis of the R M1 as well as post stenosis dilation had progressed slightly despite acceptable LDL and HDL. He was last seen on 3/1/22 and we recommended SPECT Imagining w/ Acetazolamide Challenge to determine intracranial vascular reserve. \par ------------------------------------------------------------------------------\par \par SINCE, SPECT Imaging w/ Acetazolamide Challenge was completed on 3/29/22 and reported that there is significant decreased blood flow in the Right MCA and NORMAN territories with the Diamox which improves on non-Diamox suggesting reversible perfusion defect. \par \par Today, wife states that Levi has become more emotional and sensitive, having crying spells with increased frequency and length. He is also more sensitive to sound and distractions like in the mall or at the restaurant to other people talking. 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 His wife also states that he is also more withdrawn, gives one word answers to questions. He would like to ride a bicycle and play the guitar. They are going to their home on [place] when it gets warmer. Per wife, after exercise sometimes he gets diaphoretic/pallor/and decreased BP (around )\par \par Spoken to wife at 1503. Pt continues on  and Plavix 75 and has no spontaneous bleeding/bruising or bleeding when brushing teeth. He also continues with Melatonin, Simvastatin 80 QHS and Magnesium as prescribed last visit. \par \par \par

## 2022-05-03 NOTE — PHYSICAL EXAM
[FreeTextEntry1] : NIH STROKE SCALE \par \par Item	 Score \par \par 1 a.	Level of Consciousness	 0 \par 1 b. LOC Questions	 0 \par 1 c.	LOC Commands	 0 \par 2.	Best Gaze	 0 \par 3.	Visual	 0 \par 4.	Facial Palsy 0 \par 5 a.	Motor Arm - Left	 0 \par 5 b.	Motor Arm - Right	 0 \par 6 a.	Motor Leg - Left	 0 \par 6 b.	Motor Leg - Right	 0 \par 7.	Limb Ataxia	 0 \par 8.	Sensory	 0 \par 9.	Language	 0 \par 10.	Dysarthria	 0 \par 11.	Extinction and Inattention 	 0 \par __________________________________________ \par \par TOTAL	 0 \par \par mRS: 0 No symptoms at all \par \par \par Neurologic Exam: \par \par Mental status: Awake, alert and oriented x 4. Recent and remote memory intact. \par \par Language: Follows all commands. Naming, repetition and comprehension intact. Attention/concentration intact. No dysarthria, no aphasia. Fund of knowledge appropriate. \par \par Cranial nerves: Pupils equally round and reactive to light, visual fields full, no nystagmus, EOMI, face symmetric, hearing intact bilaterally, palate elevation symmetric, tongue was midline, sternocleidomastoid/ shoulder shrug strength bilaterally 5/5. \par \par Motor: No drifting in all extremities. Normal bulk and tone, strength LUE 5-/5 including L  strength, and 5/5 lower extremities.  strength 5/5. \par \par Sensation: Intact to light touch. No neglect. \par \par Coordination: No dysmetria on finger-to-nose and heel-to-shin. \par \par Gait: Steady.\par

## 2022-05-03 NOTE — ASSESSMENT
[FreeTextEntry1] : Patient is 61 yo RH man with PMHx of HTN, psoriasis, recently quit smoking, tandem TIAs, who suffered from right hemispheric watershed infarction on April 2020, when his non-invasive cerebrovascular imaging as well as DSA on 2/3/21 showed right M1 and right A1 as well as left M2 severe stenosis. He developed a new stroke despite being on maximal medical management, risk-factor and life-style modification; likely because of rheumatologic/inflammatory disease. Consequently he underwent an urgent cerebral angiogram and angioplasty with stenting of R MCA and R NORMAN on 6/30/21. Repeat diagnostic cerebral angiogram was done on 2/17/22 because he had narrowing of the Left sided intracranial vessels in multiple places and narrowing of R MCA distally which needed monitoring.  It showed that his intra-stent stenosis of the R M1 as well as post stenosis dilation had progressed slightly despite acceptable LDL and HDL. HE PRESENTS TODAY s/p SPECT Imaging w/ Acetazolamide Challenge on 3/29/22 which reported that there is significant decreased blood flow in the Right MCA and NORMAN territories with Diamox which improves on non-Diamox suggesting reversible perfusion defect. However, the radiologist's impression is lacking in that it does not compare the R MCA/NORMAN territory perfusion before and after the Diamox challenge. To my eyes, there appears to be a [place]/Steal phenomenon in the R MCA/NORMAN territory during the Diamox challenge compared to without Diamox. However, patient denied any sx during the Diamox challenge. This suggests that he has some cerebrovascular reserve although it is not as great as the L side.  Regarding his increased sensitivity, it may be bc he had damage to the prefrontal area which controls socialization and he needs proper neurocognitive evaluation and management. Spoken to wife at 1503. Pt continues on  and Plavix 75 and has no spontaneous bleeding/bruising or bleeding when brushing teeth.  He also continues with Melatonin, Simvastatin 80 QHS and Magnesium as prescribed previous visit. \par \par IN BRIEF: PT WAS SEEN AND EXAMINED TODAY. HE LOOKS STRONGER THAN PREVIOUS VISIT AND HE ADMITS THAT HE DOES MORE PHYSICAL WORK W/O NOTICING ANY NEW LIMITATION. \par THE SPECT SCAN REPORTS COMPARED THE RT AND LT HEMISPHERE FINDINGS TO THE CONTRALATERAL SIDE, BUT THERE IS NO REPORT OF CAMPARISON OF THE IPSILATERAL SIDES IN W/ AND W/O DIAMOX. TO MY EYES THE RT HEMISPHERE CONTRAST , ESPECIALLY IN DISTAL MCA, IS RELATIVELY MORE IN W/O DIAMOX, IN COMPARE TO WITH DIAMOX. THIS FINDING MAY BE AT LEAST PARTIALLY CONTRIBUTED TO GIO-WAGNER STEEL PHENOMENON AFTER VASODILATATION (DIAMOX). IF THAT IS THE CASE THEN THE RT MCA HAS SOME RESERVE DILATATORY MEASURES STILL. ALSO CLINICALLY THE PATIENT DID NOT DEVELOPED ANY CLINICAL SXS AFTER DIAMOX, WHICH CAN BE IN FAVOR OF THE ABOVE CONCEPT. \par I REVIEWED THE SPECT SCAN W/ AND W/O CHALLENGE WITH THE PATIENT AND HIS WIFE AND EXPLAINED IN DETAIL THE FINDINGS IN LAYMAN LANGUAGE.\par \par PLAN:\par -F/u in clinic in 1 mo after recommendations below are completed\par -Rehab Medicine evaluation for activities and management\par -Neurocognitive evaluation and management \par -C/w  QD, Plavix 75 QD and Simvastatin 80 QHS \par -Increase socialization and do activities that help him to feel productive \par -Do a trail for 30 min where he increases exercise of the Left side and see his response/whether he become symptomatic  \par -If he develops any syncopal/presyncopal sx, please lie flat immediately \par -Instructed patient to call 911 or report to ED if any acute neurological changes occur, such as having severe, sudden headache\par

## 2022-05-20 ENCOUNTER — APPOINTMENT (OUTPATIENT)
Dept: CARDIOLOGY | Facility: CLINIC | Age: 62
End: 2022-05-20
Payer: MEDICAID

## 2022-05-20 ENCOUNTER — NON-APPOINTMENT (OUTPATIENT)
Age: 62
End: 2022-05-20

## 2022-05-20 PROCEDURE — 93298 REM INTERROG DEV EVAL SCRMS: CPT

## 2022-05-20 PROCEDURE — G2066: CPT

## 2022-06-24 ENCOUNTER — NON-APPOINTMENT (OUTPATIENT)
Age: 62
End: 2022-06-24

## 2022-06-24 ENCOUNTER — APPOINTMENT (OUTPATIENT)
Dept: CARDIOLOGY | Facility: CLINIC | Age: 62
End: 2022-06-24
Payer: MEDICAID

## 2022-06-24 PROCEDURE — 93298 REM INTERROG DEV EVAL SCRMS: CPT

## 2022-06-24 PROCEDURE — G2066: CPT

## 2022-07-22 ENCOUNTER — APPOINTMENT (OUTPATIENT)
Dept: CARDIOLOGY | Facility: CLINIC | Age: 62
End: 2022-07-22

## 2022-07-22 PROCEDURE — 99213 OFFICE O/P EST LOW 20 MIN: CPT | Mod: 25

## 2022-07-22 PROCEDURE — 93306 TTE W/DOPPLER COMPLETE: CPT

## 2022-07-22 NOTE — ASSESSMENT
[FreeTextEntry1] : Echo noted - bicuspid valve, mild to moderate aortic valve disease - stable.\par Stress test November 2019 - negative for ischemia.\par CCTA - non-obstructive CAD\par NSVT on MCOT - EP evaluation appreciated.\par Off Metoprolol - could not tolerate due to bradycardia. C/w Norvasc 2.5 mg QD (at night), increase to 5 mg if BP is elevated.\par CVA - f/u with neurology, neurovascular intervention.\par High dose statin.\par C/w current therapy otherwise.\par \par EP follow-up with Dr. Santoyo. ILR in place\par F/u in 6 months\par \par

## 2022-07-22 NOTE — HISTORY OF PRESENT ILLNESS
[FreeTextEntry1] : 61 y/o male presenting for f/u of NSVT, s/p another recurrent CVA, was managed in the [facility] and underwent angioplasty of R MCA and NORMAN with Dr. Guajardo. MRA c/w intracranial vascular disease. Had one more episode of syncope, but no palpitations, no CP, no dyspnea. MCOT revealed NSVT, but no AF.  He was started on b-blocker, but this was stopped due to sinus bradycardia. He had a stress echo last year - no ischemia, preserved LV function. CCTA was done to further assess for CAD and revealed mild stenosis only. No edema.He had repeat echo 05/2020 at [facility].  Echo revealed normal LV function, bicuspid AV, mild AS and moderate AI. He reports fatigue and MORALES, worse with exertion. + palpitations, near-syncope, but no syncope. He has a neurology and EP f/u scheduled.\par Had echo today - results reviewed with the patient and wife. No significant change from prior.

## 2022-07-29 ENCOUNTER — NON-APPOINTMENT (OUTPATIENT)
Age: 62
End: 2022-07-29

## 2022-07-29 ENCOUNTER — APPOINTMENT (OUTPATIENT)
Dept: CARDIOLOGY | Facility: CLINIC | Age: 62
End: 2022-07-29

## 2022-07-29 PROCEDURE — G2066: CPT

## 2022-07-29 PROCEDURE — 93298 REM INTERROG DEV EVAL SCRMS: CPT

## 2022-08-01 ENCOUNTER — NON-APPOINTMENT (OUTPATIENT)
Age: 62
End: 2022-08-01

## 2022-08-30 ENCOUNTER — NON-APPOINTMENT (OUTPATIENT)
Age: 62
End: 2022-08-30

## 2022-09-02 ENCOUNTER — APPOINTMENT (OUTPATIENT)
Dept: CARDIOLOGY | Facility: CLINIC | Age: 62
End: 2022-09-02

## 2022-09-02 ENCOUNTER — NON-APPOINTMENT (OUTPATIENT)
Age: 62
End: 2022-09-02

## 2022-09-02 PROCEDURE — G2066: CPT

## 2022-09-02 PROCEDURE — 93298 REM INTERROG DEV EVAL SCRMS: CPT

## 2022-09-06 ENCOUNTER — OUTPATIENT (OUTPATIENT)
Dept: OUTPATIENT SERVICES | Facility: HOSPITAL | Age: 62
LOS: 1 days | Discharge: HOME | End: 2022-09-06

## 2022-09-06 ENCOUNTER — RESULT REVIEW (OUTPATIENT)
Age: 62
End: 2022-09-06

## 2022-09-06 DIAGNOSIS — R55 SYNCOPE AND COLLAPSE: ICD-10-CM

## 2022-09-06 DIAGNOSIS — Z92.89 PERSONAL HISTORY OF OTHER MEDICAL TREATMENT: Chronic | ICD-10-CM

## 2022-09-06 DIAGNOSIS — Z86.79 PERSONAL HISTORY OF OTHER DISEASES OF THE CIRCULATORY SYSTEM: Chronic | ICD-10-CM

## 2022-09-06 PROCEDURE — 70551 MRI BRAIN STEM W/O DYE: CPT | Mod: 26

## 2022-10-07 ENCOUNTER — NON-APPOINTMENT (OUTPATIENT)
Age: 62
End: 2022-10-07

## 2022-10-07 ENCOUNTER — APPOINTMENT (OUTPATIENT)
Dept: CARDIOLOGY | Facility: CLINIC | Age: 62
End: 2022-10-07

## 2022-10-07 PROCEDURE — 93298 REM INTERROG DEV EVAL SCRMS: CPT

## 2022-10-07 PROCEDURE — G2066: CPT

## 2022-11-01 ENCOUNTER — APPOINTMENT (OUTPATIENT)
Dept: NEUROLOGY | Facility: CLINIC | Age: 62
End: 2022-11-01

## 2022-11-01 VITALS
HEIGHT: 66 IN | TEMPERATURE: 97.8 F | HEART RATE: 88 BPM | BODY MASS INDEX: 27.16 KG/M2 | DIASTOLIC BLOOD PRESSURE: 88 MMHG | OXYGEN SATURATION: 99 % | WEIGHT: 169 LBS | SYSTOLIC BLOOD PRESSURE: 132 MMHG

## 2022-11-01 PROCEDURE — 99213 OFFICE O/P EST LOW 20 MIN: CPT

## 2022-11-02 NOTE — ASSESSMENT
[FreeTextEntry1] : Patient is 61 yo RH man with PMHx of HTN, psoriasis, recently quit smoking, tandem TIAs, who suffered from right hemispheric watershed infarction on April 2020, when his non-invasive cerebrovascular imaging as well as DSA on 2/3/21 showed right M1 and right A1 as well as left M2 severe stenosis.He developed a new stroke despite being on maximal medical management, risk-factor and life-style modification and underwent an urgent cerebral angiogram and angioplasty with stenting of R MCA and R NORMAN on 6/30/21. In 2/17/22 on repeat diagnostic cerebral angiogram, he was found to have narrowing of the Left sided intracranial vessels in multiple places and narrowing of R MCA distally which needed monitoring. It showed that his intra-stent stenosis of the R M1 as well as post stenosis dilation had progressed slightly despite acceptable LDL and HDL. He was s/p SPECT Imaging w/ Acetazolamide Challenge on 3/29/22 which reported that there is significant decreased blood flow in the Right MCA and NORMAN territories with Diamox which improves on non-Diamox suggesting reversible perfusion defect. However, the radiologist's impression is lacking in that it does not compare the R MCA/NORMAN territory perfusion before and after the Diamox challenge. To my eyes, there appears to be a [place]/Steal phenomenon in the R MCA/NORMAN territory during the Diamox challenge compared to without Diamox. Currently, MRI H from 9/2022 seems to show SUBTLE WORSENING of ischemic changes possibly related to post dental procedure hypotension, etc. However, per patient and his wife, he has been stable to improved since his stent placement. We emphasized importance of monitoring LUE function and following with another NI specialist as well as experience neurovascular neurologist. \par \par PLAN:\par -Provided list of NI specialists in [facility] as well as Dr. Libman's contact info. Please establish care with NI specialist as well as Stroke Neurologist ASAP.\par -C/w /Plavix 75/Lipitor 80 for now.\par -Please notify our office if experiencing spontaneous bleeding or bruising\par -Keep hydrated\par -If he develops any syncopal/presyncopal sx, please lie flat immediately \par -Instructed patient to call 911 or report to ED if any acute neurological changes occur, such as having severe, sudden headache\par

## 2022-11-02 NOTE — HISTORY OF PRESENT ILLNESS
[FreeTextEntry1] : Patient is 61 yo RH man with PMHx of HTN, psoriasis, recently quit smoking, tandem TIAs, who suffered from right hemispheric watershed infarction on April 2020, when his non-invasive cerebrovascular imaging as well as DSA on 2/3/21 showed right M1 and right A1 as well as left M2 severe stenosis. He developed a new stroke despite being on maximal medical management, risk-factor and life-style modification; likely because of rheumatologic/inflammatory disease. Consequently he underwent an urgent cerebral angiogram and angioplasty with stenting of R MCA and R NORMAN on 6/30/21. Repeat diagnostic cerebral angiogram was done on 2/17/22 because he had narrowing of the Left sided intracranial vessels in multiple places and narrowing of R MCA distally which needed monitoring. It showed that his intra-stent stenosis of the R M1 as well as post stenosis dilation had progressed slightly despite acceptable LDL and HDL. He was s/p SPECT Imaging w/ Acetazolamide Challenge on 3/29/22 which reported that there is significant decreased blood flow in the Right MCA and NORMAN territories with Diamox which improves on non-Diamox suggesting reversible perfusion defect. However, the radiologist's impression is lacking in that it does not compare the R MCA/NORMAN territory perfusion before and after the Diamox challenge. To my eyes, there appears to be a [place]/Steal phenomenon in the R MCA/NORMAN territory during the Diamox challenge compared to without Diamox. However, patient denied any sx during the Diamox challenge. This suggests that he has some cerebrovascular reserve although it is not as great as the L side.  \par \par He was last seen in clinic on 5/3/22 and c/o of increased emotional sensitivity that may be bc of damage to the prefrontal area. Since, he was s/p his dental procedure and c/o of L-sided weakness and a few episodes of syncope. Repeat MRI H was negative for acute pathology. He did not get Rehab evaluation bc he no longer wants to ride his bike. She spent his summer in [place] and did very well.  \par ------------------------------------------------------------\par Since his last visit in May 2022, he received a zygomatic implant. Post-op he had to wean to lower dose of AP, however, is currently back to /Plavix 75/Statin. About 1 weeks after his implant he had 2 transient episodes of increased LUE weakness and was dropping things from his LH. MRI H was done 9/6/022, 3 weeks after his last episode of LUE weakness. The MRI H reported no acute or subacute pathology. \par \par Currently, he states that in post-stent placement his sx are improved. Compared to last year, his function capacity is stable to improved and he has NO associated increase in fatigue doing the same activities. He lost 5lbs after dental sx and is keeping hydrated. Denies any spontaneous bleeding/bruising on DAPT. Per wife, she also agrees he does not seem to have worsened LUE function.

## 2022-11-11 ENCOUNTER — NON-APPOINTMENT (OUTPATIENT)
Age: 62
End: 2022-11-11

## 2022-11-11 ENCOUNTER — APPOINTMENT (OUTPATIENT)
Dept: CARDIOLOGY | Facility: CLINIC | Age: 62
End: 2022-11-11

## 2022-11-11 PROCEDURE — G2066: CPT

## 2022-11-11 PROCEDURE — 93298 REM INTERROG DEV EVAL SCRMS: CPT

## 2022-12-15 ENCOUNTER — NON-APPOINTMENT (OUTPATIENT)
Age: 62
End: 2022-12-15

## 2022-12-15 ENCOUNTER — APPOINTMENT (OUTPATIENT)
Dept: CARDIOLOGY | Facility: CLINIC | Age: 62
End: 2022-12-15

## 2022-12-15 PROCEDURE — G2066: CPT

## 2022-12-15 PROCEDURE — 93298 REM INTERROG DEV EVAL SCRMS: CPT

## 2023-01-04 NOTE — DISCHARGE NOTE NURSING/CASE MANAGEMENT/SOCIAL WORK - HISTORY OF COVID-19 VACCINATION
Transitional Care Follow Up Visit  Subjective     Bobbi Kaylee Du is a 59 y.o. female who presents for a transitional care management visit.    I have reviewed the E-Visit questionnaire and the patient's answers, my assessment and plan are listed below.     This provider is located at the [facility] (through [facility]), [address], [address] [place] [address] using a secure "[employer]t Video Visit through [employer]. Patient is being seen remotely via telehealth at their home address in [place], and stated they are in a secure environment for this session. The patient's condition being diagnosed/treated is appropriate for telemedicine. The provider identified herself as well as her credentials. The patient, and/or patients guardian, consent to be seen remotely, and when consent is given they understand that the consent allows for patient identifiable information to be sent to a third party as needed. They may refuse to be seen remotely at any time. The electronic data is encrypted and password protected, and the patient and/or guardian has been advised of the potential risks to privacy not withstanding such measures.    You have chosen to receive care through a telehealth visit. Do you consent to use a video/audio connection for your medical care today? Yes    Within 48 business hours after discharge our office contacted her via telephone to coordinate her care and needs.      I reviewed and discussed the details of that call along with the discharge summary, hospital problems, inpatient lab results, inpatient diagnostic studies, and consultation reports with Bobbi.     Current outpatient and discharge medications have been reconciled for the patient.  Reviewed by: Yon Bond MD      Date of TCM Phone Call 12/18/2022   [facility]   Date of Admission 12/13/2022   Date of Discharge 12/18/2022   Discharge Disposition Home or Self Care     Risk for  Readmission (LACE) No data recorded    History of Present Illness     Course During Hospital Stay:      Parotiditis  The patient reports she went to the hospital and was transported by ambulance. She states when she got there she received 1.8 L of blood and was put in intensive care for 2 or 3 days. She reports they gave her a blood infusion. She states they did a CT scan of her eye, because that was the one she was missing. She notes she checked herself out of the hospital due to a family emergency. The patient states she went to her daughter's house where they took care of her for 2 weeks. She states she could not walk and was very weak. She reports 2 days after being home, she went back to the emergency room at her hospital. She reports the side of her face was swollen and she \"had infection pockets coming out of her skin.\" She states she was diagnosed with Parotiditis and was given amoxicillin 875 mg for 14 days. She reports she took her last pill this morning. The patient states she has a knot in front of her ear and her infection is still coming through her skin. She reports she has her neck bandaged up and the infection is leaking through. She notes she is not any better. She states the antibiotics helped decrease the swelling on her face. She reports she is hoarse and it is difficult to swallow on one side. She states there is a round pocket of puss and a hole in the side of her neck. The patient reports her infection is painful, especially around her ear.     Iron deficiency anemia due to chronic blood loss  The patient reports she was going to have a colonoscopy done because she has a leakage somewhere that has been going on for a long time. She notes they could not get her vitals to do the testing. She states she has to get a colonoscopy done and blood work to make sure she is still not anemic. She states she received 2 L of blood and was told she might need more blood. She reports she put off the  vascular surgeon and all of her scans are done.      The following portions of the patient's history were reviewed and updated as appropriate: allergies, current medications, past family history, past medical history, past social history, past surgical history and problem list.    Review of Systems   Constitutional: Positive for fatigue. Negative for activity change, appetite change and fever.   HENT: Positive for dental problem, sore throat and trouble swallowing.    Respiratory: Negative for apnea, chest tightness and shortness of breath.    Cardiovascular: Negative for chest pain and leg swelling.   Gastrointestinal: Negative for abdominal distention, abdominal pain, blood in stool, constipation, diarrhea, nausea and vomiting.   Genitourinary: Negative for difficulty urinating and dysuria.   Musculoskeletal: Negative for arthralgias and back pain.   Neurological: Negative for dizziness and seizures.       Objective   Physical Exam  Vitals and nursing note reviewed.   Constitutional:       General: She is not in acute distress.     Appearance: Normal appearance. She is not ill-appearing or toxic-appearing.   HENT:      Nose: No congestion or rhinorrhea.   Pulmonary:      Effort: Pulmonary effort is normal. No respiratory distress.   Musculoskeletal:      Cervical back: Normal range of motion.   Skin:     Coloration: Skin is not jaundiced.      Findings: No rash.   Neurological:      Mental Status: She is alert and oriented to person, place, and time. Mental status is at baseline.   Psychiatric:         Mood and Affect: Mood normal.         Behavior: Behavior normal.         Thought Content: Thought content normal.         Judgment: Judgment normal.         Assessment & Plan   Problems Addressed this Visit        ENT    Parotiditis     - Ambulatory referral to ENT.   - Ordered lab work for CBC Auto Differential.  - Follow up in 2 weeks.         Relevant Orders    Ambulatory Referral to ENT (Otolaryngology)  (Completed)    CBC Auto Differential       Hematology and Neoplasia    Iron deficiency anemia due to chronic blood loss - Primary     - Ambulatory referral to Gastroenterology.  - Ambulatory referral for screening colonoscopy.  - Ordered lab work for CBC Auto Differential and Comprehensive Metabolic Panel.         Relevant Orders    Ambulatory Referral to Gastroenterology    Ambulatory Referral For Screening Colonoscopy    CBC Auto Differential    Comprehensive Metabolic Panel    Ferritin    POC INR (Completed)    PT & PTT ([facility])    Lactate Dehydrogenase    Haptoglobin    Iron and TIBC ([facility])    Folate    Ferritin    Transferrin    Reticulocytes   Other Visit Diagnoses     Anemia associated with acute blood loss        Relevant Orders    PT & PTT ([facility])      Diagnoses       Codes Comments    Iron deficiency anemia due to chronic blood loss    -  Primary ICD-10-CM: D50.0  ICD-9-CM: 280.0     Parotiditis     ICD-10-CM: K11.20  ICD-9-CM: 527.2     Anemia associated with acute blood loss     ICD-10-CM: D62  ICD-9-CM: 285.1                  Transcribed from ambient dictation for Yon Bond MD by Taylor Humes.  01/04/23   10:13 EST    Patient or patient representative verbalized consent to the visit recording.  I have personally performed the services described in this document as transcribed by the above individual, and it is both accurate and complete.     No

## 2023-01-20 ENCOUNTER — APPOINTMENT (OUTPATIENT)
Dept: CARDIOLOGY | Facility: CLINIC | Age: 63
End: 2023-01-20
Payer: MEDICARE

## 2023-01-20 ENCOUNTER — NON-APPOINTMENT (OUTPATIENT)
Age: 63
End: 2023-01-20

## 2023-01-20 PROCEDURE — 93298 REM INTERROG DEV EVAL SCRMS: CPT

## 2023-01-20 PROCEDURE — G2066: CPT

## 2023-02-18 NOTE — SWALLOW BEDSIDE ASSESSMENT ADULT - SWALLOW EVAL: DIAGNOSIS
+toleration of thin liquids, soft and regular solid w/o overt s/s of aspiration.
multiple medical complaints

## 2023-02-24 ENCOUNTER — NON-APPOINTMENT (OUTPATIENT)
Age: 63
End: 2023-02-24

## 2023-02-24 ENCOUNTER — APPOINTMENT (OUTPATIENT)
Dept: CARDIOLOGY | Facility: CLINIC | Age: 63
End: 2023-02-24
Payer: MEDICARE

## 2023-02-24 PROCEDURE — 93298 REM INTERROG DEV EVAL SCRMS: CPT

## 2023-02-24 PROCEDURE — G2066: CPT

## 2023-03-29 ENCOUNTER — APPOINTMENT (OUTPATIENT)
Dept: CARDIOLOGY | Facility: CLINIC | Age: 63
End: 2023-03-29
Payer: MEDICARE

## 2023-03-29 ENCOUNTER — NON-APPOINTMENT (OUTPATIENT)
Age: 63
End: 2023-03-29

## 2023-03-29 PROCEDURE — G2066: CPT

## 2023-03-29 PROCEDURE — 93298 REM INTERROG DEV EVAL SCRMS: CPT

## 2023-04-01 ENCOUNTER — INPATIENT (INPATIENT)
Facility: HOSPITAL | Age: 63
LOS: 1 days | Discharge: HOME | DRG: 392 | End: 2023-04-03
Attending: HOSPITALIST | Admitting: HOSPITALIST
Payer: MEDICARE

## 2023-04-01 VITALS
DIASTOLIC BLOOD PRESSURE: 80 MMHG | OXYGEN SATURATION: 97 % | HEART RATE: 81 BPM | RESPIRATION RATE: 16 BRPM | TEMPERATURE: 98 F | WEIGHT: 169.98 LBS | SYSTOLIC BLOOD PRESSURE: 128 MMHG

## 2023-04-01 DIAGNOSIS — Z92.89 PERSONAL HISTORY OF OTHER MEDICAL TREATMENT: Chronic | ICD-10-CM

## 2023-04-01 DIAGNOSIS — K57.32 DIVERTICULITIS OF LARGE INTESTINE WITHOUT PERFORATION OR ABSCESS WITHOUT BLEEDING: ICD-10-CM

## 2023-04-01 DIAGNOSIS — R55 SYNCOPE AND COLLAPSE: ICD-10-CM

## 2023-04-01 DIAGNOSIS — I35.1 NONRHEUMATIC AORTIC (VALVE) INSUFFICIENCY: ICD-10-CM

## 2023-04-01 DIAGNOSIS — Z86.79 PERSONAL HISTORY OF OTHER DISEASES OF THE CIRCULATORY SYSTEM: Chronic | ICD-10-CM

## 2023-04-01 DIAGNOSIS — I10 ESSENTIAL (PRIMARY) HYPERTENSION: ICD-10-CM

## 2023-04-01 DIAGNOSIS — T46.6X5A ADVERSE EFFECT OF ANTIHYPERLIPIDEMIC AND ANTIARTERIOSCLEROTIC DRUGS, INITIAL ENCOUNTER: ICD-10-CM

## 2023-04-01 DIAGNOSIS — Z95.818 PRESENCE OF OTHER CARDIAC IMPLANTS AND GRAFTS: ICD-10-CM

## 2023-04-01 DIAGNOSIS — Z20.822 CONTACT WITH AND (SUSPECTED) EXPOSURE TO COVID-19: ICD-10-CM

## 2023-04-01 DIAGNOSIS — Z86.73 PERSONAL HISTORY OF TRANSIENT ISCHEMIC ATTACK (TIA), AND CEREBRAL INFARCTION WITHOUT RESIDUAL DEFICITS: ICD-10-CM

## 2023-04-01 DIAGNOSIS — L40.50 ARTHROPATHIC PSORIASIS, UNSPECIFIED: ICD-10-CM

## 2023-04-01 DIAGNOSIS — I67.2 CEREBRAL ATHEROSCLEROSIS: ICD-10-CM

## 2023-04-01 DIAGNOSIS — K52.1 TOXIC GASTROENTERITIS AND COLITIS: ICD-10-CM

## 2023-04-01 DIAGNOSIS — Z95.828 PRESENCE OF OTHER VASCULAR IMPLANTS AND GRAFTS: ICD-10-CM

## 2023-04-01 DIAGNOSIS — R91.8 OTHER NONSPECIFIC ABNORMAL FINDING OF LUNG FIELD: ICD-10-CM

## 2023-04-01 DIAGNOSIS — Z82.3 FAMILY HISTORY OF STROKE: ICD-10-CM

## 2023-04-01 DIAGNOSIS — Z87.891 PERSONAL HISTORY OF NICOTINE DEPENDENCE: ICD-10-CM

## 2023-04-01 DIAGNOSIS — Z82.49 FAMILY HISTORY OF ISCHEMIC HEART DISEASE AND OTHER DISEASES OF THE CIRCULATORY SYSTEM: ICD-10-CM

## 2023-04-01 LAB
ALBUMIN SERPL ELPH-MCNC: 4.6 G/DL — SIGNIFICANT CHANGE UP (ref 3.5–5.2)
ALP SERPL-CCNC: 112 U/L — SIGNIFICANT CHANGE UP (ref 30–115)
ALT FLD-CCNC: 23 U/L — SIGNIFICANT CHANGE UP (ref 0–41)
ANION GAP SERPL CALC-SCNC: 11 MMOL/L — SIGNIFICANT CHANGE UP (ref 7–14)
AST SERPL-CCNC: 21 U/L — SIGNIFICANT CHANGE UP (ref 0–41)
BASOPHILS # BLD AUTO: 0.1 K/UL — SIGNIFICANT CHANGE UP (ref 0–0.2)
BASOPHILS NFR BLD AUTO: 0.8 % — SIGNIFICANT CHANGE UP (ref 0–1)
BILIRUB SERPL-MCNC: 0.3 MG/DL — SIGNIFICANT CHANGE UP (ref 0.2–1.2)
BUN SERPL-MCNC: 24 MG/DL — HIGH (ref 10–20)
CALCIUM SERPL-MCNC: 9.5 MG/DL — SIGNIFICANT CHANGE UP (ref 8.4–10.5)
CHLORIDE SERPL-SCNC: 104 MMOL/L — SIGNIFICANT CHANGE UP (ref 98–110)
CO2 SERPL-SCNC: 24 MMOL/L — SIGNIFICANT CHANGE UP (ref 17–32)
CREAT SERPL-MCNC: 1.1 MG/DL — SIGNIFICANT CHANGE UP (ref 0.7–1.5)
EGFR: 74 ML/MIN/1.73M2 — SIGNIFICANT CHANGE UP
EOSINOPHIL # BLD AUTO: 0.11 K/UL — SIGNIFICANT CHANGE UP (ref 0–0.7)
EOSINOPHIL NFR BLD AUTO: 0.9 % — SIGNIFICANT CHANGE UP (ref 0–8)
GLUCOSE SERPL-MCNC: 88 MG/DL — SIGNIFICANT CHANGE UP (ref 70–99)
HCT VFR BLD CALC: 37.1 % — LOW (ref 42–52)
HGB BLD-MCNC: 11.5 G/DL — LOW (ref 14–18)
IMM GRANULOCYTES NFR BLD AUTO: 0.4 % — HIGH (ref 0.1–0.3)
LACTATE SERPL-SCNC: 1.2 MMOL/L — SIGNIFICANT CHANGE UP (ref 0.7–2)
LIDOCAIN IGE QN: 18 U/L — SIGNIFICANT CHANGE UP (ref 7–60)
LYMPHOCYTES # BLD AUTO: 1.6 K/UL — SIGNIFICANT CHANGE UP (ref 1.2–3.4)
LYMPHOCYTES # BLD AUTO: 13.5 % — LOW (ref 20.5–51.1)
MCHC RBC-ENTMCNC: 24.1 PG — LOW (ref 27–31)
MCHC RBC-ENTMCNC: 31 G/DL — LOW (ref 32–37)
MCV RBC AUTO: 77.8 FL — LOW (ref 80–94)
MONOCYTES # BLD AUTO: 0.99 K/UL — HIGH (ref 0.1–0.6)
MONOCYTES NFR BLD AUTO: 8.3 % — SIGNIFICANT CHANGE UP (ref 1.7–9.3)
NEUTROPHILS # BLD AUTO: 9.01 K/UL — HIGH (ref 1.4–6.5)
NEUTROPHILS NFR BLD AUTO: 76.1 % — HIGH (ref 42.2–75.2)
NRBC # BLD: 0 /100 WBCS — SIGNIFICANT CHANGE UP (ref 0–0)
PLATELET # BLD AUTO: 386 K/UL — SIGNIFICANT CHANGE UP (ref 130–400)
POTASSIUM SERPL-MCNC: 4.4 MMOL/L — SIGNIFICANT CHANGE UP (ref 3.5–5)
POTASSIUM SERPL-SCNC: 4.4 MMOL/L — SIGNIFICANT CHANGE UP (ref 3.5–5)
PROT SERPL-MCNC: 7.6 G/DL — SIGNIFICANT CHANGE UP (ref 6–8)
RBC # BLD: 4.77 M/UL — SIGNIFICANT CHANGE UP (ref 4.7–6.1)
RBC # FLD: 17.4 % — HIGH (ref 11.5–14.5)
SODIUM SERPL-SCNC: 139 MMOL/L — SIGNIFICANT CHANGE UP (ref 135–146)
TROPONIN T SERPL-MCNC: <0.01 NG/ML — SIGNIFICANT CHANGE UP
WBC # BLD: 11.86 K/UL — HIGH (ref 4.8–10.8)
WBC # FLD AUTO: 11.86 K/UL — HIGH (ref 4.8–10.8)

## 2023-04-01 PROCEDURE — U0003: CPT

## 2023-04-01 PROCEDURE — 99223 1ST HOSP IP/OBS HIGH 75: CPT

## 2023-04-01 PROCEDURE — 93306 TTE W/DOPPLER COMPLETE: CPT

## 2023-04-01 PROCEDURE — U0005: CPT

## 2023-04-01 PROCEDURE — 97161 PT EVAL LOW COMPLEX 20 MIN: CPT | Mod: GP

## 2023-04-01 PROCEDURE — 70450 CT HEAD/BRAIN W/O DYE: CPT | Mod: 26,MA

## 2023-04-01 PROCEDURE — 84484 ASSAY OF TROPONIN QUANT: CPT

## 2023-04-01 PROCEDURE — 80053 COMPREHEN METABOLIC PANEL: CPT

## 2023-04-01 PROCEDURE — 93290 INTERROG DEV EVAL ICPMS IP: CPT

## 2023-04-01 PROCEDURE — 93010 ELECTROCARDIOGRAM REPORT: CPT

## 2023-04-01 PROCEDURE — 71045 X-RAY EXAM CHEST 1 VIEW: CPT | Mod: 26

## 2023-04-01 PROCEDURE — 83735 ASSAY OF MAGNESIUM: CPT

## 2023-04-01 PROCEDURE — 93005 ELECTROCARDIOGRAM TRACING: CPT

## 2023-04-01 PROCEDURE — 99285 EMERGENCY DEPT VISIT HI MDM: CPT | Mod: FS

## 2023-04-01 PROCEDURE — 85025 COMPLETE CBC W/AUTO DIFF WBC: CPT

## 2023-04-01 PROCEDURE — 71275 CT ANGIOGRAPHY CHEST: CPT | Mod: 26,MA

## 2023-04-01 PROCEDURE — 36415 COLL VENOUS BLD VENIPUNCTURE: CPT

## 2023-04-01 RX ORDER — METRONIDAZOLE 500 MG
500 TABLET ORAL ONCE
Refills: 0 | Status: COMPLETED | OUTPATIENT
Start: 2023-04-01 | End: 2023-04-01

## 2023-04-01 RX ORDER — CLOPIDOGREL BISULFATE 75 MG/1
75 TABLET, FILM COATED ORAL DAILY
Refills: 0 | Status: DISCONTINUED | OUTPATIENT
Start: 2023-04-01 | End: 2023-04-03

## 2023-04-01 RX ORDER — VENLAFAXINE HCL 75 MG
37.5 CAPSULE, EXT RELEASE 24 HR ORAL AT BEDTIME
Refills: 0 | Status: DISCONTINUED | OUTPATIENT
Start: 2023-04-01 | End: 2023-04-03

## 2023-04-01 RX ORDER — LOSARTAN POTASSIUM 100 MG/1
50 TABLET, FILM COATED ORAL DAILY
Refills: 0 | Status: DISCONTINUED | OUTPATIENT
Start: 2023-04-01 | End: 2023-04-03

## 2023-04-01 RX ORDER — AMLODIPINE BESYLATE 2.5 MG/1
2.5 TABLET ORAL DAILY
Refills: 0 | Status: DISCONTINUED | OUTPATIENT
Start: 2023-04-01 | End: 2023-04-03

## 2023-04-01 RX ORDER — METRONIDAZOLE 500 MG
500 TABLET ORAL EVERY 8 HOURS
Refills: 0 | Status: DISCONTINUED | OUTPATIENT
Start: 2023-04-01 | End: 2023-04-03

## 2023-04-01 RX ORDER — SODIUM CHLORIDE 9 MG/ML
1000 INJECTION INTRAMUSCULAR; INTRAVENOUS; SUBCUTANEOUS
Refills: 0 | Status: DISCONTINUED | OUTPATIENT
Start: 2023-04-01 | End: 2023-04-03

## 2023-04-01 RX ORDER — PANTOPRAZOLE SODIUM 20 MG/1
40 TABLET, DELAYED RELEASE ORAL
Refills: 0 | Status: DISCONTINUED | OUTPATIENT
Start: 2023-04-01 | End: 2023-04-03

## 2023-04-01 RX ORDER — ACETAMINOPHEN 500 MG
650 TABLET ORAL EVERY 6 HOURS
Refills: 0 | Status: DISCONTINUED | OUTPATIENT
Start: 2023-04-01 | End: 2023-04-03

## 2023-04-01 RX ORDER — ASPIRIN/CALCIUM CARB/MAGNESIUM 324 MG
162 TABLET ORAL DAILY
Refills: 0 | Status: DISCONTINUED | OUTPATIENT
Start: 2023-04-01 | End: 2023-04-03

## 2023-04-01 RX ORDER — SODIUM CHLORIDE 9 MG/ML
1000 INJECTION INTRAMUSCULAR; INTRAVENOUS; SUBCUTANEOUS ONCE
Refills: 0 | Status: COMPLETED | OUTPATIENT
Start: 2023-04-01 | End: 2023-04-01

## 2023-04-01 RX ORDER — CIPROFLOXACIN LACTATE 400MG/40ML
400 VIAL (ML) INTRAVENOUS ONCE
Refills: 0 | Status: COMPLETED | OUTPATIENT
Start: 2023-04-01 | End: 2023-04-01

## 2023-04-01 RX ORDER — CIPROFLOXACIN LACTATE 400MG/40ML
400 VIAL (ML) INTRAVENOUS EVERY 12 HOURS
Refills: 0 | Status: DISCONTINUED | OUTPATIENT
Start: 2023-04-01 | End: 2023-04-03

## 2023-04-01 RX ADMIN — Medication 100 MILLIGRAM(S): at 20:55

## 2023-04-01 RX ADMIN — Medication 200 MILLIGRAM(S): at 23:27

## 2023-04-01 RX ADMIN — SODIUM CHLORIDE 1000 MILLILITER(S): 9 INJECTION INTRAMUSCULAR; INTRAVENOUS; SUBCUTANEOUS at 17:01

## 2023-04-01 NOTE — ED ADULT TRIAGE NOTE - GLASGOW COMA SCALE: BEST MOTOR RESPONSE, MLM
M6) obeys commands

## 2023-04-01 NOTE — H&P ADULT - NSHPPHYSICALEXAM_GEN_ALL_CORE
T(C): 36.6 (04-01-23 @ 15:37), Max: 36.6 (04-01-23 @ 15:37)  HR: 70 (04-01-23 @ 18:05) (70 - 81)  BP: 142/76 (04-01-23 @ 18:05) (128/80 - 142/76)  RR: 17 (04-01-23 @ 18:05) (16 - 17)  SpO2: 99% (04-01-23 @ 18:05) (97% - 99%)    PHYSICAL EXAM:  GENERAL: patient appears well, no acute distress, appropriate, pleasant  NECK: supple, soft, no thyromegaly noted  LUNGS: good air entry bilaterally, clear to auscultation  HEART: soft S1/S2, 2/6 systolic murmur, no LE edema  GASTROINTESTINAL: abdomen is tender to palpation in LLQ, no rebound tenderness, bowel sounds present  INTEGUMENT: good skin turgor, no lesions noted  MUSCULOSKELETAL: no clubbing or cyanosis, no obvious deformity  NEUROLOGIC: awake, alert, oriented x3  HEME/LYMPH: no palpable supraclavicular nodules, no obvious ecchymosis or petechiae

## 2023-04-01 NOTE — ED PROVIDER NOTE - OBJECTIVE STATEMENT
63 year old male with pmhx of TIA, presents to ed with left lower abdominal pain since this morning. pt admits pain was cramplike. Pt was trying to have a BM on toilet and had syncopal episode, fell to floor. Pt denies nausea, vomiting, diarrhea, bloody stools, fever, chills, cough, chest pain, or sob. Patients cardio is dr carol salvador - last echo 1.5 year ago, last stress and holter in ~3 years.

## 2023-04-01 NOTE — H&P ADULT - HISTORY OF PRESENT ILLNESS
Pt is a 63 year old male with PMHx of HTN, psoriatic arthritis, CVA x2, intracranial vascular stenosis presented to the ED for LLQ abdominal pain x 1 days. Pt was straining in the restroom when he syncopized and fell on the floor.    In the ED:  · BP Systolic	128 mm Hg  · BP Diastolic	80 mm Hg  · Heart Rate	81 /min  · Respiration Rate (breaths/min)	16 /min  · Temp (F)	97.8 Degrees F  · Temp (C)	36.6 Degrees C  · SpO2 (%)	97 %  · O2 Delivery/Oxygen Delivery Method	room air    Labs notable for: WBC 11.8, Hgb 11.5, troponin negative  CTH negative  CTA chest: acute sigmoid colon diverticulitis. pulmonary nodules up to 6mm    He was given cipro, flagyl and NS bolus in the ED.  Pt is a 63 year old male with PMHx of HTN, psoriatic arthritis, CVA x2, syncope s/p ILR placement by Dr. Santoyo, intracranial vascular stenosis presented to the ED for LLQ abdominal pain x 1 days. Pt started experiencing crampy like LLQ abdominal pain, nonradiating, moderate in severity, no improving or worsening factors, that started this AM. He had a similar type of pain in the past, about 3 months ago, presented to his PCP and completed a course of cipro/flagyl. He denies any fevers, chills. He was then straining in the restroom when he passed out and fell to the floor on his R side of his body. Wife heard a noise and found him passed out in the bathroom, believes that he was out for a few minutes at most and denied any seizure like activity, tongue biting, incontinence or post ictal confusion. He denied any chest pain, palpitations, headaches, dizziness, or diaphoresis prior to the episode.     In the ED:  · BP Systolic	128 mm Hg  · BP Diastolic	80 mm Hg  · Heart Rate	81 /min  · Respiration Rate (breaths/min)	16 /min  · Temp (F)	97.8 Degrees F  · Temp (C)	36.6 Degrees C  · SpO2 (%)	97 %  · O2 Delivery/Oxygen Delivery Method	room air    Labs notable for: WBC 11.8, Hgb 11.5, troponin negative  CTH negative  CTA chest: acute sigmoid colon diverticulitis. pulmonary nodules up to 6mm    He was given cipro, flagyl and NS bolus in the ED.

## 2023-04-01 NOTE — ED ADULT NURSE NOTE - NSICDXPASTMEDICALHX_GEN_ALL_CORE_FT
PAST MEDICAL HISTORY:  Aortic insufficiency Moderate    CVA (cerebral vascular accident)     H/O syncope     Hemiparesis, left resolved Syncope    HTN (hypertension)     NSVT (nonsustained ventricular tachycardia)     Psoriatic arthritis

## 2023-04-01 NOTE — H&P ADULT - NSHPLABSRESULTS_GEN_ALL_CORE
11.5   11.86 )-----------( 386      ( 01 Apr 2023 17:05 )             37.1       04-01    139  |  104  |  24<H>  ----------------------------<  88  4.4   |  24  |  1.1    Ca    9.5      01 Apr 2023 17:05    TPro  7.6  /  Alb  4.6  /  TBili  0.3  /  DBili  x   /  AST  21  /  ALT  23  /  AlkPhos  112  04-01                      Lactate Trend  04-01 @ 17:05 Lactate:1.2       CARDIAC MARKERS ( 01 Apr 2023 17:05 )  x     / <0.01 ng/mL / x     / x     / x            CAPILLARY BLOOD GLUCOSE      POCT Blood Glucose.: 93 mg/dL (01 Apr 2023 15:39)        < from: CT Angio Chest Aorta w/wo IV Cont (04.01.23 @ 17:31) >    IMPRESSION:    No CT evidence of aortic dissection or intramural hematoma.    Partially imaged acute sigmoid colon diverticulitis. No abscess or   adjacent free air visualized. Of note, pelvic bowel is out of   field-of-view, therefore cannot completely assess for abscess or free air.    Bilateral pulmonary nodules measuring up to 6 mm as above. In low risk   patients, no routine follow-up required. In high-risk patients optional   CT at 12 months is recommended.    --- End of Report ---        < end of copied text >    < from: CT Head No Cont (04.01.23 @ 17:22) >      Impression:    No acute intracranial abnormality.    --- End of Report ---        < end of copied text >

## 2023-04-01 NOTE — ED PROVIDER NOTE - ATTENDING APP SHARED VISIT CONTRIBUTION OF CARE
62 yo m with pmh of tia, carotid  with c/o abd pain.  pt says lower abd and cramping.  severe cramping on the toilet and syncopized on floor.  woke up on the floor.  pt denies cp or sob.  denies headache or dizziness.  sees dr. castellanos, last echo 2 yrs ago.  last stress was 3 yrs ago.  no fever or chills.  no urinary sx.  exam: nad, ncat, perrl, eomi, mmm, rrr, ctab, abd soft, lower b/l quad ttp, nd aox3, imp: pt with abd pain and syncope, will r/o dissection, labs, cta abd/p aorta

## 2023-04-01 NOTE — ED ADULT NURSE NOTE - OBJECTIVE STATEMENT
62 y/o male c/o of syncopal episode while having BM today. Patient felt diaphoretic and passed out while pushing to have an BM. Patient hit top of right side of head on the floor. Patient c/o 64 y/o male c/o of syncopal episode while having BM today. Patient felt diaphoretic and passed out while pushing to have an BM. Patient hit top of right side of head on the floor. Patient complaint of abdominal pain.

## 2023-04-01 NOTE — ED ADULT NURSE REASSESSMENT NOTE - NS ED NURSE REASSESS COMMENT FT1
Received pt from [facility]RI[facility].  Pt resting comfortably at this time.  Cardiac monitoring maintained.  pt has no complaints at this time.

## 2023-04-01 NOTE — H&P ADULT - ATTENDING COMMENTS
63 year old male with PMHx of HTN, psoriatic arthritis, CVA x2, syncope s/p ILR placement by Dr. Santoyo, intracranial vascular stenosis presented to the ED for LLQ abdominal pain x 1 days.    VITAL SIGNS: AFebrile, vital signs stable  CONSTITUTIONAL: Well-developed; well-nourished; in no acute distress.  SKIN: Skin exam is warm and dry, no acute rash.  HEAD: Normocephalic; atraumatic.  EYES: Pupils  reactive to light, Extraocular movements intact; conjunctiva and sclera clear.  ENT: No nasal discharge; airway clear. Moist mucus membranes.  NECK: Supple; non tender. No rigidity  CARD: Regular rate and rhythm. Normal S1, S2; no murmurs, gallops, or rubs.  RESP: CT  auscultation bilaterally. No wheezes, rales or rhonchi.  ABD: Abdomen soft;  tenderness RLQ; non-distended  EXT: Normal ROM. No clubbing, cyanosis or edema.   NEURO: Alert and oriented x 3. No focal deficits.  PSYCH: cooperative, appropriate.     IMPRESSION  Acute  Sigmoid Colon Diverticulitis   Not Septic   s/p cipro and flagyl in the ED   monitor WBC, fever curve  start CLD, advance as tolerated  continue with cipro/flagyl  tylenol PRN for pain  IVFs    Syncope, likely vasovagal  h/o of syncope s/p ILR placement  - tele monitoring   - r/o arrhythmia  - CTH negative  - troponin negative, trend troponins  - ECG: NSR, LAD, no ischemic changes (my read)  - check echo  - check orthostatics  - tele monitoring  - EP consult for ILR interrogation    Pulmonary nodules  - pt is ex-smoker  - incidental finding  - outpatient repeat CT chest in 6-12 months      Hx HTN - continue with amlodipine 2.5mg and losartan 50mg daily      Hx of CVA x 2  - continue with plavix 75mg, ASA 162mg daily   - pt with severe diarrhea from atorvastatin, is on OP simvastatin    Hx psoriatic arthritis  - on humira q3 weeks    Seen  on 04/01/23

## 2023-04-01 NOTE — ED PROVIDER NOTE - CLINICAL SUMMARY MEDICAL DECISION MAKING FREE TEXT BOX
Pt with abd cramping and syncope, found to have diverticulitis.  no aortic dissection, started on iv abx and will admit for syncope.  Any ordered labs and EKG were reviewed.  Any imaging was ordered and reviewed by me.  Appropriate medications for patient's presenting complaints were ordered and effects were reassessed.  Patient's records (prior hospital, ED visit, and/or nursing home notes if available) were reviewed.  Additional history was obtained from EMS, family, and/or PCP (where available).  Escalation to admission/observation was considered.  Patient requires inpatient hospitalization - monitored setting.

## 2023-04-01 NOTE — ED PROVIDER NOTE - PHYSICAL EXAMINATION
CONST: Well appearing in NAD  EYES: PERRL, EOMI, Sclera and conjunctiva clear.   ENT: No nasal discharge. Oropharynx normal appearing  NECK: Non-tender, no meningeal signs. normal ROM. supple   CARD: Normal S1 S2; Normal rate and rhythm  RESP: Equal BS B/L, No wheezes, rhonchi or rales. No distress  GI: Soft, LLQ tenderness, non-distended. no cva tenderness. normal BS  MS: Normal ROM in all extremities. No midline spinal tenderness. pulses 2 +. no calf tenderness or swelling  SKIN: Warm, dry, no acute rashes. Good turgor  NEURO: A&Ox3, No focal deficits.

## 2023-04-01 NOTE — H&P ADULT - ASSESSMENT
Pt is a 63 year old male with PMHx of HTN, psoriatic arthritis, CVA x2, intracranial vascular stenosis presented to the ED for LLQ abdominal pain x 1 days. Pt was straining in the restroom when he syncopized and fell on the floor.      #acute sigmoid colon diverticulitis  - not septic on admission  - s/p cipro and flagyl in the ED  - monitor WBC  - advance diet as tolerated  - IVFs  - zofran PRN for nausea/vomiting  - continue with cipro/flagyl  - tylenol PRN for pain      #syncope, likely vasovagal  - CTH negative  - troponin negative  - ECG:  - check echo  - check orthostatics  - tele monitoring    #HTN  - continue with amlodipine 2.5mg and losartan 50mg daily    #pulmonary nodules  - incidental finding  - outpatient repeat CT chest in 6-12 months    #h/o of CVA x 2  - continue with plavix and statin    #psoriatic arthritis  - on humira   Pt is a 63 year old male with PMHx of HTN, psoriatic arthritis, CVA x2, syncope s/p ILR placement by Dr. Santoyo, intracranial vascular stenosis presented to the ED for LLQ abdominal pain x 1 days.    #acute sigmoid colon diverticulitis  - not septic on admission  - s/p cipro and flagyl in the ED  - monitor WBC, fever curve  - start CLD, advance as tolerated  - continue with cipro/flagyl  - tylenol PRN for pain  - IVFs    #syncope, likely vasovagal  #h/o of syncope s/p ILR placement  - r/o arrhythmia  - CTH negative  - troponin negative, trend troponins  - ECG:   - check echo  - check orthostatics  - tele monitoring  - EP consult for ILR interrogation    #HTN  - continue with amlodipine 2.5mg and losartan 50mg daily    #pulmonary nodules  - pt is ex-smoker  - incidental finding  - outpatient repeat CT chest in 6-12 months    #h/o of CVA x 2  - continue with plavix 75mg, ASA 162mg daily   - pt with severe diarrhea from atorvastatin, is on OP simvastatin    #psoriatic arthritis  - on humira q3 weeks    #Misc:  - DVT ppx: SCDs  - GI ppx: protonix  - diet: CLD  - Activity: IAT  - Dispo: admit to telemetry   Pt is a 63 year old male with PMHx of HTN, psoriatic arthritis, CVA x2, syncope s/p ILR placement by Dr. Santoyo, intracranial vascular stenosis presented to the ED for LLQ abdominal pain x 1 days.    #acute sigmoid colon diverticulitis  - not septic on admission  - s/p cipro and flagyl in the ED  - monitor WBC, fever curve  - start CLD, advance as tolerated  - continue with cipro/flagyl  - tylenol PRN for pain  - IVFs    #syncope, likely vasovagal  #h/o of syncope s/p ILR placement  - r/o arrhythmia  - CTH negative  - troponin negative, trend troponins  - ECG: NSR, LAD, no ischemic changes (my read)  - check echo  - check orthostatics  - tele monitoring  - EP consult for ILR interrogation    #HTN  - continue with amlodipine 2.5mg and losartan 50mg daily    #pulmonary nodules  - pt is ex-smoker  - incidental finding  - outpatient repeat CT chest in 6-12 months    #h/o of CVA x 2  - continue with plavix 75mg, ASA 162mg daily   - pt with severe diarrhea from atorvastatin, is on OP simvastatin    #psoriatic arthritis  - on humira q3 weeks    #Misc:  - DVT ppx: SCDs  - GI ppx: protonix  - diet: CLD  - Activity: IAT  - Dispo: admit to telemetry

## 2023-04-02 LAB
ALBUMIN SERPL ELPH-MCNC: 3.9 G/DL — SIGNIFICANT CHANGE UP (ref 3.5–5.2)
ALP SERPL-CCNC: 99 U/L — SIGNIFICANT CHANGE UP (ref 30–115)
ALT FLD-CCNC: 19 U/L — SIGNIFICANT CHANGE UP (ref 0–41)
ANION GAP SERPL CALC-SCNC: 13 MMOL/L — SIGNIFICANT CHANGE UP (ref 7–14)
AST SERPL-CCNC: 24 U/L — SIGNIFICANT CHANGE UP (ref 0–41)
BASOPHILS # BLD AUTO: 0.09 K/UL — SIGNIFICANT CHANGE UP (ref 0–0.2)
BASOPHILS NFR BLD AUTO: 0.9 % — SIGNIFICANT CHANGE UP (ref 0–1)
BILIRUB SERPL-MCNC: 0.5 MG/DL — SIGNIFICANT CHANGE UP (ref 0.2–1.2)
BUN SERPL-MCNC: 15 MG/DL — SIGNIFICANT CHANGE UP (ref 10–20)
CALCIUM SERPL-MCNC: 9.2 MG/DL — SIGNIFICANT CHANGE UP (ref 8.4–10.4)
CHLORIDE SERPL-SCNC: 105 MMOL/L — SIGNIFICANT CHANGE UP (ref 98–110)
CO2 SERPL-SCNC: 22 MMOL/L — SIGNIFICANT CHANGE UP (ref 17–32)
CREAT SERPL-MCNC: 1 MG/DL — SIGNIFICANT CHANGE UP (ref 0.7–1.5)
EGFR: 84 ML/MIN/1.73M2 — SIGNIFICANT CHANGE UP
EOSINOPHIL # BLD AUTO: 0.35 K/UL — SIGNIFICANT CHANGE UP (ref 0–0.7)
EOSINOPHIL NFR BLD AUTO: 3.4 % — SIGNIFICANT CHANGE UP (ref 0–8)
GLUCOSE SERPL-MCNC: 87 MG/DL — SIGNIFICANT CHANGE UP (ref 70–99)
HCT VFR BLD CALC: 36.6 % — LOW (ref 42–52)
HGB BLD-MCNC: 11.2 G/DL — LOW (ref 14–18)
IMM GRANULOCYTES NFR BLD AUTO: 0.2 % — SIGNIFICANT CHANGE UP (ref 0.1–0.3)
LYMPHOCYTES # BLD AUTO: 3.18 K/UL — SIGNIFICANT CHANGE UP (ref 1.2–3.4)
LYMPHOCYTES # BLD AUTO: 31.2 % — SIGNIFICANT CHANGE UP (ref 20.5–51.1)
MAGNESIUM SERPL-MCNC: 2 MG/DL — SIGNIFICANT CHANGE UP (ref 1.8–2.4)
MCHC RBC-ENTMCNC: 23.9 PG — LOW (ref 27–31)
MCHC RBC-ENTMCNC: 30.6 G/DL — LOW (ref 32–37)
MCV RBC AUTO: 78 FL — LOW (ref 80–94)
MONOCYTES # BLD AUTO: 1.15 K/UL — HIGH (ref 0.1–0.6)
MONOCYTES NFR BLD AUTO: 11.3 % — HIGH (ref 1.7–9.3)
NEUTROPHILS # BLD AUTO: 5.4 K/UL — SIGNIFICANT CHANGE UP (ref 1.4–6.5)
NEUTROPHILS NFR BLD AUTO: 53 % — SIGNIFICANT CHANGE UP (ref 42.2–75.2)
NRBC # BLD: 0 /100 WBCS — SIGNIFICANT CHANGE UP (ref 0–0)
PLATELET # BLD AUTO: 357 K/UL — SIGNIFICANT CHANGE UP (ref 130–400)
POTASSIUM SERPL-MCNC: 4.5 MMOL/L — SIGNIFICANT CHANGE UP (ref 3.5–5)
POTASSIUM SERPL-SCNC: 4.5 MMOL/L — SIGNIFICANT CHANGE UP (ref 3.5–5)
PROT SERPL-MCNC: 6.7 G/DL — SIGNIFICANT CHANGE UP (ref 6–8)
RBC # BLD: 4.69 M/UL — LOW (ref 4.7–6.1)
RBC # FLD: 17.3 % — HIGH (ref 11.5–14.5)
SODIUM SERPL-SCNC: 140 MMOL/L — SIGNIFICANT CHANGE UP (ref 135–146)
TROPONIN T SERPL-MCNC: <0.01 NG/ML — SIGNIFICANT CHANGE UP
TROPONIN T SERPL-MCNC: <0.01 NG/ML — SIGNIFICANT CHANGE UP
WBC # BLD: 10.19 K/UL — SIGNIFICANT CHANGE UP (ref 4.8–10.8)
WBC # FLD AUTO: 10.19 K/UL — SIGNIFICANT CHANGE UP (ref 4.8–10.8)

## 2023-04-02 PROCEDURE — 99233 SBSQ HOSP IP/OBS HIGH 50: CPT

## 2023-04-02 PROCEDURE — 93306 TTE W/DOPPLER COMPLETE: CPT | Mod: 26

## 2023-04-02 RX ADMIN — Medication 200 MILLIGRAM(S): at 17:28

## 2023-04-02 RX ADMIN — Medication 100 MILLIGRAM(S): at 05:54

## 2023-04-02 RX ADMIN — AMLODIPINE BESYLATE 2.5 MILLIGRAM(S): 2.5 TABLET ORAL at 05:54

## 2023-04-02 RX ADMIN — CLOPIDOGREL BISULFATE 75 MILLIGRAM(S): 75 TABLET, FILM COATED ORAL at 12:18

## 2023-04-02 RX ADMIN — Medication 100 MILLIGRAM(S): at 14:03

## 2023-04-02 RX ADMIN — Medication 162 MILLIGRAM(S): at 12:18

## 2023-04-02 RX ADMIN — Medication 100 MILLIGRAM(S): at 21:23

## 2023-04-02 RX ADMIN — LOSARTAN POTASSIUM 50 MILLIGRAM(S): 100 TABLET, FILM COATED ORAL at 05:54

## 2023-04-02 RX ADMIN — Medication 37.5 MILLIGRAM(S): at 21:23

## 2023-04-02 RX ADMIN — PANTOPRAZOLE SODIUM 40 MILLIGRAM(S): 20 TABLET, DELAYED RELEASE ORAL at 08:30

## 2023-04-02 NOTE — PROGRESS NOTE ADULT - SUBJECTIVE AND OBJECTIVE BOX
pt seen and examined.     My notes supersede resident's notes in case of discrepancy       ROS: no cp, no sob, no n/v, no fever    Vital Signs Last 24 Hrs  T(C): 36.8 (02 Apr 2023 06:49), Max: 36.8 (02 Apr 2023 06:49)  T(F): 98.3 (02 Apr 2023 06:49), Max: 98.3 (02 Apr 2023 06:49)  HR: 65 (02 Apr 2023 06:49) (65 - 81)  BP: 153/73 (02 Apr 2023 06:49) (128/80 - 153/73)  BP(mean): --  RR: 18 (02 Apr 2023 06:49) (16 - 18)  SpO2: 98% (02 Apr 2023 06:49) (97% - 99%)    Parameters below as of 01 Apr 2023 18:05  Patient On (Oxygen Delivery Method): room air        physical exam  constitutional NAD, AAOX3, Respiratory  lungs CTA, CVS heart RRR, GI: abdomen Soft NT, ND, BS+, skin: intact  neuro exam Motor, sensory and CN normal, no deficit     MEDICATIONS  (STANDING):  amLODIPine   Tablet 2.5 milliGRAM(s) Oral daily  aspirin enteric coated 162 milliGRAM(s) Oral daily  ciprofloxacin   IVPB 400 milliGRAM(s) IV Intermittent every 12 hours  clopidogrel Tablet 75 milliGRAM(s) Oral daily  losartan 50 milliGRAM(s) Oral daily  metroNIDAZOLE  IVPB 500 milliGRAM(s) IV Intermittent every 8 hours  pantoprazole    Tablet 40 milliGRAM(s) Oral before breakfast  sodium chloride 0.9%. 1000 milliLiter(s) (75 mL/Hr) IV Continuous <Continuous>  venlafaxine 37.5 milliGRAM(s) Oral at bedtime    MEDICATIONS  (PRN):  acetaminophen     Tablet .. 650 milliGRAM(s) Oral every 6 hours PRN Temp greater or equal to 38C (100.4F), Mild Pain (1 - 3)                            11.2   10.19 )-----------( 357      ( 02 Apr 2023 07:10 )             36.6     04-02    140  |  105  |  15  ----------------------------<  87  4.5   |  22  |  1.0    Ca    9.2      02 Apr 2023 07:10  Mg     2.0     04-02    TPro  6.7  /  Alb  3.9  /  TBili  0.5  /  DBili  x   /  AST  24  /  ALT  19  /  AlkPhos  99  04-02          CARDIAC MARKERS ( 02 Apr 2023 07:10 )  x     / <0.01 ng/mL / x     / x     / x      CARDIAC MARKERS ( 01 Apr 2023 17:05 )  x     / <0.01 ng/mL / x     / x     / x                         pt seen and examined.     My notes supersede resident's notes in case of discrepancy       ROS: no cp, no sob, no n/v, no fever    Vital Signs Last 24 Hrs  T(C): 36.8 (02 Apr 2023 06:49), Max: 36.8 (02 Apr 2023 06:49)  T(F): 98.3 (02 Apr 2023 06:49), Max: 98.3 (02 Apr 2023 06:49)  HR: 65 (02 Apr 2023 06:49) (65 - 81)  BP: 153/73 (02 Apr 2023 06:49) (128/80 - 153/73)  BP(mean): --  RR: 18 (02 Apr 2023 06:49) (16 - 18)  SpO2: 98% (02 Apr 2023 06:49) (97% - 99%)    Parameters below as of 01 Apr 2023 18:05  Patient On (Oxygen Delivery Method): room air    physical exam  constitutional NAD, AAOX3, Respiratory  lungs CTA, CVS heart RRR, GI: abdomen Soft slighly tender on the LLQ, no rebound , ND, BS+, skin: intact  neuro exam Motor, sensory and CN normal, no deficit     MEDICATIONS  (STANDING):  amLODIPine   Tablet 2.5 milliGRAM(s) Oral daily  aspirin enteric coated 162 milliGRAM(s) Oral daily  ciprofloxacin   IVPB 400 milliGRAM(s) IV Intermittent every 12 hours  clopidogrel Tablet 75 milliGRAM(s) Oral daily  losartan 50 milliGRAM(s) Oral daily  metroNIDAZOLE  IVPB 500 milliGRAM(s) IV Intermittent every 8 hours  pantoprazole    Tablet 40 milliGRAM(s) Oral before breakfast  sodium chloride 0.9%. 1000 milliLiter(s) (75 mL/Hr) IV Continuous <Continuous>  venlafaxine 37.5 milliGRAM(s) Oral at bedtime    MEDICATIONS  (PRN):  acetaminophen     Tablet .. 650 milliGRAM(s) Oral every 6 hours PRN Temp greater or equal to 38C (100.4F), Mild Pain (1 - 3)                            11.2   10.19 )-----------( 357      ( 02 Apr 2023 07:10 )             36.6     04-02    140  |  105  |  15  ----------------------------<  87  4.5   |  22  |  1.0    Ca    9.2      02 Apr 2023 07:10  Mg     2.0     04-02    TPro  6.7  /  Alb  3.9  /  TBili  0.5  /  DBili  x   /  AST  24  /  ALT  19  /  AlkPhos  99  04-02      CARDIAC MARKERS ( 02 Apr 2023 07:10 )  x     / <0.01 ng/mL / x     / x     / x      CARDIAC MARKERS ( 01 Apr 2023 17:05 )  x     / <0.01 ng/mL / x     / x     / x          < from: CT Angio Chest Aorta w/wo IV Cont (04.01.23 @ 17:31) >  No CT evidence of aortic dissection or intramural hematoma.    Partially imaged acute sigmoid colon diverticulitis. No abscess or   adjacent free air visualized. Of note, pelvic bowel is out of   field-of-view, therefore cannot completely assess for abscess or free air.    Bilateral pulmonary nodules measuring up to 6 mm as above. In low risk   patients, no routine follow-up required. In high-risk patients optional   CT at 12 months is recommended.    < end of copied text >    a/p    Pt is a 63 year old male with PMHx of HTN, psoriatic arthritis, CVA x2, syncope s/p ILR placement by Dr. Santoyo, intracranial vascular stenosis presented to the ED for LLQ abdominal pain x 1 days. Pt started experiencing crampy like LLQ abdominal pain, nonradiating, moderate in severity, no improving or worsening factors, that started this AM. He had a similar type of pain in the past, about 3 months ago, presented to his PCP and completed a course of cipro/flagyl. He denies any fevers, chills. He was then straining in the restroom when he passed out and fell to the floor on his R side of his body. Wife heard a noise and found him passed out in the bathroom, believes that he was out for a few minutes at most and denied any seizure like activity, tongue biting, incontinence or post ictal confusion. He denied any chest pain, palpitations, headaches, dizziness, or diaphoresis prior to the episode.       # acute diverticultis, cont abx, advance diet   possible dc in am     # Syncope, likely vasovagal   [employer] Loop Recorder interrogated 4/2 by EP team   No events noted  Device functioning properly    # Pulmonary nodules  - pt is ex-smoker  - incidental finding  - outpatient repeat CT chest in 6-12 months    # Hx HTN - continue with amlodipine 2.5mg and losartan 50mg daily      # Hx of CVA x 2  - continue with plavix 75mg, ASA 162mg daily   - pt with severe diarrhea from atorvastatin, is on OP simvastatin    #Progress Note Handoff  Pending (specify): clinical improvement   Family discussion: jacklyn pt   Disposition: Home_

## 2023-04-02 NOTE — CHART NOTE - NSCHARTNOTEFT_GEN_A_CORE
[employer] Loop Recorder interrogated 4/2  No events noted  Device functioning properly    Recall EP as needed [id number]

## 2023-04-03 ENCOUNTER — TRANSCRIPTION ENCOUNTER (OUTPATIENT)
Age: 63
End: 2023-04-03

## 2023-04-03 VITALS
HEART RATE: 68 BPM | SYSTOLIC BLOOD PRESSURE: 160 MMHG | DIASTOLIC BLOOD PRESSURE: 82 MMHG | OXYGEN SATURATION: 98 % | TEMPERATURE: 96 F | RESPIRATION RATE: 18 BRPM

## 2023-04-03 DIAGNOSIS — L40.50 ARTHROPATHIC PSORIASIS, UNSPECIFIED: ICD-10-CM

## 2023-04-03 DIAGNOSIS — R55 SYNCOPE AND COLLAPSE: ICD-10-CM

## 2023-04-03 DIAGNOSIS — Z79.899 OTHER LONG TERM (CURRENT) DRUG THERAPY: ICD-10-CM

## 2023-04-03 DIAGNOSIS — I10 ESSENTIAL (PRIMARY) HYPERTENSION: ICD-10-CM

## 2023-04-03 DIAGNOSIS — I63.9 CEREBRAL INFARCTION, UNSPECIFIED: ICD-10-CM

## 2023-04-03 LAB
ALBUMIN SERPL ELPH-MCNC: 4 G/DL — SIGNIFICANT CHANGE UP (ref 3.5–5.2)
ALP SERPL-CCNC: 94 U/L — SIGNIFICANT CHANGE UP (ref 30–115)
ALT FLD-CCNC: 16 U/L — SIGNIFICANT CHANGE UP (ref 0–41)
ANION GAP SERPL CALC-SCNC: 10 MMOL/L — SIGNIFICANT CHANGE UP (ref 7–14)
AST SERPL-CCNC: 16 U/L — SIGNIFICANT CHANGE UP (ref 0–41)
BASOPHILS # BLD AUTO: 0.11 K/UL — SIGNIFICANT CHANGE UP (ref 0–0.2)
BASOPHILS NFR BLD AUTO: 1.3 % — HIGH (ref 0–1)
BILIRUB SERPL-MCNC: 0.2 MG/DL — SIGNIFICANT CHANGE UP (ref 0.2–1.2)
BUN SERPL-MCNC: 20 MG/DL — SIGNIFICANT CHANGE UP (ref 10–20)
CALCIUM SERPL-MCNC: 9.5 MG/DL — SIGNIFICANT CHANGE UP (ref 8.4–10.5)
CHLORIDE SERPL-SCNC: 106 MMOL/L — SIGNIFICANT CHANGE UP (ref 98–110)
CO2 SERPL-SCNC: 24 MMOL/L — SIGNIFICANT CHANGE UP (ref 17–32)
CREAT SERPL-MCNC: 1.2 MG/DL — SIGNIFICANT CHANGE UP (ref 0.7–1.5)
EGFR: 67 ML/MIN/1.73M2 — SIGNIFICANT CHANGE UP
EOSINOPHIL # BLD AUTO: 0.57 K/UL — SIGNIFICANT CHANGE UP (ref 0–0.7)
EOSINOPHIL NFR BLD AUTO: 6.9 % — SIGNIFICANT CHANGE UP (ref 0–8)
GLUCOSE SERPL-MCNC: 93 MG/DL — SIGNIFICANT CHANGE UP (ref 70–99)
HCT VFR BLD CALC: 35.6 % — LOW (ref 42–52)
HGB BLD-MCNC: 11 G/DL — LOW (ref 14–18)
IMM GRANULOCYTES NFR BLD AUTO: 0.2 % — SIGNIFICANT CHANGE UP (ref 0.1–0.3)
LYMPHOCYTES # BLD AUTO: 3.1 K/UL — SIGNIFICANT CHANGE UP (ref 1.2–3.4)
LYMPHOCYTES # BLD AUTO: 37.3 % — SIGNIFICANT CHANGE UP (ref 20.5–51.1)
MAGNESIUM SERPL-MCNC: 2.1 MG/DL — SIGNIFICANT CHANGE UP (ref 1.8–2.4)
MCHC RBC-ENTMCNC: 23.9 PG — LOW (ref 27–31)
MCHC RBC-ENTMCNC: 30.9 G/DL — LOW (ref 32–37)
MCV RBC AUTO: 77.2 FL — LOW (ref 80–94)
MONOCYTES # BLD AUTO: 1.05 K/UL — HIGH (ref 0.1–0.6)
MONOCYTES NFR BLD AUTO: 12.6 % — HIGH (ref 1.7–9.3)
NEUTROPHILS # BLD AUTO: 3.47 K/UL — SIGNIFICANT CHANGE UP (ref 1.4–6.5)
NEUTROPHILS NFR BLD AUTO: 41.7 % — LOW (ref 42.2–75.2)
NRBC # BLD: 0 /100 WBCS — SIGNIFICANT CHANGE UP (ref 0–0)
PLATELET # BLD AUTO: 364 K/UL — SIGNIFICANT CHANGE UP (ref 130–400)
POTASSIUM SERPL-MCNC: 4.2 MMOL/L — SIGNIFICANT CHANGE UP (ref 3.5–5)
POTASSIUM SERPL-SCNC: 4.2 MMOL/L — SIGNIFICANT CHANGE UP (ref 3.5–5)
PROT SERPL-MCNC: 6.7 G/DL — SIGNIFICANT CHANGE UP (ref 6–8)
RBC # BLD: 4.61 M/UL — LOW (ref 4.7–6.1)
RBC # FLD: 17.3 % — HIGH (ref 11.5–14.5)
SARS-COV-2 RNA SPEC QL NAA+PROBE: SIGNIFICANT CHANGE UP
SODIUM SERPL-SCNC: 140 MMOL/L — SIGNIFICANT CHANGE UP (ref 135–146)
WBC # BLD: 8.32 K/UL — SIGNIFICANT CHANGE UP (ref 4.8–10.8)
WBC # FLD AUTO: 8.32 K/UL — SIGNIFICANT CHANGE UP (ref 4.8–10.8)

## 2023-04-03 PROCEDURE — 99239 HOSP IP/OBS DSCHRG MGMT >30: CPT

## 2023-04-03 RX ORDER — CIPROFLOXACIN LACTATE 400MG/40ML
4 VIAL (ML) INTRAVENOUS
Qty: 96 | Refills: 0
Start: 2023-04-03 | End: 2023-04-14

## 2023-04-03 RX ORDER — CIPROFLOXACIN LACTATE 400MG/40ML
500 VIAL (ML) INTRAVENOUS EVERY 12 HOURS
Refills: 0 | Status: DISCONTINUED | OUTPATIENT
Start: 2023-04-04 | End: 2023-04-03

## 2023-04-03 RX ORDER — CIPROFLOXACIN LACTATE 400MG/40ML
1 VIAL (ML) INTRAVENOUS
Qty: 24 | Refills: 0
Start: 2023-04-03 | End: 2023-04-14

## 2023-04-03 RX ORDER — HEPARIN SODIUM 5000 [USP'U]/ML
5000 INJECTION INTRAVENOUS; SUBCUTANEOUS EVERY 8 HOURS
Refills: 0 | Status: DISCONTINUED | OUTPATIENT
Start: 2023-04-03 | End: 2023-04-03

## 2023-04-03 RX ORDER — METRONIDAZOLE 500 MG
1 TABLET ORAL
Qty: 36 | Refills: 0
Start: 2023-04-03 | End: 2023-04-14

## 2023-04-03 RX ORDER — CIPROFLOXACIN LACTATE 400MG/40ML
4 VIAL (ML) INTRAVENOUS
Qty: 0 | Refills: 0 | DISCHARGE
Start: 2023-04-03

## 2023-04-03 RX ORDER — METRONIDAZOLE 500 MG
500 TABLET ORAL EVERY 8 HOURS
Refills: 0 | Status: DISCONTINUED | OUTPATIENT
Start: 2023-04-04 | End: 2023-04-03

## 2023-04-03 RX ADMIN — Medication 162 MILLIGRAM(S): at 11:53

## 2023-04-03 RX ADMIN — Medication 100 MILLIGRAM(S): at 06:19

## 2023-04-03 RX ADMIN — Medication 200 MILLIGRAM(S): at 06:19

## 2023-04-03 RX ADMIN — AMLODIPINE BESYLATE 2.5 MILLIGRAM(S): 2.5 TABLET ORAL at 06:19

## 2023-04-03 RX ADMIN — Medication 100 MILLIGRAM(S): at 13:15

## 2023-04-03 RX ADMIN — PANTOPRAZOLE SODIUM 40 MILLIGRAM(S): 20 TABLET, DELAYED RELEASE ORAL at 06:19

## 2023-04-03 RX ADMIN — CLOPIDOGREL BISULFATE 75 MILLIGRAM(S): 75 TABLET, FILM COATED ORAL at 11:52

## 2023-04-03 RX ADMIN — LOSARTAN POTASSIUM 50 MILLIGRAM(S): 100 TABLET, FILM COATED ORAL at 06:19

## 2023-04-03 NOTE — DISCHARGE NOTE PROVIDER - CARE PROVIDER_API CALL
RUDY HARRIS  Family Practice  [address]  [place] [address]  Phone: [phone]  Fax: [phone]  Established Patient  Follow Up Time: 2 weeks

## 2023-04-03 NOTE — DISCHARGE NOTE PROVIDER - HOSPITAL COURSE
CC: Diverticulitis     63M. PMH: HTN, syncope s/p ILR placement by Dr. Santoyo, CVA x2, intracranial vascular stenosis, psoriatic arthritis   Presented 4/1 c/o cramping LLQ abdominal pain x1d. He then had syncopal episode after straining in the bathroom (+)LOC and hit R side of the body. ROS (-) for seizure like activity.  Of note: 3 months prior pt had similar abdominal pain complaints and completed a course of cipro/flagyl (via PCP).    In the ED: VS: Stable- afebrile, normotensive, nontachycardic, saturating well on RA  Labs notable for: WBC 11.8, Hgb 11.5, troponin negative  CTH negative  CT Chest/AP: Acute sigmoid colon diverticulitis. pulmonary nodules up to 6mm    #Acute sigmoid colon diverticulitis  - not septic on admission  - CT Chest/AP: Acute sigmoid colon diverticulitis. pulmonary nodules up to 6mm  - s/p cipro and flagyl in the ED  On Discharge  - continue with cipro/flagyl    #syncope, likely vasovagal  #h/o of syncope s/p ILR placement  #HTN  #h/o of CVA x 2  - CTH negative  - troponin negative x3  - ECG: NSR, LAD, no ischemic changes    - Echo (4/2): EF 58%,   - EP consult for ILR interrogation completed 4/2 - no events noted   On discharge  - continue with amlodipine 2.5mg and losartan 50mg daily, plavix 75mg, ASA 162mg daily, simvastatin     #pulmonary nodules  - pt is ex-smoker  - incidental finding  - outpatient repeat CT chest in 6-12 months    #psoriatic arthritis  - on humira q3 weeks  - OP f/u   CC: Diverticulitis     63M. PMH: HTN, syncope s/p ILR placement by Dr. Santoyo, CVA x2, intracranial vascular stenosis, psoriatic arthritis   Presented 4/1 c/o cramping LLQ abdominal pain x1d. He then had syncopal episode after straining in the bathroom (+)LOC and hit R side of the body. ROS (-) for seizure like activity.  Of note: 3 months prior pt had similar abdominal pain complaints and completed a course of cipro/flagyl (via PCP).    In the ED: VS: Stable- afebrile, normotensive, nontachycardic, saturating well on RA  Labs notable for: WBC 11.8, Hgb 11.5, troponin negative  CTH negative  CT Chest/AP: Acute sigmoid colon diverticulitis. pulmonary nodules up to 6mm    #Acute sigmoid colon diverticulitis  - not septic on admission  - CT Chest/AP: Acute sigmoid colon diverticulitis. pulmonary nodules up to 6mm  - s/p cipro and flagyl in the ED  On Discharge  - continue with cipro/flagyl    #syncope, likely vasovagal  #h/o of syncope s/p ILR placement  #HTN  - CTH negative  - troponin negative x3  - ECG: NSR, LAD, no ischemic changes    - Echo (4/2): EF 58%,   - EP consult for ILR interrogation completed 4/2 - no events noted   On discharge  - continue with amlodipine 2.5mg and losartan 50mg daily,     #h/o of CVAx2  - F/w Dr. Guajardo. Home meds: plavix 75mg, ASA 162mg daily, simvastatin   - Hx: recurrent stroke despite maximal therapy, per OP Neuro - to cont DAPT. Pt has severe allergy to atorvastatin (diarrhea) - on simvastatin.  - OP f/u     #pulmonary nodules  - pt is ex-smoker  - incidental finding  - outpatient repeat CT chest in 6-12 months    #psoriatic arthritis  - on humira q3 weeks  - OP f/u   CC: Diverticulitis     63M. PMH: HTN, syncope s/p ILR placement by Dr. Santoyo, CVA x2, intracranial vascular stenosis, psoriatic arthritis   Presented 4/1 c/o cramping LLQ abdominal pain x1d. He then had syncopal episode after straining in the bathroom (+)LOC and hit R side of the body. ROS (-) for seizure like activity.  Of note: 3 months prior pt had similar abdominal pain complaints and completed a course of cipro/flagyl (via PCP).    In the ED: VS: Stable- afebrile, normotensive, nontachycardic, saturating well on RA  Labs notable for: WBC 11.8, Hgb 11.5, troponin negative  CTH negative  CT Chest/AP: Acute sigmoid colon diverticulitis. pulmonary nodules up to 6mm    #Acute sigmoid colon diverticulitis  - not septic on admission  - CT Chest/AP: Acute sigmoid colon diverticulitis. pulmonary nodules up to 6mm  - s/p cipro and flagyl in the ED  On Discharge  - continue with cipro/flagyl for total 14d    #syncope, likely vasovagal  #h/o of syncope s/p ILR placement  #HTN  - CTH negative  - troponin negative x3  - ECG: NSR, LAD, no ischemic changes    - Echo (4/2): EF 58%  - Orthostatics (-)  - EP consult for ILR interrogation completed 4/2 - no events noted   On discharge  - continue with amlodipine 2.5mg and losartan 50mg daily,     #h/o of CVAx2  - F/w Dr. Guajardo. Home meds: plavix 75mg, ASA 162mg daily, simvastatin   - Hx: recurrent stroke despite maximal therapy, per OP Neuro - to cont DAPT. Pt has severe allergy to atorvastatin (diarrhea) - on simvastatin.  - OP f/u     #pulmonary nodules  - pt is ex-smoker  - incidental finding  - outpatient repeat CT chest in 6-12 months    #psoriatic arthritis  - on humira q3 weeks  - OP f/u

## 2023-04-03 NOTE — PROGRESS NOTE ADULT - ASSESSMENT
63M PMHx HTN, psoriatic arthritis, CVA, h/o syncope s/p ILR here with syncope. LLQ abdominal pain; found to have acute diverticulitis.

## 2023-04-03 NOTE — DISCHARGE NOTE PROVIDER - NSDCMRMEDTOKEN_GEN_ALL_CORE_FT
amLODIPine 2.5 mg oral tablet: 1 tab(s) orally once a day  aspirin 81 mg oral capsule: 2 orally once a day  clopidogrel 75 mg oral tablet: 1 tab(s) orally once a day  HUMIRA PEN 40/0.4ML INJ:   losartan 50 mg oral tablet: 1 tab(s) orally once a day  simvastatin 40 mg oral tablet: 2 tab(s) orally once a day (at bedtime)  venlafaxine 37.5 mg oral tablet: 1 tab(s) orally once a day (at bedtime)   amLODIPine 2.5 mg oral tablet: 1 tab(s) orally once a day  aspirin 81 mg oral capsule: 2 orally once a day  ciprofloxacin 500 mg oral tablet: 1 tab(s) orally 2 times a day  clopidogrel 75 mg oral tablet: 1 tab(s) orally once a day  HUMIRA PEN 40/0.4ML INJ:   losartan 50 mg oral tablet: 1 tab(s) orally once a day  metroNIDAZOLE 500 mg oral tablet: 1 tab(s) orally every 8 hours  simvastatin 40 mg oral tablet: 2 tab(s) orally once a day (at bedtime)  venlafaxine 37.5 mg oral tablet: 1 tab(s) orally once a day (at bedtime)

## 2023-04-03 NOTE — DISCHARGE NOTE NURSING/CASE MANAGEMENT/SOCIAL WORK - NSDCPEFALRISK_GEN_ALL_CORE
For information on Fall & Injury Prevention, visit: [email or web address] OR  [email or web address] OR  https://www.cdc.gov/steadi/patient.html

## 2023-04-03 NOTE — DISCHARGE NOTE NURSING/CASE MANAGEMENT/SOCIAL WORK - PATIENT PORTAL LINK FT
You can access the FollowMyHealth Patient Portal offered by [facility] by registering at the following website: [email or web address]. By joining [employer]’s FollowMyHealth portal, you will also be able to view your health information using other applications (apps) compatible with our system.

## 2023-04-03 NOTE — PHYSICAL THERAPY INITIAL EVALUATION ADULT - ADDITIONAL COMMENTS
pt lives in a house with his wife. There are no stairs to enter but has 1 flight inside. Prior to admission pt was independent in ambulation and ADLs and did not use any AD.

## 2023-04-03 NOTE — DISCHARGE NOTE PROVIDER - NSDCFUSCHEDAPPT_GEN_ALL_CORE_FT
[facility] Physician [facility]  CARDIOLOGY [address]  Scheduled Appointment: 05/02/2023

## 2023-04-03 NOTE — PHYSICAL THERAPY INITIAL EVALUATION ADULT - PERTINENT HX OF CURRENT PROBLEM, REHAB EVAL
63 year old male with PMHx of HTN, psoriatic arthritis, CVA x2, syncope s/p ILR placement by Dr. Santoyo, intracranial vascular stenosis presented to the ED for LLQ abdominal pain x 1 days. Pt started experiencing crampy like LLQ abdominal pain, nonradiating, moderate in severity, no improving or worsening factors, that started this AM. He had a similar type of pain in the past, about 3 months ago, presented to his PCP and completed a course of cipro/flagyl. He denies any fevers, chills. He was then straining in the restroom when he passed out and fell to the floor on his R side of his body. Wife heard a noise and found him passed out in the bathroom, believes that he was out for a few minutes at most and denied any seizure like activity, tongue biting, incontinence or post ictal confusion. He denied any chest pain, palpitations, headaches, dizziness, or diaphoresis prior to the episode.

## 2023-04-03 NOTE — PROGRESS NOTE ADULT - NSPROGADDITIONALINFOA_GEN_ALL_CORE
#Progress Note Handoff:  Pending (specify):  Consults_________, Tests________, Test Results_______, Other____orthostatics, PT_____  Family discussion: d/w pt at bedside re: treatment plan, primary dx  Disposition: Home___/SNF___/Other________/Unknown at this time___x_____

## 2023-04-03 NOTE — PROGRESS NOTE ADULT - SUBJECTIVE AND OBJECTIVE BOX
INTERVAL HPI/OVERNIGHT EVENTS:    SUBJECTIVE: Patient seen and examined at bedside.     cc: syncope  no cp, sob, abd pain, fever  no ha, dizziness, lightheadedness, syncope    OBJECTIVE:    VITAL SIGNS:  Vital Signs Last 24 Hrs  T(C): 35.8 (03 Apr 2023 07:56), Max: 36.9 (02 Apr 2023 15:25)  T(F): 96.4 (03 Apr 2023 07:56), Max: 98.4 (02 Apr 2023 15:25)  HR: 68 (03 Apr 2023 07:56) (60 - 68)  BP: 160/82 (03 Apr 2023 07:56) (128/70 - 160/82)  BP(mean): --  RR: 18 (03 Apr 2023 07:56) (18 - 18)  SpO2: 98% (03 Apr 2023 07:56) (98% - 98%)    Parameters below as of 03 Apr 2023 07:56  Patient On (Oxygen Delivery Method): room air          PHYSICAL EXAM:    General: NAD  HEENT: NC/AT; PERRL, clear conjunctiva  Neck: supple  Respiratory: CTA b/l  Cardiovascular: +S1/S2; RRR  Abdomen: soft, NT/ND; +BS x4  Extremities: WWP, 2+ peripheral pulses b/l; no LE edema  Skin: normal color and turgor; no rash  Neurological:    MEDICATIONS:  MEDICATIONS  (STANDING):  amLODIPine   Tablet 2.5 milliGRAM(s) Oral daily  aspirin enteric coated 162 milliGRAM(s) Oral daily  ciprofloxacin   IVPB 400 milliGRAM(s) IV Intermittent every 12 hours  clopidogrel Tablet 75 milliGRAM(s) Oral daily  heparin   Injectable 5000 Unit(s) SubCutaneous every 8 hours  losartan 50 milliGRAM(s) Oral daily  metroNIDAZOLE  IVPB 500 milliGRAM(s) IV Intermittent every 8 hours  pantoprazole    Tablet 40 milliGRAM(s) Oral before breakfast  venlafaxine 37.5 milliGRAM(s) Oral at bedtime    MEDICATIONS  (PRN):  acetaminophen     Tablet .. 650 milliGRAM(s) Oral every 6 hours PRN Temp greater or equal to 38C (100.4F), Mild Pain (1 - 3)      ALLERGIES:  Allergies    atorvastatin (Diarrhea (Severe))    Intolerances        LABS:                        11.0   8.32  )-----------( 364      ( 03 Apr 2023 07:46 )             35.6     Hemoglobin: 11.0 g/dL (04-03 @ 07:46)  Hemoglobin: 11.2 g/dL (04-02 @ 07:10)  Hemoglobin: 11.5 g/dL (04-01 @ 17:05)    CBC Full  -  ( 03 Apr 2023 07:46 )  WBC Count : 8.32 K/uL  RBC Count : 4.61 M/uL  Hemoglobin : 11.0 g/dL  Hematocrit : 35.6 %  Platelet Count - Automated : 364 K/uL  Mean Cell Volume : 77.2 fL  Mean Cell Hemoglobin : 23.9 pg  Mean Cell Hemoglobin Concentration : 30.9 g/dL  Auto Neutrophil # : 3.47 K/uL  Auto Lymphocyte # : 3.10 K/uL  Auto Monocyte # : 1.05 K/uL  Auto Eosinophil # : 0.57 K/uL  Auto Basophil # : 0.11 K/uL  Auto Neutrophil % : 41.7 %  Auto Lymphocyte % : 37.3 %  Auto Monocyte % : 12.6 %  Auto Eosinophil % : 6.9 %  Auto Basophil % : 1.3 %    04-03    140  |  106  |  20  ----------------------------<  93  4.2   |  24  |  1.2    Ca    9.5      03 Apr 2023 07:46  Mg     2.1     04-03    TPro  6.7  /  Alb  4.0  /  TBili  0.2  /  DBili  x   /  AST  16  /  ALT  16  /  AlkPhos  94  04-03    Creatinine Trend: 1.2<--, 1.0<--, 1.1<--  LIVER FUNCTIONS - ( 03 Apr 2023 07:46 )  Alb: 4.0 g/dL / Pro: 6.7 g/dL / ALK PHOS: 94 U/L / ALT: 16 U/L / AST: 16 U/L / GGT: x               hs Troponin:              CSF:                      EKG:   MICROBIOLOGY:    IMAGING:      Labs, imaging, EKG personally reviewed    RADIOLOGY & ADDITIONAL TESTS: Reviewed.

## 2023-04-03 NOTE — DISCHARGE NOTE PROVIDER - NSDCCPCAREPLAN_GEN_ALL_CORE_FT
PRINCIPAL DISCHARGE DIAGNOSIS  Diagnosis: Sigmoid diverticulitis  Assessment and Plan of Treatment: You came to the hospital with abdominal pain. You were found to have an infection in the colon called diverticulitis. We are treating you eiyh antibiotics. You also had syncope or an episode of losing consciousness. This was likely because of straining in the restroom. We completed a cardiac workup to rule out any other causes of syncope and did not find any events on your device.  You had an incidental finding of pulmonary nodules on your CT scan. We recommend a follow up CT in 6-12 months. Please follow up with yout PCP for further management.      Please take all medications as prescribed and follow up with your outpatient providers for further management of your care. If you experience worsening of symptoms or new symptoms please return to the ED.  To view your hospital results create a Patient Portal at the following link: [email or web address] or search "Patient Portal [facility]" and follow the instructions on the FollowMyHealth page.        SECONDARY DISCHARGE DIAGNOSES  Diagnosis: Sigmoid diverticulitis  Assessment and Plan of Treatment:

## 2023-04-03 NOTE — PROGRESS NOTE ADULT - PROBLEM SELECTOR PLAN 1
found down by wife in bathroom  in setting of acute diverticulitis; presumed vasovagal  ct abd c/w sigmoid diverticulitis  cth neg  cipro, flagyl  s/p ilr interrogation without events  tte unrevealing  orthostatics  PT  discharge planing

## 2023-05-02 ENCOUNTER — APPOINTMENT (OUTPATIENT)
Dept: CARDIOLOGY | Facility: CLINIC | Age: 63
End: 2023-05-02
Payer: MEDICARE

## 2023-05-02 ENCOUNTER — NON-APPOINTMENT (OUTPATIENT)
Age: 63
End: 2023-05-02

## 2023-05-02 PROCEDURE — 93298 REM INTERROG DEV EVAL SCRMS: CPT

## 2023-05-02 PROCEDURE — G2066: CPT

## 2023-05-27 ENCOUNTER — EMERGENCY (EMERGENCY)
Facility: HOSPITAL | Age: 63
LOS: 0 days | Discharge: HOME | End: 2023-05-27
Attending: EMERGENCY MEDICINE
Payer: MEDICARE

## 2023-05-27 VITALS
HEART RATE: 98 BPM | SYSTOLIC BLOOD PRESSURE: 155 MMHG | TEMPERATURE: 100 F | OXYGEN SATURATION: 99 % | HEIGHT: 66 IN | RESPIRATION RATE: 18 BRPM | DIASTOLIC BLOOD PRESSURE: 75 MMHG | WEIGHT: 169.09 LBS

## 2023-05-27 DIAGNOSIS — R10.32 LEFT LOWER QUADRANT PAIN: ICD-10-CM

## 2023-05-27 DIAGNOSIS — Z79.02 LONG TERM (CURRENT) USE OF ANTITHROMBOTICS/ANTIPLATELETS: ICD-10-CM

## 2023-05-27 DIAGNOSIS — Z86.79 PERSONAL HISTORY OF OTHER DISEASES OF THE CIRCULATORY SYSTEM: Chronic | ICD-10-CM

## 2023-05-27 DIAGNOSIS — Z79.82 LONG TERM (CURRENT) USE OF ASPIRIN: ICD-10-CM

## 2023-05-27 DIAGNOSIS — Z87.19 PERSONAL HISTORY OF OTHER DISEASES OF THE DIGESTIVE SYSTEM: ICD-10-CM

## 2023-05-27 DIAGNOSIS — K57.92 DIVERTICULITIS OF INTESTINE, PART UNSPECIFIED, WITHOUT PERFORATION OR ABSCESS WITHOUT BLEEDING: ICD-10-CM

## 2023-05-27 DIAGNOSIS — I10 ESSENTIAL (PRIMARY) HYPERTENSION: ICD-10-CM

## 2023-05-27 DIAGNOSIS — Z88.8 ALLERGY STATUS TO OTHER DRUGS, MEDICAMENTS AND BIOLOGICAL SUBSTANCES: ICD-10-CM

## 2023-05-27 DIAGNOSIS — L40.50 ARTHROPATHIC PSORIASIS, UNSPECIFIED: ICD-10-CM

## 2023-05-27 DIAGNOSIS — Z86.73 PERSONAL HISTORY OF TRANSIENT ISCHEMIC ATTACK (TIA), AND CEREBRAL INFARCTION WITHOUT RESIDUAL DEFICITS: ICD-10-CM

## 2023-05-27 DIAGNOSIS — Z92.89 PERSONAL HISTORY OF OTHER MEDICAL TREATMENT: Chronic | ICD-10-CM

## 2023-05-27 DIAGNOSIS — R55 SYNCOPE AND COLLAPSE: ICD-10-CM

## 2023-05-27 LAB
ALBUMIN SERPL ELPH-MCNC: 4.2 G/DL — SIGNIFICANT CHANGE UP (ref 3.5–5.2)
ALP SERPL-CCNC: 87 U/L — SIGNIFICANT CHANGE UP (ref 30–115)
ALT FLD-CCNC: 16 U/L — SIGNIFICANT CHANGE UP (ref 0–41)
ANION GAP SERPL CALC-SCNC: 11 MMOL/L — SIGNIFICANT CHANGE UP (ref 7–14)
APPEARANCE UR: CLEAR — SIGNIFICANT CHANGE UP
APTT BLD: 31.1 SEC — SIGNIFICANT CHANGE UP (ref 27–39.2)
AST SERPL-CCNC: 17 U/L — SIGNIFICANT CHANGE UP (ref 0–41)
BASOPHILS # BLD AUTO: 0.08 K/UL — SIGNIFICANT CHANGE UP (ref 0–0.2)
BASOPHILS NFR BLD AUTO: 0.6 % — SIGNIFICANT CHANGE UP (ref 0–1)
BILIRUB SERPL-MCNC: 0.5 MG/DL — SIGNIFICANT CHANGE UP (ref 0.2–1.2)
BILIRUB UR-MCNC: NEGATIVE — SIGNIFICANT CHANGE UP
BUN SERPL-MCNC: 25 MG/DL — HIGH (ref 10–20)
CALCIUM SERPL-MCNC: 9 MG/DL — SIGNIFICANT CHANGE UP (ref 8.4–10.5)
CHLORIDE SERPL-SCNC: 104 MMOL/L — SIGNIFICANT CHANGE UP (ref 98–110)
CO2 SERPL-SCNC: 21 MMOL/L — SIGNIFICANT CHANGE UP (ref 17–32)
COLOR SPEC: SIGNIFICANT CHANGE UP
CREAT SERPL-MCNC: 1.2 MG/DL — SIGNIFICANT CHANGE UP (ref 0.7–1.5)
DIFF PNL FLD: NEGATIVE — SIGNIFICANT CHANGE UP
EGFR: 67 ML/MIN/1.73M2 — SIGNIFICANT CHANGE UP
EOSINOPHIL # BLD AUTO: 0.14 K/UL — SIGNIFICANT CHANGE UP (ref 0–0.7)
EOSINOPHIL NFR BLD AUTO: 1.1 % — SIGNIFICANT CHANGE UP (ref 0–8)
GLUCOSE SERPL-MCNC: 95 MG/DL — SIGNIFICANT CHANGE UP (ref 70–99)
GLUCOSE UR QL: NEGATIVE — SIGNIFICANT CHANGE UP
HCT VFR BLD CALC: 34.1 % — LOW (ref 42–52)
HGB BLD-MCNC: 10.5 G/DL — LOW (ref 14–18)
IMM GRANULOCYTES NFR BLD AUTO: 0.3 % — SIGNIFICANT CHANGE UP (ref 0.1–0.3)
INR BLD: 1.08 RATIO — SIGNIFICANT CHANGE UP (ref 0.65–1.3)
KETONES UR-MCNC: NEGATIVE — SIGNIFICANT CHANGE UP
LACTATE SERPL-SCNC: 1.2 MMOL/L — SIGNIFICANT CHANGE UP (ref 0.7–2)
LEUKOCYTE ESTERASE UR-ACNC: NEGATIVE — SIGNIFICANT CHANGE UP
LIDOCAIN IGE QN: 14 U/L — SIGNIFICANT CHANGE UP (ref 7–60)
LYMPHOCYTES # BLD AUTO: 2.72 K/UL — SIGNIFICANT CHANGE UP (ref 1.2–3.4)
LYMPHOCYTES # BLD AUTO: 21.5 % — SIGNIFICANT CHANGE UP (ref 20.5–51.1)
MCHC RBC-ENTMCNC: 23.7 PG — LOW (ref 27–31)
MCHC RBC-ENTMCNC: 30.8 G/DL — LOW (ref 32–37)
MCV RBC AUTO: 77 FL — LOW (ref 80–94)
MONOCYTES # BLD AUTO: 1.37 K/UL — HIGH (ref 0.1–0.6)
MONOCYTES NFR BLD AUTO: 10.8 % — HIGH (ref 1.7–9.3)
NEUTROPHILS # BLD AUTO: 8.3 K/UL — HIGH (ref 1.4–6.5)
NEUTROPHILS NFR BLD AUTO: 65.7 % — SIGNIFICANT CHANGE UP (ref 42.2–75.2)
NITRITE UR-MCNC: NEGATIVE — SIGNIFICANT CHANGE UP
NRBC # BLD: 0 /100 WBCS — SIGNIFICANT CHANGE UP (ref 0–0)
PH UR: 6 — SIGNIFICANT CHANGE UP (ref 5–8)
PLATELET # BLD AUTO: 364 K/UL — SIGNIFICANT CHANGE UP (ref 130–400)
PMV BLD: 11.4 FL — HIGH (ref 7.4–10.4)
POTASSIUM SERPL-MCNC: 4.2 MMOL/L — SIGNIFICANT CHANGE UP (ref 3.5–5)
POTASSIUM SERPL-SCNC: 4.2 MMOL/L — SIGNIFICANT CHANGE UP (ref 3.5–5)
PROT SERPL-MCNC: 6.9 G/DL — SIGNIFICANT CHANGE UP (ref 6–8)
PROT UR-MCNC: SIGNIFICANT CHANGE UP
PROTHROM AB SERPL-ACNC: 12.3 SEC — SIGNIFICANT CHANGE UP (ref 9.95–12.87)
RBC # BLD: 4.43 M/UL — LOW (ref 4.7–6.1)
RBC # FLD: 17.6 % — HIGH (ref 11.5–14.5)
SODIUM SERPL-SCNC: 136 MMOL/L — SIGNIFICANT CHANGE UP (ref 135–146)
SP GR SPEC: 1.02 — SIGNIFICANT CHANGE UP (ref 1.01–1.03)
UROBILINOGEN FLD QL: SIGNIFICANT CHANGE UP
WBC # BLD: 12.65 K/UL — HIGH (ref 4.8–10.8)
WBC # FLD AUTO: 12.65 K/UL — HIGH (ref 4.8–10.8)

## 2023-05-27 PROCEDURE — 99285 EMERGENCY DEPT VISIT HI MDM: CPT | Mod: FS

## 2023-05-27 PROCEDURE — 83690 ASSAY OF LIPASE: CPT

## 2023-05-27 PROCEDURE — 85025 COMPLETE CBC W/AUTO DIFF WBC: CPT

## 2023-05-27 PROCEDURE — 36415 COLL VENOUS BLD VENIPUNCTURE: CPT

## 2023-05-27 PROCEDURE — 96375 TX/PRO/DX INJ NEW DRUG ADDON: CPT

## 2023-05-27 PROCEDURE — 80053 COMPREHEN METABOLIC PANEL: CPT

## 2023-05-27 PROCEDURE — 86850 RBC ANTIBODY SCREEN: CPT

## 2023-05-27 PROCEDURE — 86900 BLOOD TYPING SEROLOGIC ABO: CPT

## 2023-05-27 PROCEDURE — 85730 THROMBOPLASTIN TIME PARTIAL: CPT

## 2023-05-27 PROCEDURE — 86901 BLOOD TYPING SEROLOGIC RH(D): CPT

## 2023-05-27 PROCEDURE — 74177 CT ABD & PELVIS W/CONTRAST: CPT | Mod: MA

## 2023-05-27 PROCEDURE — 74177 CT ABD & PELVIS W/CONTRAST: CPT | Mod: 26,MA

## 2023-05-27 PROCEDURE — 81003 URINALYSIS AUTO W/O SCOPE: CPT

## 2023-05-27 PROCEDURE — 83605 ASSAY OF LACTIC ACID: CPT

## 2023-05-27 PROCEDURE — 87086 URINE CULTURE/COLONY COUNT: CPT

## 2023-05-27 PROCEDURE — 99284 EMERGENCY DEPT VISIT MOD MDM: CPT | Mod: 25

## 2023-05-27 PROCEDURE — 96374 THER/PROPH/DIAG INJ IV PUSH: CPT | Mod: XU

## 2023-05-27 PROCEDURE — 85610 PROTHROMBIN TIME: CPT

## 2023-05-27 RX ORDER — SODIUM CHLORIDE 9 MG/ML
1000 INJECTION INTRAMUSCULAR; INTRAVENOUS; SUBCUTANEOUS ONCE
Refills: 0 | Status: COMPLETED | OUTPATIENT
Start: 2023-05-27 | End: 2023-05-27

## 2023-05-27 RX ORDER — CIPROFLOXACIN LACTATE 400MG/40ML
1 VIAL (ML) INTRAVENOUS
Qty: 20 | Refills: 0
Start: 2023-05-27 | End: 2023-06-05

## 2023-05-27 RX ORDER — MORPHINE SULFATE 50 MG/1
4 CAPSULE, EXTENDED RELEASE ORAL ONCE
Refills: 0 | Status: DISCONTINUED | OUTPATIENT
Start: 2023-05-27 | End: 2023-05-27

## 2023-05-27 RX ORDER — ACETAMINOPHEN 500 MG
650 TABLET ORAL ONCE
Refills: 0 | Status: COMPLETED | OUTPATIENT
Start: 2023-05-27 | End: 2023-05-27

## 2023-05-27 RX ORDER — METRONIDAZOLE 500 MG
1 TABLET ORAL
Qty: 30 | Refills: 0
Start: 2023-05-27 | End: 2023-06-05

## 2023-05-27 RX ORDER — ONDANSETRON 8 MG/1
4 TABLET, FILM COATED ORAL ONCE
Refills: 0 | Status: COMPLETED | OUTPATIENT
Start: 2023-05-27 | End: 2023-05-27

## 2023-05-27 RX ADMIN — Medication 650 MILLIGRAM(S): at 16:54

## 2023-05-27 RX ADMIN — MORPHINE SULFATE 4 MILLIGRAM(S): 50 CAPSULE, EXTENDED RELEASE ORAL at 16:55

## 2023-05-27 RX ADMIN — SODIUM CHLORIDE 2000 MILLILITER(S): 9 INJECTION INTRAMUSCULAR; INTRAVENOUS; SUBCUTANEOUS at 16:52

## 2023-05-27 RX ADMIN — ONDANSETRON 4 MILLIGRAM(S): 8 TABLET, FILM COATED ORAL at 16:54

## 2023-05-27 NOTE — ED PROVIDER NOTE - DIFFERENTIAL DIAGNOSIS
Abdominal pain r/o obstruction, perforation, abscess, appendicitis, ischemia, hepatobiliary abnormality or any other emergent intra-abdominal pathology. Differential Diagnosis

## 2023-05-27 NOTE — ED PROVIDER NOTE - PHYSICAL EXAMINATION
CONSTITUTIONAL: in no apparent distress.   HEAD: Normocephalic; atraumatic.   EYES: Pupils are round and reactive, extra-ocular muscles are intact. Eyelids are normal in appearance without swelling or lesions.   ENT: Hearing is intact with good acuity to spoken voice.  Patient is speaking clearly, not muffled and airway is intact.   RESPIRATORY: No signs of respiratory distress. Lung sounds are clear in all lobes bilaterally without rales, rhonchi, or wheezes.  CARDIOVASCULAR: Regular rate and rhythm.   GI: tender to palpation in the LLQ. Abdomen is soft, and without distention. Bowel sounds are present and normoactive in all four quadrants. No masses are noted.   BACK: No evidence of trauma or deformity. No CVA tenderness bilaterally. Normal ROM.   NEURO: A & O x 3. Normal speech. No focal deficit.  PSYCHOLOGICAL: Appropriate mood and affect. Good judgement and insight.

## 2023-05-27 NOTE — ED PROVIDER NOTE - CLINICAL SUMMARY MEDICAL DECISION MAKING FREE TEXT BOX
Patient presented with LLQ abdominal pain as documented. (+) tender on exam but otherwise afebrile, HD stable, well appearing. Obtained labs which were grossly unremarkable including no significant leukocytosis, anemia, signs of dehydration/BEBETO, transaminitis or significant electrolyte abnormalities. UA negative for infection. CT abd/pelvis showed (+) uncomplicated diverticulitis but otherwise negative for emergent processes. Patient felt much better after tx in ED, and serial abdominal exams benign. Able to tolerate PO. Given the above, will discharge home with PO abx, outpatient follow up. Patient agreeable with plan. Agrees to return to ED for any new or worsening symptoms.

## 2023-05-27 NOTE — ED PROVIDER NOTE - NSFOLLOWUPINSTRUCTIONS_ED_ALL_ED_FT
Please make sure to follow up with your primary care doctor in 3 days.    Diverticulitis    Diverticulitis is inflammation or infection of small pouches in your colon that form when you have a condition called diverticulosis. This condition can range from mild to severe potentially leading to perforation or obstructions of your colon. Symptoms include abdominal pain, fever/chills, nausea, vomiting, diarrhea, constipation, or blood in your stool. If you were prescribed an antibiotic medicine, take it as told by your health care provider. Do not stop taking the antibiotic even if you start to feel better.    SEEK IMMEDIATE MEDICAL CARE IF YOU HAVE THE FOLLOWING SYMPTOMS: worsening abdominal pain, high fever, inability to hold down liquids or medication, black or bloody stools, inability to pass gas, lightheadedness/dizziness, or a change in mental status.

## 2023-05-27 NOTE — ED PROVIDER NOTE - PATIENT PORTAL LINK FT
You can access the FollowMyHealth Patient Portal offered by [facility] by registering at the following website: [email or web address]. By joining [employer]’s FollowMyHealth portal, you will also be able to view your health information using other applications (apps) compatible with our system.

## 2023-05-27 NOTE — ED PROVIDER NOTE - CARE PROVIDER_API CALL
America Padilla  Gastroenterology  [address] [place] [address]  Phone: [phone]  Fax: [phone]  Follow Up Time: 1-3 Days

## 2023-05-27 NOTE — ED PROVIDER NOTE - OBJECTIVE STATEMENT
63-year-old male with past medical history of hypertension, CVA on Plavix, diverticulitis, and syncope who presents with left lower quadrant abdominal pain.  Reports that symptoms started last night; pain is constant, sharp, and there is no alleviating or worsening factor. Denies chest pain, shortness of breath, nausea, vomiting, hematuria, dysuria, melena, and hematochezia.

## 2023-05-27 NOTE — ED ADULT NURSE NOTE - NSFALLUNIVINTERV_ED_ALL_ED
Bed/Stretcher in lowest position, wheels locked, appropriate side rails in place/Call bell, personal items and telephone in reach/Instruct patient to call for assistance before getting out of bed/chair/stretcher/Non-slip footwear applied when patient is off stretcher/[place] to call system/Physically safe environment - no spills, clutter or unnecessary equipment/Purposeful proactive rounding/Room/bathroom lighting operational, light cord in reach

## 2023-05-27 NOTE — ED PROVIDER NOTE - PROGRESS NOTE DETAILS
Labs shows elevated white count.  CAT scan shows diverticulitis.  Patient has been made aware of the incidental findings.  UA unremarkable.  Meds given in ED and symptoms improved significantly.  Patient is stable for discharge.  We will send meds to pharmacy and have patient follow-up with GI outpatient.

## 2023-05-29 LAB
CULTURE RESULTS: SIGNIFICANT CHANGE UP
SPECIMEN SOURCE: SIGNIFICANT CHANGE UP

## 2023-06-06 ENCOUNTER — NON-APPOINTMENT (OUTPATIENT)
Age: 63
End: 2023-06-06

## 2023-06-06 ENCOUNTER — APPOINTMENT (OUTPATIENT)
Dept: CARDIOLOGY | Facility: CLINIC | Age: 63
End: 2023-06-06
Payer: MEDICARE

## 2023-06-06 PROCEDURE — G2066: CPT

## 2023-06-06 PROCEDURE — 93298 REM INTERROG DEV EVAL SCRMS: CPT

## 2023-06-16 ENCOUNTER — APPOINTMENT (OUTPATIENT)
Dept: CARDIOLOGY | Facility: CLINIC | Age: 63
End: 2023-06-16
Payer: MEDICARE

## 2023-06-16 ENCOUNTER — NON-APPOINTMENT (OUTPATIENT)
Age: 63
End: 2023-06-16

## 2023-06-16 VITALS
TEMPERATURE: 97.6 F | HEIGHT: 66 IN | BODY MASS INDEX: 27 KG/M2 | SYSTOLIC BLOOD PRESSURE: 130 MMHG | WEIGHT: 168 LBS | HEART RATE: 70 BPM | RESPIRATION RATE: 16 BRPM | DIASTOLIC BLOOD PRESSURE: 80 MMHG | OXYGEN SATURATION: 98 %

## 2023-06-16 DIAGNOSIS — R06.09 OTHER FORMS OF DYSPNEA: ICD-10-CM

## 2023-06-16 DIAGNOSIS — R55 SYNCOPE AND COLLAPSE: ICD-10-CM

## 2023-06-16 PROCEDURE — 99214 OFFICE O/P EST MOD 30 MIN: CPT

## 2023-06-16 PROCEDURE — 93000 ELECTROCARDIOGRAM COMPLETE: CPT

## 2023-06-16 NOTE — ASSESSMENT
[FreeTextEntry1] : Echo noted - functionally bicuspid valve, mild to moderate aortic valve disease - stable.\par Stress test November 2019 - negative for ischemia.\par CCTA - non-obstructive CAD\par NSVT on MCOT - EP evaluation appreciated.\par ILR reports reviewed\par Off Metoprolol - could not tolerate due to bradycardia. Off Norvasc 2.5 mg due to episodes of hypotension - using losartan in AM and hydralazine PRN\par CVA - f/u with neurology, neurovascular intervention.\par High dose statin.\par C/w current therapy otherwise.\par Use of Paxil for vagal syncope was discussed. They will discuss with Dr. Palencia as well. \par EP follow-up with Dr. Santoyo. ILR in place\par F/u in 6-12 months\par \par

## 2023-06-16 NOTE — HISTORY OF PRESENT ILLNESS
[FreeTextEntry1] : 62 y/o male presenting for f/u of NSVT, s/p another recurrent CVA, was managed in the [facility] and underwent angioplasty of R MCA and NORMAN with Dr. Guajardo. MRA c/w intracranial vascular disease. Had one more episode of syncope in April and was admitted, but no palpitations, no CP, no dyspnea. ILR was negative, so presumed vagal.  He was started on b-blocker, but this was stopped due to sinus bradycardia. He had a stress echo last year - no ischemia, preserved LV function. CCTA was done to further assess for CAD and revealed mild stenosis only, no obstructive lesions. No edema.He had repeat echo 04/23 at [facility].  Echo was unchanged. He reports fatigue and MORALES, worse with exertion. + palpitations, near-syncope, but no syncope. He has a neurology and EP f/u scheduled.\par

## 2023-06-28 NOTE — SWALLOW BEDSIDE ASSESSMENT ADULT - ASR SWALLOW DENTITION
Cimetidine Pregnancy And Lactation Text: This medication is Pregnancy Category B and is considered safe during pregnancy. It is also excreted in breast milk and breast feeding isn't recommended. present and adequate

## 2023-07-11 ENCOUNTER — APPOINTMENT (OUTPATIENT)
Dept: CARDIOLOGY | Facility: CLINIC | Age: 63
End: 2023-07-11
Payer: MEDICARE

## 2023-07-11 ENCOUNTER — NON-APPOINTMENT (OUTPATIENT)
Age: 63
End: 2023-07-11

## 2023-07-11 PROCEDURE — 93298 REM INTERROG DEV EVAL SCRMS: CPT

## 2023-07-11 PROCEDURE — G2066: CPT

## 2023-07-13 NOTE — HISTORY OF PRESENT ILLNESS
VISIT SUMMARY:     Testing needed: NONE    Labs needed: NONE    Consults: NONE     Follow up: Follow up in 1 year.     If you have any questions or concerns, please call our office- [phone].   
[FreeTextEntry1] : Mr. Oscar is a 61yo right handed man w/h/o HTN and smoking who suffered from right hemispheric watershed infarction on April 2020, is referred to NI clinic by Dr. 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 He has been experiencing tandem TIAs of the same side during past months. He is in the clinic with his son and both stating that the duration of episodes of left arm weakness  has been prolonging over time. He denies FH of ischemic disease in young age and lately he had a cardiac test which has been reported normal. After stroke he was placed on baby Aspirin but recently his cardiologist switched it to Plavix. He denies h/o head trauma, any type serious or chronic infection of the head or chronic inflammatory disease. He states that during hospitalization for stroke and f/up with stroke clinic, Dr. Martínez, he was told that they did not find any other cause with the exception of the narrowing of the vessels in his head. The patient and his son claim that he does daily exercise and drinks plenty of water. He had another brain MRI in past few month but the images were not available for me to compare with the MRI in the April 2020.\par

## 2023-08-15 ENCOUNTER — APPOINTMENT (OUTPATIENT)
Dept: CARDIOLOGY | Facility: CLINIC | Age: 63
End: 2023-08-15
Payer: MEDICARE

## 2023-08-15 ENCOUNTER — NON-APPOINTMENT (OUTPATIENT)
Age: 63
End: 2023-08-15

## 2023-08-16 PROCEDURE — G2066: CPT

## 2023-08-16 PROCEDURE — 93298 REM INTERROG DEV EVAL SCRMS: CPT

## 2023-08-22 ENCOUNTER — APPOINTMENT (OUTPATIENT)
Dept: GASTROENTEROLOGY | Facility: CLINIC | Age: 63
End: 2023-08-22

## 2023-09-19 ENCOUNTER — NON-APPOINTMENT (OUTPATIENT)
Age: 63
End: 2023-09-19

## 2023-09-19 ENCOUNTER — APPOINTMENT (OUTPATIENT)
Dept: CARDIOLOGY | Facility: CLINIC | Age: 63
End: 2023-09-19
Payer: MEDICARE

## 2023-09-20 PROCEDURE — 93298 REM INTERROG DEV EVAL SCRMS: CPT

## 2023-09-20 PROCEDURE — G2066: CPT

## 2023-10-24 ENCOUNTER — NON-APPOINTMENT (OUTPATIENT)
Age: 63
End: 2023-10-24

## 2023-10-24 ENCOUNTER — APPOINTMENT (OUTPATIENT)
Dept: CARDIOLOGY | Facility: CLINIC | Age: 63
End: 2023-10-24
Payer: MEDICARE

## 2023-10-24 PROCEDURE — G2066: CPT | Mod: NC

## 2023-10-24 PROCEDURE — 93298 REM INTERROG DEV EVAL SCRMS: CPT

## 2023-11-27 ENCOUNTER — APPOINTMENT (OUTPATIENT)
Dept: CARDIOLOGY | Facility: CLINIC | Age: 63
End: 2023-11-27
Payer: MEDICARE

## 2023-11-27 ENCOUNTER — NON-APPOINTMENT (OUTPATIENT)
Age: 63
End: 2023-11-27

## 2023-11-28 PROCEDURE — G2066: CPT | Mod: NC

## 2023-11-28 PROCEDURE — 93298 REM INTERROG DEV EVAL SCRMS: CPT

## 2023-12-07 NOTE — OCCUPATIONAL THERAPY INITIAL EVALUATION ADULT - ASR WT BEARING STATUS EVAL
Monitor: The problem is unchanged. Evaluation: No labs/tests required today. Assessment/Treatment:  Continue current treatment/monitoring regimen. no weight-bearing restrictions

## 2023-12-28 ENCOUNTER — NON-APPOINTMENT (OUTPATIENT)
Age: 63
End: 2023-12-28

## 2023-12-28 ENCOUNTER — APPOINTMENT (OUTPATIENT)
Dept: CARDIOLOGY | Facility: CLINIC | Age: 63
End: 2023-12-28
Payer: MEDICARE

## 2023-12-29 PROCEDURE — 93298 REM INTERROG DEV EVAL SCRMS: CPT

## 2023-12-29 PROCEDURE — G2066: CPT

## 2024-01-29 ENCOUNTER — NON-APPOINTMENT (OUTPATIENT)
Age: 64
End: 2024-01-29

## 2024-01-29 ENCOUNTER — APPOINTMENT (OUTPATIENT)
Dept: CARDIOLOGY | Facility: CLINIC | Age: 64
End: 2024-01-29
Payer: MEDICARE

## 2024-01-30 PROCEDURE — 93298 REM INTERROG DEV EVAL SCRMS: CPT

## 2024-02-23 ENCOUNTER — INPATIENT (INPATIENT)
Facility: HOSPITAL | Age: 64
LOS: 3 days | Discharge: HOME HEALTH | DRG: 314 | End: 2024-02-27
Attending: PSYCHIATRY & NEUROLOGY | Admitting: INTERNAL MEDICINE
Payer: MEDICARE

## 2024-02-23 VITALS
OXYGEN SATURATION: 99 % | HEART RATE: 63 BPM | RESPIRATION RATE: 22 BRPM | TEMPERATURE: 97 F | SYSTOLIC BLOOD PRESSURE: 107 MMHG | DIASTOLIC BLOOD PRESSURE: 59 MMHG | WEIGHT: 179.02 LBS

## 2024-02-23 DIAGNOSIS — Z86.79 PERSONAL HISTORY OF OTHER DISEASES OF THE CIRCULATORY SYSTEM: Chronic | ICD-10-CM

## 2024-02-23 DIAGNOSIS — Z92.89 PERSONAL HISTORY OF OTHER MEDICAL TREATMENT: Chronic | ICD-10-CM

## 2024-02-23 DIAGNOSIS — R55 SYNCOPE AND COLLAPSE: ICD-10-CM

## 2024-02-23 LAB
ALBUMIN SERPL ELPH-MCNC: 4.1 G/DL — SIGNIFICANT CHANGE UP (ref 3.5–5.2)
ALP SERPL-CCNC: 94 U/L — SIGNIFICANT CHANGE UP (ref 30–115)
ALT FLD-CCNC: 19 U/L — SIGNIFICANT CHANGE UP (ref 0–41)
ANION GAP SERPL CALC-SCNC: 10 MMOL/L — SIGNIFICANT CHANGE UP (ref 7–14)
AST SERPL-CCNC: 23 U/L — SIGNIFICANT CHANGE UP (ref 0–41)
BASOPHILS # BLD AUTO: 0.08 K/UL — SIGNIFICANT CHANGE UP (ref 0–0.2)
BASOPHILS NFR BLD AUTO: 0.6 % — SIGNIFICANT CHANGE UP (ref 0–1)
BILIRUB SERPL-MCNC: <0.2 MG/DL — SIGNIFICANT CHANGE UP (ref 0.2–1.2)
BUN SERPL-MCNC: 23 MG/DL — HIGH (ref 10–20)
CALCIUM SERPL-MCNC: 9.3 MG/DL — SIGNIFICANT CHANGE UP (ref 8.4–10.5)
CHLORIDE SERPL-SCNC: 103 MMOL/L — SIGNIFICANT CHANGE UP (ref 98–110)
CO2 SERPL-SCNC: 25 MMOL/L — SIGNIFICANT CHANGE UP (ref 17–32)
CREAT SERPL-MCNC: 1.3 MG/DL — SIGNIFICANT CHANGE UP (ref 0.7–1.5)
EGFR: 61 ML/MIN/1.73M2 — SIGNIFICANT CHANGE UP
EOSINOPHIL # BLD AUTO: 0.33 K/UL — SIGNIFICANT CHANGE UP (ref 0–0.7)
EOSINOPHIL NFR BLD AUTO: 2.5 % — SIGNIFICANT CHANGE UP (ref 0–8)
GLUCOSE SERPL-MCNC: 93 MG/DL — SIGNIFICANT CHANGE UP (ref 70–99)
HCT VFR BLD CALC: 36.6 % — LOW (ref 42–52)
HGB BLD-MCNC: 11.5 G/DL — LOW (ref 14–18)
IMM GRANULOCYTES NFR BLD AUTO: 0.8 % — HIGH (ref 0.1–0.3)
LYMPHOCYTES # BLD AUTO: 24.2 % — SIGNIFICANT CHANGE UP (ref 20.5–51.1)
LYMPHOCYTES # BLD AUTO: 3.19 K/UL — SIGNIFICANT CHANGE UP (ref 1.2–3.4)
MCHC RBC-ENTMCNC: 26.1 PG — LOW (ref 27–31)
MCHC RBC-ENTMCNC: 31.4 G/DL — LOW (ref 32–37)
MCV RBC AUTO: 83 FL — SIGNIFICANT CHANGE UP (ref 80–94)
MONOCYTES # BLD AUTO: 1.23 K/UL — HIGH (ref 0.1–0.6)
MONOCYTES NFR BLD AUTO: 9.3 % — SIGNIFICANT CHANGE UP (ref 1.7–9.3)
NEUTROPHILS # BLD AUTO: 8.23 K/UL — HIGH (ref 1.4–6.5)
NEUTROPHILS NFR BLD AUTO: 62.6 % — SIGNIFICANT CHANGE UP (ref 42.2–75.2)
NRBC # BLD: 0 /100 WBCS — SIGNIFICANT CHANGE UP (ref 0–0)
PLATELET # BLD AUTO: 337 K/UL — SIGNIFICANT CHANGE UP (ref 130–400)
PMV BLD: 11.8 FL — HIGH (ref 7.4–10.4)
POTASSIUM SERPL-MCNC: 5.1 MMOL/L — HIGH (ref 3.5–5)
POTASSIUM SERPL-SCNC: 5.1 MMOL/L — HIGH (ref 3.5–5)
PROT SERPL-MCNC: 7.2 G/DL — SIGNIFICANT CHANGE UP (ref 6–8)
RBC # BLD: 4.41 M/UL — LOW (ref 4.7–6.1)
RBC # FLD: 16.9 % — HIGH (ref 11.5–14.5)
SODIUM SERPL-SCNC: 138 MMOL/L — SIGNIFICANT CHANGE UP (ref 135–146)
TROPONIN T, HIGH SENSITIVITY RESULT: 13 NG/L — SIGNIFICANT CHANGE UP (ref 6–21)
WBC # BLD: 13.17 K/UL — HIGH (ref 4.8–10.8)
WBC # FLD AUTO: 13.17 K/UL — HIGH (ref 4.8–10.8)

## 2024-02-23 PROCEDURE — 80048 BASIC METABOLIC PNL TOTAL CA: CPT

## 2024-02-23 PROCEDURE — 80061 LIPID PANEL: CPT

## 2024-02-23 PROCEDURE — 80053 COMPREHEN METABOLIC PANEL: CPT

## 2024-02-23 PROCEDURE — 71046 X-RAY EXAM CHEST 2 VIEWS: CPT | Mod: 26

## 2024-02-23 PROCEDURE — 84443 ASSAY THYROID STIM HORMONE: CPT

## 2024-02-23 PROCEDURE — 86900 BLOOD TYPING SEROLOGIC ABO: CPT

## 2024-02-23 PROCEDURE — 97166 OT EVAL MOD COMPLEX 45 MIN: CPT | Mod: GO

## 2024-02-23 PROCEDURE — 93306 TTE W/DOPPLER COMPLETE: CPT

## 2024-02-23 PROCEDURE — C1894: CPT

## 2024-02-23 PROCEDURE — 86850 RBC ANTIBODY SCREEN: CPT

## 2024-02-23 PROCEDURE — 97162 PT EVAL MOD COMPLEX 30 MIN: CPT | Mod: GP

## 2024-02-23 PROCEDURE — 70450 CT HEAD/BRAIN W/O DYE: CPT | Mod: 26,MC

## 2024-02-23 PROCEDURE — 83735 ASSAY OF MAGNESIUM: CPT

## 2024-02-23 PROCEDURE — 76937 US GUIDE VASCULAR ACCESS: CPT

## 2024-02-23 PROCEDURE — 93290 INTERROG DEV EVAL ICPMS IP: CPT

## 2024-02-23 PROCEDURE — 70496 CT ANGIOGRAPHY HEAD: CPT | Mod: MC

## 2024-02-23 PROCEDURE — 85576 BLOOD PLATELET AGGREGATION: CPT

## 2024-02-23 PROCEDURE — 72125 CT NECK SPINE W/O DYE: CPT | Mod: 26,MC

## 2024-02-23 PROCEDURE — C1887: CPT

## 2024-02-23 PROCEDURE — 85025 COMPLETE CBC W/AUTO DIFF WBC: CPT

## 2024-02-23 PROCEDURE — 36415 COLL VENOUS BLD VENIPUNCTURE: CPT

## 2024-02-23 PROCEDURE — 86901 BLOOD TYPING SEROLOGIC RH(D): CPT

## 2024-02-23 PROCEDURE — C1769: CPT

## 2024-02-23 PROCEDURE — 85027 COMPLETE CBC AUTOMATED: CPT

## 2024-02-23 PROCEDURE — 36227 PLACE CATH XTRNL CAROTID: CPT | Mod: 50

## 2024-02-23 PROCEDURE — 70551 MRI BRAIN STEM W/O DYE: CPT | Mod: MC

## 2024-02-23 PROCEDURE — 99285 EMERGENCY DEPT VISIT HI MDM: CPT | Mod: GC

## 2024-02-23 PROCEDURE — 36226 PLACE CATH VERTEBRAL ART: CPT | Mod: LT

## 2024-02-23 PROCEDURE — 0042T: CPT

## 2024-02-23 PROCEDURE — 36224 PLACE CATH CAROTD ART: CPT | Mod: 50

## 2024-02-23 PROCEDURE — 95819 EEG AWAKE AND ASLEEP: CPT

## 2024-02-23 PROCEDURE — 70498 CT ANGIOGRAPHY NECK: CPT | Mod: MC

## 2024-02-23 NOTE — ED PROVIDER NOTE - OBJECTIVE STATEMENT
64yoM PMHx CVA 2020 and 2021 status post intracranial angioplasty of right MCA and NORMAN, aortic insufficiency, implantable loop recorder for prior episodes of syncope, presenting for episode of syncope at about 6:30 PM tonight.  Per patient and wife at bedside patient was stepping out of the shower when he suddenly passed out and fell to the floor, his wife heard the fall from the next room and came to help him immediately, found him awake, alert but confused about how he fell.  Wife reports patient seems more confused for about 20 minutes after the fall, but denies any unilateral symptoms, weakness, change in gait or speech slurring. Patient denies any current symptoms, weakness, headache, difficulty walking, numbness, tingling, vision changes, chest pain, shortness of breath, nausea.  Patient and wife at bedside also denies any recent illness, recent fever, chills, vomiting, diarrhea    Cardiologist: Dr. Washington

## 2024-02-23 NOTE — ED ADULT NURSE NOTE - OBJECTIVE STATEMENT
Pt c/o a syncopal episode. Pt's wife states pt was stepping out of shower and fell down. (+) LOC for a few seconds as per wife. (+) HT; abrasion to top of scalp

## 2024-02-23 NOTE — ED PROVIDER NOTE - PROGRESS NOTE DETAILS
Results discussed with patient and wife at bedside, who agreed with plan for admission.  Patient continues to deny any current symptoms, ambulatory to the bathroom without difficulty.

## 2024-02-23 NOTE — ED PROVIDER NOTE - CLINICAL SUMMARY MEDICAL DECISION MAKING FREE TEXT BOX
64-year-old male, past medical history of hypertension, syncope status post ILR placement by Dr. 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  Wife heard a thud and ran to her  who came to quickly and was alert and orientated.  Denies fevers, chills, headache, chest pain or shortness of breath, nausea, vomiting, abdominal pain, seizure-like activity, incontinence.  He states that he has had syncopal episodes in the past.  Cardiologist Dr. Washington but states he has not had a cardiac workup in several years.  Labs and EKG ordered and reviewed.  EKG normal sinus rhythm largely unchanged from prior.  Imaging ordered and reviewed. 64-year-old male, past medical history of hypertension, syncope status post ILR placement by Dr. 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  Wife heard a thud and ran to her  who came to quickly and was alert and orientated.  Denies fevers, chills, headache, chest pain or shortness of breath, nausea, vomiting, abdominal pain, seizure-like activity, incontinence.  He states that he has had syncopal episodes in the past.  Cardiologist Dr. Washington but states he has not had a cardiac workup in several years.  Labs and EKG ordered and reviewed.  EKG normal sinus rhythm largely unchanged from prior.  Imaging ordered and reviewed. Escalation to admission considered. Discussed all results with patient and wife at bedside. 64-year-old male, past medical history of hypertension, syncope status post ILR placement by Dr. 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  Wife heard a thud and ran to her  who came to quickly and was alert and orientated.  Denies fevers, chills, headache, chest pain or shortness of breath, nausea, vomiting, abdominal pain, seizure-like activity, incontinence.  He states that he has had syncopal episodes in the past.  Cardiologist Dr. Washington but states he has not had a cardiac workup in several years.  Labs and EKG ordered and reviewed.  EKG normal sinus rhythm largely unchanged from prior.  Imaging ordered and reviewed. Escalation to admission considered. Discussed all results with patient and wife at bedside. Neurology consulted for CT findings. Patient requires admission.

## 2024-02-24 LAB
ANION GAP SERPL CALC-SCNC: 12 MMOL/L — SIGNIFICANT CHANGE UP (ref 7–14)
BUN SERPL-MCNC: 19 MG/DL — SIGNIFICANT CHANGE UP (ref 10–20)
CALCIUM SERPL-MCNC: 8.9 MG/DL — SIGNIFICANT CHANGE UP (ref 8.4–10.4)
CHLORIDE SERPL-SCNC: 103 MMOL/L — SIGNIFICANT CHANGE UP (ref 98–110)
CO2 SERPL-SCNC: 21 MMOL/L — SIGNIFICANT CHANGE UP (ref 17–32)
CREAT SERPL-MCNC: 1.2 MG/DL — SIGNIFICANT CHANGE UP (ref 0.7–1.5)
EGFR: 67 ML/MIN/1.73M2 — SIGNIFICANT CHANGE UP
GLUCOSE SERPL-MCNC: 86 MG/DL — SIGNIFICANT CHANGE UP (ref 70–99)
HCT VFR BLD CALC: 40.2 % — LOW (ref 42–52)
HGB BLD-MCNC: 12.5 G/DL — LOW (ref 14–18)
MCHC RBC-ENTMCNC: 25.8 PG — LOW (ref 27–31)
MCHC RBC-ENTMCNC: 31.1 G/DL — LOW (ref 32–37)
MCV RBC AUTO: 83.1 FL — SIGNIFICANT CHANGE UP (ref 80–94)
NRBC # BLD: 0 /100 WBCS — SIGNIFICANT CHANGE UP (ref 0–0)
PLATELET # BLD AUTO: 324 K/UL — SIGNIFICANT CHANGE UP (ref 130–400)
PMV BLD: 12 FL — HIGH (ref 7.4–10.4)
POTASSIUM SERPL-MCNC: 4.5 MMOL/L — SIGNIFICANT CHANGE UP (ref 3.5–5)
POTASSIUM SERPL-SCNC: 4.5 MMOL/L — SIGNIFICANT CHANGE UP (ref 3.5–5)
RBC # BLD: 4.84 M/UL — SIGNIFICANT CHANGE UP (ref 4.7–6.1)
RBC # FLD: 16.9 % — HIGH (ref 11.5–14.5)
SODIUM SERPL-SCNC: 136 MMOL/L — SIGNIFICANT CHANGE UP (ref 135–146)
WBC # BLD: 10.34 K/UL — SIGNIFICANT CHANGE UP (ref 4.8–10.8)
WBC # FLD AUTO: 10.34 K/UL — SIGNIFICANT CHANGE UP (ref 4.8–10.8)

## 2024-02-24 PROCEDURE — 70496 CT ANGIOGRAPHY HEAD: CPT | Mod: 26

## 2024-02-24 PROCEDURE — 99223 1ST HOSP IP/OBS HIGH 75: CPT

## 2024-02-24 PROCEDURE — 70551 MRI BRAIN STEM W/O DYE: CPT | Mod: 26

## 2024-02-24 PROCEDURE — 93291 INTERROG DEV EVAL SCRMS IP: CPT | Mod: 26

## 2024-02-24 PROCEDURE — 70498 CT ANGIOGRAPHY NECK: CPT | Mod: 26

## 2024-02-24 RX ORDER — ASPIRIN/CALCIUM CARB/MAGNESIUM 324 MG
2 TABLET ORAL
Refills: 0 | DISCHARGE

## 2024-02-24 RX ORDER — CLOPIDOGREL BISULFATE 75 MG/1
75 TABLET, FILM COATED ORAL DAILY
Refills: 0 | Status: DISCONTINUED | OUTPATIENT
Start: 2024-02-24 | End: 2024-02-27

## 2024-02-24 RX ORDER — ADALIMUMAB 40MG/0.8ML
0 KIT SUBCUTANEOUS
Qty: 0 | Refills: 0 | DISCHARGE

## 2024-02-24 RX ORDER — SIMVASTATIN 20 MG/1
40 TABLET, FILM COATED ORAL AT BEDTIME
Refills: 0 | Status: DISCONTINUED | OUTPATIENT
Start: 2024-02-24 | End: 2024-02-24

## 2024-02-24 RX ORDER — ENOXAPARIN SODIUM 100 MG/ML
40 INJECTION SUBCUTANEOUS EVERY 24 HOURS
Refills: 0 | Status: DISCONTINUED | OUTPATIENT
Start: 2024-02-24 | End: 2024-02-27

## 2024-02-24 RX ORDER — VENLAFAXINE HCL 75 MG
37.5 CAPSULE, EXT RELEASE 24 HR ORAL DAILY
Refills: 0 | Status: DISCONTINUED | OUTPATIENT
Start: 2024-02-24 | End: 2024-02-27

## 2024-02-24 RX ORDER — SIMVASTATIN 20 MG/1
80 TABLET, FILM COATED ORAL AT BEDTIME
Refills: 0 | Status: DISCONTINUED | OUTPATIENT
Start: 2024-02-24 | End: 2024-02-24

## 2024-02-24 RX ORDER — LOSARTAN POTASSIUM 100 MG/1
50 TABLET, FILM COATED ORAL DAILY
Refills: 0 | Status: DISCONTINUED | OUTPATIENT
Start: 2024-02-24 | End: 2024-02-27

## 2024-02-24 RX ORDER — ASPIRIN/CALCIUM CARB/MAGNESIUM 324 MG
81 TABLET ORAL DAILY
Refills: 0 | Status: DISCONTINUED | OUTPATIENT
Start: 2024-02-24 | End: 2024-02-27

## 2024-02-24 RX ORDER — ATORVASTATIN CALCIUM 80 MG/1
80 TABLET, FILM COATED ORAL AT BEDTIME
Refills: 0 | Status: DISCONTINUED | OUTPATIENT
Start: 2024-02-24 | End: 2024-02-25

## 2024-02-24 RX ORDER — VENLAFAXINE HCL 75 MG
37.5 CAPSULE, EXT RELEASE 24 HR ORAL DAILY
Refills: 0 | Status: DISCONTINUED | OUTPATIENT
Start: 2024-02-24 | End: 2024-02-24

## 2024-02-24 RX ADMIN — Medication 81 MILLIGRAM(S): at 11:51

## 2024-02-24 RX ADMIN — Medication 37.5 MILLIGRAM(S): at 11:52

## 2024-02-24 RX ADMIN — LOSARTAN POTASSIUM 50 MILLIGRAM(S): 100 TABLET, FILM COATED ORAL at 05:21

## 2024-02-24 RX ADMIN — CLOPIDOGREL BISULFATE 75 MILLIGRAM(S): 75 TABLET, FILM COATED ORAL at 11:51

## 2024-02-24 RX ADMIN — ATORVASTATIN CALCIUM 80 MILLIGRAM(S): 80 TABLET, FILM COATED ORAL at 22:44

## 2024-02-24 NOTE — PATIENT PROFILE ADULT - NSPROPTRIGHTCAREGIVER_GEN_A_NUR
[Normocephalic] : normocephalic [Atraumatic] : atraumatic [EOMI] : extra ocular movement intact [PERRL] : pupils equal, round and reactive to light [Sclera nonicteric] : sclera nonicteric [Supple] : supple [No Supraclavicular Adenopathy] : no supraclavicular adenopathy [No Cervical Adenopathy] : no cervical adenopathy [No Thyromegaly] : no thyromegaly [Examined in the supine and seated position] : examined in the supine and seated position [Asymmetrical] : asymmetrical [Grade 2] : Ptosis Grade 2 [Breast Mass Right Breast ___cm] : no masses [Breast Mass Left Breast ___cm] : no masses [Breast Nipple Inversion] : nipples not inverted [Breast Nipple Retraction] : nipples not retracted [Breast Nipple Flattening] : nipples not flattened [Breast Nipple Fissures] : nipples not fissured [No Axillary Lymphadenopathy] : no left axillary lymphadenopathy [No Edema] : no edema [No Swelling] : no swelling [No Rashes] : no rashes [No Ulceration] : no ulceration [de-identified] : non-labored respirations  [de-identified] : left inner breast incision well-healed [de-identified] : well-healed incision declines [de-identified] : some limited ROM of left shoulder

## 2024-02-24 NOTE — PHYSICAL THERAPY INITIAL EVALUATION ADULT - ADDITIONAL COMMENTS
Pt resides with wife in a private house using no steps to enter, 1 flight of stairs to access bedroom/shower. Pt used to be independent with BADLs, able to ambulate using no AD at home, RW for outdoors.

## 2024-02-24 NOTE — PATIENT PROFILE ADULT - NSPROEXTENSIONSOFSELF_GEN_A_NUR
"Subjective:     Janae Clarke is a 1 y.o. female here with parents. Patient brought in for   Well Child    History of anemia - resolved on last check. Lead 1.6 --> 1.1 on previous checks. Egg and dairy allergies - follows with Dr. Larios, tolerates baked egg and milk, has a qvar but has never needed. Seen in urgent care on 8/2 with fever and diagnosed with left ear infection, COVID negative at that visit.    Nutrition: wnl, tends to snack, drinks soy milk or water    Sleep: sleeps well, no snoring or gasping    Development: WNL  SWYC 30-MONTH DEVELOPMENTAL MILESTONES BREAK 8/10/2022 8/10/2022   Names at least one color - very much   Tries to get you to watch by saying "Look at me" - very much   Says his or her first name when asked - very much   Draws lines - very much   Talks so other people can understand him or her most of the time - very much   Washes and dries hands without help (even if you turn on the water) - very much   Asks questions beginning with "why" or "how" - like "Why no cookie?" - very much   Explains the reasons for things, like needing a sweater when its cold - very much   Compares things - using words like "bigger" or "shorter" - very much   Answers questions like "What do you do when you are cold?" or "when you are sleepy?" - very much   (Patient-Entered) Total Development Score - 30 months 20 -       1 y.o. 10 m.o.    Needs review if Total Development score is :  Below 10 (2 year 5 month old)  Below 11 (2 year 6 month old)  Below 12 (2 year 7 month old)  Below 13 (2 year 8 month old)  Below 14 (2 year 9-10 month old)    Dental: brushing teeth BID, has seen a dentist    Stooling and voiding normally    Forward-facing car seat    Review of Systems  A comprehensive review of symptoms was completed and negative except as noted above.    Objective:     Physical Exam  Vitals reviewed.   Constitutional:       General: She is active.      Appearance: She is well-developed.   HENT:      " Right Ear: Tympanic membrane normal.      Left Ear: Tympanic membrane normal.      Mouth/Throat:      Mouth: Mucous membranes are moist.      Pharynx: Oropharynx is clear.   Eyes:      General:         Right eye: No discharge.         Left eye: No discharge.      Conjunctiva/sclera: Conjunctivae normal.      Comments: Corneal light reflex symmetric   Cardiovascular:      Rate and Rhythm: Normal rate and regular rhythm.      Pulses:           Radial pulses are 2+ on the right side and 2+ on the left side.      Heart sounds: S1 normal and S2 normal. Murmur (2/6 still's murmur) heard.   Pulmonary:      Effort: Pulmonary effort is normal. No retractions.      Breath sounds: Normal breath sounds.   Abdominal:      General: Bowel sounds are normal. There is no distension.      Palpations: Abdomen is soft. There is no mass.      Tenderness: There is no abdominal tenderness. There is no guarding.   Genitourinary:     Comments:  exam normal, no labial adhesions  Musculoskeletal:         General: Normal range of motion.      Cervical back: Normal range of motion.      Comments: Knees symmetric when bent in supine position, normal hip abduction   Skin:     General: Skin is warm.      Coloration: Skin is not jaundiced.      Findings: No rash.   Neurological:      Mental Status: She is alert.           Assessment:     1. Encounter for well child check without abnormal findings     2. Encounter for screening for developmental delay  SWYC-Developmental Test     Otitis resolved   Plan:     1. Growth and development appropriate   2. Age-appropriate anticipatory guidance given and questions answered regarding nutrition, development and behavior, sleep, dental health, and safety.  3. Immunizations today: none, UTD  4. Follow up at age 1    Marah Nieto MD  8/10/2022             none

## 2024-02-24 NOTE — CONSULT NOTE ADULT - ATTENDING COMMENTS
64M w/ PMH of ischemic stroke (R. MCA infarction s/p MCA/NORMAN angioplasty and stenting by Dr. Aquino in 2021, on ASA and Plavix since then, but both meds were held for 5 days prior to endoscopy on 2/18, and were resumed on 2/21) presented to the ER w/ LOC episode, found to have multifocal infarction involving bilateral hemispheres, w/ CTA demonstrating stenosis surrounding and within the stent construct. Concern for in-stent stenosis.    MR brain 2.24.24 multifocal punctate infarction in the R. MCA and LMCA territory. Multifocal chronic infarction involving the R. frontal subcortical white matter, L. BG.   CTA h/n moderate-severe stenosis of the R. supcraclinoid ICA proximal, distal and within the stent construct. Moderate L. M1 stenosis (stable).   MR brain 4.29.2020 demonstrates multiple small and punctate acute infarctions within the R. medial frontal and parietal lobes.   DSA 2.3.2021 demonstrated severe stenosis of the R. M1 and R. A1 and L. M2 inferior division.   MR brain 6.7.2023 recurrent R. MCA infarction.   DSA 6.30.2021 s/p angioplasty and stenting of R. M1 and A1  DSA 8.30.2021 severe R. M1 and R. A1 stenosis   QMRA NOVA R. M1 flow 26, L. M1 flow 67  DSA 2.17.2022 largely stable R. M1 and R. A1 stenosis  SPECT 3.29.2022 Significant decrease blood flow to the right MCA/NORMAN territories with Diamox which improves on non-Diamox suggesting reversible perfusion defect.    Imp: Multifocal infarctions involving the R. MCA and L. MCA territory in patient with known history of in-stent stenosis of the R. MCA/NORMAN intracranial stent. The mechanism of stroke likely secondary to intracranial large artery atherosclerotic disease via hypoperfusion and thromoembolism. Its possible that the syncope episode may have resulted in a transient hypoperfusion state which may have exacerbated the small strokes, as well as the temporary holding of the antiplatelets agents for the recent endoscopy. The in-stent stenosis appears stable when comparing CTA to prior DSAs.     Plan:   -Agree with cadiac workup for syncope episodes. Avoiding these episode will be important to prevent recurrent transient cerebral ischemic particularly given the known in-stent stenosis  -Continue ASA 81mg + Plavix 75mg; Please check P2Y12 level   -Continue statin   -REINALDO consult for possible DSA - probably does not need to be done since imaging appears stable   -Routine EEG (though clinical history most consistent with syncope)   -tele    75 minutes spent on total encounter. The necessity of the time spent during the encounter on this date of service was due to:     Review of imaging and chart; obtaining history; examination of pt; discussion and coordination of care, and discussion of lifestyle modification and risk factor control.

## 2024-02-24 NOTE — H&P ADULT - HISTORY OF PRESENT ILLNESS
64yoM PMHx CVA 2020 and 2021 status post intracranial angioplasty of right MCA and NORMAN, aortic valve insufficiency, implantable loop recorder for prior episodes of syncope/NSVT, chronic anemia, CKD stage 2, presenting for episode of syncope at about 6:30 PM tonight.  Per patient and wife at bedside patient was stepping out of the shower when he suddenly passed out and fell to the floor, his wife heard the fall from the next room and came to help him immediately, found him awake, alert but confused about how he fell.  Wife reports patient seems more confused for about 20 minutes after the fall, but denies any unilateral symptoms, weakness, change in gait or speech slurring. Patient denies any current symptoms, weakness, headache, difficulty walking, numbness, tingling, vision changes, chest pain, shortness of breath, nausea.  Patient and wife at bedside also denies any recent illness, recent fever, chills, vomiting, diarrhea    Labs significant for WBC 13, Hg 11.5, trop 13, Cr 1.3 (baseline)    Imaging: CTh/n showing subacute/chronic L parietal infarction    EKG: Sinus, rate 60    Pt evaluated by neurology in ED, rec for CTa h/n and MR head non-con.    Vitals  T(C): 36.9 (02-23-24 @ 23:45), Max: 36.9 (02-23-24 @ 23:45)  T(F): 98.5 (02-23-24 @ 23:45), Max: 98.5 (02-23-24 @ 23:45)  HR: 57 (02-23-24 @ 23:45) (57 - 63)  BP: 128/78 (02-23-24 @ 23:45) (107/59 - 128/78)  RR: 18 (02-23-24 @ 23:45) (18 - 22)  SpO2: 98% (02-23-24 @ 23:45) (98% - 99%)  Wt(kg): --    Review of Systems  CONSTITUTIONAL:  No weakness, fevers or chills  EYES/ENT:  No visual changes;  No vertigo or throat pain   NECK:  No pain or stiffness  RESPIRATORY:  No cough, wheezing, hemoptysis; No shortness of breath  CARDIOVASCULAR:  No chest pain or palpitations  GASTROINTESTINAL:  No abdominal or epigastric pain. No nausea, vomiting, or hematemesis; No diarrhea or constipation. No melena or hematochezia.  GENITOURINARY:  No dysuria, frequency or hematuria  NEUROLOGICAL:  No numbness or weakness  SKIN:  No itching, rashes      Physical Exam  GENERAL: Awake, alert. Well-nourished, laying in bed appearing in no acute distress  HEENT: No FNDs, atraumatic, normocephalic, moist mucus membranes  LUNGS: Clear to auscultation bilaterally  HEART: S1/S2. No heaves or thrills  ABD: Soft, non-tender, non-distended. Bowel sounds present in all quadrants.  EXT/NEURO: 4/5 strength LUE  and elbow ext. Sensation and ROM grossly intact.  SKIN: No breaks, erythema, edema

## 2024-02-24 NOTE — H&P ADULT - ATTENDING COMMENTS
Pt seen and examined in ED4 today. History confirmed with the pt and wife. Previous notes reviewed by me. Pt was leaving the shower when he passed out. + fall.  + confusion afterwards about 20 minutes per wife. No trauma. He has loop recorder for h/o syncope. Pt currently feels well and has no complaints of pain, SOB, N/V, dizziness. He ambulated on his own and with physical therapy. ROS negative    T(F): 97.9 (02-24-24 @ 07:44), Max: 98.5 (02-23-24 @ 23:45)  HR: 69 (02-24-24 @ 07:44) (57 - 69)  BP: 139/70 (02-24-24 @ 07:44) (107/59 - 149/71)  RR: 18 (02-24-24 @ 07:44) (18 - 22)  SpO2: 100% (02-24-24 @ 07:44) (98% - 100%)    PHYSICAL EXAM:  GENERAL: NAD  HEAD:  Normocephalic  EYES:  conjunctiva and sclera clear  ENMT: Moist mucous membranes  NECK: Supple, No JVD  NERVOUS SYSTEM:  Alert, awake, Good concentration, EOMI, mild Left LE weakness 5-/5, otherwise 5/5, no facial droop, clear speech  CHEST/LUNG: CTA b/l  HEART: Regular rate and rhythm  ABDOMEN: Soft, Nontender, Nondistended; Bowel sounds present  EXTREMITIES: No edema LE  SKIN: warm, dry    Consultant(s) Notes Reviewed:  [x ] YES  [ ] NO  Care Discussed with Consultants/Other Providers [ x] YES  [ ] NO  EKG and Telemetry reviewed by me    LABS:                        12.5   10.34 )-----------( 324      ( 24 Feb 2024 08:24 )             40.2     02-24    136  |  103  |  19  ----------------------------<  86  4.5   |  21  |  1.2    Ca    8.9      24 Feb 2024 08:24  TPro  7.2  /  Alb  4.1  /  TBili  <0.2  /  DBili  x   /  AST  23  /  ALT  19  /  AlkPhos  94  02-23    RADIOLOGY & ADDITIONAL TESTS:  Imaging report Personally Reviewed:  [ x] YES  [ ] NO  Case discussed with residents, pt, wife and RN on rounds today    65 y/o man with PMH of CVA 2020 and 2021 s/p intracranial angioplasty of right MCA and NORMAN, aortic valve insufficiency, implantable loop recorder for prior episodes of syncope/NSVT, chronic anemia, CKD 2, psoriatic arthritis presented for syncopal episode when leaving the shower.     1. Syncope  EKG with NSR and LVH  telemetry - no events  s/p interrogation of loop recorder - no events per EP  negative orthostatic changes  could have been due to hypotension from recent addition of nifedipine -> stop nifedipine  fall precautions  previous ECHO 4/23: EF 58%    2. Acute stroke and worsening stensoses   h/o CVA 2020 and 2021s/p intracranial angioplasty of right MCA and NORMAN  MRI brain:  Small acute cortical and subcortical infarcts along within the right frontal lobe and left parietal and occipital lobes.  Multiple chronic infarcts in the right frontal lobe, left cerebellar hemisphere, right aspect of the madhu, and bilateral basal ganglia and corona radiata.  Status post right NORMAN and MCA stenting.   CTA head/neck: The patient is status post interval placement of a stent within the right ICA terminus extending into the right MCA and NORMAN.  There is moderate/severe stenosis of the right supraclinoid ICA (just proximal to the stent) as well as severe stenoses of the right NORMAN and MCA just distal to the stents. The lumen of the stent also appears stenotic. Moderate stenosis of the left MCA M1 segment and a few mild distal   left MCA stenoses, about stable.  neurology informed of the results and will discuss with neuroendovascular  on DAPT and statin    3. CKD 2 stable  4. DVT prophylaxis    full code  very guarded prognosis  high risk pt

## 2024-02-24 NOTE — CONSULT NOTE ADULT - ASSESSMENT
64M w/ PMH of Ischemic stroke 2020 and 2021 s/p intracranial angioplasty of right MCA and NORMAN, aortic insufficiency, implantable loop recorder for prior episodes of syncope who is admitted for syncopal workup. Neurology was consulted for CTH findings of hypodensity in L parietal lobe.     Recommendations  - CTA H&N  - MR Brain without contrast  - TTE  - continue asa 81 mg    Case discussed with Dr. Florence

## 2024-02-24 NOTE — CONSULT NOTE ADULT - SUBJECTIVE AND OBJECTIVE BOX
Patient is a 64y old  Male who presents with a chief complaint of syncope (24 Feb 2024 00:56)        HPI:    64yoM PMHx CVA 2020 and 2021 status post intracranial angioplasty of right MCA and NORMAN, aortic valve insufficiency, implantable loop recorder for prior episodes of syncope/NSVT, chronic anemia, CKD stage 2, presenting for episode of syncope at about 6:30 PM tonight.  Per patient and wife at bedside patient was stepping out of the shower when he suddenly passed out and fell to the floor, his wife heard the fall from the next room and came to help him immediately, found him awake, alert but confused about how he fell.  Wife reports patient seems more confused for about 20 minutes after the fall, but denies any unilateral symptoms, weakness, change in gait or speech slurring. Patient denies any current symptoms, weakness, headache, difficulty walking, numbness, tingling, vision changes, chest pain, shortness of breath, nausea.  Patient and wife at bedside also denies any recent illness, recent fever, chills, vomiting, diarrhea      PAST MEDICAL & SURGICAL HISTORY:  HTN (hypertension)      CVA (cerebral vascular accident)      Psoriatic arthritis      Hemiparesis, left  resolved Syncope      H/O syncope      Aortic insufficiency  Moderate      NSVT (nonsustained ventricular tachycardia)      H/O angiography      H/O cerebral artery stenosis  Cerebral angiogram with angioplaty R MI and AI severe stenosis R MCCA stenting 08/5/2021 R NORMAN 06/30/2021                      PREVIOUS DIAGNOSTIC TESTING:      ECHO  FINDINGS:  < from: TTE Echo Complete w/o Contrast w/ Doppler (04.02.23 @ 14:03) >   1. LV Ejection Fraction by Poe's Method with a biplane EF of 58 %.   2. Normal global left ventricular systolic function.   3. Endocardial visualization was enhanced with intravenous echo contrast.   4. Isolated basal septal thickening (sigmoid septum) measuring 1.7 cm   with no evidence of LIGIA or LVOT obstruction.   5. Trace mitral valve regurgitation.   6. Aortic valve thickening with decreased leaflet opening.   7. Mild aortic regurgitation.   8. There is mild aortic root calcification.   9. Normal left atrial size.  10. Normal right atrial size.  11. Dilatation of the aortic root.    < end of copied text >    STRESS  FINDINGS:    CATHETERIZATION  FINDINGS:    ELECTROPHYSIOLOGY STUDY  FINDINGS:    CAROTID ULTRASOUND:  FINDINGS    VENOUS DUPLEX SCAN:  FINDINGS:    CHEST CT PULMONARY ANGIO with IV Contrast:  FINDINGS:    MEDICATIONS  (STANDING):  aspirin enteric coated 81 milliGRAM(s) Oral daily  clopidogrel Tablet 75 milliGRAM(s) Oral daily  enoxaparin Injectable 40 milliGRAM(s) SubCutaneous every 24 hours  losartan 50 milliGRAM(s) Oral daily  simvastatin 40 milliGRAM(s) Oral at bedtime  venlafaxine XR. 37.5 milliGRAM(s) Oral daily    MEDICATIONS  (PRN):      FAMILY HISTORY:  Family history of stroke (cerebrovascular) (Mother)    FH: CAD (coronary artery disease)    FH: hypertension        SOCIAL HISTORY:    CIGARETTES:    ALCOHOL:    Past Surgical History:    Allergies:    atorvastatin (Diarrhea (Severe))      REVIEW OF SYSTEMS:    CONSTITUTIONAL: No fever, weight loss, chills, shakes, or fatigue  EYES: No eye pain, visual disturbances, or discharge  ENMT:  No difficulty hearing, tinnitus, vertigo; No sinus or throat pain  NECK: No pain or stiffness  BREASTS: No pain, masses, or nipple discharge  RESPIRATORY: No cough, wheezing, hemoptysis, or shortness of breath  CARDIOVASCULAR: No chest pain, dyspnea, palpitations, dizziness, syncope, paroxysmal nocturnal dyspnea, orthopnea, or arm or leg swelling  GASTROINTESTINAL: No abdominal  or epigastric pain, nausea, vomiting, hematemesis, diarrhea, constipation, melena or bright red blood.  GENITOURINARY: No dysuria, nocturia, hematuria, or urinary incontinence  NEUROLOGICAL: No headaches, memory loss, slurred speech, limb weakness, loss of strength, numbness, or tremors  SKIN: No itching, burning, rashes, or lesions   LYMPH NODES: No enlarged glands  ENDOCRINE: No heat or cold intolerance, or hair loss  MUSCULOSKELETAL: No joint pain or swelling, muscle, back, or extremity pain  PSYCHIATRIC: No depression, anxiety, or difficulty sleeping  HEME/LYMPH: No easy bruising or bleeding gums  ALLERY AND IMMUNOLOGIC: No hives or rash.      Vital Signs Last 24 Hrs  T(C): 36.6 (24 Feb 2024 07:44), Max: 36.9 (23 Feb 2024 23:45)  T(F): 97.9 (24 Feb 2024 07:44), Max: 98.5 (23 Feb 2024 23:45)  HR: 69 (24 Feb 2024 07:44) (57 - 69)  BP: 139/70 (24 Feb 2024 07:44) (107/59 - 149/71)  BP(mean): --  RR: 18 (24 Feb 2024 07:44) (18 - 22)  SpO2: 100% (24 Feb 2024 07:44) (98% - 100%)    Parameters below as of 24 Feb 2024 07:44  Patient On (Oxygen Delivery Method): room air        PHYSICAL EXAM:        GENERAL: In no apparent distress, well nourished, and hydrated.  HEAD:  Atraumatic, Normocephalic  EYES: EOMI, PERRLA, conjunctiva and sclera clear  ENMT: No tonsillar erythema, exudates, or enlargements; ist mucous membranes, Good dentition, No lesions  NECK: Supple and normal thyroid.  No JVD or carotid bruit.  Carotid pulse is 2+ bilaterally.  HEART: Regular rate and rhythm; No murmurs, rubs, or gallops.  PULMONARY: Clear to auscultation and perfusion.  No rales, wheezing, or rhonchi bilaterally.  ABDOMEN: Soft, Nontender, Nondistended; Bowel sounds present  EXTREMITIES:  2+ Peripheral Pulses, No clubbing, cyanosis, or edema  LYMPH: No lymphadenopathy noted  NEUROLOGICAL: Grossly nonfocal      INTERPRETATION OF TELEMETRY:    ECG:    I&O's Detail      LABS:                        12.5   10.34 )-----------( 324      ( 24 Feb 2024 08:24 )             40.2     02-24    136  |  103  |  19  ----------------------------<  86  4.5   |  21  |  1.2    Ca    8.9      24 Feb 2024 08:24    TPro  7.2  /  Alb  4.1  /  TBili  <0.2  /  DBili  x   /  AST  23  /  ALT  19  /  AlkPhos  94  02-23          Urinalysis Basic - ( 24 Feb 2024 08:24 )    Color: x / Appearance: x / SG: x / pH: x  Gluc: 86 mg/dL / Ketone: x  / Bili: x / Urobili: x   Blood: x / Protein: x / Nitrite: x   Leuk Esterase: x / RBC: x / WBC x   Sq Epi: x / Non Sq Epi: x / Bacteria: x      BNP  I&O's Detail    Daily     Daily     RADIOLOGY & ADDITIONAL STUDIES:      < from: 12 Lead ECG (02.23.24 @ 19:24) >  Ventricular Rate 60 BPM    Atrial Rate 60 BPM    P-R Interval 184 ms    QRS Duration 78 ms    Q-T Interval 402 ms    QTC Calculation(Bazett) 402 ms    P Axis 35 degrees    R Axis -5 degrees    T Axis 29 degrees    Diagnosis Line Normal sinus rhythm  Minimal voltage criteria for LVH, may be normal variant ( R in aVL )  Borderline ECG    Confirmed by vanessa bowen ([id number]) on 2/23/2024 8:58:37 PM    < end of copied text >   Patient is a 64y old  Male who presents with a chief complaint of syncope (24 Feb 2024 00:56)        HPI:    64yoM PMHx CVA 2020 and 2021 status post intracranial angioplasty of right MCA and NORMAN, aortic valve insufficiency, implantable loop recorder for prior episodes of syncope/NSVT, chronic anemia, CKD stage 2, presenting for episode of syncope at about 6:30 PM tonight.  Per patient and wife at bedside patient was stepping out of the shower when he suddenly passed out and fell to the floor, his wife heard the fall from the next room and came to help him immediately, found him awake, alert but confused about how he fell.  Wife reports patient seems more confused for about 20 minutes after the fall, but denies any unilateral symptoms, weakness, change in gait or speech slurring. Patient denies any current symptoms, weakness, headache, difficulty walking, numbness, tingling, vision changes, chest pain, shortness of breath, nausea.  Patient and wife at bedside also denies any recent illness, recent fever, chills, vomiting, diarrhea      Patient had been started on therapy one recently.    Look record interrogation showed no arrhythmias.      PAST MEDICAL & SURGICAL HISTORY:  HTN (hypertension)      CVA (cerebral vascular accident)      Psoriatic arthritis      Hemiparesis, left  resolved Syncope      H/O syncope      Aortic insufficiency  Moderate      NSVT (nonsustained ventricular tachycardia)      H/O angiography      H/O cerebral artery stenosis  Cerebral angiogram with angioplaty R MI and AI severe stenosis R MCCA stenting 08/5/2021 R NORMAN 06/30/2021                      PREVIOUS DIAGNOSTIC TESTING:      ECHO  FINDINGS:  < from: TTE Echo Complete w/o Contrast w/ Doppler (04.02.23 @ 14:03) >   1. LV Ejection Fraction by Poe's Method with a biplane EF of 58 %.   2. Normal global left ventricular systolic function.   3. Endocardial visualization was enhanced with intravenous echo contrast.   4. Isolated basal septal thickening (sigmoid septum) measuring 1.7 cm   with no evidence of LIGIA or LVOT obstruction.   5. Trace mitral valve regurgitation.   6. Aortic valve thickening with decreased leaflet opening.   7. Mild aortic regurgitation.   8. There is mild aortic root calcification.   9. Normal left atrial size.  10. Normal right atrial size.  11. Dilatation of the aortic root.    < end of copied text >    STRESS  FINDINGS:    CATHETERIZATION  FINDINGS:    ELECTROPHYSIOLOGY STUDY  FINDINGS:    CAROTID ULTRASOUND:  FINDINGS    VENOUS DUPLEX SCAN:  FINDINGS:    CHEST CT PULMONARY ANGIO with IV Contrast:  FINDINGS:    MEDICATIONS  (STANDING):  aspirin enteric coated 81 milliGRAM(s) Oral daily  clopidogrel Tablet 75 milliGRAM(s) Oral daily  enoxaparin Injectable 40 milliGRAM(s) SubCutaneous every 24 hours  losartan 50 milliGRAM(s) Oral daily  simvastatin 40 milliGRAM(s) Oral at bedtime  venlafaxine XR. 37.5 milliGRAM(s) Oral daily    MEDICATIONS  (PRN):      FAMILY HISTORY:  Family history of stroke (cerebrovascular) (Mother)    FH: CAD (coronary artery disease)    FH: hypertension        SOCIAL HISTORY:    CIGARETTES:    ALCOHOL:    Past Surgical History:    Allergies:    atorvastatin (Diarrhea (Severe))      REVIEW OF SYSTEMS:    CONSTITUTIONAL: No fever, weight loss, chills, shakes, or fatigue  EYES: No eye pain, visual disturbances, or discharge  ENMT:  No difficulty hearing, tinnitus, vertigo; No sinus or throat pain  NECK: No pain or stiffness  BREASTS: No pain, masses, or nipple discharge  RESPIRATORY: No cough, wheezing, hemoptysis, or shortness of breath  CARDIOVASCULAR: No chest pain, dyspnea, palpitations, dizziness, syncope, paroxysmal nocturnal dyspnea, orthopnea, or arm or leg swelling  GASTROINTESTINAL: No abdominal  or epigastric pain, nausea, vomiting, hematemesis, diarrhea, constipation, melena or bright red blood.  GENITOURINARY: No dysuria, nocturia, hematuria, or urinary incontinence  NEUROLOGICAL: No headaches, memory loss, slurred speech, limb weakness, loss of strength, numbness, or tremors  SKIN: No itching, burning, rashes, or lesions   LYMPH NODES: No enlarged glands  ENDOCRINE: No heat or cold intolerance, or hair loss  MUSCULOSKELETAL: No joint pain or swelling, muscle, back, or extremity pain  PSYCHIATRIC: No depression, anxiety, or difficulty sleeping  HEME/LYMPH: No easy bruising or bleeding gums  ALLERY AND IMMUNOLOGIC: No hives or rash.      Vital Signs Last 24 Hrs  T(C): 36.6 (24 Feb 2024 07:44), Max: 36.9 (23 Feb 2024 23:45)  T(F): 97.9 (24 Feb 2024 07:44), Max: 98.5 (23 Feb 2024 23:45)  HR: 69 (24 Feb 2024 07:44) (57 - 69)  BP: 139/70 (24 Feb 2024 07:44) (107/59 - 149/71)  BP(mean): --  RR: 18 (24 Feb 2024 07:44) (18 - 22)  SpO2: 100% (24 Feb 2024 07:44) (98% - 100%)    Parameters below as of 24 Feb 2024 07:44  Patient On (Oxygen Delivery Method): room air        PHYSICAL EXAM:        GENERAL: In no apparent distress, well nourished, and hydrated.  HEAD:  Atraumatic, Normocephalic  EYES: EOMI, PERRLA, conjunctiva and sclera clear  ENMT: No tonsillar erythema, exudates, or enlargements; ist mucous membranes, Good dentition, No lesions  NECK: Supple and normal thyroid.  No JVD or carotid bruit.  Carotid pulse is 2+ bilaterally.  HEART: Regular rate and rhythm; No murmurs, rubs, or gallops.  PULMONARY: Clear to auscultation and perfusion.  No rales, wheezing, or rhonchi bilaterally.  ABDOMEN: Soft, Nontender, Nondistended; Bowel sounds present  EXTREMITIES:  2+ Peripheral Pulses, No clubbing, cyanosis, or edema  LYMPH: No lymphadenopathy noted  NEUROLOGICAL: Grossly nonfocal      INTERPRETATION OF TELEMETRY:    ECG:    I&O's Detail      LABS:                        12.5   10.34 )-----------( 324      ( 24 Feb 2024 08:24 )             40.2     02-24    136  |  103  |  19  ----------------------------<  86  4.5   |  21  |  1.2    Ca    8.9      24 Feb 2024 08:24    TPro  7.2  /  Alb  4.1  /  TBili  <0.2  /  DBili  x   /  AST  23  /  ALT  19  /  AlkPhos  94  02-23          Urinalysis Basic - ( 24 Feb 2024 08:24 )    Color: x / Appearance: x / SG: x / pH: x  Gluc: 86 mg/dL / Ketone: x  / Bili: x / Urobili: x   Blood: x / Protein: x / Nitrite: x   Leuk Esterase: x / RBC: x / WBC x   Sq Epi: x / Non Sq Epi: x / Bacteria: x      BNP  I&O's Detail    Daily     Daily     RADIOLOGY & ADDITIONAL STUDIES:      < from: 12 Lead ECG (02.23.24 @ 19:24) >  Ventricular Rate 60 BPM    Atrial Rate 60 BPM    P-R Interval 184 ms    QRS Duration 78 ms    Q-T Interval 402 ms    QTC Calculation(Bazett) 402 ms    P Axis 35 degrees    R Axis -5 degrees    T Axis 29 degrees    Diagnosis Line Normal sinus rhythm  Minimal voltage criteria for LVH, may be normal variant ( R in aVL )  Borderline ECG    Confirmed by vanessa bowen ([id number]) on 2/23/2024 8:58:37 PM    < end of copied text >

## 2024-02-24 NOTE — H&P ADULT - ASSESSMENT
64yoM PMHx CVA 2020 and 2021 status post intracranial angioplasty of right MCA and NORMAN, aortic valve insufficiency, implantable loop recorder for prior episodes of syncope/NSVT, chronic anemia, CKD stage 2, presenting for episode of syncope at about 6:30 PM tonight    #Syncope likely orthostatic vs vasovagal  -pt recently started nife 30 q12 at home, wife states BP usually 130s-140s but possible that this is too low  -orthostatics, re-introduce home BP meds as tolerated  -EEG given slow to come to after fall  -neuro- ct angio h/n, non-con MR brain  -loop recorder interrogation, tele x24hrs    #Leukocytosis  -likely reactionary, no signs of infection  -trend for now    #DVT ppx  Lovenox    #Diet  -dash/tlc       #Activity  -IAT 64yoM PMHx CVA 2020 and 2021 status post intracranial angioplasty of right MCA and NORMAN, aortic valve insufficiency, implantable loop recorder for prior episodes of syncope/NSVT, chronic anemia, CKD stage 2, presenting for episode of syncope at about 6:30 PM tonight    #Syncope likely orthostatic vs vasovagal  -pt recently started nife 30 q12 at home, wife states BP usually 130s-140s but possible that this is too low  -orthostatics, re-introduce home BP meds as tolerated  -TTE eval for worsening AS  -EEG given slow to come to after fall  -neuro- ct angio h/n, non-con MR brain  -loop recorder interrogation, tele x24hrs    #Leukocytosis  -likely reactionary, no signs of infection  -trend for now    #DVT ppx  Lovenox    #Diet  -dash/tlc       #Activity  -IAT

## 2024-02-24 NOTE — H&P ADULT - NSHPLABSRESULTS_GEN_ALL_CORE
CBC Full  -  ( 23 Feb 2024 20:50 )  WBC Count : 13.17 K/uL  RBC Count : 4.41 M/uL  Hemoglobin : 11.5 g/dL  Hematocrit : 36.6 %  Platelet Count - Automated : 337 K/uL  Mean Cell Volume : 83.0 fL  Mean Cell Hemoglobin : 26.1 pg  Mean Cell Hemoglobin Concentration : 31.4 g/dL  Auto Neutrophil # : 8.23 K/uL  Auto Lymphocyte # : 3.19 K/uL  Auto Monocyte # : 1.23 K/uL  Auto Eosinophil # : 0.33 K/uL  Auto Basophil # : 0.08 K/uL  Auto Neutrophil % : 62.6 %  Auto Lymphocyte % : 24.2 %  Auto Monocyte % : 9.3 %  Auto Eosinophil % : 2.5 %  Auto Basophil % : 0.6 %    02-23    138  |  103  |  23<H>  ----------------------------<  93  5.1<H>   |  25  |  1.3    Ca    9.3      23 Feb 2024 20:50    TPro  7.2  /  Alb  4.1  /  TBili  <0.2  /  DBili  x   /  AST  23  /  ALT  19  /  AlkPhos  94  02-23    LIVER FUNCTIONS - ( 23 Feb 2024 20:50 )  Alb: 4.1 g/dL / Pro: 7.2 g/dL / ALK PHOS: 94 U/L / ALT: 19 U/L / AST: 23 U/L / GGT: x           Urinalysis Basic - ( 23 Feb 2024 20:50 )    Color: x / Appearance: x / SG: x / pH: x  Gluc: 93 mg/dL / Ketone: x  / Bili: x / Urobili: x   Blood: x / Protein: x / Nitrite: x   Leuk Esterase: x / RBC: x / WBC x   Sq Epi: x / Non Sq Epi: x / Bacteria: x

## 2024-02-24 NOTE — PHYSICAL THERAPY INITIAL EVALUATION ADULT - GENERAL OBSERVATIONS, REHAB EVAL
10:15-10:40. chart reviewed. Pt received semi-marie at B/S, alert, oriented, able to follow multi-step instructions and agreeable to PT evaluation. + IV, on RA, VSS, -ve orthostatic, denies pain or discomfort. VSS. NAD. Pt demonstrated baseline mobility, able to ambulate for 150 ft using RW, negotiate 13 steps using 1 HR under supervision. Pt is not candidate for PT at this time, may benefit from home PT. further PT referral PRN

## 2024-02-24 NOTE — CONSULT NOTE ADULT - SUBJECTIVE AND OBJECTIVE BOX
NEUROLOGY CONSULT    HPI: 64M w/ PMH of Ischemic stroke 2020 and 2021 s/p intracranial angioplasty of right MCA and NORMAN, aortic insufficiency, implantable loop recorder for prior episodes of syncope, presenting for episode of syncope at about 6:30 PM tonight.  Pt notes he was coming out of the shower and then all of a sudden passed out and fell to the floor. This lasted for about 1- min. His wife found him on the floor and he was awake but he did not know how he fell. he denies any numbness, weakness, diplopia, blurry vision, headache, or any focal deficits. Neurology was consulted because of his [facility] findings.      MEDICATIONS  Home Medications:  aspirin 81 mg oral capsule: 2 orally once a day (01 Apr 2023 23:19)  HUMIRA PEN 40/0.4ML INJ:  (01 Apr 2023 23:20)  simvastatin 40 mg oral tablet: 2 tab(s) orally once a day (at bedtime) (01 Apr 2023 23:18)    MEDICATIONS  (STANDING):    MEDICATIONS  (PRN):      FAMILY HISTORY:  Family history of stroke (cerebrovascular) (Mother)    FH: CAD (coronary artery disease)    FH: hypertension      SOCIAL HISTORY: negative for tobacco, alcohol, or ilicit drug use.    Allergies    atorvastatin (Diarrhea (Severe))    Intolerances        Neurological Examination:  General:  Appearance is consistent with chronologic age.   Cognitive/Language:  Awake, alert, and oriented to person, place, time and date.  Recent and remote memory intact.  Fund of knowledge is appropriate.  Naming, repetition and comprehension intact. Nondysarthric.    Cranial Nerves  - Eyes: Visual fields full.  EOMI w/o nystagmus, skew or reported double vision.  PERRL.  No ptosis/weakness of eyelid closure.    - Face:  Facial sensation normal V1 - 3, no facial asymmetry.    - Ears/Nose/Throat:  Hearing grossly intact b/l to finger rub.  Palate elevates midline.  Tongue and uvula midline.   Motor exam: Normal tone and bulk. No tenderness, twitching, tremors or involuntary movements.            Upper extremity                  Bicep     Tricep     HG                                                 R      5/5        5/5          5/5                                                    L       5/5        5/5          5/5              Lower extremity                   HF        KE        DF         PF                                                  R     5/5       5/5       5/5       5/5                                               L      5/5      5/5       5/5        5/5    Sensory examination:  Intact to light touch and pinprick, pain, temperature and proprioception and vibration in all extremities.  Reflexes: 2+ b/l biceps, triceps, patella and achilles.  Plantar response downgoing b/l.  Scarlet, clonus absent.  Cerebellum: FTN/HKS intact.  No dysmetria.      LABS:                        11.5   13.17 )-----------( 337      ( 23 Feb 2024 20:50 )             36.6     02-23    138  |  103  |  23<H>  ----------------------------<  93  5.1<H>   |  25  |  1.3    Ca    9.3      23 Feb 2024 20:50    TPro  7.2  /  Alb  4.1  /  TBili  <0.2  /  DBili  x   /  AST  23  /  ALT  19  /  AlkPhos  94  02-23    Hemoglobin A1C:   Vitamin B12     CAPILLARY BLOOD GLUCOSE          Urinalysis Basic - ( 23 Feb 2024 20:50 )    Color: x / Appearance: x / SG: x / pH: x  Gluc: 93 mg/dL / Ketone: x  / Bili: x / Urobili: x   Blood: x / Protein: x / Nitrite: x   Leuk Esterase: x / RBC: x / WBC x   Sq Epi: x / Non Sq Epi: x / Bacteria: x            Microbiology:      RADIOLOGY, EKG AND ADDITIONAL TESTS: Reviewed.

## 2024-02-24 NOTE — PHYSICAL THERAPY INITIAL EVALUATION ADULT - PERTINENT HX OF CURRENT PROBLEM, REHAB EVAL
64yoM PMHx CVA 2020 and 2021 status post intracranial angioplasty of right MCA and NORMAN, aortic valve insufficiency, implantable loop recorder for prior episodes of syncope/NSVT, chronic anemia, CKD stage 2, presenting for episode of syncope at about 6:30 PM tonight.  Per patient and wife at bedside patient was stepping out of the shower when he suddenly passed out and fell to the floor, his wife heard the fall from the next room and came to help him immediately, found him awake, alert but confused about how he fell.  Wife reports patient seems more confused for about 20 minutes after the fall, but denies any unilateral symptoms, weakness, change in gait or speech slurring. Patient denies any current symptoms, weakness, headache, difficulty walking, numbness, tingling, vision changes, chest pain, shortness of breath, nausea.  Patient and wife at bedside also denies any recent illness, recent fever, chills, vomiting, diarrhea

## 2024-02-24 NOTE — CONSULT NOTE ADULT - TIME BILLING
Syncope Syncope dash likely related to vasovagal syncope slash orthostatic hypotension  -Patient has preserved LV function and no arrhythmias in the loop recorder  - recommend nuclear stress test  - hold Nifedipine   - restart home dose of Losartan

## 2024-02-24 NOTE — PATIENT PROFILE ADULT - FALL HARM RISK - HARM RISK INTERVENTIONS
Assistance with ambulation/Assistance OOB with selected safe patient handling equipment/Communicate Risk of Fall with Harm to all staff/Monitor gait and stability/Reinforce activity limits and safety measures with patient and family/Review medications for side effects contributing to fall risk/Sit up slowly, dangle for a short time, stand at bedside before walking/Tailored Fall Risk Interventions/Toileting schedule using arm’s reach rule for commode and bathroom/Visual Cue: Yellow wristband and red socks/Bed in lowest position, wheels locked, appropriate side rails in place/Call bell, personal items and telephone in reach/Instruct patient to call for assistance before getting out of bed or chair/Non-slip footwear when patient is out of bed/[place] to call system/Physically safe environment - no spills, clutter or unnecessary equipment/Purposeful Proactive Rounding/Room/bathroom lighting operational, light cord in reach

## 2024-02-25 LAB
ALBUMIN SERPL ELPH-MCNC: 4.1 G/DL — SIGNIFICANT CHANGE UP (ref 3.5–5.2)
ALP SERPL-CCNC: 79 U/L — SIGNIFICANT CHANGE UP (ref 30–115)
ALT FLD-CCNC: 14 U/L — SIGNIFICANT CHANGE UP (ref 0–41)
ANION GAP SERPL CALC-SCNC: 11 MMOL/L — SIGNIFICANT CHANGE UP (ref 7–14)
AST SERPL-CCNC: 13 U/L — SIGNIFICANT CHANGE UP (ref 0–41)
BASOPHILS # BLD AUTO: 0.1 K/UL — SIGNIFICANT CHANGE UP (ref 0–0.2)
BASOPHILS NFR BLD AUTO: 1.1 % — HIGH (ref 0–1)
BILIRUB SERPL-MCNC: 0.4 MG/DL — SIGNIFICANT CHANGE UP (ref 0.2–1.2)
BLD GP AB SCN SERPL QL: SIGNIFICANT CHANGE UP
BUN SERPL-MCNC: 23 MG/DL — HIGH (ref 10–20)
CALCIUM SERPL-MCNC: 9.5 MG/DL — SIGNIFICANT CHANGE UP (ref 8.4–10.4)
CHLORIDE SERPL-SCNC: 105 MMOL/L — SIGNIFICANT CHANGE UP (ref 98–110)
CO2 SERPL-SCNC: 26 MMOL/L — SIGNIFICANT CHANGE UP (ref 17–32)
CREAT SERPL-MCNC: 1.5 MG/DL — SIGNIFICANT CHANGE UP (ref 0.7–1.5)
EGFR: 51 ML/MIN/1.73M2 — LOW
EOSINOPHIL # BLD AUTO: 0.44 K/UL — SIGNIFICANT CHANGE UP (ref 0–0.7)
EOSINOPHIL NFR BLD AUTO: 4.8 % — SIGNIFICANT CHANGE UP (ref 0–8)
GLUCOSE SERPL-MCNC: 89 MG/DL — SIGNIFICANT CHANGE UP (ref 70–99)
HCT VFR BLD CALC: 41.3 % — LOW (ref 42–52)
HGB BLD-MCNC: 13 G/DL — LOW (ref 14–18)
IMM GRANULOCYTES NFR BLD AUTO: 0.7 % — HIGH (ref 0.1–0.3)
LYMPHOCYTES # BLD AUTO: 3.92 K/UL — HIGH (ref 1.2–3.4)
LYMPHOCYTES # BLD AUTO: 42.5 % — SIGNIFICANT CHANGE UP (ref 20.5–51.1)
MAGNESIUM SERPL-MCNC: 2.1 MG/DL — SIGNIFICANT CHANGE UP (ref 1.8–2.4)
MCHC RBC-ENTMCNC: 26.1 PG — LOW (ref 27–31)
MCHC RBC-ENTMCNC: 31.5 G/DL — LOW (ref 32–37)
MCV RBC AUTO: 82.8 FL — SIGNIFICANT CHANGE UP (ref 80–94)
MONOCYTES # BLD AUTO: 0.92 K/UL — HIGH (ref 0.1–0.6)
MONOCYTES NFR BLD AUTO: 10 % — HIGH (ref 1.7–9.3)
NEUTROPHILS # BLD AUTO: 3.79 K/UL — SIGNIFICANT CHANGE UP (ref 1.4–6.5)
NEUTROPHILS NFR BLD AUTO: 40.9 % — LOW (ref 42.2–75.2)
NRBC # BLD: 0 /100 WBCS — SIGNIFICANT CHANGE UP (ref 0–0)
PLATELET # BLD AUTO: 331 K/UL — SIGNIFICANT CHANGE UP (ref 130–400)
PMV BLD: 12 FL — HIGH (ref 7.4–10.4)
POTASSIUM SERPL-MCNC: 4.6 MMOL/L — SIGNIFICANT CHANGE UP (ref 3.5–5)
POTASSIUM SERPL-SCNC: 4.6 MMOL/L — SIGNIFICANT CHANGE UP (ref 3.5–5)
PROT SERPL-MCNC: 6.6 G/DL — SIGNIFICANT CHANGE UP (ref 6–8)
RBC # BLD: 4.99 M/UL — SIGNIFICANT CHANGE UP (ref 4.7–6.1)
RBC # FLD: 17.2 % — HIGH (ref 11.5–14.5)
SODIUM SERPL-SCNC: 142 MMOL/L — SIGNIFICANT CHANGE UP (ref 135–146)
WBC # BLD: 9.23 K/UL — SIGNIFICANT CHANGE UP (ref 4.8–10.8)
WBC # FLD AUTO: 9.23 K/UL — SIGNIFICANT CHANGE UP (ref 4.8–10.8)

## 2024-02-25 PROCEDURE — 99233 SBSQ HOSP IP/OBS HIGH 50: CPT

## 2024-02-25 PROCEDURE — 95816 EEG AWAKE AND DROWSY: CPT | Mod: 26

## 2024-02-25 RX ORDER — SIMVASTATIN 20 MG/1
40 TABLET, FILM COATED ORAL AT BEDTIME
Refills: 0 | Status: DISCONTINUED | OUTPATIENT
Start: 2024-02-25 | End: 2024-02-27

## 2024-02-25 RX ADMIN — CLOPIDOGREL BISULFATE 75 MILLIGRAM(S): 75 TABLET, FILM COATED ORAL at 11:43

## 2024-02-25 RX ADMIN — Medication 81 MILLIGRAM(S): at 11:43

## 2024-02-25 RX ADMIN — LOSARTAN POTASSIUM 50 MILLIGRAM(S): 100 TABLET, FILM COATED ORAL at 05:18

## 2024-02-25 RX ADMIN — SIMVASTATIN 40 MILLIGRAM(S): 20 TABLET, FILM COATED ORAL at 22:15

## 2024-02-25 RX ADMIN — Medication 37.5 MILLIGRAM(S): at 11:43

## 2024-02-25 NOTE — PROGRESS NOTE ADULT - ASSESSMENT
64M w/ PMH of ischemic stroke (R. MCA infarction s/p MCA/NORMAN angioplasty and stenting by Dr. Aquino in 2021, on ASA and Plavix since then, but both meds were held for 5 days prior to endoscopy on 2/18, and were resumed on 2/21) presented to the ER w/ LOC episode, found to have multifocal infarction involving bilateral hemispheres, w/ CTA demonstrating stenosis surrounding and within the stent construct. Concern for in-stent stenosis. MRI as above with multifocal punctate infarcts of different vascular territories. The mechanism of stroke likely secondary to intracranial large artery atherosclerotic disease via syncopal hypoperfusion and thromboembolism. Current NIHS 0.      Recommendations:   Continue with DAPT.   rEEG.   Check P2y12.  REINALDO consult for DSA  Cardiac work up for syncope.   Please avoid cerebral hypoperfusion.   Tele, Q8 vitals and stroke neuro checks.     Thank you for the courtesy of this consult, Please contact us if any neurological changes or questions, neurology team will continue to follow.  Recommendations not final until attending attestation.

## 2024-02-25 NOTE — PROGRESS NOTE ADULT - ASSESSMENT
63 y/o man with PMH of CVA 2020 and 2021 s/p intracranial angioplasty of right MCA and NORMAN, aortic valve insufficiency, implantable loop recorder for prior episodes of syncope/NSVT, chronic anemia, CKD 2, psoriatic arthritis presented for syncopal episode when leaving the shower.     1. Syncope  EKG with NSR and LVH  telemetry - no events  s/p interrogation of loop recorder - no events per EP  negative orthostatic changes  could have been due to hypotension from recent addition of nifedipine -> stopped nifedipine  fall precautions  previous ECHO 4/23: EF 58%  avoid hypotension    2. Acute stroke and worsening stenoses   h/o CVA 2020 and 2021s/p intracranial angioplasty of right MCA and NORMAN  MRI brain:  Small acute cortical and subcortical infarcts along within the right frontal lobe and left parietal and occipital lobes.  Multiple chronic infarcts in the right frontal lobe, left cerebellar hemisphere, right aspect of the madhu, and bilateral basal ganglia and corona radiata.  Status post right NORMAN and MCA stenting.   CTA head/neck: The patient is status post interval placement of a stent within the right ICA terminus extending into the right MCA and NORMAN.  There is moderate/severe stenosis of the right supraclinoid ICA (just proximal to the stent) as well as severe stenoses of the right NORMAN and MCA just distal to the stents. The lumen of the stent also appears stenotic. Moderate stenosis of the left MCA M1 segment and a few mild distal left MCA stenoses, about stable.  neurology consult and f/u appreciated  check P2Y12 level   f/u EEG report  neuroendovascular consulted  on DAPT and statin    3. CKD 2 stable    4. DVT prophylaxis      full code  very guarded prognosis  high risk pt         PROGRESS NOTE HANDOFF    Pending: neuroendovascular consult, P2Y12 level (resident to send tomorrow), BP monitoring, f/u EEG report    Family discussion: with wife at bedside today    Disposition: from home   63 y/o man with PMH of CVA 2020 and 2021 s/p intracranial angioplasty of right MCA and NORMAN, aortic valve insufficiency, implantable loop recorder for prior episodes of syncope/NSVT, chronic anemia, CKD 2, psoriatic arthritis presented for syncopal episode when leaving the shower.     1. Syncope  EKG with NSR and LVH  telemetry - no events  s/p interrogation of loop recorder - no events per EP  negative orthostatic changes  could have been due to hypotension from recent addition of nifedipine -> stopped nifedipine  fall precautions  previous ECHO 4/23: EF 58%  avoid hypotension    2. Acute stroke and worsening stenoses   h/o CVA 2020 and 2021s/p intracranial angioplasty of right MCA and NORMAN  MRI brain:  Small acute cortical and subcortical infarcts along within the right frontal lobe and left parietal and occipital lobes.  Multiple chronic infarcts in the right frontal lobe, left cerebellar hemisphere, right aspect of the madhu, and bilateral basal ganglia and corona radiata.  Status post right NORMAN and MCA stenting.   CTA head/neck: The patient is status post interval placement of a stent within the right ICA terminus extending into the right MCA and NORMAN.  There is moderate/severe stenosis of the right supraclinoid ICA (just proximal to the stent) as well as severe stenoses of the right NORMAN and MCA just distal to the stents. The lumen of the stent also appears stenotic. Moderate stenosis of the left MCA M1 segment and a few mild distal left MCA stenoses, about stable.  neurology consult and f/u appreciated  check P2Y12 level   f/u EEG report  neuroendovascular consulted  on DAPT and statin  no need for nuclear stress test now per my conversation with EP attending    3. CKD 2 stable    4. DVT prophylaxis      full code  very guarded prognosis  high risk pt         PROGRESS NOTE HANDOFF    Pending: neuroendovascular consult, P2Y12 level (resident to send tomorrow), BP monitoring, f/u EEG report    Family discussion: with wife at bedside today    Disposition: from home

## 2024-02-25 NOTE — EEG REPORT - NS EEG TEXT BOX
VEENA BRANDON [id number]     Study Date: 02-25-24  Duration: 20 min  --------------------------------------------------------------------------------------------------  History:  CC/ HPI Patient is a 64y old  Male who presents with a chief complaint of syncope (25 Feb 2024 14:15)    MEDICATIONS  (STANDING):  aspirin enteric coated 81 milliGRAM(s) Oral daily  atorvastatin 80 milliGRAM(s) Oral at bedtime  clopidogrel Tablet 75 milliGRAM(s) Oral daily  enoxaparin Injectable 40 milliGRAM(s) SubCutaneous every 24 hours  losartan 50 milliGRAM(s) Oral daily  venlafaxine XR. 37.5 milliGRAM(s) Oral daily    --------------------------------------------------------------------------------------------------  Study Interpretation:    [[[Abbreviation Key:  PDR=alpha rhythm/posterior dominant rhythm. A-P=anterior posterior.  Amplitude: ‘very low’:<20; ‘low’:20-49; ‘medium’:; ‘high’:>150uV.  Persistence for periodic/rhythmic patterns (% of epoch) ‘rare’:<1%; ‘occasional’:1-10%; ‘frequent’:10-50%; ‘abundant’:50-90%; ‘continuous’:>90%.  Persistence for sporadic discharges: ‘rare’:<1/hr; ‘occasional’:1/min-1/hr; ‘frequent’:>1/min; ‘abundant’:>1/10 sec.  RPP=rhythmic and periodic patterns; GRDA=generalized rhythmic delta activity; FIRDA=frontal intermittent GRDA; LRDA=lateralized rhythmic delta activity; TIRDA=temporal intermittent rhythmic delta activity;  LPD=PLED=lateralized periodic discharges; GPD=generalized periodic discharges; BIPDs =bilateral independent periodic discharges; Mf=multifocal; SIRPDs=stimulus induced rhythmic, periodic, or ictal appearing discharges; BIRDs=brief potentially ictal rhythmic discharges >4 Hz, lasting .5-10s; PFA (paroxysmal bursts >13 Hz or =8 Hz <10s).  Modifiers: +F=with fast component; +S=with spike component; +R=with rhythmic component.  S-B=burst suppression pattern.  Max=maximal. N1-drowsy; N2-stage II sleep; N3-slow wave sleep. SSS/BETS=small sharp spikes/benign epileptiform transients of sleep. HV=hyperventilation; PS=photic stimulation]]]    Daily EEG Visual Analysis    FINDINGS:      Background:  Continuity: Continuous  Symmetry: Symmetric  Posterior dominant rhythm (PDR): 8.5 Hz, reactive to eye closure. Symmetric low-amplitude frontal beta in wakefulness.  Voltage: Normal  Anterior-Posterior Gradient: Present  Other background findings: None  Breach: Absent    Background Slowing:  Generalized slowing: None  Focal slowing: No clear focal slowing    State Changes:   Drowsiness is characterized by slowing of the background activity.  Stage 2 sleep is not captured.    Interictal Findings:  None    Electrographic and Electroclinical seizures:  None    Other Clinical Events:  None    Activation Procedures:   Hyperventilation is not performed.    Photic stimulation is not performed.    Artifacts:  Intermittent myogenic and movement artifacts are present. Frequent electrode movement artifacts and poor impedance intermittently limit interpretation.    EKG:  Single-lead EKG shows regular rhythm.    EEG Classification / Summary:  Normal routine EEG in the awake, drowsy states.   No epileptiform abnormalities are captured.     Clinical Impression:  A normal routine EEG neither refutes nor supports a diagnosis of epilepsy.           -------------------------------------------------------------------------------------------------------    Sigrid Abel MD  Attending Physician, [facility]    -------------------------------------------------------------------------------------------------------    To reach EEG technologist:  Please use the pager number for the appropriate hospital or contact the .  At [facility] - Pager #: [phone]    To reach EEG-reading physician:  [facility] EEG Reading Room Phone #: [phone]  Epilepsy Answering Service after 5PM and before 8:30AM: Phone #: [phone]

## 2024-02-26 ENCOUNTER — TRANSCRIPTION ENCOUNTER (OUTPATIENT)
Age: 64
End: 2024-02-26

## 2024-02-26 ENCOUNTER — APPOINTMENT (OUTPATIENT)
Dept: NEUROSURGERY | Facility: HOSPITAL | Age: 64
End: 2024-02-26

## 2024-02-26 LAB
ALBUMIN SERPL ELPH-MCNC: 3.9 G/DL — SIGNIFICANT CHANGE UP (ref 3.5–5.2)
ALP SERPL-CCNC: 78 U/L — SIGNIFICANT CHANGE UP (ref 30–115)
ALT FLD-CCNC: 15 U/L — SIGNIFICANT CHANGE UP (ref 0–41)
ANION GAP SERPL CALC-SCNC: 13 MMOL/L — SIGNIFICANT CHANGE UP (ref 7–14)
ANION GAP SERPL CALC-SCNC: 15 MMOL/L — HIGH (ref 7–14)
AST SERPL-CCNC: 15 U/L — SIGNIFICANT CHANGE UP (ref 0–41)
BASOPHILS # BLD AUTO: 0.1 K/UL — SIGNIFICANT CHANGE UP (ref 0–0.2)
BASOPHILS # BLD AUTO: 0.1 K/UL — SIGNIFICANT CHANGE UP (ref 0–0.2)
BASOPHILS NFR BLD AUTO: 0.9 % — SIGNIFICANT CHANGE UP (ref 0–1)
BASOPHILS NFR BLD AUTO: 1.1 % — HIGH (ref 0–1)
BILIRUB SERPL-MCNC: 0.4 MG/DL — SIGNIFICANT CHANGE UP (ref 0.2–1.2)
BUN SERPL-MCNC: 23 MG/DL — HIGH (ref 10–20)
BUN SERPL-MCNC: 27 MG/DL — HIGH (ref 10–20)
CALCIUM SERPL-MCNC: 8.6 MG/DL — SIGNIFICANT CHANGE UP (ref 8.4–10.5)
CALCIUM SERPL-MCNC: 9.4 MG/DL — SIGNIFICANT CHANGE UP (ref 8.4–10.4)
CHLORIDE SERPL-SCNC: 106 MMOL/L — SIGNIFICANT CHANGE UP (ref 98–110)
CHLORIDE SERPL-SCNC: 108 MMOL/L — SIGNIFICANT CHANGE UP (ref 98–110)
CO2 SERPL-SCNC: 19 MMOL/L — SIGNIFICANT CHANGE UP (ref 17–32)
CO2 SERPL-SCNC: 22 MMOL/L — SIGNIFICANT CHANGE UP (ref 17–32)
CREAT SERPL-MCNC: 1.3 MG/DL — SIGNIFICANT CHANGE UP (ref 0.7–1.5)
CREAT SERPL-MCNC: 1.3 MG/DL — SIGNIFICANT CHANGE UP (ref 0.7–1.5)
EGFR: 61 ML/MIN/1.73M2 — SIGNIFICANT CHANGE UP
EGFR: 61 ML/MIN/1.73M2 — SIGNIFICANT CHANGE UP
EOSINOPHIL # BLD AUTO: 0.46 K/UL — SIGNIFICANT CHANGE UP (ref 0–0.7)
EOSINOPHIL # BLD AUTO: 0.6 K/UL — SIGNIFICANT CHANGE UP (ref 0–0.7)
EOSINOPHIL NFR BLD AUTO: 5.1 % — SIGNIFICANT CHANGE UP (ref 0–8)
EOSINOPHIL NFR BLD AUTO: 5.4 % — SIGNIFICANT CHANGE UP (ref 0–8)
GIANT PLATELETS BLD QL SMEAR: PRESENT — SIGNIFICANT CHANGE UP
GLUCOSE SERPL-MCNC: 90 MG/DL — SIGNIFICANT CHANGE UP (ref 70–99)
GLUCOSE SERPL-MCNC: 99 MG/DL — SIGNIFICANT CHANGE UP (ref 70–99)
HCT VFR BLD CALC: 38.7 % — LOW (ref 42–52)
HCT VFR BLD CALC: 40.3 % — LOW (ref 42–52)
HGB BLD-MCNC: 12.2 G/DL — LOW (ref 14–18)
HGB BLD-MCNC: 12.8 G/DL — LOW (ref 14–18)
IMM GRANULOCYTES NFR BLD AUTO: 0.6 % — HIGH (ref 0.1–0.3)
LYMPHOCYTES # BLD AUTO: 3.32 K/UL — SIGNIFICANT CHANGE UP (ref 1.2–3.4)
LYMPHOCYTES # BLD AUTO: 3.64 K/UL — HIGH (ref 1.2–3.4)
LYMPHOCYTES # BLD AUTO: 30 % — SIGNIFICANT CHANGE UP (ref 20.5–51.1)
LYMPHOCYTES # BLD AUTO: 40.5 % — SIGNIFICANT CHANGE UP (ref 20.5–51.1)
MAGNESIUM SERPL-MCNC: 2 MG/DL — SIGNIFICANT CHANGE UP (ref 1.8–2.4)
MANUAL SMEAR VERIFICATION: SIGNIFICANT CHANGE UP
MCHC RBC-ENTMCNC: 26 PG — LOW (ref 27–31)
MCHC RBC-ENTMCNC: 26.1 PG — LOW (ref 27–31)
MCHC RBC-ENTMCNC: 31.5 G/DL — LOW (ref 32–37)
MCHC RBC-ENTMCNC: 31.8 G/DL — LOW (ref 32–37)
MCV RBC AUTO: 82.1 FL — SIGNIFICANT CHANGE UP (ref 80–94)
MCV RBC AUTO: 82.3 FL — SIGNIFICANT CHANGE UP (ref 80–94)
MONOCYTES # BLD AUTO: 0.81 K/UL — HIGH (ref 0.1–0.6)
MONOCYTES # BLD AUTO: 0.97 K/UL — HIGH (ref 0.1–0.6)
MONOCYTES NFR BLD AUTO: 10.8 % — HIGH (ref 1.7–9.3)
MONOCYTES NFR BLD AUTO: 7.3 % — SIGNIFICANT CHANGE UP (ref 1.7–9.3)
NEUTROPHILS # BLD AUTO: 3.76 K/UL — SIGNIFICANT CHANGE UP (ref 1.4–6.5)
NEUTROPHILS # BLD AUTO: 5.43 K/UL — SIGNIFICANT CHANGE UP (ref 1.4–6.5)
NEUTROPHILS NFR BLD AUTO: 41.9 % — LOW (ref 42.2–75.2)
NEUTROPHILS NFR BLD AUTO: 49.1 % — SIGNIFICANT CHANGE UP (ref 42.2–75.2)
NRBC # BLD: 0 /100 WBCS — SIGNIFICANT CHANGE UP (ref 0–0)
PLAT MORPH BLD: NORMAL — SIGNIFICANT CHANGE UP
PLATELET # BLD AUTO: 315 K/UL — SIGNIFICANT CHANGE UP (ref 130–400)
PLATELET # BLD AUTO: 319 K/UL — SIGNIFICANT CHANGE UP (ref 130–400)
PMV BLD: 11.7 FL — HIGH (ref 7.4–10.4)
PMV BLD: 11.9 FL — HIGH (ref 7.4–10.4)
POIKILOCYTOSIS BLD QL AUTO: SLIGHT — SIGNIFICANT CHANGE UP
POTASSIUM SERPL-MCNC: 4.3 MMOL/L — SIGNIFICANT CHANGE UP (ref 3.5–5)
POTASSIUM SERPL-MCNC: 4.7 MMOL/L — SIGNIFICANT CHANGE UP (ref 3.5–5)
POTASSIUM SERPL-SCNC: 4.3 MMOL/L — SIGNIFICANT CHANGE UP (ref 3.5–5)
POTASSIUM SERPL-SCNC: 4.7 MMOL/L — SIGNIFICANT CHANGE UP (ref 3.5–5)
PROMYELOCYTES # FLD: 0.9 % — HIGH (ref 0–0)
PROT SERPL-MCNC: 6.4 G/DL — SIGNIFICANT CHANGE UP (ref 6–8)
RBC # BLD: 4.7 M/UL — SIGNIFICANT CHANGE UP (ref 4.7–6.1)
RBC # BLD: 4.91 M/UL — SIGNIFICANT CHANGE UP (ref 4.7–6.1)
RBC # FLD: 17 % — HIGH (ref 11.5–14.5)
RBC # FLD: 17 % — HIGH (ref 11.5–14.5)
RBC BLD AUTO: NORMAL — SIGNIFICANT CHANGE UP
SMUDGE CELLS # BLD: PRESENT — SIGNIFICANT CHANGE UP
SODIUM SERPL-SCNC: 141 MMOL/L — SIGNIFICANT CHANGE UP (ref 135–146)
SODIUM SERPL-SCNC: 142 MMOL/L — SIGNIFICANT CHANGE UP (ref 135–146)
VARIANT LYMPHS # BLD: 6.4 % — HIGH (ref 0–5)
WBC # BLD: 11.06 K/UL — HIGH (ref 4.8–10.8)
WBC # BLD: 8.98 K/UL — SIGNIFICANT CHANGE UP (ref 4.8–10.8)
WBC # FLD AUTO: 11.06 K/UL — HIGH (ref 4.8–10.8)
WBC # FLD AUTO: 8.98 K/UL — SIGNIFICANT CHANGE UP (ref 4.8–10.8)

## 2024-02-26 PROCEDURE — 36226 PLACE CATH VERTEBRAL ART: CPT | Mod: RT

## 2024-02-26 PROCEDURE — 99232 SBSQ HOSP IP/OBS MODERATE 35: CPT

## 2024-02-26 PROCEDURE — 36224 PLACE CATH CAROTD ART: CPT | Mod: 50

## 2024-02-26 PROCEDURE — 76937 US GUIDE VASCULAR ACCESS: CPT | Mod: 26

## 2024-02-26 PROCEDURE — 36227 PLACE CATH XTRNL CAROTID: CPT | Mod: 50

## 2024-02-26 RX ORDER — SODIUM CHLORIDE 9 MG/ML
1000 INJECTION INTRAMUSCULAR; INTRAVENOUS; SUBCUTANEOUS
Refills: 0 | Status: DISCONTINUED | OUTPATIENT
Start: 2024-02-26 | End: 2024-02-27

## 2024-02-26 RX ADMIN — LOSARTAN POTASSIUM 50 MILLIGRAM(S): 100 TABLET, FILM COATED ORAL at 05:26

## 2024-02-26 RX ADMIN — SIMVASTATIN 40 MILLIGRAM(S): 20 TABLET, FILM COATED ORAL at 21:48

## 2024-02-26 RX ADMIN — SODIUM CHLORIDE 75 MILLILITER(S): 9 INJECTION INTRAMUSCULAR; INTRAVENOUS; SUBCUTANEOUS at 17:26

## 2024-02-26 NOTE — OCCUPATIONAL THERAPY INITIAL EVALUATION ADULT - PERTINENT HX OF CURRENT PROBLEM, REHAB EVAL
64yoM PMHx CVA 2020 and 2021 status post intracranial angioplasty of right MCA and NORMAN, aortic valve insufficiency, implantable loop recorder for prior episodes of syncope/NSVT, chronic anemia, CKD stage 2, presenting for episode of syncope at about 6:30 PM tonight.  Per patient and wife at bedside patient was stepping out of the shower when he suddenly passed out and fell to the floor, his wife heard the fall from the next room and came to help him immediately, found him awake, alert but confused about how he fell.  Wife reports patient seems more confused for about 20 minutes after the fall, but denies any unilateral symptoms, weakness, change in gait or speech slurring. Patient denies any current symptoms, weakness, headache, difficulty walking, numbness, tingling, vision changes, chest pain, shortness of breath, nausea.  Patient and wife at bedside also denies any recent illness, recent fever, chills, vomiting, diarrhea Yes, ok to add TSH. Thanks.

## 2024-02-26 NOTE — BRIEF OPERATIVE NOTE - OPERATION/FINDINGS
Procedure : Diagnostic cerebral angiogram    Contrast: Visipaque 320   IA medications: Heparin 3000 IU, Verapamil 2.5mg, Nitroglycerin 200 mcg   Implants placed: n/a  Complications : n/a    Preliminary Report:  Selective diagnostic angiogram performed from right ulnar arteriotomy site with preliminary findings of intra-stent occlusion of the previously placed right MCA/NORMAN intracranial stent.     Official IR neuro procedure note to follow.

## 2024-02-26 NOTE — BRIEF OPERATIVE NOTE - COMMENTS
Please follow post NI orders for neuro checks, distal pulses, vitals, and arteriotomy site checks placed for total of 2 hour recovery period following procedure.   Keep HOB elevated, right wrist straight and immobile for x 2 hr    Keep IVF as ordered.   -160      Patient tolerated procedure well, hemodynamically stable, no change in neurological status compared to baseline.   NIHSS pre procedure _0_ post procedure _0_  Right wrist site CDI without evidence of bleeding hematoma oozing ecchymosis swelling at the time of closure. Site nontender to palpation. Temperature and color consistent bilaterally to palpation with intact distal pulses.     Please notify provider with any signs of bleeding or hematoma at arteriotomy site, change in mental status, vitals outside parameters, or absent distal pulses.   Signout given to medicine team, stroke team. Attending signout given to stroke attending. Per stroke attending, plan to repeat CTA/CTP tomorrow AM and likely admit to stroke unit. Stroke team aware.   [phone]

## 2024-02-26 NOTE — CONSULT NOTE ADULT - SUBJECTIVE AND OBJECTIVE BOX
Neuroendovascular Consult note:   Consulted for: Diagnostic cerebral angiorgam     HPI:  Patient is a 64 year old male, history of ischemic stroke right MCA s/p MCA/NORMAN angioplasty and stenting with Dr Guajardo in 2021, who was found subsequently following initial angiogram with progressive worsening of stenosis, well known to neuroendovascular service, on DAPT (aspirin and plavix) which were both held (?) 2/18 prior to endoscopy then resumed 2/21 presented with LOC episode and found with multifocal infarction of the bilateral hemispheres, CTA with concerns for instent stenosis. Neuroendovascular team consulted for evaluation for repeat diagnostic cerebral angiogram to evaluate prior intracranial stents. Patient seen and examined in [facility] without complaints this AM. NPO for procedure.     Past medical history:   HTN (hypertension)    Psoriasis    CVA (cerebral vascular accident)    Psoriatic arthritis    Hemiparesis, left    H/O syncope    Aortic insufficiency    NSVT (nonsustained ventricular tachycardia)        Medications:  aspirin enteric coated 81 milliGRAM(s) Oral daily  clopidogrel Tablet 75 milliGRAM(s) Oral daily  enoxaparin Injectable 40 milliGRAM(s) SubCutaneous every 24 hours  losartan 50 milliGRAM(s) Oral daily  simvastatin 40 milliGRAM(s) Oral at bedtime  venlafaxine XR. 37.5 milliGRAM(s) Oral daily      Vitals:   T(F): 97.9 (02-26-24 @ 04:55), Max: 98.4 (02-25-24 @ 20:15)  HR: 55 (02-26-24 @ 04:55) (55 - 76)  BP: 124/61 (02-26-24 @ 04:55) (124/61 - 146/73)  RR: 18 (02-26-24 @ 04:55) (18 - 18)  SpO2: --    Labs:                         12.8   8.98  )-----------( 319      ( 26 Feb 2024 08:01 )             40.3     02-26    141  |  106  |  27<H>  ----------------------------<  90  4.3   |  22  |  1.3    Ca    9.4      26 Feb 2024 08:01  Mg     2.0     02-26    TPro  6.4  /  Alb  3.9  /  TBili  0.4  /  DBili  x   /  AST  15  /  ALT  15  /  AlkPhos  78  02-26      LIVER FUNCTIONS - ( 26 Feb 2024 08:01 )  Alb: 3.9 g/dL / Pro: 6.4 g/dL / ALK PHOS: 78 U/L / ALT: 15 U/L / AST: 15 U/L / GGT: x             Exam:   General - NAD   Neuro- AAO3 follows commands, EOMi visual fields intact face symmetric, moving all extremities to antigravity sensation intact no neglect no aphasia no dysarthria no dysmetria on finger to nose   [id number]     Radiology:   < from: IR Neuro (02.17.22 @ 14:06) >  IMPRESSION:  -  Essentially a questionable slight improvement of intra-stent stenosis,   but unchanged distal perfusion of the right M1, and right A1 in comparing   with angiogram performed on 8/25/21. No significant changes of stenotic   lesions of left MCA as described above. No significant changes in the   right vertebrobasilar system and its distribution in compared to the   previous angiogram.    RECOMMENDATIONS:  -Continue the recommended Maximal medical/risk-factor management and   lifestyle modification please.  -Avoid dehydration or hypotension.  -Obtain Brain SPECT scan with Acetazolamide challenge to evaluate the   reserve vasodilatation.  -Repeating NOVA scan to compare with previous one in 6-9m.  -Will discuss the case in the NV conference for best management decision.  -F/up with NI clinic in 2 to 3 weeks.      < end of copied text >  < from: IR Neuro (08.25.21 @ 13:54) >    IMPRESSION:  *  Unchanged distal perfusion and intra-stent stenosis of the right M1, and right A1, but more post stenosis dilatation especially in M1, in compare to angiogram performed on 6/30/21. Worsening of stenotic lesions of left MCA as described above. No significant changes in the right vertebrobasilar system and its distribution in compare to the angiogram performed on 2/3/21.    RECOMMENDATIONS:  -Given the fact that the patient symptoms and level of functioning have been improving and the right distal ICA perfusion is similar to post stenting time, no NI is warranted for this vessel territory at this time. However since the left MCA seems to be worsened in compare to the angiogram in the angiogram in February, despite of the fact that the patient is on maximal medical management, the other causes of worsening in addition to atherosclerosis need to be seriously investigated.  -Inflammatory, infectious process and atypical Moyamoya need to beconsidered in addition to atherosclerosis.  -Follow up High resolution MR imaging of the vessel wall, Quantitative blood flow imaging, parenchymal blood flow, and challenging cerebrovascular reserve volume test is needed.  -He needs to avoid dehydration, hypovolemia, and hypotension.  -Maximal medical management and risk factor modification per stroke team.  -F/up with NI clinic in 2 to 3 weeks.      < end of copied text >  < from: IR Neuro (06.30.21 @ 16:37) >    IMPRESSION:  -Successful and uneventful angioplasty and stenting of near occlusion stenotic segments of right A1 and right M1.      RECOMMENDATIONS:  - Follow post thrombectomy protocol please.  - Neuro management per NCC Team.  - Medical management per ICU team.      < end of copied text >  < from: IR Neuro (02.03.21 @ 16:55) >    IMPRESSION:  -  Severe stenosis of the right M1, right A1 and left inferior M2 bifurcation as described above.    RECOMMENDATIONS:  Avoid dehydration, hypovolemia, and hypotension.  Inflammatory, infectious process as well as dissection and atypical Moyamoya need to be considered in addition to atherosclerosis.  -High resolution MR imaging of the vessel wall is needed.  Maximal medical management and risk factor modificationper stroke team.  -F/up with NI clinic in 2 to 3 weeks.    < end of copied text >  < from: MR Angio Head No Cont (09.21.21 @ 12:29) >  NOVA analysis demonstrates:    ALMA          108    ml/min        LICA          222   ml/min  RACA LACA        122  RACA2       30                        LACA2         95    RMCA LMCA          67  RM21          21  RM2A  20  RM2B          18  RPCOM LPCOM    BA              221    ml/min  RVA           253  LVA              53  RPCA        138  LPCA           86    < end of copied text >  < from: MR Head No Cont (02.24.24 @ 09:14) >  IMPRESSION:    1.  Small acute cortical and subcortical infarcts along within the right   frontal lobe and left parietal and occipital lobes.    2.  Multiple chronic infarcts in the right frontal lobe, left cerebellar   hemisphere, right aspect of the madhu, andbilateral basal ganglia and   corona radiata.    3.  Status post right NORMAN and MCA stenting.  See CTA head from same day.      < end of copied text >  < from: CT Angio Neck w/ IV Cont (02.24.24 @ 07:51) >  IMPRESSION:  Since the CTAneck and brain dated 6/23/2021:    1.  The patient is status post interval placement of a stent within the   right ICA terminus extending into the right MCA and NORMAN.    2.  There is moderate/severe stenosis of the right supraclinoid ICA (just   proximal to the stent) as well as severe stenoses of the right NORMAN and   MCA just distal to the stents. The lumen of the stent also appears   stenotic.    3.  Moderate stenosis of the left MCA M1 segment and a few mild distal   left MCA stenoses, about stable.    < end of copied text >  < from: CT Head No Cont (02.23.24 @ 20:41) >  HEAD:  No acute intracranial abnormalities.    Indeterminate hypodensity in the cortical-subcortical region within the   left parietal lobe (2-15, 601-65), new from most recent comparison.   Favoring subacute/chronic. Can follow-up with MRI if clinically warranted.    CERVICAL SPINE:  No acute cervical spine fracture or subluxation.      < end of copied text >      Assessment:   64 year old male, history of ischemic stroke s/p right MCA/NORMAN stenting in 2021, with progressive intracranial stenosis, on DAPT which was held for endoscopy, presented with LOC episode, found with bl multifocal infarct, concerns for in-stent stenosis.     Suggestions:  - Diagnostic cerebral angiogram today with the neuroendovascular team   - NPO except medications   - IVF as tolerated   - Continue DAPT   - Continue management per med/stroke team.   Discussed with Dr Mckay   [id number]

## 2024-02-26 NOTE — PROGRESS NOTE ADULT - ATTENDING COMMENTS
64M w/ PMH of ischemic stroke (R. MCA infarction s/p MCA/NORMAN angioplasty and stenting by Dr. Aquino in 2021, on ASA and Plavix since then, but both meds were held for 5 days prior to endoscopy on 2/18, and were resumed on 2/21) presented to the ER w/ LOC episode, found to have multifocal infarction involving bilateral hemispheres, w/ CTA demonstrating stenosis surrounding and within the stent construct. Concern for in-stent stenosis.    MR brain 2.24.24 multifocal punctate infarction in the R. MCA and LMCA territory. Multifocal chronic infarction involving the R. frontal subcortical white matter, L. BG.   CTA h/n moderate-severe stenosis of the R. supcraclinoid ICA proximal, distal and within the stent construct. Moderate L. M1 stenosis (stable).   MR brain 4.29.2020 demonstrates multiple small and punctate acute infarctions within the R. medial frontal and parietal lobes.   DSA 2.3.2021 demonstrated severe stenosis of the R. M1 and R. A1 and L. M2 inferior division.   MR brain 6.7.2023 recurrent R. MCA infarction.   DSA 6.30.2021 s/p angioplasty and stenting of R. M1 and A1  DSA 8.30.2021 severe R. M1 and R. A1 stenosis   QMRA NOVA R. M1 flow 26, L. M1 flow 67  DSA 2.17.2022 largely stable R. M1 and R. A1 stenosis  SPECT 3.29.2022 Significant decrease blood flow to the right MCA/NORMAN territories with Diamox which improves on non-Diamox suggesting reversible perfusion defect.    Imp: Multifocal infarctions involving the R. MCA and L. MCA territory in patient with known history of in-stent stenosis of the R. MCA/NORMAN intracranial stent. The mechanism of stroke likely secondary to intracranial large artery atherosclerotic disease via hypoperfusion and thromoembolism. Its possible that the syncope episode may have resulted in a transient hypoperfusion state which may have exacerbated the small strokes, as well as the temporary holding of the antiplatelets agents for the recent endoscopy. The in-stent stenosis appears stable when comparing CTA to prior DSAs.     Plan:   -Agree with cadiac workup for syncope episodes. Avoiding these episode will be important to prevent recurrent transient cerebral ischemic particularly given the known in-stent stenosis  -Continue ASA 81mg + Plavix 75mg; Please check P2Y12 level   -Continue statin   -REINALDO consult for possible DSA - probably does not need to be done since imaging appears stable   -Routine EEG (though clinical history most consistent with syncope)   -tele    50 minutes spent on total encounter. The necessity of the time spent during the encounter on this date of service was due to:     Review of imaging and chart; obtaining history; examination of pt; discussion and coordination of care, and discussion of lifestyle modification and risk factor control.
***My note supersedes any discrepancies that may be above in the resident's note****    65 y/o man with PMH of CVA 2020 and 2021 s/p intracranial angioplasty of right MCA and NORMAN, aortic valve insufficiency, implantable loop recorder for prior episodes of syncope/NSVT, chronic anemia, CKD 2, psoriatic arthritis presented for syncopal episode when leaving the shower.     #Syncope  - EKG with NSR and LVH  - s/p interrogation of loop recorder - no events per EP  - negative orthostatic changes  - could have been due to hypotension from recent addition of nifedipine? -> stopped nifedipine and BP still under control  - previous ECHO 4/23: EF 58%, f/u repeat echo  - no need for nuclear stress test per EP (attending discussed with EP attending)    #Acute stroke and worsening stenoses   - h/o CVA 2020 and 2021s/p intracranial angioplasty of right MCA and NORMAN  - MRI brain:  Small acute cortical and subcortical infarcts along within the right frontal lobe and left parietal and occipital lobes.  Multiple chronic infarcts in the right frontal lobe, left cerebellar hemisphere, right aspect of the madhu, and bilateral basal ganglia and corona radiata.  Status post right NORMAN and MCA stenting.   - CTA head/neck: The patient is status post interval placement of a stent within the right ICA terminus extending into the right MCA and NORMAN.  There is moderate/severe stenosis of the right supraclinoid ICA (just proximal to the stent) as well as severe stenoses of the right NORMAN and MCA just distal to the stents. The lumen of the stent also appears stenotic. Moderate stenosis of the left MCA M1 segment and a few mild distal left MCA stenoses, about stable.  - neurology consult and f/u appreciated  - check P2Y12 level  pending  - f/u EEG report: normal rEEG  - neuroendovascular consulted - pending diagnostic cerebral angiogram  - c/w DAPT and statin(medication intolerance to atorvastatin NOT allergic)    #CKD 2 - stable     # DVT prophylaxis: Lovenox  # GI prophylaxis: NI  # Diet: DASH/TLC - NPO pending angiogram  # Activity: as tolerated  # Code status: FULL CODE  # Disposition: From home    #Progress Note Handoff  Pending (specify):  diagnostic cerebral angiogram by Neuroendovascular  Family discussion: updated  Disposition: likely home in the next 24hrs

## 2024-02-26 NOTE — PROGRESS NOTE ADULT - ASSESSMENT
63 y/o man with PMH of CVA 2020 and 2021 s/p intracranial angioplasty of right MCA and NORMAN, aortic valve insufficiency, implantable loop recorder for prior episodes of syncope/NSVT, chronic anemia, CKD 2, psoriatic arthritis presented for syncopal episode when leaving the shower.     #Syncope  - EKG with NSR and LVH  - s/p interrogation of loop recorder - no events per EP  - negative orthostatic changes  - could have been due to hypotension from recent addition of nifedipine -> stopped nifedipine  - previous ECHO 4/23: EF 58%, f/u repeat echo  - no need for nuclear stress test per EP (attending discussed with EP attending)    #Acute stroke and worsening stenoses   - h/o CVA 2020 and 2021s/p intracranial angioplasty of right MCA and NORMAN  - MRI brain:  Small acute cortical and subcortical infarcts along within the right frontal lobe and left parietal and occipital lobes.  Multiple chronic infarcts in the right frontal lobe, left cerebellar hemisphere, right aspect of the madhu, and bilateral basal ganglia and corona radiata.  Status post right NORMAN and MCA stenting.   - CTA head/neck: The patient is status post interval placement of a stent within the right ICA terminus extending into the right MCA and NORMAN.  There is moderate/severe stenosis of the right supraclinoid ICA (just proximal to the stent) as well as severe stenoses of the right NORMAN and MCA just distal to the stents. The lumen of the stent also appears stenotic. Moderate stenosis of the left MCA M1 segment and a few mild distal left MCA stenoses, about stable.  - neurology consult and f/u appreciated  - check P2Y12 level   - f/u EEG report: normal rEEG  - neuroendovascular consulted - pending diagnostic cerebral angiogram  - c/w DAPT and statin    #CKD 2 - stable                                                                              ----------------------------------------------------  # DVT prophylaxis: Lovenox  # GI prophylaxis: NI  # Diet: DASH/TLC - NPO pending angiogram  # Activity: as tolerated  # Code status: FULL CODE  # Disposition: From home                                                                           --------------------------------------------------------    # Handoff:   - f/u neurovascular s/p diagnostic angiogram  - f/u P2Y12 level to assess for plavix effectiveness per neuro

## 2024-02-26 NOTE — PRE PROCEDURE NOTE - PRE PROCEDURE EVALUATION
HPI 64 year old male, history of ischemic stroke s/p right MCA/NORMAN stenting in 2021, with progressive intracranial stenosis, on DAPT which was held for endoscopy, presented with LOC episode, found with bl multifocal infarct, concerns for in-stent stenosis.       NPO except meds since midnight.   NIHSS 0    Allergies: atorvastatin (Diarrhea (Severe))      PAST MEDICAL & SURGICAL HISTORY:  HTN (hypertension)      CVA (cerebral vascular accident)      Psoriatic arthritis      Hemiparesis, left  resolved Syncope      H/O syncope      Aortic insufficiency  Moderate      NSVT (nonsustained ventricular tachycardia)      H/O angiography      H/O cerebral artery stenosis  Cerebral angiogram with angioplaty R MI and AI severe stenosis R MCCA stenting 08/5/2021 R NORMAN 06/30/2021          Current Medications: aspirin enteric coated 81 milliGRAM(s) Oral daily  clopidogrel Tablet 75 milliGRAM(s) Oral daily  enoxaparin Injectable 40 milliGRAM(s) SubCutaneous every 24 hours  losartan 50 milliGRAM(s) Oral daily  simvastatin 40 milliGRAM(s) Oral at bedtime  venlafaxine XR. 37.5 milliGRAM(s) Oral daily      Labs:                         12.8   8.98  )-----------( 319      ( 26 Feb 2024 08:01 )             40.3     02-26    141  |  106  |  27<H>  ----------------------------<  90  4.3   |  22  |  1.3    Ca    9.4      26 Feb 2024 08:01  Mg     2.0     02-26    TPro  6.4  /  Alb  3.9  /  TBili  0.4  /  DBili  x   /  AST  15  /  ALT  15  /  AlkPhos  78  02-26      Blood Bank: 02-25-24  --  AB NEG  --      Assessment/Plan:   This is a 64y  year old male presents with new bilateral multifocal infarcts s/p right MCA/NORMAN stenosis with concerns for in-stent stenosis.   Patient presents to neuro-IR for diagnostic cerebral angiogram.   Procedure, goals, risks, benefits and alternatives  were discussed with patient and patient's family.  All questions were answered to best understanding.   Risks discussed include but are not limited to stroke, vessel injury, hemorrhage, and or arteriotomy site hematoma.   Patient / proxy demonstrates understanding  of all risks involved with this procedure and wishes to continue.     Appropriate consent was obtained and placed in the patient's chart.     [id number]

## 2024-02-27 ENCOUNTER — TRANSCRIPTION ENCOUNTER (OUTPATIENT)
Age: 64
End: 2024-02-27

## 2024-02-27 ENCOUNTER — RESULT REVIEW (OUTPATIENT)
Age: 64
End: 2024-02-27

## 2024-02-27 VITALS
DIASTOLIC BLOOD PRESSURE: 90 MMHG | TEMPERATURE: 98 F | RESPIRATION RATE: 18 BRPM | SYSTOLIC BLOOD PRESSURE: 160 MMHG | HEART RATE: 84 BPM | OXYGEN SATURATION: 99 %

## 2024-02-27 LAB
ALBUMIN SERPL ELPH-MCNC: 3.5 G/DL — SIGNIFICANT CHANGE UP (ref 3.5–5.2)
ALP SERPL-CCNC: 73 U/L — SIGNIFICANT CHANGE UP (ref 30–115)
ALT FLD-CCNC: 13 U/L — SIGNIFICANT CHANGE UP (ref 0–41)
ANION GAP SERPL CALC-SCNC: 8 MMOL/L — SIGNIFICANT CHANGE UP (ref 7–14)
AST SERPL-CCNC: 11 U/L — SIGNIFICANT CHANGE UP (ref 0–41)
BASOPHILS # BLD AUTO: 0.09 K/UL — SIGNIFICANT CHANGE UP (ref 0–0.2)
BASOPHILS NFR BLD AUTO: 0.9 % — SIGNIFICANT CHANGE UP (ref 0–1)
BILIRUB SERPL-MCNC: 0.3 MG/DL — SIGNIFICANT CHANGE UP (ref 0.2–1.2)
BUN SERPL-MCNC: 22 MG/DL — HIGH (ref 10–20)
CALCIUM SERPL-MCNC: 8.8 MG/DL — SIGNIFICANT CHANGE UP (ref 8.4–10.5)
CHLORIDE SERPL-SCNC: 108 MMOL/L — SIGNIFICANT CHANGE UP (ref 98–110)
CHOLEST SERPL-MCNC: 148 MG/DL — SIGNIFICANT CHANGE UP
CO2 SERPL-SCNC: 25 MMOL/L — SIGNIFICANT CHANGE UP (ref 17–32)
CREAT SERPL-MCNC: 1.3 MG/DL — SIGNIFICANT CHANGE UP (ref 0.7–1.5)
EGFR: 61 ML/MIN/1.73M2 — SIGNIFICANT CHANGE UP
EOSINOPHIL # BLD AUTO: 0.46 K/UL — SIGNIFICANT CHANGE UP (ref 0–0.7)
EOSINOPHIL NFR BLD AUTO: 4.5 % — SIGNIFICANT CHANGE UP (ref 0–8)
GLUCOSE SERPL-MCNC: 97 MG/DL — SIGNIFICANT CHANGE UP (ref 70–99)
HCT VFR BLD CALC: 36.4 % — LOW (ref 42–52)
HDLC SERPL-MCNC: 44 MG/DL — SIGNIFICANT CHANGE UP
HGB BLD-MCNC: 11.6 G/DL — LOW (ref 14–18)
IMM GRANULOCYTES NFR BLD AUTO: 0.4 % — HIGH (ref 0.1–0.3)
LIPID PNL WITH DIRECT LDL SERPL: 93 MG/DL — SIGNIFICANT CHANGE UP
LYMPHOCYTES # BLD AUTO: 3.84 K/UL — HIGH (ref 1.2–3.4)
LYMPHOCYTES # BLD AUTO: 37.8 % — SIGNIFICANT CHANGE UP (ref 20.5–51.1)
MAGNESIUM SERPL-MCNC: 1.9 MG/DL — SIGNIFICANT CHANGE UP (ref 1.8–2.4)
MCHC RBC-ENTMCNC: 26.1 PG — LOW (ref 27–31)
MCHC RBC-ENTMCNC: 31.9 G/DL — LOW (ref 32–37)
MCV RBC AUTO: 81.8 FL — SIGNIFICANT CHANGE UP (ref 80–94)
MONOCYTES # BLD AUTO: 0.87 K/UL — HIGH (ref 0.1–0.6)
MONOCYTES NFR BLD AUTO: 8.6 % — SIGNIFICANT CHANGE UP (ref 1.7–9.3)
NEUTROPHILS # BLD AUTO: 4.87 K/UL — SIGNIFICANT CHANGE UP (ref 1.4–6.5)
NEUTROPHILS NFR BLD AUTO: 47.8 % — SIGNIFICANT CHANGE UP (ref 42.2–75.2)
NON HDL CHOLESTEROL: 104 MG/DL — SIGNIFICANT CHANGE UP
NRBC # BLD: 0 /100 WBCS — SIGNIFICANT CHANGE UP (ref 0–0)
PLATELET # BLD AUTO: 310 K/UL — SIGNIFICANT CHANGE UP (ref 130–400)
PMV BLD: 11.6 FL — HIGH (ref 7.4–10.4)
POTASSIUM SERPL-MCNC: 4.4 MMOL/L — SIGNIFICANT CHANGE UP (ref 3.5–5)
POTASSIUM SERPL-SCNC: 4.4 MMOL/L — SIGNIFICANT CHANGE UP (ref 3.5–5)
PROT SERPL-MCNC: 6 G/DL — SIGNIFICANT CHANGE UP (ref 6–8)
RBC # BLD: 4.45 M/UL — LOW (ref 4.7–6.1)
RBC # FLD: 16.9 % — HIGH (ref 11.5–14.5)
SODIUM SERPL-SCNC: 141 MMOL/L — SIGNIFICANT CHANGE UP (ref 135–146)
TRIGL SERPL-MCNC: 55 MG/DL — SIGNIFICANT CHANGE UP
TSH SERPL-MCNC: 4.54 UIU/ML — HIGH (ref 0.27–4.2)
WBC # BLD: 10.17 K/UL — SIGNIFICANT CHANGE UP (ref 4.8–10.8)
WBC # FLD AUTO: 10.17 K/UL — SIGNIFICANT CHANGE UP (ref 4.8–10.8)

## 2024-02-27 PROCEDURE — 99239 HOSP IP/OBS DSCHRG MGMT >30: CPT

## 2024-02-27 PROCEDURE — 99233 SBSQ HOSP IP/OBS HIGH 50: CPT

## 2024-02-27 PROCEDURE — 70496 CT ANGIOGRAPHY HEAD: CPT | Mod: 26

## 2024-02-27 PROCEDURE — 70498 CT ANGIOGRAPHY NECK: CPT | Mod: 26

## 2024-02-27 PROCEDURE — 0042T: CPT

## 2024-02-27 PROCEDURE — 93306 TTE W/DOPPLER COMPLETE: CPT | Mod: 26

## 2024-02-27 RX ORDER — SIMVASTATIN 20 MG/1
1 TABLET, FILM COATED ORAL
Refills: 0 | DISCHARGE

## 2024-02-27 RX ORDER — CLOPIDOGREL BISULFATE 75 MG/1
1 TABLET, FILM COATED ORAL
Qty: 0 | Refills: 0 | DISCHARGE
Start: 2024-02-27

## 2024-02-27 RX ORDER — SIMVASTATIN 20 MG/1
1 TABLET, FILM COATED ORAL
Qty: 0 | Refills: 0 | DISCHARGE
Start: 2024-02-27

## 2024-02-27 RX ADMIN — SODIUM CHLORIDE 75 MILLILITER(S): 9 INJECTION INTRAMUSCULAR; INTRAVENOUS; SUBCUTANEOUS at 03:48

## 2024-02-27 RX ADMIN — Medication 81 MILLIGRAM(S): at 15:30

## 2024-02-27 RX ADMIN — Medication 37.5 MILLIGRAM(S): at 13:02

## 2024-02-27 RX ADMIN — LOSARTAN POTASSIUM 50 MILLIGRAM(S): 100 TABLET, FILM COATED ORAL at 05:47

## 2024-02-27 RX ADMIN — SODIUM CHLORIDE 75 MILLILITER(S): 9 INJECTION INTRAMUSCULAR; INTRAVENOUS; SUBCUTANEOUS at 15:21

## 2024-02-27 RX ADMIN — CLOPIDOGREL BISULFATE 75 MILLIGRAM(S): 75 TABLET, FILM COATED ORAL at 11:51

## 2024-02-27 NOTE — PROGRESS NOTE ADULT - REASON FOR ADMISSION
syncope
syncope
73 yr old male s/p T11/T10 laminectomy, T10-L1 fusion, hypertension, diabetes mellitus, hypothyroid was sent in from [facility] for fever and back pain. He was recently discharged from [facility] after surgery. Noted to have fever 102. He was evaluated by orthopedics in ED given recent surgery and CT of thoracolumbar spine with contrast was done, which showed post surgical changes and multiple foci of air, fluid and soft tissue edema. ID was consulted and he was started on Vancomycin and Cefepime. Orthopedics advised local wound care and possible seroma drainage if no improvement. Nephrology consulted for hyponatremia. Advised fluid restriction and increased salt tabs to 2 tabs three times a day. He was noted to Hb 7.7, was given 1 unit PRBC, Hb came up to 9.5.  after daily wound care for serosanguinous soaking of dressing multiple times and antibiotics, he was taken to OR for seroma evacuation. post op accordion drain placed which did not drain at all so it was removed.      1) Post op seroma from thoracolumbar fusion s/p seroma evacuation:    - Continue Daptomycin and Invaz  - As per ID will need for 6 weeks  - PICC Line ordered by ID  - wound care per orthopedics   2) SIADH: hyponatremia  - stable  - NaCl tabs  - monitor levels  3) CLARI  - Improving  4) HTN  - Continue Metoprolol and Cardura  - Amlodipine increased to 10 mg this am  - Hydralazine (PRN)  5) Anemia   -  s/p PRBCs transufion  - H&H stable  6) DM2   - Accu checks and ISS  7) Hypothyroidism  - Continue Synthroid  8) BPH  - Continue Flomax, Cardura and Condom Catheter  9) Hypokalemia and Hypomagnesemia  - KCl 40 mEq x 1  - Magnesium Sulfate 2 gm x 1 then Mg Oxide 400 mg TID for 2 days  DVT Prophylaxis -- Lovenox 40 mg
syncope
syncope

## 2024-02-27 NOTE — CONSULT NOTE ADULT - ASSESSMENT
IMPRESSION: Rehab of syncope / [facility] 2020 and 2021 s/p intracranial angioplasty of right MCA and NORMAN, aortic valve insufficiency, implantable loop recorder for prior episodes of syncope/NSVT, chronic anemia, CKD 2, psoriatic arthritis    PRECAUTIONS: [   ] Cardiac  [   ] Respiratory  [   ] Seizures [   ] Contact Isolation  [   ] Droplet Isolation  [   ] Other    Weight Bearing Status:     RECOMMENDATION:    Out of Bed to Chair     DVT/Decubiti Prophylaxis    REHAB PLAN:     [  x  ] Bedside P/T 3-5 times a week   [  x  ]   Bedside O/T  2-3 times a week             [    ] Speech Therapy               [    ]  No Rehab Therapy Indicated   Conditioning/ROM                                    ADL  Bed Mobility                                               Conditioning/ROM  Transfers                                                     Bed Mobility  Sitting /Standing Balance                         Transfers                                        Gait Training                                               Sitting/Standing Balance  Stair Training [   ]Applicable                    Home equipment Eval                                                                        Splinting  [   ] Only      GOALS:   ADL   [  x  ]   Independent                    Transfers  [   x ] Independent                          Ambulation  [  x  ] Independent     [ x    ] With device                            [    ]  CG                                                         [    ]  CG                                                                  [    ] CG                            [    ] Min A                                                   [    ] Min A                                                              [    ] Min  A          DISCHARGE PLAN:   [    ]  Good candidate for Intensive Rehabilitation/Hospital based                                             Will tolerate 3hrs Intensive Rehab Daily                                       [     ]  Short Term Rehab in Skilled Nursing Facility                                       [  x   ]  Home with Outpatient or VN services                                         [     ]  Possible Candidate for Intensive Hospital based Rehab                                        IMPRESSION: Rehab of bilateral stroke / syncope / [facility] 2020 and 2021 s/p intracranial angioplasty of right MCA and NORMAN, aortic valve insufficiency, implantable loop recorder for prior episodes of syncope/NSVT, chronic anemia, CKD 2, psoriatic arthritis    PRECAUTIONS: [   ] Cardiac  [   ] Respiratory  [   ] Seizures [   ] Contact Isolation  [   ] Droplet Isolation  [   ] Other    Weight Bearing Status:     RECOMMENDATION:    Out of Bed to Chair     DVT/Decubiti Prophylaxis    REHAB PLAN:     [  x  ] Bedside P/T 3-5 times a week   [  x  ]   Bedside O/T  2-3 times a week             [    ] Speech Therapy               [    ]  No Rehab Therapy Indicated   Conditioning/ROM                                    ADL  Bed Mobility                                               Conditioning/ROM  Transfers                                                     Bed Mobility  Sitting /Standing Balance                         Transfers                                        Gait Training                                               Sitting/Standing Balance  Stair Training [   ]Applicable                    Home equipment Eval                                                                        Splinting  [   ] Only      GOALS:   ADL   [  x  ]   Independent                    Transfers  [   x ] Independent                          Ambulation  [  x  ] Independent     [ x    ] With device                            [    ]  CG                                                         [    ]  CG                                                                  [    ] CG                            [    ] Min A                                                   [    ] Min A                                                              [    ] Min  A          DISCHARGE PLAN:   [    ]  Good candidate for Intensive Rehabilitation/Hospital based                                             Will tolerate 3hrs Intensive Rehab Daily                                       [     ]  Short Term Rehab in Skilled Nursing Facility                                       [  x   ]  Home with Outpatient or VN services                                         [     ]  Possible Candidate for Intensive Hospital based Rehab

## 2024-02-27 NOTE — DISCHARGE NOTE PROVIDER - CARE PROVIDER_API CALL
Leah Chen  Neurology  [address] [place] [id number]  Phone: [phone]  Fax: [phone]  Follow Up Time: 2 weeks

## 2024-02-27 NOTE — DISCHARGE NOTE PROVIDER - NSDCMRMEDTOKEN_GEN_ALL_CORE_FT
aspirin 81 mg oral tablet: orally once a day  clopidogrel 75 mg oral tablet: 1 tab(s) orally once a day  losartan 50 mg oral tablet: 1 tab(s) orally once a day  NIFEdipine (Eqv-Adalat CC) 30 mg oral tablet, extended release: 1 tab(s) orally 2 times a day  simvastatin 80 mg oral tablet: 1 tab(s) orally once a day (at bedtime)  venlafaxine 37.5 mg oral tablet: 1 tab(s) orally once a day   aspirin 81 mg oral tablet: orally once a day  losartan 50 mg oral tablet: 1 tab(s) orally once a day  NIFEdipine (Eqv-Adalat CC) 30 mg oral tablet, extended release: 1 tab(s) orally 2 times a day  Plavix 75 mg oral tablet: 1 tab(s) orally once a day  simvastatin 40 mg oral tablet: 1 tab(s) orally once a day (at bedtime)  venlafaxine 37.5 mg oral tablet: 1 tab(s) orally once a day

## 2024-02-27 NOTE — DISCHARGE NOTE PROVIDER - HOSPITAL COURSE
Hospital course:  65 y/o man with PMH of CVA 2020 and 2021 s/p intracranial angioplasty of right MCA and NORMAN, aortic valve insufficiency, implantable loop recorder for prior episodes of syncope/NSVT, chronic anemia, CKD 2, psoriatic arthritis presented for syncopal episode when leaving the shower.     CTA h/n moderate-severe stenosis of the R. supcraclinoid ICA proximal, distal and within the stent construct. Moderate L. M1 stenosis.     MR brain 2.24.24 multifocal punctate infarction in the R. MCA and LMCA territory. Multifocal chronic infarction involving the R. frontal subcortical white matter, L. BG.     ILR interrogated - no arrhythmia    Pt received DSA on 2/26/2024, pt was found to have intra-stent occlusion of the previously placed right MCA/NORMAN intracranial stent R M1    ARU/PRU was drawn showing _____.    Imp: Multifocal infarctions involving the R. MCA and L. MCA territory in-stent stenosis of the R. MCA/NORMAN intracranial stent. Etiology ICAD - thromboembolism vs hypoperfusion possibly 2/2 to holding DAPT due to recent endoscopy.     Physical exam at discharge:  Neurological Examination:  General:  Appearance is consistent with chronologic age.   Cognitive/Language:  Awake, alert, and oriented to person, place, time and date.  Recent and remote memory intact.  Fund of knowledge is appropriate.  Naming, repetition and comprehension intact. Nondysarthric.    Cranial Nerves  - Eyes: Visual acuity intact, Visual fields full.  EOMI w/o nystagmus, skew or reported double vision.  PERRL.  No ptosis/weakness of eyelid closure.    - Face:  Facial sensation normal V1 - 3, no facial asymmetry.    - Ears/Nose/Throat:  Hearing grossly intact b/l to finger rub.  Palate elevates midline.  Tongue and uvula midline.   Motor examination:  (MRC grade R/L) 5/5 UE; 5/5 LE.  No observable drift. Normal tone and bulk. No tenderness, twitching, tremors or involuntary movements.  Sensory examination:  Intact to light touch and pinprick, pain, temperature and proprioception and vibration in all extremities.  Reflexes:   2+ b/l biceps, triceps, patella and achilles.  Plantar response downgoing b/l.  Jaw jerk, Scarlet, clonus absent.  Cerebellum:   FTN/HKS intact.  No dysmetria.    Gait Deferred.     NIHS 0.     Changes to medications _____________  Further outpatient workup: Stroke followup, endovascular follow up. Hospital course:  65 y/o man with PMH of CVA 2020 and 2021 s/p intracranial angioplasty of right MCA and NORMAN, aortic valve insufficiency, implantable loop recorder for prior episodes of syncope/NSVT, chronic anemia, CKD 2, psoriatic arthritis presented for syncopal episode when leaving the shower.     CTA h/n moderate-severe stenosis of the R. supcraclinoid ICA proximal, distal and within the stent construct. Moderate L. M1 stenosis.     MR brain 2.24.24 multifocal punctate infarction in the R. MCA and LMCA territory. Multifocal chronic infarction involving the R. frontal subcortical white matter, L. BG.     ILR interrogated - no arrhythmia    Pt received DSA on 2/26/2024, pt was found to have intra-stent occlusion of the previously placed right MCA/NORMAN intracranial stent R M1    ARU/PRU was drawn showing pru 70 asa 372       Imp: Multifocal infarctions involving the R. MCA and L. MCA territory in-stent stenosis of the R. MCA/NORMAN intracranial stent. Etiology ICAD - thromboembolism vs hypoperfusion possibly 2/2 to holding DAPT due to recent endoscopy.     Physical exam at discharge:  Neurological Examination:  General:  Appearance is consistent with chronologic age.   Cognitive/Language:  Awake, alert, and oriented to person, place, time and date.  Recent and remote memory intact.  Fund of knowledge is appropriate.  Naming, repetition and comprehension intact. Nondysarthric.    Cranial Nerves  - Eyes: Visual acuity intact, Visual fields full.  EOMI w/o nystagmus, skew or reported double vision.  PERRL.  No ptosis/weakness of eyelid closure.    - Face:  Facial sensation normal V1 - 3, no facial asymmetry.    - Ears/Nose/Throat:  Hearing grossly intact b/l to finger rub.  Palate elevates midline.  Tongue and uvula midline.   Motor examination:  (MRC grade R/L) 5/5 UE; 5/5 LE.  No observable drift. Normal tone and bulk. No tenderness, twitching, tremors or involuntary movements.  Sensory examination:  Intact to light touch and pinprick, pain, temperature and proprioception and vibration in all extremities.  Reflexes:   2+ b/l biceps, triceps, patella and achilles.  Plantar response downgoing b/l.  Jaw jerk, Scarlet, clonus absent.  Cerebellum:   FTN/HKS intact.  No dysmetria.    Gait Deferred.     NIHS 0.     Further outpatient workup: Stroke followup, endovascular follow up.

## 2024-02-27 NOTE — DISCHARGE NOTE PROVIDER - NSDCFUSCHEDAPPT_GEN_ALL_CORE_FT
[facility]  CARDIOLOGY [address]  Scheduled Appointment: 03/04/2024    Unknown, Doctor  BIANCA Hooker  [facility]  Scheduled Appointment: 03/26/2024    [facility]  MRI SI [address]  Scheduled Appointment: 03/26/2024

## 2024-02-27 NOTE — DISCHARGE NOTE NURSING/CASE MANAGEMENT/SOCIAL WORK - PATIENT PORTAL LINK FT
You can access the FollowMyHealth Patient Portal offered by [facility] by registering at the following website: [email or web address]. By joining [facility]’s FollowMyHealth portal, you will also be able to view your health information using other applications (apps) compatible with our system.

## 2024-02-27 NOTE — PROGRESS NOTE ADULT - SUBJECTIVE AND OBJECTIVE BOX
Neurology Progress Note    Interval History:    Patient was seen and examined, no acute event over night. Ptn denies any weakness, numbness, dysarthria aphasia, dysphagia, visual changes, memory deficits headache, Nausea or vomiting.    Medications:  aspirin enteric coated 81 milliGRAM(s) Oral daily  atorvastatin 80 milliGRAM(s) Oral at bedtime  clopidogrel Tablet 75 milliGRAM(s) Oral daily  enoxaparin Injectable 40 milliGRAM(s) SubCutaneous every 24 hours  losartan 50 milliGRAM(s) Oral daily  venlafaxine XR. 37.5 milliGRAM(s) Oral daily      Vital Signs Last 24 Hrs  T(C): 36.6 (25 Feb 2024 05:04), Max: 36.6 (25 Feb 2024 05:04)  T(F): 97.9 (25 Feb 2024 05:04), Max: 97.9 (25 Feb 2024 05:04)  HR: 57 (25 Feb 2024 05:04) (57 - 66)  BP: 142/73 (25 Feb 2024 05:04) (142/73 - 164/82)  BP(mean): --  RR: 18 (25 Feb 2024 05:04) (18 - 18)  SpO2: 98% (24 Feb 2024 15:51) (98% - 98%)        Neurological Examination:  General:  Appearance is consistent with chronologic age.   Cognitive/Language:  Awake, alert, and oriented to person, place, time and date.  Recent and remote memory intact.  Fund of knowledge is appropriate.  Naming, repetition and comprehension intact. Nondysarthric.    Cranial Nerves  - Eyes: Visual acuity intact, Visual fields full.  EOMI w/o nystagmus, skew or reported double vision.  PERRL.  No ptosis/weakness of eyelid closure.    - Face:  Facial sensation normal V1 - 3, no facial asymmetry.    - Ears/Nose/Throat:  Hearing grossly intact b/l to finger rub.  Palate elevates midline.  Tongue and uvula midline.   Motor examination:  (MRC grade R/L) 5/5 UE; 5/5 LE.  No observable drift. Normal tone and bulk. No tenderness, twitching, tremors or involuntary movements.  Sensory examination:  Intact to light touch and pinprick, pain, temperature and proprioception and vibration in all extremities.  Reflexes:   2+ b/l biceps, triceps, patella and achilles.  Plantar response downgoing b/l.  Jaw jerk, Scarlet, clonus absent.  Cerebellum:   FTN/HKS intact.  No dysmetria.    Gait Deferred.     NIHS 0.     Labs:  CBC Full  -  ( 24 Feb 2024 08:24 )  WBC Count : 10.34 K/uL  RBC Count : 4.84 M/uL  Hemoglobin : 12.5 g/dL  Hematocrit : 40.2 %  Platelet Count - Automated : 324 K/uL  Mean Cell Volume : 83.1 fL  Mean Cell Hemoglobin : 25.8 pg  Mean Cell Hemoglobin Concentration : 31.1 g/dL  Auto Neutrophil # : x  Auto Lymphocyte # : x  Auto Monocyte # : x  Auto Eosinophil # : x  Auto Basophil # : x  Auto Neutrophil % : x  Auto Lymphocyte % : x  Auto Monocyte % : x  Auto Eosinophil % : x  Auto Basophil % : x    02-25    142  |  105  |  23<H>  ----------------------------<  89  4.6   |  26  |  1.5    Ca    9.5      25 Feb 2024 07:26  Mg     2.1     02-25    TPro  6.6  /  Alb  4.1  /  TBili  0.4  /  DBili  x   /  AST  13  /  ALT  14  /  AlkPhos  79  02-25    LIVER FUNCTIONS - ( 25 Feb 2024 07:26 )  Alb: 4.1 g/dL / Pro: 6.6 g/dL / ALK PHOS: 79 U/L / ALT: 14 U/L / AST: 13 U/L / GGT: x             Urinalysis Basic - ( 25 Feb 2024 07:26 )    Color: x / Appearance: x / SG: x / pH: x  Gluc: 89 mg/dL / Ketone: x  / Bili: x / Urobili: x   Blood: x / Protein: x / Nitrite: x   Leuk Esterase: x / RBC: x / WBC x   Sq Epi: x / Non Sq Epi: x / Bacteria: x        RADIOLOGY & ADDITIONAL TESTS:  < from: MR Head No Cont (02.24.24 @ 09:14) >  IMPRESSION:    1.  Small acute cortical and subcortical infarcts along within the right   frontal lobe and left parietal and occipital lobes.    2.  Multiple chronic infarcts in the right frontal lobe, left cerebellar   hemisphere, right aspect of the madhu, andbilateral basal ganglia and   corona radiata.    3.  Status post right NORMAN and MCA stenting.  See CTA head from same day.    --- End of Report ---    < end of copied text >  MR brain 2.24.24 multifocal punctate infarction in the R. MCA and LMCA territory. Multifocal chronic infarction involving the R. frontal subcortical white matter, L. BG.   CTA h/n moderate-severe stenosis of the R. supcraclinoid ICA proximal, distal and within the stent construct. Moderate L. M1 stenosis (stable).   MR brain 4.29.2020 demonstrates multiple small and punctate acute infarctions within the R. medial frontal and parietal lobes.   DSA 2.3.2021 demonstrated severe stenosis of the R. M1 and R. A1 and L. M2 inferior division.   MR brain 6.7.2023 recurrent R. MCA infarction.   DSA 6.30.2021 s/p angioplasty and stenting of R. M1 and A1  DSA 8.30.2021 severe R. M1 and R. A1 stenosis   QMRA NOVA R. M1 flow 26, L. M1 flow 67  DSA 2.17.2022 largely stable R. M1 and R. A1 stenosis  SPECT 3.29.2022 Significant decrease blood flow to the right MCA/NORMAN territories with Diamox which improves on non-Diamox suggesting reversible perfusion defect.
24H events:    Patient is a 64y old Male who presents with a chief complaint of syncope (26 Feb 2024 10:47)    Primary diagnosis of Syncope    Today is hospital day 3d. This morning patient was seen and examined at bedside, resting comfortably in bed.    No acute or major events overnight. Hemodynamically stable, tolerating oral diet, voiding appropriately with appropriate bowel movements.         PAST MEDICAL & SURGICAL HISTORY  HTN (hypertension)    CVA (cerebral vascular accident)    Psoriatic arthritis    Hemiparesis, left  resolved Syncope    H/O syncope    Aortic insufficiency  Moderate    NSVT (nonsustained ventricular tachycardia)    H/O angiography    H/O cerebral artery stenosis  Cerebral angiogram with angioplaty R MI and AI severe stenosis R MCCA stenting 08/5/2021 R NORMAN 06/30/2021      ALLERGIES:  atorvastatin (Diarrhea (Severe))    MEDICATIONS:  STANDING MEDICATIONS  aspirin enteric coated 81 milliGRAM(s) Oral daily  clopidogrel Tablet 75 milliGRAM(s) Oral daily  enoxaparin Injectable 40 milliGRAM(s) SubCutaneous every 24 hours  losartan 50 milliGRAM(s) Oral daily  simvastatin 40 milliGRAM(s) Oral at bedtime  venlafaxine XR. 37.5 milliGRAM(s) Oral daily    PRN MEDICATIONS    VITALS:   T(F): 97.5  HR: 66  BP: 147/75  RR: 16  SpO2: 99%    PHYSICAL EXAM:  GENERAL: NAD, lying in bed comfortably  HEAD: NCAD, no hematoma or laceration   NECK: Supple, no neck stiffness/nuchal rigidity, no JVD    HEART: Regular rate and rhythm, normal S1/S2, no murmurs  LUNGS: No acute respiratory distress, clear b/l breath sounds, no wheezing  ABDOMEN:  soft, nontender, nondistended, + B  EXTREMITIES: no rashes, extremities warm/dry, no cyanosis, no edema  NERVOUS SYSTEM:  A&Ox3    LABS:                        12.8   8.98  )-----------( 319      ( 26 Feb 2024 08:01 )             40.3     02-26    141  |  106  |  27<H>  ----------------------------<  90  4.3   |  22  |  1.3    Ca    9.4      26 Feb 2024 08:01  Mg     2.0     02-26    TPro  6.4  /  Alb  3.9  /  TBili  0.4  /  DBili  x   /  AST  15  /  ALT  15  /  AlkPhos  78  02-26      Urinalysis Basic - ( 26 Feb 2024 08:01 )    Color: x / Appearance: x / SG: x / pH: x  Gluc: 90 mg/dL / Ketone: x  / Bili: x / Urobili: x   Blood: x / Protein: x / Nitrite: x   Leuk Esterase: x / RBC: x / WBC x   Sq Epi: x / Non Sq Epi: x / Bacteria: x                RADIOLOGY:      < from: MR Head No Cont (02.24.24 @ 09:14) >    ACC: [id number] EXAM:  MR BRAIN   ORDERED BY: MARISOL MENDOZA     PROCEDURE DATE:  02/24/2024          INTERPRETATION:  Clinical History / Reason for exam: Subacute stroke   evaluation.    TECHNIQUE: MRI brain without contrast. Multiplanar multisequential MRI of   the brain without intravenous contrast administration on the 1.5 Saira   magnet.    COMPARISON: Brain MRI 9/6/2022. CT head dated 2/23/2024.    FINDINGS:    There are several punctate foci of restricted diffusion in the right   frontal lobe. There are also small and punctate foci of restricted   diffusion along the cortex of the left parietal and occipital lobes.    The ventricles and cortical sulci are compatible with a mild degree of   parenchymal volume loss.    There are stable chronic infarcts in the right frontal lobe, left   cerebellar hemisphere, right aspect of the madhu, bilateral basal ganglia   and corona radiata.    There are stable patchy foci of T2/FLAIR abnormality within the cerebral   hemispheric white matter consistent with chronic microvascular ischemic   changes.    There is no evidence of acute intracranial hemorrhage, extra-axial fluid   collection or midline shift.    Susceptibility noted within the right M1 and A1 segments consistent with   stent.    The pituitary is normal in size. The cerebellar tonsils are normal in   position. There is normal marrow signal within the clivus.    The visualized paranasal sinuses and mastoids are clear. The   globes/orbits are unremarkable.      IMPRESSION:    1.  Small acute cortical and subcortical infarcts along within the right   frontal lobe and left parietal and occipital lobes.    2.  Multiple chronic infarcts in the right frontal lobe, left cerebellar   hemisphere, right aspect of the madhu, andbilateral basal ganglia and   corona radiata.    3.  Status post right NORMAN and MCA stenting.  See CTA head from same day.    --- End of Report ---            RAHAT BREWER MD; Attending Radiologist  This document has been electronically signed. Feb 242024  1:17PM    < end of copied text >  
  ALEKSANDR, VEENA  64y Male    INTERVAL HPI/OVERNIGHT EVENTS:    pt feels well  wife at bedside  no pain, dizziness, SOB  telemtery - no events    T(F): 97.9 (24 @ 05:04), Max: 97.9 (24 @ 05:04)  HR: 57 (24 @ 05:04) (57 - 66)  BP: 142/73 (24 @ 05:04) (142/73 - 164/82)  RR: 18 (24 @ 05:04) (18 - 18)  SpO2: 98% (24 @ 15:51) (98% - 98%)    I&O's Summary    2024 07:01  -  2024 14:15  --------------------------------------------------------  IN: 120 mL / OUT: 0 mL / NET: 120 mL    Daily Height in cm: 167.64 (2024 21:26)    Daily Weight in k (2024 05:04)      PHYSICAL EXAM:  GENERAL: NAD  HEAD:  Normocephalic  EYES:  conjunctiva and sclera clear  ENMT: Moist mucous membranes  NECK: Supple  NERVOUS SYSTEM:  Alert, awake, Good concentration, motor Left LE 5-/5, following commands  CHEST/LUNG: CTA b/l  HEART: Regular rate and rhythm  ABDOMEN: Soft, Nontender, Nondistended; Bowel sounds present  EXTREMITIES: No edema LE  SKIN: warm, dry    Consultant(s) Notes Reviewed:  [x ] YES  [ ] NO  Care Discussed with Consultants/Other Providers [ x] YES  [ ] NO    MEDICATIONS  (STANDING):  aspirin enteric coated 81 milliGRAM(s) Oral daily  atorvastatin 80 milliGRAM(s) Oral at bedtime  clopidogrel Tablet 75 milliGRAM(s) Oral daily  enoxaparin Injectable 40 milliGRAM(s) SubCutaneous every 24 hours  losartan 50 milliGRAM(s) Oral daily  venlafaxine XR. 37.5 milliGRAM(s) Oral daily    MEDICATIONS  (PRN):      Telemetry reviewed by me    LABS:                        13.0   9.23  )-----------( 331      ( 2024 07:26 )             41.3         142  |  105  |  23<H>  ----------------------------<  89  4.6   |  26  |  1.5    Ca    9.5      2024 07:26  Mg     2.1         TPro  6.6  /  Alb  4.1  /  TBili  0.4  /  DBili  x   /  AST  13  /  ALT  14  /  AlkPhos  79                    RADIOLOGY & ADDITIONAL TESTS:    Imaging or report Personally Reviewed:  [ x] YES  [ ] NO    < from: MR Head No Cont (24 @ 09:14) >    IMPRESSION:    1.  Small acute cortical and subcortical infarcts along within the right   frontal lobe and left parietal and occipital lobes.    2.  Multiple chronic infarcts in the right frontal lobe, left cerebellar   hemisphere, right aspect of the madhu, andbilateral basal ganglia and   corona radiata.    3.  Status post right NORMAN and MCA stenting.  See CTA head from same day.    < end of copied text >      < from: CT Angio Head w/ IV Cont (24 @ 07:51) >  IMPRESSION:  Since the CTA neck and brain dated 2021:    1.  The patient is status post interval placement of a stent within the   right ICA terminus extending into the right MCA and NORMAN.    2.  There is moderate/severe stenosis of the right supraclinoid ICA (just   proximal to the stent) as well as severe stenoses of the right NORMAN and   MCA just distal to the stents. The lumen of the stent also appears   stenotic.    3.  Moderate stenosis of the left MCA M1 segment and a few mild distal   left MCA stenoses, about stable.    < end of copied text >          Case discussed with RN today    Care discussed with pt and family        
Neuroendovascular Progress Note:     HPI:  Pt is a 63 y/o male w/ PMHx of CVA 2020 and 2021 s/p intracranial angioplasty of right MCA and NORMAN, aortic valve insufficiency, implantable loop recorder for prior episodes of syncope/NSVT, chronic anemia, CKD 2, psoriatic arthritis who presented for syncopal episode when leaving the shower.  DSA (02/26/24): pt was found to have intra-stent occlusion of the previously placed right MCA/NORMAN intracranial stent.   DSA PPD#1: Today pt reports feeling well and endorses 2/10 pain at rt ulnar arteriotomy site. Pt denies weakness, HA, dizziness, vision changes. H/H stable overnight; afebrile.     Imaging: MRI brain reveals small acute cortical and subcortical infarcts along within the right frontal lobe and left parietal and occipital lobes. Multiple chronic infarcts in the right frontal lobe, left cerebellar hemisphere, right aspect of the madhu, and bilateral basal ganglia and corona radiata. CTA reported moderate/severe stenosis of the right supraclinoid ICA (just proximal to the stent) as well as severe stenoses of the right NORMAN and MCA just distal to the stents. The lumen of the stent also appears stenotic. Moderate stenosis of the left MCA M1 segment and a few mild distal left MCA stenoses, about stable.    EKG: Sinus, rate 60    Vitals  T(C): 36.9 (02-23-24 @ 23:45), Max: 36.9 (02-23-24 @ 23:45)  T(F): 98.5 (02-23-24 @ 23:45), Max: 98.5 (02-23-24 @ 23:45)  HR: 57 (02-23-24 @ 23:45) (57 - 63)  BP: 128/78 (02-23-24 @ 23:45) (107/59 - 128/78)  RR: 18 (02-23-24 @ 23:45) (18 - 22)  SpO2: 98% (02-23-24 @ 23:45) (98% - 99%)  Wt(kg): --    Review of Systems  CONSTITUTIONAL:  No weakness, fevers or chills  EYES/ENT:  No visual changes;  No vertigo or throat pain   NECK:  No pain or stiffness  RESPIRATORY:  No cough, wheezing, hemoptysis; No shortness of breath  CARDIOVASCULAR:  No chest pain or palpitations  GASTROINTESTINAL:  No abdominal or epigastric pain. No nausea, vomiting, or hematemesis; No diarrhea or constipation. No melena or hematochezia.  GENITOURINARY:  No dysuria, frequency or hematuria  NEUROLOGICAL:  No numbness or weakness  SKIN:  No itching, rashes    Past Medical History:   HTN (hypertension)  Psoriasis  CVA (cerebral vascular accident)  Psoriatic arthritis  Hemiparesis, left  H/O syncope  Aortic insufficiency  NSVT (nonsustained ventricular tachycardia)    24-Hour Events: None    Medication:  aspirin enteric coated 81 milliGRAM(s) Oral daily  clopidogrel Tablet 75 milliGRAM(s) Oral daily  enoxaparin Injectable 40 milliGRAM(s) SubCutaneous every 24 hours  losartan 50 milliGRAM(s) Oral daily  simvastatin 40 milliGRAM(s) Oral at bedtime  sodium chloride 0.9%. 1000 milliLiter(s) IV Continuous <Continuous>  venlafaxine XR. 37.5 milliGRAM(s) Oral daily    Allergies:   atorvastatin (Diarrhea (Severe))    Recent Vitals:  T(F): 97.7 (02-27-24 @ 16:00), Max: 97.9 (02-27-24 @ 03:00)  HR: 84 (02-27-24 @ 16:00) (60 - 105)  BP: 160/90 (02-27-24 @ 16:00) (144/70 - 160/90)  RR: 18 (02-27-24 @ 16:00) (18 - 18)  SpO2: 99% (02-27-24 @ 16:00) (96% - 99%)    Recent Labs:                        11.6   10.17 )-----------( 310      ( 27 Feb 2024 07:37 )             36.4     02-27    141  |  108  |  22<H>  ----------------------------<  97  4.4   |  25  |  1.3    Ca    8.8      27 Feb 2024 07:37  Mg     1.9     02-27    TPro  6.0  /  Alb  3.5  /  TBili  0.3  /  DBili  x   /  AST  11  /  ALT  13  /  AlkPhos  73  02-27    Urinalysis Basic - ( 27 Feb 2024 07:37 )    Color: x / Appearance: x / SG: x / pH: x  Gluc: 97 mg/dL / Ketone: x  / Bili: x / Urobili: x   Blood: x / Protein: x / Nitrite: x   Leuk Esterase: x / RBC: x / WBC x   Sq Epi: x / Non Sq Epi: x / Bacteria: x    LIVER FUNCTIONS - ( 27 Feb 2024 07:37 )  Alb: 3.5 g/dL / Pro: 6.0 g/dL / ALK PHOS: 73 U/L / ALT: 13 U/L / AST: 11 U/L / GGT: x           Exam:  General: NAD, supine in hospital bed.  Skin: Warm, dry. Rt ulnar arteriotomy site presents w/ dried scab and local ecchymosis. Mild edema appreciated local to arteriotomy site w/ absence of warmth, erythema, discharge and active bleeding. Absence of crepitus.  Cardiovascular: Regular rate rhythm, +S1, S2  Respiratory: Symmetrical chest rise and fall. Lungs CTA b/l.  Mental Status: AAO x3, recent and remote memory intact. Naming, repetition, and comprehension intact. Attention/concentration intact. No dysarthria or aphasia.   CN: Full visual fields, no nystagmus, EOMi, V1-V3 intact b/l and symmetric, face symmetric, tongue midline, palate elevation symmetric.   Motor: 5/5 b/l UE/LE,  strength 5/5. Normal bulk and tone. No abnormal movements.   Sensation: Intact to light touch.   Coordination: No dysmetria on finger-to-nose and heel-to-shin.   NIHSS: 0    Radiology:   Pertinent radiological imaging has been reviewed. Neuro IR procedure note is to follow.    Assessment:   63 y/o male w/ PMHx of CVA 2020 and 2021 s/p intracranial angioplasty of right MCA and NORMAN, aortic valve insufficiency, implantable loop recorder for prior episodes of syncope/NSVT, chronic anemia, CKD 2, psoriatic arthritis who presented for syncopal episode when leaving the shower.  DSA (02/26/24): pt was found to have intra-stent occlusion of the previously placed right MCA/NORMAN intracranial stent.   DSA PPD#1: Today pt reports feeling well and endorses 2/10 pain at rt ulnar arteriotomy site. Pt denies weakness, HA, dizziness, vision changes. H/H stable overnight; afebrile.     Suggestions:  - F/u with Dr. Mckay in the outpatient neurosurgery office after discharge for continued monitoring.   - Continued monitoring of the RUE for signs of increased swelling/ ischemia to the arm/ absent or diminished distal pulse.s   - No neuroendovascular intervention is planned at this time.   [id number] Neuroendovascular with additional questions/ concerns on this admission.

## 2024-02-27 NOTE — CONSULT NOTE ADULT - SUBJECTIVE AND OBJECTIVE BOX
VEENA BRANDON  64y  Male    Patient is a 64y old  Male who presents with a chief complaint of syncope (26 Feb 2024 12:02)      HPI:  64yoM PMHx CVA 2020 and 2021 status post intracranial angioplasty of right MCA and NORMAN, aortic valve insufficiency, implantable loop recorder for prior episodes of syncope/NSVT, chronic anemia, CKD stage 2, psoriatic arthritis presenting for episode of syncope at about 6:30 PM tonight.  Per patient and wife at bedside patient was stepping out of the shower when he suddenly passed out and fell to the floor, his wife heard the fall from the next room and came to help him immediately, found him awake, alert but confused about how he fell.  Wife reports patient seems more confused for about 20 minutes after the fall, but denies any unilateral symptoms, weakness, change in gait or speech slurring. Patient denies any current symptoms, weakness, headache, difficulty walking, numbness, tingling, vision changes, chest pain, shortness of breath, nausea.  Patient and wife at bedside also denies any recent illness, recent fever, chills, vomiting, diarrhea    Labs significant for WBC 13, Hg 11.5, trop 13, Cr 1.3 (baseline)    Imaging: CTh/n showing subacute/chronic L parietal infarction    EKG: Sinus, rate 60   (24 Feb 2024 00:56)  3C Course:  Cardiac workup for syncope episode ordered for patient. EP consulted, and no events were seen in loop recorder. Patient with preserved LV function and no arrhythmias. No need for nuclear stress test when discussed with EP attending. Neurology consulted as patient presented with LOC and found with multifocal infarction of the bilateral hemispheres, CTA with concerns for instent stenosis. rEEG ordered per neurology, normal rEEG. Neuroendovascular was consulted for possible angiogram due to ICA stenosis seen on imaging. Exam unchanged and patient was satble s/p procedure. Patient to continue with DAPT. Patient is now being transferred to stroke team for further monitoring.   MRI brain:  Small acute cortical and subcortical infarcts along within the right frontal lobe and left parietal and occipital lobes.  Multiple chronic infarcts in the right frontal lobe, left cerebellar hemisphere, right aspect of the madhu, and bilateral basal ganglia and corona radiata. CTA reported  moderate/severe stenosis of the right supraclinoid ICA (just proximal to the stent) as well as severe stenoses of the right NORMAN and MCA just distal to the stents. The lumen of the stent also appears stenotic. Moderate stenosis of the left MCA M1 segment and a few mild distal left MCA stenoses, about stable.    Pt received DSA on 2/26/2024, pt was found to have intra-stent occlusion of the previously placed right MCA/NORMAN intracranial stent.    Pt currently with NIH of 0, no neuro deficits on exam. Evidence of minimal previous bleeding of right ulnar site. No evidence of current bleeding.   Pt currently on DAPT and statin, CTA and CTP ordered for 2/27.     BP parameters: <160.      S: Patient was examined and seen at bedside. This morning pt is resting comfortably in bed and reports no new issues or overnight events. No complaints, feels better  Denies CP, SOB, N/V/D/C/AP, cough, F, chills, dizziness, new focal weakness, HA, vision changes, dysuria, or urinary symptoms, blood in stool.  ROS: all other systems reviewed and are negative    PAST MEDICAL & SURGICAL HISTORY:  HTN (hypertension)      CVA (cerebral vascular accident)      Psoriatic arthritis      Hemiparesis, left  resolved Syncope      H/O syncope      Aortic insufficiency  Moderate      NSVT (nonsustained ventricular tachycardia)      H/O angiography      H/O cerebral artery stenosis  Cerebral angiogram with angioplaty R MI and AI severe stenosis R MCCA stenting 08/5/2021 R NORMAN 06/30/2021        SOCIAL HISTORY:  Tobacco: negative  Illicit drugs: negative  Alcohol: social  Family history reviewed and otherwise non-contributory No clotting disorders, CVAs at early age.  ALLERGIES: NKDA    MEDICATIONS  (STANDING):  aspirin enteric coated 81 milliGRAM(s) Oral daily  clopidogrel Tablet 75 milliGRAM(s) Oral daily  enoxaparin Injectable 40 milliGRAM(s) SubCutaneous every 24 hours  losartan 50 milliGRAM(s) Oral daily  simvastatin 40 milliGRAM(s) Oral at bedtime  sodium chloride 0.9%. 1000 milliLiter(s) (75 mL/Hr) IV Continuous <Continuous>  venlafaxine XR. 37.5 milliGRAM(s) Oral daily    MEDICATIONS  (PRN):    Home Medications:  aspirin 81 mg oral tablet: orally once a day (24 Feb 2024 01:03)  NIFEdipine (Eqv-Adalat CC) 30 mg oral tablet, extended release: 1 tab(s) orally 2 times a day (24 Feb 2024 01:04)  simvastatin 80 mg oral tablet: 1 tab(s) orally once a day (at bedtime) (24 Feb 2024 01:04)  venlafaxine 37.5 mg oral tablet: 1 tab(s) orally once a day (24 Feb 2024 01:04)          Vital Signs Last 24 Hrs  T(C): 36.5 (27 Feb 2024 08:00), Max: 36.6 (27 Feb 2024 03:00)  T(F): 97.7 (27 Feb 2024 08:00), Max: 97.9 (27 Feb 2024 03:00)  HR: 86 (27 Feb 2024 08:00) (60 - 119)  BP: 153/79 (27 Feb 2024 08:00) (102/70 - 160/65)  BP(mean): 107 (27 Feb 2024 08:00) (89 - 107)  RR: 18 (27 Feb 2024 08:00) (16 - 18)  SpO2: 99% (27 Feb 2024 08:00) (96% - 99%)    Parameters below as of 27 Feb 2024 08:00  Patient On (Oxygen Delivery Method): room air      CAPILLARY BLOOD GLUCOSE          General: NAD. Looks stated age.  HEENT: clean oropharynx, EOMI, no LAD  Neck: trachea midline, no thyromegaly  CV: nl S1 S2; no m/r/g  Resp: decreased breath sounds at base  GI: NT/ND/S +BS  MS: no clubbing/cyanosis/edema, +pulses  Neuro: motor, sensory intact; + reflexes  Skin: no rashes, nl turgor  Psychiatric: AA0x3 w/ intact insight and judgement        LABS:                        11.6   10.17 )-----------( 310      ( 27 Feb 2024 07:37 )             36.4     02-27    141  |  108  |  22<H>  ----------------------------<  97  4.4   |  25  |  1.3    Ca    8.8      27 Feb 2024 07:37  Mg     1.9     02-27    TPro  6.0  /  Alb  3.5  /  TBili  0.3  /  DBili  x   /  AST  11  /  ALT  13  /  AlkPhos  73  02-27    LIVER FUNCTIONS - ( 27 Feb 2024 07:37 )  Alb: 3.5 g/dL / Pro: 6.0 g/dL / ALK PHOS: 73 U/L / ALT: 13 U/L / AST: 11 U/L / GGT: x                 Urinalysis Basic - ( 27 Feb 2024 07:37 )    Color: x / Appearance: x / SG: x / pH: x  Gluc: 97 mg/dL / Ketone: x  / Bili: x / Urobili: x   Blood: x / Protein: x / Nitrite: x   Leuk Esterase: x / RBC: x / WBC x   Sq Epi: x / Non Sq Epi: x / Bacteria: x            EKG - SR, nonspecific changes (my read)  Chart and consultant noted personally reviewed.  Care Discussed with Consultants/Other Providers/ Housestaff [ x] YES [ ] NO   Radiology, labs, old records personally reviewed.           VEENA BRANDON  64y  Male    Patient is a 64y old  Male who presents with a chief complaint of syncope (26 Feb 2024 12:02)      HPI:  64yoM PMHx CVA 2020 and 2021 status post intracranial angioplasty of right MCA and NORMAN, aortic valve insufficiency, implantable loop recorder for prior episodes of syncope/NSVT, chronic anemia, CKD stage 2, psoriatic arthritis presenting for episode of syncope at about 6:30 PM tonight.  Per patient and wife at bedside patient was stepping out of the shower when he suddenly passed out and fell to the floor, his wife heard the fall from the next room and came to help him immediately, found him awake, alert but confused about how he fell.  Wife reports patient seems more confused for about 20 minutes after the fall, but denies any unilateral symptoms, weakness, change in gait or speech slurring. Patient denies any current symptoms, weakness, headache, difficulty walking, numbness, tingling, vision changes, chest pain, shortness of breath, nausea.  Patient and wife at bedside also denies any recent illness, recent fever, chills, vomiting, diarrhea    Labs significant for WBC 13, Hg 11.5, trop 13, Cr 1.3 (baseline)    Imaging: CTh/n showing subacute/chronic L parietal infarction    EKG: Sinus, rate 60   (24 Feb 2024 00:56)  3C Course:  Cardiac workup for syncope episode ordered for patient. EP consulted, and no events were seen in loop recorder. Patient with preserved LV function and no arrhythmias. No need for nuclear stress test when discussed with EP attending. Neurology consulted as patient presented with LOC and found with multifocal infarction of the bilateral hemispheres, CTA with concerns for instent stenosis. rEEG ordered per neurology, normal rEEG. Neuroendovascular was consulted for possible angiogram due to ICA stenosis seen on imaging. Exam unchanged and patient was satble s/p procedure. Patient to continue with DAPT. Patient is now being transferred to stroke team for further monitoring.   MRI brain:  Small acute cortical and subcortical infarcts along within the right frontal lobe and left parietal and occipital lobes.  Multiple chronic infarcts in the right frontal lobe, left cerebellar hemisphere, right aspect of the madhu, and bilateral basal ganglia and corona radiata. CTA reported  moderate/severe stenosis of the right supraclinoid ICA (just proximal to the stent) as well as severe stenoses of the right NORMAN and MCA just distal to the stents. The lumen of the stent also appears stenotic. Moderate stenosis of the left MCA M1 segment and a few mild distal left MCA stenoses, about stable.    Pt received DSA on 2/26/2024, pt was found to have intra-stent occlusion of the previously placed right MCA/NORMAN intracranial stent.    Pt currently with NIH of 0, no neuro deficits on exam. Evidence of minimal previous bleeding of right ulnar site. No evidence of current bleeding.   Pt currently on DAPT and statin, CTA and CTP ordered for 2/27.     BP parameters: <160.      S: Patient was examined and seen at bedside. This morning pt is resting comfortably in bed and reports no new issues or overnight events. No complaints, feels back to normal. Reports holding ASA, Plavix for endoscopy and restarting 3 days later.  Denies CP, SOB, N/V/D/C/AP, cough, F, chills, dizziness, new focal weakness, HA, vision changes, dysuria, or urinary symptoms, blood in stool.  ROS: all other systems reviewed and are negative    PAST MEDICAL & SURGICAL HISTORY:  HTN (hypertension)      CVA (cerebral vascular accident)      Psoriatic arthritis      Hemiparesis, left  resolved Syncope      H/O syncope      Aortic insufficiency  Moderate      NSVT (nonsustained ventricular tachycardia)      H/O angiography      H/O cerebral artery stenosis  Cerebral angiogram with angioplaty R MI and AI severe stenosis R MCCA stenting 08/5/2021 R NORMAN 06/30/2021        SOCIAL HISTORY:  Tobacco: negative  Illicit drugs: negative  Alcohol: social  Family history reviewed and otherwise non-contributory No clotting disorders, CVAs at early age.  ALLERGIES: NKDA    MEDICATIONS  (STANDING):  aspirin enteric coated 81 milliGRAM(s) Oral daily  clopidogrel Tablet 75 milliGRAM(s) Oral daily  enoxaparin Injectable 40 milliGRAM(s) SubCutaneous every 24 hours  losartan 50 milliGRAM(s) Oral daily  simvastatin 40 milliGRAM(s) Oral at bedtime  sodium chloride 0.9%. 1000 milliLiter(s) (75 mL/Hr) IV Continuous <Continuous>  venlafaxine XR. 37.5 milliGRAM(s) Oral daily    MEDICATIONS  (PRN):    Home Medications:  aspirin 81 mg oral tablet: orally once a day (24 Feb 2024 01:03)  NIFEdipine (Eqv-Adalat CC) 30 mg oral tablet, extended release: 1 tab(s) orally 2 times a day (24 Feb 2024 01:04)  simvastatin 80 mg oral tablet: 1 tab(s) orally once a day (at bedtime) (24 Feb 2024 01:04)  venlafaxine 37.5 mg oral tablet: 1 tab(s) orally once a day (24 Feb 2024 01:04)          Vital Signs Last 24 Hrs  T(C): 36.5 (27 Feb 2024 08:00), Max: 36.6 (27 Feb 2024 03:00)  T(F): 97.7 (27 Feb 2024 08:00), Max: 97.9 (27 Feb 2024 03:00)  HR: 86 (27 Feb 2024 08:00) (60 - 119)  BP: 153/79 (27 Feb 2024 08:00) (102/70 - 160/65)  BP(mean): 107 (27 Feb 2024 08:00) (89 - 107)  RR: 18 (27 Feb 2024 08:00) (16 - 18)  SpO2: 99% (27 Feb 2024 08:00) (96% - 99%)    Parameters below as of 27 Feb 2024 08:00  Patient On (Oxygen Delivery Method): room air      CAPILLARY BLOOD GLUCOSE          General: NAD. Looks stated age.  HEENT: clean oropharynx, EOMI, no LAD  Neck: trachea midline, no thyromegaly  CV: nl S1 S2; no m/r/g  Resp: decreased breath sounds at base  GI: NT/ND/S +BS  MS: no clubbing/cyanosis/edema, +pulses  Neuro: motor, sensory intact; + reflexes  Skin: no rashes, nl turgor  Psychiatric: AA0x3 w/ fair insight and judgement    tele: SR, nonspecific changes (on my own evaluation of tele monitor)    LABS:                        11.6   10.17 )-----------( 310      ( 27 Feb 2024 07:37 )             36.4     02-27    141  |  108  |  22<H>  ----------------------------<  97  4.4   |  25  |  1.3    Ca    8.8      27 Feb 2024 07:37  Mg     1.9     02-27    TPro  6.0  /  Alb  3.5  /  TBili  0.3  /  DBili  x   /  AST  11  /  ALT  13  /  AlkPhos  73  02-27    LIVER FUNCTIONS - ( 27 Feb 2024 07:37 )  Alb: 3.5 g/dL / Pro: 6.0 g/dL / ALK PHOS: 73 U/L / ALT: 13 U/L / AST: 11 U/L / GGT: x                 Urinalysis Basic - ( 27 Feb 2024 07:37 )    Color: x / Appearance: x / SG: x / pH: x  Gluc: 97 mg/dL / Ketone: x  / Bili: x / Urobili: x   Blood: x / Protein: x / Nitrite: x   Leuk Esterase: x / RBC: x / WBC x   Sq Epi: x / Non Sq Epi: x / Bacteria: x            EKG - SR, nonspecific changes (my read)  Chart and consultant noted personally reviewed.  Care Discussed with Consultants/Other Providers/ Housestaff [ x] YES [ ] NO   Radiology, labs, old records personally reviewed.

## 2024-02-27 NOTE — CONSULT NOTE ADULT - ASSESSMENT
64yoM PMHx CVA 2020 and 2021 status post intracranial angioplasty of right MCA and NORMAN, aortic valve insufficiency, implantable loop recorder for prior episodes of syncope/NSVT, chronic anemia, CKD stage 2, presenting for episode of syncope at about 6:30 PM tonight.  Per patient and wife at bedside patient was stepping out of the shower when he suddenly passed out and fell to the floor, his wife heard the fall from the next room and came to help him immediately, found him awake, alert but confused about how he fell.  Wife reports patient seems more confused for about 20 minutes after the fall, but denies any unilateral symptoms, weakness, change in gait or speech slurring. Patient denies any current symptoms, weakness, headache, difficulty walking, numbness, tingling, vision changes, chest pain, shortness of breath, nausea.  Patient and wife at bedside also denies any recent illness, recent fever, chills, vomiting, diarrhea    3C Course:  Cardiac workup for syncope episode ordered for patient. EP consulted, and no events were seen in loop recorder. Patient with preserved LV function and no arrhythmias. No need for nuclear stress test when discussed with EP attending. Neurology consulted as patient presented with LOC and found with multifocal infarction of the bilateral hemispheres, CTA with concerns for instent stenosis. rEEG ordered per neurology, normal rEEG. Neuroendovascular was consulted for possible angiogram due to ICA stenosis seen on imaging. Patient was scheduled for diagnostic cerebral angiogram, which was completed on 2/26/24. Per neuroendovascular team, angiogram showed in-stent occlusion of R MCA/NORMAN stent that was placed back in 2021. Exam unchanged and patient was satble s/p procedure. Patient to continue with DAPT. Patient is now being transferred to stroke team for further monitoring.     #Acute stroke and worsening stenoses   - h/o CVA 2020 and 2021s/p intracranial angioplasty of right MCA and NORMAN  - MRI brain:  Small acute cortical and subcortical infarcts along within the right frontal lobe and left parietal and occipital lobes.  Multiple chronic infarcts in the right frontal lobe, left cerebellar hemisphere, right aspect of the madhu, and bilateral basal ganglia and corona radiata.  Status post right NORMAN and MCA stenting.   - CTA head/neck: The patient is status post interval placement of a stent within the right ICA terminus extending into the right MCA and NORMAN.  There is moderate/severe stenosis of the right supraclinoid ICA (just proximal to the stent) as well as severe stenoses of the right NORMAN and MCA just distal to the stents. The lumen of the stent also appears stenotic. Moderate stenosis of the left MCA M1 segment and a few mild distal left MCA stenoses, about stable.  - check P2Y12 level  pending  -normal rEEG  - c/w DAPT and statin(medication intolerance to atorvastatin NOT allergic)    #CKD 2 - stable  #HTN - cont Losartan  #HLD- statin     #Nutrition/Fluids/Electrolytes   - replete K<4 and Mg <2  - ensure regular BMs  - consider Miralax BID, Senna    #DVT Px  SCDs  Heparin or Lovenox    Chart and notes personally reviewed.  Care Discussed with Consultants/Other Providers/ Housestaff [ x] YES [ ] NO   Radiology, labs, old records personally reviewed.    discussed w/ housestaff, nursing, case management, neuro team    Attestation Statements:    Attestation Statements:  Risk Statement (NON-critical care).     On this date of service, level of risk to patient is considered: High.     Due to: acute stroke, stroke unit care, neuro checks, telemetry monitoring.    Time-based billing (NON-critical care).     50 minutes spent on total encounter. The necessity of the time spent during the encounter on this date of service was due to:     time spent on review of labs, imaging studies, old records, obtaining history, personally examining patient, counselling and communicating with patient/ family, entering orders for medications/tests/etc, discussions with other health care providers, documentation in electronic health records, independent interpretation of labs, imaging/procedure results and care coordination.

## 2024-02-27 NOTE — PROGRESS NOTE ADULT - ASSESSMENT
64yMale    HPI:    Pt is a 63 y/o male w/ PMHx of CVA 2020 and 2021 s/p intracranial angioplasty of right MCA and NORMAN, aortic valve insufficiency, implantable loop recorder for prior episodes of syncope/NSVT, chronic anemia, CKD 2, psoriatic arthritis who presented for syncopal episode when leaving the shower.      DSA (02/26/24): pt was found to have intra-stent occlusion of the previously placed right MCA/NORMAN intracranial stent.   DSA POD#1: Today pt reports feeling well and endorses 2/10 pain at rt ulnar arteriotomy site. Pt denies weakness, HA, dizziness, vision changes.    Imaging: MRI brain reveals small acute cortical and subcortical infarcts along within the right frontal lobe and left parietal and occipital lobes. Multiple chronic infarcts in the right frontal lobe, left cerebellar hemisphere, right aspect of the madhu, and bilateral basal ganglia and corona radiata. CTA reported moderate/severe stenosis of the right supraclinoid ICA (just proximal to the stent) as well as severe stenoses of the right NORMAN and MCA just distal to the stents. The lumen of the stent also appears stenotic. Moderate stenosis of the left MCA M1 segment and a few mild distal left MCA stenoses, about stable.    EKG: Sinus, rate 60    Vitals  T(C): 36.9 (02-23-24 @ 23:45), Max: 36.9 (02-23-24 @ 23:45)  T(F): 98.5 (02-23-24 @ 23:45), Max: 98.5 (02-23-24 @ 23:45)  HR: 57 (02-23-24 @ 23:45) (57 - 63)  BP: 128/78 (02-23-24 @ 23:45) (107/59 - 128/78)  RR: 18 (02-23-24 @ 23:45) (18 - 22)  SpO2: 98% (02-23-24 @ 23:45) (98% - 99%)  Wt(kg): --    Review of Systems  CONSTITUTIONAL:  No weakness, fevers or chills  EYES/ENT:  No visual changes;  No vertigo or throat pain   NECK:  No pain or stiffness  RESPIRATORY:  No cough, wheezing, hemoptysis; No shortness of breath  CARDIOVASCULAR:  No chest pain or palpitations  GASTROINTESTINAL:  No abdominal or epigastric pain. No nausea, vomiting, or hematemesis; No diarrhea or constipation. No melena or hematochezia.  GENITOURINARY:  No dysuria, frequency or hematuria  NEUROLOGICAL:  No numbness or weakness  SKIN:  No itching, rashes        HTN (hypertension)    Psoriasis    CVA (cerebral vascular accident)    Psoriatic arthritis    Hemiparesis, left    H/O syncope    Aortic insufficiency    NSVT (nonsustained ventricular tachycardia)        24-Hour Events: None    Medication:  aspirin enteric coated 81 milliGRAM(s) Oral daily  clopidogrel Tablet 75 milliGRAM(s) Oral daily  enoxaparin Injectable 40 milliGRAM(s) SubCutaneous every 24 hours  losartan 50 milliGRAM(s) Oral daily  simvastatin 40 milliGRAM(s) Oral at bedtime  sodium chloride 0.9%. 1000 milliLiter(s) IV Continuous <Continuous>  venlafaxine XR. 37.5 milliGRAM(s) Oral daily    atorvastatin (Diarrhea (Severe))      Recent Vitals:  T(F): 97.7 (02-27-24 @ 16:00), Max: 97.9 (02-27-24 @ 03:00)  HR: 84 (02-27-24 @ 16:00) (60 - 105)  BP: 160/90 (02-27-24 @ 16:00) (144/70 - 160/90)  RR: 18 (02-27-24 @ 16:00) (18 - 18)  SpO2: 99% (02-27-24 @ 16:00) (96% - 99%)    EVD: [ ] Polo: [ ]     ICP:     CPP:     Level(cm):     24hr(ml):     CSF:     W:     R:     C:     P:     G:     LA:          Recent Labs:                        11.6   10.17 )-----------( 310      ( 27 Feb 2024 07:37 )             36.4     02-27    141  |  108  |  22<H>  ----------------------------<  97  4.4   |  25  |  1.3    Ca    8.8      27 Feb 2024 07:37  Mg     1.9     02-27    TPro  6.0  /  Alb  3.5  /  TBili  0.3  /  DBili  x   /  AST  11  /  ALT  13  /  AlkPhos  73  02-27      Urinalysis Basic - ( 27 Feb 2024 07:37 )    Color: x / Appearance: x / SG: x / pH: x  Gluc: 97 mg/dL / Ketone: x  / Bili: x / Urobili: x   Blood: x / Protein: x / Nitrite: x   Leuk Esterase: x / RBC: x / WBC x   Sq Epi: x / Non Sq Epi: x / Bacteria: x      LIVER FUNCTIONS - ( 27 Feb 2024 07:37 )  Alb: 3.5 g/dL / Pro: 6.0 g/dL / ALK PHOS: 73 U/L / ALT: 13 U/L / AST: 11 U/L / GGT: x             Exam:  General: NAD, supine in hospital bed.  Skin: Warm, dry. Rt ulnar arteriotomy site presents w/ dried scab and local ecchymosis. Mild edema appreciated local to arteriotomy site w/ absence of warmth, erythema, discharge and active bleeding. Absence of crepitus.  Cardiovascular: Regular rate rhythm, +S1, $2  Respiratory: Symmetrical chest rise and fall. Lungs CTA b/1.  Mental Status: AAO x3, recent and remote memory intact. Naming, repetition, and comprehension intact. Attention/concentration intact. No dysarthria or aphasia.   CN: Full visual fields, no nystagmus, EOMi, V1-V3 intact b/l and symmetric, face symmetric, tongue midline, palate elevation symmetric.   Motor: 5/5 b/l UE/LE,  strength 5/5. Normal bulk and tone. No abnormal movements.   Sensation: Intact to light touch.   Coordination: No dysmetria on   finger-to-nose and heel-to-shin.   Gait:   NIHSS: 0    Radiology:             Assessment:   Suggestions:

## 2024-02-27 NOTE — CHART NOTE - NSCHARTNOTEFT_GEN_A_CORE
Pt accepted to stroke unit, 3E, pt was hemodynamically stable when they got to unit.     65 y/o man with PMH of CVA 2020 and 2021 s/p intracranial angioplasty of right MCA and NORMAN, aortic valve insufficiency, implantable loop recorder for prior episodes of syncope/NSVT, chronic anemia, CKD 2, psoriatic arthritis presented for syncopal episode when leaving the shower. MRI brain:  Small acute cortical and subcortical infarcts along within the right frontal lobe and left parietal and occipital lobes.  Multiple chronic infarcts in the right frontal lobe, left cerebellar hemisphere, right aspect of the madhu, and bilateral basal ganglia and corona radiata. CTA reported  moderate/severe stenosis of the right supraclinoid ICA (just proximal to the stent) as well as severe stenoses of the right NORMAN and MCA just distal to the stents. The lumen of the stent also appears stenotic. Moderate stenosis of the left MCA M1 segment and a few mild distal left MCA stenoses, about stable.    Pt received DSA on 2/26/2024, pt was found to have intra-stent occlusion of the previously placed right MCA/NORMAN intracranial stent.    Pt currently with NIH of 0, no neuro deficits on exam. Evidence of minimal previous bleeding of right ulnar site. No evidence of current bleeding.   Pt currently on DAPT and statin, CTA and CTP ordered for 2/27.     BP parameters: <160
A neuroendovascular consult was placed for ICA stenosis and possible intracranial stent stenosis on CTA.   - Please keep the patient NPO except medication after midnight tonight and on IV fluid as appropriate in the event that a diagnostic cerebral angiogram is needed tomorrow morning.   - Coags/ CBC/ CMP tomorrow AM   - Formal consult and evaluation to be completed tomorrow AM  [id number] [facility]
PACU ANESTHESIA ADMISSION NOTE      Procedure: cerebral angiogram  Post op diagnosis:      ____  Intubated  TV:______       Rate: ______      FiO2: ______    _x___  Patent Airway    _x___  Full return of protective reflexes    _x___  Full recovery from anesthesia / back to baseline status    Vitals:  T(C): 36.4  HR: 100  BP: 121/79  RR: 16   SpO2: 99%     Mental Status:  _x___ Awake   _____ Alert   _____ Drowsy   _____ Sedated    Nausea/Vomiting:  _x___  NO       ______Yes,   See Post - Op Orders         Pain Scale (0-10):  __0___    Treatment: _x___ None    ____ See Post - Op/PCA Orders    Post - Operative Fluids:   __x__ Oral   ____ See Post - Op Orders    Plan: Discharge:   ____Home       ___x__Floor     _____Critical Care    _____  Other:_________________    Comments:  No anesthesia issues or complications noted.  Discharge to floor when criteria met.
Patient had diagnostic angiogram performed today by Dr. Mckay - found to have occluded right M1. Patient to be transferred to stroke unit as per Dr. Florence for further workup. Continue Aspirin and plavix. Plan for CTA of head and neck with contrast and CTP tomorrow AM. Will likely need SPECT scan. ARU/PRU tomorrow AM.     Discussed with Dr. Florence
Patient refusing atorvastatin, states he is allergic and causes severe diarrhea. Explained to patient that he is high risk for future stroke given his history of infarct and large vessel atherosclerotic disease and that taking a statin will help reduce this risk. Patient asked for me to speak to wife, who is nurse. She explained that patient has severe diarrhea with atorvastatin and causes syncope, does not want him to take. She noted that he tolerates simvastatin better, patient agreeable to taking statin if switched to simvastatin.
Transfer from: 3C  Transfer to: Stroke unit     Accepted by Dr. Florence    HPI:  64yoM PMHx CVA 2020 and 2021 status post intracranial angioplasty of right MCA and NORMAN, aortic valve insufficiency, implantable loop recorder for prior episodes of syncope/NSVT, chronic anemia, CKD stage 2, presenting for episode of syncope at about 6:30 PM tonight.  Per patient and wife at bedside patient was stepping out of the shower when he suddenly passed out and fell to the floor, his wife heard the fall from the next room and came to help him immediately, found him awake, alert but confused about how he fell.  Wife reports patient seems more confused for about 20 minutes after the fall, but denies any unilateral symptoms, weakness, change in gait or speech slurring. Patient denies any current symptoms, weakness, headache, difficulty walking, numbness, tingling, vision changes, chest pain, shortness of breath, nausea.  Patient and wife at bedside also denies any recent illness, recent fever, chills, vomiting, diarrhea    Labs significant for WBC 13, Hg 11.5, trop 13, Cr 1.3 (baseline)    Imaging: CTh/n showing subacute/chronic L parietal infarction    EKG: Sinus, rate 60    Pt evaluated by neurology in ED, rec for CTa h/n and MR head non-con.    3C Course:  Cardiac workup for syncope episode ordered for patient. EP consulted, and no events were seen in loop recorder. Patient with preserved LV function and no arrhythmias. No need for nuclear stress test when discussed with EP attending. Neurology consulted as patient presented with LOC and found with multifocal infarction of the bilateral hemispheres, CTA with concerns for instent stenosis. rEEG ordered per neurology, normal rEEG. Neuroendovascular was consulted for possible angiogram due to ICA stenosis seen on imaging. Patient was scheduled for diagnostic cerebral angiogram, which was completed on 2/26/24. Per neuroendovascular team, angiogram showed in-stent occlusion of R MCA/NORMAN stent that was placed back in 2021. Exam unchanged and patient was satble s/p procedure. Patient to continue with DAPT. Patient is now being transferred to stroke team for further monitoring.     65 y/o man with PMH of CVA 2020 and 2021 s/p intracranial angioplasty of right MCA and NORMAN, aortic valve insufficiency, implantable loop recorder for prior episodes of syncope/NSVT, chronic anemia, CKD 2, psoriatic arthritis presented for syncopal episode when leaving the shower.     #Syncope  - EKG with NSR and LVH  - s/p interrogation of loop recorder - no events per EP  - negative orthostatic changes  - could have been due to hypotension from recent addition of nifedipine? -> stopped nifedipine and BP still under control  - previous ECHO 4/23: EF 58%, f/u repeat echo  - no need for nuclear stress test per EP (attending discussed with EP attending)    #Acute stroke and worsening stenoses   - h/o CVA 2020 and 2021s/p intracranial angioplasty of right MCA and NORMAN  - MRI brain:  Small acute cortical and subcortical infarcts along within the right frontal lobe and left parietal and occipital lobes.  Multiple chronic infarcts in the right frontal lobe, left cerebellar hemisphere, right aspect of the madhu, and bilateral basal ganglia and corona radiata.  Status post right NORMAN and MCA stenting.   - CTA head/neck: The patient is status post interval placement of a stent within the right ICA terminus extending into the right MCA and NORMAN.  There is moderate/severe stenosis of the right supraclinoid ICA (just proximal to the stent) as well as severe stenoses of the right NORMAN and MCA just distal to the stents. The lumen of the stent also appears stenotic. Moderate stenosis of the left MCA M1 segment and a few mild distal left MCA stenoses, about stable.  - neurology consult and f/u appreciated  - check P2Y12 level  pending  - f/u EEG report: normal rEEG  - neuroendovascular consulted - pending diagnostic cerebral angiogram results  - Pt transferred to Stroke unit for further monitoring   - c/w DAPT and statin(medication intolerance to atorvastatin NOT allergic)    #CKD 2 - stable     # DVT prophylaxis: Lovenox  # GI prophylaxis: NI  # Diet: DASH/TLC   # Activity: as tolerated  # Code status: FULL CODE  # Disposition: From home

## 2024-02-27 NOTE — CONSULT NOTE ADULT - SUBJECTIVE AND OBJECTIVE BOX
HPI:  64yoM PMHx CVA 2020 and 2021 status post intracranial angioplasty of right MCA and NORMAN, aortic valve insufficiency, implantable loop recorder for prior episodes of syncope/NSVT, chronic anemia, CKD stage 2, presenting for episode of syncope at about 6:30 PM tonight.  Per patient and wife at bedside patient was stepping out of the shower when he suddenly passed out and fell to the floor, his wife heard the fall from the next room and came to help him immediately, found him awake, alert but confused about how he fell.  Wife reports patient seems more confused for about 20 minutes after the fall, but denies any unilateral symptoms, weakness, change in gait or speech slurring. Patient denies any current symptoms, weakness, headache, difficulty walking, numbness, tingling, vision changes, chest pain, shortness of breath, nausea.  Patient and wife at bedside also denies any recent illness, recent fever, chills, vomiting, diarrhea    Labs significant for WBC 13, Hg 11.5, trop 13, Cr 1.3 (baseline)    Imaging: CTh/n showing subacute/chronic L parietal infarction    EKG: Sinus, rate 60    < from: MR Head No Cont (02.24.24 @ 09:14) >  IMPRESSION:    1.  Small acute cortical and subcortical infarcts along within the right   frontal lobe and left parietal and occipital lobes.    2.  Multiple chronic infarcts in the right frontal lobe, left cerebellar   hemisphere, right aspect of the madhu, andbilateral basal ganglia and   corona radiata.    3.  Status post right NORMAN and MCA stenting.  See CTA head from same day.    --- End of Report ---    < end of copied text >        PAST MEDICAL & SURGICAL HISTORY:  HTN (hypertension)      CVA (cerebral vascular accident)      Psoriatic arthritis      Hemiparesis, left  resolved Syncope      H/O syncope      Aortic insufficiency  Moderate      NSVT (nonsustained ventricular tachycardia)      H/O angiography      H/O cerebral artery stenosis  Cerebral angiogram with angioplaty R MI and AI severe stenosis R MCCA stenting 08/5/2021 R NORMAN 06/30/2021          Hospital Course:    TODAY'S SUBJECTIVE & REVIEW OF SYMPTOMS:     Constitutional WNL   Cardio WNL   Resp WNL   GI WNL  Heme WNL  Endo WNL  Skin WNL  MSK WNL  Neuro syncope   Cognitive WNL  Psych WNL      MEDICATIONS  (STANDING):  aspirin enteric coated 81 milliGRAM(s) Oral daily  clopidogrel Tablet 75 milliGRAM(s) Oral daily  enoxaparin Injectable 40 milliGRAM(s) SubCutaneous every 24 hours  losartan 50 milliGRAM(s) Oral daily  simvastatin 40 milliGRAM(s) Oral at bedtime  sodium chloride 0.9%. 1000 milliLiter(s) (75 mL/Hr) IV Continuous <Continuous>  venlafaxine XR. 37.5 milliGRAM(s) Oral daily    MEDICATIONS  (PRN):      FAMILY HISTORY:  Family history of stroke (cerebrovascular) (Mother)    FH: CAD (coronary artery disease)    FH: hypertension        Allergies    atorvastatin (Diarrhea (Severe))    Intolerances        SOCIAL HISTORY:    [  ] Etoh  [  ] Smoking  [  ] Substance abuse     Home Environment:  [   ] Home Alone  [ x  ] Lives with Family  [   ] Home Health Aid    Dwelling:  [   ] Apartment  [ x  ] Private House  [   ] Adult Home  [   ] Skilled Nursing Facility      [   ] Short Term  [   ] Long Term  [ x  ] Stairs       Elevator [   ]    FUNCTIONAL STATUS PTA: (Check all that apply)  Ambulation: [ x   ]Independent    [   ] Dependent     [   ] Non-Ambulatory  Assistive Device: [   ] SA Cane  [   ]  Q Cane  [   ] Walker  [   ]  Wheelchair  ADL : [ x  ] Independent  [    ]  Dependent       Vital Signs Last 24 Hrs  T(C): 36.5 (27 Feb 2024 08:00), Max: 36.6 (27 Feb 2024 03:00)  T(F): 97.7 (27 Feb 2024 08:00), Max: 97.9 (27 Feb 2024 03:00)  HR: 86 (27 Feb 2024 08:00) (60 - 119)  BP: 153/79 (27 Feb 2024 08:00) (102/70 - 160/65)  BP(mean): 107 (27 Feb 2024 08:00) (89 - 107)  RR: 18 (27 Feb 2024 08:00) (16 - 18)  SpO2: 99% (27 Feb 2024 08:00) (96% - 99%)    Parameters below as of 27 Feb 2024 08:00  Patient On (Oxygen Delivery Method): room air          PHYSICAL EXAM: Awake & Alert  GENERAL: NAD  HEAD:  Normocephalic  CHEST/LUNG: Clear   HEART: S1S2+  ABDOMEN: Soft, Nontender  EXTREMITIES:  no calf tenderness    NERVOUS SYSTEM:  Cranial Nerves 2-12 intact [   ] Abnormal  [   ]  ROM: WFL all extremities [ x  ]  Abnormal [   ]  Motor Strength: WFL all extremities  [ x  ]  Abnormal [   ]  Sensation: intact to light touch [  x ] Abnormal [   ]    FUNCTIONAL STATUS:  Bed Mobility: Independent [   ]  Supervision [ x  ]  Needs Assistance [   ]  N/A [   ]  Transfers: Independent [   ]  Supervision [x   ]  Needs Assistance [   ]  N/A [   ]   Ambulation: Independent [   ]  Supervision [ x  ]  Needs Assistance [   ]  N/A [   ]  ADL: Independent [   ] Requires Assistance [   ] N/A [   ]      LABS:                        11.6   10.17 )-----------( 310      ( 27 Feb 2024 07:37 )             36.4     02-27    141  |  108  |  22<H>  ----------------------------<  97  4.4   |  25  |  1.3    Ca    8.8      27 Feb 2024 07:37  Mg     1.9     02-27    TPro  6.0  /  Alb  3.5  /  TBili  0.3  /  DBili  x   /  AST  11  /  ALT  13  /  AlkPhos  73  02-27      Urinalysis Basic - ( 27 Feb 2024 07:37 )    Color: x / Appearance: x / SG: x / pH: x  Gluc: 97 mg/dL / Ketone: x  / Bili: x / Urobili: x   Blood: x / Protein: x / Nitrite: x   Leuk Esterase: x / RBC: x / WBC x   Sq Epi: x / Non Sq Epi: x / Bacteria: x        RADIOLOGY & ADDITIONAL STUDIES:

## 2024-02-27 NOTE — DISCHARGE NOTE PROVIDER - NSDCCPCAREPLAN_GEN_ALL_CORE_FT
PRINCIPAL DISCHARGE DIAGNOSIS  Diagnosis: Stroke  Assessment and Plan of Treatment: You have been diagnosed with stroke which is a condition that develops when part of the brain doesn't get enough blood and oxygen   - Take your blood thinners and cholesterol medication as prescribed. Do not skip doses and do not run low on your medication. call your doctor if you need refills   - If you have diabetes, take your diabetes medication as prescribed. Check your blood sugar level every day and contact your doctor if the levels arr high as your medication may need to be re-adjusted or some medication may need to be added   - If you have hypertension, take your blood pressure medication as prescribed. Measure your blood pressure with a cuff every day and write down the values. Call your doctors if the values are high despite medication as he may adjust your medication   - Avoid smoking and do not let anyone smoke next to you. If you are a smoker and find it hard to smoke seek help or call your doctors for referrals for counselling programs   - Follow up with your doctor as outpatient. he may prescribe regular lab work to keep track of your cholesterol and sugar levels in your blood. Do not skip appointments and always be compliant wit your doctor's advise  - Call 911 or report to the emergency department if you have sudden onset severe headache, vision problems such as blurry or double vision or vision that is going dark, vertigo, numbness or weakness anywhere in your body, slurred speech or difficulty walking

## 2024-03-04 ENCOUNTER — APPOINTMENT (OUTPATIENT)
Dept: CARDIOLOGY | Facility: CLINIC | Age: 64
End: 2024-03-04
Payer: MEDICARE

## 2024-03-04 ENCOUNTER — NON-APPOINTMENT (OUTPATIENT)
Age: 64
End: 2024-03-04

## 2024-03-04 PROCEDURE — 93298 REM INTERROG DEV EVAL SCRMS: CPT

## 2024-03-08 DIAGNOSIS — D64.9 ANEMIA, UNSPECIFIED: ICD-10-CM

## 2024-03-08 DIAGNOSIS — Z79.82 LONG TERM (CURRENT) USE OF ASPIRIN: ICD-10-CM

## 2024-03-08 DIAGNOSIS — I63.413 CEREBRAL INFARCTION DUE TO EMBOLISM OF BILATERAL MIDDLE CEREBRAL ARTERIES: ICD-10-CM

## 2024-03-08 DIAGNOSIS — T46.1X5A ADVERSE EFFECT OF CALCIUM-CHANNEL BLOCKERS, INITIAL ENCOUNTER: ICD-10-CM

## 2024-03-08 DIAGNOSIS — I35.1 NONRHEUMATIC AORTIC (VALVE) INSUFFICIENCY: ICD-10-CM
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
## 2024-03-08 DIAGNOSIS — I65.21 OCCLUSION AND STENOSIS OF RIGHT CAROTID ARTERY: ICD-10-CM

## 2024-03-08 DIAGNOSIS — I12.9 HYPERTENSIVE CHRONIC KIDNEY DISEASE WITH STAGE 1 THROUGH STAGE 4 CHRONIC KIDNEY DISEASE, OR UNSPECIFIED CHRONIC KIDNEY DISEASE: ICD-10-CM

## 2024-03-08 DIAGNOSIS — I95.2 HYPOTENSION DUE TO DRUGS: ICD-10-CM
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
## 2024-03-08 DIAGNOSIS — Z86.73 PERSONAL HISTORY OF TRANSIENT ISCHEMIC ATTACK (TIA), AND CEREBRAL INFARCTION WITHOUT RESIDUAL DEFICITS: ICD-10-CM

## 2024-03-08 DIAGNOSIS — Z95.828 PRESENCE OF OTHER VASCULAR IMPLANTS AND GRAFTS: ICD-10-CM

## 2024-03-08 DIAGNOSIS — T82.858A STENOSIS OF OTHER VASCULAR PROSTHETIC DEVICES, IMPLANTS AND GRAFTS, INITIAL ENCOUNTER: ICD-10-CM

## 2024-03-08 DIAGNOSIS — L40.50 ARTHROPATHIC PSORIASIS, UNSPECIFIED: ICD-10-CM

## 2024-03-08 DIAGNOSIS — R29.700 NIHSS SCORE 0: ICD-10-CM
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
## 2024-03-08 DIAGNOSIS — N18.2 CHRONIC KIDNEY DISEASE, STAGE 2 (MILD): ICD-10-CM

## 2024-03-08 DIAGNOSIS — I67.2 CEREBRAL ATHEROSCLEROSIS: ICD-10-CM

## 2024-03-08 DIAGNOSIS — Z88.8 ALLERGY STATUS TO OTHER DRUGS, MEDICAMENTS AND BIOLOGICAL SUBSTANCES: ICD-10-CM

## 2024-03-08 DIAGNOSIS — Z79.02 LONG TERM (CURRENT) USE OF ANTITHROMBOTICS/ANTIPLATELETS: ICD-10-CM

## 2024-03-13 ENCOUNTER — APPOINTMENT (OUTPATIENT)
Dept: NEUROSURGERY | Facility: CLINIC | Age: 64
End: 2024-03-13
Payer: MEDICARE

## 2024-03-13 ENCOUNTER — RESULT REVIEW (OUTPATIENT)
Age: 64
End: 2024-03-13

## 2024-03-13 VITALS — BODY MASS INDEX: 27 KG/M2 | HEIGHT: 66 IN | WEIGHT: 168 LBS

## 2024-03-13 PROCEDURE — 99212 OFFICE O/P EST SF 10 MIN: CPT

## 2024-03-29 ENCOUNTER — APPOINTMENT (OUTPATIENT)
Dept: NEUROLOGY | Facility: CLINIC | Age: 64
End: 2024-03-29
Payer: MEDICARE

## 2024-03-29 VITALS
HEART RATE: 71 BPM | HEIGHT: 66 IN | WEIGHT: 169 LBS | TEMPERATURE: 98.5 F | OXYGEN SATURATION: 100 % | BODY MASS INDEX: 27.16 KG/M2 | DIASTOLIC BLOOD PRESSURE: 86 MMHG | SYSTOLIC BLOOD PRESSURE: 141 MMHG

## 2024-03-29 DIAGNOSIS — I67.9 CEREBROVASCULAR DISEASE, UNSPECIFIED: ICD-10-CM

## 2024-03-29 PROCEDURE — 99215 OFFICE O/P EST HI 40 MIN: CPT

## 2024-03-29 RX ORDER — NIFEDIPINE 30 MG
30 TABLET, EXTENDED RELEASE ORAL DAILY
Refills: 0 | Status: ACTIVE | COMMUNITY

## 2024-03-29 RX ORDER — ASPIRIN ENTERIC COATED TABLETS 81 MG 81 MG/1
81 TABLET, DELAYED RELEASE ORAL DAILY
Qty: 90 | Refills: 3 | Status: ACTIVE | COMMUNITY

## 2024-03-29 RX ORDER — TICAGRELOR 60 MG/1
60 TABLET ORAL
Qty: 180 | Refills: 1 | Status: ACTIVE | COMMUNITY
Start: 2024-03-29 | End: 1900-01-01

## 2024-03-29 RX ORDER — CLOPIDOGREL BISULFATE 75 MG/1
75 TABLET, FILM COATED ORAL DAILY
Qty: 30 | Refills: 0 | Status: DISCONTINUED | COMMUNITY
Start: 2020-11-05 | End: 2024-03-29

## 2024-03-29 NOTE — PHYSICAL EXAM
[General Appearance - Alert] : alert [General Appearance - In No Acute Distress] : in no acute distress [General Appearance - Well Nourished] : well nourished [General Appearance - Well Developed] : well developed [Oriented To Time, Place, And Person] : oriented to person, place, and time [FreeTextEntry1] : blunted affect [Place] : oriented to place [Person] : oriented to person [Time] : oriented to time [Repeating Phrases] : no difficulty repeating a phrase [Naming Objects] : no difficulty naming common objects [Comprehension] : comprehension intact [Fluency] : fluency intact [Cranial Nerves Optic (II)] : visual acuity intact bilaterally,  visual fields full to confrontation, pupils equal round and reactive to light [Cranial Nerves Oculomotor (III)] : extraocular motion intact [Cranial Nerves Trigeminal (V)] : facial sensation intact symmetrically [Cranial Nerves Glossopharyngeal (IX)] : tongue and palate midline [Cranial Nerves Vestibulocochlear (VIII)] : hearing was intact bilaterally [Cranial Nerves Hypoglossal (XII)] : there was no tongue deviation with protrusion [Flattening Of Nasolabial Fold On The Left] : flattening of the  nasolabial fold [Motor Tone] : muscle tone was normal in all four extremities [Motor Handedness Right-Handed] : the patient is right hand dominant [Paresis Pronator Drift Right-Sided] : no pronator drift on the right [Paresis Pronator Drift Left-Sided] : no pronator drift on the left [Coordination - Dysmetria Impaired Finger-to-Nose Bilateral] : not present [Sensation Tactile Decrease] : light touch was intact [2+] : Patella right 2+ [FreeTextEntry6] : fine motor difficulty in left hand RUE/RLE 5/5. LUE/LLE 4+/5

## 2024-03-29 NOTE — DISCUSSION/SUMMARY
[FreeTextEntry1] : Pt is a 63 yo M with PMHx of right NORMAN ischemic stroke 04/2020, right frontal ischemic stroke 06/2021 s/p right supraclinoid ICA/MCA/NORMAN angioplasty and stenting 06/2021, left occipitoparietal and punctate right frontal ischemic stroke 02/2024, s/p ILR, syncope, anemia, CKD, diverticulosis, HTN, HLD, former smoker, psoriatic arthritis (left knee), who presents with his wife for stroke follow up.  # Recurrent ischemic strokes: mostly in right anterior circulation, however most recently with left MCA inferior division strokes as well. Etiology of right sided strokes is ICAD, however the left side is ESUS (thromboembolic vs cardioembolic). S/p ILR placement. NIHSS 3, mRS 2. - PTA: ASA/plavix (prior PRU 70, ) - Decrease ASA to 81mg daily (instead of 162). Stop plavix. Start brilinta 60mg BID. - Check PRU in 1-2 weeks - Continue simvastatin 80mg daily. however LDL still not optimized at 93, goal LDL <50 - Extensive conversation regarding optimizing diet/exercise. Continue tobacco cessation  # Symptomatic ICAD s/p angioplasty/stenting: in 06/2021, now occluded, right anterior circulation via diandra collaterals.  - Diamox study and  NOVROSITA as per Dr. Mckay   RTC in 5 mo [Goals and Counseling] : I have reviewed the goals of stroke risk factor modification. I counseled the patient on measures to reduce stroke risk, including the importance of medication compliance, risk factor control, exercise, healthy diet and avoidance of smoking. I reviewed stroke warning signs and symptoms and appropriate actions to take if such occur.

## 2024-03-29 NOTE — HISTORY OF PRESENT ILLNESS
[FreeTextEntry1] : Pt is a 63 yo M with PMHx of right NORMAN ischemic stroke 04/2020, right frontal ischemic stroke 06/2021 s/p right supraclinoid ICA/MCA/NORMAN angioplasty and stenting 06/2021, left occipitoparietal and punctate right frontal ischemic stroke 02/2024, s/p ILR, syncope, anemia, CKD, diverticulosis, HTN, HLD, former smoker, psoriatic arthritis (left knee), who presents with his wife for stroke follow up. Pt was admitted with worsening of left sided weakness and syncope in 02/2024. Found to have bilateral strokes. Underwent diagnostic DSA which showed occlusion of the right intracranial stents, right anterior circulation being fed via R PCA and L NORMAN pial collaterals. Pt's wife works as a nurse/ at a nursing home. He is along much of the day. Tends to cook fried/fatty foods for himself, sits on the couch much of the day with limited exercise outside of his PT sessions. Doesn't use a cane but does lean on his wife for support when ambulating. Dresses himself and goes to the bathroom on his own. Limited verbal output overall but answers questions with simple responses and comprehension intact. Quit smoking after his first stroke, denies alcohol use. Most recent stroke occurred in the setting of holding ASA/plavix for colonoscopy. He had severe diarrhea and syncope when on atorvastatin, they are nervous to try rosuvastatin as he has been tolerating simvastatin.

## 2024-04-04 ENCOUNTER — OUTPATIENT (OUTPATIENT)
Dept: OUTPATIENT SERVICES | Facility: HOSPITAL | Age: 64
LOS: 1 days | End: 2024-04-04
Payer: MEDICARE

## 2024-04-04 ENCOUNTER — APPOINTMENT (OUTPATIENT)
Dept: NUCLEAR MEDICINE | Facility: HOSPITAL | Age: 64
End: 2024-04-04
Payer: MEDICARE

## 2024-04-04 ENCOUNTER — NON-APPOINTMENT (OUTPATIENT)
Age: 64
End: 2024-04-04

## 2024-04-04 DIAGNOSIS — Z86.79 PERSONAL HISTORY OF OTHER DISEASES OF THE CIRCULATORY SYSTEM: Chronic | ICD-10-CM

## 2024-04-04 DIAGNOSIS — Z92.89 PERSONAL HISTORY OF OTHER MEDICAL TREATMENT: Chronic | ICD-10-CM

## 2024-04-04 RX ORDER — ACETAZOLAMIDE 250 MG/1
1000 TABLET ORAL ONCE
Refills: 0 | Status: DISCONTINUED | OUTPATIENT
Start: 2024-04-04 | End: 2024-04-18

## 2024-04-05 ENCOUNTER — APPOINTMENT (OUTPATIENT)
Dept: NUCLEAR MEDICINE | Facility: HOSPITAL | Age: 64
End: 2024-04-05

## 2024-04-05 PROCEDURE — 78831 RP LOCLZJ TUM SPECT 2 AREAS: CPT | Mod: 26

## 2024-04-05 PROCEDURE — 78831 RP LOCLZJ TUM SPECT 2 AREAS: CPT

## 2024-04-05 PROCEDURE — A9557: CPT

## 2024-04-07 ENCOUNTER — NON-APPOINTMENT (OUTPATIENT)
Age: 64
End: 2024-04-07

## 2024-04-08 ENCOUNTER — APPOINTMENT (OUTPATIENT)
Dept: CARDIOLOGY | Facility: CLINIC | Age: 64
End: 2024-04-08
Payer: MEDICARE

## 2024-04-08 PROCEDURE — 93298 REM INTERROG DEV EVAL SCRMS: CPT

## 2024-04-16 ENCOUNTER — NON-APPOINTMENT (OUTPATIENT)
Age: 64
End: 2024-04-16

## 2024-05-07 ENCOUNTER — OUTPATIENT (OUTPATIENT)
Dept: OUTPATIENT SERVICES | Facility: HOSPITAL | Age: 64
LOS: 1 days | End: 2024-05-07
Payer: MEDICARE

## 2024-05-07 ENCOUNTER — RESULT REVIEW (OUTPATIENT)
Age: 64
End: 2024-05-07

## 2024-05-07 DIAGNOSIS — Z92.89 PERSONAL HISTORY OF OTHER MEDICAL TREATMENT: Chronic | ICD-10-CM

## 2024-05-07 DIAGNOSIS — I67.9 CEREBROVASCULAR DISEASE, UNSPECIFIED: ICD-10-CM

## 2024-05-07 DIAGNOSIS — Z86.79 PERSONAL HISTORY OF OTHER DISEASES OF THE CIRCULATORY SYSTEM: Chronic | ICD-10-CM

## 2024-05-07 DIAGNOSIS — I63.9 CEREBRAL INFARCTION, UNSPECIFIED: ICD-10-CM

## 2024-05-07 PROCEDURE — 70553 MRI BRAIN STEM W/O & W/DYE: CPT

## 2024-05-07 PROCEDURE — A9579: CPT

## 2024-05-07 PROCEDURE — 70546 MR ANGIOGRAPH HEAD W/O&W/DYE: CPT | Mod: XU

## 2024-05-07 PROCEDURE — 70546 MR ANGIOGRAPH HEAD W/O&W/DYE: CPT | Mod: 26,MH,XU

## 2024-05-07 PROCEDURE — 70553 MRI BRAIN STEM W/O & W/DYE: CPT | Mod: 26,MH

## 2024-05-08 DIAGNOSIS — I63.9 CEREBRAL INFARCTION, UNSPECIFIED: ICD-10-CM

## 2024-05-08 DIAGNOSIS — I67.9 CEREBROVASCULAR DISEASE, UNSPECIFIED: ICD-10-CM

## 2024-05-12 ENCOUNTER — NON-APPOINTMENT (OUTPATIENT)
Age: 64
End: 2024-05-12

## 2024-05-13 ENCOUNTER — APPOINTMENT (OUTPATIENT)
Dept: CARDIOLOGY | Facility: CLINIC | Age: 64
End: 2024-05-13
Payer: MEDICARE

## 2024-05-13 PROCEDURE — 93298 REM INTERROG DEV EVAL SCRMS: CPT

## 2024-05-15 ENCOUNTER — NON-APPOINTMENT (OUTPATIENT)
Age: 64
End: 2024-05-15

## 2024-05-17 ENCOUNTER — APPOINTMENT (OUTPATIENT)
Dept: NEUROSURGERY | Facility: CLINIC | Age: 64
End: 2024-05-17
Payer: MEDICARE

## 2024-05-17 VITALS
SYSTOLIC BLOOD PRESSURE: 130 MMHG | HEART RATE: 83 BPM | BODY MASS INDEX: 27.16 KG/M2 | HEIGHT: 66 IN | WEIGHT: 169 LBS | DIASTOLIC BLOOD PRESSURE: 85 MMHG

## 2024-05-17 PROCEDURE — 99214 OFFICE O/P EST MOD 30 MIN: CPT

## 2024-05-17 NOTE — END OF VISIT
[FreeTextEntry3] :  I, Dr Mckay personally performed the evaluation and management (E/M) services for this established patient who presents today with (a) new problem(s)/exacerbation of (an) existing condition(s).  That E/M includes conducting the examination, assessing all new/exacerbated conditions, and establishing a new plan of care.  Today, my DAVE was here to observe my evaluation and management services for this new problem/exacerbated condition to be followed going forward.  I reviewed this very pleasant gentleman today together with his wife in the neurosurgery clinic.  We went over the results of his recent catheter angiogram as well as the strokes that occurred of his bilateral cerebral hemispheres after he stopped his dual antiplatelet therapy for GI procedure.  I emphasized the importance of not coming off both his antiplatelet agents in the future and at least for the next 3 months should attempt to remain on both dual antiplatelets for as long as possible.  In terms of workup, we discussed the fact that his stents in his terminal internal carotid artery on the right-hand side appears to be occluded on the most recent catheter angiogram and that his brain was being perfused through collateralization.  He had previously undergone a Diamox SPECT scan to evaluate his cerebral reserve prior to the stenting procedure and I think it would be appropriate to perform an up-to-date Diamox SPECT scan which we will arrange.  In addition to this I would like to obtain a nova MRA to further evaluate the flow in the left-sided middle cerebral artery as this region appears to have created some strokes as well.  It was not apparent that the left-sided intracranial stenosis was significantly worse on the most recent catheter angiogram.  He did undergo some cardiac evaluation during his most recent hospital stay which was negative.  Once we have reviewed the Diamox scan as well as the nova scan we can then discuss him again at our conference for consideration of any further therapy but at the moment I would be hesitant to offer him any form of bypass therapy for the right side given the bilateral nature of his disease as well as the lack of clear evidence that he is suffering from a hypoperfusion event.  He currently has no major symptoms and continues on his aspirin and Plavix therapy.  They had no further questions and were satisfied with the outcome of the consultation.

## 2024-05-20 ENCOUNTER — NON-APPOINTMENT (OUTPATIENT)
Age: 64
End: 2024-05-20

## 2024-05-27 NOTE — PROGRESS NOTE ADULT - SUBJECTIVE AND OBJECTIVE BOX
Pt. seen and examined by Neurocritical care team this morning. Chart reviewed and case d/w NI and CCM team. No significant clinical events reported overnight.     Vitals:   T(F): 98.3 (07-02-21 @ 07:43), Max: 99.6 (07-01-21 @ 20:00)  HR: 72 (07-02-21 @ 11:05) (64 - 88)  BP: 156/78 (07-02-21 @ 11:05) (120/61 - 170/84)  RR: 39 (07-02-21 @ 11:05) (10 - 88)  SpO2: 98% (07-02-21 @ 11:05) (96% - 100%)    24hrs I/O's:         History goes back to: 1 month ago when patient started notice episodic left arm weakness.     ROS is negative for: no fever, no chills, no SOB, chest pains or palpitations, no abdominal pain, nausea or vomiting. Patient denies facial droop, difficulty swallowing     In the ED: VS all WNL  Labs significant for: WNL  Troponin: negative  CT head: Acute infarct involving the right frontal lobe in the precentral gyrus.  Chronic infarct of the right periventricular white matter, new from prior exams detailed above.  Additional chronic infarcts of the bilateral basal ganglia and left cerebellum, stable.    CTA head and neck: Severe stenosis and/or nonocclusive thrombus of the M1 segment of the right MCA, with diminished flow of the distal right MCA circulation, new from prior CTA 4/27/2020. 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    CT PERFUSION:, there are 229 cc of ischemic penumbra in the right frontoparietal lobes, partially accounted for by chronic infarcts of the right frontoparietal white matter.    NIHSS in ED=3  stroke code called. Not a candidate for tPA due to unknown last normal LWN       (23 Jun 2021 20:39)      PAST MEDICAL & SURGICAL HISTORY:  HTN (hypertension)    Psoriasis    CVA (cerebral vascular accident)    No significant past surgical history        Last 24 hour updates:      Outside Neurologist:     Home Medications:  amLODIPine 2.5 mg oral tablet: 1 tab(s) orally once a day (18 Jan 2021 09:40)  clopidogrel 75 mg oral tablet: 1 tab(s) orally once a day (18 Jan 2021 09:40)  Humira Pre-Filled Syringe: subcutaneous 2 times a month (18 Jan 2021 09:40)  losartan 50 mg oral tablet: 1 tab(s) orally once a day (18 Jan 2021 09:40)  venlafaxine 37.5 mg oral tablet: 1 tab(s) orally once a day (at bedtime) (18 Jan 2021 09:40)  Vitamin B12:  (18 Jan 2021 09:40)      amLODIPine   Tablet 2.5 milliGRAM(s) Oral daily  aspirin 325 milliGRAM(s) Oral daily  chlorhexidine 4% Liquid 1 Application(s) Topical daily  clopidogrel Tablet 75 milliGRAM(s) Oral daily  heparin   Injectable 5000 Unit(s) SubCutaneous every 8 hours  losartan 50 milliGRAM(s) Oral daily  melatonin 3 milliGRAM(s) Oral at bedtime  melatonin 5 milliGRAM(s) Oral at bedtime  pantoprazole    Tablet 40 milliGRAM(s) Oral before breakfast  simvastatin 40 milliGRAM(s) Oral at bedtime  sodium chloride 0.9%. 1000 milliLiter(s) IV Continuous <Continuous>  venlafaxine 37.5 milliGRAM(s) Oral at bedtime                                    11.5   10.40 )-----------( 262      ( 02 Jul 2021 04:30 )             35.7     07-02    140  |  109  |  14  ----------------------------<  95  3.9   |  23  |  1.1    Ca    8.3<L>      02 Jul 2021 04:30  Mg     1.7     07-02    TPro  5.4<L>  /  Alb  3.4<L>  /  TBili  0.3  /  DBili  x   /  AST  28  /  ALT  26  /  AlkPhos  93  07-02    PT/INR - ( 02 Jul 2021 04:30 )   PT: 12.50 sec;   INR: 1.09 ratio         PTT - ( 02 Jul 2021 04:30 )  PTT:29.4 sec    LIVER FUNCTIONS - ( 02 Jul 2021 04:30 )  Alb: 3.4 g/dL / Pro: 5.4 g/dL / ALK PHOS: 93 U/L / ALT: 26 U/L / AST: 28 U/L / GGT: x                     I&O's Detail    01 Jul 2021 07:01  -  02 Jul 2021 07:00  --------------------------------------------------------  IN:    Norepinephrine: 14 mL    Oral Fluid: 1110 mL    sodium chloride 0.9%: 600 mL    sodium chloride 0.9%: 375 mL    sodium chloride 0.9%: 500 mL  Total IN: 2599 mL    OUT:    Indwelling Catheter - Urethral (mL): 1840 mL  Total OUT: 1840 mL    Total NET: 759 mL      02 Jul 2021 07:01  -  02 Jul 2021 12:07  --------------------------------------------------------  IN:    sodium chloride 0.9%: 250 mL  Total IN: 250 mL    OUT:    Indwelling Catheter - Urethral (mL): 200 mL    Voided (mL): 300 mL  Total OUT: 500 mL    Total NET: -250 mL          NIHSS:    Neuro Exam:    Neurological:  Mental Status: Alert and Oriented to person, place, and time, cooperative, pleasant. Affect appropriate, thought, speech, and language coherent. Short- and long-term memory, abstract thinking and calculation intact.  Cranial Nerves:  I: intact  II, III, IV, VI: PERRLA, EOM intact. Serrato intact by confrontation. No cranial nerve palsy or nystagmus noted.  V: facial sensation intact; masseter/ temporal muscles intact, corneal reflex intact bilaterally  VII: face symmetrical at rest and with expression  VIII: intact to finger rub in the right ear and left ear  IX and X: no hoarseness, gag intact, palate/ uvula rise symmetrically  XI: SCM/ trapezius strength intact bilateral  XII: no dysarthria, tongue weakness/fasiculation   Motor: Normal muscle bulk/tone. Good posture. Strength 5+/5+ upper & lower extremities  Sensory: Sensation to light touch, pain, vibration, proprioception, and stereognosis intact to trunk and bilaterally to both upper and lower extremities. Normal two point discrimination.   Cerebellar Function: Normal gait including tandem, heel and toe walking. Romberg and pronator drift negative. Normal rapid alternating movements and point to point test.  Reflexes: No clonus      EVD ICP:             Level(cm):          24hr(ml):                             CSF:   W:      R:     C:    P:     G:     LA:      Last CTH:    Last CTA/MRA:    Last CTP:    Last MRI:    Last EEG:    Last Echo:        ABG:            Prophalaxis:     GI:   DVT:     Pt. seen and examined by Neurocritical care team this morning. Chart reviewed and case d/w NI and CCM team. No significant clinical events reported overnight. Pt. reports increased pain at Rt. groin cath site __ otherwise denies any other acute complaints.     Vitals:   T(F): 98.3 (07-02-21 @ 07:43), Max: 99.6 (07-01-21 @ 20:00)  HR: 72 (07-02-21 @ 11:05) (64 - 88)  BP: 156/78 (07-02-21 @ 11:05) (120/61 - 170/84)  RR: 39 (07-02-21 @ 11:05) (10 - 88)  SpO2: 98% (07-02-21 @ 11:05) (96% - 100%)    24hrs I/O's:   01 Jul 2021 07:01  -  02 Jul 2021 07:00  --------------------------------------------------------  IN:    Norepinephrine: 14 mL    Oral Fluid: 1110 mL    sodium chloride 0.9%: 600 mL    sodium chloride 0.9%: 375 mL    sodium chloride 0.9%: 500 mL  Total IN: 2599 mL    OUT:    Indwelling Catheter - Urethral (mL): 1840 mL  Total OUT: 1840 mL    Total NET: 759 mL      Labs: Reviewed                         11.5   10.40 )-----------( 262      ( 02 Jul 2021 04:30 )             35.7     07-02    140  |  109  |  14  ----------------------------<  95  3.9   |  23  |  1.1    Ca    8.3<L>      02 Jul 2021 04:30  Mg     1.7     07-02    TPro  5.4<L>  /  Alb  3.4<L>  /  TBili  0.3  /  DBili  x   /  AST  28  /  ALT  26  /  AlkPhos  93  07-02    PT/INR - ( 02 Jul 2021 04:30 )   PT: 12.50 sec;   INR: 1.09 ratio       PTT - ( 02 Jul 2021 04:30 )  PTT:29.4 sec    LIVER FUNCTIONS - ( 02 Jul 2021 04:30 )  Alb: 3.4 g/dL / Pro: 5.4 g/dL / ALK PHOS: 93 U/L / ALT: 26 U/L / AST: 28 U/L / GGT: x        ______________________________     Neuroimaging: Reviewed   < from: CT Head No Cont (07.01.21 @ 07:18) >  IMPRESSION:  Since the CT head performed on the prior day:    1.  Patient is again noted to be status post stent placement within the right A1 and M1 segments.    2.  Slight interval decrease in the hyperdensity (likely combination of contrast and hemorrhage) within a right parietal sulcus and right sylvian fissure.    3.  Stable focal right frontal gyriform hyperdensity likely reflecting petechial hemorrhage of an acute infarct.    4.  Stable patchy hypodensities within the right frontal lobe consistent with scattered acute infarcts.    5.  Stable chronic lacunar infarcts within the basal ganglia and left cerebellum.    < end of copied text >  - - - - - - - - - - - - - - - - - - - -    < from: CT Head No Cont (06.30.21 @ 21:03) >    IMPRESSION:    Since CT head dated 6/30/2021 at 4:54 PM,    No significant interval change in right parietal sulcus and right sylvian fissure extra-axial hyperdensities, likely mixed extravasated contrast with subarachnoid hemorrhage.    Slightly decreased petechial hemorrhages along the right precentral gyrus.    Unchanged evolving infarct in the right frontal lobe.    < end of copied text >  ____________________________________________        Current Medications:   AmLODIPine   Tablet 2.5 milliGRAM(s) Oral daily  Aspirin 325 milliGRAM(s) Oral daily  Chlorhexidine 4% Liquid 1 Application(s) Topical daily  Clopidogrel Tablet 75 milliGRAM(s) Oral daily  Heparin   Injectable 5000 Unit(s) SubCutaneous every 8 hours  Losartan 50 milliGRAM(s) Oral daily  Melatonin 3 milliGRAM(s) Oral at bedtime  Melatonin 5 milliGRAM(s) Oral at bedtime  Pantoprazole    Tablet 40 milliGRAM(s) Oral before breakfast  Simvastatin 40 milliGRAM(s) Oral at bedtime  Sodium chloride 0.9%. 1000 milliLiter(s) IV Continuous <Continuous>  Venlafaxine 37.5 milliGRAM(s) Oral at bedtime    - - - - - - - - - - - - - - - - - - - - - - - - - - - - - - -- - - - - - - - - - - -    Neuro Exam:  Pt. awake, alert, attends to examiner, keenly/ appropriately responsive to questioning  Oriented to self/ surroundings/ time/ age   PERRL, EOMI, no visual field deficits noted  Flattened Lf. nasolabial fold, normal speech fluidity/ enunciation  Pt. moves BUE anti-grav to command w/ slight LUE pronator drift noted   Limited RLE movement sec. to pain at Rt. groin __ tiny palpable hematoma, no active bleeding, no ecchymosis, distal pulses palpable  LLE anti-grav w/ slow drift __ does not hit the bed.   Mild LUE dysmetria noted/ no apparent ataxia noted __ reflexes brisk/ symmetric    [ ] NIHSS: 3 [mild Lf. facial palsy; LUE/LLE motor drifts]  - - - - - - - - - - - - - - - - - - - - - - - - - -        Impression:   60y/o M w/ known Rt. M1/ A1 severe stenosis, admitted 6/23 for worsening Lf. hemiparesis __ w/ acute Rt. MCA territorial infarct sec. to severe Rt. M1/A1 stenosis __ now POD # 2 s/p DSA w/ angioplasty + stenting of severely stenosed Rt. M1/ A1 by Dr. Guajardo.  Pt. had interval dev. of Rt. frontoparietal petechial hemorrhage s/p revascularization __ stable on rpt. imaging.       Recommendations:   [ ] Cont. DAPT  [ASA + Plavis] + Resume high-dose statin therapy  [ ] Pt. is stable for transfer to Neuro floor __ may dcr. freq. of neurochecks to q4-6h   [ ] SBP parameters liberalized to 120 - 160mmHg  [ ] PT/ OT eval/ treatment for mobilization __ OOB to chair   [ ] Warm compress to Rt. groin hematoma __ Acetaminophen prn as warranted  [ ] Maintain SpO2 > 92% w/ supplemental O2 as warranted  [ ] Maintain euglycemia 120 - 180mg/dL  [ ] Cont. HSQ/ SCDs prophylactically.   [ ] Outpatient f/u w/ Rheumatologist for vasculitic work, given h/o of psoriasis, and focal/ insidious worsening of  cerebral atherosclerosis.   [ ] Outpatient f/u w/ Dr. Guajardo in 1-2wks after discharge.     [ ] Case d/w Neurointensivist Dr. Garcia.              Pt. seen and examined by Neurocritical care team this morning. Chart reviewed and case d/w NI and CCM team. No significant clinical events reported overnight. Pt. reports increased pain at Rt. groin cath site __ otherwise denies any other acute complaints.     Vitals:   T(F): 98.3 (07-02-21 @ 07:43), Max: 99.6 (07-01-21 @ 20:00)  HR: 72 (07-02-21 @ 11:05) (64 - 88)  BP: 156/78 (07-02-21 @ 11:05) (120/61 - 170/84)  RR: 39 (07-02-21 @ 11:05) (10 - 88)  SpO2: 98% (07-02-21 @ 11:05) (96% - 100%)    24hrs I/O's:   01 Jul 2021 07:01  -  02 Jul 2021 07:00  --------------------------------------------------------  IN:    Norepinephrine: 14 mL    Oral Fluid: 1110 mL    sodium chloride 0.9%: 600 mL    sodium chloride 0.9%: 375 mL    sodium chloride 0.9%: 500 mL  Total IN: 2599 mL    OUT:    Indwelling Catheter - Urethral (mL): 1840 mL  Total OUT: 1840 mL    Total NET: 759 mL      Labs: Reviewed                         11.5   10.40 )-----------( 262      ( 02 Jul 2021 04:30 )             35.7     07-02    140  |  109  |  14  ----------------------------<  95  3.9   |  23  |  1.1    Ca    8.3<L>      02 Jul 2021 04:30  Mg     1.7     07-02    TPro  5.4<L>  /  Alb  3.4<L>  /  TBili  0.3  /  DBili  x   /  AST  28  /  ALT  26  /  AlkPhos  93  07-02    PT/INR - ( 02 Jul 2021 04:30 )   PT: 12.50 sec;   INR: 1.09 ratio       PTT - ( 02 Jul 2021 04:30 )  PTT:29.4 sec    LIVER FUNCTIONS - ( 02 Jul 2021 04:30 )  Alb: 3.4 g/dL / Pro: 5.4 g/dL / ALK PHOS: 93 U/L / ALT: 26 U/L / AST: 28 U/L / GGT: x        ______________________________     Neuroimaging: Reviewed   < from: CT Head No Cont (07.01.21 @ 07:18) >  IMPRESSION:  Since the CT head performed on the prior day:    1.  Patient is again noted to be status post stent placement within the right A1 and M1 segments.    2.  Slight interval decrease in the hyperdensity (likely combination of contrast and hemorrhage) within a right parietal sulcus and right sylvian fissure.    3.  Stable focal right frontal gyriform hyperdensity likely reflecting petechial hemorrhage of an acute infarct.    4.  Stable patchy hypodensities within the right frontal lobe consistent with scattered acute infarcts.    5.  Stable chronic lacunar infarcts within the basal ganglia and left cerebellum.    < end of copied text >  - - - - - - - - - - - - - - - - - - - -    < from: CT Head No Cont (06.30.21 @ 21:03) >    IMPRESSION:    Since CT head dated 6/30/2021 at 4:54 PM,    No significant interval change in right parietal sulcus and right sylvian fissure extra-axial hyperdensities, likely mixed extravasated contrast with subarachnoid hemorrhage.    Slightly decreased petechial hemorrhages along the right precentral gyrus.    Unchanged evolving infarct in the right frontal lobe.    < end of copied text >  ____________________________________________        Current Medications:   AmLODIPine   Tablet 2.5 milliGRAM(s) Oral daily  Aspirin 325 milliGRAM(s) Oral daily  Chlorhexidine 4% Liquid 1 Application(s) Topical daily  Clopidogrel Tablet 75 milliGRAM(s) Oral daily  Heparin   Injectable 5000 Unit(s) SubCutaneous every 8 hours  Losartan 50 milliGRAM(s) Oral daily  Melatonin 3 milliGRAM(s) Oral at bedtime  Melatonin 5 milliGRAM(s) Oral at bedtime  Pantoprazole    Tablet 40 milliGRAM(s) Oral before breakfast  Simvastatin 40 milliGRAM(s) Oral at bedtime  Sodium chloride 0.9%. 1000 milliLiter(s) IV Continuous <Continuous>  Venlafaxine 37.5 milliGRAM(s) Oral at bedtime    - - - - - - - - - - - - - - - - - - - - - - - - - - - - - - -- - - - - - - - - - - -    Neuro Exam:  Pt. awake, alert, attends to examiner, keenly/ appropriately responsive to questioning  Oriented to self/ surroundings/ time/ age   PERRL, EOMI, no visual field deficits noted  Flattened Lf. nasolabial fold, normal speech fluidity/ enunciation  Pt. moves BUE anti-grav to command w/ slight LUE pronator drift noted   Limited RLE movement sec. to pain at Rt. groin __ tiny palpable hematoma, no active bleeding, no ecchymosis, distal pulses palpable  LLE anti-grav w/ slow drift __ does not hit the bed.   Mild LUE dysmetria noted/ no apparent ataxia noted __ reflexes brisk/ symmetric    [ ] NIHSS: 3 [mild Lf. facial palsy; LUE/LLE motor drifts]  - - - - - - - - - - - - - - - - - - - - - - - - - -        Impression:   60y/o M w/ known Rt. M1/ A1 severe stenosis, admitted 6/23 for worsening Lf. hemiparesis __ w/ acute Rt. MCA territorial infarct sec. to severe Rt. M1/A1 stenosis __ now POD # 2 s/p DSA w/ angioplasty + stenting of severely stenosed Rt. M1/ A1 by Dr. Guajardo.  Pt. had interval dev. of Rt. frontoparietal petechial hemorrhage s/p revascularization __ stable on rpt. imaging.       Recommendations:   [ ] Cont. DAPT  [ASA + Plavis] + Resume high-dose statin therapy  [ ] Pt. is stable for transfer to Neuro floor __ may dcr. freq. of neurochecks to q4-6h   [ ] SBP parameters liberalized to 120 - 160mmHg  [ ] PT/ OT eval/ treatment for mobilization __ OOB to chair   [ ] Warm compress to Rt. groin hematoma __ Acetaminophen prn as warranted  [ ] Pls restart Effexor for depression mgmt __ requested by patient and spouse  [ ] Pls restart  bi-monthly Humira for psoriatic arthritis__ next due 7/3 [wife can bring in medication if nonformulary/ unavailable per pharmarcy]   [ ] Maintain SpO2 > 92% w/ supplemental O2 as warranted  [ ] Maintain euglycemia 120 - 180mg/dL  [ ] Cont. HSQ/ SCDs prophylactically.   [ ] Outpatient f/u w/ Rheumatologist for vasculitic work, given h/o of psoriasis, and focal/ insidious worsening of  cerebral atherosclerosis.   [ ] Outpatient f/u w/ Dr. Guajardo in 1-2wks after discharge.     [ ] Case d/w Neurointensivist Dr. Garcia.              .

## 2024-06-06 ENCOUNTER — OUTPATIENT (OUTPATIENT)
Dept: OUTPATIENT SERVICES | Facility: HOSPITAL | Age: 64
LOS: 1 days | End: 2024-06-06
Payer: MEDICARE

## 2024-06-06 VITALS
DIASTOLIC BLOOD PRESSURE: 86 MMHG | HEART RATE: 64 BPM | WEIGHT: 175.93 LBS | SYSTOLIC BLOOD PRESSURE: 140 MMHG | OXYGEN SATURATION: 97 % | HEIGHT: 66 IN | TEMPERATURE: 98 F | RESPIRATION RATE: 16 BRPM

## 2024-06-06 DIAGNOSIS — I67.5 MOYAMOYA DISEASE: ICD-10-CM

## 2024-06-06 DIAGNOSIS — Z01.818 ENCOUNTER FOR OTHER PREPROCEDURAL EXAMINATION: ICD-10-CM

## 2024-06-06 DIAGNOSIS — Z86.79 PERSONAL HISTORY OF OTHER DISEASES OF THE CIRCULATORY SYSTEM: Chronic | ICD-10-CM

## 2024-06-06 DIAGNOSIS — Z92.89 PERSONAL HISTORY OF OTHER MEDICAL TREATMENT: Chronic | ICD-10-CM

## 2024-06-06 DIAGNOSIS — Z98.890 OTHER SPECIFIED POSTPROCEDURAL STATES: Chronic | ICD-10-CM

## 2024-06-06 LAB
ALBUMIN SERPL ELPH-MCNC: 4.4 G/DL — SIGNIFICANT CHANGE UP (ref 3.5–5.2)
ALP SERPL-CCNC: 105 U/L — SIGNIFICANT CHANGE UP (ref 30–115)
ALT FLD-CCNC: 20 U/L — SIGNIFICANT CHANGE UP (ref 0–41)
ANION GAP SERPL CALC-SCNC: 12 MMOL/L — SIGNIFICANT CHANGE UP (ref 7–14)
APTT BLD: 30.8 SEC — SIGNIFICANT CHANGE UP (ref 27–39.2)
AST SERPL-CCNC: 19 U/L — SIGNIFICANT CHANGE UP (ref 0–41)
BASOPHILS # BLD AUTO: 0.11 K/UL — SIGNIFICANT CHANGE UP (ref 0–0.2)
BASOPHILS NFR BLD AUTO: 1.4 % — HIGH (ref 0–1)
BILIRUB SERPL-MCNC: 0.2 MG/DL — SIGNIFICANT CHANGE UP (ref 0.2–1.2)
BLD GP AB SCN SERPL QL: SIGNIFICANT CHANGE UP
BUN SERPL-MCNC: 18 MG/DL — SIGNIFICANT CHANGE UP (ref 10–20)
CALCIUM SERPL-MCNC: 9.6 MG/DL — SIGNIFICANT CHANGE UP (ref 8.4–10.5)
CHLORIDE SERPL-SCNC: 102 MMOL/L — SIGNIFICANT CHANGE UP (ref 98–110)
CO2 SERPL-SCNC: 24 MMOL/L — SIGNIFICANT CHANGE UP (ref 17–32)
CREAT SERPL-MCNC: 1.3 MG/DL — SIGNIFICANT CHANGE UP (ref 0.7–1.5)
EGFR: 61 ML/MIN/1.73M2 — SIGNIFICANT CHANGE UP
EOSINOPHIL # BLD AUTO: 0.64 K/UL — SIGNIFICANT CHANGE UP (ref 0–0.7)
EOSINOPHIL NFR BLD AUTO: 8.2 % — HIGH (ref 0–8)
GLUCOSE SERPL-MCNC: 85 MG/DL — SIGNIFICANT CHANGE UP (ref 70–99)
HCT VFR BLD CALC: 43.1 % — SIGNIFICANT CHANGE UP (ref 42–52)
HCV AB S/CO SERPL IA: 0.06 COI — SIGNIFICANT CHANGE UP
HCV AB SERPL-IMP: SIGNIFICANT CHANGE UP
HGB BLD-MCNC: 13.8 G/DL — LOW (ref 14–18)
IMM GRANULOCYTES NFR BLD AUTO: 0.3 % — SIGNIFICANT CHANGE UP (ref 0.1–0.3)
INR BLD: 0.99 RATIO — SIGNIFICANT CHANGE UP (ref 0.65–1.3)
LYMPHOCYTES # BLD AUTO: 3.15 K/UL — SIGNIFICANT CHANGE UP (ref 1.2–3.4)
LYMPHOCYTES # BLD AUTO: 40.3 % — SIGNIFICANT CHANGE UP (ref 20.5–51.1)
MCHC RBC-ENTMCNC: 27.4 PG — SIGNIFICANT CHANGE UP (ref 27–31)
MCHC RBC-ENTMCNC: 32 G/DL — SIGNIFICANT CHANGE UP (ref 32–37)
MCV RBC AUTO: 85.7 FL — SIGNIFICANT CHANGE UP (ref 80–94)
MONOCYTES # BLD AUTO: 0.61 K/UL — HIGH (ref 0.1–0.6)
MONOCYTES NFR BLD AUTO: 7.8 % — SIGNIFICANT CHANGE UP (ref 1.7–9.3)
MRSA PCR RESULT.: NEGATIVE — SIGNIFICANT CHANGE UP
NEUTROPHILS # BLD AUTO: 3.28 K/UL — SIGNIFICANT CHANGE UP (ref 1.4–6.5)
NEUTROPHILS NFR BLD AUTO: 42 % — LOW (ref 42.2–75.2)
NRBC # BLD: 0 /100 WBCS — SIGNIFICANT CHANGE UP (ref 0–0)
PLATELET # BLD AUTO: 372 K/UL — SIGNIFICANT CHANGE UP (ref 130–400)
PMV BLD: 11.5 FL — HIGH (ref 7.4–10.4)
POTASSIUM SERPL-MCNC: 4.9 MMOL/L — SIGNIFICANT CHANGE UP (ref 3.5–5)
POTASSIUM SERPL-SCNC: 4.9 MMOL/L — SIGNIFICANT CHANGE UP (ref 3.5–5)
PROT SERPL-MCNC: 7.6 G/DL — SIGNIFICANT CHANGE UP (ref 6–8)
PROTHROM AB SERPL-ACNC: 11.3 SEC — SIGNIFICANT CHANGE UP (ref 9.95–12.87)
RBC # BLD: 5.03 M/UL — SIGNIFICANT CHANGE UP (ref 4.7–6.1)
RBC # FLD: 15.7 % — HIGH (ref 11.5–14.5)
SODIUM SERPL-SCNC: 138 MMOL/L — SIGNIFICANT CHANGE UP (ref 135–146)
WBC # BLD: 7.81 K/UL — SIGNIFICANT CHANGE UP (ref 4.8–10.8)
WBC # FLD AUTO: 7.81 K/UL — SIGNIFICANT CHANGE UP (ref 4.8–10.8)

## 2024-06-06 PROCEDURE — 86901 BLOOD TYPING SEROLOGIC RH(D): CPT

## 2024-06-06 PROCEDURE — 86850 RBC ANTIBODY SCREEN: CPT

## 2024-06-06 PROCEDURE — 85730 THROMBOPLASTIN TIME PARTIAL: CPT

## 2024-06-06 PROCEDURE — 86803 HEPATITIS C AB TEST: CPT

## 2024-06-06 PROCEDURE — 93005 ELECTROCARDIOGRAM TRACING: CPT

## 2024-06-06 PROCEDURE — 85025 COMPLETE CBC W/AUTO DIFF WBC: CPT

## 2024-06-06 PROCEDURE — 85610 PROTHROMBIN TIME: CPT

## 2024-06-06 PROCEDURE — 87641 MR-STAPH DNA AMP PROBE: CPT

## 2024-06-06 PROCEDURE — 99214 OFFICE O/P EST MOD 30 MIN: CPT | Mod: 25

## 2024-06-06 PROCEDURE — 93010 ELECTROCARDIOGRAM REPORT: CPT

## 2024-06-06 PROCEDURE — 87640 STAPH A DNA AMP PROBE: CPT

## 2024-06-06 PROCEDURE — 80053 COMPREHEN METABOLIC PANEL: CPT

## 2024-06-06 PROCEDURE — 36415 COLL VENOUS BLD VENIPUNCTURE: CPT

## 2024-06-06 PROCEDURE — 86900 BLOOD TYPING SEROLOGIC ABO: CPT

## 2024-06-06 NOTE — H&P PST ADULT - NSICDXPASTSURGICALHX_GEN_ALL_CORE_FT
PAST SURGICAL HISTORY:  H/O angiography     H/O cerebral artery stenosis Cerebral angiogram with angioplaty R MI and AI severe stenosis R MCCA stenting 08/5/2021 R NORMAN 06/30/2021    History of loop recorder

## 2024-06-06 NOTE — H&P PST ADULT - HISTORY OF PRESENT ILLNESS
64yr old male presents with his son Diogo. H/O CVA 2020 AND 2021 s/p diagnsotic cerebral angio was found to have " Occlusion of right-sided internal carotid artery/MCA and NORMAN stents with reconstitution of the right-sided anterior cerebral arterial territory via pial collaterals." presents to PAST in prep for  INDIRECT SUPERFICIAL TEMPORAL ARTERY TO MIDDLE CEREBRAL ARTERY Bypass Right . Denies COVID /URI S/S.  Anesthesia Alert  yes--Difficult Airway CLASS 4  NO--History of neck surgery or radiation  NO--Limited ROM of neck  NO--History of Malignant hyperthermia  NO--Personal or family history of Pseudocholinesterase deficiency  NO--Prior Anesthesia Complication  NO--Latex Allergy  NO--Loose teeth  NO--History of Rheumatoid Arthritis  NO--BENSON  YES Bleeding risk  NO--Other_____  D[employer] Activity Status Index (DASI) from [employer]  on 6/6/2024  ** All calculations should be rechecked by clinician prior to use **    RESULT SUMMARY:  15.45 points  The higher the score (maximum 58.2), the higher the functional status.    4.64 METs        INPUTS:  Take care of self —> 2.75 = Yes  Walk indoors —> 1.75 = Yes  Walk 1&ndash;2 blocks on level ground —> 2.75 = Yes  Climb a flight of stairs or walk up a hill —> 5.5 = Yes  Run a short distance —> 0 = No  Do light work around the house —> 2.7 = Yes  Do moderate work around the house —> 0 = No  Do heavy work around the house —> 0 = No  Do yardwork —> 0 = No  Have sexual relations —> 0 = No  Participate in moderate recreational activities —> 0 = No  Participate in strenuous sports —> 0 = No  Revised Cardiac Risk Index for Pre-Operative Risk from [employer]  on 6/6/2024  ** All calculations should be rechecked by clinician prior to use **    RESULT SUMMARY:  2 points  Class III Risk    10.1 %  30-day risk of death, MI, or cardiac arrest    From Duceppe 2017. These numbers are higher than those from the original study (Evan 1999). See Evidence for details.      INPUTS:  Elevated-risk surgery —> 1 = Yes  History of ischemic heart disease —> 0 = No  History of congestive heart failure —> 0 = No  History of cerebrovascular disease —> 1 = Yes  Pre-operative treatment with insulin —> 0 = No  Pre-operative creatinine >2 mg/dL / 176.8 µmol/L —> 0 = No

## 2024-06-07 ENCOUNTER — APPOINTMENT (OUTPATIENT)
Dept: CARDIOLOGY | Facility: CLINIC | Age: 64
End: 2024-06-07
Payer: MEDICARE

## 2024-06-07 VITALS
OXYGEN SATURATION: 98 % | HEIGHT: 66 IN | WEIGHT: 172 LBS | TEMPERATURE: 97.6 F | RESPIRATION RATE: 16 BRPM | BODY MASS INDEX: 27.64 KG/M2 | HEART RATE: 72 BPM | SYSTOLIC BLOOD PRESSURE: 142 MMHG | DIASTOLIC BLOOD PRESSURE: 82 MMHG

## 2024-06-07 DIAGNOSIS — Z01.818 ENCOUNTER FOR OTHER PREPROCEDURAL EXAMINATION: ICD-10-CM

## 2024-06-07 DIAGNOSIS — I67.5 MOYAMOYA DISEASE: ICD-10-CM

## 2024-06-07 DIAGNOSIS — I47.29 OTHER VENTRICULAR TACHYCARDIA: ICD-10-CM

## 2024-06-07 DIAGNOSIS — I63.9 CEREBRAL INFARCTION, UNSPECIFIED: ICD-10-CM

## 2024-06-07 DIAGNOSIS — I10 ESSENTIAL (PRIMARY) HYPERTENSION: ICD-10-CM

## 2024-06-07 DIAGNOSIS — I35.1 NONRHEUMATIC AORTIC (VALVE) INSUFFICIENCY: ICD-10-CM

## 2024-06-07 PROCEDURE — G2211 COMPLEX E/M VISIT ADD ON: CPT

## 2024-06-07 PROCEDURE — 99214 OFFICE O/P EST MOD 30 MIN: CPT

## 2024-06-07 PROCEDURE — 93000 ELECTROCARDIOGRAM COMPLETE: CPT

## 2024-06-07 NOTE — HISTORY OF PRESENT ILLNESS
[FreeTextEntry1] : 63 y/o male presenting for f/u of NSVT, s/p another recurrent CVA, was managed in the [facility] and underwent angioplasty of R MCA and NORMAN with Dr. Guajardo. MRA c/w intracranial vascular disease. Had one more episode of syncope in April and was admitted, but no palpitations, no CP, no dyspnea. ILR was negative, so presumed vagal.  He was started on b-blocker, but this was stopped due to sinus bradycardia. He had a stress echo last year - no ischemia, preserved LV function. CCTA was done to further assess for CAD and revealed mild stenosis only, no obstructive lesions. No edema. He had repeat echo  at [facility].  Echo was unchanged. Mild to moderate AS, septal hypertrophy at 17 mm. He reports fatigue and MORALES, worse with exertion. + palpitations, near-syncope, but no syncope. Diagnosed with atherosclerotic Moyamoya disease. MRA brain: Diminished but present flow in the distal MCA vessels on the right with no detectable flow in the right middle and anterior cerebral arteries, right anterior cerebral artery flow likely from the left side. Considering surgical bypass and requires cardiac clearance.

## 2024-06-07 NOTE — ASSESSMENT
[FreeTextEntry1] : Echo noted - functionally bicuspid valve, mild to moderate aortic valve disease - stable. Aorta 4.0 cm Stress test November 2019 - negative for ischemia. CCTA - mild non-obstructive CAD NSVT on MCOT - EP evaluation appreciated. ILR reports reviewed. No sustained arrhythmia to explain syncope. Off Metoprolol - could not tolerate due to bradycardia. Off Norvasc 2.5 mg due to episodes of hypotension - using losartan in AM and Nifedipine ER in the evening, hydralazine PRN CVA - f/u with neurology, neurovascular intervention. High dose statin. (On Simva) Plavix switched to Brilinta, PRU check by neurology - tolerating well. C/w current therapy otherwise. Use of Paxil for vagal syncope was discussed. EP follow-up with Dr. Santoyo. ILR in place  Will clear for the planned surgical procedure as intermediate risk. Hold Brilinta for 7 days pre-op.  F/u in 6 months

## 2024-06-14 ENCOUNTER — NON-APPOINTMENT (OUTPATIENT)
Age: 64
End: 2024-06-14

## 2024-06-16 ENCOUNTER — NON-APPOINTMENT (OUTPATIENT)
Age: 64
End: 2024-06-16

## 2024-06-17 ENCOUNTER — APPOINTMENT (OUTPATIENT)
Dept: CARDIOLOGY | Facility: CLINIC | Age: 64
End: 2024-06-17
Payer: MEDICARE

## 2024-06-17 PROCEDURE — 93298 REM INTERROG DEV EVAL SCRMS: CPT

## 2024-06-20 ENCOUNTER — INPATIENT (INPATIENT)
Facility: HOSPITAL | Age: 64
LOS: 14 days | Discharge: HOME | DRG: 93 | End: 2024-07-05
Attending: GENERAL ACUTE CARE HOSPITAL | Admitting: NEUROLOGICAL SURGERY
Payer: MEDICARE

## 2024-06-20 ENCOUNTER — RESULT REVIEW (OUTPATIENT)
Age: 64
End: 2024-06-20

## 2024-06-20 ENCOUNTER — APPOINTMENT (OUTPATIENT)
Dept: NEUROSURGERY | Facility: HOSPITAL | Age: 64
End: 2024-06-20

## 2024-06-20 VITALS
OXYGEN SATURATION: 98 % | SYSTOLIC BLOOD PRESSURE: 135 MMHG | WEIGHT: 175.93 LBS | DIASTOLIC BLOOD PRESSURE: 74 MMHG | RESPIRATION RATE: 17 BRPM | HEIGHT: 66 IN | TEMPERATURE: 98 F | HEART RATE: 60 BPM

## 2024-06-20 DIAGNOSIS — Z92.89 PERSONAL HISTORY OF OTHER MEDICAL TREATMENT: Chronic | ICD-10-CM

## 2024-06-20 DIAGNOSIS — Z86.79 PERSONAL HISTORY OF OTHER DISEASES OF THE CIRCULATORY SYSTEM: Chronic | ICD-10-CM

## 2024-06-20 DIAGNOSIS — I67.5 MOYAMOYA DISEASE: ICD-10-CM

## 2024-06-20 DIAGNOSIS — Z98.890 OTHER SPECIFIED POSTPROCEDURAL STATES: Chronic | ICD-10-CM

## 2024-06-20 LAB
ALBUMIN SERPL ELPH-MCNC: 3.8 G/DL — SIGNIFICANT CHANGE UP (ref 3.5–5.2)
ALP SERPL-CCNC: 87 U/L — SIGNIFICANT CHANGE UP (ref 30–115)
ALT FLD-CCNC: 13 U/L — SIGNIFICANT CHANGE UP (ref 0–41)
ANION GAP SERPL CALC-SCNC: 11 MMOL/L — SIGNIFICANT CHANGE UP (ref 7–14)
AST SERPL-CCNC: 19 U/L — SIGNIFICANT CHANGE UP (ref 0–41)
BILIRUB SERPL-MCNC: 0.3 MG/DL — SIGNIFICANT CHANGE UP (ref 0.2–1.2)
BUN SERPL-MCNC: 14 MG/DL — SIGNIFICANT CHANGE UP (ref 10–20)
CALCIUM SERPL-MCNC: 8.8 MG/DL — SIGNIFICANT CHANGE UP (ref 8.4–10.4)
CHLORIDE SERPL-SCNC: 106 MMOL/L — SIGNIFICANT CHANGE UP (ref 98–110)
CHOLEST SERPL-MCNC: 145 MG/DL — SIGNIFICANT CHANGE UP
CO2 SERPL-SCNC: 22 MMOL/L — SIGNIFICANT CHANGE UP (ref 17–32)
CREAT SERPL-MCNC: 1.2 MG/DL — SIGNIFICANT CHANGE UP (ref 0.7–1.5)
EGFR: 67 ML/MIN/1.73M2 — SIGNIFICANT CHANGE UP
GLUCOSE SERPL-MCNC: 108 MG/DL — HIGH (ref 70–99)
HCT VFR BLD CALC: 38.8 % — LOW (ref 42–52)
HDLC SERPL-MCNC: 51 MG/DL — SIGNIFICANT CHANGE UP
HGB BLD-MCNC: 12.8 G/DL — LOW (ref 14–18)
LIPID PNL WITH DIRECT LDL SERPL: 75 MG/DL — SIGNIFICANT CHANGE UP
MAGNESIUM SERPL-MCNC: 1.9 MG/DL — SIGNIFICANT CHANGE UP (ref 1.8–2.4)
MCHC RBC-ENTMCNC: 27.9 PG — SIGNIFICANT CHANGE UP (ref 27–31)
MCHC RBC-ENTMCNC: 33 G/DL — SIGNIFICANT CHANGE UP (ref 32–37)
MCV RBC AUTO: 84.5 FL — SIGNIFICANT CHANGE UP (ref 80–94)
NON HDL CHOLESTEROL: 94 MG/DL — SIGNIFICANT CHANGE UP
NRBC # BLD: 0 /100 WBCS — SIGNIFICANT CHANGE UP (ref 0–0)
PHOSPHATE SERPL-MCNC: 3 MG/DL — SIGNIFICANT CHANGE UP (ref 2.1–4.9)
PLATELET # BLD AUTO: 358 K/UL — SIGNIFICANT CHANGE UP (ref 130–400)
PMV BLD: 11.5 FL — HIGH (ref 7.4–10.4)
POTASSIUM SERPL-MCNC: 4.4 MMOL/L — SIGNIFICANT CHANGE UP (ref 3.5–5)
POTASSIUM SERPL-SCNC: 4.4 MMOL/L — SIGNIFICANT CHANGE UP (ref 3.5–5)
PROT SERPL-MCNC: 6.4 G/DL — SIGNIFICANT CHANGE UP (ref 6–8)
RBC # BLD: 4.59 M/UL — LOW (ref 4.7–6.1)
RBC # FLD: 15.5 % — HIGH (ref 11.5–14.5)
SODIUM SERPL-SCNC: 139 MMOL/L — SIGNIFICANT CHANGE UP (ref 135–146)
TRIGL SERPL-MCNC: 96 MG/DL — SIGNIFICANT CHANGE UP
WBC # BLD: 15.08 K/UL — HIGH (ref 4.8–10.8)
WBC # FLD AUTO: 15.08 K/UL — HIGH (ref 4.8–10.8)

## 2024-06-20 PROCEDURE — 82140 ASSAY OF AMMONIA: CPT

## 2024-06-20 PROCEDURE — 97530 THERAPEUTIC ACTIVITIES: CPT | Mod: GP

## 2024-06-20 PROCEDURE — C1713: CPT

## 2024-06-20 PROCEDURE — 92610 EVALUATE SWALLOWING FUNCTION: CPT | Mod: GN

## 2024-06-20 PROCEDURE — 69990 MICROSURGERY ADD-ON: CPT

## 2024-06-20 PROCEDURE — 36415 COLL VENOUS BLD VENIPUNCTURE: CPT

## 2024-06-20 PROCEDURE — 95700 EEG CONT REC W/VID EEG TECH: CPT

## 2024-06-20 PROCEDURE — 69990 MICROSURGERY ADD-ON: CPT | Mod: 82

## 2024-06-20 PROCEDURE — 93306 TTE W/DOPPLER COMPLETE: CPT

## 2024-06-20 PROCEDURE — C1889: CPT

## 2024-06-20 PROCEDURE — 71045 X-RAY EXAM CHEST 1 VIEW: CPT

## 2024-06-20 PROCEDURE — 88307 TISSUE EXAM BY PATHOLOGIST: CPT | Mod: 26

## 2024-06-20 PROCEDURE — 92507 TX SP LANG VOICE COMM INDIV: CPT | Mod: GN

## 2024-06-20 PROCEDURE — 92526 ORAL FUNCTION THERAPY: CPT | Mod: GN

## 2024-06-20 PROCEDURE — 95711 VEEG 2-12 HR UNMONITORED: CPT

## 2024-06-20 PROCEDURE — 80048 BASIC METABOLIC PNL TOTAL CA: CPT

## 2024-06-20 PROCEDURE — 61711 FUSION OF SKULL ARTERIES: CPT | Mod: 82

## 2024-06-20 PROCEDURE — 86850 RBC ANTIBODY SCREEN: CPT

## 2024-06-20 PROCEDURE — 93320 DOPPLER ECHO COMPLETE: CPT

## 2024-06-20 PROCEDURE — 80053 COMPREHEN METABOLIC PANEL: CPT

## 2024-06-20 PROCEDURE — 84443 ASSAY THYROID STIM HORMONE: CPT

## 2024-06-20 PROCEDURE — 95714 VEEG EA 12-26 HR UNMNTR: CPT

## 2024-06-20 PROCEDURE — 83036 HEMOGLOBIN GLYCOSYLATED A1C: CPT

## 2024-06-20 PROCEDURE — 99291 CRITICAL CARE FIRST HOUR: CPT

## 2024-06-20 PROCEDURE — 70450 CT HEAD/BRAIN W/O DYE: CPT | Mod: 26

## 2024-06-20 PROCEDURE — 86900 BLOOD TYPING SEROLOGIC ABO: CPT

## 2024-06-20 PROCEDURE — 70250 X-RAY EXAM OF SKULL: CPT

## 2024-06-20 PROCEDURE — 97116 GAIT TRAINING THERAPY: CPT | Mod: GP

## 2024-06-20 PROCEDURE — 85025 COMPLETE CBC W/AUTO DIFF WBC: CPT

## 2024-06-20 PROCEDURE — 92240 ICG ANGIOGRAPHY I&R UNI/BI: CPT | Mod: 26

## 2024-06-20 PROCEDURE — 85610 PROTHROMBIN TIME: CPT

## 2024-06-20 PROCEDURE — 64999 UNLISTED PX NERVOUS SYSTEM: CPT | Mod: 82

## 2024-06-20 PROCEDURE — 93971 EXTREMITY STUDY: CPT | Mod: LT

## 2024-06-20 PROCEDURE — C1751: CPT

## 2024-06-20 PROCEDURE — 83735 ASSAY OF MAGNESIUM: CPT

## 2024-06-20 PROCEDURE — 70496 CT ANGIOGRAPHY HEAD: CPT | Mod: 26

## 2024-06-20 PROCEDURE — 70496 CT ANGIOGRAPHY HEAD: CPT | Mod: MC

## 2024-06-20 PROCEDURE — 97167 OT EVAL HIGH COMPLEX 60 MIN: CPT | Mod: GO

## 2024-06-20 PROCEDURE — 85730 THROMBOPLASTIN TIME PARTIAL: CPT

## 2024-06-20 PROCEDURE — C9399: CPT

## 2024-06-20 PROCEDURE — 82962 GLUCOSE BLOOD TEST: CPT

## 2024-06-20 PROCEDURE — 97110 THERAPEUTIC EXERCISES: CPT | Mod: GP

## 2024-06-20 PROCEDURE — 64999 UNLISTED PX NERVOUS SYSTEM: CPT

## 2024-06-20 PROCEDURE — 97168 OT RE-EVAL EST PLAN CARE: CPT | Mod: GO

## 2024-06-20 PROCEDURE — 84100 ASSAY OF PHOSPHORUS: CPT

## 2024-06-20 PROCEDURE — 97163 PT EVAL HIGH COMPLEX 45 MIN: CPT | Mod: GP

## 2024-06-20 PROCEDURE — 97535 SELF CARE MNGMENT TRAINING: CPT | Mod: GO

## 2024-06-20 PROCEDURE — 93312 ECHO TRANSESOPHAGEAL: CPT

## 2024-06-20 PROCEDURE — 85027 COMPLETE CBC AUTOMATED: CPT

## 2024-06-20 PROCEDURE — 94010 BREATHING CAPACITY TEST: CPT

## 2024-06-20 PROCEDURE — 93325 DOPPLER ECHO COLOR FLOW MAPG: CPT

## 2024-06-20 PROCEDURE — 86901 BLOOD TYPING SEROLOGIC RH(D): CPT

## 2024-06-20 PROCEDURE — 70450 CT HEAD/BRAIN W/O DYE: CPT | Mod: 26,77

## 2024-06-20 PROCEDURE — 93970 EXTREMITY STUDY: CPT

## 2024-06-20 PROCEDURE — 70450 CT HEAD/BRAIN W/O DYE: CPT | Mod: MC

## 2024-06-20 PROCEDURE — 80061 LIPID PANEL: CPT

## 2024-06-20 PROCEDURE — 88307 TISSUE EXAM BY PATHOLOGIST: CPT

## 2024-06-20 PROCEDURE — 61711 FUSION OF SKULL ARTERIES: CPT

## 2024-06-20 RX ORDER — MINOCYCLINE HCL 50 MG
100 CAPSULE ORAL EVERY 12 HOURS
Refills: 0 | Status: DISCONTINUED | OUTPATIENT
Start: 2024-06-20 | End: 2024-06-21

## 2024-06-20 RX ORDER — TICAGRELOR 90 MG/1
1 TABLET ORAL
Refills: 0 | DISCHARGE

## 2024-06-20 RX ORDER — MINOCYCLINE HCL 50 MG
100 CAPSULE ORAL EVERY 12 HOURS
Refills: 0 | Status: DISCONTINUED | OUTPATIENT
Start: 2024-06-20 | End: 2024-06-20

## 2024-06-20 RX ORDER — SODIUM CHLORIDE 0.9 % (FLUSH) 0.9 %
500 SYRINGE (ML) INJECTION ONCE
Refills: 0 | Status: COMPLETED | OUTPATIENT
Start: 2024-06-20 | End: 2024-06-20

## 2024-06-20 RX ORDER — ASPIRIN 325 MG/1
600 TABLET, FILM COATED ORAL ONCE
Refills: 0 | Status: COMPLETED | OUTPATIENT
Start: 2024-06-20 | End: 2024-06-20

## 2024-06-20 RX ORDER — PHENYLEPHRINE HYDROCHLORIDE 10 MG/ML
0.1 INJECTION INTRAVENOUS
Qty: 40 | Refills: 0 | Status: DISCONTINUED | OUTPATIENT
Start: 2024-06-20 | End: 2024-06-22

## 2024-06-20 RX ORDER — NIFEDIPINE 30 MG
1 TABLET, EXTENDED RELEASE 24 HR ORAL
Refills: 0 | DISCHARGE

## 2024-06-20 RX ORDER — ASPIRIN/CALCIUM CARB/MAGNESIUM 324 MG
0 TABLET ORAL
Refills: 0 | DISCHARGE

## 2024-06-20 RX ORDER — LEVETIRACETAM 100 MG/ML
500 INJECTION INTRAVENOUS EVERY 12 HOURS
Refills: 0 | Status: DISCONTINUED | OUTPATIENT
Start: 2024-06-20 | End: 2024-06-22

## 2024-06-20 RX ORDER — PHENYLEPHRINE HYDROCHLORIDE 10 MG/ML
0.1 INJECTION INTRAVENOUS
Qty: 40 | Refills: 0 | Status: DISCONTINUED | OUTPATIENT
Start: 2024-06-20 | End: 2024-06-20

## 2024-06-20 RX ORDER — ADALIMUMAB 40MG/0.8ML
40 KIT SUBCUTANEOUS
Refills: 0 | DISCHARGE

## 2024-06-20 RX ORDER — HYDROMORPHONE HCL 0.2 MG/ML
0.5 INJECTION, SOLUTION INTRAVENOUS
Refills: 0 | Status: DISCONTINUED | OUTPATIENT
Start: 2024-06-20 | End: 2024-06-20

## 2024-06-20 RX ORDER — LEVETIRACETAM 100 MG/ML
500 INJECTION INTRAVENOUS
Refills: 0 | Status: DISCONTINUED | OUTPATIENT
Start: 2024-06-20 | End: 2024-06-20

## 2024-06-20 RX ADMIN — ASPIRIN 600 MILLIGRAM(S): 325 TABLET, FILM COATED ORAL at 18:20

## 2024-06-20 RX ADMIN — LEVETIRACETAM 400 MILLIGRAM(S): 100 INJECTION INTRAVENOUS at 20:40

## 2024-06-20 RX ADMIN — Medication 100 MILLIGRAM(S): at 21:27

## 2024-06-20 RX ADMIN — PHENYLEPHRINE HYDROCHLORIDE 2.99 MICROGRAM(S)/KG/MIN: 10 INJECTION INTRAVENOUS at 21:48

## 2024-06-20 RX ADMIN — Medication 500 MILLILITER(S): at 17:41

## 2024-06-21 ENCOUNTER — RESULT REVIEW (OUTPATIENT)
Age: 64
End: 2024-06-21

## 2024-06-21 LAB
A1C WITH ESTIMATED AVERAGE GLUCOSE RESULT: 5.6 % — SIGNIFICANT CHANGE UP (ref 4–5.6)
A1C WITH ESTIMATED AVERAGE GLUCOSE RESULT: 5.8 % — HIGH (ref 4–5.6)
ANION GAP SERPL CALC-SCNC: 11 MMOL/L — SIGNIFICANT CHANGE UP (ref 7–14)
BUN SERPL-MCNC: 13 MG/DL — SIGNIFICANT CHANGE UP (ref 10–20)
CALCIUM SERPL-MCNC: 8.9 MG/DL — SIGNIFICANT CHANGE UP (ref 8.4–10.4)
CHLORIDE SERPL-SCNC: 107 MMOL/L — SIGNIFICANT CHANGE UP (ref 98–110)
CHOLEST SERPL-MCNC: 140 MG/DL — SIGNIFICANT CHANGE UP
CO2 SERPL-SCNC: 24 MMOL/L — SIGNIFICANT CHANGE UP (ref 17–32)
CREAT SERPL-MCNC: 1.2 MG/DL — SIGNIFICANT CHANGE UP (ref 0.7–1.5)
EGFR: 67 ML/MIN/1.73M2 — SIGNIFICANT CHANGE UP
ESTIMATED AVERAGE GLUCOSE: 114 MG/DL — SIGNIFICANT CHANGE UP (ref 68–114)
ESTIMATED AVERAGE GLUCOSE: 120 MG/DL — HIGH (ref 68–114)
GLUCOSE SERPL-MCNC: 106 MG/DL — HIGH (ref 70–99)
HCT VFR BLD CALC: 37.6 % — LOW (ref 42–52)
HDLC SERPL-MCNC: 50 MG/DL — SIGNIFICANT CHANGE UP
HGB BLD-MCNC: 12.2 G/DL — LOW (ref 14–18)
LIPID PNL WITH DIRECT LDL SERPL: 71 MG/DL — SIGNIFICANT CHANGE UP
MAGNESIUM SERPL-MCNC: 2 MG/DL — SIGNIFICANT CHANGE UP (ref 1.8–2.4)
MCHC RBC-ENTMCNC: 27.6 PG — SIGNIFICANT CHANGE UP (ref 27–31)
MCHC RBC-ENTMCNC: 32.4 G/DL — SIGNIFICANT CHANGE UP (ref 32–37)
MCV RBC AUTO: 85.1 FL — SIGNIFICANT CHANGE UP (ref 80–94)
NON HDL CHOLESTEROL: 90 MG/DL — SIGNIFICANT CHANGE UP
NRBC # BLD: 0 /100 WBCS — SIGNIFICANT CHANGE UP (ref 0–0)
PHOSPHATE SERPL-MCNC: 3.6 MG/DL — SIGNIFICANT CHANGE UP (ref 2.1–4.9)
PLATELET # BLD AUTO: 343 K/UL — SIGNIFICANT CHANGE UP (ref 130–400)
PMV BLD: 12 FL — HIGH (ref 7.4–10.4)
POTASSIUM SERPL-MCNC: 4.2 MMOL/L — SIGNIFICANT CHANGE UP (ref 3.5–5)
POTASSIUM SERPL-SCNC: 4.2 MMOL/L — SIGNIFICANT CHANGE UP (ref 3.5–5)
RBC # BLD: 4.42 M/UL — LOW (ref 4.7–6.1)
RBC # FLD: 15.6 % — HIGH (ref 11.5–14.5)
SODIUM SERPL-SCNC: 142 MMOL/L — SIGNIFICANT CHANGE UP (ref 135–146)
TRIGL SERPL-MCNC: 100 MG/DL — SIGNIFICANT CHANGE UP
TSH SERPL-MCNC: 2.07 UIU/ML — SIGNIFICANT CHANGE UP (ref 0.27–4.2)
TSH SERPL-MCNC: 3.77 UIU/ML — SIGNIFICANT CHANGE UP (ref 0.27–4.2)
WBC # BLD: 13.21 K/UL — HIGH (ref 4.8–10.8)
WBC # FLD AUTO: 13.21 K/UL — HIGH (ref 4.8–10.8)

## 2024-06-21 PROCEDURE — 70450 CT HEAD/BRAIN W/O DYE: CPT | Mod: 26

## 2024-06-21 PROCEDURE — 99291 CRITICAL CARE FIRST HOUR: CPT

## 2024-06-21 PROCEDURE — 93306 TTE W/DOPPLER COMPLETE: CPT | Mod: 26

## 2024-06-21 RX ORDER — DEXAMETHASONE 1 MG/1
4 TABLET ORAL EVERY 6 HOURS
Refills: 0 | Status: DISCONTINUED | OUTPATIENT
Start: 2024-06-21 | End: 2024-06-22

## 2024-06-21 RX ORDER — HEPARIN SODIUM 50 [USP'U]/ML
5000 INJECTION, SOLUTION INTRAVENOUS EVERY 8 HOURS
Refills: 0 | Status: DISCONTINUED | OUTPATIENT
Start: 2024-06-21 | End: 2024-06-22

## 2024-06-21 RX ORDER — ACETAMINOPHEN 325 MG
650 TABLET ORAL EVERY 6 HOURS
Refills: 0 | Status: DISCONTINUED | OUTPATIENT
Start: 2024-06-21 | End: 2024-07-05

## 2024-06-21 RX ORDER — OXYCODONE HYDROCHLORIDE 100 MG/5ML
5 SOLUTION ORAL ONCE
Refills: 0 | Status: DISCONTINUED | OUTPATIENT
Start: 2024-06-21 | End: 2024-06-21

## 2024-06-21 RX ORDER — MINOCYCLINE HCL 50 MG
100 CAPSULE ORAL EVERY 12 HOURS
Refills: 0 | Status: DISCONTINUED | OUTPATIENT
Start: 2024-06-21 | End: 2024-06-21

## 2024-06-21 RX ORDER — MINOCYCLINE HCL 50 MG
100 CAPSULE ORAL EVERY 12 HOURS
Refills: 0 | Status: DISCONTINUED | OUTPATIENT
Start: 2024-06-21 | End: 2024-06-22

## 2024-06-21 RX ORDER — ASPIRIN 325 MG/1
325 TABLET, FILM COATED ORAL DAILY
Refills: 0 | Status: DISCONTINUED | OUTPATIENT
Start: 2024-06-21 | End: 2024-06-22

## 2024-06-21 RX ORDER — FAMOTIDINE 40 MG
20 TABLET ORAL DAILY
Refills: 0 | Status: DISCONTINUED | OUTPATIENT
Start: 2024-06-21 | End: 2024-06-22

## 2024-06-21 RX ORDER — SODIUM CHLORIDE 0.9 % (FLUSH) 0.9 %
500 SYRINGE (ML) INJECTION ONCE
Refills: 0 | Status: COMPLETED | OUTPATIENT
Start: 2024-06-21 | End: 2024-06-21

## 2024-06-21 RX ORDER — POLYETHYLENE GLYCOL 3350 1 G/G
17 POWDER ORAL DAILY
Refills: 0 | Status: DISCONTINUED | OUTPATIENT
Start: 2024-06-21 | End: 2024-06-25

## 2024-06-21 RX ORDER — DEXTROSE MONOHYDRATE AND SODIUM CHLORIDE 5; .3 G/100ML; G/100ML
1000 INJECTION, SOLUTION INTRAVENOUS
Refills: 0 | Status: DISCONTINUED | OUTPATIENT
Start: 2024-06-21 | End: 2024-06-22

## 2024-06-21 RX ORDER — SENNOSIDES 8.6 MG
2 TABLET ORAL AT BEDTIME
Refills: 0 | Status: DISCONTINUED | OUTPATIENT
Start: 2024-06-21 | End: 2024-07-05

## 2024-06-21 RX ADMIN — Medication 500 MILLILITER(S): at 09:55

## 2024-06-21 RX ADMIN — Medication 650 MILLIGRAM(S): at 02:17

## 2024-06-21 RX ADMIN — DEXAMETHASONE 4 MILLIGRAM(S): 1 TABLET ORAL at 11:20

## 2024-06-21 RX ADMIN — ASPIRIN 325 MILLIGRAM(S): 325 TABLET, FILM COATED ORAL at 11:20

## 2024-06-21 RX ADMIN — DEXTROSE MONOHYDRATE AND SODIUM CHLORIDE 60 MILLILITER(S): 5; .3 INJECTION, SOLUTION INTRAVENOUS at 12:31

## 2024-06-21 RX ADMIN — DEXAMETHASONE 4 MILLIGRAM(S): 1 TABLET ORAL at 17:54

## 2024-06-21 RX ADMIN — PHENYLEPHRINE HYDROCHLORIDE 2.99 MICROGRAM(S)/KG/MIN: 10 INJECTION INTRAVENOUS at 08:41

## 2024-06-21 RX ADMIN — DEXTROSE MONOHYDRATE AND SODIUM CHLORIDE 60 MILLILITER(S): 5; .3 INJECTION, SOLUTION INTRAVENOUS at 21:30

## 2024-06-21 RX ADMIN — OXYCODONE HYDROCHLORIDE 5 MILLIGRAM(S): 100 SOLUTION ORAL at 14:15

## 2024-06-21 RX ADMIN — POLYETHYLENE GLYCOL 3350 17 GRAM(S): 1 POWDER ORAL at 11:20

## 2024-06-21 RX ADMIN — LEVETIRACETAM 400 MILLIGRAM(S): 100 INJECTION INTRAVENOUS at 06:40

## 2024-06-21 RX ADMIN — Medication 2 TABLET(S): at 21:30

## 2024-06-21 RX ADMIN — Medication 650 MILLIGRAM(S): at 08:17

## 2024-06-21 RX ADMIN — HEPARIN SODIUM 5000 UNIT(S): 50 INJECTION, SOLUTION INTRAVENOUS at 13:45

## 2024-06-21 RX ADMIN — PHENYLEPHRINE HYDROCHLORIDE 2.99 MICROGRAM(S)/KG/MIN: 10 INJECTION INTRAVENOUS at 21:30

## 2024-06-21 RX ADMIN — Medication 650 MILLIGRAM(S): at 03:04

## 2024-06-21 RX ADMIN — OXYCODONE HYDROCHLORIDE 5 MILLIGRAM(S): 100 SOLUTION ORAL at 13:45

## 2024-06-21 RX ADMIN — DEXAMETHASONE 4 MILLIGRAM(S): 1 TABLET ORAL at 23:12

## 2024-06-21 RX ADMIN — PHENYLEPHRINE HYDROCHLORIDE 2.99 MICROGRAM(S)/KG/MIN: 10 INJECTION INTRAVENOUS at 06:41

## 2024-06-21 RX ADMIN — Medication 100 MILLIGRAM(S): at 06:40

## 2024-06-21 RX ADMIN — LEVETIRACETAM 400 MILLIGRAM(S): 100 INJECTION INTRAVENOUS at 17:54

## 2024-06-21 RX ADMIN — Medication 1 APPLICATION(S): at 06:41

## 2024-06-21 RX ADMIN — Medication 650 MILLIGRAM(S): at 09:17

## 2024-06-21 RX ADMIN — HEPARIN SODIUM 5000 UNIT(S): 50 INJECTION, SOLUTION INTRAVENOUS at 21:30

## 2024-06-21 RX ADMIN — Medication 20 MILLIGRAM(S): at 11:20

## 2024-06-21 RX ADMIN — Medication 100 MILLIGRAM(S): at 17:54

## 2024-06-22 LAB
ALBUMIN SERPL ELPH-MCNC: 3.5 G/DL — SIGNIFICANT CHANGE UP (ref 3.5–5.2)
ALBUMIN SERPL ELPH-MCNC: 3.6 G/DL — SIGNIFICANT CHANGE UP (ref 3.5–5.2)
ALP SERPL-CCNC: 76 U/L — SIGNIFICANT CHANGE UP (ref 30–115)
ALP SERPL-CCNC: 79 U/L — SIGNIFICANT CHANGE UP (ref 30–115)
ALT FLD-CCNC: 10 U/L — SIGNIFICANT CHANGE UP (ref 0–41)
ALT FLD-CCNC: 11 U/L — SIGNIFICANT CHANGE UP (ref 0–41)
ANION GAP SERPL CALC-SCNC: 11 MMOL/L — SIGNIFICANT CHANGE UP (ref 7–14)
ANION GAP SERPL CALC-SCNC: 14 MMOL/L — SIGNIFICANT CHANGE UP (ref 7–14)
ANISOCYTOSIS BLD QL: SLIGHT — SIGNIFICANT CHANGE UP
APTT BLD: 29.1 SEC — SIGNIFICANT CHANGE UP (ref 27–39.2)
AST SERPL-CCNC: 18 U/L — SIGNIFICANT CHANGE UP (ref 0–41)
AST SERPL-CCNC: 20 U/L — SIGNIFICANT CHANGE UP (ref 0–41)
BASOPHILS # BLD AUTO: 0 K/UL — SIGNIFICANT CHANGE UP (ref 0–0.2)
BASOPHILS NFR BLD AUTO: 0 % — SIGNIFICANT CHANGE UP (ref 0–1)
BILIRUB SERPL-MCNC: 0.4 MG/DL — SIGNIFICANT CHANGE UP (ref 0.2–1.2)
BILIRUB SERPL-MCNC: 0.5 MG/DL — SIGNIFICANT CHANGE UP (ref 0.2–1.2)
BLD GP AB SCN SERPL QL: SIGNIFICANT CHANGE UP
BUN SERPL-MCNC: 14 MG/DL — SIGNIFICANT CHANGE UP (ref 10–20)
BUN SERPL-MCNC: 18 MG/DL — SIGNIFICANT CHANGE UP (ref 10–20)
CALCIUM SERPL-MCNC: 8.8 MG/DL — SIGNIFICANT CHANGE UP (ref 8.4–10.4)
CALCIUM SERPL-MCNC: 8.8 MG/DL — SIGNIFICANT CHANGE UP (ref 8.4–10.4)
CHLORIDE SERPL-SCNC: 107 MMOL/L — SIGNIFICANT CHANGE UP (ref 98–110)
CHLORIDE SERPL-SCNC: 109 MMOL/L — SIGNIFICANT CHANGE UP (ref 98–110)
CO2 SERPL-SCNC: 21 MMOL/L — SIGNIFICANT CHANGE UP (ref 17–32)
CO2 SERPL-SCNC: 22 MMOL/L — SIGNIFICANT CHANGE UP (ref 17–32)
CREAT SERPL-MCNC: 1.1 MG/DL — SIGNIFICANT CHANGE UP (ref 0.7–1.5)
CREAT SERPL-MCNC: 1.3 MG/DL — SIGNIFICANT CHANGE UP (ref 0.7–1.5)
EGFR: 61 ML/MIN/1.73M2 — SIGNIFICANT CHANGE UP
EGFR: 74 ML/MIN/1.73M2 — SIGNIFICANT CHANGE UP
EOSINOPHIL # BLD AUTO: 1.51 K/UL — HIGH (ref 0–0.7)
EOSINOPHIL NFR BLD AUTO: 6 % — SIGNIFICANT CHANGE UP (ref 0–8)
GIANT PLATELETS BLD QL SMEAR: PRESENT — SIGNIFICANT CHANGE UP
GLUCOSE SERPL-MCNC: 116 MG/DL — HIGH (ref 70–99)
GLUCOSE SERPL-MCNC: 153 MG/DL — HIGH (ref 70–99)
HCT VFR BLD CALC: 35.4 % — LOW (ref 42–52)
HGB BLD-MCNC: 11.7 G/DL — LOW (ref 14–18)
INR BLD: 1.27 RATIO — SIGNIFICANT CHANGE UP (ref 0.65–1.3)
LYMPHOCYTES # BLD AUTO: 1.08 K/UL — LOW (ref 1.2–3.4)
LYMPHOCYTES # BLD AUTO: 4.3 % — LOW (ref 20.5–51.1)
MAGNESIUM SERPL-MCNC: 1.9 MG/DL — SIGNIFICANT CHANGE UP (ref 1.8–2.4)
MANUAL SMEAR VERIFICATION: SIGNIFICANT CHANGE UP
MCHC RBC-ENTMCNC: 27.9 PG — SIGNIFICANT CHANGE UP (ref 27–31)
MCHC RBC-ENTMCNC: 33.1 G/DL — SIGNIFICANT CHANGE UP (ref 32–37)
MCV RBC AUTO: 84.3 FL — SIGNIFICANT CHANGE UP (ref 80–94)
MONOCYTES # BLD AUTO: 1.51 K/UL — HIGH (ref 0.1–0.6)
MONOCYTES NFR BLD AUTO: 6 % — SIGNIFICANT CHANGE UP (ref 1.7–9.3)
NEUTROPHILS # BLD AUTO: 20.79 K/UL — HIGH (ref 1.4–6.5)
NEUTROPHILS NFR BLD AUTO: 82.8 % — HIGH (ref 42.2–75.2)
PHOSPHATE SERPL-MCNC: 2.1 MG/DL — SIGNIFICANT CHANGE UP (ref 2.1–4.9)
PLAT MORPH BLD: NORMAL — SIGNIFICANT CHANGE UP
PLATELET # BLD AUTO: 295 K/UL — SIGNIFICANT CHANGE UP (ref 130–400)
PMV BLD: 11.4 FL — HIGH (ref 7.4–10.4)
POLYCHROMASIA BLD QL SMEAR: SIGNIFICANT CHANGE UP
POTASSIUM SERPL-MCNC: 3.8 MMOL/L — SIGNIFICANT CHANGE UP (ref 3.5–5)
POTASSIUM SERPL-MCNC: 4.4 MMOL/L — SIGNIFICANT CHANGE UP (ref 3.5–5)
POTASSIUM SERPL-SCNC: 3.8 MMOL/L — SIGNIFICANT CHANGE UP (ref 3.5–5)
POTASSIUM SERPL-SCNC: 4.4 MMOL/L — SIGNIFICANT CHANGE UP (ref 3.5–5)
PROT SERPL-MCNC: 6.3 G/DL — SIGNIFICANT CHANGE UP (ref 6–8)
PROT SERPL-MCNC: 6.4 G/DL — SIGNIFICANT CHANGE UP (ref 6–8)
PROTHROM AB SERPL-ACNC: 14.5 SEC — HIGH (ref 9.95–12.87)
RBC # BLD: 4.2 M/UL — LOW (ref 4.7–6.1)
RBC # FLD: 15.5 % — HIGH (ref 11.5–14.5)
RBC BLD AUTO: NORMAL — SIGNIFICANT CHANGE UP
SODIUM SERPL-SCNC: 142 MMOL/L — SIGNIFICANT CHANGE UP (ref 135–146)
SODIUM SERPL-SCNC: 142 MMOL/L — SIGNIFICANT CHANGE UP (ref 135–146)
VARIANT LYMPHS # BLD: 0.9 % — SIGNIFICANT CHANGE UP (ref 0–5)
WBC # BLD: 25.11 K/UL — HIGH (ref 4.8–10.8)
WBC # FLD AUTO: 25.11 K/UL — HIGH (ref 4.8–10.8)

## 2024-06-22 PROCEDURE — 99291 CRITICAL CARE FIRST HOUR: CPT

## 2024-06-22 PROCEDURE — 61313 CRNEC/CRNOT STTL ICERE: CPT | Mod: 78

## 2024-06-22 PROCEDURE — 70496 CT ANGIOGRAPHY HEAD: CPT | Mod: 26

## 2024-06-22 PROCEDURE — 70450 CT HEAD/BRAIN W/O DYE: CPT | Mod: 26,59

## 2024-06-22 PROCEDURE — 70450 CT HEAD/BRAIN W/O DYE: CPT | Mod: 26,59,77

## 2024-06-22 RX ORDER — PHENYLEPHRINE HYDROCHLORIDE 10 MG/ML
0.1 INJECTION INTRAVENOUS
Qty: 40 | Refills: 0 | Status: DISCONTINUED | OUTPATIENT
Start: 2024-06-22 | End: 2024-06-23

## 2024-06-22 RX ORDER — LEVETIRACETAM 100 MG/ML
750 INJECTION INTRAVENOUS EVERY 12 HOURS
Refills: 0 | Status: DISCONTINUED | OUTPATIENT
Start: 2024-06-22 | End: 2024-06-25

## 2024-06-22 RX ORDER — TRAMADOL HYDROCHLORIDE 50 MG/1
25 TABLET, FILM COATED ORAL ONCE
Refills: 0 | Status: DISCONTINUED | OUTPATIENT
Start: 2024-06-22 | End: 2024-06-22

## 2024-06-22 RX ORDER — DEXMEDETOMIDINE HYDROCHLORIDE 4 UG/ML
0.2 INJECTION, SOLUTION INTRAVENOUS
Qty: 400 | Refills: 0 | Status: DISCONTINUED | OUTPATIENT
Start: 2024-06-22 | End: 2024-06-23

## 2024-06-22 RX ORDER — PHENYLEPHRINE HYDROCHLORIDE 10 MG/ML
0.1 INJECTION INTRAVENOUS
Qty: 40 | Refills: 0 | Status: DISCONTINUED | OUTPATIENT
Start: 2024-06-22 | End: 2024-06-22

## 2024-06-22 RX ORDER — NICARDIPINE HYDROCHLORIDE 0.1 MG/ML
5 INJECTION, SOLUTION INTRAVENOUS
Qty: 40 | Refills: 0 | Status: DISCONTINUED | OUTPATIENT
Start: 2024-06-22 | End: 2024-06-23

## 2024-06-22 RX ORDER — ASPIRIN 325 MG/1
300 TABLET, FILM COATED ORAL DAILY
Refills: 0 | Status: DISCONTINUED | OUTPATIENT
Start: 2024-06-22 | End: 2024-06-23

## 2024-06-22 RX ORDER — LABETALOL HYDROCHLORIDE 300 MG/1
10 TABLET ORAL ONCE
Refills: 0 | Status: COMPLETED | OUTPATIENT
Start: 2024-06-22 | End: 2024-06-22

## 2024-06-22 RX ORDER — POTASSIUM CHLORIDE 600 MG/1
20 TABLET, FILM COATED, EXTENDED RELEASE ORAL ONCE
Refills: 0 | Status: COMPLETED | OUTPATIENT
Start: 2024-06-22 | End: 2024-06-22

## 2024-06-22 RX ORDER — MINOCYCLINE HCL 50 MG
100 CAPSULE ORAL EVERY 12 HOURS
Refills: 0 | Status: DISCONTINUED | OUTPATIENT
Start: 2024-06-22 | End: 2024-06-25

## 2024-06-22 RX ORDER — DEXAMETHASONE 1 MG/1
3 TABLET ORAL EVERY 8 HOURS
Refills: 0 | Status: DISCONTINUED | OUTPATIENT
Start: 2024-06-22 | End: 2024-06-22

## 2024-06-22 RX ORDER — ACETAMINOPHEN 325 MG
1000 TABLET ORAL ONCE
Refills: 0 | Status: COMPLETED | OUTPATIENT
Start: 2024-06-22 | End: 2024-06-22

## 2024-06-22 RX ORDER — MAGNESIUM SULFATE 100 %
2 POWDER (GRAM) MISCELLANEOUS ONCE
Refills: 0 | Status: COMPLETED | OUTPATIENT
Start: 2024-06-22 | End: 2024-06-22

## 2024-06-22 RX ORDER — SODIUM CHLORIDE 3 G/100ML
1000 INJECTION, SOLUTION INTRAVENOUS
Refills: 0 | Status: DISCONTINUED | OUTPATIENT
Start: 2024-06-22 | End: 2024-06-26

## 2024-06-22 RX ORDER — FAMOTIDINE 40 MG
20 TABLET ORAL DAILY
Refills: 0 | Status: DISCONTINUED | OUTPATIENT
Start: 2024-06-22 | End: 2024-06-24

## 2024-06-22 RX ORDER — NICARDIPINE HYDROCHLORIDE 0.1 MG/ML
5 INJECTION, SOLUTION INTRAVENOUS
Qty: 40 | Refills: 0 | Status: DISCONTINUED | OUTPATIENT
Start: 2024-06-22 | End: 2024-06-22

## 2024-06-22 RX ADMIN — LABETALOL HYDROCHLORIDE 10 MILLIGRAM(S): 300 TABLET ORAL at 03:30

## 2024-06-22 RX ADMIN — TRAMADOL HYDROCHLORIDE 25 MILLIGRAM(S): 50 TABLET, FILM COATED ORAL at 03:03

## 2024-06-22 RX ADMIN — LEVETIRACETAM 400 MILLIGRAM(S): 100 INJECTION INTRAVENOUS at 04:32

## 2024-06-22 RX ADMIN — POTASSIUM CHLORIDE 50 MILLIEQUIVALENT(S): 600 TABLET, FILM COATED, EXTENDED RELEASE ORAL at 14:04

## 2024-06-22 RX ADMIN — Medication 400 MILLIGRAM(S): at 09:16

## 2024-06-22 RX ADMIN — LEVETIRACETAM 400 MILLIGRAM(S): 100 INJECTION INTRAVENOUS at 18:05

## 2024-06-22 RX ADMIN — Medication 1 APPLICATION(S): at 05:04

## 2024-06-22 RX ADMIN — Medication 650 MILLIGRAM(S): at 02:46

## 2024-06-22 RX ADMIN — Medication 20 MILLIGRAM(S): at 14:05

## 2024-06-22 RX ADMIN — TRAMADOL HYDROCHLORIDE 25 MILLIGRAM(S): 50 TABLET, FILM COATED ORAL at 04:08

## 2024-06-22 RX ADMIN — Medication 25 GRAM(S): at 07:00

## 2024-06-22 RX ADMIN — Medication 650 MILLIGRAM(S): at 02:59

## 2024-06-22 RX ADMIN — Medication 100 MILLIGRAM(S): at 18:03

## 2024-06-22 RX ADMIN — NICARDIPINE HYDROCHLORIDE 25 MG/HR: 0.1 INJECTION, SOLUTION INTRAVENOUS at 05:14

## 2024-06-22 RX ADMIN — Medication 1000 MILLIGRAM(S): at 09:30

## 2024-06-22 RX ADMIN — ASPIRIN 300 MILLIGRAM(S): 325 TABLET, FILM COATED ORAL at 15:38

## 2024-06-22 RX ADMIN — DEXAMETHASONE 3 MILLIGRAM(S): 1 TABLET ORAL at 14:05

## 2024-06-23 LAB
ALBUMIN SERPL ELPH-MCNC: 3.4 G/DL — LOW (ref 3.5–5.2)
ALBUMIN SERPL ELPH-MCNC: 3.8 G/DL — SIGNIFICANT CHANGE UP (ref 3.5–5.2)
ALP SERPL-CCNC: 65 U/L — SIGNIFICANT CHANGE UP (ref 30–115)
ALP SERPL-CCNC: 66 U/L — SIGNIFICANT CHANGE UP (ref 30–115)
ALT FLD-CCNC: 11 U/L — SIGNIFICANT CHANGE UP (ref 0–41)
ALT FLD-CCNC: 11 U/L — SIGNIFICANT CHANGE UP (ref 0–41)
ANION GAP SERPL CALC-SCNC: 13 MMOL/L — SIGNIFICANT CHANGE UP (ref 7–14)
ANION GAP SERPL CALC-SCNC: 16 MMOL/L — HIGH (ref 7–14)
APTT BLD: 27.1 SEC — SIGNIFICANT CHANGE UP (ref 27–39.2)
AST SERPL-CCNC: 18 U/L — SIGNIFICANT CHANGE UP (ref 0–41)
AST SERPL-CCNC: 20 U/L — SIGNIFICANT CHANGE UP (ref 0–41)
BASOPHILS # BLD AUTO: 0.03 K/UL — SIGNIFICANT CHANGE UP (ref 0–0.2)
BASOPHILS # BLD AUTO: 0.03 K/UL — SIGNIFICANT CHANGE UP (ref 0–0.2)
BASOPHILS NFR BLD AUTO: 0.1 % — SIGNIFICANT CHANGE UP (ref 0–1)
BASOPHILS NFR BLD AUTO: 0.1 % — SIGNIFICANT CHANGE UP (ref 0–1)
BILIRUB SERPL-MCNC: 0.3 MG/DL — SIGNIFICANT CHANGE UP (ref 0.2–1.2)
BILIRUB SERPL-MCNC: 0.3 MG/DL — SIGNIFICANT CHANGE UP (ref 0.2–1.2)
BUN SERPL-MCNC: 22 MG/DL — HIGH (ref 10–20)
BUN SERPL-MCNC: 25 MG/DL — HIGH (ref 10–20)
CALCIUM SERPL-MCNC: 8.5 MG/DL — SIGNIFICANT CHANGE UP (ref 8.4–10.5)
CALCIUM SERPL-MCNC: 8.7 MG/DL — SIGNIFICANT CHANGE UP (ref 8.4–10.5)
CHLORIDE SERPL-SCNC: 109 MMOL/L — SIGNIFICANT CHANGE UP (ref 98–110)
CHLORIDE SERPL-SCNC: 112 MMOL/L — HIGH (ref 98–110)
CO2 SERPL-SCNC: 18 MMOL/L — SIGNIFICANT CHANGE UP (ref 17–32)
CO2 SERPL-SCNC: 21 MMOL/L — SIGNIFICANT CHANGE UP (ref 17–32)
CREAT SERPL-MCNC: 1.1 MG/DL — SIGNIFICANT CHANGE UP (ref 0.7–1.5)
CREAT SERPL-MCNC: 1.2 MG/DL — SIGNIFICANT CHANGE UP (ref 0.7–1.5)
EGFR: 67 ML/MIN/1.73M2 — SIGNIFICANT CHANGE UP
EGFR: 74 ML/MIN/1.73M2 — SIGNIFICANT CHANGE UP
EOSINOPHIL # BLD AUTO: 0 K/UL — SIGNIFICANT CHANGE UP (ref 0–0.7)
EOSINOPHIL # BLD AUTO: 0 K/UL — SIGNIFICANT CHANGE UP (ref 0–0.7)
EOSINOPHIL NFR BLD AUTO: 0 % — SIGNIFICANT CHANGE UP (ref 0–8)
EOSINOPHIL NFR BLD AUTO: 0 % — SIGNIFICANT CHANGE UP (ref 0–8)
GLUCOSE BLDC GLUCOMTR-MCNC: 127 MG/DL — HIGH (ref 70–99)
GLUCOSE SERPL-MCNC: 133 MG/DL — HIGH (ref 70–99)
GLUCOSE SERPL-MCNC: 163 MG/DL — HIGH (ref 70–99)
HCT VFR BLD CALC: 30.7 % — LOW (ref 42–52)
HCT VFR BLD CALC: 31.8 % — LOW (ref 42–52)
HGB BLD-MCNC: 10.5 G/DL — LOW (ref 14–18)
HGB BLD-MCNC: 9.9 G/DL — LOW (ref 14–18)
IMM GRANULOCYTES NFR BLD AUTO: 0.6 % — HIGH (ref 0.1–0.3)
IMM GRANULOCYTES NFR BLD AUTO: 0.9 % — HIGH (ref 0.1–0.3)
INR BLD: 1.18 RATIO — SIGNIFICANT CHANGE UP (ref 0.65–1.3)
LYMPHOCYTES # BLD AUTO: 1.22 K/UL — SIGNIFICANT CHANGE UP (ref 1.2–3.4)
LYMPHOCYTES # BLD AUTO: 1.74 K/UL — SIGNIFICANT CHANGE UP (ref 1.2–3.4)
LYMPHOCYTES # BLD AUTO: 6 % — LOW (ref 20.5–51.1)
LYMPHOCYTES # BLD AUTO: 7.8 % — LOW (ref 20.5–51.1)
MAGNESIUM SERPL-MCNC: 2 MG/DL — SIGNIFICANT CHANGE UP (ref 1.8–2.4)
MAGNESIUM SERPL-MCNC: 2.9 MG/DL — HIGH (ref 1.8–2.4)
MCHC RBC-ENTMCNC: 27.7 PG — SIGNIFICANT CHANGE UP (ref 27–31)
MCHC RBC-ENTMCNC: 28.1 PG — SIGNIFICANT CHANGE UP (ref 27–31)
MCHC RBC-ENTMCNC: 32.2 G/DL — SIGNIFICANT CHANGE UP (ref 32–37)
MCHC RBC-ENTMCNC: 33 G/DL — SIGNIFICANT CHANGE UP (ref 32–37)
MCV RBC AUTO: 85 FL — SIGNIFICANT CHANGE UP (ref 80–94)
MCV RBC AUTO: 86 FL — SIGNIFICANT CHANGE UP (ref 80–94)
MONOCYTES # BLD AUTO: 0.71 K/UL — HIGH (ref 0.1–0.6)
MONOCYTES # BLD AUTO: 0.84 K/UL — HIGH (ref 0.1–0.6)
MONOCYTES NFR BLD AUTO: 3.2 % — SIGNIFICANT CHANGE UP (ref 1.7–9.3)
MONOCYTES NFR BLD AUTO: 4.1 % — SIGNIFICANT CHANGE UP (ref 1.7–9.3)
NEUTROPHILS # BLD AUTO: 18.11 K/UL — HIGH (ref 1.4–6.5)
NEUTROPHILS # BLD AUTO: 19.73 K/UL — HIGH (ref 1.4–6.5)
NEUTROPHILS NFR BLD AUTO: 88 % — HIGH (ref 42.2–75.2)
NEUTROPHILS NFR BLD AUTO: 89.2 % — HIGH (ref 42.2–75.2)
NRBC # BLD: 0 /100 WBCS — SIGNIFICANT CHANGE UP (ref 0–0)
NRBC # BLD: 0 /100 WBCS — SIGNIFICANT CHANGE UP (ref 0–0)
PHOSPHATE SERPL-MCNC: 2.6 MG/DL — SIGNIFICANT CHANGE UP (ref 2.1–4.9)
PHOSPHATE SERPL-MCNC: 3.8 MG/DL — SIGNIFICANT CHANGE UP (ref 2.1–4.9)
PLATELET # BLD AUTO: 280 K/UL — SIGNIFICANT CHANGE UP (ref 130–400)
PLATELET # BLD AUTO: 287 K/UL — SIGNIFICANT CHANGE UP (ref 130–400)
PMV BLD: 11.5 FL — HIGH (ref 7.4–10.4)
PMV BLD: 12.4 FL — HIGH (ref 7.4–10.4)
POTASSIUM SERPL-MCNC: 4.2 MMOL/L — SIGNIFICANT CHANGE UP (ref 3.5–5)
POTASSIUM SERPL-MCNC: 4.3 MMOL/L — SIGNIFICANT CHANGE UP (ref 3.5–5)
POTASSIUM SERPL-SCNC: 4.2 MMOL/L — SIGNIFICANT CHANGE UP (ref 3.5–5)
POTASSIUM SERPL-SCNC: 4.3 MMOL/L — SIGNIFICANT CHANGE UP (ref 3.5–5)
PROT SERPL-MCNC: 6 G/DL — SIGNIFICANT CHANGE UP (ref 6–8)
PROT SERPL-MCNC: 6.4 G/DL — SIGNIFICANT CHANGE UP (ref 6–8)
PROTHROM AB SERPL-ACNC: 13.5 SEC — HIGH (ref 9.95–12.87)
RBC # BLD: 3.57 M/UL — LOW (ref 4.7–6.1)
RBC # BLD: 3.74 M/UL — LOW (ref 4.7–6.1)
RBC # FLD: 15.8 % — HIGH (ref 11.5–14.5)
RBC # FLD: 15.9 % — HIGH (ref 11.5–14.5)
SODIUM SERPL-SCNC: 143 MMOL/L — SIGNIFICANT CHANGE UP (ref 135–146)
SODIUM SERPL-SCNC: 146 MMOL/L — SIGNIFICANT CHANGE UP (ref 135–146)
WBC # BLD: 20.32 K/UL — HIGH (ref 4.8–10.8)
WBC # BLD: 22.41 K/UL — HIGH (ref 4.8–10.8)
WBC # FLD AUTO: 20.32 K/UL — HIGH (ref 4.8–10.8)
WBC # FLD AUTO: 22.41 K/UL — HIGH (ref 4.8–10.8)

## 2024-06-23 PROCEDURE — 70450 CT HEAD/BRAIN W/O DYE: CPT | Mod: 26,77

## 2024-06-23 PROCEDURE — 99291 CRITICAL CARE FIRST HOUR: CPT

## 2024-06-23 PROCEDURE — 70450 CT HEAD/BRAIN W/O DYE: CPT | Mod: 26

## 2024-06-23 PROCEDURE — 71045 X-RAY EXAM CHEST 1 VIEW: CPT | Mod: 26

## 2024-06-23 RX ORDER — SODIUM CHLORIDE 0.9 % (FLUSH) 0.9 %
500 SYRINGE (ML) INJECTION ONCE
Refills: 0 | Status: COMPLETED | OUTPATIENT
Start: 2024-06-23 | End: 2024-06-23

## 2024-06-23 RX ORDER — ASPIRIN 325 MG/1
300 TABLET, FILM COATED ORAL DAILY
Refills: 0 | Status: DISCONTINUED | OUTPATIENT
Start: 2024-06-23 | End: 2024-06-24

## 2024-06-23 RX ORDER — LABETALOL HYDROCHLORIDE 300 MG/1
5 TABLET ORAL ONCE
Refills: 0 | Status: COMPLETED | OUTPATIENT
Start: 2024-06-23 | End: 2024-06-23

## 2024-06-23 RX ORDER — DEXMEDETOMIDINE HYDROCHLORIDE 4 UG/ML
0.1 INJECTION, SOLUTION INTRAVENOUS
Qty: 400 | Refills: 0 | Status: DISCONTINUED | OUTPATIENT
Start: 2024-06-23 | End: 2024-06-23

## 2024-06-23 RX ORDER — NICARDIPINE HYDROCHLORIDE 0.1 MG/ML
5 INJECTION, SOLUTION INTRAVENOUS
Qty: 40 | Refills: 0 | Status: DISCONTINUED | OUTPATIENT
Start: 2024-06-23 | End: 2024-06-24

## 2024-06-23 RX ORDER — ASPIRIN 325 MG/1
81 TABLET, FILM COATED ORAL DAILY
Refills: 0 | Status: DISCONTINUED | OUTPATIENT
Start: 2024-06-23 | End: 2024-06-23

## 2024-06-23 RX ORDER — DEXMEDETOMIDINE HYDROCHLORIDE 4 UG/ML
0.2 INJECTION, SOLUTION INTRAVENOUS
Qty: 400 | Refills: 0 | Status: DISCONTINUED | OUTPATIENT
Start: 2024-06-23 | End: 2024-06-25

## 2024-06-23 RX ORDER — PHENYLEPHRINE HYDROCHLORIDE 10 MG/ML
0.1 INJECTION INTRAVENOUS
Qty: 40 | Refills: 0 | Status: DISCONTINUED | OUTPATIENT
Start: 2024-06-23 | End: 2024-06-25

## 2024-06-23 RX ADMIN — PHENYLEPHRINE HYDROCHLORIDE 2.99 MICROGRAM(S)/KG/MIN: 10 INJECTION INTRAVENOUS at 17:43

## 2024-06-23 RX ADMIN — Medication 100 MILLIGRAM(S): at 05:02

## 2024-06-23 RX ADMIN — LEVETIRACETAM 400 MILLIGRAM(S): 100 INJECTION INTRAVENOUS at 05:02

## 2024-06-23 RX ADMIN — LABETALOL HYDROCHLORIDE 5 MILLIGRAM(S): 300 TABLET ORAL at 04:15

## 2024-06-23 RX ADMIN — ASPIRIN 300 MILLIGRAM(S): 325 TABLET, FILM COATED ORAL at 14:05

## 2024-06-23 RX ADMIN — DEXMEDETOMIDINE HYDROCHLORIDE 4.18 MICROGRAM(S)/KG/HR: 4 INJECTION, SOLUTION INTRAVENOUS at 12:15

## 2024-06-23 RX ADMIN — POLYETHYLENE GLYCOL 3350 17 GRAM(S): 1 POWDER ORAL at 16:26

## 2024-06-23 RX ADMIN — SODIUM CHLORIDE 50 MILLILITER(S): 3 INJECTION, SOLUTION INTRAVENOUS at 07:59

## 2024-06-23 RX ADMIN — Medication 100 MILLIGRAM(S): at 17:40

## 2024-06-23 RX ADMIN — Medication 2 TABLET(S): at 22:18

## 2024-06-23 RX ADMIN — LEVETIRACETAM 400 MILLIGRAM(S): 100 INJECTION INTRAVENOUS at 17:43

## 2024-06-23 RX ADMIN — Medication 2000 MILLILITER(S): at 18:14

## 2024-06-23 RX ADMIN — Medication 1 APPLICATION(S): at 05:03

## 2024-06-23 RX ADMIN — Medication 20 MILLIGRAM(S): at 14:05

## 2024-06-24 LAB
ALBUMIN SERPL ELPH-MCNC: 3.1 G/DL — LOW (ref 3.5–5.2)
ALP SERPL-CCNC: 56 U/L — SIGNIFICANT CHANGE UP (ref 30–115)
ALT FLD-CCNC: 13 U/L — SIGNIFICANT CHANGE UP (ref 0–41)
ANION GAP SERPL CALC-SCNC: 10 MMOL/L — SIGNIFICANT CHANGE UP (ref 7–14)
ANION GAP SERPL CALC-SCNC: 8 MMOL/L — SIGNIFICANT CHANGE UP (ref 7–14)
AST SERPL-CCNC: 23 U/L — SIGNIFICANT CHANGE UP (ref 0–41)
BILIRUB SERPL-MCNC: 0.3 MG/DL — SIGNIFICANT CHANGE UP (ref 0.2–1.2)
BUN SERPL-MCNC: 27 MG/DL — HIGH (ref 10–20)
BUN SERPL-MCNC: 32 MG/DL — HIGH (ref 10–20)
CALCIUM SERPL-MCNC: 8.2 MG/DL — LOW (ref 8.4–10.5)
CALCIUM SERPL-MCNC: 8.2 MG/DL — LOW (ref 8.4–10.5)
CHLORIDE SERPL-SCNC: 115 MMOL/L — HIGH (ref 98–110)
CHLORIDE SERPL-SCNC: 116 MMOL/L — HIGH (ref 98–110)
CO2 SERPL-SCNC: 20 MMOL/L — SIGNIFICANT CHANGE UP (ref 17–32)
CO2 SERPL-SCNC: 22 MMOL/L — SIGNIFICANT CHANGE UP (ref 17–32)
CREAT SERPL-MCNC: 0.9 MG/DL — SIGNIFICANT CHANGE UP (ref 0.7–1.5)
CREAT SERPL-MCNC: 0.9 MG/DL — SIGNIFICANT CHANGE UP (ref 0.7–1.5)
EGFR: 94 ML/MIN/1.73M2 — SIGNIFICANT CHANGE UP
EGFR: 94 ML/MIN/1.73M2 — SIGNIFICANT CHANGE UP
GLUCOSE SERPL-MCNC: 108 MG/DL — HIGH (ref 70–99)
GLUCOSE SERPL-MCNC: 126 MG/DL — HIGH (ref 70–99)
MAGNESIUM SERPL-MCNC: 2.5 MG/DL — HIGH (ref 1.8–2.4)
PHOSPHATE SERPL-MCNC: 3.1 MG/DL — SIGNIFICANT CHANGE UP (ref 2.1–4.9)
POTASSIUM SERPL-MCNC: 4.3 MMOL/L — SIGNIFICANT CHANGE UP (ref 3.5–5)
POTASSIUM SERPL-MCNC: 4.6 MMOL/L — SIGNIFICANT CHANGE UP (ref 3.5–5)
POTASSIUM SERPL-SCNC: 4.3 MMOL/L — SIGNIFICANT CHANGE UP (ref 3.5–5)
POTASSIUM SERPL-SCNC: 4.6 MMOL/L — SIGNIFICANT CHANGE UP (ref 3.5–5)
PROT SERPL-MCNC: 5.6 G/DL — LOW (ref 6–8)
SODIUM SERPL-SCNC: 145 MMOL/L — SIGNIFICANT CHANGE UP (ref 135–146)
SODIUM SERPL-SCNC: 146 MMOL/L — SIGNIFICANT CHANGE UP (ref 135–146)

## 2024-06-24 PROCEDURE — 95720 EEG PHY/QHP EA INCR W/VEEG: CPT

## 2024-06-24 PROCEDURE — 99291 CRITICAL CARE FIRST HOUR: CPT

## 2024-06-24 RX ORDER — ASPIRIN 325 MG/1
81 TABLET, FILM COATED ORAL DAILY
Refills: 0 | Status: DISCONTINUED | OUTPATIENT
Start: 2024-06-24 | End: 2024-06-28

## 2024-06-24 RX ORDER — NICARDIPINE HYDROCHLORIDE 0.1 MG/ML
5 INJECTION, SOLUTION INTRAVENOUS
Qty: 40 | Refills: 0 | Status: DISCONTINUED | OUTPATIENT
Start: 2024-06-24 | End: 2024-06-27

## 2024-06-24 RX ADMIN — Medication 2 TABLET(S): at 22:24

## 2024-06-24 RX ADMIN — Medication 1 APPLICATION(S): at 05:37

## 2024-06-24 RX ADMIN — DEXMEDETOMIDINE HYDROCHLORIDE 4.18 MICROGRAM(S)/KG/HR: 4 INJECTION, SOLUTION INTRAVENOUS at 08:54

## 2024-06-24 RX ADMIN — LEVETIRACETAM 400 MILLIGRAM(S): 100 INJECTION INTRAVENOUS at 17:32

## 2024-06-24 RX ADMIN — SODIUM CHLORIDE 50 MILLILITER(S): 3 INJECTION, SOLUTION INTRAVENOUS at 08:54

## 2024-06-24 RX ADMIN — POLYETHYLENE GLYCOL 3350 17 GRAM(S): 1 POWDER ORAL at 11:58

## 2024-06-24 RX ADMIN — LEVETIRACETAM 400 MILLIGRAM(S): 100 INJECTION INTRAVENOUS at 05:37

## 2024-06-24 RX ADMIN — ASPIRIN 81 MILLIGRAM(S): 325 TABLET, FILM COATED ORAL at 11:57

## 2024-06-24 RX ADMIN — Medication 100 MILLIGRAM(S): at 05:07

## 2024-06-24 RX ADMIN — Medication 100 MILLIGRAM(S): at 17:34

## 2024-06-24 NOTE — END OF VISIT
[FreeTextEntry3] :  I, Dr Mckay personally performed the evaluation and management (E/M) services for this established patient who presents today with (a) new problem(s)/exacerbation of (an) existing condition(s).  That E/M includes conducting the examination, assessing all new/exacerbated conditions, and establishing a new plan of care.  Today, my DAVE was here to observe my evaluation and management services for this new problem/exacerbated condition to be followed going forward. Today we had an extensive discussion about the management options for his likely symptomatic right-sided terminal carotid artery occlusion.  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  Based on imaging, I do think that he would benefit from a bypass procedure but we will discuss this at our neurovascular conference which is a regional conference so we get input from other providers to perform bypass procedures as well as a local [facility] conference.  The risks and benefits of the procedure were discussed in detail with the patient and his family and the pros of informed consent completed.  They did not have any further questions and was satisfied to proceed accordingly.

## 2024-06-24 NOTE — HISTORY OF PRESENT ILLNESS
[FreeTextEntry1] : Mr. BRANDON is a 64-year-old male presenting to the office today alongside his wife to discuss the results of the most recent diagnostic imaging and to establish a plan for his atherosclerotic Moyamoya disease.  4/5/2024 SPECT: Significant discrepancy in blood flow between Diamox and on Diamox study is in the distribution of the right MCA and NORMAN.  5/7/2024 MRA brain: Diminished but present flow in the distal MCA vessels on the right with no detectable flow in the right middle and anterior cerebral arteries, right anterior cerebral artery flow likely from the left side.

## 2024-06-25 LAB
ALBUMIN SERPL ELPH-MCNC: 3 G/DL — LOW (ref 3.5–5.2)
ALP SERPL-CCNC: 56 U/L — SIGNIFICANT CHANGE UP (ref 30–115)
ALT FLD-CCNC: 16 U/L — SIGNIFICANT CHANGE UP (ref 0–41)
ANION GAP SERPL CALC-SCNC: 11 MMOL/L — SIGNIFICANT CHANGE UP (ref 7–14)
ANION GAP SERPL CALC-SCNC: 12 MMOL/L — SIGNIFICANT CHANGE UP (ref 7–14)
AST SERPL-CCNC: 24 U/L — SIGNIFICANT CHANGE UP (ref 0–41)
BASOPHILS # BLD AUTO: 0.01 K/UL — SIGNIFICANT CHANGE UP (ref 0–0.2)
BASOPHILS NFR BLD AUTO: 0.1 % — SIGNIFICANT CHANGE UP (ref 0–1)
BILIRUB SERPL-MCNC: 0.3 MG/DL — SIGNIFICANT CHANGE UP (ref 0.2–1.2)
BUN SERPL-MCNC: 24 MG/DL — HIGH (ref 10–20)
BUN SERPL-MCNC: 31 MG/DL — HIGH (ref 10–20)
CALCIUM SERPL-MCNC: 8.3 MG/DL — LOW (ref 8.4–10.4)
CALCIUM SERPL-MCNC: 8.5 MG/DL — SIGNIFICANT CHANGE UP (ref 8.4–10.5)
CHLORIDE SERPL-SCNC: 106 MMOL/L — SIGNIFICANT CHANGE UP (ref 98–110)
CHLORIDE SERPL-SCNC: 113 MMOL/L — HIGH (ref 98–110)
CO2 SERPL-SCNC: 20 MMOL/L — SIGNIFICANT CHANGE UP (ref 17–32)
CO2 SERPL-SCNC: 22 MMOL/L — SIGNIFICANT CHANGE UP (ref 17–32)
CREAT SERPL-MCNC: 0.9 MG/DL — SIGNIFICANT CHANGE UP (ref 0.7–1.5)
CREAT SERPL-MCNC: 0.9 MG/DL — SIGNIFICANT CHANGE UP (ref 0.7–1.5)
EGFR: 94 ML/MIN/1.73M2 — SIGNIFICANT CHANGE UP
EGFR: 94 ML/MIN/1.73M2 — SIGNIFICANT CHANGE UP
EOSINOPHIL # BLD AUTO: 0.02 K/UL — SIGNIFICANT CHANGE UP (ref 0–0.7)
EOSINOPHIL NFR BLD AUTO: 0.2 % — SIGNIFICANT CHANGE UP (ref 0–8)
GLUCOSE SERPL-MCNC: 101 MG/DL — HIGH (ref 70–99)
GLUCOSE SERPL-MCNC: 117 MG/DL — HIGH (ref 70–99)
HCT VFR BLD CALC: 29.6 % — LOW (ref 42–52)
HGB BLD-MCNC: 9.1 G/DL — LOW (ref 14–18)
IMM GRANULOCYTES NFR BLD AUTO: 0.4 % — HIGH (ref 0.1–0.3)
LYMPHOCYTES # BLD AUTO: 2.65 K/UL — SIGNIFICANT CHANGE UP (ref 1.2–3.4)
LYMPHOCYTES # BLD AUTO: 25.1 % — SIGNIFICANT CHANGE UP (ref 20.5–51.1)
MAGNESIUM SERPL-MCNC: 2.5 MG/DL — HIGH (ref 1.8–2.4)
MCHC RBC-ENTMCNC: 27 PG — SIGNIFICANT CHANGE UP (ref 27–31)
MCHC RBC-ENTMCNC: 30.7 G/DL — LOW (ref 32–37)
MCV RBC AUTO: 87.8 FL — SIGNIFICANT CHANGE UP (ref 80–94)
MONOCYTES # BLD AUTO: 0.92 K/UL — HIGH (ref 0.1–0.6)
MONOCYTES NFR BLD AUTO: 8.7 % — SIGNIFICANT CHANGE UP (ref 1.7–9.3)
NEUTROPHILS # BLD AUTO: 6.91 K/UL — HIGH (ref 1.4–6.5)
NEUTROPHILS NFR BLD AUTO: 65.5 % — SIGNIFICANT CHANGE UP (ref 42.2–75.2)
NRBC # BLD: 0 /100 WBCS — SIGNIFICANT CHANGE UP (ref 0–0)
PHOSPHATE SERPL-MCNC: 3 MG/DL — SIGNIFICANT CHANGE UP (ref 2.1–4.9)
PLATELET # BLD AUTO: 270 K/UL — SIGNIFICANT CHANGE UP (ref 130–400)
PMV BLD: 11.8 FL — HIGH (ref 7.4–10.4)
POTASSIUM SERPL-MCNC: 3.7 MMOL/L — SIGNIFICANT CHANGE UP (ref 3.5–5)
POTASSIUM SERPL-MCNC: 4.5 MMOL/L — SIGNIFICANT CHANGE UP (ref 3.5–5)
POTASSIUM SERPL-SCNC: 3.7 MMOL/L — SIGNIFICANT CHANGE UP (ref 3.5–5)
POTASSIUM SERPL-SCNC: 4.5 MMOL/L — SIGNIFICANT CHANGE UP (ref 3.5–5)
PROT SERPL-MCNC: 5.5 G/DL — LOW (ref 6–8)
RBC # BLD: 3.37 M/UL — LOW (ref 4.7–6.1)
RBC # FLD: 16 % — HIGH (ref 11.5–14.5)
SODIUM SERPL-SCNC: 139 MMOL/L — SIGNIFICANT CHANGE UP (ref 135–146)
SODIUM SERPL-SCNC: 145 MMOL/L — SIGNIFICANT CHANGE UP (ref 135–146)
SURGICAL PATHOLOGY STUDY: SIGNIFICANT CHANGE UP
WBC # BLD: 10.55 K/UL — SIGNIFICANT CHANGE UP (ref 4.8–10.8)
WBC # FLD AUTO: 10.55 K/UL — SIGNIFICANT CHANGE UP (ref 4.8–10.8)

## 2024-06-25 PROCEDURE — 99291 CRITICAL CARE FIRST HOUR: CPT

## 2024-06-25 PROCEDURE — 70450 CT HEAD/BRAIN W/O DYE: CPT | Mod: 26

## 2024-06-25 RX ORDER — VALPROIC ACID 250 MG/5ML
500 SOLUTION ORAL EVERY 12 HOURS
Refills: 0 | Status: DISCONTINUED | OUTPATIENT
Start: 2024-06-25 | End: 2024-06-27

## 2024-06-25 RX ORDER — MINOCYCLINE HCL 50 MG
100 CAPSULE ORAL
Refills: 0 | Status: COMPLETED | OUTPATIENT
Start: 2024-06-25 | End: 2024-06-25

## 2024-06-25 RX ORDER — OLANZAPINE 2.5 MG/1
2.5 TABLET, FILM COATED ORAL ONCE
Refills: 0 | Status: COMPLETED | OUTPATIENT
Start: 2024-06-25 | End: 2024-06-25

## 2024-06-25 RX ORDER — LABETALOL HYDROCHLORIDE 300 MG/1
100 TABLET ORAL EVERY 8 HOURS
Refills: 0 | Status: COMPLETED | OUTPATIENT
Start: 2024-06-25 | End: 2024-06-26

## 2024-06-25 RX ORDER — MINOCYCLINE HCL 50 MG
100 CAPSULE ORAL ONCE
Refills: 0 | Status: COMPLETED | OUTPATIENT
Start: 2024-06-25 | End: 2024-06-25

## 2024-06-25 RX ORDER — SIMETHICONE 40MG/0.6ML
80 SUSPENSION, DROPS(FINAL DOSAGE FORM)(ML) ORAL EVERY 8 HOURS
Refills: 0 | Status: COMPLETED | OUTPATIENT
Start: 2024-06-25 | End: 2024-06-27

## 2024-06-25 RX ORDER — LABETALOL HYDROCHLORIDE 300 MG/1
5 TABLET ORAL ONCE
Refills: 0 | Status: COMPLETED | OUTPATIENT
Start: 2024-06-25 | End: 2024-06-25

## 2024-06-25 RX ORDER — LABETALOL HYDROCHLORIDE 300 MG/1
200 TABLET ORAL EVERY 8 HOURS
Refills: 0 | Status: DISCONTINUED | OUTPATIENT
Start: 2024-06-25 | End: 2024-06-25

## 2024-06-25 RX ORDER — FENTANYL CITRATE 50 UG/ML
25 INJECTION, SOLUTION INTRAMUSCULAR; INTRAVENOUS ONCE
Refills: 0 | Status: DISCONTINUED | OUTPATIENT
Start: 2024-06-25 | End: 2024-06-25

## 2024-06-25 RX ORDER — DEXMEDETOMIDINE HYDROCHLORIDE 4 UG/ML
0.2 INJECTION, SOLUTION INTRAVENOUS
Qty: 400 | Refills: 0 | Status: DISCONTINUED | OUTPATIENT
Start: 2024-06-25 | End: 2024-06-27

## 2024-06-25 RX ORDER — POLYETHYLENE GLYCOL 3350 1 G/G
17 POWDER ORAL
Refills: 0 | Status: DISCONTINUED | OUTPATIENT
Start: 2024-06-25 | End: 2024-07-05

## 2024-06-25 RX ADMIN — OLANZAPINE 2.5 MILLIGRAM(S): 2.5 TABLET, FILM COATED ORAL at 23:38

## 2024-06-25 RX ADMIN — POLYETHYLENE GLYCOL 3350 17 GRAM(S): 1 POWDER ORAL at 17:09

## 2024-06-25 RX ADMIN — LABETALOL HYDROCHLORIDE 5 MILLIGRAM(S): 300 TABLET ORAL at 16:46

## 2024-06-25 RX ADMIN — ASPIRIN 81 MILLIGRAM(S): 325 TABLET, FILM COATED ORAL at 12:08

## 2024-06-25 RX ADMIN — LEVETIRACETAM 400 MILLIGRAM(S): 100 INJECTION INTRAVENOUS at 06:24

## 2024-06-25 RX ADMIN — FENTANYL CITRATE 25 MICROGRAM(S): 50 INJECTION, SOLUTION INTRAMUSCULAR; INTRAVENOUS at 18:15

## 2024-06-25 RX ADMIN — FENTANYL CITRATE 25 MICROGRAM(S): 50 INJECTION, SOLUTION INTRAMUSCULAR; INTRAVENOUS at 16:31

## 2024-06-25 RX ADMIN — FENTANYL CITRATE 25 MICROGRAM(S): 50 INJECTION, SOLUTION INTRAMUSCULAR; INTRAVENOUS at 18:00

## 2024-06-25 RX ADMIN — SODIUM CHLORIDE 50 MILLILITER(S): 3 INJECTION, SOLUTION INTRAVENOUS at 08:24

## 2024-06-25 RX ADMIN — LABETALOL HYDROCHLORIDE 5 MILLIGRAM(S): 300 TABLET ORAL at 18:00

## 2024-06-25 RX ADMIN — Medication 1 APPLICATION(S): at 06:24

## 2024-06-25 RX ADMIN — Medication 80 MILLIGRAM(S): at 13:56

## 2024-06-25 RX ADMIN — Medication 80 MILLIGRAM(S): at 21:02

## 2024-06-25 RX ADMIN — Medication 100 MILLIGRAM(S): at 20:56

## 2024-06-25 RX ADMIN — FENTANYL CITRATE 25 MICROGRAM(S): 50 INJECTION, SOLUTION INTRAMUSCULAR; INTRAVENOUS at 16:46

## 2024-06-25 RX ADMIN — Medication 30 MILLILITER(S): at 12:11

## 2024-06-25 RX ADMIN — Medication 2 TABLET(S): at 21:02

## 2024-06-25 RX ADMIN — LABETALOL HYDROCHLORIDE 100 MILLIGRAM(S): 300 TABLET ORAL at 20:05

## 2024-06-25 RX ADMIN — Medication 100 MILLIGRAM(S): at 06:23

## 2024-06-26 LAB
ALBUMIN SERPL ELPH-MCNC: 3.5 G/DL — SIGNIFICANT CHANGE UP (ref 3.5–5.2)
ALP SERPL-CCNC: 57 U/L — SIGNIFICANT CHANGE UP (ref 30–115)
ALT FLD-CCNC: 24 U/L — SIGNIFICANT CHANGE UP (ref 0–41)
ANION GAP SERPL CALC-SCNC: 12 MMOL/L — SIGNIFICANT CHANGE UP (ref 7–14)
ANION GAP SERPL CALC-SCNC: 12 MMOL/L — SIGNIFICANT CHANGE UP (ref 7–14)
AST SERPL-CCNC: 35 U/L — SIGNIFICANT CHANGE UP (ref 0–41)
BASOPHILS # BLD AUTO: 0.02 K/UL — SIGNIFICANT CHANGE UP (ref 0–0.2)
BASOPHILS NFR BLD AUTO: 0.2 % — SIGNIFICANT CHANGE UP (ref 0–1)
BILIRUB SERPL-MCNC: 0.5 MG/DL — SIGNIFICANT CHANGE UP (ref 0.2–1.2)
BUN SERPL-MCNC: 19 MG/DL — SIGNIFICANT CHANGE UP (ref 10–20)
BUN SERPL-MCNC: 20 MG/DL — SIGNIFICANT CHANGE UP (ref 10–20)
CALCIUM SERPL-MCNC: 8.4 MG/DL — SIGNIFICANT CHANGE UP (ref 8.4–10.5)
CALCIUM SERPL-MCNC: 9.2 MG/DL — SIGNIFICANT CHANGE UP (ref 8.4–10.5)
CHLORIDE SERPL-SCNC: 105 MMOL/L — SIGNIFICANT CHANGE UP (ref 98–110)
CHLORIDE SERPL-SCNC: 109 MMOL/L — SIGNIFICANT CHANGE UP (ref 98–110)
CO2 SERPL-SCNC: 23 MMOL/L — SIGNIFICANT CHANGE UP (ref 17–32)
CO2 SERPL-SCNC: 24 MMOL/L — SIGNIFICANT CHANGE UP (ref 17–32)
CREAT SERPL-MCNC: 0.9 MG/DL — SIGNIFICANT CHANGE UP (ref 0.7–1.5)
CREAT SERPL-MCNC: 1.1 MG/DL — SIGNIFICANT CHANGE UP (ref 0.7–1.5)
EGFR: 74 ML/MIN/1.73M2 — SIGNIFICANT CHANGE UP
EGFR: 94 ML/MIN/1.73M2 — SIGNIFICANT CHANGE UP
EOSINOPHIL # BLD AUTO: 0.53 K/UL — SIGNIFICANT CHANGE UP (ref 0–0.7)
EOSINOPHIL NFR BLD AUTO: 4.8 % — SIGNIFICANT CHANGE UP (ref 0–8)
GLUCOSE BLDC GLUCOMTR-MCNC: 88 MG/DL — SIGNIFICANT CHANGE UP (ref 70–99)
GLUCOSE SERPL-MCNC: 74 MG/DL — SIGNIFICANT CHANGE UP (ref 70–99)
GLUCOSE SERPL-MCNC: 95 MG/DL — SIGNIFICANT CHANGE UP (ref 70–99)
HCT VFR BLD CALC: 30.8 % — LOW (ref 42–52)
HGB BLD-MCNC: 10.1 G/DL — LOW (ref 14–18)
IMM GRANULOCYTES NFR BLD AUTO: 1.3 % — HIGH (ref 0.1–0.3)
LYMPHOCYTES # BLD AUTO: 3.89 K/UL — HIGH (ref 1.2–3.4)
LYMPHOCYTES # BLD AUTO: 35 % — SIGNIFICANT CHANGE UP (ref 20.5–51.1)
MAGNESIUM SERPL-MCNC: 1.9 MG/DL — SIGNIFICANT CHANGE UP (ref 1.8–2.4)
MCHC RBC-ENTMCNC: 27.7 PG — SIGNIFICANT CHANGE UP (ref 27–31)
MCHC RBC-ENTMCNC: 32.8 G/DL — SIGNIFICANT CHANGE UP (ref 32–37)
MCV RBC AUTO: 84.6 FL — SIGNIFICANT CHANGE UP (ref 80–94)
MONOCYTES # BLD AUTO: 1.14 K/UL — HIGH (ref 0.1–0.6)
MONOCYTES NFR BLD AUTO: 10.3 % — HIGH (ref 1.7–9.3)
NEUTROPHILS # BLD AUTO: 5.4 K/UL — SIGNIFICANT CHANGE UP (ref 1.4–6.5)
NEUTROPHILS NFR BLD AUTO: 48.4 % — SIGNIFICANT CHANGE UP (ref 42.2–75.2)
NRBC # BLD: 0 /100 WBCS — SIGNIFICANT CHANGE UP (ref 0–0)
PHOSPHATE SERPL-MCNC: 3.1 MG/DL — SIGNIFICANT CHANGE UP (ref 2.1–4.9)
PLATELET # BLD AUTO: 320 K/UL — SIGNIFICANT CHANGE UP (ref 130–400)
PMV BLD: 11.3 FL — HIGH (ref 7.4–10.4)
POTASSIUM SERPL-MCNC: 3.6 MMOL/L — SIGNIFICANT CHANGE UP (ref 3.5–5)
POTASSIUM SERPL-MCNC: 3.8 MMOL/L — SIGNIFICANT CHANGE UP (ref 3.5–5)
POTASSIUM SERPL-SCNC: 3.6 MMOL/L — SIGNIFICANT CHANGE UP (ref 3.5–5)
POTASSIUM SERPL-SCNC: 3.8 MMOL/L — SIGNIFICANT CHANGE UP (ref 3.5–5)
PROT SERPL-MCNC: 5.8 G/DL — LOW (ref 6–8)
RBC # BLD: 3.64 M/UL — LOW (ref 4.7–6.1)
RBC # FLD: 15.6 % — HIGH (ref 11.5–14.5)
SODIUM SERPL-SCNC: 141 MMOL/L — SIGNIFICANT CHANGE UP (ref 135–146)
SODIUM SERPL-SCNC: 144 MMOL/L — SIGNIFICANT CHANGE UP (ref 135–146)
WBC # BLD: 11.12 K/UL — HIGH (ref 4.8–10.8)
WBC # FLD AUTO: 11.12 K/UL — HIGH (ref 4.8–10.8)

## 2024-06-26 PROCEDURE — 93971 EXTREMITY STUDY: CPT | Mod: 26,LT

## 2024-06-26 PROCEDURE — 99291 CRITICAL CARE FIRST HOUR: CPT

## 2024-06-26 RX ORDER — SODIUM CHLORIDE 0.9 % (FLUSH) 0.9 %
1 SYRINGE (ML) INJECTION EVERY 6 HOURS
Refills: 0 | Status: DISCONTINUED | OUTPATIENT
Start: 2024-06-26 | End: 2024-06-27

## 2024-06-26 RX ORDER — MAGNESIUM SULFATE 100 %
2 POWDER (GRAM) MISCELLANEOUS ONCE
Refills: 0 | Status: COMPLETED | OUTPATIENT
Start: 2024-06-26 | End: 2024-06-26

## 2024-06-26 RX ORDER — LABETALOL HYDROCHLORIDE 300 MG/1
100 TABLET ORAL EVERY 8 HOURS
Refills: 0 | Status: DISCONTINUED | OUTPATIENT
Start: 2024-06-26 | End: 2024-06-26

## 2024-06-26 RX ORDER — POTASSIUM CHLORIDE 600 MG/1
40 TABLET, FILM COATED, EXTENDED RELEASE ORAL ONCE
Refills: 0 | Status: COMPLETED | OUTPATIENT
Start: 2024-06-26 | End: 2024-06-26

## 2024-06-26 RX ORDER — SODIUM CHLORIDE 3 G/100ML
1000 INJECTION, SOLUTION INTRAVENOUS
Refills: 0 | Status: DISCONTINUED | OUTPATIENT
Start: 2024-06-26 | End: 2024-06-27

## 2024-06-26 RX ORDER — POTASSIUM CHLORIDE 600 MG/1
20 TABLET, FILM COATED, EXTENDED RELEASE ORAL ONCE
Refills: 0 | Status: COMPLETED | OUTPATIENT
Start: 2024-06-26 | End: 2024-06-26

## 2024-06-26 RX ORDER — HYDRALAZINE HYDROCHLORIDE 50 MG/1
25 TABLET ORAL EVERY 8 HOURS
Refills: 0 | Status: DISCONTINUED | OUTPATIENT
Start: 2024-06-26 | End: 2024-06-27

## 2024-06-26 RX ORDER — HYDRALAZINE HYDROCHLORIDE 50 MG/1
10 TABLET ORAL
Refills: 0 | Status: DISCONTINUED | OUTPATIENT
Start: 2024-06-26 | End: 2024-06-27

## 2024-06-26 RX ORDER — SODIUM CHLORIDE 0.9 % (FLUSH) 0.9 %
1 SYRINGE (ML) INJECTION EVERY 8 HOURS
Refills: 0 | Status: DISCONTINUED | OUTPATIENT
Start: 2024-06-26 | End: 2024-06-26

## 2024-06-26 RX ORDER — HYDRALAZINE HYDROCHLORIDE 50 MG/1
10 TABLET ORAL ONCE
Refills: 0 | Status: COMPLETED | OUTPATIENT
Start: 2024-06-26 | End: 2024-06-26

## 2024-06-26 RX ADMIN — Medication 650 MILLIGRAM(S): at 12:41

## 2024-06-26 RX ADMIN — POLYETHYLENE GLYCOL 3350 17 GRAM(S): 1 POWDER ORAL at 05:06

## 2024-06-26 RX ADMIN — Medication 30 MILLILITER(S): at 14:24

## 2024-06-26 RX ADMIN — HYDRALAZINE HYDROCHLORIDE 10 MILLIGRAM(S): 50 TABLET ORAL at 16:56

## 2024-06-26 RX ADMIN — VALPROIC ACID 50 MILLIGRAM(S): 250 SOLUTION ORAL at 05:05

## 2024-06-26 RX ADMIN — LABETALOL HYDROCHLORIDE 100 MILLIGRAM(S): 300 TABLET ORAL at 05:06

## 2024-06-26 RX ADMIN — Medication 25 GRAM(S): at 14:25

## 2024-06-26 RX ADMIN — VALPROIC ACID 50 MILLIGRAM(S): 250 SOLUTION ORAL at 00:15

## 2024-06-26 RX ADMIN — POLYETHYLENE GLYCOL 3350 17 GRAM(S): 1 POWDER ORAL at 18:16

## 2024-06-26 RX ADMIN — Medication 80 MILLIGRAM(S): at 05:06

## 2024-06-26 RX ADMIN — Medication 1 GRAM(S): at 18:16

## 2024-06-26 RX ADMIN — ASPIRIN 81 MILLIGRAM(S): 325 TABLET, FILM COATED ORAL at 12:41

## 2024-06-26 RX ADMIN — VALPROIC ACID 50 MILLIGRAM(S): 250 SOLUTION ORAL at 18:00

## 2024-06-26 RX ADMIN — HYDRALAZINE HYDROCHLORIDE 25 MILLIGRAM(S): 50 TABLET ORAL at 21:50

## 2024-06-26 RX ADMIN — Medication 80 MILLIGRAM(S): at 14:24

## 2024-06-26 RX ADMIN — SODIUM CHLORIDE 50 MILLILITER(S): 3 INJECTION, SOLUTION INTRAVENOUS at 05:05

## 2024-06-26 RX ADMIN — OLANZAPINE 2.5 MILLIGRAM(S): 2.5 TABLET, FILM COATED ORAL at 00:16

## 2024-06-26 RX ADMIN — Medication 1 GRAM(S): at 23:04

## 2024-06-26 RX ADMIN — POTASSIUM CHLORIDE 40 MILLIEQUIVALENT(S): 600 TABLET, FILM COATED, EXTENDED RELEASE ORAL at 18:16

## 2024-06-26 RX ADMIN — Medication 650 MILLIGRAM(S): at 13:10

## 2024-06-26 RX ADMIN — Medication 1 APPLICATION(S): at 05:05

## 2024-06-26 RX ADMIN — Medication 80 MILLIGRAM(S): at 21:50

## 2024-06-26 RX ADMIN — HYDRALAZINE HYDROCHLORIDE 10 MILLIGRAM(S): 50 TABLET ORAL at 23:04

## 2024-06-27 LAB
ALBUMIN SERPL ELPH-MCNC: 3.5 G/DL — SIGNIFICANT CHANGE UP (ref 3.5–5.2)
ALP SERPL-CCNC: 65 U/L — SIGNIFICANT CHANGE UP (ref 30–115)
ALT FLD-CCNC: 26 U/L — SIGNIFICANT CHANGE UP (ref 0–41)
AMMONIA BLD-MCNC: 28 UMOL/L — SIGNIFICANT CHANGE UP (ref 11–55)
ANION GAP SERPL CALC-SCNC: 12 MMOL/L — SIGNIFICANT CHANGE UP (ref 7–14)
ANION GAP SERPL CALC-SCNC: 9 MMOL/L — SIGNIFICANT CHANGE UP (ref 7–14)
AST SERPL-CCNC: 34 U/L — SIGNIFICANT CHANGE UP (ref 0–41)
BASOPHILS # BLD AUTO: 0.06 K/UL — SIGNIFICANT CHANGE UP (ref 0–0.2)
BASOPHILS NFR BLD AUTO: 0.4 % — SIGNIFICANT CHANGE UP (ref 0–1)
BILIRUB SERPL-MCNC: 0.6 MG/DL — SIGNIFICANT CHANGE UP (ref 0.2–1.2)
BUN SERPL-MCNC: 21 MG/DL — HIGH (ref 10–20)
BUN SERPL-MCNC: 23 MG/DL — HIGH (ref 10–20)
CALCIUM SERPL-MCNC: 8.4 MG/DL — SIGNIFICANT CHANGE UP (ref 8.4–10.5)
CALCIUM SERPL-MCNC: 8.8 MG/DL — SIGNIFICANT CHANGE UP (ref 8.4–10.5)
CHLORIDE SERPL-SCNC: 108 MMOL/L — SIGNIFICANT CHANGE UP (ref 98–110)
CHLORIDE SERPL-SCNC: 113 MMOL/L — HIGH (ref 98–110)
CO2 SERPL-SCNC: 19 MMOL/L — SIGNIFICANT CHANGE UP (ref 17–32)
CO2 SERPL-SCNC: 22 MMOL/L — SIGNIFICANT CHANGE UP (ref 17–32)
CREAT SERPL-MCNC: 0.9 MG/DL — SIGNIFICANT CHANGE UP (ref 0.7–1.5)
CREAT SERPL-MCNC: 1 MG/DL — SIGNIFICANT CHANGE UP (ref 0.7–1.5)
EGFR: 83 ML/MIN/1.73M2 — SIGNIFICANT CHANGE UP
EGFR: 94 ML/MIN/1.73M2 — SIGNIFICANT CHANGE UP
EOSINOPHIL # BLD AUTO: 1.12 K/UL — HIGH (ref 0–0.7)
EOSINOPHIL NFR BLD AUTO: 8.3 % — HIGH (ref 0–8)
GLUCOSE SERPL-MCNC: 84 MG/DL — SIGNIFICANT CHANGE UP (ref 70–99)
GLUCOSE SERPL-MCNC: 90 MG/DL — SIGNIFICANT CHANGE UP (ref 70–99)
HCT VFR BLD CALC: 35.7 % — LOW (ref 42–52)
HGB BLD-MCNC: 11.6 G/DL — LOW (ref 14–18)
IMM GRANULOCYTES NFR BLD AUTO: 1.9 % — HIGH (ref 0.1–0.3)
LYMPHOCYTES # BLD AUTO: 32.5 % — SIGNIFICANT CHANGE UP (ref 20.5–51.1)
LYMPHOCYTES # BLD AUTO: 4.37 K/UL — HIGH (ref 1.2–3.4)
MAGNESIUM SERPL-MCNC: 2.7 MG/DL — HIGH (ref 1.8–2.4)
MCHC RBC-ENTMCNC: 27.8 PG — SIGNIFICANT CHANGE UP (ref 27–31)
MCHC RBC-ENTMCNC: 32.5 G/DL — SIGNIFICANT CHANGE UP (ref 32–37)
MCV RBC AUTO: 85.4 FL — SIGNIFICANT CHANGE UP (ref 80–94)
MONOCYTES # BLD AUTO: 1.27 K/UL — HIGH (ref 0.1–0.6)
MONOCYTES NFR BLD AUTO: 9.4 % — HIGH (ref 1.7–9.3)
NEUTROPHILS # BLD AUTO: 6.37 K/UL — SIGNIFICANT CHANGE UP (ref 1.4–6.5)
NEUTROPHILS NFR BLD AUTO: 47.5 % — SIGNIFICANT CHANGE UP (ref 42.2–75.2)
NRBC # BLD: 0 /100 WBCS — SIGNIFICANT CHANGE UP (ref 0–0)
PHOSPHATE SERPL-MCNC: 3.4 MG/DL — SIGNIFICANT CHANGE UP (ref 2.1–4.9)
PLATELET # BLD AUTO: 341 K/UL — SIGNIFICANT CHANGE UP (ref 130–400)
PMV BLD: 11.2 FL — HIGH (ref 7.4–10.4)
POTASSIUM SERPL-MCNC: 3.9 MMOL/L — SIGNIFICANT CHANGE UP (ref 3.5–5)
POTASSIUM SERPL-MCNC: 4.1 MMOL/L — SIGNIFICANT CHANGE UP (ref 3.5–5)
POTASSIUM SERPL-SCNC: 3.9 MMOL/L — SIGNIFICANT CHANGE UP (ref 3.5–5)
POTASSIUM SERPL-SCNC: 4.1 MMOL/L — SIGNIFICANT CHANGE UP (ref 3.5–5)
PROT SERPL-MCNC: 6.1 G/DL — SIGNIFICANT CHANGE UP (ref 6–8)
RBC # BLD: 4.18 M/UL — LOW (ref 4.7–6.1)
RBC # FLD: 15.8 % — HIGH (ref 11.5–14.5)
SODIUM SERPL-SCNC: 139 MMOL/L — SIGNIFICANT CHANGE UP (ref 135–146)
SODIUM SERPL-SCNC: 144 MMOL/L — SIGNIFICANT CHANGE UP (ref 135–146)
WBC # BLD: 13.44 K/UL — HIGH (ref 4.8–10.8)
WBC # FLD AUTO: 13.44 K/UL — HIGH (ref 4.8–10.8)

## 2024-06-27 PROCEDURE — 93970 EXTREMITY STUDY: CPT | Mod: 26

## 2024-06-27 PROCEDURE — 99291 CRITICAL CARE FIRST HOUR: CPT | Mod: 25

## 2024-06-27 PROCEDURE — 95720 EEG PHY/QHP EA INCR W/VEEG: CPT

## 2024-06-27 PROCEDURE — 36410 VNPNXR 3YR/> PHY/QHP DX/THER: CPT | Mod: GC

## 2024-06-27 RX ORDER — HYDRALAZINE HYDROCHLORIDE 50 MG/1
25 TABLET ORAL EVERY 8 HOURS
Refills: 0 | Status: DISCONTINUED | OUTPATIENT
Start: 2024-06-27 | End: 2024-06-30

## 2024-06-27 RX ORDER — NICARDIPINE HYDROCHLORIDE 0.1 MG/ML
5 INJECTION, SOLUTION INTRAVENOUS
Qty: 40 | Refills: 0 | Status: DISCONTINUED | OUTPATIENT
Start: 2024-06-27 | End: 2024-06-29

## 2024-06-27 RX ORDER — DIVALPROEX SODIUM 500 MG
500 TABLET, DELAYED RELEASE (ENTERIC COATED) ORAL EVERY 12 HOURS
Refills: 0 | Status: DISCONTINUED | OUTPATIENT
Start: 2024-06-27 | End: 2024-06-27

## 2024-06-27 RX ORDER — VALPROIC ACID 250 MG/5ML
500 SOLUTION ORAL EVERY 12 HOURS
Refills: 0 | Status: COMPLETED | OUTPATIENT
Start: 2024-06-27 | End: 2024-06-30

## 2024-06-27 RX ORDER — LOSARTAN POTASSIUM 100 MG/1
50 TABLET, FILM COATED ORAL DAILY
Refills: 0 | Status: DISCONTINUED | OUTPATIENT
Start: 2024-06-28 | End: 2024-06-28

## 2024-06-27 RX ORDER — LABETALOL HYDROCHLORIDE 300 MG/1
10 TABLET ORAL
Refills: 0 | Status: DISCONTINUED | OUTPATIENT
Start: 2024-06-27 | End: 2024-07-05

## 2024-06-27 RX ORDER — HYDRALAZINE HYDROCHLORIDE 50 MG/1
10 TABLET ORAL
Refills: 0 | Status: DISCONTINUED | OUTPATIENT
Start: 2024-06-27 | End: 2024-06-27

## 2024-06-27 RX ORDER — HYDRALAZINE HYDROCHLORIDE 50 MG/1
5 TABLET ORAL
Refills: 0 | Status: DISCONTINUED | OUTPATIENT
Start: 2024-06-27 | End: 2024-07-02

## 2024-06-27 RX ORDER — HYDRALAZINE HYDROCHLORIDE 50 MG/1
25 TABLET ORAL EVERY 8 HOURS
Refills: 0 | Status: DISCONTINUED | OUTPATIENT
Start: 2024-06-27 | End: 2024-06-27

## 2024-06-27 RX ORDER — POTASSIUM CHLORIDE 600 MG/1
20 TABLET, FILM COATED, EXTENDED RELEASE ORAL ONCE
Refills: 0 | Status: COMPLETED | OUTPATIENT
Start: 2024-06-27 | End: 2024-06-27

## 2024-06-27 RX ORDER — VALPROIC ACID 250 MG/5ML
500 SOLUTION ORAL EVERY 12 HOURS
Refills: 0 | Status: DISCONTINUED | OUTPATIENT
Start: 2024-06-27 | End: 2024-06-27

## 2024-06-27 RX ORDER — SODIUM CHLORIDE 0.9 % (FLUSH) 0.9 %
2 SYRINGE (ML) INJECTION EVERY 6 HOURS
Refills: 0 | Status: DISCONTINUED | OUTPATIENT
Start: 2024-06-27 | End: 2024-06-27

## 2024-06-27 RX ORDER — SODIUM CHLORIDE 0.9 % (FLUSH) 0.9 %
2 SYRINGE (ML) INJECTION EVERY 6 HOURS
Refills: 0 | Status: DISCONTINUED | OUTPATIENT
Start: 2024-06-27 | End: 2024-06-30

## 2024-06-27 RX ORDER — VENLAFAXINE 37.5 MG/1
37.5 CAPSULE, EXTENDED RELEASE ORAL DAILY
Refills: 0 | Status: DISCONTINUED | OUTPATIENT
Start: 2024-06-27 | End: 2024-07-05

## 2024-06-27 RX ADMIN — Medication 25 MILLIGRAM(S): at 21:12

## 2024-06-27 RX ADMIN — HYDRALAZINE HYDROCHLORIDE 25 MILLIGRAM(S): 50 TABLET ORAL at 05:00

## 2024-06-27 RX ADMIN — POTASSIUM CHLORIDE 20 MILLIEQUIVALENT(S): 600 TABLET, FILM COATED, EXTENDED RELEASE ORAL at 06:53

## 2024-06-27 RX ADMIN — HYDRALAZINE HYDROCHLORIDE 25 MILLIGRAM(S): 50 TABLET ORAL at 21:12

## 2024-06-27 RX ADMIN — VALPROIC ACID 55 MILLIGRAM(S): 250 SOLUTION ORAL at 21:05

## 2024-06-27 RX ADMIN — Medication 80 MILLIGRAM(S): at 05:00

## 2024-06-27 RX ADMIN — VALPROIC ACID 50 MILLIGRAM(S): 250 SOLUTION ORAL at 05:01

## 2024-06-27 RX ADMIN — Medication 25 MILLIGRAM(S): at 00:08

## 2024-06-27 RX ADMIN — SODIUM CHLORIDE 20 MILLILITER(S): 3 INJECTION, SOLUTION INTRAVENOUS at 05:01

## 2024-06-27 RX ADMIN — Medication 1 GRAM(S): at 05:00

## 2024-06-27 RX ADMIN — Medication 650 MILLIGRAM(S): at 00:08

## 2024-06-27 RX ADMIN — HYDRALAZINE HYDROCHLORIDE 10 MILLIGRAM(S): 50 TABLET ORAL at 21:38

## 2024-06-27 RX ADMIN — ASPIRIN 81 MILLIGRAM(S): 325 TABLET, FILM COATED ORAL at 13:00

## 2024-06-27 RX ADMIN — Medication 650 MILLIGRAM(S): at 01:00

## 2024-06-27 RX ADMIN — Medication 2 GRAM(S): at 18:13

## 2024-06-27 RX ADMIN — LABETALOL HYDROCHLORIDE 10 MILLIGRAM(S): 300 TABLET ORAL at 23:17

## 2024-06-27 RX ADMIN — HYDRALAZINE HYDROCHLORIDE 25 MILLIGRAM(S): 50 TABLET ORAL at 13:34

## 2024-06-27 RX ADMIN — POTASSIUM CHLORIDE 20 MILLIEQUIVALENT(S): 600 TABLET, FILM COATED, EXTENDED RELEASE ORAL at 00:09

## 2024-06-27 RX ADMIN — VENLAFAXINE 37.5 MILLIGRAM(S): 37.5 CAPSULE, EXTENDED RELEASE ORAL at 14:12

## 2024-06-27 RX ADMIN — Medication 1 APPLICATION(S): at 05:00

## 2024-06-27 RX ADMIN — Medication 2 TABLET(S): at 21:12

## 2024-06-27 RX ADMIN — Medication 2 GRAM(S): at 13:00

## 2024-06-28 LAB
ALBUMIN SERPL ELPH-MCNC: 3.6 G/DL — SIGNIFICANT CHANGE UP (ref 3.5–5.2)
ALP SERPL-CCNC: 60 U/L — SIGNIFICANT CHANGE UP (ref 30–115)
ALT FLD-CCNC: 22 U/L — SIGNIFICANT CHANGE UP (ref 0–41)
ANION GAP SERPL CALC-SCNC: 15 MMOL/L — HIGH (ref 7–14)
AST SERPL-CCNC: 24 U/L — SIGNIFICANT CHANGE UP (ref 0–41)
BASOPHILS # BLD AUTO: 0.04 K/UL — SIGNIFICANT CHANGE UP (ref 0–0.2)
BASOPHILS NFR BLD AUTO: 0.3 % — SIGNIFICANT CHANGE UP (ref 0–1)
BILIRUB SERPL-MCNC: 0.5 MG/DL — SIGNIFICANT CHANGE UP (ref 0.2–1.2)
BUN SERPL-MCNC: 21 MG/DL — HIGH (ref 10–20)
CALCIUM SERPL-MCNC: 9.1 MG/DL — SIGNIFICANT CHANGE UP (ref 8.4–10.5)
CHLORIDE SERPL-SCNC: 107 MMOL/L — SIGNIFICANT CHANGE UP (ref 98–110)
CO2 SERPL-SCNC: 20 MMOL/L — SIGNIFICANT CHANGE UP (ref 17–32)
CREAT SERPL-MCNC: 0.9 MG/DL — SIGNIFICANT CHANGE UP (ref 0.7–1.5)
EGFR: 94 ML/MIN/1.73M2 — SIGNIFICANT CHANGE UP
EOSINOPHIL # BLD AUTO: 0.84 K/UL — HIGH (ref 0–0.7)
EOSINOPHIL NFR BLD AUTO: 7 % — SIGNIFICANT CHANGE UP (ref 0–8)
GLUCOSE SERPL-MCNC: 97 MG/DL — SIGNIFICANT CHANGE UP (ref 70–99)
HCT VFR BLD CALC: 32.8 % — LOW (ref 42–52)
HGB BLD-MCNC: 10.8 G/DL — LOW (ref 14–18)
IMM GRANULOCYTES NFR BLD AUTO: 2.1 % — HIGH (ref 0.1–0.3)
LYMPHOCYTES # BLD AUTO: 29.4 % — SIGNIFICANT CHANGE UP (ref 20.5–51.1)
LYMPHOCYTES # BLD AUTO: 3.51 K/UL — HIGH (ref 1.2–3.4)
MAGNESIUM SERPL-MCNC: 2.1 MG/DL — SIGNIFICANT CHANGE UP (ref 1.8–2.4)
MCHC RBC-ENTMCNC: 28.1 PG — SIGNIFICANT CHANGE UP (ref 27–31)
MCHC RBC-ENTMCNC: 32.9 G/DL — SIGNIFICANT CHANGE UP (ref 32–37)
MCV RBC AUTO: 85.2 FL — SIGNIFICANT CHANGE UP (ref 80–94)
MONOCYTES # BLD AUTO: 1.26 K/UL — HIGH (ref 0.1–0.6)
MONOCYTES NFR BLD AUTO: 10.6 % — HIGH (ref 1.7–9.3)
NEUTROPHILS # BLD AUTO: 6.04 K/UL — SIGNIFICANT CHANGE UP (ref 1.4–6.5)
NEUTROPHILS NFR BLD AUTO: 50.6 % — SIGNIFICANT CHANGE UP (ref 42.2–75.2)
NRBC # BLD: 0 /100 WBCS — SIGNIFICANT CHANGE UP (ref 0–0)
PHOSPHATE SERPL-MCNC: 3.4 MG/DL — SIGNIFICANT CHANGE UP (ref 2.1–4.9)
PLATELET # BLD AUTO: 373 K/UL — SIGNIFICANT CHANGE UP (ref 130–400)
PMV BLD: 11.6 FL — HIGH (ref 7.4–10.4)
POTASSIUM SERPL-MCNC: 4.3 MMOL/L — SIGNIFICANT CHANGE UP (ref 3.5–5)
POTASSIUM SERPL-SCNC: 4.3 MMOL/L — SIGNIFICANT CHANGE UP (ref 3.5–5)
PROT SERPL-MCNC: 6.3 G/DL — SIGNIFICANT CHANGE UP (ref 6–8)
RBC # BLD: 3.85 M/UL — LOW (ref 4.7–6.1)
RBC # FLD: 16 % — HIGH (ref 11.5–14.5)
SODIUM SERPL-SCNC: 142 MMOL/L — SIGNIFICANT CHANGE UP (ref 135–146)
WBC # BLD: 11.94 K/UL — HIGH (ref 4.8–10.8)
WBC # FLD AUTO: 11.94 K/UL — HIGH (ref 4.8–10.8)

## 2024-06-28 PROCEDURE — 70450 CT HEAD/BRAIN W/O DYE: CPT | Mod: 26

## 2024-06-28 PROCEDURE — 99291 CRITICAL CARE FIRST HOUR: CPT

## 2024-06-28 RX ORDER — AMLODIPINE BESYLATE 2.5 MG/1
5 TABLET ORAL DAILY
Refills: 0 | Status: DISCONTINUED | OUTPATIENT
Start: 2024-06-29 | End: 2024-06-30

## 2024-06-28 RX ORDER — HYDRALAZINE HYDROCHLORIDE 50 MG/1
10 TABLET ORAL ONCE
Refills: 0 | Status: COMPLETED | OUTPATIENT
Start: 2024-06-28 | End: 2024-06-28

## 2024-06-28 RX ORDER — LOSARTAN POTASSIUM 100 MG/1
50 TABLET, FILM COATED ORAL DAILY
Refills: 0 | Status: DISCONTINUED | OUTPATIENT
Start: 2024-06-29 | End: 2024-07-01

## 2024-06-28 RX ORDER — ASPIRIN 325 MG/1
81 TABLET, FILM COATED ORAL DAILY
Refills: 0 | Status: DISCONTINUED | OUTPATIENT
Start: 2024-06-29 | End: 2024-07-05

## 2024-06-28 RX ADMIN — VALPROIC ACID 55 MILLIGRAM(S): 250 SOLUTION ORAL at 06:09

## 2024-06-28 RX ADMIN — Medication 2 TABLET(S): at 21:15

## 2024-06-28 RX ADMIN — Medication 650 MILLIGRAM(S): at 04:19

## 2024-06-28 RX ADMIN — Medication 2 GRAM(S): at 06:10

## 2024-06-28 RX ADMIN — Medication 650 MILLIGRAM(S): at 18:47

## 2024-06-28 RX ADMIN — Medication 25 MILLIGRAM(S): at 21:14

## 2024-06-28 RX ADMIN — VALPROIC ACID 55 MILLIGRAM(S): 250 SOLUTION ORAL at 18:47

## 2024-06-28 RX ADMIN — ASPIRIN 81 MILLIGRAM(S): 325 TABLET, FILM COATED ORAL at 12:01

## 2024-06-28 RX ADMIN — Medication 30 MILLIGRAM(S): at 12:02

## 2024-06-28 RX ADMIN — HYDRALAZINE HYDROCHLORIDE 25 MILLIGRAM(S): 50 TABLET ORAL at 17:49

## 2024-06-28 RX ADMIN — VENLAFAXINE 37.5 MILLIGRAM(S): 37.5 CAPSULE, EXTENDED RELEASE ORAL at 17:49

## 2024-06-28 RX ADMIN — HYDRALAZINE HYDROCHLORIDE 25 MILLIGRAM(S): 50 TABLET ORAL at 21:14

## 2024-06-28 RX ADMIN — Medication 2 GRAM(S): at 12:03

## 2024-06-28 RX ADMIN — Medication 650 MILLIGRAM(S): at 03:52

## 2024-06-28 RX ADMIN — Medication 1 APPLICATION(S): at 06:09

## 2024-06-28 RX ADMIN — HYDRALAZINE HYDROCHLORIDE 5 MILLIGRAM(S): 50 TABLET ORAL at 09:15

## 2024-06-28 RX ADMIN — HYDRALAZINE HYDROCHLORIDE 25 MILLIGRAM(S): 50 TABLET ORAL at 06:09

## 2024-06-28 RX ADMIN — Medication 2 GRAM(S): at 00:26

## 2024-06-28 RX ADMIN — HYDRALAZINE HYDROCHLORIDE 5 MILLIGRAM(S): 50 TABLET ORAL at 04:15

## 2024-06-28 RX ADMIN — LOSARTAN POTASSIUM 50 MILLIGRAM(S): 100 TABLET, FILM COATED ORAL at 06:10

## 2024-06-28 RX ADMIN — HYDRALAZINE HYDROCHLORIDE 10 MILLIGRAM(S): 50 TABLET ORAL at 12:02

## 2024-06-28 RX ADMIN — HYDRALAZINE HYDROCHLORIDE 5 MILLIGRAM(S): 50 TABLET ORAL at 02:13

## 2024-06-28 RX ADMIN — Medication 2 GRAM(S): at 18:47

## 2024-06-28 RX ADMIN — Medication 2 GRAM(S): at 23:54

## 2024-06-29 LAB
ALBUMIN SERPL ELPH-MCNC: 3.6 G/DL — SIGNIFICANT CHANGE UP (ref 3.5–5.2)
ALP SERPL-CCNC: 70 U/L — SIGNIFICANT CHANGE UP (ref 30–115)
ALT FLD-CCNC: 22 U/L — SIGNIFICANT CHANGE UP (ref 0–41)
ANION GAP SERPL CALC-SCNC: 15 MMOL/L — HIGH (ref 7–14)
AST SERPL-CCNC: 23 U/L — SIGNIFICANT CHANGE UP (ref 0–41)
BASOPHILS # BLD AUTO: 0.05 K/UL — SIGNIFICANT CHANGE UP (ref 0–0.2)
BASOPHILS NFR BLD AUTO: 0.4 % — SIGNIFICANT CHANGE UP (ref 0–1)
BILIRUB SERPL-MCNC: 0.5 MG/DL — SIGNIFICANT CHANGE UP (ref 0.2–1.2)
BUN SERPL-MCNC: 24 MG/DL — HIGH (ref 10–20)
CALCIUM SERPL-MCNC: 8.9 MG/DL — SIGNIFICANT CHANGE UP (ref 8.4–10.5)
CHLORIDE SERPL-SCNC: 106 MMOL/L — SIGNIFICANT CHANGE UP (ref 98–110)
CO2 SERPL-SCNC: 18 MMOL/L — SIGNIFICANT CHANGE UP (ref 17–32)
CREAT SERPL-MCNC: 1 MG/DL — SIGNIFICANT CHANGE UP (ref 0.7–1.5)
EGFR: 83 ML/MIN/1.73M2 — SIGNIFICANT CHANGE UP
EOSINOPHIL # BLD AUTO: 0.64 K/UL — SIGNIFICANT CHANGE UP (ref 0–0.7)
EOSINOPHIL NFR BLD AUTO: 5.1 % — SIGNIFICANT CHANGE UP (ref 0–8)
GLUCOSE SERPL-MCNC: 90 MG/DL — SIGNIFICANT CHANGE UP (ref 70–99)
HCT VFR BLD CALC: 37.9 % — LOW (ref 42–52)
HGB BLD-MCNC: 11.9 G/DL — LOW (ref 14–18)
IMM GRANULOCYTES NFR BLD AUTO: 2 % — HIGH (ref 0.1–0.3)
LYMPHOCYTES # BLD AUTO: 28.6 % — SIGNIFICANT CHANGE UP (ref 20.5–51.1)
LYMPHOCYTES # BLD AUTO: 3.62 K/UL — HIGH (ref 1.2–3.4)
MAGNESIUM SERPL-MCNC: 2.5 MG/DL — HIGH (ref 1.8–2.4)
MCHC RBC-ENTMCNC: 27.7 PG — SIGNIFICANT CHANGE UP (ref 27–31)
MCHC RBC-ENTMCNC: 31.4 G/DL — LOW (ref 32–37)
MCV RBC AUTO: 88.3 FL — SIGNIFICANT CHANGE UP (ref 80–94)
MONOCYTES # BLD AUTO: 1.36 K/UL — HIGH (ref 0.1–0.6)
MONOCYTES NFR BLD AUTO: 10.8 % — HIGH (ref 1.7–9.3)
NEUTROPHILS # BLD AUTO: 6.72 K/UL — HIGH (ref 1.4–6.5)
NEUTROPHILS NFR BLD AUTO: 53.1 % — SIGNIFICANT CHANGE UP (ref 42.2–75.2)
NRBC # BLD: 0 /100 WBCS — SIGNIFICANT CHANGE UP (ref 0–0)
PHOSPHATE SERPL-MCNC: 3.3 MG/DL — SIGNIFICANT CHANGE UP (ref 2.1–4.9)
PLATELET # BLD AUTO: 364 K/UL — SIGNIFICANT CHANGE UP (ref 130–400)
PMV BLD: 11.1 FL — HIGH (ref 7.4–10.4)
POTASSIUM SERPL-MCNC: 4.8 MMOL/L — SIGNIFICANT CHANGE UP (ref 3.5–5)
POTASSIUM SERPL-SCNC: 4.8 MMOL/L — SIGNIFICANT CHANGE UP (ref 3.5–5)
PROT SERPL-MCNC: 6.3 G/DL — SIGNIFICANT CHANGE UP (ref 6–8)
RBC # BLD: 4.29 M/UL — LOW (ref 4.7–6.1)
RBC # FLD: 16.6 % — HIGH (ref 11.5–14.5)
SODIUM SERPL-SCNC: 139 MMOL/L — SIGNIFICANT CHANGE UP (ref 135–146)
WBC # BLD: 12.64 K/UL — HIGH (ref 4.8–10.8)
WBC # FLD AUTO: 12.64 K/UL — HIGH (ref 4.8–10.8)

## 2024-06-29 PROCEDURE — 99291 CRITICAL CARE FIRST HOUR: CPT

## 2024-06-29 RX ORDER — BISACODYL 5 MG
5 TABLET, DELAYED RELEASE (ENTERIC COATED) ORAL AT BEDTIME
Refills: 0 | Status: COMPLETED | OUTPATIENT
Start: 2024-06-29 | End: 2024-06-29

## 2024-06-29 RX ADMIN — Medication 2 GRAM(S): at 14:01

## 2024-06-29 RX ADMIN — Medication 650 MILLIGRAM(S): at 14:32

## 2024-06-29 RX ADMIN — Medication 25 MILLIGRAM(S): at 21:48

## 2024-06-29 RX ADMIN — LOSARTAN POTASSIUM 50 MILLIGRAM(S): 100 TABLET, FILM COATED ORAL at 05:13

## 2024-06-29 RX ADMIN — VENLAFAXINE 37.5 MILLIGRAM(S): 37.5 CAPSULE, EXTENDED RELEASE ORAL at 14:03

## 2024-06-29 RX ADMIN — Medication 2 TABLET(S): at 21:47

## 2024-06-29 RX ADMIN — ASPIRIN 81 MILLIGRAM(S): 325 TABLET, FILM COATED ORAL at 14:03

## 2024-06-29 RX ADMIN — HYDRALAZINE HYDROCHLORIDE 25 MILLIGRAM(S): 50 TABLET ORAL at 14:04

## 2024-06-29 RX ADMIN — VALPROIC ACID 55 MILLIGRAM(S): 250 SOLUTION ORAL at 18:13

## 2024-06-29 RX ADMIN — Medication 1 APPLICATION(S): at 05:14

## 2024-06-29 RX ADMIN — Medication 650 MILLIGRAM(S): at 05:13

## 2024-06-29 RX ADMIN — HYDRALAZINE HYDROCHLORIDE 25 MILLIGRAM(S): 50 TABLET ORAL at 05:12

## 2024-06-29 RX ADMIN — Medication 650 MILLIGRAM(S): at 14:02

## 2024-06-29 RX ADMIN — Medication 2 GRAM(S): at 05:13

## 2024-06-29 RX ADMIN — Medication 2 GRAM(S): at 18:13

## 2024-06-29 RX ADMIN — HYDRALAZINE HYDROCHLORIDE 25 MILLIGRAM(S): 50 TABLET ORAL at 21:48

## 2024-06-29 RX ADMIN — Medication 5 MILLIGRAM(S): at 21:48

## 2024-06-29 RX ADMIN — AMLODIPINE BESYLATE 5 MILLIGRAM(S): 2.5 TABLET ORAL at 05:13

## 2024-06-29 RX ADMIN — POLYETHYLENE GLYCOL 3350 17 GRAM(S): 1 POWDER ORAL at 05:12

## 2024-06-29 RX ADMIN — Medication 650 MILLIGRAM(S): at 05:45

## 2024-06-29 RX ADMIN — VALPROIC ACID 55 MILLIGRAM(S): 250 SOLUTION ORAL at 05:12

## 2024-06-30 LAB
ALBUMIN SERPL ELPH-MCNC: 3.5 G/DL — SIGNIFICANT CHANGE UP (ref 3.5–5.2)
ALP SERPL-CCNC: 70 U/L — SIGNIFICANT CHANGE UP (ref 30–115)
ALT FLD-CCNC: 23 U/L — SIGNIFICANT CHANGE UP (ref 0–41)
ANION GAP SERPL CALC-SCNC: 13 MMOL/L — SIGNIFICANT CHANGE UP (ref 7–14)
AST SERPL-CCNC: 23 U/L — SIGNIFICANT CHANGE UP (ref 0–41)
BASOPHILS # BLD AUTO: 0.03 K/UL — SIGNIFICANT CHANGE UP (ref 0–0.2)
BASOPHILS NFR BLD AUTO: 0.3 % — SIGNIFICANT CHANGE UP (ref 0–1)
BILIRUB SERPL-MCNC: 0.4 MG/DL — SIGNIFICANT CHANGE UP (ref 0.2–1.2)
BUN SERPL-MCNC: 23 MG/DL — HIGH (ref 10–20)
CALCIUM SERPL-MCNC: 8.6 MG/DL — SIGNIFICANT CHANGE UP (ref 8.4–10.5)
CHLORIDE SERPL-SCNC: 105 MMOL/L — SIGNIFICANT CHANGE UP (ref 98–110)
CO2 SERPL-SCNC: 21 MMOL/L — SIGNIFICANT CHANGE UP (ref 17–32)
CREAT SERPL-MCNC: 0.9 MG/DL — SIGNIFICANT CHANGE UP (ref 0.7–1.5)
EGFR: 94 ML/MIN/1.73M2 — SIGNIFICANT CHANGE UP
EOSINOPHIL # BLD AUTO: 0.6 K/UL — SIGNIFICANT CHANGE UP (ref 0–0.7)
EOSINOPHIL NFR BLD AUTO: 6.4 % — SIGNIFICANT CHANGE UP (ref 0–8)
GLUCOSE BLDC GLUCOMTR-MCNC: 105 MG/DL — HIGH (ref 70–99)
GLUCOSE SERPL-MCNC: 82 MG/DL — SIGNIFICANT CHANGE UP (ref 70–99)
HCT VFR BLD CALC: 31.7 % — LOW (ref 42–52)
HGB BLD-MCNC: 10.3 G/DL — LOW (ref 14–18)
IMM GRANULOCYTES NFR BLD AUTO: 2.1 % — HIGH (ref 0.1–0.3)
LYMPHOCYTES # BLD AUTO: 3.5 K/UL — HIGH (ref 1.2–3.4)
LYMPHOCYTES # BLD AUTO: 37.6 % — SIGNIFICANT CHANGE UP (ref 20.5–51.1)
MAGNESIUM SERPL-MCNC: 1.9 MG/DL — SIGNIFICANT CHANGE UP (ref 1.8–2.4)
MCHC RBC-ENTMCNC: 27.9 PG — SIGNIFICANT CHANGE UP (ref 27–31)
MCHC RBC-ENTMCNC: 32.5 G/DL — SIGNIFICANT CHANGE UP (ref 32–37)
MCV RBC AUTO: 85.9 FL — SIGNIFICANT CHANGE UP (ref 80–94)
MONOCYTES # BLD AUTO: 1.21 K/UL — HIGH (ref 0.1–0.6)
MONOCYTES NFR BLD AUTO: 13 % — HIGH (ref 1.7–9.3)
NEUTROPHILS # BLD AUTO: 3.77 K/UL — SIGNIFICANT CHANGE UP (ref 1.4–6.5)
NEUTROPHILS NFR BLD AUTO: 40.6 % — LOW (ref 42.2–75.2)
NRBC # BLD: 0 /100 WBCS — SIGNIFICANT CHANGE UP (ref 0–0)
PHOSPHATE SERPL-MCNC: 3 MG/DL — SIGNIFICANT CHANGE UP (ref 2.1–4.9)
PLATELET # BLD AUTO: 372 K/UL — SIGNIFICANT CHANGE UP (ref 130–400)
PMV BLD: 11.5 FL — HIGH (ref 7.4–10.4)
POTASSIUM SERPL-MCNC: 4.4 MMOL/L — SIGNIFICANT CHANGE UP (ref 3.5–5)
POTASSIUM SERPL-SCNC: 4.4 MMOL/L — SIGNIFICANT CHANGE UP (ref 3.5–5)
PROT SERPL-MCNC: 5.9 G/DL — LOW (ref 6–8)
RBC # BLD: 3.69 M/UL — LOW (ref 4.7–6.1)
RBC # FLD: 16.5 % — HIGH (ref 11.5–14.5)
SODIUM SERPL-SCNC: 139 MMOL/L — SIGNIFICANT CHANGE UP (ref 135–146)
WBC # BLD: 9.31 K/UL — SIGNIFICANT CHANGE UP (ref 4.8–10.8)
WBC # FLD AUTO: 9.31 K/UL — SIGNIFICANT CHANGE UP (ref 4.8–10.8)

## 2024-06-30 PROCEDURE — 99233 SBSQ HOSP IP/OBS HIGH 50: CPT

## 2024-06-30 RX ORDER — HEPARIN SODIUM 50 [USP'U]/ML
5000 INJECTION, SOLUTION INTRAVENOUS EVERY 8 HOURS
Refills: 0 | Status: DISCONTINUED | OUTPATIENT
Start: 2024-06-30 | End: 2024-07-05

## 2024-06-30 RX ORDER — MAGNESIUM SULFATE 100 %
2 POWDER (GRAM) MISCELLANEOUS ONCE
Refills: 0 | Status: COMPLETED | OUTPATIENT
Start: 2024-06-30 | End: 2024-06-30

## 2024-06-30 RX ORDER — SODIUM CHLORIDE 0.9 % (FLUSH) 0.9 %
500 SYRINGE (ML) INJECTION ONCE
Refills: 0 | Status: COMPLETED | OUTPATIENT
Start: 2024-06-30 | End: 2024-06-30

## 2024-06-30 RX ORDER — SODIUM CHLORIDE 0.9 % (FLUSH) 0.9 %
3 SYRINGE (ML) INJECTION EVERY 6 HOURS
Refills: 0 | Status: DISCONTINUED | OUTPATIENT
Start: 2024-06-30 | End: 2024-07-02

## 2024-06-30 RX ORDER — MAGNESIUM OXIDE 400 MG/1
400 TABLET ORAL
Refills: 0 | Status: DISCONTINUED | OUTPATIENT
Start: 2024-06-30 | End: 2024-07-05

## 2024-06-30 RX ORDER — AMLODIPINE BESYLATE 2.5 MG/1
5 TABLET ORAL DAILY
Refills: 0 | Status: DISCONTINUED | OUTPATIENT
Start: 2024-06-30 | End: 2024-07-01

## 2024-06-30 RX ADMIN — VALPROIC ACID 55 MILLIGRAM(S): 250 SOLUTION ORAL at 18:03

## 2024-06-30 RX ADMIN — HYDRALAZINE HYDROCHLORIDE 25 MILLIGRAM(S): 50 TABLET ORAL at 05:11

## 2024-06-30 RX ADMIN — Medication 5 MILLIGRAM(S): at 23:21

## 2024-06-30 RX ADMIN — Medication 650 MILLIGRAM(S): at 18:47

## 2024-06-30 RX ADMIN — MAGNESIUM OXIDE 400 MILLIGRAM(S): 400 TABLET ORAL at 13:07

## 2024-06-30 RX ADMIN — Medication 1 APPLICATION(S): at 05:11

## 2024-06-30 RX ADMIN — Medication 3 GRAM(S): at 23:53

## 2024-06-30 RX ADMIN — VALPROIC ACID 55 MILLIGRAM(S): 250 SOLUTION ORAL at 05:13

## 2024-06-30 RX ADMIN — MAGNESIUM OXIDE 400 MILLIGRAM(S): 400 TABLET ORAL at 18:23

## 2024-06-30 RX ADMIN — Medication 3 GRAM(S): at 13:07

## 2024-06-30 RX ADMIN — LOSARTAN POTASSIUM 50 MILLIGRAM(S): 100 TABLET, FILM COATED ORAL at 05:11

## 2024-06-30 RX ADMIN — Medication 1000 MILLILITER(S): at 11:20

## 2024-06-30 RX ADMIN — Medication 3 GRAM(S): at 18:04

## 2024-06-30 RX ADMIN — HEPARIN SODIUM 5000 UNIT(S): 50 INJECTION, SOLUTION INTRAVENOUS at 13:33

## 2024-06-30 RX ADMIN — Medication 25 GRAM(S): at 13:33

## 2024-06-30 RX ADMIN — Medication 2 TABLET(S): at 21:44

## 2024-06-30 RX ADMIN — ASPIRIN 81 MILLIGRAM(S): 325 TABLET, FILM COATED ORAL at 13:07

## 2024-06-30 RX ADMIN — POLYETHYLENE GLYCOL 3350 17 GRAM(S): 1 POWDER ORAL at 05:11

## 2024-06-30 RX ADMIN — Medication 650 MILLIGRAM(S): at 09:34

## 2024-06-30 RX ADMIN — VENLAFAXINE 37.5 MILLIGRAM(S): 37.5 CAPSULE, EXTENDED RELEASE ORAL at 13:07

## 2024-06-30 RX ADMIN — Medication 2 GRAM(S): at 05:11

## 2024-06-30 RX ADMIN — AMLODIPINE BESYLATE 5 MILLIGRAM(S): 2.5 TABLET ORAL at 05:11

## 2024-06-30 RX ADMIN — Medication 650 MILLIGRAM(S): at 09:04

## 2024-06-30 RX ADMIN — HEPARIN SODIUM 5000 UNIT(S): 50 INJECTION, SOLUTION INTRAVENOUS at 21:43

## 2024-06-30 RX ADMIN — Medication 1000 MILLILITER(S): at 12:14

## 2024-06-30 RX ADMIN — Medication 2 GRAM(S): at 01:36

## 2024-07-01 LAB
ALBUMIN SERPL ELPH-MCNC: 3.6 G/DL — SIGNIFICANT CHANGE UP (ref 3.5–5.2)
ALP SERPL-CCNC: 91 U/L — SIGNIFICANT CHANGE UP (ref 30–115)
ALT FLD-CCNC: 46 U/L — HIGH (ref 0–41)
ANION GAP SERPL CALC-SCNC: 11 MMOL/L — SIGNIFICANT CHANGE UP (ref 7–14)
ANION GAP SERPL CALC-SCNC: 12 MMOL/L — SIGNIFICANT CHANGE UP (ref 7–14)
AST SERPL-CCNC: 41 U/L — SIGNIFICANT CHANGE UP (ref 0–41)
BASOPHILS # BLD AUTO: 0.04 K/UL — SIGNIFICANT CHANGE UP (ref 0–0.2)
BASOPHILS NFR BLD AUTO: 0.4 % — SIGNIFICANT CHANGE UP (ref 0–1)
BILIRUB SERPL-MCNC: 0.4 MG/DL — SIGNIFICANT CHANGE UP (ref 0.2–1.2)
BUN SERPL-MCNC: 14 MG/DL — SIGNIFICANT CHANGE UP (ref 10–20)
BUN SERPL-MCNC: 17 MG/DL — SIGNIFICANT CHANGE UP (ref 10–20)
CALCIUM SERPL-MCNC: 8.8 MG/DL — SIGNIFICANT CHANGE UP (ref 8.4–10.5)
CALCIUM SERPL-MCNC: 9.1 MG/DL — SIGNIFICANT CHANGE UP (ref 8.4–10.5)
CHLORIDE SERPL-SCNC: 105 MMOL/L — SIGNIFICANT CHANGE UP (ref 98–110)
CHLORIDE SERPL-SCNC: 105 MMOL/L — SIGNIFICANT CHANGE UP (ref 98–110)
CO2 SERPL-SCNC: 20 MMOL/L — SIGNIFICANT CHANGE UP (ref 17–32)
CO2 SERPL-SCNC: 20 MMOL/L — SIGNIFICANT CHANGE UP (ref 17–32)
CREAT SERPL-MCNC: 0.9 MG/DL — SIGNIFICANT CHANGE UP (ref 0.7–1.5)
CREAT SERPL-MCNC: 1.1 MG/DL — SIGNIFICANT CHANGE UP (ref 0.7–1.5)
EGFR: 74 ML/MIN/1.73M2 — SIGNIFICANT CHANGE UP
EGFR: 94 ML/MIN/1.73M2 — SIGNIFICANT CHANGE UP
EOSINOPHIL # BLD AUTO: 0.33 K/UL — SIGNIFICANT CHANGE UP (ref 0–0.7)
EOSINOPHIL NFR BLD AUTO: 3.6 % — SIGNIFICANT CHANGE UP (ref 0–8)
GLUCOSE SERPL-MCNC: 102 MG/DL — HIGH (ref 70–99)
GLUCOSE SERPL-MCNC: 92 MG/DL — SIGNIFICANT CHANGE UP (ref 70–99)
HCT VFR BLD CALC: 33.9 % — LOW (ref 42–52)
HGB BLD-MCNC: 11.1 G/DL — LOW (ref 14–18)
IMM GRANULOCYTES NFR BLD AUTO: 2.5 % — HIGH (ref 0.1–0.3)
LYMPHOCYTES # BLD AUTO: 2.9 K/UL — SIGNIFICANT CHANGE UP (ref 1.2–3.4)
LYMPHOCYTES # BLD AUTO: 31.8 % — SIGNIFICANT CHANGE UP (ref 20.5–51.1)
MAGNESIUM SERPL-MCNC: 2.1 MG/DL — SIGNIFICANT CHANGE UP (ref 1.8–2.4)
MCHC RBC-ENTMCNC: 27.9 PG — SIGNIFICANT CHANGE UP (ref 27–31)
MCHC RBC-ENTMCNC: 32.7 G/DL — SIGNIFICANT CHANGE UP (ref 32–37)
MCV RBC AUTO: 85.2 FL — SIGNIFICANT CHANGE UP (ref 80–94)
MONOCYTES # BLD AUTO: 1.15 K/UL — HIGH (ref 0.1–0.6)
MONOCYTES NFR BLD AUTO: 12.6 % — HIGH (ref 1.7–9.3)
NEUTROPHILS # BLD AUTO: 4.46 K/UL — SIGNIFICANT CHANGE UP (ref 1.4–6.5)
NEUTROPHILS NFR BLD AUTO: 49.1 % — SIGNIFICANT CHANGE UP (ref 42.2–75.2)
NRBC # BLD: 0 /100 WBCS — SIGNIFICANT CHANGE UP (ref 0–0)
PHOSPHATE SERPL-MCNC: 2.9 MG/DL — SIGNIFICANT CHANGE UP (ref 2.1–4.9)
PLATELET # BLD AUTO: 451 K/UL — HIGH (ref 130–400)
PMV BLD: 11 FL — HIGH (ref 7.4–10.4)
POTASSIUM SERPL-MCNC: 4.5 MMOL/L — SIGNIFICANT CHANGE UP (ref 3.5–5)
POTASSIUM SERPL-MCNC: 4.9 MMOL/L — SIGNIFICANT CHANGE UP (ref 3.5–5)
POTASSIUM SERPL-SCNC: 4.5 MMOL/L — SIGNIFICANT CHANGE UP (ref 3.5–5)
POTASSIUM SERPL-SCNC: 4.9 MMOL/L — SIGNIFICANT CHANGE UP (ref 3.5–5)
PROT SERPL-MCNC: 6.2 G/DL — SIGNIFICANT CHANGE UP (ref 6–8)
RBC # BLD: 3.98 M/UL — LOW (ref 4.7–6.1)
RBC # FLD: 16.4 % — HIGH (ref 11.5–14.5)
SODIUM SERPL-SCNC: 136 MMOL/L — SIGNIFICANT CHANGE UP (ref 135–146)
SODIUM SERPL-SCNC: 137 MMOL/L — SIGNIFICANT CHANGE UP (ref 135–146)
WBC # BLD: 9.11 K/UL — SIGNIFICANT CHANGE UP (ref 4.8–10.8)
WBC # FLD AUTO: 9.11 K/UL — SIGNIFICANT CHANGE UP (ref 4.8–10.8)

## 2024-07-01 PROCEDURE — 99233 SBSQ HOSP IP/OBS HIGH 50: CPT | Mod: GC

## 2024-07-01 RX ORDER — LOSARTAN POTASSIUM 100 MG/1
50 TABLET, FILM COATED ORAL DAILY
Refills: 0 | Status: DISCONTINUED | OUTPATIENT
Start: 2024-07-01 | End: 2024-07-01

## 2024-07-01 RX ORDER — LOSARTAN POTASSIUM 100 MG/1
50 TABLET, FILM COATED ORAL ONCE
Refills: 0 | Status: COMPLETED | OUTPATIENT
Start: 2024-07-01 | End: 2024-07-01

## 2024-07-01 RX ORDER — AMLODIPINE BESYLATE 2.5 MG/1
5 TABLET ORAL DAILY
Refills: 0 | Status: DISCONTINUED | OUTPATIENT
Start: 2024-07-02 | End: 2024-07-05

## 2024-07-01 RX ORDER — AMLODIPINE BESYLATE 2.5 MG/1
5 TABLET ORAL ONCE
Refills: 0 | Status: COMPLETED | OUTPATIENT
Start: 2024-07-01 | End: 2024-07-01

## 2024-07-01 RX ORDER — LOSARTAN POTASSIUM 100 MG/1
50 TABLET, FILM COATED ORAL DAILY
Refills: 0 | Status: DISCONTINUED | OUTPATIENT
Start: 2024-07-02 | End: 2024-07-03

## 2024-07-01 RX ADMIN — HEPARIN SODIUM 5000 UNIT(S): 50 INJECTION, SOLUTION INTRAVENOUS at 21:19

## 2024-07-01 RX ADMIN — Medication 3 GRAM(S): at 12:41

## 2024-07-01 RX ADMIN — HEPARIN SODIUM 5000 UNIT(S): 50 INJECTION, SOLUTION INTRAVENOUS at 05:24

## 2024-07-01 RX ADMIN — POLYETHYLENE GLYCOL 3350 17 GRAM(S): 1 POWDER ORAL at 18:04

## 2024-07-01 RX ADMIN — MAGNESIUM OXIDE 400 MILLIGRAM(S): 400 TABLET ORAL at 18:04

## 2024-07-01 RX ADMIN — Medication 2 TABLET(S): at 21:20

## 2024-07-01 RX ADMIN — Medication 1 APPLICATION(S): at 05:25

## 2024-07-01 RX ADMIN — Medication 650 MILLIGRAM(S): at 18:30

## 2024-07-01 RX ADMIN — Medication 650 MILLIGRAM(S): at 18:00

## 2024-07-01 RX ADMIN — AMLODIPINE BESYLATE 5 MILLIGRAM(S): 2.5 TABLET ORAL at 15:13

## 2024-07-01 RX ADMIN — Medication 5 MILLIGRAM(S): at 21:22

## 2024-07-01 RX ADMIN — HYDRALAZINE HYDROCHLORIDE 5 MILLIGRAM(S): 50 TABLET ORAL at 09:59

## 2024-07-01 RX ADMIN — LOSARTAN POTASSIUM 50 MILLIGRAM(S): 100 TABLET, FILM COATED ORAL at 13:30

## 2024-07-01 RX ADMIN — MAGNESIUM OXIDE 400 MILLIGRAM(S): 400 TABLET ORAL at 12:41

## 2024-07-01 RX ADMIN — Medication 3 GRAM(S): at 18:03

## 2024-07-01 RX ADMIN — VENLAFAXINE 37.5 MILLIGRAM(S): 37.5 CAPSULE, EXTENDED RELEASE ORAL at 17:56

## 2024-07-01 RX ADMIN — ASPIRIN 81 MILLIGRAM(S): 325 TABLET, FILM COATED ORAL at 12:40

## 2024-07-01 RX ADMIN — HEPARIN SODIUM 5000 UNIT(S): 50 INJECTION, SOLUTION INTRAVENOUS at 15:30

## 2024-07-01 RX ADMIN — HYDRALAZINE HYDROCHLORIDE 5 MILLIGRAM(S): 50 TABLET ORAL at 03:05

## 2024-07-02 LAB
ALBUMIN SERPL ELPH-MCNC: 3.8 G/DL — SIGNIFICANT CHANGE UP (ref 3.5–5.2)
ALP SERPL-CCNC: 107 U/L — SIGNIFICANT CHANGE UP (ref 30–115)
ALT FLD-CCNC: 45 U/L — HIGH (ref 0–41)
ANION GAP SERPL CALC-SCNC: 12 MMOL/L — SIGNIFICANT CHANGE UP (ref 7–14)
AST SERPL-CCNC: 37 U/L — SIGNIFICANT CHANGE UP (ref 0–41)
BASOPHILS # BLD AUTO: 0 K/UL — SIGNIFICANT CHANGE UP (ref 0–0.2)
BASOPHILS NFR BLD AUTO: 0 % — SIGNIFICANT CHANGE UP (ref 0–1)
BILIRUB SERPL-MCNC: 0.4 MG/DL — SIGNIFICANT CHANGE UP (ref 0.2–1.2)
BUN SERPL-MCNC: 17 MG/DL — SIGNIFICANT CHANGE UP (ref 10–20)
CALCIUM SERPL-MCNC: 9.3 MG/DL — SIGNIFICANT CHANGE UP (ref 8.4–10.4)
CHLORIDE SERPL-SCNC: 105 MMOL/L — SIGNIFICANT CHANGE UP (ref 98–110)
CO2 SERPL-SCNC: 23 MMOL/L — SIGNIFICANT CHANGE UP (ref 17–32)
CREAT SERPL-MCNC: 1.2 MG/DL — SIGNIFICANT CHANGE UP (ref 0.7–1.5)
EGFR: 67 ML/MIN/1.73M2 — SIGNIFICANT CHANGE UP
EOSINOPHIL # BLD AUTO: 0.47 K/UL — SIGNIFICANT CHANGE UP (ref 0–0.7)
EOSINOPHIL NFR BLD AUTO: 4.3 % — SIGNIFICANT CHANGE UP (ref 0–8)
GLUCOSE SERPL-MCNC: 99 MG/DL — SIGNIFICANT CHANGE UP (ref 70–99)
HCT VFR BLD CALC: 36.1 % — LOW (ref 42–52)
HGB BLD-MCNC: 11.3 G/DL — LOW (ref 14–18)
LYMPHOCYTES # BLD AUTO: 38.3 % — SIGNIFICANT CHANGE UP (ref 20.5–51.1)
LYMPHOCYTES # BLD AUTO: 4.21 K/UL — HIGH (ref 1.2–3.4)
MAGNESIUM SERPL-MCNC: 2.3 MG/DL — SIGNIFICANT CHANGE UP (ref 1.8–2.4)
MCHC RBC-ENTMCNC: 27.4 PG — SIGNIFICANT CHANGE UP (ref 27–31)
MCHC RBC-ENTMCNC: 31.3 G/DL — LOW (ref 32–37)
MCV RBC AUTO: 87.6 FL — SIGNIFICANT CHANGE UP (ref 80–94)
MONOCYTES # BLD AUTO: 1.14 K/UL — HIGH (ref 0.1–0.6)
MONOCYTES NFR BLD AUTO: 10.4 % — HIGH (ref 1.7–9.3)
NEUTROPHILS # BLD AUTO: 4.96 K/UL — SIGNIFICANT CHANGE UP (ref 1.4–6.5)
NEUTROPHILS NFR BLD AUTO: 44.3 % — SIGNIFICANT CHANGE UP (ref 42.2–75.2)
PHOSPHATE SERPL-MCNC: 4 MG/DL — SIGNIFICANT CHANGE UP (ref 2.1–4.9)
PLATELET # BLD AUTO: 448 K/UL — HIGH (ref 130–400)
PMV BLD: 11.1 FL — HIGH (ref 7.4–10.4)
POTASSIUM SERPL-MCNC: 4.5 MMOL/L — SIGNIFICANT CHANGE UP (ref 3.5–5)
POTASSIUM SERPL-SCNC: 4.5 MMOL/L — SIGNIFICANT CHANGE UP (ref 3.5–5)
PROT SERPL-MCNC: 6.4 G/DL — SIGNIFICANT CHANGE UP (ref 6–8)
RBC # BLD: 4.12 M/UL — LOW (ref 4.7–6.1)
RBC # FLD: 16.7 % — HIGH (ref 11.5–14.5)
SODIUM SERPL-SCNC: 140 MMOL/L — SIGNIFICANT CHANGE UP (ref 135–146)
WBC # BLD: 10.98 K/UL — HIGH (ref 4.8–10.8)
WBC # FLD AUTO: 10.98 K/UL — HIGH (ref 4.8–10.8)

## 2024-07-02 PROCEDURE — 99233 SBSQ HOSP IP/OBS HIGH 50: CPT | Mod: GC

## 2024-07-02 RX ORDER — SODIUM CHLORIDE 0.9 % (FLUSH) 0.9 %
500 SYRINGE (ML) INJECTION ONCE
Refills: 0 | Status: COMPLETED | OUTPATIENT
Start: 2024-07-02 | End: 2024-07-02

## 2024-07-02 RX ORDER — SODIUM CHLORIDE 0.9 % (FLUSH) 0.9 %
3 SYRINGE (ML) INJECTION EVERY 8 HOURS
Refills: 0 | Status: DISCONTINUED | OUTPATIENT
Start: 2024-07-02 | End: 2024-07-05

## 2024-07-02 RX ADMIN — MAGNESIUM OXIDE 400 MILLIGRAM(S): 400 TABLET ORAL at 12:33

## 2024-07-02 RX ADMIN — Medication 3 GRAM(S): at 01:00

## 2024-07-02 RX ADMIN — LOSARTAN POTASSIUM 50 MILLIGRAM(S): 100 TABLET, FILM COATED ORAL at 05:58

## 2024-07-02 RX ADMIN — Medication 3 GRAM(S): at 21:30

## 2024-07-02 RX ADMIN — Medication 5 MILLIGRAM(S): at 21:30

## 2024-07-02 RX ADMIN — HEPARIN SODIUM 5000 UNIT(S): 50 INJECTION, SOLUTION INTRAVENOUS at 05:58

## 2024-07-02 RX ADMIN — Medication 1 APPLICATION(S): at 05:59

## 2024-07-02 RX ADMIN — Medication 3 GRAM(S): at 13:09

## 2024-07-02 RX ADMIN — Medication 2 TABLET(S): at 21:30

## 2024-07-02 RX ADMIN — Medication 3 GRAM(S): at 05:59

## 2024-07-02 RX ADMIN — HEPARIN SODIUM 5000 UNIT(S): 50 INJECTION, SOLUTION INTRAVENOUS at 13:09

## 2024-07-02 RX ADMIN — MAGNESIUM OXIDE 400 MILLIGRAM(S): 400 TABLET ORAL at 17:21

## 2024-07-02 RX ADMIN — Medication 650 MILLIGRAM(S): at 13:00

## 2024-07-02 RX ADMIN — ASPIRIN 81 MILLIGRAM(S): 325 TABLET, FILM COATED ORAL at 11:04

## 2024-07-02 RX ADMIN — Medication 650 MILLIGRAM(S): at 18:41

## 2024-07-02 RX ADMIN — Medication 650 MILLIGRAM(S): at 13:30

## 2024-07-02 RX ADMIN — VENLAFAXINE 37.5 MILLIGRAM(S): 37.5 CAPSULE, EXTENDED RELEASE ORAL at 12:29

## 2024-07-02 RX ADMIN — Medication 500 MILLILITER(S): at 10:00

## 2024-07-02 RX ADMIN — MAGNESIUM OXIDE 400 MILLIGRAM(S): 400 TABLET ORAL at 09:09

## 2024-07-02 RX ADMIN — HEPARIN SODIUM 5000 UNIT(S): 50 INJECTION, SOLUTION INTRAVENOUS at 21:31

## 2024-07-03 ENCOUNTER — TRANSCRIPTION ENCOUNTER (OUTPATIENT)
Age: 64
End: 2024-07-03

## 2024-07-03 ENCOUNTER — APPOINTMENT (OUTPATIENT)
Dept: NEUROSURGERY | Facility: CLINIC | Age: 64
End: 2024-07-03

## 2024-07-03 LAB
ANION GAP SERPL CALC-SCNC: 15 MMOL/L — HIGH (ref 7–14)
BASOPHILS # BLD AUTO: 0.09 K/UL — SIGNIFICANT CHANGE UP (ref 0–0.2)
BASOPHILS NFR BLD AUTO: 1 % — SIGNIFICANT CHANGE UP (ref 0–1)
BUN SERPL-MCNC: 12 MG/DL — SIGNIFICANT CHANGE UP (ref 10–20)
CALCIUM SERPL-MCNC: 9.3 MG/DL — SIGNIFICANT CHANGE UP (ref 8.4–10.5)
CHLORIDE SERPL-SCNC: 104 MMOL/L — SIGNIFICANT CHANGE UP (ref 98–110)
CO2 SERPL-SCNC: 23 MMOL/L — SIGNIFICANT CHANGE UP (ref 17–32)
CREAT SERPL-MCNC: 1.1 MG/DL — SIGNIFICANT CHANGE UP (ref 0.7–1.5)
EGFR: 74 ML/MIN/1.73M2 — SIGNIFICANT CHANGE UP
EOSINOPHIL # BLD AUTO: 0.26 K/UL — SIGNIFICANT CHANGE UP (ref 0–0.7)
EOSINOPHIL NFR BLD AUTO: 2.9 % — SIGNIFICANT CHANGE UP (ref 0–8)
GLUCOSE SERPL-MCNC: 93 MG/DL — SIGNIFICANT CHANGE UP (ref 70–99)
HCT VFR BLD CALC: 34.8 % — LOW (ref 42–52)
HGB BLD-MCNC: 10.8 G/DL — LOW (ref 14–18)
IMM GRANULOCYTES NFR BLD AUTO: 2.3 % — HIGH (ref 0.1–0.3)
LYMPHOCYTES # BLD AUTO: 3.17 K/UL — SIGNIFICANT CHANGE UP (ref 1.2–3.4)
LYMPHOCYTES # BLD AUTO: 35.9 % — SIGNIFICANT CHANGE UP (ref 20.5–51.1)
MAGNESIUM SERPL-MCNC: 2.1 MG/DL — SIGNIFICANT CHANGE UP (ref 1.8–2.4)
MCHC RBC-ENTMCNC: 27.6 PG — SIGNIFICANT CHANGE UP (ref 27–31)
MCHC RBC-ENTMCNC: 31 G/DL — LOW (ref 32–37)
MCV RBC AUTO: 89 FL — SIGNIFICANT CHANGE UP (ref 80–94)
MONOCYTES # BLD AUTO: 1.08 K/UL — HIGH (ref 0.1–0.6)
MONOCYTES NFR BLD AUTO: 12.2 % — HIGH (ref 1.7–9.3)
NEUTROPHILS # BLD AUTO: 4.03 K/UL — SIGNIFICANT CHANGE UP (ref 1.4–6.5)
NEUTROPHILS NFR BLD AUTO: 45.7 % — SIGNIFICANT CHANGE UP (ref 42.2–75.2)
NRBC # BLD: 0 /100 WBCS — SIGNIFICANT CHANGE UP (ref 0–0)
PHOSPHATE SERPL-MCNC: 4 MG/DL — SIGNIFICANT CHANGE UP (ref 2.1–4.9)
PLATELET # BLD AUTO: 452 K/UL — HIGH (ref 130–400)
PMV BLD: 11.5 FL — HIGH (ref 7.4–10.4)
POTASSIUM SERPL-MCNC: 4.8 MMOL/L — SIGNIFICANT CHANGE UP (ref 3.5–5)
POTASSIUM SERPL-SCNC: 4.8 MMOL/L — SIGNIFICANT CHANGE UP (ref 3.5–5)
RBC # BLD: 3.91 M/UL — LOW (ref 4.7–6.1)
RBC # FLD: 17 % — HIGH (ref 11.5–14.5)
SODIUM SERPL-SCNC: 142 MMOL/L — SIGNIFICANT CHANGE UP (ref 135–146)
WBC # BLD: 8.83 K/UL — SIGNIFICANT CHANGE UP (ref 4.8–10.8)
WBC # FLD AUTO: 8.83 K/UL — SIGNIFICANT CHANGE UP (ref 4.8–10.8)

## 2024-07-03 PROCEDURE — 99232 SBSQ HOSP IP/OBS MODERATE 35: CPT

## 2024-07-03 PROCEDURE — 99232 SBSQ HOSP IP/OBS MODERATE 35: CPT | Mod: GC

## 2024-07-03 RX ORDER — LOSARTAN POTASSIUM 100 MG/1
50 TABLET, FILM COATED ORAL ONCE
Refills: 0 | Status: COMPLETED | OUTPATIENT
Start: 2024-07-03 | End: 2024-07-03

## 2024-07-03 RX ORDER — LOSARTAN POTASSIUM 100 MG/1
100 TABLET, FILM COATED ORAL DAILY
Refills: 0 | Status: DISCONTINUED | OUTPATIENT
Start: 2024-07-04 | End: 2024-07-05

## 2024-07-03 RX ADMIN — Medication 3 GRAM(S): at 05:48

## 2024-07-03 RX ADMIN — POLYETHYLENE GLYCOL 3350 17 GRAM(S): 1 POWDER ORAL at 17:08

## 2024-07-03 RX ADMIN — LOSARTAN POTASSIUM 50 MILLIGRAM(S): 100 TABLET, FILM COATED ORAL at 09:59

## 2024-07-03 RX ADMIN — AMLODIPINE BESYLATE 5 MILLIGRAM(S): 2.5 TABLET ORAL at 05:48

## 2024-07-03 RX ADMIN — Medication 3 GRAM(S): at 21:47

## 2024-07-03 RX ADMIN — Medication 5 MILLIGRAM(S): at 21:33

## 2024-07-03 RX ADMIN — POLYETHYLENE GLYCOL 3350 17 GRAM(S): 1 POWDER ORAL at 05:38

## 2024-07-03 RX ADMIN — Medication 2 TABLET(S): at 21:33

## 2024-07-03 RX ADMIN — MAGNESIUM OXIDE 400 MILLIGRAM(S): 400 TABLET ORAL at 12:14

## 2024-07-03 RX ADMIN — MAGNESIUM OXIDE 400 MILLIGRAM(S): 400 TABLET ORAL at 08:24

## 2024-07-03 RX ADMIN — HEPARIN SODIUM 5000 UNIT(S): 50 INJECTION, SOLUTION INTRAVENOUS at 21:34

## 2024-07-03 RX ADMIN — Medication 1 APPLICATION(S): at 05:44

## 2024-07-03 RX ADMIN — MAGNESIUM OXIDE 400 MILLIGRAM(S): 400 TABLET ORAL at 17:08

## 2024-07-03 RX ADMIN — LOSARTAN POTASSIUM 50 MILLIGRAM(S): 100 TABLET, FILM COATED ORAL at 05:48

## 2024-07-03 RX ADMIN — VENLAFAXINE 37.5 MILLIGRAM(S): 37.5 CAPSULE, EXTENDED RELEASE ORAL at 12:15

## 2024-07-03 RX ADMIN — HEPARIN SODIUM 5000 UNIT(S): 50 INJECTION, SOLUTION INTRAVENOUS at 13:07

## 2024-07-03 RX ADMIN — Medication 3 GRAM(S): at 13:06

## 2024-07-03 RX ADMIN — LABETALOL HYDROCHLORIDE 10 MILLIGRAM(S): 300 TABLET ORAL at 00:30

## 2024-07-03 RX ADMIN — ASPIRIN 81 MILLIGRAM(S): 325 TABLET, FILM COATED ORAL at 12:14

## 2024-07-03 RX ADMIN — HEPARIN SODIUM 5000 UNIT(S): 50 INJECTION, SOLUTION INTRAVENOUS at 05:48

## 2024-07-03 RX ADMIN — LABETALOL HYDROCHLORIDE 10 MILLIGRAM(S): 300 TABLET ORAL at 06:47

## 2024-07-04 LAB
ANION GAP SERPL CALC-SCNC: 12 MMOL/L — SIGNIFICANT CHANGE UP (ref 7–14)
APTT BLD: 30.1 SEC — SIGNIFICANT CHANGE UP (ref 27–39.2)
BASOPHILS # BLD AUTO: 0.12 K/UL — SIGNIFICANT CHANGE UP (ref 0–0.2)
BASOPHILS NFR BLD AUTO: 1.2 % — HIGH (ref 0–1)
BUN SERPL-MCNC: 16 MG/DL — SIGNIFICANT CHANGE UP (ref 10–20)
CALCIUM SERPL-MCNC: 9 MG/DL — SIGNIFICANT CHANGE UP (ref 8.4–10.5)
CHLORIDE SERPL-SCNC: 103 MMOL/L — SIGNIFICANT CHANGE UP (ref 98–110)
CO2 SERPL-SCNC: 23 MMOL/L — SIGNIFICANT CHANGE UP (ref 17–32)
CREAT SERPL-MCNC: 1.1 MG/DL — SIGNIFICANT CHANGE UP (ref 0.7–1.5)
EGFR: 74 ML/MIN/1.73M2 — SIGNIFICANT CHANGE UP
EOSINOPHIL # BLD AUTO: 0.32 K/UL — SIGNIFICANT CHANGE UP (ref 0–0.7)
EOSINOPHIL NFR BLD AUTO: 3.3 % — SIGNIFICANT CHANGE UP (ref 0–8)
GLUCOSE SERPL-MCNC: 90 MG/DL — SIGNIFICANT CHANGE UP (ref 70–99)
HCT VFR BLD CALC: 35.5 % — LOW (ref 42–52)
HGB BLD-MCNC: 11 G/DL — LOW (ref 14–18)
IMM GRANULOCYTES NFR BLD AUTO: 2.4 % — HIGH (ref 0.1–0.3)
INR BLD: 1.03 RATIO — SIGNIFICANT CHANGE UP (ref 0.65–1.3)
LYMPHOCYTES # BLD AUTO: 3.25 K/UL — SIGNIFICANT CHANGE UP (ref 1.2–3.4)
LYMPHOCYTES # BLD AUTO: 33.4 % — SIGNIFICANT CHANGE UP (ref 20.5–51.1)
MAGNESIUM SERPL-MCNC: 2.2 MG/DL — SIGNIFICANT CHANGE UP (ref 1.8–2.4)
MCHC RBC-ENTMCNC: 27.7 PG — SIGNIFICANT CHANGE UP (ref 27–31)
MCHC RBC-ENTMCNC: 31 G/DL — LOW (ref 32–37)
MCV RBC AUTO: 89.4 FL — SIGNIFICANT CHANGE UP (ref 80–94)
MONOCYTES # BLD AUTO: 1.19 K/UL — HIGH (ref 0.1–0.6)
MONOCYTES NFR BLD AUTO: 12.2 % — HIGH (ref 1.7–9.3)
NEUTROPHILS # BLD AUTO: 4.63 K/UL — SIGNIFICANT CHANGE UP (ref 1.4–6.5)
NEUTROPHILS NFR BLD AUTO: 47.5 % — SIGNIFICANT CHANGE UP (ref 42.2–75.2)
NRBC # BLD: 0 /100 WBCS — SIGNIFICANT CHANGE UP (ref 0–0)
PHOSPHATE SERPL-MCNC: 3.8 MG/DL — SIGNIFICANT CHANGE UP (ref 2.1–4.9)
PLATELET # BLD AUTO: 518 K/UL — HIGH (ref 130–400)
PMV BLD: 11.4 FL — HIGH (ref 7.4–10.4)
POTASSIUM SERPL-MCNC: 4.6 MMOL/L — SIGNIFICANT CHANGE UP (ref 3.5–5)
POTASSIUM SERPL-SCNC: 4.6 MMOL/L — SIGNIFICANT CHANGE UP (ref 3.5–5)
PROTHROM AB SERPL-ACNC: 11.8 SEC — SIGNIFICANT CHANGE UP (ref 9.95–12.87)
RBC # BLD: 3.97 M/UL — LOW (ref 4.7–6.1)
RBC # FLD: 16.9 % — HIGH (ref 11.5–14.5)
SODIUM SERPL-SCNC: 138 MMOL/L — SIGNIFICANT CHANGE UP (ref 135–146)
WBC # BLD: 9.74 K/UL — SIGNIFICANT CHANGE UP (ref 4.8–10.8)
WBC # FLD AUTO: 9.74 K/UL — SIGNIFICANT CHANGE UP (ref 4.8–10.8)

## 2024-07-04 PROCEDURE — 99232 SBSQ HOSP IP/OBS MODERATE 35: CPT

## 2024-07-04 PROCEDURE — 99232 SBSQ HOSP IP/OBS MODERATE 35: CPT | Mod: GC

## 2024-07-04 RX ADMIN — MAGNESIUM OXIDE 400 MILLIGRAM(S): 400 TABLET ORAL at 12:08

## 2024-07-04 RX ADMIN — Medication 2 TABLET(S): at 21:37

## 2024-07-04 RX ADMIN — HEPARIN SODIUM 5000 UNIT(S): 50 INJECTION, SOLUTION INTRAVENOUS at 21:38

## 2024-07-04 RX ADMIN — Medication 1 APPLICATION(S): at 05:40

## 2024-07-04 RX ADMIN — ASPIRIN 81 MILLIGRAM(S): 325 TABLET, FILM COATED ORAL at 12:07

## 2024-07-04 RX ADMIN — VENLAFAXINE 37.5 MILLIGRAM(S): 37.5 CAPSULE, EXTENDED RELEASE ORAL at 12:07

## 2024-07-04 RX ADMIN — MAGNESIUM OXIDE 400 MILLIGRAM(S): 400 TABLET ORAL at 09:01

## 2024-07-04 RX ADMIN — Medication 650 MILLIGRAM(S): at 14:23

## 2024-07-04 RX ADMIN — HEPARIN SODIUM 5000 UNIT(S): 50 INJECTION, SOLUTION INTRAVENOUS at 13:17

## 2024-07-04 RX ADMIN — POLYETHYLENE GLYCOL 3350 17 GRAM(S): 1 POWDER ORAL at 17:33

## 2024-07-04 RX ADMIN — MAGNESIUM OXIDE 400 MILLIGRAM(S): 400 TABLET ORAL at 17:34

## 2024-07-04 RX ADMIN — LOSARTAN POTASSIUM 100 MILLIGRAM(S): 100 TABLET, FILM COATED ORAL at 05:41

## 2024-07-04 RX ADMIN — POLYETHYLENE GLYCOL 3350 17 GRAM(S): 1 POWDER ORAL at 05:40

## 2024-07-04 RX ADMIN — AMLODIPINE BESYLATE 5 MILLIGRAM(S): 2.5 TABLET ORAL at 05:40

## 2024-07-04 RX ADMIN — HEPARIN SODIUM 5000 UNIT(S): 50 INJECTION, SOLUTION INTRAVENOUS at 05:40

## 2024-07-04 RX ADMIN — Medication 5 MILLIGRAM(S): at 21:38

## 2024-07-05 ENCOUNTER — INPATIENT (INPATIENT)
Facility: HOSPITAL | Age: 64
LOS: 11 days | Discharge: NURSING, REHAB OR LONG-TERM CARE | DRG: 66 | End: 2024-07-17
Attending: PHYSICAL MEDICINE & REHABILITATION | Admitting: PHYSICAL MEDICINE & REHABILITATION
Payer: MEDICARE

## 2024-07-05 ENCOUNTER — TRANSCRIPTION ENCOUNTER (OUTPATIENT)
Age: 64
End: 2024-07-05

## 2024-07-05 ENCOUNTER — RESULT REVIEW (OUTPATIENT)
Age: 64
End: 2024-07-05

## 2024-07-05 VITALS
TEMPERATURE: 98 F | WEIGHT: 176.15 LBS | OXYGEN SATURATION: 96 % | HEIGHT: 66 IN | TEMPERATURE: 98 F | DIASTOLIC BLOOD PRESSURE: 133 MMHG | HEART RATE: 80 BPM | HEART RATE: 71 BPM | SYSTOLIC BLOOD PRESSURE: 127 MMHG | RESPIRATION RATE: 18 BRPM | DIASTOLIC BLOOD PRESSURE: 69 MMHG | SYSTOLIC BLOOD PRESSURE: 158 MMHG | RESPIRATION RATE: 16 BRPM

## 2024-07-05 DIAGNOSIS — Z98.890 OTHER SPECIFIED POSTPROCEDURAL STATES: Chronic | ICD-10-CM

## 2024-07-05 DIAGNOSIS — I62.9 NONTRAUMATIC INTRACRANIAL HEMORRHAGE, UNSPECIFIED: ICD-10-CM

## 2024-07-05 DIAGNOSIS — Z86.79 PERSONAL HISTORY OF OTHER DISEASES OF THE CIRCULATORY SYSTEM: Chronic | ICD-10-CM

## 2024-07-05 DIAGNOSIS — Z92.89 PERSONAL HISTORY OF OTHER MEDICAL TREATMENT: Chronic | ICD-10-CM

## 2024-07-05 LAB
ANION GAP SERPL CALC-SCNC: 10 MMOL/L — SIGNIFICANT CHANGE UP (ref 7–14)
BASOPHILS # BLD AUTO: 0.08 K/UL — SIGNIFICANT CHANGE UP (ref 0–0.2)
BASOPHILS NFR BLD AUTO: 0.8 % — SIGNIFICANT CHANGE UP (ref 0–1)
BUN SERPL-MCNC: 22 MG/DL — HIGH (ref 10–20)
CALCIUM SERPL-MCNC: 9.5 MG/DL — SIGNIFICANT CHANGE UP (ref 8.4–10.4)
CHLORIDE SERPL-SCNC: 102 MMOL/L — SIGNIFICANT CHANGE UP (ref 98–110)
CO2 SERPL-SCNC: 26 MMOL/L — SIGNIFICANT CHANGE UP (ref 17–32)
CREAT SERPL-MCNC: 1.3 MG/DL — SIGNIFICANT CHANGE UP (ref 0.7–1.5)
EGFR: 61 ML/MIN/1.73M2 — SIGNIFICANT CHANGE UP
EOSINOPHIL # BLD AUTO: 0.38 K/UL — SIGNIFICANT CHANGE UP (ref 0–0.7)
EOSINOPHIL NFR BLD AUTO: 4 % — SIGNIFICANT CHANGE UP (ref 0–8)
GLUCOSE SERPL-MCNC: 96 MG/DL — SIGNIFICANT CHANGE UP (ref 70–99)
HCT VFR BLD CALC: 34.2 % — LOW (ref 42–52)
HGB BLD-MCNC: 11.1 G/DL — LOW (ref 14–18)
IMM GRANULOCYTES NFR BLD AUTO: 1.8 % — HIGH (ref 0.1–0.3)
LYMPHOCYTES # BLD AUTO: 3.36 K/UL — SIGNIFICANT CHANGE UP (ref 1.2–3.4)
LYMPHOCYTES # BLD AUTO: 35.1 % — SIGNIFICANT CHANGE UP (ref 20.5–51.1)
MCHC RBC-ENTMCNC: 28.7 PG — SIGNIFICANT CHANGE UP (ref 27–31)
MCHC RBC-ENTMCNC: 32.5 G/DL — SIGNIFICANT CHANGE UP (ref 32–37)
MCV RBC AUTO: 88.4 FL — SIGNIFICANT CHANGE UP (ref 80–94)
MONOCYTES # BLD AUTO: 1.23 K/UL — HIGH (ref 0.1–0.6)
MONOCYTES NFR BLD AUTO: 12.9 % — HIGH (ref 1.7–9.3)
NEUTROPHILS # BLD AUTO: 4.35 K/UL — SIGNIFICANT CHANGE UP (ref 1.4–6.5)
NEUTROPHILS NFR BLD AUTO: 45.4 % — SIGNIFICANT CHANGE UP (ref 42.2–75.2)
NRBC # BLD: 0 /100 WBCS — SIGNIFICANT CHANGE UP (ref 0–0)
PLATELET # BLD AUTO: 570 K/UL — HIGH (ref 130–400)
PMV BLD: 11.4 FL — HIGH (ref 7.4–10.4)
POTASSIUM SERPL-MCNC: 4.6 MMOL/L — SIGNIFICANT CHANGE UP (ref 3.5–5)
POTASSIUM SERPL-SCNC: 4.6 MMOL/L — SIGNIFICANT CHANGE UP (ref 3.5–5)
RBC # BLD: 3.87 M/UL — LOW (ref 4.7–6.1)
RBC # FLD: 16.9 % — HIGH (ref 11.5–14.5)
SODIUM SERPL-SCNC: 138 MMOL/L — SIGNIFICANT CHANGE UP (ref 135–146)
WBC # BLD: 9.57 K/UL — SIGNIFICANT CHANGE UP (ref 4.8–10.8)
WBC # FLD AUTO: 9.57 K/UL — SIGNIFICANT CHANGE UP (ref 4.8–10.8)

## 2024-07-05 PROCEDURE — 80053 COMPREHEN METABOLIC PANEL: CPT

## 2024-07-05 PROCEDURE — 85025 COMPLETE CBC W/AUTO DIFF WBC: CPT

## 2024-07-05 PROCEDURE — 97166 OT EVAL MOD COMPLEX 45 MIN: CPT | Mod: GO

## 2024-07-05 PROCEDURE — 93320 DOPPLER ECHO COMPLETE: CPT | Mod: 26

## 2024-07-05 PROCEDURE — 93325 DOPPLER ECHO COLOR FLOW MAPG: CPT | Mod: 26

## 2024-07-05 PROCEDURE — 97129 THER IVNTJ 1ST 15 MIN: CPT | Mod: GO

## 2024-07-05 PROCEDURE — 92523 SPEECH SOUND LANG COMPREHEN: CPT | Mod: GN

## 2024-07-05 PROCEDURE — 93312 ECHO TRANSESOPHAGEAL: CPT | Mod: 26,XU

## 2024-07-05 PROCEDURE — 97163 PT EVAL HIGH COMPLEX 45 MIN: CPT | Mod: GP

## 2024-07-05 PROCEDURE — 96132 NRPSYC TST EVAL PHYS/QHP 1ST: CPT

## 2024-07-05 PROCEDURE — 83735 ASSAY OF MAGNESIUM: CPT

## 2024-07-05 PROCEDURE — 97112 NEUROMUSCULAR REEDUCATION: CPT | Mod: GP

## 2024-07-05 PROCEDURE — 90791 PSYCH DIAGNOSTIC EVALUATION: CPT

## 2024-07-05 PROCEDURE — 99232 SBSQ HOSP IP/OBS MODERATE 35: CPT

## 2024-07-05 PROCEDURE — 90832 PSYTX W PT 30 MINUTES: CPT

## 2024-07-05 PROCEDURE — 97130 THER IVNTJ EA ADDL 15 MIN: CPT | Mod: GO

## 2024-07-05 PROCEDURE — 97535 SELF CARE MNGMENT TRAINING: CPT | Mod: GO

## 2024-07-05 PROCEDURE — 99232 SBSQ HOSP IP/OBS MODERATE 35: CPT | Mod: GC

## 2024-07-05 PROCEDURE — 36415 COLL VENOUS BLD VENIPUNCTURE: CPT

## 2024-07-05 PROCEDURE — 97110 THERAPEUTIC EXERCISES: CPT | Mod: GO

## 2024-07-05 PROCEDURE — 97116 GAIT TRAINING THERAPY: CPT | Mod: GP

## 2024-07-05 PROCEDURE — 92507 TX SP LANG VOICE COMM INDIV: CPT | Mod: GN

## 2024-07-05 PROCEDURE — 97530 THERAPEUTIC ACTIVITIES: CPT | Mod: GP

## 2024-07-05 RX ORDER — LOSARTAN POTASSIUM 100 MG/1
100 TABLET, FILM COATED ORAL DAILY
Refills: 0 | Status: DISCONTINUED | OUTPATIENT
Start: 2024-07-05 | End: 2024-07-17

## 2024-07-05 RX ORDER — AMLODIPINE BESYLATE 2.5 MG/1
5 TABLET ORAL DAILY
Refills: 0 | Status: DISCONTINUED | OUTPATIENT
Start: 2024-07-05 | End: 2024-07-07

## 2024-07-05 RX ORDER — PANTOPRAZOLE SODIUM 40 MG/10ML
40 INJECTION, POWDER, FOR SOLUTION INTRAVENOUS
Refills: 0 | Status: DISCONTINUED | OUTPATIENT
Start: 2024-07-05 | End: 2024-07-17

## 2024-07-05 RX ORDER — POLYETHYLENE GLYCOL 3350 1 G/G
17 POWDER ORAL
Refills: 0 | Status: DISCONTINUED | OUTPATIENT
Start: 2024-07-05 | End: 2024-07-17

## 2024-07-05 RX ORDER — SODIUM CHLORIDE 0.9 % (FLUSH) 0.9 %
3 SYRINGE (ML) INJECTION
Qty: 0 | Refills: 0 | DISCHARGE
Start: 2024-07-05

## 2024-07-05 RX ORDER — SODIUM CHLORIDE 0.9 % (FLUSH) 0.9 %
3 SYRINGE (ML) INJECTION EVERY 8 HOURS
Refills: 0 | Status: DISCONTINUED | OUTPATIENT
Start: 2024-07-05 | End: 2024-07-10

## 2024-07-05 RX ORDER — POLYETHYLENE GLYCOL 3350 1 G/G
17 POWDER ORAL
Qty: 0 | Refills: 0 | DISCHARGE
Start: 2024-07-05

## 2024-07-05 RX ORDER — SENNOSIDES 8.6 MG
2 TABLET ORAL
Qty: 0 | Refills: 0 | DISCHARGE
Start: 2024-07-05

## 2024-07-05 RX ORDER — MAGNESIUM OXIDE 400 MG/1
400 TABLET ORAL
Refills: 0 | Status: DISCONTINUED | OUTPATIENT
Start: 2024-07-05 | End: 2024-07-13

## 2024-07-05 RX ORDER — SENNOSIDES 8.6 MG
2 TABLET ORAL AT BEDTIME
Refills: 0 | Status: DISCONTINUED | OUTPATIENT
Start: 2024-07-05 | End: 2024-07-17

## 2024-07-05 RX ORDER — HEPARIN SODIUM 50 [USP'U]/ML
5000 INJECTION, SOLUTION INTRAVENOUS EVERY 8 HOURS
Refills: 0 | Status: DISCONTINUED | OUTPATIENT
Start: 2024-07-05 | End: 2024-07-17

## 2024-07-05 RX ORDER — VENLAFAXINE 37.5 MG/1
37.5 CAPSULE, EXTENDED RELEASE ORAL DAILY
Refills: 0 | Status: DISCONTINUED | OUTPATIENT
Start: 2024-07-05 | End: 2024-07-11

## 2024-07-05 RX ORDER — AMLODIPINE BESYLATE 2.5 MG/1
1 TABLET ORAL
Qty: 0 | Refills: 0 | DISCHARGE
Start: 2024-07-05

## 2024-07-05 RX ORDER — ASPIRIN 325 MG/1
81 TABLET, FILM COATED ORAL DAILY
Refills: 0 | Status: DISCONTINUED | OUTPATIENT
Start: 2024-07-05 | End: 2024-07-17

## 2024-07-05 RX ORDER — LABETALOL HYDROCHLORIDE 300 MG/1
10 TABLET ORAL
Refills: 0 | Status: DISCONTINUED | OUTPATIENT
Start: 2024-07-05 | End: 2024-07-17

## 2024-07-05 RX ORDER — ACETAMINOPHEN 325 MG
650 TABLET ORAL EVERY 6 HOURS
Refills: 0 | Status: DISCONTINUED | OUTPATIENT
Start: 2024-07-05 | End: 2024-07-17

## 2024-07-05 RX ADMIN — Medication 650 MILLIGRAM(S): at 17:03

## 2024-07-05 RX ADMIN — LOSARTAN POTASSIUM 100 MILLIGRAM(S): 100 TABLET, FILM COATED ORAL at 06:06

## 2024-07-05 RX ADMIN — Medication 2 TABLET(S): at 22:16

## 2024-07-05 RX ADMIN — HEPARIN SODIUM 5000 UNIT(S): 50 INJECTION, SOLUTION INTRAVENOUS at 13:41

## 2024-07-05 RX ADMIN — AMLODIPINE BESYLATE 5 MILLIGRAM(S): 2.5 TABLET ORAL at 06:06

## 2024-07-05 RX ADMIN — Medication 1 APPLICATION(S): at 06:07

## 2024-07-05 RX ADMIN — POLYETHYLENE GLYCOL 3350 17 GRAM(S): 1 POWDER ORAL at 17:03

## 2024-07-05 RX ADMIN — VENLAFAXINE 37.5 MILLIGRAM(S): 37.5 CAPSULE, EXTENDED RELEASE ORAL at 13:43

## 2024-07-05 RX ADMIN — MAGNESIUM OXIDE 400 MILLIGRAM(S): 400 TABLET ORAL at 08:11

## 2024-07-05 RX ADMIN — Medication 5 MILLIGRAM(S): at 22:17

## 2024-07-05 RX ADMIN — ASPIRIN 81 MILLIGRAM(S): 325 TABLET, FILM COATED ORAL at 13:42

## 2024-07-05 RX ADMIN — MAGNESIUM OXIDE 400 MILLIGRAM(S): 400 TABLET ORAL at 17:03

## 2024-07-05 RX ADMIN — HEPARIN SODIUM 5000 UNIT(S): 50 INJECTION, SOLUTION INTRAVENOUS at 06:07

## 2024-07-05 RX ADMIN — MAGNESIUM OXIDE 400 MILLIGRAM(S): 400 TABLET ORAL at 13:42

## 2024-07-05 RX ADMIN — HEPARIN SODIUM 5000 UNIT(S): 50 INJECTION, SOLUTION INTRAVENOUS at 22:17

## 2024-07-05 RX ADMIN — POLYETHYLENE GLYCOL 3350 17 GRAM(S): 1 POWDER ORAL at 06:07

## 2024-07-05 RX ADMIN — Medication 3 GRAM(S): at 22:16

## 2024-07-05 NOTE — H&P ADULT - HISTORY OF PRESENT ILLNESS
64M w/ PMHx of CVA 2020 and 2021 s/p intracranial angioplasty of right MCA and NORMAN w/ subsequent intra-stent occlusion of R MCA/NORMAN intracranial stent, aortic valve insufficiency, implantable loop recorder for prior episodes of syncope/NSVT, chronic anemia, CKD 2, psoriatic arthritis who presents for R STA to MCA bypass. Post-op was CTH stable. On assessment in PACU, noted to have new L sided weakness, confusion at SBP 120s. Started on Phenylephrine gtt for perfusion with some improvement of symptoms. Pt admitted to NSICU. On 6/22 he was noted to have R temperoparietal ICH, now s/p crani and hematoma evacuation. Repeat imaging has been stable, EEG negative, ASA restarted. BP better controlled. TTE with findings concerning for endocarditis, GIULIANA performed showed no signs of endocarditis. Patient with functional deficits relative to baseline, admitted to acute inpatient rehab.    The patient was evaluated by the PM&R team once medically stable. The patient was found to have funcitonal limitations in terms of physical strength/mobility and ablility to carry out ADLs (self care, transfers, ambulation). The patient was deemed to be a good candidate for admission for acute inpatient rehab.  64M w/ PMHx of CVA 2020 and 2021 s/p intracranial angioplasty of right MCA and NORMAN w/ subsequent intra-stent occlusion of R MCA/NORMAN intracranial stent, aortic valve insufficiency, implantable loop recorder for prior episodes of syncope/NSVT, chronic anemia, CKD 2, psoriatic arthritis who presents for R STA to MCA bypass. Post-op was CTH stable. On assessment in PACU, noted to have new L sided weakness, confusion at SBP 120s. Started on Phenylephrine gtt for perfusion with some improvement of symptoms. Pt admitted to NSICU. On 6/22 he was noted to have R temperoparietal ICH, now s/p crani and hematoma evacuation. Repeat imaging has been stable, EEG negative, ASA restarted. BP better controlled. TTE with findings concerning for endocarditis, GIULIANA performed showed no signs of endocarditis. Patient with functional deficits relative to baseline, admitted to acute inpatient rehab.    The patient was evaluated by the PM&R team once medically stable. The patient was found to have funcitonal limitations in terms of physical strength/mobility and ablility to carry out ADLs (self care, transfers, ambulation). The patient was deemed to be a good candidate for admission for acute inpatient rehab.     Living Situation: Lives with Wife 1 VIANEY, [address] within home  PLOF: Rolling walker since 02/2024, Independent with ADLs and IADLs 64M w/ PMHx of CVA 2020 and 2021 s/p intracranial angioplasty of right MCA and NORMAN w/ subsequent intra-stent occlusion of R MCA/NORMAN intracranial stent, aortic valve insufficiency, implantable loop recorder for prior episodes of syncope/NSVT, chronic anemia, CKD 2, psoriatic arthritis who presents for R STA to MCA bypass. Post-op was CTH stable. On assessment in PACU, noted to have new L sided weakness, confusion at SBP 120s. Started on Phenylephrine gtt for perfusion with some improvement of symptoms. Pt admitted to NSICU. On 6/22 he was noted to have R temporoparietal ICH, now s/p crani and hematoma evacuation. Repeat imaging has been stable, EEG negative, ASA restarted. BP better controlled. TTE with findings concerning for endocarditis, GIULIANA performed showed no signs of endocarditis. Patient with functional deficits relative to baseline, admitted to acute inpatient rehab.    The patient was evaluated by the PM&R team once medically stable. The patient was found to have functional limitations in terms of physical strength/mobility and ability to carry out ADLs (self care, transfers, ambulation). The patient was deemed to be a good candidate for admission for acute inpatient rehab.     Living Situation: Lives with Wife 1 VIANEY, [address] within home  PLOF: Rolling walker since 02/2024, Independent with ADLs and IADLs

## 2024-07-05 NOTE — PATIENT PROFILE ADULT - FALL HARM RISK - HARM RISK INTERVENTIONS
Assistance with ambulation/Assistance OOB with selected safe patient handling equipment/Communicate Risk of Fall with Harm to all staff/Monitor for mental status changes/Move patient closer to nurses' station/Reinforce activity limits and safety measures with patient and family/Reorient to person, place and time as needed/Tailored Fall Risk Interventions/Toileting schedule using arm’s reach rule for commode and bathroom/Use of alarms - bed, chair and/or voice tab/Visual Cue: Yellow wristband and red socks/Bed in lowest position, wheels locked, appropriate side rails in place/Call bell, personal items and telephone in reach/Instruct patient to call for assistance before getting out of bed or chair/Non-slip footwear when patient is out of bed/[place] to call system/Physically safe environment - no spills, clutter or unnecessary equipment/Purposeful Proactive Rounding/Room/bathroom lighting operational, light cord in reach

## 2024-07-05 NOTE — H&P ADULT - NSHPPHYSICALEXAM_GEN_ALL_CORE
PHYSICAL EXAMINATION   VItals: T(C): 36.7 (07-05-24 @ 11:48), Max: 36.7 (07-04-24 @ 21:11)  HR: 64 (07-05-24 @ 11:48) (64 - 70)  BP: 157/83 (07-05-24 @ 11:48) (133/81 - 157/83)  RR: 18 (07-05-24 @ 11:48) (18 - 18)  SpO2: 98% (07-05-24 @ 11:48) (98% - 98%)    General:[ X  ] NAD, Resting Comfortable,   [   ] other:                                HEENT: [   ] NC/AT, EOMI, PERRL , Normal Conjunctivae,   [ X  ] other: right sided temporal swelling, craniotomy wound with staples in place  Cardio: [ X  ] RRR, no murmer,   [   ] other:                              Pulm: [ X  ] No Respiratory Distress,  Lungs CTAB,   [   ] other:                       Abdomen: [ X ]ND/NT, Soft,   [   ] other:    : [ X ] NO GIBSON CATHETER, [   ] GIBSON CATHETER- no meatal tear, no discharge, [   ] other:                                            MSK: [ X  ] No joint swelling, Full ROM,   [   ] other:                                         Ext: [ X  ]No C/C/E, No calf tenderness,   [   ]other:    Skin: [   ]intact,   [ X  ] other: right forearm and medial bicep bruising. right sided temporal swelling, craniotomy wound with staples in place                                                              Neurological Examination:  Cognitive: [  X  ] AAO x 3,   [  X  ]  other:  slowed responses, poor insight into deficits                                                                    Attention:  [  X  ] intact serial additions, no hemineglect   [    ]  other:                            Memory: [  X  ] intact,    [    ]  other:     Mood/Affect: [    ] wnl,    [  X  ]  other: blunted, distant, slowed                                                                            Communication: [  X  ]Fluent, no dysarthria, following commands:  [    ] other:   CN II - XII:  [    ] intact,  [  X  ] other: mild right sided facial droop                                                                                        Motor:   RIGHT UE: [ X  ] WNL,  [   ] other:  LEFT    UE: [   ] WNL,  [ X  ] other: Sh Flexion 4/5, EF 4/5, Elbow extension 4/5, finger  4/5. POSITIVE pronator drift  RIGHT LE: [   ] WNL,  [ X  ] other: HF 4/5, KF 4/5, KE 4/5, ADF 5/5, APF 5/5. POSITIVE clonus 3 beats  LEFT    LE: [ X  ] WNL,  [   ] other:    Tone: [  X  ] wnl,   [    ]  other:  DTRs: [  X ]symmetric, [   ] other:  Coordination:   [  X  ] intact,   [    ] other:                                                                           Sensory: [  X  ] Intact to light touch,   [    ] other: PHYSICAL EXAMINATION   VItals: T(C): 36.7 (07-05-24 @ 11:48), Max: 36.7 (07-04-24 @ 21:11)  HR: 64 (07-05-24 @ 11:48) (64 - 70)  BP: 157/83 (07-05-24 @ 11:48) (133/81 - 157/83)  RR: 18 (07-05-24 @ 11:48) (18 - 18)  SpO2: 98% (07-05-24 @ 11:48) (98% - 98%)    General:[ X  ] NAD, Resting Comfortable,   [   ] other:                                HEENT: [   ] NC/AT, EOMI, PERRL , Normal Conjunctivae,   [ X  ] other: right sided temporal swelling, craniotomy wound with staples in place  Cardio: [ X  ] RRR, no murmur,   [   ] other:                              Pulm: [ X  ] No Respiratory Distress,  Lungs CTAB,   [   ] other:                       Abdomen: [ X ]ND/NT, Soft,   [   ] other:    : [ X ] NO GIBSON CATHETER, [   ] GIBSON CATHETER- no meatal tear, no discharge, [   ] other:                                            MSK: [ X  ] No joint swelling, Full ROM,   [   ] other:                                         Ext: [ X  ]No C/C/E, No calf tenderness,   [   ]other:    Skin: [   ]intact,   [ X  ] other: right forearm and medial bicep bruising. right sided temporal swelling, craniotomy wound with staples in place                                                              Neurological Examination:  Cognitive: [  X  ] AAO x 3,   [  X  ]  other:  slowed responses, poor insight into deficits                                                                    Attention:  [  X  ] intact serial additions, no hemineglect   [    ]  other:                            Memory: [  X  ] intact,    [    ]  other:     Mood/Affect: [    ] wnl,    [  X  ]  other: blunted, distant, slowed                                                                            Communication: [  X  ]Fluent, no dysarthria, following commands:  [    ] other:   CN II - XII:  [    ] intact,  [  X  ] other: mild right sided facial droop                                                                                        Motor:   RIGHT UE: [ X  ] WNL,  [   ] other:  LEFT    UE: [   ] WNL,  [ X  ] other: Sh Flexion 4/5, EF 4/5, Elbow extension 4/5, finger  4/5. POSITIVE pronator drift  RIGHT LE: [ x  ] WNL,  [   ] other:   LEFT    LE: [   ] WNL,  [ x  ] other: HF 4/5, KF 4/5, KE 4/5, ADF 5/5, APF 5/5. POSITIVE clonus 3 beats left ankle    Tone: [  X  ] wnl,   [    ]  other:  DTRs: [  X ]symmetric, [   ] other:  Coordination:   [  X  ] intact,   [    ] other:                                                                           Sensory: [  X  ] Intact to light touch,   [    ] other:

## 2024-07-05 NOTE — PATIENT PROFILE ADULT - TRANSPORTATION
Anesthetic History PONV Review of Systems / Medical History Patient summary reviewed, nursing notes reviewed and pertinent labs reviewed Pulmonary COPD: mild Asthma Neuro/Psych  
 
 
 
TIA Cardiovascular Within defined limits GI/Hepatic/Renal 
  
GERD Endo/Other Hypothyroidism Other Findings Physical Exam 
 
Airway Mallampati: II 
TM Distance: 4 - 6 cm Neck ROM: normal range of motion Mouth opening: Normal 
 
 Cardiovascular Rhythm: regular Rate: normal 
 
 
 
 Dental 
No notable dental hx Pulmonary Breath sounds clear to auscultation Abdominal 
 
 
 
 Other Findings Anesthetic Plan ASA: 2 Anesthesia type: general 
 
 
 
 
 
Anesthetic plan and risks discussed with: Patient
no

## 2024-07-05 NOTE — H&P ADULT - ASSESSMENT
ASSESSMENT/PLAN    Rehab for Acute Right tempoparietal ICH and craniotomy with hematoma evacuation, left sided hemiparesis and cognitive deficits    #R temporoparietal ICH after elective R STA to MCA bypass  #Hx CVA 2020 and 2021 s/p intracranial angioplasty of right MCA and NORMAN w/ subsequent intra-stent occlusion of R MCA/NORMAN intracranial stent  New left sided weakness and confusion post procedure  S/P Partial evac of R T/p ICH with improvement of R--L shift   CTH stable on 6/28  EEG negative for any seizure  - BP goal 110-150  - Q4h neuro checks  - PT/OT eval  - NSGY following - plan to remove staples on 7/6  - Aspirin- 81 mg for bypass patency   - Continue home Effexor qd  - Seroquel 25 mg q hs prn for agitation   - melatonin initiated qhs (pt on it at home)  - Plan as per Stroke Neurology     #Mobile Echodensity on Mitral Valve   -TTE Echo Complete w/o Contrast w/ Doppler (6/21/24) - EF 70 to 75%. Moderate (Grade 2) diastolic dysfunction. Asymmatric basal septal hypertrophy (1.6cm).  There is a small independently mobile echodensity attached to the anterior mitral valve leaflet. In the right clinical context this may represent endocarditis. Thickening and calcification of the anterior and posterior mitral valve leaflets.  - GIULIANA performed with evidence of MV Calcification rather than IE  - likely OP f/u Cardiology     #HTN  -Goal - < 150  -Continue amlodipine 5 mg qd.   -Losartan increased to 100 mg qd  -labetalol prn for breakthrough    #Stage II CKD   - Baseline 0.9-1.1 , but now around 1.3 ( has trended up to 1.3 prior as well )   - encourage PO intake today, and trend creatinine   - Na goal 140- 150 for perihematomal edema   - on salt tabs 3 tabs q8hrs, goal Na 135-150, taper as tolerates    #LUE + superficial thrombophlebitis  - warmth and elevation  - conservative measures    #Psoriatic Arthritis  Humira last dosed june 9th as per wife and as communicated by pt's rheumatologist; Discussed with rheumatologist Dr Andre, pt to hold Humira while hospitalized       -Pain control:     -GI/Bowel Mgmt:     -Skin:  No active issues at this time    -FEN     - Diet:           Precautions / PROPHYLAXIS:      - Falls      - DVT prophylaxis:      MEDICAL PROGNOSIS: GOOD            REHAB POTENTIAL: GOOD             ESTIMATED DISPOSITION: HOME WITH HOME CARE       [ x ]  The goals of the IRF admission were discussed with the patient and or family member, who agreed             ELOS:  [     ] 7-14    [    ]  14-21    [    ]  Other    THERAPY ORDERS and INITIAL INDIVIDUALIZED PLAN OF CARE:  This initial individualized interdisciplinary plan of care, which was established by me (the attending physiatrist), is based on elements from the post admission evaluation. The interdisciplinary therapy program is to be at least 3 hrs a day, at least 5 days per week from from physical, occupational and/ or speech therapies as ordered by me below.      [ x  ] P.T. 90 mins. /day at least 5 out of 7 days:  [  x ] superficial  modalities prn, [ x  ] A/AAROM, [ x  ] PREs, [ x  ] transfer training,            [ x  ] progressive ambulation, [x   ] stairs                                               [ x  ] O.T. 90 mins. /day at least 5 out of 7 days::  [ x  ] modalities prn,  [ x  ]A/AAROM, [ x  ] PREs, [  x ] functional transfer training, [ x  ] ADL training           [   ] cognitive/ perceptual eval and training, [   ] splint eval                                                  [   ] S.L.P:  [   ] speech eval, [   ] swallow eval     [   ] Neuropsychology eval     [ x  ] Individualized rec. therapy      RATIONALE FOR INPATIENT ADMISSION - Patient demonstrates the following: (check all that apply)  [X] Medically appropriate for acute rehabilitation admission. Requires interdisiplinary therapy consisting of at least PT and OT, at least 3 hrs. a day at least 5 days a week  [X] Has attainable rehab goals with an appropriate initial discharge plan  [X] Has rehabilitation potential (expected to make a significant improvement within a reasonable period of time)  [X] Requires close medical management by a rehab physician, rehab nursing care,  and comprehensive interdisciplinary team (including PT, OT)    [X] Requires evaluation by a physiatrist at least 3 days a week to evaluate and manage and coordinate rehab and medical problems   ASSESSMENT/PLAN    Rehab for Acute Right tempoparietal ICH and craniotomy with hematoma evacuation, left sided hemiparesis and cognitive deficits    #R temporoparietal ICH after elective R STA to MCA bypass  #Hx CVA 2020 and 2021 s/p intracranial angioplasty of right MCA and NORMAN w/ subsequent intra-stent occlusion of R MCA/NORMAN intracranial stent  New left sided weakness and confusion post procedure  S/P Partial evac of R T/p ICH with improvement of R--L shift   CTH stable on 6/28  EEG negative for any seizure  - c/up Stroke Neurology   - BP goal 110-150  - Q4h neuro checks  - NSGY following - plan to remove staples on 7/6  - Aspirin- 81 mg for bypass patency   - Continue home Effexor qd  - Seroquel 25 mg q hs prn for agitation   - melatonin qhs  - PT/OT/SLP    #Mobile Echodensity on Mitral Valve   TTE Echo Complete w/o Contrast w/ Doppler (6/21/24) - EF 70 to 75%. Moderate (Grade 2) diastolic dysfunction. Asymmatric basal septal hypertrophy (1.6cm).  There is a small independently mobile echodensity attached to the anterior mitral valve leaflet. In the right clinical context this may represent endocarditis. Thickening and calcification of the anterior and posterior mitral valve leaflets.  07/05 GIULIANA performed with evidence of MV Calcification rather than IE  - likely OP f/u Cardiology     #HTN  Goal - < 150  -Continue amlodipine 5 mg qd.   -c/w Losartan 100 mg qd  -labetalol prn for breakthrough    #Stage II CKD   - Baseline 0.9-1.1 , but now around 1.3 ( has trended up to 1.3 prior as well )   - encourage PO intake today, and trend creatinine   - Na goal 140- 150 for perihematomal edema   - on salt tabs 3 tabs q8hrs, goal Na 140-150, taper as tolerates    #LUE + superficial thrombophlebitis  - warmth and elevation  - conservative measures    #Psoriatic Arthritis  Humira last dosed june 9th as per wife and as communicated by pt's rheumatologist; Discussed with rheumatologist Dr Andre, pt to hold Humira while hospitalized     -Pain control: tylenol, heat    -GI/Bowel Mgmt:     -Skin:  No active issues at this time    -FEN     - Diet: DASH      Precautions / PROPHYLAXIS:      - Falls      - DVT prophylaxis:      MEDICAL PROGNOSIS: GOOD            REHAB POTENTIAL: GOOD             ESTIMATED DISPOSITION: HOME WITH HOME CARE       [ x ]  The goals of the IRF admission were discussed with the patient and or family member, who agreed             ELOS:  [ X   ] 7-14    [    ]  14-21    [    ]  Other    THERAPY ORDERS and INITIAL INDIVIDUALIZED PLAN OF CARE:  This initial individualized interdisciplinary plan of care, which was established by me (the attending physiatrist), is based on elements from the post admission evaluation. The interdisciplinary therapy program is to be at least 3 hrs a day, at least 5 days per week from from physical, occupational and/ or speech therapies as ordered by me below.      [ x  ] P.T. 90 mins. /day at least 5 out of 7 days:  [  x ] superficial  modalities prn, [ x  ] A/AAROM, [ x  ] PREs, [ x  ] transfer training,            [ x  ] progressive ambulation, [x   ] stairs                                               [ x  ] O.T. 90 mins. /day at least 5 out of 7 days::  [ x  ] modalities prn,  [ x  ]A/AAROM, [ x  ] PREs, [  x ] functional transfer training, [ x  ] ADL training           [   ] cognitive/ perceptual eval and training, [   ] splint eval                                                  [ X  ] S.L.P:  [ X  ] speech eval, [   ] swallow eval     [ X  ] Neuropsychology eval     [ x  ] Individualized rec. therapy      RATIONALE FOR INPATIENT ADMISSION - Patient demonstrates the following: (check all that apply)  [X] Medically appropriate for acute rehabilitation admission. Requires interdisiplinary therapy consisting of at least PT and OT, at least 3 hrs. a day at least 5 days a week  [X] Has attainable rehab goals with an appropriate initial discharge plan  [X] Has rehabilitation potential (expected to make a significant improvement within a reasonable period of time)  [X] Requires close medical management by a rehab physician, rehab nursing care,  and comprehensive interdisciplinary team (including PT, OT)    [X] Requires evaluation by a physiatrist at least 3 days a week to evaluate and manage and coordinate rehab and medical problems   ASSESSMENT/PLAN    Rehab for Acute Right temporoparietal ICH and craniotomy with hematoma evacuation, left sided hemiparesis and cognitive deficits    #R temporoparietal ICH after elective R STA to MCA bypass  #Hx CVA 2020 and 2021 s/p intracranial angioplasty of right MCA and NORMAN w/ subsequent intra-stent occlusion of R MCA/NORMAN intracranial stent  New left sided weakness and confusion post procedure  S/P Partial evac of R T/p ICH with improvement of R--L shift   CTH stable on 6/28  EEG negative for any seizure  - c/up Stroke Neurology   - BP goal 110-150  - Q4h neuro checks  - NSGY following - plan to remove staples on 7/6  - Aspirin- 81 mg for bypass patency   - Continue home Effexor qd  - Seroquel 25 mg q hs prn for agitation   - melatonin qhs  - 1:1 observation for impulsivity  - PT/OT/SLP    #Mobile Echodensity on Mitral Valve   TTE Echo Complete w/o Contrast w/ Doppler (6/21/24) - EF 70 to 75%. Moderate (Grade 2) diastolic dysfunction. Asymmatric basal septal hypertrophy (1.6cm).  There is a small independently mobile echodensity attached to the anterior mitral valve leaflet. In the right clinical context this may represent endocarditis. Thickening and calcification of the anterior and posterior mitral valve leaflets.  07/05 GIULIANA performed with evidence of MV Calcification rather than IE  - likely OP f/u Cardiology     #HTN  Goal - < 150  -Continue amlodipine 5 mg qd.   -c/w Losartan 100 mg qd  -labetalol prn for breakthrough    #Stage II CKD   - Baseline 0.9-1.1 , but now around 1.3 ( has trended up to 1.3 prior as well )   - encourage PO intake today, and trend creatinine   - Na goal 140- 150 for perihematomal edema   - on salt tabs 3 tabs q8hrs, goal Na 140-150, taper as tolerates    #LUE + superficial thrombophlebitis  - warmth and elevation  - conservative measures    #Psoriatic Arthritis  Humira last dosed june 9th as per wife and as communicated by pt's rheumatologist; Discussed with rheumatologist Dr Andre, pt to hold Humira while hospitalized     -Pain control: tylenol, heat    -GI/Bowel Mgmt: miralax     -Skin:  No active issues at this time    -FEN     - Diet: DASH      Precautions / PROPHYLAXIS:      - Falls      - DVT prophylaxis: Heparin 5000 units subq q8hrs      MEDICAL PROGNOSIS: GOOD            REHAB POTENTIAL: GOOD             ESTIMATED DISPOSITION: HOME WITH HOME CARE       [ x ]  The goals of the IRF admission were discussed with the patient and or family member, who agreed             ELOS:  [ X   ] 7-14    [    ]  14-21    [    ]  Other    THERAPY ORDERS and INITIAL INDIVIDUALIZED PLAN OF CARE:  This initial individualized interdisciplinary plan of care, which was established by me (the attending physiatrist), is based on elements from the post admission evaluation. The interdisciplinary therapy program is to be at least 3 hrs a day, at least 5 days per week from from physical, occupational and/ or speech therapies as ordered by me below.      [ x  ] P.T. 90 mins. /day at least 5 out of 7 days:  [  x ] superficial  modalities prn, [ x  ] A/AAROM, [ x  ] PREs, [ x  ] transfer training,            [ x  ] progressive ambulation, [x   ] stairs                                               [ x  ] O.T. 90 mins. /day at least 5 out of 7 days::  [ x  ] modalities prn,  [ x  ]A/AAROM, [ x  ] PREs, [  x ] functional transfer training, [ x  ] ADL training           [   ] cognitive/ perceptual eval and training, [   ] splint eval                                                  [ X  ] S.L.P:  [ X  ] speech eval, [   ] swallow eval     [ X  ] Neuropsychology eval     [ x  ] Individualized rec. therapy      RATIONALE FOR INPATIENT ADMISSION - Patient demonstrates the following: (check all that apply)  [X] Medically appropriate for acute rehabilitation admission. Requires interdisiplinary therapy consisting of at least PT and OT, at least 3 hrs. a day at least 5 days a week  [X] Has attainable rehab goals with an appropriate initial discharge plan  [X] Has rehabilitation potential (expected to make a significant improvement within a reasonable period of time)  [X] Requires close medical management by a rehab physician, rehab nursing care,  and comprehensive interdisciplinary team (including PT, OT)    [X] Requires evaluation by a physiatrist at least 3 days a week to evaluate and manage and coordinate rehab and medical problems   ASSESSMENT/PLAN    Rehab for Acute Right temporoparietal ICH and craniotomy with hematoma evacuation, left sided hemiparesis and cognitive deficits    64M w/ PMHx of CVA 2020 and 2021 s/p intracranial angioplasty of right MCA and NORMAN w/ subsequent intra-stent occlusion of R MCA/NORMAN intracranial stent, aortic valve insufficiency, implantable loop recorder for prior episodes of syncope/NSVT, chronic anemia, CKD 2, psoriatic arthritis who underwent elective R STA to MCA bypass 06/20. On 6/22 noted to have new weakness and mental status change, CTH showed R temporoparietal ICH with midline shift. Patient underwent craniotomy with hematoma evacuation with improvement in midline shift. Repeat CTH stable, patient clinically improving. Admitted for inpatient rehabilitation for acute right temporoparietal ICH and craniotomy with hematoma evacuation, left sided hemiparesis and cognitive deficits    PLOF: Rolling walker since 02/2024, Independent with ADLs and IADLs  CLOF:  Bed mobility min assist, transfer min assist, gait 20 feet RW min assist    #R temporoparietal ICH after elective R STA to MCA bypass  #Hx CVA 2020 and 2021 s/p intracranial angioplasty of right MCA and NORMAN w/ subsequent intra-stent occlusion of R MCA/NORMAN intracranial stent  New left sided weakness and confusion post procedure  S/P Partial evac of R T/p ICH with improvement of R--L shift   CTH stable on 6/28  EEG negative for any seizure  - c/up Stroke Neurology   - BP goal 110-150  - Q4h neuro checks  - NSGY following - plan to remove staples on 7/6  - Aspirin- 81 mg for bypass patency   - Continue home Effexor qd  - Seroquel 25 mg q hs prn for agitation   - melatonin qhs  - 1:1 observation for impulsivity  - PT/OT/SLP    #Mobile Echodensity on Mitral Valve   TTE Echo Complete w/o Contrast w/ Doppler (6/21/24) - EF 70 to 75%. Moderate (Grade 2) diastolic dysfunction. Asymmatric basal septal hypertrophy (1.6cm).  There is a small independently mobile echodensity attached to the anterior mitral valve leaflet. In the right clinical context this may represent endocarditis. Thickening and calcification of the anterior and posterior mitral valve leaflets.  07/05 GIULIANA performed with evidence of MV Calcification rather than IE  - likely OP f/u Cardiology     #HTN  Goal - < 150  -Continue amlodipine 5 mg qd.   -c/w Losartan 100 mg qd  -labetalol prn for breakthrough    #Stage II CKD   - Baseline 0.9-1.1 , but now around 1.3 ( has trended up to 1.3 prior as well )   - encourage PO intake today, and trend creatinine   - Na goal 140- 150 for perihematomal edema   - on salt tabs 3 tabs q8hrs, goal Na 140-150, taper as tolerates    #LUE + superficial thrombophlebitis  - warmth and elevation  - conservative measures    #Psoriatic Arthritis  Humira last dosed june 9th as per wife and as communicated by pt's rheumatologist; Discussed with rheumatologist Dr Andre, pt to hold Humira while hospitalized     -Pain control: tylenol, heat    -GI/Bowel Mgmt: miralax     -Skin:  No active issues at this time    -FEN     - Diet: DASH      Precautions / PROPHYLAXIS:      - Falls      - DVT prophylaxis: Heparin 5000 units subq q8hrs      MEDICAL PROGNOSIS: GOOD            REHAB POTENTIAL: GOOD             ESTIMATED DISPOSITION: HOME WITH HOME CARE       [ x ]  The goals of the IRF admission were discussed with the patient and or family member, who agreed             ELOS:  [ X   ] 7-14    [    ]  14-21    [    ]  Other    THERAPY ORDERS and INITIAL INDIVIDUALIZED PLAN OF CARE:  This initial individualized interdisciplinary plan of care, which was established by me (the attending physiatrist), is based on elements from the post admission evaluation. The interdisciplinary therapy program is to be at least 3 hrs a day, at least 5 days per week from from physical, occupational and/ or speech therapies as ordered by me below.      [ x  ] P.T. 90 mins. /day at least 5 out of 7 days:  [  x ] superficial  modalities prn, [ x  ] A/AAROM, [ x  ] PREs, [ x  ] transfer training,            [ x  ] progressive ambulation, [x   ] stairs                                               [ x  ] O.T. 90 mins. /day at least 5 out of 7 days::  [ x  ] modalities prn,  [ x  ]A/AAROM, [ x  ] PREs, [  x ] functional transfer training, [ x  ] ADL training           [   ] cognitive/ perceptual eval and training, [   ] splint eval                                                  [ X  ] S.L.P:  [ X  ] speech eval, [   ] swallow eval     [ X  ] Neuropsychology eval     [ x  ] Individualized rec. therapy      RATIONALE FOR INPATIENT ADMISSION - Patient demonstrates the following: (check all that apply)  [X] Medically appropriate for acute rehabilitation admission. Requires interdisiplinary therapy consisting of at least PT and OT, at least 3 hrs. a day at least 5 days a week  [X] Has attainable rehab goals with an appropriate initial discharge plan  [X] Has rehabilitation potential (expected to make a significant improvement within a reasonable period of time)  [X] Requires close medical management by a rehab physician, rehab nursing care,  and comprehensive interdisciplinary team (including PT, OT)    [X] Requires evaluation by a physiatrist at least 3 days a week to evaluate and manage and coordinate rehab and medical problems   ASSESSMENT/PLAN    Rehab for Acute right temporoparietal ICH, s/p craniotomy  with hematoma evacuation, left sided hemiparesis (nondominant), dysarthria and cognitive deficits    64 M w/ PMHx of CVA 2020 and 2021 s/p intracranial angioplasty of right MCA and NORMAN w/ subsequent intra-stent occlusion of R MCA/NORMAN intracranial stent, aortic valve insufficiency, implantable loop recorder for prior episodes of syncope/NSVT, chronic anemia, CKD 2, psoriatic arthritis who underwent elective R STA to MCA bypass 06/20. On 6/22 noted to have new weakness and mental status change, CTH showed R temporoparietal ICH with midline shift. Patient underwent craniotomy with hematoma evacuation with improvement in midline shift. Repeat CTH stable, patient clinically improving. Admitted for inpatient rehabilitation for acute right temporoparietal ICH and craniotomy with hematoma evacuation, left sided hemiparesis and cognitive deficits    PLOF: Rolling walker since 02/2024, Independent with ADLs and IADLs  CLOF:  Bed mobility min assist, transfer min assist, gait 20 feet RW min assist    #Rehab of R temporoparietal ICH after elective R STA to MCA bypass  #Hx CVA 2020 and 2021 s/p intracranial angioplasty of right MCA and NORMAN w/ subsequent intra-stent occlusion of R MCA/NORMAN intracranial stent  New left sided weakness and confusion post procedure  S/P Partial evac of R T/p ICH with improvement of R--L shift   CTH stable on 6/28  EEG negative for any seizure  - c/up Stroke Neurology   - BP goal 110-150  - Q4h neuro checks  - NSGY following - plan to remove staples on 7/6  - Aspirin- 81 mg for bypass patency   - Continue home Effexor qd  - Seroquel 25 mg q hs prn for agitation   - melatonin qhs  - 1:1 observation for impulsivity  - PT/OT/SLP    #Mobile Echodensity on Mitral Valve   TTE Echo Complete w/o Contrast w/ Doppler (6/21/24) - EF 70 to 75%. Moderate (Grade 2) diastolic dysfunction. Asymmetric basal septal hypertrophy (1.6cm).  There is a small independently mobile echodensity attached to the anterior mitral valve leaflet. In the right clinical context this may represent endocarditis. Thickening and calcification of the anterior and posterior mitral valve leaflets.  07/05 GIULIANA performed with evidence of MV Calcification rather than IE  - likely OP f/u Cardiology     #HTN  Goal - < 150  -Continue amlodipine 5 mg qd.   -c/w Losartan 100 mg qd  -labetalol prn for breakthrough    #Stage II CKD   - Baseline 0.9-1.1 , but now around 1.3 ( has trended up to 1.3 prior as well )   - encourage PO intake today, and trend creatinine   - Na goal 140- 150 for perihematomal edema   - on salt tabs 3 tabs q8hrs, goal Na 140-150, taper as tolerates    #LUE + superficial thrombophlebitis  - warmth and elevation  - conservative measures    #Psoriatic Arthritis  Humira last dosed on June 9th as per wife and as communicated by pt's rheumatologist; Discussed with rheumatologist Dr Andre, pt to hold Humira while hospitalized     -Pain control: Tylenol heat    -GI/Bowel Mgmt: Miralax     -Skin:  No active issues at this time    -FEN     - Diet: DASH      Precautions / PROPHYLAXIS:      - Falls      - DVT prophylaxis: Heparin 5000 units subq q8hrs      MEDICAL PROGNOSIS: GOOD            REHAB POTENTIAL: GOOD             ESTIMATED DISPOSITION: HOME WITH HOME CARE       [ x ]  The goals of the IRF admission were discussed with the patient and or family member, who agreed             ELOS:  [ X   ] 7-14    [    ]  14-21    [    ]  Other    THERAPY ORDERS and INITIAL INDIVIDUALIZED PLAN OF CARE:  This initial individualized interdisciplinary plan of care, which was established by me (the attending physiatrist), is based on elements from the post admission evaluation. The interdisciplinary therapy program is to be at least 3 hrs a day, at least 5 days per week from physical, occupational and/ or speech therapies as ordered by me below.      [ x  ] P.T. 90 mins. /day at least 5 out of 7 days:  [  x ] superficial  modalities prn, [ x  ] A/AAROM, [ x  ] PREs, [ x  ] transfer training,            [ x  ] progressive ambulation, [x   ] stairs                                               [ x  ] O.T. 90 mins. /day at least 5 out of 7 days::  [ x  ] modalities prn,  [ x  ]A/AAROM, [ x  ] PREs, [  x ] functional transfer training, [ x  ] ADL training           [x   ] cognitive/ perceptual eval and training, [   ] splint eval                                                  [ X  ] S.L.P:  [ X  ] speech eval, [  x ] swallow eval     [ X  ] Neuropsychology eval on Monday 7/8/24     [ x  ] Individualized rec. therapy      RATIONALE FOR INPATIENT ADMISSION - Patient demonstrates the following: (check all that apply)  [X] Medically appropriate for acute rehabilitation admission. Requires interdisciplinary therapy consisting of at least PT and OT, at least 3 hrs. a day at least 5 days a week  [X] Has attainable rehab goals with an appropriate initial discharge plan  [X] Has rehabilitation potential (expected to make a significant improvement within a reasonable period of time)  [X] Requires close medical management by a rehab physician, rehab nursing care,  and comprehensive interdisciplinary team (including PT, OT)    [X] Requires evaluation by a physiatrist at least 3 days a week to evaluate and manage and coordinate rehab and medical problems

## 2024-07-05 NOTE — H&P ADULT - NSHPSOCIALHISTORY_GEN_ALL_CORE
Living Situation: Lives with Wife [address], [address] within home  PLOF: Rolling walker since 02/2024, Independent with ADLs and IADLs  Retired   Denies smoking history, denies active alcohol use

## 2024-07-05 NOTE — H&P ADULT - NSHPLABSRESULTS_GEN_ALL_CORE
11.1   9.57  )-----------( 570      ( 05 Jul 2024 07:24 )             34.2     07-05    138  |  102  |  22<H>  ----------------------------<  96  4.6   |  26  |  1.3    Ca    9.5      05 Jul 2024 07:24  Phos  3.8     07-04  Mg     2.2     07-04      PT/INR - ( 04 Jul 2024 05:59 )   PT: 11.80 sec;   INR: 1.03 ratio         PTT - ( 04 Jul 2024 05:59 )  PTT:30.1 sec  Urinalysis Basic - ( 05 Jul 2024 07:24 )    Color: x / Appearance: x / SG: x / pH: x  Gluc: 96 mg/dL / Ketone: x  / Bili: x / Urobili: x   Blood: x / Protein: x / Nitrite: x   Leuk Esterase: x / RBC: x / WBC x   Sq Epi: x / Non Sq Epi: x / Bacteria: x                  CT Head No Cont:   ACC: [id number] EXAM:  CT BRAIN   ORDERED BY: SAMARIA MCCLELLAND     PROCEDURE DATE:  06/28/2024          INTERPRETATION:  CLINICAL INDICATION: Follow-up intracranial hemorrhage.    TECHNIQUE: CT of the head was performed without the administration of   intravenous contrast.    COMPARISON: CT head dated 6/25/2024.    FINDINGS:    Stable post right temporal parietal craniotomy. Redemonstrated right   ICA/MCA/NORMAN stent.    Slightly smaller right temporal intraparenchymal hemorrhage with moderate   surrounding edema. Stable volume of the right cerebral convexity subdural   hematoma extending into the anterior falx and right tentorial leaflet.   Stable scattered subarachnoid hemorrhage along the right cerebral sulci.   There is stable mild leftward midline shift.    Redemonstration of chronic lacunar infarcts in the bilateral corona   radiata and basal ganglia, as well as chronic left cerebellar infarct.    The visualized intraorbital contents are normal. Stable mild mucosal   thickening of the paranasal sinuses. The mastoid air cells are aerated.      IMPRESSION:    Slight decreased size of the right temporal intraparenchymal hemorrhage   since the prior head CT from 6/25/2024. Stable volume of the right   cerebral convexity subdural hemorrhages. Stable mild leftward midline   shift.    Stable scattered subarachnoid hemorrhage along the right cerebral sulci.    --- End of Report ---            SHAHZAD ZAMUDIO MD; Attending Radiologist  This document has been electronically signed. Jun 29 202410:22AM (06-28-24 @ 17:12)

## 2024-07-05 NOTE — PATIENT PROFILE ADULT - NSPRONUTRITIONRISK_GEN_A_NUR
No indicators present

## 2024-07-05 NOTE — H&P ADULT - ATTENDING COMMENTS
He is a 65 yo M with a PMHx of CVA 2020 and 2021, s/p intracranial angioplasty of right MCA and NORMAN w/ subsequent intra-stent occlusion of R MCA/NORMAN intracranial stent, aortic valve insufficiency, implantable loop recorder for prior episodes of syncope/NSVT, chronic anemia, CKD 2, psoriatic arthritis who presents for elective R STA to MCA bypass. Post-op his CTH was stable. On assessment in the PACU, he was noted to have new L sided weakness, confusion with SBP in 120s. Started on Phenylephrine gtt for perfusion with some improvement of symptoms. He required NSICU admit.. On 6/22 he was noted to have R temporoparietal ICH, now s/p crani and hematoma evacuation. Repeat imaging has been stable, EEG negative, ASA restarted. BP better controlled. TTE with findings concerning for endocarditis but GIULIANA performed showed no signs of endocarditis. Patient with functional deficits relative to baseline, admitted to acute inpatient rehab. At the present time he amb 20 ft RW min assist, transfers min assist, upper body ADLs min assist, lower body ADLs max assist.. He has observed to have a light of insight into his disability. Premorbidly at baseline he was indep with amb with a RW. He was sup to indep with simple ADLs.  The patient was evaluated by the PM&R team once medically stable. The patient was deemed to be a very good candidate for admission for acute inpatient rehab.   He is a complex case with his right temporoparietal ICH with left hemiparesis. He has cognitive deficits including a lack of insight into his disability. He has dysarthria. 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 He clearly warrants close physiatry follow up. I discussed the case with nursing on the neurology floor and the acute rehab floor.  Given his lack of his insight and his propensity to get out of bed I added nursing fall precautions and 1:1 sit..  He requires the intensity of acute rehab and can benefit from PT, OT and Speech. Neuropsychology consult will be added on Monday.  Close physiatry follow up is required.  Physiatry follow up including monitoring his blood pressure on rehab is required. He requires acute rehab; his needs would not be met at subacute rehab.   #Rehab of R temporoparietal ICH after elective R STA to MCA bypass  #Hx CVA 2020 and 2021 s/p intracranial angioplasty of right MCA and NORMAN w/ subsequent intra-stent occlusion of R MCA/NORMAN intracranial stent  New left sided weakness and confusion post procedure  S/P Partial evac of R T/p ICH with improvement of R--L shift   CTH stable on 6/28  EEG negative for any seizure  - c/up Stroke Neurology   - BP goal 110-150  - Q4h neuro checks  - NSGY following - plan to remove staples on 7/6  - Aspirin- 81 mg for bypass patency   - Continue home Effexor qd  - Seroquel 25 mg q hs prn for agitation   - melatonin qhs  - 1:1 observation for impulsivity  - PT/OT/SLP    #Mobile Echodensity on Mitral Valve   TTE Echo Complete w/o Contrast w/ Doppler (6/21/24) - EF 70 to 75%. Moderate (Grade 2) diastolic dysfunction. Asymmetric basal septal hypertrophy (1.6cm).  There is a small independently mobile echodensity attached to the anterior mitral valve leaflet. In the right clinical context this may represent endocarditis. Thickening and calcification of the anterior and posterior mitral valve leaflets.  07/05 GIULIANA performed with evidence of MV Calcification rather than IE  - likely OP f/u Cardiology     #HTN  Goal - < 150  -Continue amlodipine 5 mg qd.   -c/w Losartan 100 mg qd  -labetalol prn for breakthrough    #Stage II CKD   - Baseline 0.9-1.1 , but now around 1.3 ( has trended up to 1.3 prior as well )   - encourage PO intake today, and trend creatinine   - Na goal 140- 150 for perihematomal edema   - on salt tabs 3 tabs q8hrs, goal Na 140-150, taper as tolerates    #LUE + superficial thrombophlebitis  - warmth and elevation  - conservative measures    #Psoriatic Arthritis  Humira last dosed on June 9th as per wife and as communicated by pt's rheumatologist; Discussed with rheumatologist Dr Andre, pt to hold Humira while hospitalized     -Pain control: Tylenol heat    -GI/Bowel Mgmt: Miralax     -Skin:  No active issues at this time    -FEN     - Diet: DASH

## 2024-07-05 NOTE — H&P ADULT - NSHPREVIEWOFSYSTEMS_GEN_ALL_CORE
Constiutional:    [   ] WNL           [   ] poor appetite   [   ] insomnia   [   ] tired   Cardio:                [   ] WNL           [   ] CP   [   ] MORALES   [   ] palpitations               Resp:                   [ X  ] WNL           [   ] SOB   [   ] cough   [   ] wheezing   GI:                        [ X  ] WNL           [   ] constipation   [   ] diarrhea   [   ] abdominal pain   [   ] nausea   [   ] emesis                                :                      [ X  ] WNL           [   ] GIBSON  [   ] dysuria   [   ] difficulty voiding             Endo:                   [ X  ] WNL          [   ] polyuria   [   ] temperature intolerance                 Skin:                     [   ] WNL          [   ] pain   [ X  ] wound right temporal craniotomy wound, stapled  [   ] rash [ X ]  bruising, right arm medial bicep   MSK:                    [ X  ] WNL          [   ] muscle pain   [   ] joint pain/ stiffness   [   ] muscle tenderness   [   ] swelling   Neuro:                 [   ] WNL          [   ] HA   [   ] change in vision   [   ] tremor   [ X  ] weakness   [   ]dysphagia              Cognitive:           [   ] WNL           [   ]confusion   [ X ] impulsiveness [ X ] slowed cognition   Psych:                  [ X  ] WNL           [   ] hallucinations   [   ]agitation   [   ] delusion   [   ]depression Constitutional:    [   ] WNL           [   ] poor appetite   [   ] insomnia   [   ] tired   Cardio:                [   ] WNL           [   ] CP   [   ] MORALES   [   ] palpitations               Resp:                   [ X  ] WNL           [   ] SOB   [   ] cough   [   ] wheezing   GI:                        [ X  ] WNL           [   ] constipation   [   ] diarrhea   [   ] abdominal pain   [   ] nausea   [   ] emesis                                :                      [ X  ] WNL           [   ] GIBSON  [   ] dysuria   [   ] difficulty voiding             Endo:                   [ X  ] WNL          [   ] polyuria   [   ] temperature intolerance                 Skin:                     [   ] WNL          [   ] pain   [ X  ] wound right temporal craniotomy wound, stapled  [   ] rash [ X ]  bruising, right arm medial bicep   MSK:                    [ X  ] WNL          [   ] muscle pain   [   ] joint pain/ stiffness   [   ] muscle tenderness   [   ] swelling   Neuro:                 [   ] WNL          [   ] HA   [   ] change in vision   [   ] tremor   [ X  ] weakness   [   ]dysphagia              Cognitive:           [   ] WNL           [   ]confusion   [ X ] impulsiveness [ X ] slowed cognition   Psych:                  [ X  ] WNL           [   ] hallucinations   [   ]agitation   [   ] delusion   [   ]depression

## 2024-07-05 NOTE — H&P ADULT - REASON FOR ADMISSION
Rehab for Acute Right tempoparietal ICH and craniotomy with hematoma evacuation, left sided hemiparesis and cognitive deficits Rehab for Acute Right temporoparietal ICH, s/p  craniotomy with hematoma evacuation, left sided hemiparesis (nondominant), dysarthria  and cognitive deficits

## 2024-07-05 NOTE — H&P ADULT - TIME BILLING
educating patient and family regarding the expectation and goals of the inpatient rehab process, speaking with the nurses and physicians and acute care therapists on the acute med/ surg service regarding the patient's course, speaking and coordinating with the nurses, ACP and residents on the accepting rehab floor and chart review and performing an intensive teaching H&P with the rehab residents.

## 2024-07-05 NOTE — PATIENT PROFILE ADULT - FALL HARM RISK - TYPE OF ASSESSMENT
Detail Level: Detailed
Procedure To Be Performed At Next Visit: Intralesional Kenalog
Introduction Text (Please End With A Colon): The following procedure was deferred:
X Size Of Lesion In Cm (Optional): 0
Admission

## 2024-07-05 NOTE — PATIENT PROFILE ADULT - INTERNATIONAL TRAVEL
Madilyn Libman is a 56 y.o. female presenting to the ED for cough chest pressure and shortness of breth, beginning 0900 ago. The complaint has been intermittent, moderate in severity, and worsened by nothing. 55 yo f who was recently dx w NONISCHEMIC CARDIOmyopathy placed on metoprolol and lasix. Pt notes since she was discharged has had a dry cough substernal chest pressure and increased sob. Pt denies cp, abd pain, vomiting diarrhea, fever, headahce. She did have a recent abnormal stress test with inferior apical changes c/w previous inf wall mi, also she had an EF 30%, per her no one discussd a life vest or poteniality of ventricular arrythmias,. Pt denies fever, vomiting, diarrhea, ut isx, or other illness. Review of Systems:   Pertinent positives and negatives are stated within HPI, all other systems reviewed and are negative.          --------------------------------------------- PAST HISTORY ---------------------------------------------  Past Medical History:  has a past medical history of Asthma, Hyperlipidemia, Hypertension, and Pneumonia. Past Surgical History:  has a past surgical history that includes  section and Hysterectomy. Social History:  reports that she has quit smoking. She has never used smokeless tobacco. She reports current alcohol use. Family History: family history is not on file. The patients home medications have been reviewed.     Allergies: Bee venom and Pcn [penicillins]    -------------------------------------------------- RESULTS -------------------------------------------------  All laboratory and radiology results have been personally reviewed by myself   LABS:  Results for orders placed or performed during the hospital encounter of 11/15/20   CBC Auto Differential   Result Value Ref Range    WBC 11.4 4.5 - 11.5 E9/L    RBC 4.89 3.50 - 5.50 E12/L    Hemoglobin 15.4 11.5 - 15.5 g/dL    Hematocrit 47.2 34.0 - 48.0 %    MCV 96.5 80.0 - 99.9 fL reviewed. BP (!) 189/105   Pulse 103   Temp 97.9 °F (36.6 °C) (Oral)   Resp 18   Ht 5' 2\" (1.575 m)   Wt 175 lb (79.4 kg)   SpO2 94%   BMI 32.01 kg/m²   Oxygen Saturation Interpretation: Normal      ---------------------------------------------------PHYSICAL EXAM--------------------------------------    Physical Exam  Vitals signs reviewed. Constitutional:       General: She is not in acute distress. Appearance: Normal appearance. She is not toxic-appearing. HENT:      Head: Normocephalic and atraumatic. Right Ear: External ear normal.      Left Ear: External ear normal.      Nose: Nose normal. No congestion. Mouth/Throat:      Mouth: Mucous membranes are moist.      Pharynx: Oropharynx is clear. No posterior oropharyngeal erythema. Eyes:      Extraocular Movements: Extraocular movements intact. Pupils: Pupils are equal, round, and reactive to light. Neck:      Musculoskeletal: Normal range of motion and neck supple. No muscular tenderness. Cardiovascular:      Rate and Rhythm: Normal rate and regular rhythm. Pulses: Normal pulses. Heart sounds: No murmur. Pulmonary:      Effort: Pulmonary effort is normal.      Breath sounds: Decreased breath sounds present. No wheezing or rhonchi. Chest:      Chest wall: No tenderness. Abdominal:      General: Bowel sounds are normal.      Tenderness: There is no abdominal tenderness. There is no right CVA tenderness, left CVA tenderness or guarding. Musculoskeletal:         General: No swelling or deformity. Right lower leg: She exhibits no tenderness. Edema present. Left lower leg: She exhibits no tenderness. Edema present. Skin:     General: Skin is warm and dry. Capillary Refill: Capillary refill takes less than 2 seconds. Neurological:      General: No focal deficit present. Mental Status: She is alert and oriented to person, place, and time.    Psychiatric:         Mood and Affect: Mood normal. No

## 2024-07-06 RX ADMIN — MAGNESIUM OXIDE 400 MILLIGRAM(S): 400 TABLET ORAL at 18:06

## 2024-07-06 RX ADMIN — HEPARIN SODIUM 5000 UNIT(S): 50 INJECTION, SOLUTION INTRAVENOUS at 21:47

## 2024-07-06 RX ADMIN — HEPARIN SODIUM 5000 UNIT(S): 50 INJECTION, SOLUTION INTRAVENOUS at 14:27

## 2024-07-06 RX ADMIN — AMLODIPINE BESYLATE 5 MILLIGRAM(S): 2.5 TABLET ORAL at 06:44

## 2024-07-06 RX ADMIN — MAGNESIUM OXIDE 400 MILLIGRAM(S): 400 TABLET ORAL at 08:39

## 2024-07-06 RX ADMIN — PANTOPRAZOLE SODIUM 40 MILLIGRAM(S): 40 INJECTION, POWDER, FOR SOLUTION INTRAVENOUS at 06:44

## 2024-07-06 RX ADMIN — Medication 3 GRAM(S): at 14:27

## 2024-07-06 RX ADMIN — Medication 2 TABLET(S): at 21:47

## 2024-07-06 RX ADMIN — MAGNESIUM OXIDE 400 MILLIGRAM(S): 400 TABLET ORAL at 12:47

## 2024-07-06 RX ADMIN — ASPIRIN 81 MILLIGRAM(S): 325 TABLET, FILM COATED ORAL at 12:45

## 2024-07-06 RX ADMIN — POLYETHYLENE GLYCOL 3350 17 GRAM(S): 1 POWDER ORAL at 06:44

## 2024-07-06 RX ADMIN — Medication 3 GRAM(S): at 06:44

## 2024-07-06 RX ADMIN — HEPARIN SODIUM 5000 UNIT(S): 50 INJECTION, SOLUTION INTRAVENOUS at 06:46

## 2024-07-06 RX ADMIN — Medication 3 GRAM(S): at 21:47

## 2024-07-06 RX ADMIN — VENLAFAXINE 37.5 MILLIGRAM(S): 37.5 CAPSULE, EXTENDED RELEASE ORAL at 14:27

## 2024-07-06 RX ADMIN — LOSARTAN POTASSIUM 100 MILLIGRAM(S): 100 TABLET, FILM COATED ORAL at 06:44

## 2024-07-06 RX ADMIN — Medication 5 MILLIGRAM(S): at 21:47

## 2024-07-06 RX ADMIN — Medication 1 APPLICATION(S): at 06:45

## 2024-07-06 RX ADMIN — POLYETHYLENE GLYCOL 3350 17 GRAM(S): 1 POWDER ORAL at 18:06

## 2024-07-06 NOTE — PROGRESS NOTE ADULT - SUBJECTIVE AND OBJECTIVE BOX
Patient is a 64y old  Male who presents with a chief complaint of Rehab for Acute Right temporoparietal ICH, s/p  craniotomy with hematoma evacuation, left sided hemiparesis (nondominant), dysarthria  and cognitive deficits (05 Jul 2024 16:46)      HPI:  64M w/ PMHx of CVA 2020 and 2021 s/p intracranial angioplasty of right MCA and NORMAN w/ subsequent intra-stent occlusion of R MCA/NORMAN intracranial stent, aortic valve insufficiency, implantable loop recorder for prior episodes of syncope/NSVT, chronic anemia, CKD 2, psoriatic arthritis who presents for R STA to MCA bypass. Post-op was CTH stable. On assessment in PACU, noted to have new L sided weakness, confusion at SBP 120s. Started on Phenylephrine gtt for perfusion with some improvement of symptoms. Pt admitted to NSICU. On 6/22 he was noted to have R temporoparietal ICH, now s/p crani and hematoma evacuation. Repeat imaging has been stable, EEG negative, ASA restarted. BP better controlled. TTE with findings concerning for endocarditis, GIULIANA performed showed no signs of endocarditis. Patient with functional deficits relative to baseline, admitted to acute inpatient rehab.    The patient was evaluated by the PM&R team once medically stable. The patient was found to have functional limitations in terms of physical strength/mobility and ability to carry out ADLs (self care, transfers, ambulation). The patient was deemed to be a good candidate for admission for acute inpatient rehab.     Living Situation: Lives with Wife 1 VIANEY, 12 Steps within home  PLOF: Rolling walker since 02/2024, Independent with ADLs and IADLs (05 Jul 2024 16:46)      I examined the patient and reviewed the chart. There have been no significant changes since my history and physical except where documented below.    TODAY'S SUBJECTIVE & REVIEW OF SYMPTOMS  Patient was seen and assessed at bedside.   No overnight events.   Patient denies any new complaints at this time.   Tolerating PT/OT.   Tolerating oral diet.   Voiding and passing stool spontaneously.   Vital signs reviewed.    Constitutional:    [   ] WNL           [   ] poor appetite   [   ] insomnia   [   ] tired   Cardio:                [   ] WNL           [   ] CP   [   ] MORALES   [   ] palpitations               Resp:                   [ X  ] WNL           [   ] SOB   [   ] cough   [   ] wheezing   GI:                        [ X  ] WNL           [   ] constipation   [   ] diarrhea   [   ] abdominal pain   [   ] nausea   [   ] emesis                                :                      [ X  ] WNL           [   ] GIBSON  [   ] dysuria   [   ] difficulty voiding             Endo:                   [ X  ] WNL          [   ] polyuria   [   ] temperature intolerance                 Skin:                     [   ] WNL          [   ] pain   [ X  ] wound right temporal craniotomy wound, stapled  [   ] rash [ X ]  bruising, right arm medial bicep   MSK:                    [ X  ] WNL          [   ] muscle pain   [   ] joint pain/ stiffness   [   ] muscle tenderness   [   ] swelling   Neuro:                 [   ] WNL          [   ] HA   [   ] change in vision   [   ] tremor   [ X  ] weakness   [   ]dysphagia              Cognitive:           [   ] WNL           [   ]confusion   [ X ] impulsiveness [ X ] slowed cognition   Psych:                  [ X  ] WNL           [   ] hallucinations   [   ]agitation   [   ] delusion   [   ]depression    PHYSICAL EXAM    Vital Signs Last 24 Hrs  T(C): 36.8 (06 Jul 2024 06:41), Max: 36.9 (05 Jul 2024 18:08)  T(F): 98.3 (06 Jul 2024 06:41), Max: 98.5 (05 Jul 2024 18:08)  HR: 58 (06 Jul 2024 06:41) (58 - 80)  BP: 138/79 (06 Jul 2024 06:41) (117/74 - 158/133)  BP(mean): 88 (05 Jul 2024 20:43) (88 - 107)  RR: 18 (06 Jul 2024 06:41) (16 - 18)  SpO2: 97% (06 Jul 2024 06:41) (96% - 98%)    Parameters below as of 05 Jul 2024 20:43  Patient On (Oxygen Delivery Method): room air    General:[ X  ] NAD, Resting Comfortable,   [   ] other:                                HEENT: [   ] NC/AT, EOMI, PERRL , Normal Conjunctivae,   [ X  ] other: right sided temporal swelling, craniotomy wound with staples in place  Cardio: [ X  ] RRR, no murmur,   [   ] other:                              Pulm: [ X  ] No Respiratory Distress,  Lungs CTAB,   [   ] other:                       Abdomen: [ X ]ND/NT, Soft,   [   ] other:    : [ X ] NO GIBSON CATHETER, [   ] GIBSON CATHETER- no meatal tear, no discharge, [   ] other:                                            MSK: [ X  ] No joint swelling, Full ROM,   [   ] other:                                         Ext: [ X  ]No C/C/E, No calf tenderness,   [   ]other:    Skin: [   ]intact,   [ X  ] other: right forearm and medial bicep bruising. right sided temporal swelling, craniotomy wound with staples in place                                                              Neurological Examination:  Cognitive: [  X  ] AAO x 3,   [  X  ]  other:  slowed responses, poor insight into deficits                                                                    Attention:  [  X  ] intact serial additions, no hemineglect   [    ]  other:                            Memory: [  X  ] intact,    [    ]  other:     Mood/Affect: [    ] wnl,    [  X  ]  other: blunted, distant, slowed                                                                            Communication: [  X  ]Fluent, no dysarthria, following commands:  [    ] other:   CN II - XII:  [    ] intact,  [  X  ] other: mild right sided facial droop                                                                                        Motor:   RIGHT UE: [ X  ] WNL,  [   ] other:  LEFT    UE: [   ] WNL,  [ X  ] other: Sh Flexion 4/5, EF 4/5, Elbow extension 4/5, finger  4/5. POSITIVE pronator drift  RIGHT LE: [ x  ] WNL,  [   ] other:   LEFT    LE: [   ] WNL,  [ x  ] other: HF 4/5, KF 4/5, KE 4/5, ADF 5/5, APF 5/5. POSITIVE clonus 3 beats left ankle    Tone: [  X  ] wnl,   [    ]  other:  DTRs: [  X ]symmetric, [   ] other:  Coordination:   [  X  ] intact,   [    ] other:                                                                           Sensory: [  X  ] Intact to light touch,   [    ] other:  acetaminophen     Tablet .. 650 milliGRAM(s) Oral every 6 hours PRN  amLODIPine   Tablet 5 milliGRAM(s) Oral daily  aspirin  chewable 81 milliGRAM(s) Oral daily  chlorhexidine 2% Cloths 1 Application(s) Topical <User Schedule>  heparin   Injectable 5000 Unit(s) SubCutaneous every 8 hours  labetalol Injectable 10 milliGRAM(s) IV Push every 2 hours PRN  losartan 100 milliGRAM(s) Oral daily  magnesium oxide 400 milliGRAM(s) Oral three times a day with meals  melatonin 5 milliGRAM(s) Oral at bedtime  pantoprazole    Tablet 40 milliGRAM(s) Oral before breakfast  polyethylene glycol 3350 17 Gram(s) Oral two times a day  QUEtiapine 12.5 milliGRAM(s) Oral at bedtime PRN  senna 2 Tablet(s) Oral at bedtime  sodium chloride 3 Gram(s) Oral every 8 hours  venlafaxine 37.5 milliGRAM(s) Oral daily      RECENT LABS/IMAGING                        11.1   9.57  )-----------( 570      ( 05 Jul 2024 07:24 )             34.2     07-05    138  |  102  |  22<H>  ----------------------------<  96  4.6   |  26  |  1.3    Ca    9.5      05 Jul 2024 07:24        Urinalysis Basic - ( 05 Jul 2024 07:24 )    Color: x / Appearance: x / SG: x / pH: x  Gluc: 96 mg/dL / Ketone: x  / Bili: x / Urobili: x   Blood: x / Protein: x / Nitrite: x   Leuk Esterase: x / RBC: x / WBC x   Sq Epi: x / Non Sq Epi: x / Bacteria: x Patient is a 64y old  Male who presents with a chief complaint of Rehab for Acute Right temporoparietal ICH, s/p  craniotomy with hematoma evacuation, left sided hemiparesis (nondominant), dysarthria  and cognitive deficits (05 Jul 2024 16:46)      HPI:  64M w/ PMHx of CVA 2020 and 2021 s/p intracranial angioplasty of right MCA and NORMAN w/ subsequent intra-stent occlusion of R MCA/NORMAN intracranial stent, aortic valve insufficiency, implantable loop recorder for prior episodes of syncope/NSVT, chronic anemia, CKD 2, psoriatic arthritis who presents for R STA to MCA bypass. Post-op was CTH stable. On assessment in PACU, noted to have new L sided weakness, confusion at SBP 120s. Started on Phenylephrine gtt for perfusion with some improvement of symptoms. Pt admitted to NSICU. On 6/22 he was noted to have R temporoparietal ICH, now s/p crani and hematoma evacuation. Repeat imaging has been stable, EEG negative, ASA restarted. BP better controlled. TTE with findings concerning for endocarditis, GIULIANA performed showed no signs of endocarditis. Patient with functional deficits relative to baseline, admitted to acute inpatient rehab.    The patient was evaluated by the PM&R team once medically stable. The patient was found to have functional limitations in terms of physical strength/mobility and ability to carry out ADLs (self care, transfers, ambulation). The patient was deemed to be a good candidate for admission for acute inpatient rehab.     Living Situation: Lives with Wife 1 VIANEY, [address] within home  PLOF: Rolling walker since 02/2024, Independent with ADLs and IADLs (05 Jul 2024 16:46)      I examined the patient and reviewed the chart. There have been no significant changes since my history and physical except where documented below.    TODAY'S SUBJECTIVE & REVIEW OF SYMPTOMS  Patient was seen and assessed at bedside.   No overnight events.   Patient denies any new complaints at this time.   To begin therapies today.   Tolerating oral diet.   Voiding and passing stool spontaneously.   Vital signs reviewed.  Admission labs pending    Constitutional:    [   ] WNL           [   ] poor appetite   [   ] insomnia   [   ] tired   Cardio:                [   ] WNL           [   ] CP   [   ] MORALES   [   ] palpitations               Resp:                   [ X  ] WNL           [   ] SOB   [   ] cough   [   ] wheezing   GI:                        [ X  ] WNL           [   ] constipation   [   ] diarrhea   [   ] abdominal pain   [   ] nausea   [   ] emesis                                :                      [ X  ] WNL           [   ] GIBSON  [   ] dysuria   [   ] difficulty voiding             Endo:                   [ X  ] WNL          [   ] polyuria   [   ] temperature intolerance                 Skin:                     [   ] WNL          [   ] pain   [ X  ] wound right temporal craniotomy wound, stapled  [   ] rash [ X ]  bruising, right arm medial bicep   MSK:                    [ X  ] WNL          [   ] muscle pain   [   ] joint pain/ stiffness   [   ] muscle tenderness   [   ] swelling   Neuro:                 [   ] WNL          [   ] HA   [   ] change in vision   [   ] tremor   [ X  ] weakness   [   ]dysphagia              Cognitive:           [   ] WNL           [   ]confusion   [ X ] impulsiveness [ X ] slowed cognition   Psych:                  [ X  ] WNL           [   ] hallucinations   [   ]agitation   [   ] delusion   [   ]depression    PHYSICAL EXAM    Vital Signs Last 24 Hrs  T(C): 36.8 (06 Jul 2024 06:41), Max: 36.9 (05 Jul 2024 18:08)  T(F): 98.3 (06 Jul 2024 06:41), Max: 98.5 (05 Jul 2024 18:08)  HR: 58 (06 Jul 2024 06:41) (58 - 80)  BP: 138/79 (06 Jul 2024 06:41) (117/74 - 158/133)  BP(mean): 88 (05 Jul 2024 20:43) (88 - 107)  RR: 18 (06 Jul 2024 06:41) (16 - 18)  SpO2: 97% (06 Jul 2024 06:41) (96% - 98%)    Parameters below as of 05 Jul 2024 20:43  Patient On (Oxygen Delivery Method): room air    General:[ X  ] NAD, Resting Comfortable,   [   ] other:                                HEENT: [   ] NC/AT, EOMI, PERRL , Normal Conjunctivae,   [ X  ] other: right sided temporal swelling, craniotomy wound with staples in place  Cardio: [ X  ] RRR, no murmur,   [   ] other:                              Pulm: [ X  ] No Respiratory Distress,  Lungs CTAB,   [   ] other:                       Abdomen: [ X ]ND/NT, Soft,   [   ] other:    : [ X ] NO GIBSON CATHETER, [   ] GIBSON CATHETER- no meatal tear, no discharge, [   ] other:                                            MSK: [ X  ] No joint swelling, Full ROM,   [   ] other:                                         Ext: [ X  ]No C/C/E, No calf tenderness,   [   ]other:    Skin: [   ]intact,   [ X  ] other: right forearm and medial bicep bruising. right sided temporal swelling, craniotomy wound with staples in place                                                              Neurological Examination:  Cognitive: [  X  ] AAO x 3,   [  X  ]  other:  slowed responses, poor insight into deficits                                                                    Attention:  [  X  ] intact serial additions, no hemineglect   [    ]  other:                            Memory: [  X  ] intact,    [    ]  other:     Mood/Affect: [    ] wnl,    [  X  ]  other: blunted, distant, slowed                                                                            Communication: [  X  ]Fluent, no dysarthria, following commands:  [    ] other:   CN II - XII:  [    ] intact,  [  X  ] other: mild right sided facial droop                                                                                        Motor:   RIGHT UE: [ X  ] WNL,  [   ] other:  LEFT    UE: [   ] WNL,  [ X  ] other: Sh Flexion 4/5, EF 4/5, Elbow extension 4/5, finger  4/5. POSITIVE pronator drift  RIGHT LE: [ x  ] WNL,  [   ] other:   LEFT    LE: [   ] WNL,  [ x  ] other: HF 4/5, KF 4/5, KE 4/5, ADF 5/5, APF 5/5. POSITIVE clonus 3 beats left ankle    Tone: [  X  ] wnl,   [    ]  other:  DTRs: [  X ]symmetric, [   ] other:  Coordination:   [  X  ] intact,   [    ] other:                                                                           Sensory: [  X  ] Intact to light touch,   [    ] other:  acetaminophen     Tablet .. 650 milliGRAM(s) Oral every 6 hours PRN  amLODIPine   Tablet 5 milliGRAM(s) Oral daily  aspirin  chewable 81 milliGRAM(s) Oral daily  chlorhexidine 2% Cloths 1 Application(s) Topical <User Schedule>  heparin   Injectable 5000 Unit(s) SubCutaneous every 8 hours  labetalol Injectable 10 milliGRAM(s) IV Push every 2 hours PRN  losartan 100 milliGRAM(s) Oral daily  magnesium oxide 400 milliGRAM(s) Oral three times a day with meals  melatonin 5 milliGRAM(s) Oral at bedtime  pantoprazole    Tablet 40 milliGRAM(s) Oral before breakfast  polyethylene glycol 3350 17 Gram(s) Oral two times a day  QUEtiapine 12.5 milliGRAM(s) Oral at bedtime PRN  senna 2 Tablet(s) Oral at bedtime  sodium chloride 3 Gram(s) Oral every 8 hours  venlafaxine 37.5 milliGRAM(s) Oral daily      RECENT LABS/IMAGING                        11.1   9.57  )-----------( 570      ( 05 Jul 2024 07:24 )             34.2     07-05    138  |  102  |  22<H>  ----------------------------<  96  4.6   |  26  |  1.3    Ca    9.5      05 Jul 2024 07:24        Urinalysis Basic - ( 05 Jul 2024 07:24 )    Color: x / Appearance: x / SG: x / pH: x  Gluc: 96 mg/dL / Ketone: x  / Bili: x / Urobili: x   Blood: x / Protein: x / Nitrite: x   Leuk Esterase: x / RBC: x / WBC x   Sq Epi: x / Non Sq Epi: x / Bacteria: x

## 2024-07-06 NOTE — PROGRESS NOTE ADULT - ASSESSMENT
ASSESSMENT/PLAN    Rehab for Acute right temporoparietal ICH, s/p craniotomy  with hematoma evacuation, left sided hemiparesis (nondominant), dysarthria and cognitive deficits    64 M w/ PMHx of CVA 2020 and 2021 s/p intracranial angioplasty of right MCA and NORMAN w/ subsequent intra-stent occlusion of R MCA/NORMAN intracranial stent, aortic valve insufficiency, implantable loop recorder for prior episodes of syncope/NSVT, chronic anemia, CKD 2, psoriatic arthritis who underwent elective R STA to MCA bypass 06/20. On 6/22 noted to have new weakness and mental status change, CTH showed R temporoparietal ICH with midline shift. Patient underwent craniotomy with hematoma evacuation with improvement in midline shift. Repeat CTH stable, patient clinically improving. Admitted for inpatient rehabilitation for acute right temporoparietal ICH and craniotomy with hematoma evacuation, left sided hemiparesis and cognitive deficits    PLOF: Rolling walker since 02/2024, Independent with ADLs and IADLs  CLOF:  Bed mobility min assist, transfer min assist, gait 20 feet RW min assist    #Rehab of R temporoparietal ICH after elective R STA to MCA bypass  #Hx CVA 2020 and 2021 s/p intracranial angioplasty of right MCA and NORMAN w/ subsequent intra-stent occlusion of R MCA/NORMAN intracranial stent  New left sided weakness and confusion post procedure  S/P Partial evac of R T/p ICH with improvement of R--L shift   CTH stable on 6/28  EEG negative for any seizure  - c/up Stroke Neurology   - BP goal 110-150  - Q4h neuro checks  - NSGY following - plan to remove staples on 7/6  - Aspirin- 81 mg for bypass patency   - Continue home Effexor qd  - Seroquel 25 mg q hs prn for agitation   - melatonin qhs  - 1:1 observation for impulsivity  - PT/OT/SLP    #Mobile Echodensity on Mitral Valve   TTE Echo Complete w/o Contrast w/ Doppler (6/21/24) - EF 70 to 75%. Moderate (Grade 2) diastolic dysfunction. Asymmetric basal septal hypertrophy (1.6cm).  There is a small independently mobile echodensity attached to the anterior mitral valve leaflet. In the right clinical context this may represent endocarditis. Thickening and calcification of the anterior and posterior mitral valve leaflets.  07/05 GIULIANA performed with evidence of MV Calcification rather than IE  - likely OP f/u Cardiology     #HTN  Goal - < 150  -Continue amlodipine 5 mg qd.   -c/w Losartan 100 mg qd  -labetalol prn for breakthrough    #Stage II CKD   - Baseline 0.9-1.1 , but now around 1.3 ( has trended up to 1.3 prior as well )   - encourage PO intake today, and trend creatinine   - Na goal 140- 150 for perihematomal edema   - on salt tabs 3 tabs q8hrs, goal Na 140-150, taper as tolerates    #LUE + superficial thrombophlebitis  - warmth and elevation  - conservative measures    #Psoriatic Arthritis  Humira last dosed on June 9th as per wife and as communicated by pt's rheumatologist; Discussed with rheumatologist Dr Andre, pt to hold Humira while hospitalized     -Pain control: Tylenol heat  -GI/Bowel Mgmt: Miralax  -Skin:  No active issues at this time  -FEN   - Diet: DASH  Precautions / PROPHYLAXIS:    - Falls  - DVT prophylaxis: Heparin 5000 units subq q8hrs

## 2024-07-07 LAB
ALBUMIN SERPL ELPH-MCNC: 4 G/DL — SIGNIFICANT CHANGE UP (ref 3.5–5.2)
ALP SERPL-CCNC: 91 U/L — SIGNIFICANT CHANGE UP (ref 30–115)
ALT FLD-CCNC: 28 U/L — SIGNIFICANT CHANGE UP (ref 0–41)
ANION GAP SERPL CALC-SCNC: 7 MMOL/L — SIGNIFICANT CHANGE UP (ref 7–14)
AST SERPL-CCNC: 20 U/L — SIGNIFICANT CHANGE UP (ref 0–41)
BASOPHILS # BLD AUTO: 0.09 K/UL — SIGNIFICANT CHANGE UP (ref 0–0.2)
BASOPHILS NFR BLD AUTO: 1.1 % — HIGH (ref 0–1)
BILIRUB SERPL-MCNC: 0.4 MG/DL — SIGNIFICANT CHANGE UP (ref 0.2–1.2)
BUN SERPL-MCNC: 20 MG/DL — SIGNIFICANT CHANGE UP (ref 10–20)
CALCIUM SERPL-MCNC: 9.5 MG/DL — SIGNIFICANT CHANGE UP (ref 8.4–10.5)
CHLORIDE SERPL-SCNC: 104 MMOL/L — SIGNIFICANT CHANGE UP (ref 98–110)
CO2 SERPL-SCNC: 25 MMOL/L — SIGNIFICANT CHANGE UP (ref 17–32)
CREAT SERPL-MCNC: 1.2 MG/DL — SIGNIFICANT CHANGE UP (ref 0.7–1.5)
EGFR: 67 ML/MIN/1.73M2 — SIGNIFICANT CHANGE UP
EOSINOPHIL # BLD AUTO: 0.25 K/UL — SIGNIFICANT CHANGE UP (ref 0–0.7)
EOSINOPHIL NFR BLD AUTO: 3 % — SIGNIFICANT CHANGE UP (ref 0–8)
GLUCOSE SERPL-MCNC: 117 MG/DL — HIGH (ref 70–99)
HCT VFR BLD CALC: 36.7 % — LOW (ref 42–52)
HGB BLD-MCNC: 11.3 G/DL — LOW (ref 14–18)
IMM GRANULOCYTES NFR BLD AUTO: 0.8 % — HIGH (ref 0.1–0.3)
LYMPHOCYTES # BLD AUTO: 2.82 K/UL — SIGNIFICANT CHANGE UP (ref 1.2–3.4)
LYMPHOCYTES # BLD AUTO: 34.1 % — SIGNIFICANT CHANGE UP (ref 20.5–51.1)
MCHC RBC-ENTMCNC: 27.9 PG — SIGNIFICANT CHANGE UP (ref 27–31)
MCHC RBC-ENTMCNC: 30.8 G/DL — LOW (ref 32–37)
MCV RBC AUTO: 90.6 FL — SIGNIFICANT CHANGE UP (ref 80–94)
MONOCYTES # BLD AUTO: 0.93 K/UL — HIGH (ref 0.1–0.6)
MONOCYTES NFR BLD AUTO: 11.3 % — HIGH (ref 1.7–9.3)
NEUTROPHILS # BLD AUTO: 4.1 K/UL — SIGNIFICANT CHANGE UP (ref 1.4–6.5)
NEUTROPHILS NFR BLD AUTO: 49.7 % — SIGNIFICANT CHANGE UP (ref 42.2–75.2)
NRBC # BLD: 0 /100 WBCS — SIGNIFICANT CHANGE UP (ref 0–0)
PLATELET # BLD AUTO: 627 K/UL — HIGH (ref 130–400)
PMV BLD: 10.9 FL — HIGH (ref 7.4–10.4)
POTASSIUM SERPL-MCNC: 4.8 MMOL/L — SIGNIFICANT CHANGE UP (ref 3.5–5)
POTASSIUM SERPL-SCNC: 4.8 MMOL/L — SIGNIFICANT CHANGE UP (ref 3.5–5)
PROT SERPL-MCNC: 6.8 G/DL — SIGNIFICANT CHANGE UP (ref 6–8)
RBC # BLD: 4.05 M/UL — LOW (ref 4.7–6.1)
RBC # FLD: 16.9 % — HIGH (ref 11.5–14.5)
SODIUM SERPL-SCNC: 136 MMOL/L — SIGNIFICANT CHANGE UP (ref 135–146)
WBC # BLD: 8.26 K/UL — SIGNIFICANT CHANGE UP (ref 4.8–10.8)
WBC # FLD AUTO: 8.26 K/UL — SIGNIFICANT CHANGE UP (ref 4.8–10.8)

## 2024-07-07 RX ORDER — AMLODIPINE BESYLATE 2.5 MG/1
5 TABLET ORAL DAILY
Refills: 0 | Status: DISCONTINUED | OUTPATIENT
Start: 2024-07-07 | End: 2024-07-17

## 2024-07-07 RX ADMIN — HEPARIN SODIUM 5000 UNIT(S): 50 INJECTION, SOLUTION INTRAVENOUS at 13:21

## 2024-07-07 RX ADMIN — Medication 3 GRAM(S): at 21:26

## 2024-07-07 RX ADMIN — MAGNESIUM OXIDE 400 MILLIGRAM(S): 400 TABLET ORAL at 08:13

## 2024-07-07 RX ADMIN — VENLAFAXINE 37.5 MILLIGRAM(S): 37.5 CAPSULE, EXTENDED RELEASE ORAL at 12:38

## 2024-07-07 RX ADMIN — POLYETHYLENE GLYCOL 3350 17 GRAM(S): 1 POWDER ORAL at 06:18

## 2024-07-07 RX ADMIN — HEPARIN SODIUM 5000 UNIT(S): 50 INJECTION, SOLUTION INTRAVENOUS at 06:16

## 2024-07-07 RX ADMIN — AMLODIPINE BESYLATE 5 MILLIGRAM(S): 2.5 TABLET ORAL at 06:17

## 2024-07-07 RX ADMIN — ASPIRIN 81 MILLIGRAM(S): 325 TABLET, FILM COATED ORAL at 12:36

## 2024-07-07 RX ADMIN — HEPARIN SODIUM 5000 UNIT(S): 50 INJECTION, SOLUTION INTRAVENOUS at 21:26

## 2024-07-07 RX ADMIN — Medication 3 GRAM(S): at 06:16

## 2024-07-07 RX ADMIN — Medication 5 MILLIGRAM(S): at 21:26

## 2024-07-07 RX ADMIN — Medication 2 TABLET(S): at 21:26

## 2024-07-07 RX ADMIN — MAGNESIUM OXIDE 400 MILLIGRAM(S): 400 TABLET ORAL at 12:36

## 2024-07-07 RX ADMIN — PANTOPRAZOLE SODIUM 40 MILLIGRAM(S): 40 INJECTION, POWDER, FOR SOLUTION INTRAVENOUS at 06:17

## 2024-07-07 RX ADMIN — Medication 3 GRAM(S): at 13:21

## 2024-07-07 RX ADMIN — LOSARTAN POTASSIUM 100 MILLIGRAM(S): 100 TABLET, FILM COATED ORAL at 06:17

## 2024-07-07 RX ADMIN — MAGNESIUM OXIDE 400 MILLIGRAM(S): 400 TABLET ORAL at 18:16

## 2024-07-07 NOTE — PROGRESS NOTE ADULT - SUBJECTIVE AND OBJECTIVE BOX
Patient is a 64y old  Male who presents with a chief complaint of Rehab for Acute Right temporoparietal ICH, s/p  craniotomy with hematoma evacuation, left sided hemiparesis (nondominant), dysarthria  and cognitive deficits (05 Jul 2024 16:46)      HPI:  64M w/ PMHx of CVA 2020 and 2021 s/p intracranial angioplasty of right MCA and NORMAN w/ subsequent intra-stent occlusion of R MCA/NORMAN intracranial stent, aortic valve insufficiency, implantable loop recorder for prior episodes of syncope/NSVT, chronic anemia, CKD 2, psoriatic arthritis who presents for R STA to MCA bypass. Post-op was CTH stable. On assessment in PACU, noted to have new L sided weakness, confusion at SBP 120s. Started on Phenylephrine gtt for perfusion with some improvement of symptoms. Pt admitted to NSICU. On 6/22 he was noted to have R temporoparietal ICH, now s/p crani and hematoma evacuation. Repeat imaging has been stable, EEG negative, ASA restarted. BP better controlled. TTE with findings concerning for endocarditis, GIULIANA performed showed no signs of endocarditis. Patient with functional deficits relative to baseline, admitted to acute inpatient rehab.    The patient was evaluated by the PM&R team once medically stable. The patient was found to have functional limitations in terms of physical strength/mobility and ability to carry out ADLs (self care, transfers, ambulation). The patient was deemed to be a good candidate for admission for acute inpatient rehab.     Living Situation: Lives with Wife 1 VIANEY, 12 Steps within home  PLOF: Rolling walker since 02/2024, Independent with ADLs and IADLs (05 Jul 2024 16:46)      I examined the patient and reviewed the chart. There have been no significant changes since my history and physical except where documented below.    TODAY'S SUBJECTIVE & REVIEW OF SYMPTOMS  Patient was seen and assessed at bedside.   No overnight events.   Patient denies any new complaints at this time.   To begin therapies today.   Tolerating oral diet.   Voiding and passing stool spontaneously.   Vital signs reviewed.  Admission labs have been cancelled by performing department on 7/6 x2. Labs ordered again today.     Constitutional:    [   ] WNL           [   ] poor appetite   [   ] insomnia   [   ] tired   Cardio:                [   ] WNL           [   ] CP   [   ] MORALES   [   ] palpitations               Resp:                   [ X  ] WNL           [   ] SOB   [   ] cough   [   ] wheezing   GI:                        [ X  ] WNL           [   ] constipation   [   ] diarrhea   [   ] abdominal pain   [   ] nausea   [   ] emesis                                :                      [ X  ] WNL           [   ] GIBSON  [   ] dysuria   [   ] difficulty voiding             Endo:                   [ X  ] WNL          [   ] polyuria   [   ] temperature intolerance                 Skin:                     [   ] WNL          [   ] pain   [ X  ] wound right temporal craniotomy wound, stapled  [   ] rash [ X ]  bruising, right arm medial bicep   MSK:                    [ X  ] WNL          [   ] muscle pain   [   ] joint pain/ stiffness   [   ] muscle tenderness   [   ] swelling   Neuro:                 [   ] WNL          [   ] HA   [   ] change in vision   [   ] tremor   [ X  ] weakness   [   ]dysphagia              Cognitive:           [   ] WNL           [   ]confusion   [ X ] impulsiveness [ X ] slowed cognition   Psych:                  [ X  ] WNL           [   ] hallucinations   [   ]agitation   [   ] delusion   [   ]depression    PHYSICAL EXAM  T(C): 36.8 (07-07-24 @ 04:56), Max: 36.9 (07-06-24 @ 11:38)  T(F): 98.3 (07-07-24 @ 04:56), Max: 98.4 (07-06-24 @ 11:38)  HR: 56 (07-07-24 @ 04:56) (56 - 75)  BP: 146/80 (07-07-24 @ 04:56) (130/79 - 146/80)  ABP: --  ABP(mean): --  RR: 18 (07-07-24 @ 04:56) (18 - 18)  SpO2: 97% (07-07-24 @ 04:56) (97% - 99%)      General:[ X  ] NAD, Resting Comfortable,   [   ] other:                                HEENT: [   ] NC/AT, EOMI, PERRL , Normal Conjunctivae,   [ X  ] other: right sided temporal swelling, craniotomy wound with staples in place  Cardio: [ X  ] RRR, no murmur,   [   ] other:                              Pulm: [ X  ] No Respiratory Distress,  Lungs CTAB,   [   ] other:                       Abdomen: [ X ]ND/NT, Soft,   [   ] other:    : [ X ] NO GIBSON CATHETER, [   ] GIBSON CATHETER- no meatal tear, no discharge, [   ] other:                                            MSK: [ X  ] No joint swelling, Full ROM,   [   ] other:                                         Ext: [ X  ]No C/C/E, No calf tenderness,   [   ]other:    Skin: [   ]intact,   [ X  ] other: right forearm and medial bicep bruising. right sided temporal swelling, craniotomy wound with staples in place                                                              Neurological Examination:  Cognitive: [  X  ] AAO x 3,   [  X  ]  other:  slowed responses, poor insight into deficits                                                                    Attention:  [  X  ] intact serial additions, no hemineglect   [    ]  other:                            Memory: [  X  ] intact,    [    ]  other:     Mood/Affect: [    ] wnl,    [  X  ]  other: blunted, distant, slowed                                                                            Communication: [  X  ]Fluent, no dysarthria, following commands:  [    ] other:   CN II - XII:  [    ] intact,  [  X  ] other: mild right sided facial droop                                                                                        Motor:   RIGHT UE: [ X  ] WNL,  [   ] other:  LEFT    UE: [   ] WNL,  [ X  ] other: Sh Flexion 4/5, EF 4/5, Elbow extension 4/5, finger  4/5. POSITIVE pronator drift  RIGHT LE: [ x  ] WNL,  [   ] other:   LEFT    LE: [   ] WNL,  [ x  ] other: HF 4/5, KF 4/5, KE 4/5, ADF 5/5, APF 5/5. POSITIVE clonus 3 beats left ankle    Tone: [  X  ] wnl,   [    ]  other:  DTRs: [  X ]symmetric, [   ] other:  Coordination:   [  X  ] intact,   [    ] other:                                                                           Sensory: [  X  ] Intact to light touch,   [    ] other:  acetaminophen     Tablet .. 650 milliGRAM(s) Oral every 6 hours PRN  amLODIPine   Tablet 5 milliGRAM(s) Oral daily  aspirin  chewable 81 milliGRAM(s) Oral daily  chlorhexidine 2% Cloths 1 Application(s) Topical <User Schedule>  heparin   Injectable 5000 Unit(s) SubCutaneous every 8 hours  labetalol Injectable 10 milliGRAM(s) IV Push every 2 hours PRN  losartan 100 milliGRAM(s) Oral daily  magnesium oxide 400 milliGRAM(s) Oral three times a day with meals  melatonin 5 milliGRAM(s) Oral at bedtime  pantoprazole    Tablet 40 milliGRAM(s) Oral before breakfast  polyethylene glycol 3350 17 Gram(s) Oral two times a day  QUEtiapine 12.5 milliGRAM(s) Oral at bedtime PRN  senna 2 Tablet(s) Oral at bedtime  sodium chloride 3 Gram(s) Oral every 8 hours  venlafaxine 37.5 milliGRAM(s) Oral daily      RECENT LABS/IMAGING                        11.1   9.57  )-----------( 570      ( 05 Jul 2024 07:24 )             34.2     07-05    138  |  102  |  22<H>  ----------------------------<  96  4.6   |  26  |  1.3    Ca    9.5      05 Jul 2024 07:24        Urinalysis Basic - ( 05 Jul 2024 07:24 )    Color: x / Appearance: x / SG: x / pH: x  Gluc: 96 mg/dL / Ketone: x  / Bili: x / Urobili: x   Blood: x / Protein: x / Nitrite: x   Leuk Esterase: x / RBC: x / WBC x   Sq Epi: x / Non Sq Epi: x / Bacteria: x Patient is a 64y old  Male who presents with a chief complaint of Rehab for Acute Right temporoparietal ICH, s/p  craniotomy with hematoma evacuation, left sided hemiparesis (nondominant), dysarthria  and cognitive deficits (05 Jul 2024 16:46)      HPI:  64M w/ PMHx of CVA 2020 and 2021 s/p intracranial angioplasty of right MCA and NORMAN w/ subsequent intra-stent occlusion of R MCA/NORMAN intracranial stent, aortic valve insufficiency, implantable loop recorder for prior episodes of syncope/NSVT, chronic anemia, CKD 2, psoriatic arthritis who presents for R STA to MCA bypass. Post-op was CTH stable. On assessment in PACU, noted to have new L sided weakness, confusion at SBP 120s. Started on Phenylephrine gtt for perfusion with some improvement of symptoms. Pt admitted to NSICU. On 6/22 he was noted to have R temporoparietal ICH, now s/p crani and hematoma evacuation. Repeat imaging has been stable, EEG negative, ASA restarted. BP better controlled. TTE with findings concerning for endocarditis, GIULIANA performed showed no signs of endocarditis. Patient with functional deficits relative to baseline, admitted to acute inpatient rehab.    The patient was evaluated by the PM&R team once medically stable. The patient was found to have functional limitations in terms of physical strength/mobility and ability to carry out ADLs (self care, transfers, ambulation). The patient was deemed to be a good candidate for admission for acute inpatient rehab.     Living Situation: Lives with Wife 1 VIANEY, [address] within home  PLOF: Rolling walker since 02/2024, Independent with ADLs and IADLs (05 Jul 2024 16:46)      I examined the patient and reviewed the chart. There have been no significant changes since my history and physical except where documented below.    TODAY'S SUBJECTIVE & REVIEW OF SYMPTOMS  Patient was seen and assessed at bedside.   No overnight events.   Patient denies any new complaints at this time.   To begin therapies today.   Tolerating oral diet.   Voiding and passing stool spontaneously.   Vital signs reviewed.  Admission labs have been cancelled by performing department on 7/6 x2. Labs ordered again today.   Staples removed by neurosurgery this morning.     Constitutional:    [   ] WNL           [   ] poor appetite   [   ] insomnia   [   ] tired   Cardio:                [   ] WNL           [   ] CP   [   ] MORALES   [   ] palpitations               Resp:                   [ X  ] WNL           [   ] SOB   [   ] cough   [   ] wheezing   GI:                        [ X  ] WNL           [   ] constipation   [   ] diarrhea   [   ] abdominal pain   [   ] nausea   [   ] emesis                                :                      [ X  ] WNL           [   ] GIBSON  [   ] dysuria   [   ] difficulty voiding             Endo:                   [ X  ] WNL          [   ] polyuria   [   ] temperature intolerance                 Skin:                     [   ] WNL          [   ] pain   [ X  ] wound right temporal craniotomy wound, stapled  [   ] rash [ X ]  bruising, right arm medial bicep   MSK:                    [ X  ] WNL          [   ] muscle pain   [   ] joint pain/ stiffness   [   ] muscle tenderness   [   ] swelling   Neuro:                 [   ] WNL          [   ] HA   [   ] change in vision   [   ] tremor   [ X  ] weakness   [   ]dysphagia              Cognitive:           [   ] WNL           [   ]confusion   [ X ] impulsiveness [ X ] slowed cognition   Psych:                  [ X  ] WNL           [   ] hallucinations   [   ]agitation   [   ] delusion   [   ]depression    PHYSICAL EXAM  T(C): 36.8 (07-07-24 @ 04:56), Max: 36.9 (07-06-24 @ 11:38)  T(F): 98.3 (07-07-24 @ 04:56), Max: 98.4 (07-06-24 @ 11:38)  HR: 56 (07-07-24 @ 04:56) (56 - 75)  BP: 146/80 (07-07-24 @ 04:56) (130/79 - 146/80)  ABP: --  ABP(mean): --  RR: 18 (07-07-24 @ 04:56) (18 - 18)  SpO2: 97% (07-07-24 @ 04:56) (97% - 99%)      General:[ X  ] NAD, Resting Comfortable,   [   ] other:                                HEENT: [   ] NC/AT, EOMI, PERRL , Normal Conjunctivae,   [ X  ] other: right sided temporal swelling, craniotomy wound with staples in place  Cardio: [ X  ] RRR, no murmur,   [   ] other:                              Pulm: [ X  ] No Respiratory Distress,  Lungs CTAB,   [   ] other:                       Abdomen: [ X ]ND/NT, Soft,   [   ] other:    : [ X ] NO GIBSON CATHETER, [   ] GIBSON CATHETER- no meatal tear, no discharge, [   ] other:                                            MSK: [ X  ] No joint swelling, Full ROM,   [   ] other:                                         Ext: [ X  ]No C/C/E, No calf tenderness,   [   ]other:    Skin: [   ]intact,   [ X  ] other: right forearm and medial bicep bruising. right sided temporal swelling, craniotomy wound with staples in place                                                              Neurological Examination:  Cognitive: [  X  ] AAO x 3,   [  X  ]  other:  slowed responses, poor insight into deficits                                                                    Attention:  [  X  ] intact serial additions, no hemineglect   [    ]  other:                            Memory: [  X  ] intact,    [    ]  other:     Mood/Affect: [    ] wnl,    [  X  ]  other: blunted, distant, slowed                                                                            Communication: [  X  ]Fluent, no dysarthria, following commands:  [    ] other:   CN II - XII:  [    ] intact,  [  X  ] other: mild right sided facial droop                                                                                        Motor:   RIGHT UE: [ X  ] WNL,  [   ] other:  LEFT    UE: [   ] WNL,  [ X  ] other: Sh Flexion 4/5, EF 4/5, Elbow extension 4/5, finger  4/5. POSITIVE pronator drift  RIGHT LE: [ x  ] WNL,  [   ] other:   LEFT    LE: [   ] WNL,  [ x  ] other: HF 4/5, KF 4/5, KE 4/5, ADF 5/5, APF 5/5. POSITIVE clonus 3 beats left ankle    Tone: [  X  ] wnl,   [    ]  other:  DTRs: [  X ]symmetric, [   ] other:  Coordination:   [  X  ] intact,   [    ] other:                                                                           Sensory: [  X  ] Intact to light touch,   [    ] other:  acetaminophen     Tablet .. 650 milliGRAM(s) Oral every 6 hours PRN  amLODIPine   Tablet 5 milliGRAM(s) Oral daily  aspirin  chewable 81 milliGRAM(s) Oral daily  chlorhexidine 2% Cloths 1 Application(s) Topical <User Schedule>  heparin   Injectable 5000 Unit(s) SubCutaneous every 8 hours  labetalol Injectable 10 milliGRAM(s) IV Push every 2 hours PRN  losartan 100 milliGRAM(s) Oral daily  magnesium oxide 400 milliGRAM(s) Oral three times a day with meals  melatonin 5 milliGRAM(s) Oral at bedtime  pantoprazole    Tablet 40 milliGRAM(s) Oral before breakfast  polyethylene glycol 3350 17 Gram(s) Oral two times a day  QUEtiapine 12.5 milliGRAM(s) Oral at bedtime PRN  senna 2 Tablet(s) Oral at bedtime  sodium chloride 3 Gram(s) Oral every 8 hours  venlafaxine 37.5 milliGRAM(s) Oral daily      RECENT LABS/IMAGING                        11.1   9.57  )-----------( 570      ( 05 Jul 2024 07:24 )             34.2     07-05    138  |  102  |  22<H>  ----------------------------<  96  4.6   |  26  |  1.3    Ca    9.5      05 Jul 2024 07:24        Urinalysis Basic - ( 05 Jul 2024 07:24 )    Color: x / Appearance: x / SG: x / pH: x  Gluc: 96 mg/dL / Ketone: x  / Bili: x / Urobili: x   Blood: x / Protein: x / Nitrite: x   Leuk Esterase: x / RBC: x / WBC x   Sq Epi: x / Non Sq Epi: x / Bacteria: x Patient is a 64y old  Male who presents with a chief complaint of Rehab for Acute Right temporoparietal ICH, s/p  craniotomy with hematoma evacuation, left sided hemiparesis (nondominant), dysarthria  and cognitive deficits (05 Jul 2024 16:46)      HPI:  64M w/ PMHx of CVA 2020 and 2021 s/p intracranial angioplasty of right MCA and NORMAN w/ subsequent intra-stent occlusion of R MCA/NORMAN intracranial stent, aortic valve insufficiency, implantable loop recorder for prior episodes of syncope/NSVT, chronic anemia, CKD 2, psoriatic arthritis who presents for R STA to MCA bypass. Post-op was CTH stable. On assessment in PACU, noted to have new L sided weakness, confusion at SBP 120s. Started on Phenylephrine gtt for perfusion with some improvement of symptoms. Pt admitted to NSICU. On 6/22 he was noted to have R temporoparietal ICH, now s/p crani and hematoma evacuation. Repeat imaging has been stable, EEG negative, ASA restarted. BP better controlled. TTE with findings concerning for endocarditis, GIULIANA performed showed no signs of endocarditis. Patient with functional deficits relative to baseline, admitted to acute inpatient rehab.    The patient was evaluated by the PM&R team once medically stable. The patient was found to have functional limitations in terms of physical strength/mobility and ability to carry out ADLs (self care, transfers, ambulation). The patient was deemed to be a good candidate for admission for acute inpatient rehab.     Living Situation: Lives with Wife 1 VIANEY, [address] within home  PLOF: Rolling walker since 02/2024, Independent with ADLs and IADLs (05 Jul 2024 16:46)      I examined the patient and reviewed the chart. There have been no significant changes since my history and physical except where documented below.    TODAY'S SUBJECTIVE & REVIEW OF SYMPTOMS  Patient was seen and assessed at bedside.   No overnight events.   Patient denies any new complaints at this time.   To begin therapies today.   Tolerating oral diet.   Voiding and passing stool spontaneously.   Vital signs reviewed.  Admission labs have been cancelled by performing department on 7/6 x2. Labs ordered again today.   Staples removed by neurosurgery this morning.     Constitutional:    [   ] WNL           [   ] poor appetite   [   ] insomnia   [   ] tired   Cardio:                [   ] WNL           [   ] CP   [   ] MORALES   [   ] palpitations               Resp:                   [ X  ] WNL           [   ] SOB   [   ] cough   [   ] wheezing   GI:                        [ X  ] WNL           [   ] constipation   [   ] diarrhea   [   ] abdominal pain   [   ] nausea   [   ] emesis                                :                      [ X  ] WNL           [   ] GIBSON  [   ] dysuria   [   ] difficulty voiding             Endo:                   [ X  ] WNL          [   ] polyuria   [   ] temperature intolerance                 Skin:                     [   ] WNL          [   ] pain   [ X  ] wound right temporal craniotomy wound, stapled  [   ] rash [ X ]  bruising, right arm medial bicep   MSK:                    [ X  ] WNL          [   ] muscle pain   [   ] joint pain/ stiffness   [   ] muscle tenderness   [   ] swelling   Neuro:                 [   ] WNL          [   ] HA   [   ] change in vision   [   ] tremor   [ X  ] weakness   [   ]dysphagia              Cognitive:           [   ] WNL           [   ]confusion   [ X ] impulsiveness [ X ] slowed cognition   Psych:                  [ X  ] WNL           [   ] hallucinations   [   ]agitation   [   ] delusion   [   ]depression    PHYSICAL EXAM  T(C): 36.8 (07-07-24 @ 04:56), Max: 36.9 (07-06-24 @ 11:38)  T(F): 98.3 (07-07-24 @ 04:56), Max: 98.4 (07-06-24 @ 11:38)  HR: 56 (07-07-24 @ 04:56) (56 - 75)  BP: 146/80 (07-07-24 @ 04:56) (130/79 - 146/80)  ABP: --  ABP(mean): --  RR: 18 (07-07-24 @ 04:56) (18 - 18)  SpO2: 97% (07-07-24 @ 04:56) (97% - 99%)      General:[ X  ] NAD, Resting Comfortable,   [   ] other:                                HEENT: [   ] NC/AT, EOMI, PERRL , Normal Conjunctivae,   [ X  ] other: right sided temporal swelling, craniotomy wound with staples in place  Cardio: [ X  ] RRR, no murmur,   [   ] other:                              Pulm: [ X  ] No Respiratory Distress,  Lungs CTAB,   [   ] other:                       Abdomen: [ X ]ND/NT, Soft,   [   ] other:    : [ X ] NO GIBSON CATHETER, [   ] GIBSON CATHETER- no meatal tear, no discharge, [   ] other:                                            MSK: [ X  ] No joint swelling, Full ROM,   [   ] other:                                         Ext: [ X  ]No C/C/E, No calf tenderness,   [   ]other:    Skin: [   ]intact,   [ X  ] other: right forearm and medial bicep bruising. right sided temporal swelling,                                                      Neurological Examination:  Cognitive: [  X  ] AAO x 3,   [  X  ]  other:  slowed responses, poor insight into deficits                                                                    Attention:  [  X  ] intact serial additions, no hemineglect   [    ]  other:                            Memory: [  X  ] intact,    [    ]  other:     Mood/Affect: [    ] wnl,    [  X  ]  other: blunted, distant, slowed                                                                            Communication: [  X  ]Fluent, no dysarthria, following commands:  [    ] other:   CN II - XII:  [    ] intact,  [  X  ] other: mild right sided facial droop                                                                                        Motor:   RIGHT UE: [ X  ] WNL,  [   ] other:  LEFT    UE: [   ] WNL,  [ X  ] other: Sh Flexion 4/5, EF 4/5, Elbow extension 4/5, finger  4/5. POSITIVE pronator drift  RIGHT LE: [ x  ] WNL,  [   ] other:   LEFT    LE: [   ] WNL,  [ x  ] other: HF 4/5, KF 4/5, KE 4/5, ADF 5/5, APF 5/5. POSITIVE clonus 3 beats left ankle    Tone: [  X  ] wnl,   [    ]  other:  DTRs: [  X ]symmetric, [   ] other:  Coordination:   [  X  ] intact,   [    ] other:                                                                           Sensory: [  X  ] Intact to light touch,   [    ] other:  acetaminophen     Tablet .. 650 milliGRAM(s) Oral every 6 hours PRN  amLODIPine   Tablet 5 milliGRAM(s) Oral daily  aspirin  chewable 81 milliGRAM(s) Oral daily  chlorhexidine 2% Cloths 1 Application(s) Topical <User Schedule>  heparin   Injectable 5000 Unit(s) SubCutaneous every 8 hours  labetalol Injectable 10 milliGRAM(s) IV Push every 2 hours PRN  losartan 100 milliGRAM(s) Oral daily  magnesium oxide 400 milliGRAM(s) Oral three times a day with meals  melatonin 5 milliGRAM(s) Oral at bedtime  pantoprazole    Tablet 40 milliGRAM(s) Oral before breakfast  polyethylene glycol 3350 17 Gram(s) Oral two times a day  QUEtiapine 12.5 milliGRAM(s) Oral at bedtime PRN  senna 2 Tablet(s) Oral at bedtime  sodium chloride 3 Gram(s) Oral every 8 hours  venlafaxine 37.5 milliGRAM(s) Oral daily      RECENT LABS/IMAGING                        11.1   9.57  )-----------( 570      ( 05 Jul 2024 07:24 )             34.2     07-05    138  |  102  |  22<H>  ----------------------------<  96  4.6   |  26  |  1.3    Ca    9.5      05 Jul 2024 07:24        Urinalysis Basic - ( 05 Jul 2024 07:24 )    Color: x / Appearance: x / SG: x / pH: x  Gluc: 96 mg/dL / Ketone: x  / Bili: x / Urobili: x   Blood: x / Protein: x / Nitrite: x   Leuk Esterase: x / RBC: x / WBC x   Sq Epi: x / Non Sq Epi: x / Bacteria: x

## 2024-07-07 NOTE — PROGRESS NOTE ADULT - ASSESSMENT
ASSESSMENT/PLAN    Rehab for Acute right temporoparietal ICH, s/p craniotomy  with hematoma evacuation, left sided hemiparesis (nondominant), dysarthria and cognitive deficits    64 M w/ PMHx of CVA 2020 and 2021 s/p intracranial angioplasty of right MCA and NORMAN w/ subsequent intra-stent occlusion of R MCA/NORMAN intracranial stent, aortic valve insufficiency, implantable loop recorder for prior episodes of syncope/NSVT, chronic anemia, CKD 2, psoriatic arthritis who underwent elective R STA to MCA bypass 06/20. On 6/22 noted to have new weakness and mental status change, CTH showed R temporoparietal ICH with midline shift. Patient underwent craniotomy with hematoma evacuation with improvement in midline shift. Repeat CTH stable, patient clinically improving. Admitted for inpatient rehabilitation for acute right temporoparietal ICH and craniotomy with hematoma evacuation, left sided hemiparesis and cognitive deficits    PLOF: Rolling walker since 02/2024, Independent with ADLs and IADLs  CLOF:  Bed mobility min assist, transfer min assist, gait 20 feet RW min assist    #Rehab of R temporoparietal ICH after elective R STA to MCA bypass  #Hx CVA 2020 and 2021 s/p intracranial angioplasty of right MCA and NORMAN w/ subsequent intra-stent occlusion of R MCA/NORMAN intracranial stent  New left sided weakness and confusion post procedure  S/P Partial evac of R T/p ICH with improvement of R--L shift   CTH stable on 6/28  EEG negative for any seizure  - c/up Stroke Neurology   - BP goal 110-150  - Q4h neuro checks  - NSGY following - plan to remove staples on 7/6  - Aspirin- 81 mg for bypass patency   - Continue home Effexor qd  - Seroquel 25 mg q hs prn for agitation   - melatonin qhs  - 1:1 observation for impulsivity  - PT/OT/SLP    #Mobile Echodensity on Mitral Valve   TTE Echo Complete w/o Contrast w/ Doppler (6/21/24) - EF 70 to 75%. Moderate (Grade 2) diastolic dysfunction. Asymmetric basal septal hypertrophy (1.6cm).  There is a small independently mobile echodensity attached to the anterior mitral valve leaflet. In the right clinical context this may represent endocarditis. Thickening and calcification of the anterior and posterior mitral valve leaflets.  07/05 GIULIANA performed with evidence of MV Calcification rather than IE  - likely OP f/u Cardiology     #HTN  Goal - < 150  -Continue amlodipine 5 mg qd.   -c/w Losartan 100 mg qd  -labetalol prn for breakthrough    #Stage II CKD   - Baseline 0.9-1.1 , but now around 1.3 ( has trended up to 1.3 prior as well )   - encourage PO intake today, and trend creatinine   - Na goal 140- 150 for perihematomal edema   - on salt tabs 3 tabs q8hrs, goal Na 140-150, taper as tolerates    #LUE + superficial thrombophlebitis  - warmth and elevation  - conservative measures    #Psoriatic Arthritis  Humira last dosed on June 9th as per wife and as communicated by pt's rheumatologist; Discussed with rheumatologist Dr Andre, pt to hold Humira while hospitalized     -Pain control: Tylenol heat  -GI/Bowel Mgmt: Miralax  -Skin:  No active issues at this time  -FEN   - Diet: DASH  Precautions / PROPHYLAXIS:    - Falls  - DVT prophylaxis: Heparin 5000 units subq q8hrs ASSESSMENT/PLAN    Rehab for Acute right temporoparietal ICH, s/p craniotomy  with hematoma evacuation, left sided hemiparesis (nondominant), dysarthria and cognitive deficits    64 M w/ PMHx of CVA 2020 and 2021 s/p intracranial angioplasty of right MCA and NORMAN w/ subsequent intra-stent occlusion of R MCA/NORMAN intracranial stent, aortic valve insufficiency, implantable loop recorder for prior episodes of syncope/NSVT, chronic anemia, CKD 2, psoriatic arthritis who underwent elective R STA to MCA bypass 06/20. On 6/22 noted to have new weakness and mental status change, CTH showed R temporoparietal ICH with midline shift. Patient underwent craniotomy with hematoma evacuation with improvement in midline shift. Repeat CTH stable, patient clinically improving. Admitted for inpatient rehabilitation for acute right temporoparietal ICH and craniotomy with hematoma evacuation, left sided hemiparesis and cognitive deficits    PLOF: Rolling walker since 02/2024, Independent with ADLs and IADLs  CLOF:  Bed mobility min assist, transfer min assist, gait 20 feet RW min assist    #Rehab of R temporoparietal ICH after elective R STA to MCA bypass  #Hx CVA 2020 and 2021 s/p intracranial angioplasty of right MCA and NORMAN w/ subsequent intra-stent occlusion of R MCA/NORMAN intracranial stent  New left sided weakness and confusion post procedure  S/P Partial evac of R T/p ICH with improvement of R--L shift   CTH stable on 6/28  EEG negative for any seizure  - c/up Stroke Neurology   - BP goal 110-150  - Q4h neuro checks  - NSGY following - removed staples on 7/7  - Aspirin- 81 mg for bypass patency   - Continue home Effexor qd  - Seroquel 25 mg q hs prn for agitation   - melatonin qhs  - 1:1 observation for impulsivity  - PT/OT/SLP    #Mobile Echodensity on Mitral Valve   TTE Echo Complete w/o Contrast w/ Doppler (6/21/24) - EF 70 to 75%. Moderate (Grade 2) diastolic dysfunction. Asymmetric basal septal hypertrophy (1.6cm).  There is a small independently mobile echodensity attached to the anterior mitral valve leaflet. In the right clinical context this may represent endocarditis. Thickening and calcification of the anterior and posterior mitral valve leaflets.  07/05 GIULIANA performed with evidence of MV Calcification rather than IE  - likely OP f/u Cardiology     #HTN  Goal - < 150  -Continue amlodipine 5 mg qd.   -c/w Losartan 100 mg qd  -labetalol prn for breakthrough    #Stage II CKD   - Baseline 0.9-1.1 , but now around 1.3 ( has trended up to 1.3 prior as well )   - encourage PO intake today, and trend creatinine   - Na goal 140- 150 for perihematomal edema   - on salt tabs 3 tabs q8hrs, goal Na 140-150, taper as tolerates    #LUE + superficial thrombophlebitis  - warmth and elevation  - conservative measures    #Psoriatic Arthritis  Humira last dosed on June 9th as per wife and as communicated by pt's rheumatologist; Discussed with rheumatologist Dr Andre, pt to hold Humira while hospitalized     -Pain control: Tylenol heat  -GI/Bowel Mgmt: Miralax  -Skin:  No active issues at this time  -FEN   - Diet: DASH  Precautions / PROPHYLAXIS:    - Falls  - DVT prophylaxis: Heparin 5000 units subq q8hrs

## 2024-07-08 LAB
ALBUMIN SERPL ELPH-MCNC: 3.8 G/DL — SIGNIFICANT CHANGE UP (ref 3.5–5.2)
ALP SERPL-CCNC: 99 U/L — SIGNIFICANT CHANGE UP (ref 30–115)
ALT FLD-CCNC: 25 U/L — SIGNIFICANT CHANGE UP (ref 0–41)
ANION GAP SERPL CALC-SCNC: 13 MMOL/L — SIGNIFICANT CHANGE UP (ref 7–14)
AST SERPL-CCNC: 19 U/L — SIGNIFICANT CHANGE UP (ref 0–41)
BASOPHILS # BLD AUTO: 0.14 K/UL — SIGNIFICANT CHANGE UP (ref 0–0.2)
BASOPHILS NFR BLD AUTO: 1.7 % — HIGH (ref 0–1)
BILIRUB SERPL-MCNC: 0.3 MG/DL — SIGNIFICANT CHANGE UP (ref 0.2–1.2)
BUN SERPL-MCNC: 22 MG/DL — HIGH (ref 10–20)
CALCIUM SERPL-MCNC: 9.5 MG/DL — SIGNIFICANT CHANGE UP (ref 8.4–10.5)
CHLORIDE SERPL-SCNC: 103 MMOL/L — SIGNIFICANT CHANGE UP (ref 98–110)
CO2 SERPL-SCNC: 22 MMOL/L — SIGNIFICANT CHANGE UP (ref 17–32)
CREAT SERPL-MCNC: 1 MG/DL — SIGNIFICANT CHANGE UP (ref 0.7–1.5)
EGFR: 83 ML/MIN/1.73M2 — SIGNIFICANT CHANGE UP
EOSINOPHIL # BLD AUTO: 0.45 K/UL — SIGNIFICANT CHANGE UP (ref 0–0.7)
EOSINOPHIL NFR BLD AUTO: 5.3 % — SIGNIFICANT CHANGE UP (ref 0–8)
GLUCOSE SERPL-MCNC: 121 MG/DL — HIGH (ref 70–99)
HCT VFR BLD CALC: 36.8 % — LOW (ref 42–52)
HGB BLD-MCNC: 11.5 G/DL — LOW (ref 14–18)
IMM GRANULOCYTES NFR BLD AUTO: 0.9 % — HIGH (ref 0.1–0.3)
LYMPHOCYTES # BLD AUTO: 3.52 K/UL — HIGH (ref 1.2–3.4)
LYMPHOCYTES # BLD AUTO: 41.7 % — SIGNIFICANT CHANGE UP (ref 20.5–51.1)
MAGNESIUM SERPL-MCNC: 2.2 MG/DL — SIGNIFICANT CHANGE UP (ref 1.8–2.4)
MCHC RBC-ENTMCNC: 27.9 PG — SIGNIFICANT CHANGE UP (ref 27–31)
MCHC RBC-ENTMCNC: 31.3 G/DL — LOW (ref 32–37)
MCV RBC AUTO: 89.3 FL — SIGNIFICANT CHANGE UP (ref 80–94)
MONOCYTES # BLD AUTO: 1.06 K/UL — HIGH (ref 0.1–0.6)
MONOCYTES NFR BLD AUTO: 12.6 % — HIGH (ref 1.7–9.3)
NEUTROPHILS # BLD AUTO: 3.19 K/UL — SIGNIFICANT CHANGE UP (ref 1.4–6.5)
NEUTROPHILS NFR BLD AUTO: 37.8 % — LOW (ref 42.2–75.2)
NRBC # BLD: 0 /100 WBCS — SIGNIFICANT CHANGE UP (ref 0–0)
PLATELET # BLD AUTO: 580 K/UL — HIGH (ref 130–400)
PMV BLD: 11.5 FL — HIGH (ref 7.4–10.4)
POTASSIUM SERPL-MCNC: 5 MMOL/L — SIGNIFICANT CHANGE UP (ref 3.5–5)
POTASSIUM SERPL-SCNC: 5 MMOL/L — SIGNIFICANT CHANGE UP (ref 3.5–5)
PROT SERPL-MCNC: 6.8 G/DL — SIGNIFICANT CHANGE UP (ref 6–8)
RBC # BLD: 4.12 M/UL — LOW (ref 4.7–6.1)
RBC # FLD: 16.6 % — HIGH (ref 11.5–14.5)
SODIUM SERPL-SCNC: 138 MMOL/L — SIGNIFICANT CHANGE UP (ref 135–146)
WBC # BLD: 8.44 K/UL — SIGNIFICANT CHANGE UP (ref 4.8–10.8)
WBC # FLD AUTO: 8.44 K/UL — SIGNIFICANT CHANGE UP (ref 4.8–10.8)

## 2024-07-08 RX ADMIN — LOSARTAN POTASSIUM 100 MILLIGRAM(S): 100 TABLET, FILM COATED ORAL at 06:21

## 2024-07-08 RX ADMIN — POLYETHYLENE GLYCOL 3350 17 GRAM(S): 1 POWDER ORAL at 18:35

## 2024-07-08 RX ADMIN — POLYETHYLENE GLYCOL 3350 17 GRAM(S): 1 POWDER ORAL at 06:20

## 2024-07-08 RX ADMIN — HEPARIN SODIUM 5000 UNIT(S): 50 INJECTION, SOLUTION INTRAVENOUS at 14:04

## 2024-07-08 RX ADMIN — MAGNESIUM OXIDE 400 MILLIGRAM(S): 400 TABLET ORAL at 18:36

## 2024-07-08 RX ADMIN — Medication 3 GRAM(S): at 06:22

## 2024-07-08 RX ADMIN — HEPARIN SODIUM 5000 UNIT(S): 50 INJECTION, SOLUTION INTRAVENOUS at 21:48

## 2024-07-08 RX ADMIN — VENLAFAXINE 37.5 MILLIGRAM(S): 37.5 CAPSULE, EXTENDED RELEASE ORAL at 12:29

## 2024-07-08 RX ADMIN — Medication 5 MILLIGRAM(S): at 21:48

## 2024-07-08 RX ADMIN — MAGNESIUM OXIDE 400 MILLIGRAM(S): 400 TABLET ORAL at 08:07

## 2024-07-08 RX ADMIN — MAGNESIUM OXIDE 400 MILLIGRAM(S): 400 TABLET ORAL at 12:29

## 2024-07-08 RX ADMIN — Medication 3 GRAM(S): at 14:04

## 2024-07-08 RX ADMIN — AMLODIPINE BESYLATE 5 MILLIGRAM(S): 2.5 TABLET ORAL at 06:21

## 2024-07-08 RX ADMIN — ASPIRIN 81 MILLIGRAM(S): 325 TABLET, FILM COATED ORAL at 12:28

## 2024-07-08 RX ADMIN — HEPARIN SODIUM 5000 UNIT(S): 50 INJECTION, SOLUTION INTRAVENOUS at 06:20

## 2024-07-08 RX ADMIN — Medication 3 GRAM(S): at 21:47

## 2024-07-08 RX ADMIN — Medication 2 TABLET(S): at 21:47

## 2024-07-08 RX ADMIN — PANTOPRAZOLE SODIUM 40 MILLIGRAM(S): 40 INJECTION, POWDER, FOR SOLUTION INTRAVENOUS at 06:21

## 2024-07-08 NOTE — PROGRESS NOTE ADULT - SUBJECTIVE AND OBJECTIVE BOX
Patient is a 64y old  Male who presents with a chief complaint of Rehab for Acute Right temporoparietal ICH, s/p  craniotomy with hematoma evacuation, left sided hemiparesis (nondominant), dysarthria  and cognitive deficits (05 Jul 2024 16:46)      HPI:  64M w/ PMHx of CVA 2020 and 2021 s/p intracranial angioplasty of right MCA and NORMAN w/ subsequent intra-stent occlusion of R MCA/NORMAN intracranial stent, aortic valve insufficiency, implantable loop recorder for prior episodes of syncope/NSVT, chronic anemia, CKD 2, psoriatic arthritis who presents for R STA to MCA bypass. Post-op was CTH stable. On assessment in PACU, noted to have new L sided weakness, confusion at SBP 120s. Started on Phenylephrine gtt for perfusion with some improvement of symptoms. Pt admitted to NSICU. On 6/22 he was noted to have R temporoparietal ICH, now s/p crani and hematoma evacuation. Repeat imaging has been stable, EEG negative, ASA restarted. BP better controlled. TTE with findings concerning for endocarditis, GIULIANA performed showed no signs of endocarditis. Patient with functional deficits relative to baseline, admitted to acute inpatient rehab.    The patient was evaluated by the PM&R team once medically stable. The patient was found to have functional limitations in terms of physical strength/mobility and ability to carry out ADLs (self care, transfers, ambulation). The patient was deemed to be a good candidate for admission for acute inpatient rehab.     Living Situation: Lives with Wife 1 VIANEY, [address] within home  PLOF: Rolling walker since 02/2024, Independent with ADLs and IADLs (05 Jul 2024 16:46)      I examined the patient and reviewed the chart. There have been no significant changes since my history and physical except where documented below.    TODAY'S SUBJECTIVE & REVIEW OF SYMPTOMS  Patient was seen and assessed at bedside.   No overnight events.   Patient denies any new complaints at this time.   Tolerating PT/OT   Tolerating oral diet.   Voiding and passing stool spontaneously.   Vital signs reviewed.    This morning patient not eating his breakfast, appears dejected. Responds with short phrases. Denies any pain, weakness, headache. Denies feeling depressed    Constitutional:    [   ] WNL           [   ] poor appetite   [   ] insomnia   [   ] tired   Cardio:                [   ] WNL           [   ] CP   [   ] MORALES   [   ] palpitations               Resp:                   [ X  ] WNL           [   ] SOB   [   ] cough   [   ] wheezing   GI:                        [ X  ] WNL           [   ] constipation   [   ] diarrhea   [   ] abdominal pain   [   ] nausea   [   ] emesis                                :                      [ X  ] WNL           [   ] GIBSON  [   ] dysuria   [   ] difficulty voiding             Endo:                   [ X  ] WNL          [   ] polyuria   [   ] temperature intolerance                 Skin:                     [   ] WNL          [   ] pain   [ X  ] wound right temporal craniotomy wound, stapled  [   ] rash [ X ]  bruising, right arm medial bicep   MSK:                    [ X  ] WNL          [   ] muscle pain   [   ] joint pain/ stiffness   [   ] muscle tenderness   [   ] swelling   Neuro:                 [   ] WNL          [   ] HA   [   ] change in vision   [   ] tremor   [ X  ] weakness   [   ]dysphagia              Cognitive:           [   ] WNL           [   ]confusion   [ X ] impulsiveness [ X ] slowed cognition   Psych:                  [ X  ] WNL           [   ] hallucinations   [   ]agitation   [   ] delusion   [   ]depression    PHYSICAL EXAM  Vital Signs Last 24 Hrs  T(C): 36.9 (08 Jul 2024 05:00), Max: 37 (07 Jul 2024 16:36)  T(F): 98.4 (08 Jul 2024 05:00), Max: 98.6 (07 Jul 2024 16:36)  HR: 79 (08 Jul 2024 05:00) (79 - 85)  BP: 124/81 (08 Jul 2024 05:00) (117/82 - 161/76)  BP(mean): --  RR: 18 (08 Jul 2024 05:00) (18 - 18)  SpO2: 96% (08 Jul 2024 05:00) (96% - 99%)    Parameters below as of 07 Jul 2024 16:36  Patient On (Oxygen Delivery Method): room air    General:[ X  ] NAD, Resting Comfortable,   [   ] other:                                HEENT: [   ] NC/AT, EOMI, PERRL , Normal Conjunctivae,   [ X  ] other: right sided temporal swelling, craniotomy wound with staples in place  Cardio: [ X  ] RRR, no murmur,   [   ] other:                              Pulm: [ X  ] No Respiratory Distress,  Lungs CTAB,   [   ] other:                       Abdomen: [ X ]ND/NT, Soft,   [   ] other:    : [ X ] NO GIBSON CATHETER, [   ] GIBSON CATHETER- no meatal tear, no discharge, [   ] other:                                            MSK: [ X  ] No joint swelling, Full ROM,   [   ] other:                                         Ext: [ X  ]No C/C/E, No calf tenderness,   [   ]other:    Skin: [   ]intact,   [ X  ] other: right forearm and medial bicep bruising. right sided temporal swelling, staples removed                                                  Neurological Examination:  Cognitive: [  X  ] AAO x 3,   [  X  ]  other:  slowed responses, poor insight into deficits                                                                    Attention:  [  X  ] intact serial additions, no hemineglect   [    ]  other:                            Memory: [  X  ] intact,    [    ]  other:     Mood/Affect: [    ] wnl,    [  X  ]  other: blunted, distant, slowed                                                                            Communication: [  X  ]Fluent, no dysarthria, following commands:  [    ] other:   CN II - XII:  [    ] intact,  [  X  ] other: left sided facial droop                                                                                        Motor:   RIGHT UE: [ X  ] WNL,  [   ] other:  LEFT    UE: [   ] WNL,  [ X  ] other: Sh Flexion 4/5, EF 4/5, Elbow extension 4/5, finger  4/5. POSITIVE pronator drift  RIGHT LE: [ x  ] WNL,  [   ] other:   LEFT    LE: [   ] WNL,  [ x  ] other: HF 4/5, KF 4/5, KE 4/5, ADF 5/5, APF 5/5. POSITIVE clonus 3 beats left ankle    Tone: [  X  ] wnl,   [    ]  other:  DTRs: [  X ]symmetric, [   ] other:  Coordination:   [  X  ] intact,   [    ] other:                                                                           Sensory: [  X  ] Intact to light touch,   [    ] other:    MEDICATIONS  (STANDING):  amLODIPine   Tablet 5 milliGRAM(s) Oral daily  aspirin  chewable 81 milliGRAM(s) Oral daily  chlorhexidine 2% Cloths 1 Application(s) Topical <User Schedule>  heparin   Injectable 5000 Unit(s) SubCutaneous every 8 hours  losartan 100 milliGRAM(s) Oral daily  magnesium oxide 400 milliGRAM(s) Oral three times a day with meals  melatonin 5 milliGRAM(s) Oral at bedtime  pantoprazole    Tablet 40 milliGRAM(s) Oral before breakfast  polyethylene glycol 3350 17 Gram(s) Oral two times a day  senna 2 Tablet(s) Oral at bedtime  sodium chloride 3 Gram(s) Oral every 8 hours  venlafaxine 37.5 milliGRAM(s) Oral daily    MEDICATIONS  (PRN):  acetaminophen     Tablet .. 650 milliGRAM(s) Oral every 6 hours PRN Temp greater or equal to 38C (100.4F), Mild Pain (1 - 3)  labetalol Injectable 10 milliGRAM(s) IV Push every 2 hours PRN SBP >150, HR > 70  QUEtiapine 12.5 milliGRAM(s) Oral at bedtime PRN agitation    RECENT LABS/IMAGING                        11.5   8.44  )-----------( 580      ( 08 Jul 2024 07:06 )             36.8     07-08    138  |  103  |  22<H>  ----------------------------<  121<H>  5.0   |  22  |  1.0    Ca    9.5      08 Jul 2024 07:06  Mg     2.2     07-08    TPro  6.8  /  Alb  3.8  /  TBili  0.3  /  DBili  x   /  AST  19  /  ALT  25  /  AlkPhos  99  07-08      Urinalysis Basic - ( 08 Jul 2024 07:06 )    Color: x / Appearance: x / SG: x / pH: x  Gluc: 121 mg/dL / Ketone: x  / Bili: x / Urobili: x   Blood: x / Protein: x / Nitrite: x   Leuk Esterase: x / RBC: x / WBC x   Sq Epi: x / Non Sq Epi: x / Bacteria: x       Patient is a 64y old  Male who presents with a chief complaint of Rehab for Acute Right temporoparietal ICH, s/p  craniotomy with hematoma evacuation, left sided hemiparesis (nondominant), dysarthria  and cognitive deficits (05 Jul 2024 16:46)      HPI:  64M w/ PMHx of CVA 2020 and 2021 s/p intracranial angioplasty of right MCA and NORMAN w/ subsequent intra-stent occlusion of R MCA/NORMAN intracranial stent, aortic valve insufficiency, implantable loop recorder for prior episodes of syncope/NSVT, chronic anemia, CKD 2, psoriatic arthritis who presents for R STA to MCA bypass. Post-op was CTH stable. On assessment in PACU, noted to have new L sided weakness, confusion at SBP 120s. Started on Phenylephrine gtt for perfusion with some improvement of symptoms. Pt admitted to NSICU. On 6/22 he was noted to have R temporoparietal ICH, now s/p crani and hematoma evacuation. Repeat imaging has been stable, EEG negative, ASA restarted. BP better controlled. TTE with findings concerning for endocarditis, GIULIANA performed showed no signs of endocarditis. Patient with functional deficits relative to baseline, admitted to acute inpatient rehab.    The patient was evaluated by the PM&R team once medically stable. The patient was found to have functional limitations in terms of physical strength/mobility and ability to carry out ADLs (self care, transfers, ambulation). The patient was deemed to be a good candidate for admission for acute inpatient rehab.     Living Situation: Lives with Wife 1 VIANEY, [address] within home  PLOF: Rolling walker since 02/2024, Independent with ADLs and IADLs (05 Jul 2024 16:46)      TODAY'S SUBJECTIVE & REVIEW OF SYMPTOMS  Patient was seen and assessed at bedside.   No overnight events.   Patient denies any new complaints at this time.   Tolerating PT/OT   Tolerating oral diet.   Voiding and passing stool spontaneously.   Vital signs reviewed.    This morning patient not eating his breakfast, appears dejected. Responds with short phrases. Denies any pain, weakness, headache. Denies feeling depressed    Constitutional:    [   ] WNL           [   ] poor appetite   [   ] insomnia   [   ] tired   Cardio:                [   ] WNL           [   ] CP   [   ] MORALES   [   ] palpitations               Resp:                   [ X  ] WNL           [   ] SOB   [   ] cough   [   ] wheezing   GI:                        [ X  ] WNL           [   ] constipation   [   ] diarrhea   [   ] abdominal pain   [   ] nausea   [   ] emesis                                :                      [ X  ] WNL           [   ] GIBSON  [   ] dysuria   [   ] difficulty voiding             Endo:                   [ X  ] WNL          [   ] polyuria   [   ] temperature intolerance                 Skin:                     [   ] WNL          [   ] pain   [ X  ] wound right temporal craniotomy wound, stapled  [   ] rash [ X ]  bruising, right arm medial bicep   MSK:                    [ X  ] WNL          [   ] muscle pain   [   ] joint pain/ stiffness   [   ] muscle tenderness   [   ] swelling   Neuro:                 [   ] WNL          [   ] HA   [   ] change in vision   [   ] tremor   [ X  ] weakness   [   ]dysphagia              Cognitive:           [   ] WNL           [   ]confusion   [ X ] impulsiveness [ X ] slowed cognition   Psych:                  [ X  ] WNL           [   ] hallucinations   [   ]agitation   [   ] delusion   [   ]depression    PHYSICAL EXAM  Vital Signs Last 24 Hrs  T(C): 36.9 (08 Jul 2024 05:00), Max: 37 (07 Jul 2024 16:36)  T(F): 98.4 (08 Jul 2024 05:00), Max: 98.6 (07 Jul 2024 16:36)  HR: 79 (08 Jul 2024 05:00) (79 - 85)  BP: 124/81 (08 Jul 2024 05:00) (117/82 - 161/76)  BP(mean): --  RR: 18 (08 Jul 2024 05:00) (18 - 18)  SpO2: 96% (08 Jul 2024 05:00) (96% - 99%)    Parameters below as of 07 Jul 2024 16:36  Patient On (Oxygen Delivery Method): room air    General:[ X  ] NAD, Resting Comfortable,   [   ] other:                                HEENT: [   ] NC/AT, EOMI, PERRL , Normal Conjunctivae,   [ X  ] other: right sided temporal swelling, craniotomy wound with staples in place  Cardio: [ X  ] RRR, no murmur,   [   ] other:                              Pulm: [ X  ] No Respiratory Distress,  Lungs CTAB,   [   ] other:                       Abdomen: [ X ]ND/NT, Soft,   [   ] other:    : [ X ] NO GIBSON CATHETER, [   ] GIBSON CATHETER- no meatal tear, no discharge, [   ] other:                                            MSK: [ X  ] No joint swelling, Full ROM,   [   ] other:                                         Ext: [ X  ]No C/C/E, No calf tenderness,   [   ]other:    Skin: [   ]intact,   [ X  ] other: right forearm and medial bicep bruising. right sided temporal swelling, staples removed                                                  Neurological Examination:  Cognitive: [  X  ] AAO x 3,   [  X  ]  other:  slowed responses, poor insight into deficits                                                                    Attention:  [  X  ] intact serial additions, no hemineglect   [    ]  other:                        Memory: [  X  ] intact,    [    ]  other:     Mood/Affect: [    ] wnl,    [  X  ]  other: blunted, distant, slowed                                                                            Communication: [  X  ]Fluent, no dysarthria, following commands:  [    ] other:   CN II - XII:  [    ] intact,  [  X  ] other: left sided facial droop                                                                                        Motor:   RIGHT UE: [ X  ] WNL,  [   ] other:  LEFT    UE: [   ] WNL,  [ X  ] other: Sh Flexion 4/5, EF 4/5, Elbow extension 4/5, finger  4/5. POSITIVE pronator drift  RIGHT LE: [ x  ] WNL,  [   ] other:   LEFT    LE: [   ] WNL,  [ x  ] other: HF 4/5, KF 4/5, KE 4/5, ADF 5/5, APF 5/5. POSITIVE clonus 3 beats left ankle    Tone: [  X  ] wnl,   [    ]  other:  DTRs: [  X ]symmetric, [   ] other:  Coordination:   [  X  ] intact,   [    ] other:                                                                           Sensory: [  X  ] Intact to light touch,   [    ] other:    MEDICATIONS  (STANDING):  amLODIPine   Tablet 5 milliGRAM(s) Oral daily  aspirin  chewable 81 milliGRAM(s) Oral daily  chlorhexidine 2% Cloths 1 Application(s) Topical <User Schedule>  heparin   Injectable 5000 Unit(s) SubCutaneous every 8 hours  losartan 100 milliGRAM(s) Oral daily  magnesium oxide 400 milliGRAM(s) Oral three times a day with meals  melatonin 5 milliGRAM(s) Oral at bedtime  pantoprazole    Tablet 40 milliGRAM(s) Oral before breakfast  polyethylene glycol 3350 17 Gram(s) Oral two times a day  senna 2 Tablet(s) Oral at bedtime  sodium chloride 3 Gram(s) Oral every 8 hours  venlafaxine 37.5 milliGRAM(s) Oral daily    MEDICATIONS  (PRN):  acetaminophen     Tablet .. 650 milliGRAM(s) Oral every 6 hours PRN Temp greater or equal to 38C (100.4F), Mild Pain (1 - 3)  labetalol Injectable 10 milliGRAM(s) IV Push every 2 hours PRN SBP >150, HR > 70  QUEtiapine 12.5 milliGRAM(s) Oral at bedtime PRN agitation    RECENT LABS/IMAGING                        11.5   8.44  )-----------( 580      ( 08 Jul 2024 07:06 )             36.8     07-08    138  |  103  |  22<H>  ----------------------------<  121<H>  5.0   |  22  |  1.0    Ca    9.5      08 Jul 2024 07:06  Mg     2.2     07-08    TPro  6.8  /  Alb  3.8  /  TBili  0.3  /  DBili  x   /  AST  19  /  ALT  25  /  AlkPhos  99  07-08

## 2024-07-08 NOTE — PROGRESS NOTE ADULT - ASSESSMENT
ASSESSMENT/PLAN    Rehab for Acute right temporoparietal ICH, s/p craniotomy  with hematoma evacuation, left sided hemiparesis (nondominant), dysarthria and cognitive deficits    64 M w/ PMHx of CVA 2020 and 2021 s/p intracranial angioplasty of right MCA and NORMAN w/ subsequent intra-stent occlusion of R MCA/NORMAN intracranial stent, aortic valve insufficiency, implantable loop recorder for prior episodes of syncope/NSVT, chronic anemia, CKD 2, psoriatic arthritis who underwent elective R STA to MCA bypass 06/20. On 6/22 noted to have new weakness and mental status change, CTH showed R temporoparietal ICH with midline shift. Patient underwent craniotomy with hematoma evacuation with improvement in midline shift. Repeat CTH stable, patient clinically improving. Admitted for inpatient rehabilitation for acute right temporoparietal ICH and craniotomy with hematoma evacuation, left sided hemiparesis and cognitive deficits    PLOF: Rolling walker since 02/2024, Independent with ADLs and IADLs  CLOF:  Bed mobility min assist, transfer min assist, gait 20 feet RW min assist    #Rehab of R temporoparietal ICH after elective R STA to MCA bypass  #Hx CVA 2020 and 2021 s/p intracranial angioplasty of right MCA and NORMAN w/ subsequent intra-stent occlusion of R  #Thrombocytosis likely reactive to surgery - stable   MCA/NORMAN intracranial stent  New left sided weakness and confusion post procedure  S/P Partial evac of R T/p ICH with improvement of R--L shift   CTH stable on 6/28  EEG negative for any seizure  - f/up Stroke Neurology   - BP goal 110-150  - Q4h neuro checks  - NSGY following - removed staples on 7/7  - Aspirin- 81 mg for bypass patency   - Continue home Effexor qd  - Seroquel 25 mg q hs prn for agitation   - melatonin qhs  - PT/OT/SLP    #Mobile Echodensity on Mitral Valve   TTE Echo Complete w/o Contrast w/ Doppler (6/21/24) - EF 70 to 75%. Moderate (Grade 2) diastolic dysfunction. Asymmetric basal septal hypertrophy (1.6cm).  There is a small independently mobile echodensity attached to the anterior mitral valve leaflet. In the right clinical context this may represent endocarditis. Thickening and calcification of the anterior and posterior mitral valve leaflets.  07/05 GIULIANA performed with evidence of MV Calcification rather than IE  - likely OP f/u Cardiology     #HTN  Goal - < 150  -Continue amlodipine 5 mg qd.   -c/w Losartan 100 mg qd  -labetalol prn for breakthrough    #Stage II CKD   - Baseline 0.9-1.1 , but now around 1.3 ( has trended up to 1.3 prior as well )   - encourage PO intake today, and trend creatinine   - Na goal 140- 150 for perihematomal edema   - on salt tabs 3 tabs q8hrs, goal Na 140-150, taper as tolerates    #LUE + superficial thrombophlebitis - improving  - warmth and elevation  - conservative measures    #Psoriatic Arthritis  Humira last dosed on June 9th as per wife and as communicated by pt's rheumatologist; Discussed with rheumatologist Dr Andre, pt to hold Humira while hospitalized     -Pain control: Tylenol heat  -GI/Bowel Mgmt: Miralax  -Skin:  No active issues at this time  -FEN   - Diet: DASH  Precautions / PROPHYLAXIS:    - Falls  - DVT prophylaxis: Heparin 5000 units subq q8hrs ASSESSMENT/PLAN    Rehab for Acute right temporoparietal ICH, s/p craniotomy  with hematoma evacuation, left sided hemiparesis (nondominant), dysarthria and cognitive deficits    64 M w/ PMHx of CVA 2020 and 2021 s/p intracranial angioplasty of right MCA and NORMAN w/ subsequent intra-stent occlusion of R MCA/NORMAN intracranial stent, aortic valve insufficiency, implantable loop recorder for prior episodes of syncope/NSVT, chronic anemia, CKD 2, psoriatic arthritis who underwent elective R STA to MCA bypass 06/20. On 6/22 noted to have new weakness and mental status change, CTH showed R temporoparietal ICH with midline shift. Patient underwent craniotomy with hematoma evacuation with improvement in midline shift. Repeat CTH stable, patient clinically improving. Admitted for inpatient rehabilitation for acute right temporoparietal ICH and craniotomy with hematoma evacuation, left sided hemiparesis and cognitive deficits    PLOF: Rolling walker since 02/2024, Independent with ADLs and IADLs  CLOF:  Bed mobility min assist, transfer min assist, gait 20 feet RW min assist    #Rehab of R temporoparietal ICH after elective R STA to MCA bypass  #Hx CVA 2020 and 2021 s/p intracranial angioplasty of right MCA and NORMAN w/ subsequent intra-stent occlusion of R  #Thrombocytosis likely reactive to surgery - stable   MCA/NORMAN intracranial stent  New left sided weakness and confusion post procedure  S/P Partial evac of R T/p ICH with improvement of R--L shift   CTH stable on 6/28  EEG negative for any seizure  - f/up Stroke Neurology   - BP goal 110-150  - Q4h neuro checks  - NSGY following - removed staples on 7/7  - Aspirin- 81 mg for bypass patency   - Continue home Effexor qd  - Seroquel 25 mg q hs prn for agitation   - melatonin qhs  - PT/OT/SLP    #Mobile Echodensity on Mitral Valve   TTE Echo Complete w/o Contrast w/ Doppler (6/21/24) - EF 70 to 75%. Moderate (Grade 2) diastolic dysfunction. Asymmetric basal septal hypertrophy (1.6cm).  There is a small independently mobile echodensity attached to the anterior mitral valve leaflet. In the right clinical context this may represent endocarditis. Thickening and calcification of the anterior and posterior mitral valve leaflets.  07/05 GIULIANA performed with evidence of MV Calcification rather than IE  - likely OP f/u Cardiology     #HTN  Goal - < 150  -Continue amlodipine 5 mg qd.   -c/w Losartan 100 mg qd  -labetalol prn for breakthrough    #Stage II CKD   - Baseline 0.9-1.1 , but now around 1.3 ( has trended up to 1.3 prior as well )   - encourage PO intake today, and trend creatinine   - Na goal 140- 150 for perihematomal edema   - on salt tabs 3 tabs q8hrs, goal Na 140-150, taper as tolerates    #LUE + superficial thrombophlebitis - improving  - warmth and elevation  - conservative measures    #Psoriatic Arthritis  Humira last dosed on June 9th as per wife and as communicated by pt's rheumatologist; Discussed with rheumatologist Dr Andre, pt to hold Humira while hospitalized     -Pain control: Tylenol heat  -GI/Bowel Mgmt: Miralax  -Skin:  No active issues at this time  -FEN   Precautions / PROPHYLAXIS:    - Falls  - DVT prophylaxis: Heparin 5000 units subq q8hrs  - Diet, Regular:   High Fiber (HIFIBER)  Supplement Feeding Modality:  Oral  Ensure Plus High Protein Cans or Servings Per Day:  1       Frequency:  Daily (07-08-24 @ 11:46) [Active] ASSESSMENT/PLAN    Rehab for Acute right temporoparietal ICH, s/p craniotomy  with hematoma evacuation, left sided hemiparesis (nondominant), dysarthria and cognitive deficits    64 M w/ PMHx of CVA 2020 and 2021 s/p intracranial angioplasty of right MCA and NORMAN w/ subsequent intra-stent occlusion of R MCA/NORMAN intracranial stent, aortic valve insufficiency, implantable loop recorder for prior episodes of syncope/NSVT, chronic anemia, CKD 2, psoriatic arthritis who underwent elective R STA to MCA bypass 06/20. On 6/22 noted to have new weakness and mental status change, CTH showed R temporoparietal ICH with midline shift. Patient underwent craniotomy with hematoma evacuation with improvement in midline shift. Repeat CTH stable, patient clinically improving. Admitted for inpatient rehabilitation for acute right temporoparietal ICH and craniotomy with hematoma evacuation, left sided hemiparesis and cognitive deficits    PLOF: Rolling walker since 02/2024, Independent with ADLs and IADLs  CLOF:  Bed mobility min assist, transfer min assist, gait 20 feet RW min assist    #Rehab of R temporoparietal ICH after elective R STA to MCA bypass  #Hx CVA 2020 and 2021 s/p intracranial angioplasty of right MCA and NORMAN w/ subsequent intra-stent occlusion of R  #Thrombocytosis likely reactive to surgery - stable   MCA/NORMAN intracranial stent  New left sided weakness and confusion post procedure  S/P Partial evac of R T/p ICH with improvement of R--L shift   CTH stable on 6/28  EEG negative for any seizure  - f/up Stroke Neurology   - BP goal 110-150  - Q4h neuro checks  - NSGY following - removed staples on 7/7  - Aspirin- 81 mg for bypass patency   - Continue home Effexor qd  - Seroquel 25 mg q hs prn for agitation   - melatonin qhs  - PT/OT/SLP    #Mobile Echodensity on Mitral Valve   TTE Echo Complete w/o Contrast w/ Doppler (6/21/24) - EF 70 to 75%. Moderate (Grade 2) diastolic dysfunction. Asymmetric basal septal hypertrophy (1.6cm).  There is a small independently mobile echodensity attached to the anterior mitral valve leaflet. Thickening and calcification of the anterior and posterior mitral valve leaflets.  07/05 GIULIANA performed with evidence of MV Calcification rather than IE  - likely OP f/u Cardiology     #HTN  Goal - < 150  -Continue amlodipine 5 mg qd.   -c/w Losartan 100 mg qd  -labetalol prn for breakthrough    #Stage II CKD   - Baseline 0.9-1.1 , but now around 1.3 ( has trended up to 1.3 prior as well )   - encourage PO intake today, and trend creatinine   - Na goal 140- 150 for perihematomal edema   - on salt tabs 3 tabs q8hrs, goal Na 140-150, taper as tolerates    #LUE + superficial thrombophlebitis - improving  - warmth and elevation  - conservative measures    #Psoriatic Arthritis  Humira last dosed on June 9th as per wife and as communicated by pt's rheumatologist; Discussed with rheumatologist Dr Andre, pt to hold Humira while hospitalized     -Pain control: Tylenol heat  -GI/Bowel Mgmt: Miralax  Precautions / PROPHYLAXIS:    - Falls  - DVT prophylaxis: Heparin 5000 units subq q8hrs  - Diet, Regular:   High Fiber (HIFIBER)  Supplement Feeding Modality:  Oral  Ensure Plus High Protein Cans or Servings Per Day:  1       Frequency:  Daily (07-08-24 @ 11:46) [Active]

## 2024-07-09 RX ORDER — SIMVASTATIN 40 MG
40 TABLET ORAL AT BEDTIME
Refills: 0 | Status: DISCONTINUED | OUTPATIENT
Start: 2024-07-09 | End: 2024-07-10

## 2024-07-09 RX ADMIN — Medication 1 APPLICATION(S): at 05:58

## 2024-07-09 RX ADMIN — HEPARIN SODIUM 5000 UNIT(S): 50 INJECTION, SOLUTION INTRAVENOUS at 21:48

## 2024-07-09 RX ADMIN — Medication 3 GRAM(S): at 21:48

## 2024-07-09 RX ADMIN — VENLAFAXINE 37.5 MILLIGRAM(S): 37.5 CAPSULE, EXTENDED RELEASE ORAL at 12:39

## 2024-07-09 RX ADMIN — HEPARIN SODIUM 5000 UNIT(S): 50 INJECTION, SOLUTION INTRAVENOUS at 12:31

## 2024-07-09 RX ADMIN — Medication 3 GRAM(S): at 12:31

## 2024-07-09 RX ADMIN — HEPARIN SODIUM 5000 UNIT(S): 50 INJECTION, SOLUTION INTRAVENOUS at 05:58

## 2024-07-09 RX ADMIN — Medication 5 MILLIGRAM(S): at 21:48

## 2024-07-09 RX ADMIN — Medication 3 GRAM(S): at 05:58

## 2024-07-09 RX ADMIN — MAGNESIUM OXIDE 400 MILLIGRAM(S): 400 TABLET ORAL at 17:16

## 2024-07-09 RX ADMIN — POLYETHYLENE GLYCOL 3350 17 GRAM(S): 1 POWDER ORAL at 05:57

## 2024-07-09 RX ADMIN — AMLODIPINE BESYLATE 5 MILLIGRAM(S): 2.5 TABLET ORAL at 05:58

## 2024-07-09 RX ADMIN — MAGNESIUM OXIDE 400 MILLIGRAM(S): 400 TABLET ORAL at 12:31

## 2024-07-09 RX ADMIN — LOSARTAN POTASSIUM 100 MILLIGRAM(S): 100 TABLET, FILM COATED ORAL at 05:58

## 2024-07-09 RX ADMIN — PANTOPRAZOLE SODIUM 40 MILLIGRAM(S): 40 INJECTION, POWDER, FOR SOLUTION INTRAVENOUS at 06:29

## 2024-07-09 RX ADMIN — Medication 2 TABLET(S): at 21:48

## 2024-07-09 RX ADMIN — ASPIRIN 81 MILLIGRAM(S): 325 TABLET, FILM COATED ORAL at 12:31

## 2024-07-09 NOTE — PROGRESS NOTE ADULT - ASSESSMENT
ASSESSMENT/PLAN    Rehab for Acute right temporoparietal ICH, s/p craniotomy  with hematoma evacuation, left sided hemiparesis (nondominant), dysarthria and cognitive deficits    64 M w/ PMHx of CVA 2020 and 2021 s/p intracranial angioplasty of right MCA and NORMAN w/ subsequent intra-stent occlusion of R MCA/NORMAN intracranial stent, aortic valve insufficiency, implantable loop recorder for prior episodes of syncope/NSVT, chronic anemia, CKD 2, psoriatic arthritis who underwent elective R STA to MCA bypass 06/20. On 6/22 noted to have new weakness and mental status change, CTH showed R temporoparietal ICH with midline shift. Patient underwent craniotomy with hematoma evacuation with improvement in midline shift. Repeat CTH stable, patient clinically improving. Admitted for inpatient rehabilitation for acute right temporoparietal ICH and craniotomy with hematoma evacuation, left sided hemiparesis and cognitive deficits    PLOF: Rolling walker since 02/2024, Independent with ADLs and IADLs  CLOF:  Bed mobility min assist, transfer min assist, gait 20 feet RW min assist    #Rehab of R temporoparietal ICH after elective R STA to MCA bypass  #Hx CVA 2020 and 2021 s/p intracranial angioplasty of right MCA and NORMAN w/ subsequent intra-stent occlusion of R  #Thrombocytosis likely reactive to surgery - stable   MCA/NORMAN intracranial stent  New left sided weakness and confusion post procedure  S/P Partial evac of R T/p ICH with improvement of R--L shift   CTH stable on 6/28  EEG negative for any seizure  - f/up Stroke Neurology   - BP goal 110-150  - Q4h neuro checks  - NSGY following - removed staples on 7/7  - Aspirin- 81 mg for bypass patency   - Continue home Effexor 37.5mg qd  - Seroquel 12.5 mg q hs prn for agitation   - melatonin qhs  - PT/OT/SLP    #Mobile Echodensity on Mitral Valve   TTE Echo Complete w/o Contrast w/ Doppler (6/21/24) - EF 70 to 75%. Moderate (Grade 2) diastolic dysfunction. Asymmetric basal septal hypertrophy (1.6cm).  There is a small independently mobile echodensity attached to the anterior mitral valve leaflet. Thickening and calcification of the anterior and posterior mitral valve leaflets.  07/05 GIULIANA performed with evidence of MV Calcification rather than IE  - likely OP f/u Cardiology     #HTN  Goal - < 150  -Continue amlodipine 5 mg qd.   -c/w Losartan 100 mg qd  -labetalol prn for breakthrough    #Stage II CKD   - Baseline 0.9-1.1 , but now around 1.3 ( has trended up to 1.3 prior as well )   - encourage PO intake today, and trend creatinine   - Na goal 140- 150 for perihematomal edema   - on salt tabs 3 tabs q8hrs, goal Na 140-150, taper as tolerates    #LUE + superficial thrombophlebitis - improving  - warmth and elevation  - conservative measures    #Psoriatic Arthritis  Humira last dosed on June 9th as per wife and as communicated by pt's rheumatologist; Discussed with rheumatologist Dr Andre, pt to hold Humira while hospitalized     -Pain control: Tylenol heat  -GI/Bowel Mgmt: Miralax  Precautions / PROPHYLAXIS:    - Falls  - DVT prophylaxis: Heparin 5000 units subq q8hrs  - Diet, Regular:   High Fiber (HIFIBER)  Supplement Feeding Modality:  Oral  Ensure Plus High Protein Cans or Servings Per Day:  1       Frequency:  Daily (07-08-24 @ 11:46) [Active] ASSESSMENT/PLAN    Rehab for Acute right temporoparietal ICH, s/p craniotomy  with hematoma evacuation, left sided hemiparesis (nondominant), dysarthria and cognitive deficits    64 M w/ PMHx of CVA 2020 and 2021 s/p intracranial angioplasty of right MCA and NORMAN w/ subsequent intra-stent occlusion of R MCA/NORMAN intracranial stent, aortic valve insufficiency, implantable loop recorder for prior episodes of syncope/NSVT, chronic anemia, CKD 2, psoriatic arthritis who underwent elective R STA to MCA bypass 06/20. On 6/22 noted to have new weakness and mental status change, CTH showed R temporoparietal ICH with midline shift. Patient underwent craniotomy with hematoma evacuation with improvement in midline shift. Repeat CTH stable, patient clinically improving. Admitted for inpatient rehabilitation for acute right temporoparietal ICH and craniotomy with hematoma evacuation, left sided hemiparesis and cognitive deficits    PLOF: Rolling walker since 02/2024, Independent with ADLs and IADLs  CLOF:  Bed mobility min assist, transfer min assist, gait 20 feet RW min assist    #Rehab of R temporoparietal ICH after elective R STA to MCA bypass  #Hx CVA 2020 and 2021 s/p intracranial angioplasty of right MCA and NORMAN w/ subsequent intra-stent occlusion of R  #Thrombocytosis likely reactive to surgery - stable   MCA/NORMAN intracranial stent  New left sided weakness and confusion post procedure  S/P Partial evac of R T/p ICH with improvement of R--L shift   CTH stable on 6/28  EEG negative for any seizure  - f/up Stroke Neurology   - BP goal 110-150  - Q4h neuro checks  - NSGY following - removed staples on 7/7  - Aspirin- 81 mg for bypass patency  - simvastatin 40mg daily  - Continue home Effexor 37.5mg qd  - Seroquel 12.5 mg q hs prn for agitation   - melatonin qhs  - PT/OT/SLP    #Mobile Echodensity on Mitral Valve   TTE Echo Complete w/o Contrast w/ Doppler (6/21/24) - EF 70 to 75%. Moderate (Grade 2) diastolic dysfunction. Asymmetric basal septal hypertrophy (1.6cm).  There is a small independently mobile echodensity attached to the anterior mitral valve leaflet. Thickening and calcification of the anterior and posterior mitral valve leaflets.  07/05 GIULIANA performed with evidence of MV Calcification rather than IE  - likely OP f/u Cardiology     #HTN  Goal - < 150  -Continue amlodipine 5 mg qd.   -c/w Losartan 100 mg qd  -labetalol prn for breakthrough    #Stage II CKD   - Baseline 0.9-1.1 , but now around 1.3 ( has trended up to 1.3 prior as well )   - encourage PO intake today, and trend creatinine   - Na goal 140- 150 for perihematomal edema   - on salt tabs 3 tabs q8hrs, goal Na 140-150, taper as tolerates    #LUE + superficial thrombophlebitis - improving  - warmth and elevation  - conservative measures    #Psoriatic Arthritis  Humira last dosed on June 9th as per wife and as communicated by pt's rheumatologist; Discussed with rheumatologist Dr Andre, pt to hold Humira while hospitalized     -Pain control: Tylenol heat  -GI/Bowel Mgmt: Miralax  Precautions / PROPHYLAXIS:    - Falls  - DVT prophylaxis: Heparin 5000 units subq q8hrs  - Diet, Regular:   High Fiber (HIFIBER)  Supplement Feeding Modality:  Oral  Ensure Plus High Protein Cans or Servings Per Day:  1       Frequency:  Daily (07-08-24 @ 11:46) [Active] ASSESSMENT/PLAN    Rehab for Acute right temporoparietal ICH, s/p craniotomy  with hematoma evacuation, left sided hemiparesis (nondominant), dysarthria and cognitive deficits    64 M w/ PMHx of CVA 2020 and 2021 s/p intracranial angioplasty of right MCA and NORMAN w/ subsequent intra-stent occlusion of R MCA/NORMAN intracranial stent, aortic valve insufficiency, implantable loop recorder for prior episodes of syncope/NSVT, chronic anemia, CKD 2, psoriatic arthritis who underwent elective R STA to MCA bypass 06/20. On 6/22 noted to have new weakness and mental status change, CTH showed R temporoparietal ICH with midline shift. Patient underwent craniotomy with hematoma evacuation with improvement in midline shift. Repeat CTH stable, patient clinically improving. Admitted for inpatient rehabilitation for acute right temporoparietal ICH and craniotomy with hematoma evacuation, left sided hemiparesis and cognitive deficits    PLOF: Rolling walker since 02/2024, Independent with ADLs and IADLs    #Rehab of R temporoparietal ICH after elective R STA to MCA bypass  #Hx CVA 2020 and 2021 s/p intracranial angioplasty of right MCA and NORMAN w/ subsequent intra-stent occlusion of R  #Thrombocytosis likely reactive to surgery - stable   MCA/NORMAN intracranial stent  New left sided weakness and confusion post procedure  S/P Partial evac of R T/p ICH with improvement of R--L shift   CTH stable on 6/28  EEG negative for any seizure  - BP goal 110-150  - NSGY following - removed staples on 7/7  - Aspirin- 81 mg for bypass patency  - not on statin. Has history of diarrhea with Atorvastatin. Has reportedly been on Zocor. Will discuss with patient.  - Continue home Effexor 37.5mg qd  - Seroquel 12.5 mg q hs prn for agitation - has not been needed  - melatonin qhs  - PT/OT/SLP    #Mobile Echodensity on Mitral Valve   TTE Echo Complete w/o Contrast w/ Doppler (6/21/24) - EF 70 to 75%. Moderate (Grade 2) diastolic dysfunction. Asymmetric basal septal hypertrophy (1.6cm).  There is a small independently mobile echodensity attached to the anterior mitral valve leaflet. Thickening and calcification of the anterior and posterior mitral valve leaflets.  07/05 GIULIANA performed with evidence of MV Calcification rather than IE  - likely OP f/u Cardiology     #HTN  Goal - < 150  -Continue amlodipine 5 mg qd.   -c/w Losartan 100 mg qd  -labetalol prn for breakthrough  - controlled    #Stage II CKD   - Baseline 0.9-1.1 , but now around 1.3 ( has trended up to 1.3 prior as well )   - encourage PO intake today, and trend creatinine   - Na goal 140- 150 for perihematomal edema   - on salt tabs 3 tabs q8hrs, goal Na 140-150, taper as tolerates    #LUE + superficial thrombophlebitis - improving  - warmth and elevation  - conservative measures    #Psoriatic Arthritis  Humira last dosed on June 9th as per wife and as communicated by pt's rheumatologist; Discussed with rheumatologist Dr Andre, pt to hold Humira while hospitalized     -Pain control: Tylenol heat  -GI/Bowel Mgmt: Miralax  Precautions / PROPHYLAXIS:    - Falls  - DVT prophylaxis: Heparin 5000 units subq q8hrs  - Diet, Regular:   High Fiber (HIFIBER)  Supplement Feeding Modality:  Oral  Ensure Plus High Protein Cans or Servings Per Day:  1       Frequency:  Daily (07-08-24 @ 11:46) [Active]

## 2024-07-09 NOTE — PROGRESS NOTE ADULT - ASSESSMENT
NEUROPSYCHOLOGY INITIAL EVALUATION            Session focused on: Initial evaluation of cognitive functioning and family contact      Needed: No : N/A     Pain: Denied Location: N/A Intervention: N/A     Orientation: Ax4    Arousal Level: Alert      Behavior: Disengaged     Mood/Affect:  Flat/blank     Attention: Required redirection and prompting     Insight into illness/deficits: Fair      Effort: Poor     Patient asked to be tested in English          Neuropsychology service was consulted to evaluate this 64 year old male with acute right temporoparietal ICH, s/p craniotomy with hematoma evacuation, left sided hemiparesis (nondominant), dysarthria, and cognitive deficits. Patient has a past medical history of CVA in 2020 and 2021 s/p intracranial angioplasty of right MCA and NORMAN w/ subsequent intra-stent occlusion of R MCA/NORMAN intracranial stent, aortic valve insufficiency, implantable loop recorder for prior episodes of syncope/NSVT, chronic anemia, CKD 2, psoriatic arthritis who presents for R STA to MCA bypass. On assessment in PACU, noted to have new L sided weakness, confusion, started on Phenylephrine gtt for perfusion with some improvement of symptoms. On 6/22 he was noted to have R temporoparietal ICH, now s/p crani and hematoma evacuation. Repeat imaging has been stable, EEG negative.          Social/Premorbid: He is from [place] and moved to the  in 1996. Patient is a retired (4 yrs ago) . He completed HS in [place]. He is  and has one adult child. Lives with wife. Ambulated with rolling walker since 2/2024. Patient is a former smoker and quite 3 years ago (1 pack per day). He denies past or present ETOH or substance use.          Behavioral Observations: Poverty of speech, occasional inappropriate or nonsensical responses (possibly affected by language barrier), flat affect, blank stare, slow processing. Requires much redirecting or prompting.          Results:     DF = 6     DB = 2      Verbal learning was poor and he stopped responding after 3 trials.     Comprehension of 1-2 step commands was variable.     Repetition was intact for simple sentences in English only.     Naming on MoCA was intact 3/3, though some words were only recalled in Macanese. Naming on Cognistat was 6/8, with Russian. Called ladder “railroad.”     Oral production was brief and inappropriate; patient described fishing scene as “Two girls getting dressed.”     Recall of shapes or drawing tasks was 4/6. He remembered setting clock to 10 after 11.      Processing speed was very slow.     Visuoconstruction was intact.     Calculation was impaired.      Abstract reasoning and judgement was impaired based on patient’s lack of response to questions relating to judgement of similarities and safety.       Case Summary: 64-year-old male with acute right temporoparietal ICH, s/p craniotomy with hematoma evacuation, left sided hemiparesis (nondominant), dysarthria, and cognitive deficits, with a history of prior CVA (2020 & 2021). Patient demonstrated deficits in attention, expressive language (i.e., naming, repetition, verbal output), abstract reasoning, judgement, and mental numeric calculation. He difficulty following multi-step commands and processing speed was notably slow. While low English proficiency may have affected patient’s performance on evaluation, patient’s wife confirms that patient is demonstrating poverty of speech, variable/poor attention, and is often not responsive to questions or commands, even if he had previously demonstrated the ability to respond. Orientation, visuoconstruction, and short-term recall were strengths, though recall has declined since the stroke. Although patient’s spouse described left-sided neglect, patient’s clock drawing and drawings of a flower, star, and 3-dimensional cube were intact and not indicative of visual neglect.           Overall, patient’s presentation is consistent with right temporal and parietal stroke, as well as with the cumulative residual effects of multiple CVAs on cognition. Patient’s NEUROPSYCHOLOGY INITIAL EVALUATION            Session focused on: Initial evaluation of cognitive functioning and family contact      Needed: No : N/A     Pain: Denied Location: N/A Intervention: N/A     Orientation: x6     Arousal Level: Alert      Behavior: Disengaged     Mood/Affect:  Flat/blank     Attention: Required redirection and prompting     Insight into illness/deficits: Fair      Effort: Poor     Patient asked to be tested in English          Neuropsychology service was consulted to evaluate this 64 year old male with acute right temporoparietal ICH, s/p craniotomy with hematoma evacuation, left sided hemiparesis (nondominant), dysarthria, and cognitive deficits. Patient has a past medical history of CVA in 2020 and 2021 s/p intracranial angioplasty of right MCA and NORMAN w/ subsequent intra-stent occlusion of R MCA/NORMAN intracranial stent, aortic valve insufficiency, implantable loop recorder for prior episodes of syncope/NSVT, chronic anemia, CKD 2, psoriatic arthritis who presents for R STA to MCA bypass. On assessment in PACU, noted to have new L sided weakness, confusion, started on Phenylephrine gtt for perfusion with some improvement of symptoms. On 6/22 he was noted to have R temporoparietal ICH, now s/p crani and hematoma evacuation. Repeat imaging has been stable, EEG negative.          Social/Premorbid: He is from [place] and moved to the  in 1996. Patient is a retired (4 yrs ago) . He completed HS in [place]. He is  and has one adult child. Lives with wife. Ambulated with rolling walker since 2/2024. Patient is a former smoker and quite 3 years ago (1 pack per day). He denies past or present ETOH or substance use.          Behavioral Observations: Poverty of speech, occasional inappropriate or nonsensical responses (possibly affected by language barrier), flat affect, blank stare, slow processing. Requires much redirecting or prompting.          Results:          DF = 6     DB = 2      Verbal learning was poor and he stopped responding after 3 trials.     Comprehension of 1-2 step commands was variable.     Repetition was intact for simple sentences in English only.     Naming on MoCA was intact 3/3, though some words were only recalled in Macanese. Naming on Cognistat was 6/8, with Russian. Called ladder “railroad.”     Oral production was brief and inappropriate; patient described fishing scene as “Two girls getting dressed.”     Recall of shapes or drawing tasks was 4/6. He remembered setting clock to 10 after 11.      Processing speed was very slow.     Visuoconstruction was intact.     Calculation was impaired.      Abstract reasoning and judgement was impaired based on patient’s lack of response to questions relating to judgement of similarities and safety.          Case Summary: 64-year-old male with acute right temporoparietal ICH, s/p craniotomy with hematoma evacuation, left sided hemiparesis (nondominant), dysarthria, and cognitive deficits, with a history of prior CVA (2020 & 2021). Patient demonstrated deficits in attention, expressive language (i.e., naming, repetition, verbal output), abstract reasoning, judgement, and mental numeric calculation. He difficulty following multi-step commands and processing speed was notably slow. While low English proficiency may have affected patient’s performance on evaluation, patient’s wife confirms that patient is demonstrating poverty of speech, variable/poor attention, and is often not responsive to questions or commands, even if he had previously demonstrated the ability to respond. Orientation, visuoconstruction, and short-term recall were strengths, though recall has declined since the stroke. Althought patient’s spouse described left-sided neglect, patient’s clock drawing and drawings of a flower, star, and 3-dimensional cube were intact and not indicative of visual neglect.        The patient's presentation is consistent with a right temporal and parietal stroke, as well as the cumulative effects of multiple cerebrovascular accidents (CVAs) on cognition. The patient exhibits acute changes, such as disengaged attention and apathy, which are also consistent with the potential effects of a right parietal stroke. 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     Additionally, the cumulative impact of multiple CVAs has likely exacerbated these deficits, leading to a pronounced decline in cognitive abilities.     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          Prognosis is fair, given severity of symptoms, history of CVAs, and low motivation/apathy secondary to r-sided stroke.          Plan: Monitor and continue to evaluate cognition and mood affect, including disengaged attention and apathy, are also consistent with potential affects of right parietal stroke. 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           Prognosis is fair, given severity of symptoms, history of CVAs, and low motivation/apathy secondary to r-sided stroke.          Plan: Monitor and continue to evaluate cognition and mood  NEUROPSYCHOLOGY INITIAL EVALUATION            Session focused on: Initial evaluation of cognitive functioning and family contact      Needed: No : N/A     Pain: Denied Location: N/A Intervention: N/A     Orientation: Ax4    Arousal Level: Alert      Behavior: Disengaged     Mood/Affect:  Flat/blank     Attention: Required redirection and prompting     Insight into illness/deficits: Fair      Effort: Poor     Patient asked to be tested in English          Neuropsychology service was consulted to evaluate this 64 year old male with acute right temporoparietal ICH, s/p craniotomy with hematoma evacuation, left sided hemiparesis (nondominant), dysarthria, and cognitive deficits. Patient has a past medical history of CVA in 2020 and 2021 s/p intracranial angioplasty of right MCA and NORMAN w/ subsequent intra-stent occlusion of R MCA/NORMAN intracranial stent, aortic valve insufficiency, implantable loop recorder for prior episodes of syncope/NSVT, chronic anemia, CKD 2, psoriatic arthritis who presents for R STA to MCA bypass. On assessment in PACU, noted to have new L sided weakness, confusion, started on Phenylephrine gtt for perfusion with some improvement of symptoms. On 6/22 he was noted to have R temporoparietal ICH, now s/p crani and hematoma evacuation. Repeat imaging has been stable, EEG negative.          Social/Premorbid: He is from [place] and moved to the  in 1996. Patient is a retired (4 yrs ago) . He completed HS in [place]. He is  and has one adult child. Lives with wife. Ambulated with rolling walker since 2/2024. Patient is a former smoker and quite 3 years ago (1 pack per day). He denies past or present ETOH or substance use.          Behavioral Observations: Poverty of speech, occasional inappropriate or nonsensical responses (possibly affected by language barrier), flat affect, blank stare, slow processing. Requires much redirecting or prompting.          Results:     DF = 6     DB = 2      Verbal learning was poor and he stopped responding after 3 trials.     Comprehension of 1-2 step commands was variable.     Repetition was intact for simple sentences in English only.     Naming on MoCA was intact 3/3, though some words were only recalled in Chinese. Naming on Cognistat was 6/8, with Russian. Called ladder “railroad.”     Oral production was brief and inappropriate; patient described fishing scene as “Two girls getting dressed.”     Recall of shapes or drawing tasks was 4/6. He remembered setting clock to 10 after 11.      Processing speed was very slow.     Visuoconstruction was intact.     Calculation was impaired.      Abstract reasoning and judgement was impaired based on patient’s lack of response to questions relating to judgement of similarities and safety.       Case Summary: 64-year-old male with acute right temporoparietal ICH, s/p craniotomy with hematoma evacuation, left sided hemiparesis (nondominant), dysarthria, and cognitive deficits, with a history of prior CVA (2020 & 2021). Patient demonstrated deficits in attention, expressive language (i.e., naming, repetition, verbal output), abstract reasoning, judgement, and mental numeric calculation. He difficulty following multi-step commands and processing speed was notably slow. While low English proficiency may have affected patient’s performance on evaluation, patient’s wife confirms that patient is demonstrating poverty of speech, variable/poor attention, and is often not responsive to questions or commands, even if he had previously demonstrated the ability to respond. Orientation, visuoconstruction, and short-term recall were strengths, though recall has declined since the stroke. Patient’s spouse described left-sided neglect, and although there was some evidence of left neglect on a cancelation test and right gaze preference, patient’s clock drawing and drawings of a flower, star, and 3-dimensional cube were intact.       The patient's presentation is consistent with a right temporal and parietal stroke, as well as the cumulative effects of multiple cerebrovascular accidents (CVAs) on cognition. The patient exhibits acute changes, such as disengaged attention and apathy, which are also consistent with the potential effects of a right parietal stroke. 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     Additionally, the cumulative impact of multiple CVAs has likely exacerbated these deficits, leading to a pronounced decline in cognitive abilities.     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               Prognosis is fair, given severity of symptoms, history of CVAs, and low motivation/apathy secondary to r-sided stroke.          Plan: Monitor and continue to evaluate cognition and mood

## 2024-07-09 NOTE — PROGRESS NOTE ADULT - ASSESSMENT
Neuropsychology Note    Treatment focused on Family Contact    Primary Contact Name: Anny Oscar Relationship: Wife          Pertinent Social History: Patient was born in [place] and moved to the  in 1996. He is a retired (4 yrs ago) . He completed HS in [place]. He is  and has one adult child. He lives with wife. Has been ambulating with rolling walker since 2/2024.          Premorbid Functioning:     ADL/iADLs: Even after CVAs in 2020/21, patient was independent in ADLs: cooked for himself, managed his meds independently, pay bills, went shopping, and drove (short distances only given motor weakness in L arm). Now he is “like a baby.”     Psychiatric History/Treatment: Wife said he was depressed as of 5 yrs; taking 37.5 venlafaxine. Appetite is currently better than it used to be. Denied SI. Looking forward to being home with dog.     Cognition: Mild short term memory difficulties and reduced speech since 2020/2021 CVAs. In contrast to premorbid, wife reports that patient neglects objects in his left hand or to his left. Attention is poor and processing is very slow. She has to repeat her question numerous times and he sometimes does not respond. Unsure if comprehension or motivational problems. Wife also noticed that patient’s eyes are unfocused and he has a blank look.     Substance Use: Denied; former smoker; ETOH denied          Years of Education:      Occupation:      Retired: 5 yrs ago     Current Status:     Mood Changes: Yes, flat/depressed affect     Cognition Changes: Reduced verbal output, poor attention, slow processing, possible comprehension deficits     Behavior Changes: Left neglect     Patient’s Primary Language: Sammarinese, understands some English     a) Changes in understanding primary language after Neurological event.  No      b) Preferred language for health care information? English     Brain Injury / Cognitive Behavioral Protocol Education Provided: N/A     Family Education: Caregiver was educated about the rehabilitation process, current status, and family education sessions. Discharge planning initiated.     Family Concerns: Patient sometimes unresponsive/not motivated, possibly depressed     Family Goals: Support positive coping and cognitive assessment.

## 2024-07-09 NOTE — PROGRESS NOTE ADULT - SUBJECTIVE AND OBJECTIVE BOX
Patient is a 64y old  Male who presents with a chief complaint of Rehab for Acute Right temporoparietal ICH, s/p  craniotomy with hematoma evacuation, left sided hemiparesis (nondominant), dysarthria  and cognitive deficits (05 Jul 2024 16:46)      HPI:  64M w/ PMHx of CVA 2020 and 2021 s/p intracranial angioplasty of right MCA and NORMAN w/ subsequent intra-stent occlusion of R MCA/NORMAN intracranial stent, aortic valve insufficiency, implantable loop recorder for prior episodes of syncope/NSVT, chronic anemia, CKD 2, psoriatic arthritis who presents for R STA to MCA bypass. Post-op was CTH stable. On assessment in PACU, noted to have new L sided weakness, confusion at SBP 120s. Started on Phenylephrine gtt for perfusion with some improvement of symptoms. Pt admitted to NSICU. On 6/22 he was noted to have R temporoparietal ICH, now s/p crani and hematoma evacuation. Repeat imaging has been stable, EEG negative, ASA restarted. BP better controlled. TTE with findings concerning for endocarditis, GIULIANA performed showed no signs of endocarditis. Patient with functional deficits relative to baseline, admitted to acute inpatient rehab.    The patient was evaluated by the PM&R team once medically stable. The patient was found to have functional limitations in terms of physical strength/mobility and ability to carry out ADLs (self care, transfers, ambulation). The patient was deemed to be a good candidate for admission for acute inpatient rehab.     Living Situation: Lives with Wife 1 VIANEY, 12 Steps within home  PLOF: Rolling walker since 02/2024, Independent with ADLs and IADLs (05 Jul 2024 16:46)      TODAY'S SUBJECTIVE & REVIEW OF SYMPTOMS  Patient was seen and assessed at bedside.   No overnight events.   Patient denies any new complaints at this time.   Tolerating PT/OT   Tolerating oral diet.   Voiding and passing stool spontaneously.   Vital signs reviewed.    Patient seen this morning, sitting upright in wheelchair using his phone. Well groomed. States he is feeling better. Does not specify what in particular is improved. More alert and engaged but still only speaking in short sentences    Constitutional:    [   ] WNL           [   ] poor appetite   [   ] insomnia   [   ] tired   Cardio:                [   ] WNL           [   ] CP   [   ] MORALES   [   ] palpitations               Resp:                   [ X  ] WNL           [   ] SOB   [   ] cough   [   ] wheezing   GI:                        [ X  ] WNL           [   ] constipation   [   ] diarrhea   [   ] abdominal pain   [   ] nausea   [   ] emesis                                :                      [ X  ] WNL           [   ] GIBSON  [   ] dysuria   [   ] difficulty voiding             Endo:                   [ X  ] WNL          [   ] polyuria   [   ] temperature intolerance                 Skin:                     [   ] WNL          [   ] pain   [ X  ] wound right temporal craniotomy wound, stapled  [   ] rash [ X ]  bruising, right arm medial bicep   MSK:                    [ X  ] WNL          [   ] muscle pain   [   ] joint pain/ stiffness   [   ] muscle tenderness   [   ] swelling   Neuro:                 [   ] WNL          [   ] HA   [   ] change in vision   [   ] tremor   [ X  ] weakness   [   ]dysphagia              Cognitive:           [   ] WNL           [   ]confusion   [ X ] impulsiveness [ X ] slowed cognition   Psych:                  [ X  ] WNL           [   ] hallucinations   [   ]agitation   [   ] delusion   [   ]depression    PHYSICAL EXAM  Vital Signs Last 24 Hrs  T(C): 36.6 (09 Jul 2024 05:12), Max: 36.8 (08 Jul 2024 20:48)  T(F): 97.8 (09 Jul 2024 05:12), Max: 98.2 (08 Jul 2024 20:48)  HR: 63 (09 Jul 2024 05:12) (63 - 86)  BP: 140/80 (09 Jul 2024 05:12) (110/66 - 140/80)  BP(mean): 100 (09 Jul 2024 05:12) (89 - 100)  RR: 17 (09 Jul 2024 05:12) (17 - 19)  SpO2: 95% (08 Jul 2024 12:41) (95% - 95%)    Parameters below as of 08 Jul 2024 12:41  Patient On (Oxygen Delivery Method): room air    General:[ X  ] NAD, Resting Comfortable,   [   ] other:                                HEENT: [   ] NC/AT, EOMI, PERRL , Normal Conjunctivae,   [ X  ] other: right sided temporal swelling markedly improved        Cardio: [ X  ] RRR, no murmur,   [   ] other:                              Pulm: [ X  ] No Respiratory Distress,  Lungs CTAB,   [   ] other:                       Abdomen: [ X ]ND/NT, Soft,   [   ] other:    : [ X ] NO GIBSON CATHETER, [   ] GIBSON CATHETER- no meatal tear, no discharge, [   ] other:                                            MSK: [ X  ] No joint swelling, Full ROM,   [   ] other:                                         Ext: [ X  ]No C/C/E, No calf tenderness,   [   ]other:    Skin: [   ]intact,   [ X  ] other: right forearm and medial bicep bruising. right sided temporal swelling markedly improved                                                 Neurological Examination:  Cognitive: [  X  ] AAO x 3,   [  X  ]  other:  slowed responses, poor insight into deficits                                                                    Attention:  [  X  ] intact serial additions, no hemineglect   [    ]  other:                        Memory: [  X  ] intact,    [    ]  other:     Mood/Affect: [    ] wnl,    [  X  ]  other: blunted, distant, slowed                                                                            Communication: [  X  ]Fluent, no dysarthria, following commands:  [    ] other:   CN II - XII:  [    ] intact,  [  X  ] other: left sided facial droop                                                                                        Motor:   RIGHT UE: [ X  ] WNL,  [   ] other:  LEFT    UE: [   ] WNL,  [ X  ] other: Sh Flexion 4/5, EF 4/5, Elbow extension 4/5, finger  4/5. POSITIVE pronator drift  RIGHT LE: [ x  ] WNL,  [   ] other:   LEFT    LE: [   ] WNL,  [ x  ] other: HF 4/5, KF 4/5, KE 4/5, ADF 5/5, APF 5/5. POSITIVE clonus 3 beats left ankle    Tone: [  X  ] wnl,   [    ]  other:  DTRs: [  X ]symmetric, [   ] other:  Coordination:   [  X  ] intact,   [    ] other:                                                                           Sensory: [  X  ] Intact to light touch,   [    ] other:    MEDICATIONS  (STANDING):  amLODIPine   Tablet 5 milliGRAM(s) Oral daily  aspirin  chewable 81 milliGRAM(s) Oral daily  chlorhexidine 2% Cloths 1 Application(s) Topical <User Schedule>  heparin   Injectable 5000 Unit(s) SubCutaneous every 8 hours  losartan 100 milliGRAM(s) Oral daily  magnesium oxide 400 milliGRAM(s) Oral three times a day with meals  melatonin 5 milliGRAM(s) Oral at bedtime  pantoprazole    Tablet 40 milliGRAM(s) Oral before breakfast  polyethylene glycol 3350 17 Gram(s) Oral two times a day  senna 2 Tablet(s) Oral at bedtime  sodium chloride 3 Gram(s) Oral every 8 hours  venlafaxine 37.5 milliGRAM(s) Oral daily    MEDICATIONS  (PRN):  acetaminophen     Tablet .. 650 milliGRAM(s) Oral every 6 hours PRN Temp greater or equal to 38C (100.4F), Mild Pain (1 - 3)  labetalol Injectable 10 milliGRAM(s) IV Push every 2 hours PRN SBP >150, HR > 70  QUEtiapine 12.5 milliGRAM(s) Oral at bedtime PRN agitation    RECENT LABS/IMAGING                        11.5   8.44  )-----------( 580      ( 08 Jul 2024 07:06 )             36.8     07-08    138  |  103  |  22<H>  ----------------------------<  121<H>  5.0   |  22  |  1.0    Ca    9.5      08 Jul 2024 07:06  Mg     2.2     07-08    TPro  6.8  /  Alb  3.8  /  TBili  0.3  /  DBili  x   /  AST  19  /  ALT  25  /  AlkPhos  99  07-08      Urinalysis Basic - ( 08 Jul 2024 07:06 )    Color: x / Appearance: x / SG: x / pH: x  Gluc: 121 mg/dL / Ketone: x  / Bili: x / Urobili: x   Blood: x / Protein: x / Nitrite: x   Leuk Esterase: x / RBC: x / WBC x   Sq Epi: x / Non Sq Epi: x / Bacteria: x     Patient is a 64y old  Male who presents with a chief complaint of Rehab for Acute Right temporoparietal ICH, s/p  craniotomy with hematoma evacuation, left sided hemiparesis (nondominant), dysarthria  and cognitive deficits (05 Jul 2024 16:46)      HPI:  64M w/ PMHx of CVA 2020 and 2021 s/p intracranial angioplasty of right MCA and NORMAN w/ subsequent intra-stent occlusion of R MCA/NORMAN intracranial stent, aortic valve insufficiency, implantable loop recorder for prior episodes of syncope/NSVT, chronic anemia, CKD 2, psoriatic arthritis who presents for R STA to MCA bypass. Post-op was CTH stable. On assessment in PACU, noted to have new L sided weakness, confusion at SBP 120s. Started on Phenylephrine gtt for perfusion with some improvement of symptoms. Pt admitted to NSICU. On 6/22 he was noted to have R temporoparietal ICH, now s/p crani and hematoma evacuation. Repeat imaging has been stable, EEG negative, ASA restarted. BP better controlled. TTE with findings concerning for endocarditis, GIULIANA performed showed no signs of endocarditis. Patient with functional deficits relative to baseline, admitted to acute inpatient rehab.    The patient was evaluated by the PM&R team once medically stable. The patient was found to have functional limitations in terms of physical strength/mobility and ability to carry out ADLs (self care, transfers, ambulation). The patient was deemed to be a good candidate for admission for acute inpatient rehab.     Living Situation: Lives with Wife 1 VIANEY, [address] within home  PLOF: Rolling walker since 02/2024, Independent with ADLs and IADLs (05 Jul 2024 16:46)      TODAY'S SUBJECTIVE & REVIEW OF SYMPTOMS    No overnight events.   Patient denies any new complaints at this time.   Tolerating PT/OT   Tolerating oral diet.   Voiding and passing stool spontaneously.   Vital signs reviewed.    Patient seen this morning, sitting upright in wheelchair using his phone. Well groomed. States he is feeling better. Does not specify what in particular is improved. More alert and engaged but still only speaking in short sentences    Constitutional:    [ x  ] WNL           [   ] poor appetite   [   ] insomnia   [   ] tired   Cardio:                [ x  ] WNL           [   ] CP   [   ] MORALES   [   ] palpitations               Resp:                   [ X  ] WNL           [   ] SOB   [   ] cough   [   ] wheezing   GI:                        [ X  ] WNL           [   ] constipation   [   ] diarrhea   [   ] abdominal pain   [   ] nausea   [   ] emesis                                :                      [ X  ] WNL           [   ] GIBSON  [   ] dysuria   [   ] difficulty voiding             Endo:                   [ X  ] WNL          [   ] polyuria   [   ] temperature intolerance                 Skin:                     [   ] WNL          [   ] pain   [ X  ] wound right temporal craniotomy wound, stapled  [   ] rash [ X ]  bruising, right arm medial bicep   MSK:                    [ X  ] WNL          [   ] muscle pain   [   ] joint pain/ stiffness   [   ] muscle tenderness   [   ] swelling   Neuro:                 [   ] WNL          [   ] HA   [   ] change in vision   [   ] tremor   [ X  ] weakness   [   ]dysphagia              Cognitive:           [   ] WNL           [   ]confusion   [ X ] impulsiveness [ X ] slowed cognition   Psych:                  [ X  ] WNL           [   ] hallucinations   [   ]agitation   [   ] delusion   [   ]depression    CLOF:  Bed mobility min assist, transfer min assist, gait 20 feet RW min assist/ CG    PHYSICAL EXAM  Vital Signs Last 24 Hrs  T(C): 36.6 (09 Jul 2024 05:12), Max: 36.8 (08 Jul 2024 20:48)  T(F): 97.8 (09 Jul 2024 05:12), Max: 98.2 (08 Jul 2024 20:48)  HR: 63 (09 Jul 2024 05:12) (63 - 86)  BP: 140/80 (09 Jul 2024 05:12) (110/66 - 140/80)  BP(mean): 100 (09 Jul 2024 05:12) (89 - 100)  RR: 17 (09 Jul 2024 05:12) (17 - 19)  SpO2: 95% (08 Jul 2024 12:41) (95% - 95%)    Parameters below as of 08 Jul 2024 12:41  Patient On (Oxygen Delivery Method): room air    General:[ X  ] NAD, Resting Comfortable,   [   ] other:                                HEENT: [   ] NC/AT, EOMI, PERRL , Normal Conjunctivae,   [ X  ] other: right sided temporal swelling markedly improved        Cardio: [ X  ] RRR, no murmur,   [   ] other:                              Pulm: [ X  ] No Respiratory Distress,  Lungs CTAB,   [   ] other:                       Abdomen: [ X ]ND/NT, Soft,   [   ] other:    : [ X ] NO GIBSON CATHETER, [   ] GIBSON CATHETER- no meatal tear, no discharge, [   ] other:                                            MSK: [ X  ] No joint swelling, Full ROM,   [   ] other:                                         Ext: [ X  ]No C/C/E, No calf tenderness,   [   ]other:    Skin: [   ]intact,   [ X  ] other: right forearm and medial bicep bruising. right sided temporal swelling markedly improved                                                 Neurological Examination:  Cognitive: [  X  ] AAO x 3,   [  X  ]  other:  slowed responses, poor insight into deficits                                                                    Attention:  [    ] intact serial additions, no hemineglect   [  x  ]  other:  right gaze preference, needs redirection                      Memory: [    ] intact,    [ x   ]  other: impaired    Mood/Affect: [    ] wnl,    [  X  ]  other: blunted, distant, slowed                                                                            Communication: [  X  ]Fluent, no dysarthria, following commands:  [    ] other:   CN II - XII:  [    ] intact,  [  X  ] other: left sided facial droop                                                                                        Motor:   RIGHT UE: [ X  ] WNL,  [   ] other:  LEFT    UE: [   ] WNL,  [ X  ] other: Sh Flexion 4/5, EF 4/5, Elbow extension 4/5, finger  4/5. POSITIVE pronator drift  RIGHT LE: [ x  ] WNL,  [   ] other:   LEFT    LE: [   ] WNL,  [ x  ] other: HF 4/5, KF 4/5, KE 4/5, ADF 5/5, APF 5/5. POSITIVE clonus 3 beats left ankle    Tone: [  X  ] wnl,   [    ]  other:  DTRs: [  X ]symmetric, [   ] other:  Coordination:   [  X  ] intact,   [    ] other:                                                                           Sensory: [  X  ] Intact to light touch,   [    ] other:    MEDICATIONS  (STANDING):  amLODIPine   Tablet 5 milliGRAM(s) Oral daily  aspirin  chewable 81 milliGRAM(s) Oral daily  chlorhexidine 2% Cloths 1 Application(s) Topical <User Schedule>  heparin   Injectable 5000 Unit(s) SubCutaneous every 8 hours  losartan 100 milliGRAM(s) Oral daily  magnesium oxide 400 milliGRAM(s) Oral three times a day with meals  melatonin 5 milliGRAM(s) Oral at bedtime  pantoprazole    Tablet 40 milliGRAM(s) Oral before breakfast  polyethylene glycol 3350 17 Gram(s) Oral two times a day  senna 2 Tablet(s) Oral at bedtime  sodium chloride 3 Gram(s) Oral every 8 hours  venlafaxine 37.5 milliGRAM(s) Oral daily    MEDICATIONS  (PRN):  acetaminophen     Tablet .. 650 milliGRAM(s) Oral every 6 hours PRN Temp greater or equal to 38C (100.4F), Mild Pain (1 - 3)  labetalol Injectable 10 milliGRAM(s) IV Push every 2 hours PRN SBP >150, HR > 70  QUEtiapine 12.5 milliGRAM(s) Oral at bedtime PRN agitation    RECENT LABS/IMAGING                        11.5   8.44  )-----------( 580      ( 08 Jul 2024 07:06 )             36.8     07-08    138  |  103  |  22<H>  ----------------------------<  121<H>  5.0   |  22  |  1.0    Ca    9.5      08 Jul 2024 07:06  Mg     2.2     07-08    TPro  6.8  /  Alb  3.8  /  TBili  0.3  /  DBili  x   /  AST  19  /  ALT  25  /  AlkPhos  99  07-08

## 2024-07-10 RX ORDER — SIMVASTATIN 40 MG
40 TABLET ORAL AT BEDTIME
Refills: 0 | Status: DISCONTINUED | OUTPATIENT
Start: 2024-07-10 | End: 2024-07-17

## 2024-07-10 RX ORDER — SODIUM CHLORIDE 0.9 % (FLUSH) 0.9 %
2 SYRINGE (ML) INJECTION EVERY 8 HOURS
Refills: 0 | Status: DISCONTINUED | OUTPATIENT
Start: 2024-07-10 | End: 2024-07-14

## 2024-07-10 RX ORDER — SIMVASTATIN 40 MG
20 TABLET ORAL AT BEDTIME
Refills: 0 | Status: DISCONTINUED | OUTPATIENT
Start: 2024-07-10 | End: 2024-07-10

## 2024-07-10 RX ADMIN — HEPARIN SODIUM 5000 UNIT(S): 50 INJECTION, SOLUTION INTRAVENOUS at 22:30

## 2024-07-10 RX ADMIN — HEPARIN SODIUM 5000 UNIT(S): 50 INJECTION, SOLUTION INTRAVENOUS at 06:16

## 2024-07-10 RX ADMIN — PANTOPRAZOLE SODIUM 40 MILLIGRAM(S): 40 INJECTION, POWDER, FOR SOLUTION INTRAVENOUS at 06:14

## 2024-07-10 RX ADMIN — VENLAFAXINE 37.5 MILLIGRAM(S): 37.5 CAPSULE, EXTENDED RELEASE ORAL at 13:03

## 2024-07-10 RX ADMIN — LOSARTAN POTASSIUM 100 MILLIGRAM(S): 100 TABLET, FILM COATED ORAL at 06:17

## 2024-07-10 RX ADMIN — AMLODIPINE BESYLATE 5 MILLIGRAM(S): 2.5 TABLET ORAL at 06:14

## 2024-07-10 RX ADMIN — ASPIRIN 81 MILLIGRAM(S): 325 TABLET, FILM COATED ORAL at 13:05

## 2024-07-10 RX ADMIN — HEPARIN SODIUM 5000 UNIT(S): 50 INJECTION, SOLUTION INTRAVENOUS at 13:05

## 2024-07-10 RX ADMIN — MAGNESIUM OXIDE 400 MILLIGRAM(S): 400 TABLET ORAL at 08:15

## 2024-07-10 RX ADMIN — POLYETHYLENE GLYCOL 3350 17 GRAM(S): 1 POWDER ORAL at 06:18

## 2024-07-10 RX ADMIN — Medication 5 MILLIGRAM(S): at 22:30

## 2024-07-10 RX ADMIN — MAGNESIUM OXIDE 400 MILLIGRAM(S): 400 TABLET ORAL at 13:05

## 2024-07-10 RX ADMIN — Medication 2 TABLET(S): at 22:30

## 2024-07-10 RX ADMIN — Medication 1 APPLICATION(S): at 06:15

## 2024-07-10 RX ADMIN — Medication 3 GRAM(S): at 06:16

## 2024-07-10 RX ADMIN — Medication 3 GRAM(S): at 13:05

## 2024-07-10 RX ADMIN — Medication 40 MILLIGRAM(S): at 22:31

## 2024-07-10 RX ADMIN — Medication 2 GRAM(S): at 22:30

## 2024-07-10 RX ADMIN — MAGNESIUM OXIDE 400 MILLIGRAM(S): 400 TABLET ORAL at 17:41

## 2024-07-10 NOTE — CHART NOTE - NSCHARTNOTEFT_GEN_A_CORE
Patient with documented allergy to atorvastatin with severe diarrhea reaction. Discussed with patient starting trial of simvastatin, patient agreeable. Yesterday evening patient and wife refusing simvastatin citing patients allergy Patient with documented allergy to atorvastatin with severe diarrhea reaction. Discussed with patient starting trial of simvastatin, patient agreeable. Yesterday evening patient and wife refusing simvastatin citing patients allergy.     Patient and wife now agreeable to trial of simvastatin

## 2024-07-10 NOTE — PROGRESS NOTE ADULT - SUBJECTIVE AND OBJECTIVE BOX
Patient is a 64y old  Male who presents with a chief complaint of Rehab for Acute Right temporoparietal ICH, s/p  craniotomy with hematoma evacuation, left sided hemiparesis (nondominant), dysarthria  and cognitive deficits (05 Jul 2024 16:46)      HPI:  64M w/ PMHx of CVA 2020 and 2021 s/p intracranial angioplasty of right MCA and NORMAN w/ subsequent intra-stent occlusion of R MCA/NORMAN intracranial stent, aortic valve insufficiency, implantable loop recorder for prior episodes of syncope/NSVT, chronic anemia, CKD 2, psoriatic arthritis who presents for R STA to MCA bypass. Post-op was CTH stable. On assessment in PACU, noted to have new L sided weakness, confusion at SBP 120s. Started on Phenylephrine gtt for perfusion with some improvement of symptoms. Pt admitted to NSICU. On 6/22 he was noted to have R temporoparietal ICH, now s/p crani and hematoma evacuation. Repeat imaging has been stable, EEG negative, ASA restarted. BP better controlled. TTE with findings concerning for endocarditis, GIULIANA performed showed no signs of endocarditis. Patient with functional deficits relative to baseline, admitted to acute inpatient rehab.    The patient was evaluated by the PM&R team once medically stable. The patient was found to have functional limitations in terms of physical strength/mobility and ability to carry out ADLs (self care, transfers, ambulation). The patient was deemed to be a good candidate for admission for acute inpatient rehab.     Living Situation: Lives with Wife 1 VIANEY, [address] within home  PLOF: Rolling walker since 02/2024, Independent with ADLs and IADLs (05 Jul 2024 16:46)      TODAY'S SUBJECTIVE & REVIEW OF SYMPTOMS    No overnight events.   Patient denies any new complaints at this time.   Tolerating PT/OT   Tolerating oral diet.   Voiding and passing stool spontaneously.   Vital signs reviewed.  Blunted, avoidant behavior.     Constitutional:    [ x  ] WNL           [   ] poor appetite   [   ] insomnia   [   ] tired   Cardio:                [ x  ] WNL           [   ] CP   [   ] MORALES   [   ] palpitations               Resp:                   [ X  ] WNL           [   ] SOB   [   ] cough   [   ] wheezing   GI:                        [ X  ] WNL           [   ] constipation   [   ] diarrhea   [   ] abdominal pain   [   ] nausea   [   ] emesis                                :                      [ X  ] WNL           [   ] GIBSON  [   ] dysuria   [   ] difficulty voiding             Endo:                   [ X  ] WNL          [   ] polyuria   [   ] temperature intolerance                 Skin:                     [   ] WNL          [   ] pain   [ X  ] wound right temporal craniotomy wound, stapled  [   ] rash [ X ]  bruising, right arm medial bicep   MSK:                    [ X  ] WNL          [   ] muscle pain   [   ] joint pain/ stiffness   [   ] muscle tenderness   [   ] swelling   Neuro:                 [   ] WNL          [   ] HA   [   ] change in vision   [   ] tremor   [ X  ] weakness   [   ]dysphagia              Cognitive:           [   ] WNL           [   ]confusion   [ X ] impulsiveness [ X ] slowed cognition   Psych:                  [ X  ] WNL           [   ] hallucinations   [   ]agitation   [   ] delusion   [   ]depression    CLOF:  Bed mobility min assist, transfer min assist, gait 20 feet RW min assist/ CG    PHYSICAL EXAM    Vital Signs Last 24 Hrs  T(C): 36.7 (10 Jul 2024 05:49), Max: 36.8 (09 Jul 2024 19:45)  T(F): 98 (10 Jul 2024 05:49), Max: 98.2 (09 Jul 2024 19:45)  HR: 68 (10 Jul 2024 05:49) (68 - 87)  BP: 129/64 (10 Jul 2024 05:49) (118/79 - 129/64)  BP(mean): 92 (09 Jul 2024 19:45) (92 - 92)  RR: 18 (10 Jul 2024 05:49) (18 - 19)  SpO2: --      General:[ X  ] NAD, Resting Comfortable,   [   ] other:                                HEENT: [   ] NC/AT, EOMI, PERRL , Normal Conjunctivae,   [ X  ] other: right sided temporal swelling markedly improved        Cardio: [ X  ] RRR, no murmur,   [   ] other:                              Pulm: [ X  ] No Respiratory Distress,  Lungs CTAB,   [   ] other:                       Abdomen: [ X ]ND/NT, Soft,   [   ] other:    : [ X ] NO GIBSON CATHETER, [   ] GIBSON CATHETER- no meatal tear, no discharge, [   ] other:                                            MSK: [ X  ] No joint swelling, Full ROM,   [   ] other:                                         Ext: [ X  ]No C/C/E, No calf tenderness,   [   ]other:    Skin: [   ]intact,   [ X  ] other: right forearm and medial bicep bruising. right sided temporal swelling markedly improved                                                 Neurological Examination:  Cognitive: [  X  ] AAO x 3,   [  X  ]  other:  slowed responses, poor insight into deficits                                                                    Attention:  [    ] intact serial additions, no hemineglect   [  x  ]  other:  right gaze preference, needs redirection                      Memory: [    ] intact,    [ x   ]  other: impaired    Mood/Affect: [    ] wnl,    [  X  ]  other: blunted, distant, slowed                                                                            Communication: [  X  ]Fluent, no dysarthria, following commands:  [    ] other:   CN II - XII:  [    ] intact,  [  X  ] other: left sided facial droop                                                                                        Motor:   RIGHT UE: [ X  ] WNL,  [   ] other:  LEFT    UE: [   ] WNL,  [ X  ] other: Sh Flexion 4/5, EF 4/5, Elbow extension 4/5, finger  4/5. POSITIVE pronator drift  RIGHT LE: [ x  ] WNL,  [   ] other:   LEFT    LE: [   ] WNL,  [ x  ] other: HF 4/5, KF 4/5, KE 4/5, ADF 5/5, APF 5/5. POSITIVE clonus 3 beats left ankle    Tone: [  X  ] wnl,   [    ]  other:  DTRs: [  X ]symmetric, [   ] other:  Coordination:   [  X  ] intact,   [    ] other:                                                                           Sensory: [  X  ] Intact to light touch,   [    ] other:    MEDICATIONS  (STANDING):  amLODIPine   Tablet 5 milliGRAM(s) Oral daily  aspirin  chewable 81 milliGRAM(s) Oral daily  chlorhexidine 2% Cloths 1 Application(s) Topical <User Schedule>  heparin   Injectable 5000 Unit(s) SubCutaneous every 8 hours  losartan 100 milliGRAM(s) Oral daily  magnesium oxide 400 milliGRAM(s) Oral three times a day with meals  melatonin 5 milliGRAM(s) Oral at bedtime  pantoprazole    Tablet 40 milliGRAM(s) Oral before breakfast  polyethylene glycol 3350 17 Gram(s) Oral two times a day  senna 2 Tablet(s) Oral at bedtime  sodium chloride 3 Gram(s) Oral every 8 hours  venlafaxine 37.5 milliGRAM(s) Oral daily    MEDICATIONS  (PRN):  acetaminophen     Tablet .. 650 milliGRAM(s) Oral every 6 hours PRN Temp greater or equal to 38C (100.4F), Mild Pain (1 - 3)  labetalol Injectable 10 milliGRAM(s) IV Push every 2 hours PRN SBP >150, HR > 70  QUEtiapine 12.5 milliGRAM(s) Oral at bedtime PRN agitation    RECENT LABS/IMAGING                        11.5   8.44  )-----------( 580      ( 08 Jul 2024 07:06 )             36.8     07-08    138  |  103  |  22<H>  ----------------------------<  121<H>  5.0   |  22  |  1.0    Ca    9.5      08 Jul 2024 07:06  Mg     2.2     07-08    TPro  6.8  /  Alb  3.8  /  TBili  0.3  /  DBili  x   /  AST  19  /  ALT  25  /  AlkPhos  99  07-08

## 2024-07-10 NOTE — PROGRESS NOTE ADULT - ASSESSMENT
ASSESSMENT/PLAN    Rehab for Acute right temporoparietal ICH, s/p craniotomy  with hematoma evacuation, left sided hemiparesis (nondominant), dysarthria and cognitive deficits    64 M w/ PMHx of CVA 2020 and 2021 s/p intracranial angioplasty of right MCA and NORMAN w/ subsequent intra-stent occlusion of R MCA/NORMAN intracranial stent, aortic valve insufficiency, implantable loop recorder for prior episodes of syncope/NSVT, chronic anemia, CKD 2, psoriatic arthritis who underwent elective R STA to MCA bypass 06/20. On 6/22 noted to have new weakness and mental status change, CTH showed R temporoparietal ICH with midline shift. Patient underwent craniotomy with hematoma evacuation with improvement in midline shift. Repeat CTH stable, patient clinically improving. Admitted for inpatient rehabilitation for acute right temporoparietal ICH and craniotomy with hematoma evacuation, left sided hemiparesis and cognitive deficits    PLOF: Rolling walker since 02/2024, Independent with ADLs and IADLs    #Rehab of R temporoparietal ICH after elective R STA to MCA bypass  #Hx CVA 2020 and 2021 s/p intracranial angioplasty of right MCA and NORMAN w/ subsequent intra-stent occlusion of R  #Thrombocytosis likely reactive to surgery - stable   MCA/NORMAN intracranial stent  New left sided weakness and confusion post procedure  S/P Partial evac of R T/p ICH with improvement of R--L shift   CTH stable on 6/28  EEG negative for any seizure  - BP goal 110-150  - NSGY following - removed staples on 7/7  - Aspirin- 81 mg for bypass patency  - not on statin. Has history of diarrhea with Atorvastatin. Has reportedly been on Zocor, but refusing any statin at this time.  - Na goal 140- 150 for perihematomal edema   - on salt tabs since surgery. Will start to wean. BP and serum Na have been stable  - PT/OT/SLP    #History of depression  - Continue home Effexor 37.5mg qd  - Seroquel 12.5 mg q hs prn for agitation - has not been needed  - melatonin qhs  - Appears depressed and apathetic and unmotivated. Will ask Psych. consult to eval meds    #Mobile Echodensity on Mitral Valve   TTE Echo Complete w/o Contrast w/ Doppler (6/21/24) - EF 70 to 75%. Moderate (Grade 2) diastolic dysfunction. Asymmetric basal septal hypertrophy (1.6cm).  There is a small independently mobile echodensity attached to the anterior mitral valve leaflet. Thickening and calcification of the anterior and posterior mitral valve leaflets.  07/05 GIULIANA performed with evidence of MV Calcification rather than IE  - likely OP f/u Cardiology     #HTN  Goal - < 150  -Continue amlodipine 5 mg qd.   -c/w Losartan 100 mg qd  -labetalol prn for breakthrough  - controlled    #Stage II CKD   - Baseline 0.9-1.1 , but now around 1.3 ( has trended up to 1.3 prior as well )   - encourage PO intake today, and trend creatinine     #LUE + superficial thrombophlebitis - improving  - warmth and elevation  - conservative measures    #Psoriatic Arthritis  Humira last dosed on June 9th as per wife and as communicated by pt's rheumatologist; Discussed with rheumatologist Dr Andre, pt to hold Humira while hospitalized     -Pain control: Tylenol heat  -GI/Bowel Mgmt: Miralax  Precautions / PROPHYLAXIS:    - Falls  - DVT prophylaxis: Heparin 5000 units subq q8hrs  - Diet, Regular:   High Fiber (HIFIBER)  Supplement Feeding Modality:  Oral  Ensure Plus High Protein Cans or Servings Per Day:  1       Frequency:  Daily (07-08-24 @ 11:46) [Active]

## 2024-07-10 NOTE — PROGRESS NOTE ADULT - ASSESSMENT
NEUROPSYCHOLOGY INITIAL EVALUATION            Session focused on: Follow-up evaluation of cognitive functioning      Needed: No : N/A     Pain: Denied Location: N/A Intervention: N/A     Orientation: Ax4     Arousal Level: Alert      Behavior: Cooperative, polite     Mood/Affect:  Flat/blank; disengaged; somewhat dysphoric     Attention: Required redirection and prompting     Insight into illness/deficits: Fair      Effort: Fair          Reason for Admission: 64-year-old male with acute right temporoparietal ICH, s/p craniotomy with hematoma evacuation, left sided hemiparesis (nondominant), dysarthria, and cognitive deficits. Patient has a past medical history of CVA in 2020 and 2021 s/p intracranial angioplasty of right MCA and NORMAN w/ subsequent intra-stent occlusion of R MCA/NORMAN intracranial stent, aortic valve insufficiency, implantable loop recorder for prior episodes of syncope/NSVT, chronic anemia, CKD 2, psoriatic arthritis who presents for R STA to MCA bypass.           Behavioral Observations: Affect was flat and dysphoric. Behavior was reluctantly cooperative. Pace was slow. Requires redirecting and prompting. Speech was sparse. Thought process appeared logical/coherent. Patient was more responsive than on prior evaluation. Of note, when wife was present, patient’s affect was significantly less constricted and brighter than his usual presentation. Oral output increased and performance on cognitive testing improved with wife present serving as . Wife attributes this to patient’s depression, which is alleviated to some degree by her company.          Case Summary: 64-year-old male with acute right temporoparietal ICH, s/p craniotomy with hematoma evacuation, left sided hemiparesis (nondominant), dysarthria, and cognitive deficits, with a history of prior CVA (2020 & 2021). Patient participated in follow-up evaluation of visuospatial functioning and attention. On present evaluation, patient was able to use both UEs, though he required more prompting when asked to grasp objects with left hand. Patient was able to appropriately name objects placed to his left and right. Patient completed a cancellation task that was indicative of a tendency to attend more to right than left-sided stimuli; however, he was able to attend to left sided stimuli when prompted. Patient demonstrated appropriate visual scanning.           In summary, patient may have had mild left-sided inattention or right-sided gaze preference, but deficits have improved such that he is able to attend to bilateral stimuli sufficiently, and attention is redirectable. There was no evidence of field cut or visuospatial perceptual distortion on assessment. Of note, patient is considerably more responsive in the presence of his wife. Abstract reasoning and judgement are WNL when wife interpreted questions from evaluator.      Given findings noted above, patient would benefit from efforts to reduce depressive symptomatology, including low appetite, with either a psychostimulant or increase in his antidepressant dose.          Plan: Monitor and continue to evaluate cognition and mood

## 2024-07-11 DIAGNOSIS — R41.89 OTHER SYMPTOMS AND SIGNS INVOLVING COGNITIVE FUNCTIONS AND AWARENESS: ICD-10-CM

## 2024-07-11 PROCEDURE — 90792 PSYCH DIAG EVAL W/MED SRVCS: CPT | Mod: 2W

## 2024-07-11 RX ORDER — VENLAFAXINE 37.5 MG/1
75 CAPSULE, EXTENDED RELEASE ORAL DAILY
Refills: 0 | Status: DISCONTINUED | OUTPATIENT
Start: 2024-07-12 | End: 2024-07-17

## 2024-07-11 RX ORDER — VENLAFAXINE 37.5 MG/1
37.5 CAPSULE, EXTENDED RELEASE ORAL ONCE
Refills: 0 | Status: COMPLETED | OUTPATIENT
Start: 2024-07-11 | End: 2024-07-11

## 2024-07-11 RX ADMIN — Medication 10 MILLIGRAM(S): at 22:17

## 2024-07-11 RX ADMIN — HEPARIN SODIUM 5000 UNIT(S): 50 INJECTION, SOLUTION INTRAVENOUS at 06:13

## 2024-07-11 RX ADMIN — Medication 2 TABLET(S): at 22:17

## 2024-07-11 RX ADMIN — Medication 2 GRAM(S): at 22:17

## 2024-07-11 RX ADMIN — HEPARIN SODIUM 5000 UNIT(S): 50 INJECTION, SOLUTION INTRAVENOUS at 22:17

## 2024-07-11 RX ADMIN — ASPIRIN 81 MILLIGRAM(S): 325 TABLET, FILM COATED ORAL at 13:00

## 2024-07-11 RX ADMIN — Medication 40 MILLIGRAM(S): at 22:17

## 2024-07-11 RX ADMIN — MAGNESIUM OXIDE 400 MILLIGRAM(S): 400 TABLET ORAL at 17:25

## 2024-07-11 RX ADMIN — AMLODIPINE BESYLATE 5 MILLIGRAM(S): 2.5 TABLET ORAL at 06:13

## 2024-07-11 RX ADMIN — POLYETHYLENE GLYCOL 3350 17 GRAM(S): 1 POWDER ORAL at 06:12

## 2024-07-11 RX ADMIN — PANTOPRAZOLE SODIUM 40 MILLIGRAM(S): 40 INJECTION, POWDER, FOR SOLUTION INTRAVENOUS at 06:13

## 2024-07-11 RX ADMIN — Medication 2 GRAM(S): at 06:13

## 2024-07-11 RX ADMIN — MAGNESIUM OXIDE 400 MILLIGRAM(S): 400 TABLET ORAL at 13:00

## 2024-07-11 RX ADMIN — Medication 1 APPLICATION(S): at 06:12

## 2024-07-11 RX ADMIN — LOSARTAN POTASSIUM 100 MILLIGRAM(S): 100 TABLET, FILM COATED ORAL at 06:13

## 2024-07-11 RX ADMIN — POLYETHYLENE GLYCOL 3350 17 GRAM(S): 1 POWDER ORAL at 17:26

## 2024-07-11 NOTE — BH CONSULTATION LIAISON ASSESSMENT NOTE - NSBHCONSFOLLOWNEEDS_PSY_ALL_CORE
1929 Received report from [facility] [employer]  2130 assessment done tolerated care well  0030 infant bathed and weighed tolerated care well - infant feeding over 420 minutes on pump awake and alert with care  0330 labs done tolerated care well accu check done - repositioned linen changed vitals done No psychiatric contraindications to discharge

## 2024-07-11 NOTE — BH CONSULTATION LIAISON ASSESSMENT NOTE - CURRENT MEDICATION
MEDICATIONS  (STANDING):  amLODIPine   Tablet 5 milliGRAM(s) Oral daily  aspirin  chewable 81 milliGRAM(s) Oral daily  chlorhexidine 2% Cloths 1 Application(s) Topical <User Schedule>  heparin   Injectable 5000 Unit(s) SubCutaneous every 8 hours  losartan 100 milliGRAM(s) Oral daily  magnesium oxide 400 milliGRAM(s) Oral three times a day with meals  melatonin 5 milliGRAM(s) Oral at bedtime  pantoprazole    Tablet 40 milliGRAM(s) Oral before breakfast  polyethylene glycol 3350 17 Gram(s) Oral two times a day  senna 2 Tablet(s) Oral at bedtime  simvastatin 40 milliGRAM(s) Oral at bedtime  sodium chloride 2 Gram(s) Oral every 8 hours  venlafaxine 37.5 milliGRAM(s) Oral daily    MEDICATIONS  (PRN):  acetaminophen     Tablet .. 650 milliGRAM(s) Oral every 6 hours PRN Temp greater or equal to 38C (100.4F), Mild Pain (1 - 3)  labetalol Injectable 10 milliGRAM(s) IV Push every 2 hours PRN SBP >150, HR > 70  QUEtiapine 12.5 milliGRAM(s) Oral at bedtime PRN agitation

## 2024-07-11 NOTE — BH CONSULTATION LIAISON ASSESSMENT NOTE - SUMMARY
64M  disabled lives with wife, hx of CVA, had recent acute right temporoparietal ICH and craniotomy with hematoma evacuation, left sided hemiparesis and cognitive deficits; admitted to rehab unit; psych consult called for depression.     Patient is not suicidal, an acute risk or in need of psych hospitalization.     Would recommend:    1. increase effexor xr to 75mg po qday  2. Melatonin 10mg po qhs   3. pt may f/u with behav health provider at NH or [facility] OPD as below  64M  disabled lives with wife, hx of CVA, had recent acute right temporoparietal ICH and craniotomy with hematoma evacuation, left sided hemiparesis and cognitive deficits; admitted to rehab unit; psych consult called for depression.     Patient is not suicidal, an acute risk or in need of psych hospitalization.     Would recommend:( discussed with wife Vibha who agrees)    1. increase effexor xr to 75mg po qday  2. Melatonin 10mg po qhs   3. pt may f/u with behav health provider at NH, or [facility] OPD as below

## 2024-07-11 NOTE — PROGRESS NOTE ADULT - SUBJECTIVE AND OBJECTIVE BOX
Patient is a 64y old  Male who presents with a chief complaint of Rehab for Acute Right temporoparietal ICH, s/p  craniotomy with hematoma evacuation, left sided hemiparesis (nondominant), dysarthria  and cognitive deficits (05 Jul 2024 16:46)      HPI:  64M w/ PMHx of CVA 2020 and 2021 s/p intracranial angioplasty of right MCA and NORMAN w/ subsequent intra-stent occlusion of R MCA/NORMAN intracranial stent, aortic valve insufficiency, implantable loop recorder for prior episodes of syncope/NSVT, chronic anemia, CKD 2, psoriatic arthritis who presents for R STA to MCA bypass. Post-op was CTH stable. On assessment in PACU, noted to have new L sided weakness, confusion at SBP 120s. Started on Phenylephrine gtt for perfusion with some improvement of symptoms. Pt admitted to NSICU. On 6/22 he was noted to have R temporoparietal ICH, now s/p crani and hematoma evacuation. Repeat imaging has been stable, EEG negative, ASA restarted. BP better controlled. TTE with findings concerning for endocarditis, GIULIANA performed showed no signs of endocarditis. Patient with functional deficits relative to baseline, admitted to acute inpatient rehab.    The patient was evaluated by the PM&R team once medically stable. The patient was found to have functional limitations in terms of physical strength/mobility and ability to carry out ADLs (self care, transfers, ambulation). The patient was deemed to be a good candidate for admission for acute inpatient rehab.     Living Situation: Lives with Wife 1 VIANEY, [address] within home  PLOF: Rolling walker since 02/2024, Independent with ADLs and IADLs (05 Jul 2024 16:46)      TODAY'S SUBJECTIVE & REVIEW OF SYMPTOMS    No overnight events.   Patient denies any new complaints at this time.   Tolerating PT/OT   Tolerating oral diet.   Voiding and passing stool spontaneously.   Vital signs reviewed.  Less blunted today. Psychiatry recs appreciated.    Constitutional:    [ x  ] WNL           [   ] poor appetite   [   ] insomnia   [   ] tired   Cardio:                [ x  ] WNL           [   ] CP   [   ] MORALES   [   ] palpitations               Resp:                   [ X  ] WNL           [   ] SOB   [   ] cough   [   ] wheezing   GI:                        [ X  ] WNL           [   ] constipation   [   ] diarrhea   [   ] abdominal pain   [   ] nausea   [   ] emesis                                :                      [ X  ] WNL           [   ] GIBSON  [   ] dysuria   [   ] difficulty voiding             Endo:                   [ X  ] WNL          [   ] polyuria   [   ] temperature intolerance                 Skin:                     [   ] WNL          [   ] pain   [ X  ] wound right temporal craniotomy wound, stapled  [   ] rash [ X ]  bruising, right arm medial bicep   MSK:                    [ X  ] WNL          [   ] muscle pain   [   ] joint pain/ stiffness   [   ] muscle tenderness   [   ] swelling   Neuro:                 [   ] WNL          [   ] HA   [   ] change in vision   [   ] tremor   [ X  ] weakness   [   ]dysphagia              Cognitive:           [   ] WNL           [   ]confusion   [ X ] impulsiveness [ X ] slowed cognition   Psych:                  [ X  ] WNL           [   ] hallucinations   [   ]agitation   [   ] delusion   [   ]depression    CLOF:  Bed mobility min assist, transfer min assist, gait 20 feet RW min assist/ CG    PHYSICAL EXAM    Vital Signs Last 24 Hrs  T(C): 36.7 (11 Jul 2024 12:26), Max: 36.8 (11 Jul 2024 05:48)  T(F): 98.1 (11 Jul 2024 12:26), Max: 98.2 (11 Jul 2024 05:48)  HR: 64 (11 Jul 2024 12:26) (50 - 68)  BP: 125/71 (11 Jul 2024 12:26) (121/73 - 125/71)  BP(mean): 91 (11 Jul 2024 05:48) (89 - 91)  RR: 19 (11 Jul 2024 12:26) (19 - 20)  SpO2: --      General:[ X  ] NAD, Resting Comfortable,   [   ] other:                                HEENT: [   ] NC/AT, EOMI, PERRL , Normal Conjunctivae,   [ X  ] other: right sided temporal swelling markedly improved        Cardio: [ X  ] RRR, no murmur,   [   ] other:                              Pulm: [ X  ] No Respiratory Distress,  Lungs CTAB,   [   ] other:                       Abdomen: [ X ]ND/NT, Soft,   [   ] other:    : [ X ] NO GIBSON CATHETER, [   ] GIBSON CATHETER- no meatal tear, no discharge, [   ] other:                                            MSK: [ X  ] No joint swelling, Full ROM,   [   ] other:                                         Ext: [ X  ]No C/C/E, No calf tenderness,   [   ]other:    Skin: [   ]intact,   [ X  ] other: right forearm and medial bicep bruising. right sided temporal swelling markedly improved                                                 Neurological Examination:  Cognitive: [  X  ] AAO x 3,   [  X  ]  other:  slowed responses, poor insight into deficits                                                                    Attention:  [    ] intact serial additions, no hemineglect   [  x  ]  other:  right gaze preference, needs redirection                      Memory: [    ] intact,    [ x   ]  other: impaired    Mood/Affect: [    ] wnl,    [  X  ]  other: blunted                                                                        Communication: [  X  ]Fluent, no dysarthria, following commands:  [    ] other:   CN II - XII:  [    ] intact,  [  X  ] other: left sided facial droop                                                                                        Motor:   RIGHT UE: [ X  ] WNL,  [   ] other:  LEFT    UE: [   ] WNL,  [ X  ] other: Sh Flexion 4/5, EF 4/5, Elbow extension 4/5, finger  4/5. POSITIVE pronator drift  RIGHT LE: [ x  ] WNL,  [   ] other:   LEFT    LE: [   ] WNL,  [ x  ] other: HF 4/5, KF 4/5, KE 4/5, ADF 5/5, APF 5/5. POSITIVE clonus 3 beats left ankle    Tone: [  X  ] wnl,   [    ]  other:  DTRs: [  X ]symmetric, [   ] other:  Coordination:   [  X  ] intact,   [    ] other:                                                                           Sensory: [  X  ] Intact to light touch,   [    ] other:    MEDICATIONS  (STANDING):  amLODIPine   Tablet 5 milliGRAM(s) Oral daily  aspirin  chewable 81 milliGRAM(s) Oral daily  chlorhexidine 2% Cloths 1 Application(s) Topical <User Schedule>  heparin   Injectable 5000 Unit(s) SubCutaneous every 8 hours  losartan 100 milliGRAM(s) Oral daily  magnesium oxide 400 milliGRAM(s) Oral three times a day with meals  melatonin 5 milliGRAM(s) Oral at bedtime  pantoprazole    Tablet 40 milliGRAM(s) Oral before breakfast  polyethylene glycol 3350 17 Gram(s) Oral two times a day  senna 2 Tablet(s) Oral at bedtime  sodium chloride 3 Gram(s) Oral every 8 hours  venlafaxine 37.5 milliGRAM(s) Oral daily    MEDICATIONS  (PRN):  acetaminophen     Tablet .. 650 milliGRAM(s) Oral every 6 hours PRN Temp greater or equal to 38C (100.4F), Mild Pain (1 - 3)  labetalol Injectable 10 milliGRAM(s) IV Push every 2 hours PRN SBP >150, HR > 70  QUEtiapine 12.5 milliGRAM(s) Oral at bedtime PRN agitation    RECENT LABS/IMAGING                        11.5   8.44  )-----------( 580      ( 08 Jul 2024 07:06 )             36.8     07-08    138  |  103  |  22<H>  ----------------------------<  121<H>  5.0   |  22  |  1.0    Ca    9.5      08 Jul 2024 07:06  Mg     2.2     07-08    TPro  6.8  /  Alb  3.8  /  TBili  0.3  /  DBili  x   /  AST  19  /  ALT  25  /  AlkPhos  99  07-08

## 2024-07-11 NOTE — BH CONSULTATION LIAISON ASSESSMENT NOTE - NSBHCHARTREVIEWVS_PSY_A_CORE FT
Vital Signs Last 24 Hrs  T(C): 36.7 (11 Jul 2024 12:26), Max: 36.8 (10 Jul 2024 16:00)  T(F): 98.1 (11 Jul 2024 12:26), Max: 98.2 (10 Jul 2024 16:00)  HR: 64 (11 Jul 2024 12:26) (50 - 68)  BP: 125/71 (11 Jul 2024 12:26) (121/73 - 127/73)  BP(mean): 91 (11 Jul 2024 05:48) (89 - 91)  RR: 19 (11 Jul 2024 12:26) (18 - 20)  SpO2: 95% (10 Jul 2024 16:00) (95% - 95%)    Parameters below as of 10 Jul 2024 16:00  Patient On (Oxygen Delivery Method): room air

## 2024-07-11 NOTE — BH CONSULTATION LIAISON ASSESSMENT NOTE - HPI (INCLUDE ILLNESS QUALITY, SEVERITY, DURATION, TIMING, CONTEXT, MODIFYING FACTORS, ASSOCIATED SIGNS AND SYMPTOMS)
64M  disabled lives with wife, hx of CVA, had recent acute right temporoparietal ICH and craniotomy with hematoma evacuation, left sided hemiparesis and cognitive deficits; admitted to rehab unit; psych consult called for depression.   Patient seen via telepsych, chart reviewed.   I spoke with his wife Vibha on the phone - she says that he had his first stroke in 2020, after which he became disabled,  had some apathy and depression, was started on effexor x4 37.5mg , which helped him, dose has remained the same.  He is more depressed now after the most recent neurological issues.   She says he has a great appetite when she brings food for him - "he eats like crazy" and that he also does not have issues   with sleep, if anything he oversleeps and is more sedated in morning.   She denies any suicidality or safety concerns.   Patient seen with [place]  Jodie [id number]. he is aaox4, attention is distractible, speech is with increased latency  and underproductive, but he answers questions appropriately, he denies any psychiatric symptoms, denies depression /anxiety  denies si/hi/ah/vh, says his PT isgoing "perfect." gives the thumbs up sign several times.  64M  disabled lives with wife, hx of CVA, had recent acute right temporoparietal ICH and craniotomy with hematoma evacuation, left sided hemiparesis and cognitive deficits; admitted to rehab unit; psych consult called for depression.   Patient seen via telepsych, chart reviewed.   I spoke with his wife Vibha on the phone - she says that he had his first stroke in 2020, after which he became disabled,  had apathy and depression, was started on effexor x4 37.5mg by his pmd , which helped him, dose has remained the same.  He is more depressed now after the most recent neurological issues.   She says he has a great appetite when she brings food for him - "he eats like crazy" and that he also does not have issues   with sleep, if anything he oversleeps and is more sedated in morning, but melatonin 10mg has helped stabilize wake cycle in past.  he has no hx of psychiatric treatment/hospitalizations/self harm   She denies any acute suicidality or safety concerns.   Patient seen with [place]  Jodie [id number]. he is aaox4, attention is distractible, speech is with increased latency  and underproductive, but he answers questions appropriately, he denies any psychiatric symptoms, denies depression /anxiety  denies si/hi/ah/vh, says his PT isgoing "perfect." gives the thumbs up sign several times.

## 2024-07-11 NOTE — PROGRESS NOTE ADULT - ASSESSMENT
ASSESSMENT/PLAN    Rehab for Acute right temporoparietal ICH, s/p craniotomy  with hematoma evacuation, left sided hemiparesis (nondominant), dysarthria and cognitive deficits    64 M w/ PMHx of CVA 2020 and 2021 s/p intracranial angioplasty of right MCA and NORMAN w/ subsequent intra-stent occlusion of R MCA/NORMAN intracranial stent, aortic valve insufficiency, implantable loop recorder for prior episodes of syncope/NSVT, chronic anemia, CKD 2, psoriatic arthritis who underwent elective R STA to MCA bypass 06/20. On 6/22 noted to have new weakness and mental status change, CTH showed R temporoparietal ICH with midline shift. Patient underwent craniotomy with hematoma evacuation with improvement in midline shift. Repeat CTH stable, patient clinically improving. Admitted for inpatient rehabilitation for acute right temporoparietal ICH and craniotomy with hematoma evacuation, left sided hemiparesis and cognitive deficits    PLOF: Rolling walker since 02/2024, Independent with ADLs and IADLs    #Rehab of R temporoparietal ICH after elective R STA to MCA bypass  #Hx CVA 2020 and 2021 s/p intracranial angioplasty of right MCA and NORMAN w/ subsequent intra-stent occlusion of R  #Thrombocytosis likely reactive to surgery - stable   MCA/NORMAN intracranial stent  New left sided weakness and confusion post procedure  S/P Partial evac of R T/p ICH with improvement of R--L shift   CTH stable on 6/28  EEG negative for any seizure  - BP goal 110-150  - NSGY following - removed staples on 7/7  - Aspirin- 81 mg for bypass patency  - not on statin. Has history of diarrhea with Atorvastatin. Has reportedly been on Zocor, but refusing any statin at this time.  - Na goal 140- 150 for perihematomal edema   - on salt tabs since surgery. Will continue to wean to 1mg q 8hrs. BP and serum Na have been stable  - PT/OT/SLP    #History of depression  - Continue home Effexor 37.5mg qd  - Seroquel 12.5 mg q hs prn for agitation - has not been needed  - melatonin 5 mg  qhs  - Appears depressed and apathetic and unmotivated. 7/11 Psych consult recs appreciated. Increase Effexor to 75mg daily and increase Melatonin to 10mg qhs    #Mobile Echodensity on Mitral Valve   TTE Echo Complete w/o Contrast w/ Doppler (6/21/24) - EF 70 to 75%. Moderate (Grade 2) diastolic dysfunction. Asymmetric basal septal hypertrophy (1.6cm).  There is a small independently mobile echodensity attached to the anterior mitral valve leaflet. Thickening and calcification of the anterior and posterior mitral valve leaflets.  07/05 GIULIANA performed with evidence of MV Calcification rather than IE  - likely OP f/u Cardiology     #HTN  Goal - < 150  -Continue amlodipine 5 mg qd.   -c/w Losartan 100 mg qd  -labetalol prn for breakthrough  - controlled - monitor as NaCl weaned    #Stage II CKD   - Baseline 0.9-1.1 , but now around 1.3 ( has trended up to 1.3 prior as well )   - encourage PO intake today, and trend creatinine     #LUE + superficial thrombophlebitis - improving  - warmth and elevation  - conservative measures    #Psoriatic Arthritis  Humira last dosed on June 9th as per wife and as communicated by pt's rheumatologist; Discussed with rheumatologist Dr Andre, pt to hold Humira while hospitalized     -Pain control: Tylenol heat  -GI/Bowel Mgmt: Miralax  Precautions / PROPHYLAXIS:    - Falls  - DVT prophylaxis: Heparin 5000 units subq q8hrs  - Diet, Regular:   High Fiber (HIFIBER)  Supplement Feeding Modality:  Oral  Ensure Plus High Protein Cans or Servings Per Day:  1       Frequency:  Daily (07-08-24 @ 11:46) [Active]

## 2024-07-12 RX ADMIN — VENLAFAXINE 75 MILLIGRAM(S): 37.5 CAPSULE, EXTENDED RELEASE ORAL at 12:53

## 2024-07-12 RX ADMIN — AMLODIPINE BESYLATE 5 MILLIGRAM(S): 2.5 TABLET ORAL at 06:11

## 2024-07-12 RX ADMIN — Medication 10 MILLIGRAM(S): at 21:23

## 2024-07-12 RX ADMIN — Medication 2 GRAM(S): at 21:21

## 2024-07-12 RX ADMIN — Medication 2 GRAM(S): at 12:55

## 2024-07-12 RX ADMIN — ASPIRIN 81 MILLIGRAM(S): 325 TABLET, FILM COATED ORAL at 12:55

## 2024-07-12 RX ADMIN — HEPARIN SODIUM 5000 UNIT(S): 50 INJECTION, SOLUTION INTRAVENOUS at 21:18

## 2024-07-12 RX ADMIN — HEPARIN SODIUM 5000 UNIT(S): 50 INJECTION, SOLUTION INTRAVENOUS at 13:02

## 2024-07-12 RX ADMIN — MAGNESIUM OXIDE 400 MILLIGRAM(S): 400 TABLET ORAL at 17:14

## 2024-07-12 RX ADMIN — HEPARIN SODIUM 5000 UNIT(S): 50 INJECTION, SOLUTION INTRAVENOUS at 06:10

## 2024-07-12 RX ADMIN — Medication 40 MILLIGRAM(S): at 21:22

## 2024-07-12 RX ADMIN — LOSARTAN POTASSIUM 100 MILLIGRAM(S): 100 TABLET, FILM COATED ORAL at 06:11

## 2024-07-12 RX ADMIN — POLYETHYLENE GLYCOL 3350 17 GRAM(S): 1 POWDER ORAL at 06:10

## 2024-07-12 RX ADMIN — MAGNESIUM OXIDE 400 MILLIGRAM(S): 400 TABLET ORAL at 12:55

## 2024-07-12 RX ADMIN — PANTOPRAZOLE SODIUM 40 MILLIGRAM(S): 40 INJECTION, POWDER, FOR SOLUTION INTRAVENOUS at 06:11

## 2024-07-12 RX ADMIN — Medication 2 GRAM(S): at 06:11

## 2024-07-12 RX ADMIN — Medication 2 TABLET(S): at 21:21

## 2024-07-12 RX ADMIN — POLYETHYLENE GLYCOL 3350 17 GRAM(S): 1 POWDER ORAL at 17:15

## 2024-07-12 RX ADMIN — MAGNESIUM OXIDE 400 MILLIGRAM(S): 400 TABLET ORAL at 08:30

## 2024-07-12 NOTE — CHART NOTE - NSCHARTNOTEFT_GEN_A_CORE
Patient did not get full therapy  today , He refused  therapy , Patient has been seen  by Psychiatry and Effexor was increased to 75 mg daily .

## 2024-07-12 NOTE — PROGRESS NOTE ADULT - SUBJECTIVE AND OBJECTIVE BOX
Patient is a 64y old  Male who presents with a chief complaint of Rehab for Acute Right temporoparietal ICH, s/p  craniotomy with hematoma evacuation, left sided hemiparesis (nondominant), dysarthria  and cognitive deficits (05 Jul 2024 16:46)      HPI:  64M w/ PMHx of CVA 2020 and 2021 s/p intracranial angioplasty of right MCA and NORMAN w/ subsequent intra-stent occlusion of R MCA/NORMAN intracranial stent, aortic valve insufficiency, implantable loop recorder for prior episodes of syncope/NSVT, chronic anemia, CKD 2, psoriatic arthritis who presents for R STA to MCA bypass. Post-op was CTH stable. On assessment in PACU, noted to have new L sided weakness, confusion at SBP 120s. Started on Phenylephrine gtt for perfusion with some improvement of symptoms. Pt admitted to NSICU. On 6/22 he was noted to have R temporoparietal ICH, now s/p crani and hematoma evacuation. Repeat imaging has been stable, EEG negative, ASA restarted. BP better controlled. TTE with findings concerning for endocarditis, GIULIANA performed showed no signs of endocarditis. Patient with functional deficits relative to baseline, admitted to acute inpatient rehab.    The patient was evaluated by the PM&R team once medically stable. The patient was found to have functional limitations in terms of physical strength/mobility and ability to carry out ADLs (self care, transfers, ambulation). The patient was deemed to be a good candidate for admission for acute inpatient rehab.     Living Situation: Lives with Wife 1 VIANEY, [address] within home  PLOF: Rolling walker since 02/2024, Independent with ADLs and IADLs (05 Jul 2024 16:46)      TODAY'S SUBJECTIVE & REVIEW OF SYMPTOMS    No overnight events.   Patient denies any new complaints at this time.   Tolerating PT/OT   Tolerating oral diet.   Voiding and passing stool spontaneously.   Vital signs reviewed.  Less blunted today. Psychiatry recs appreciated.    Constitutional:    [ x  ] WNL           [   ] poor appetite   [   ] insomnia   [   ] tired   Cardio:                [ x  ] WNL           [   ] CP   [   ] MORALES   [   ] palpitations               Resp:                   [ X  ] WNL           [   ] SOB   [   ] cough   [   ] wheezing   GI:                        [ X  ] WNL           [   ] constipation   [   ] diarrhea   [   ] abdominal pain   [   ] nausea   [   ] emesis                                :                      [ X  ] WNL           [   ] GIBSON  [   ] dysuria   [   ] difficulty voiding             Endo:                   [ X  ] WNL          [   ] polyuria   [   ] temperature intolerance                 Skin:                     [   ] WNL          [   ] pain   [ X  ] wound right temporal craniotomy wound, stapled  [   ] rash [ X ]  bruising, right arm medial bicep   MSK:                    [ X  ] WNL          [   ] muscle pain   [   ] joint pain/ stiffness   [   ] muscle tenderness   [   ] swelling   Neuro:                 [   ] WNL          [   ] HA   [   ] change in vision   [   ] tremor   [ X  ] weakness   [   ]dysphagia              Cognitive:           [   ] WNL           [   ]confusion   [ X ] impulsiveness [ X ] slowed cognition   Psych:                  [ X  ] WNL           [   ] hallucinations   [   ]agitation   [   ] delusion   [   ]depression    CLOF:  Bed mobility min assist, transfer min assist, gait 20 feet RW min assist/ CG    PHYSICAL EXAM    Vital Signs Last 24 Hrs  T(C): 36.4 (12 Jul 2024 12:40), Max: 36.8 (12 Jul 2024 06:09)  T(F): 97.6 (12 Jul 2024 12:40), Max: 98.2 (12 Jul 2024 06:09)  HR: 67 (12 Jul 2024 12:40) (60 - 67)  BP: 127/72 (12 Jul 2024 12:40) (125/67 - 145/85)  BP(mean): 87 (11 Jul 2024 20:51) (87 - 87)  RR: 19 (12 Jul 2024 12:40) (19 - 20)  SpO2: --        General:[ X  ] NAD, Resting Comfortable,   [   ] other:                                HEENT: [   ] NC/AT, EOMI, PERRL , Normal Conjunctivae,   [ X  ] other: right sided temporal swelling markedly improved        Cardio: [ X  ] RRR, no murmur,   [   ] other:                              Pulm: [ X  ] No Respiratory Distress,  Lungs CTAB,   [   ] other:                       Abdomen: [ X ]ND/NT, Soft,   [   ] other:    : [ X ] NO GIBSON CATHETER, [   ] GIBSON CATHETER- no meatal tear, no discharge, [   ] other:                                            MSK: [ X  ] No joint swelling, Full ROM,   [   ] other:                                         Ext: [ X  ]No C/C/E, No calf tenderness,   [   ]other:    Skin: [   ]intact,   [ X  ] other: right forearm and medial bicep bruising. right sided temporal swelling markedly improved                                                 Neurological Examination:  Cognitive: [  X  ] AAO x 3,   [  X  ]  other:  slowed responses, poor insight into deficits                                                                    Attention:  [    ] intact serial additions, no hemineglect   [  x  ]  other:  right gaze preference, needs redirection                      Memory: [    ] intact,    [ x   ]  other: impaired    Mood/Affect: [    ] wnl,    [  X  ]  other: blunted                                                                        Communication: [  X  ]Fluent, no dysarthria, following commands:  [    ] other:   CN II - XII:  [    ] intact,  [  X  ] other: left sided facial droop                                                                                        Motor:   RIGHT UE: [ X  ] WNL,  [   ] other:  LEFT    UE: [   ] WNL,  [ X  ] other: Sh Flexion 4/5, EF 4/5, Elbow extension 4/5, finger  4/5. POSITIVE pronator drift  RIGHT LE: [ x  ] WNL,  [   ] other:   LEFT    LE: [   ] WNL,  [ x  ] other: HF 4/5, KF 4/5, KE 4/5, ADF 5/5, APF 5/5. POSITIVE clonus 3 beats left ankle    Tone: [  X  ] wnl,   [    ]  other:  DTRs: [  X ]symmetric, [   ] other:  Coordination:   [  X  ] intact,   [    ] other:                                                                           Sensory: [  X  ] Intact to light touch,   [    ] other:    MEDICATIONS  (STANDING):  amLODIPine   Tablet 5 milliGRAM(s) Oral daily  aspirin  chewable 81 milliGRAM(s) Oral daily  chlorhexidine 2% Cloths 1 Application(s) Topical <User Schedule>  heparin   Injectable 5000 Unit(s) SubCutaneous every 8 hours  losartan 100 milliGRAM(s) Oral daily  magnesium oxide 400 milliGRAM(s) Oral three times a day with meals  melatonin 5 milliGRAM(s) Oral at bedtime  pantoprazole    Tablet 40 milliGRAM(s) Oral before breakfast  polyethylene glycol 3350 17 Gram(s) Oral two times a day  senna 2 Tablet(s) Oral at bedtime  sodium chloride 3 Gram(s) Oral every 8 hours  venlafaxine 37.5 milliGRAM(s) Oral daily    MEDICATIONS  (PRN):  acetaminophen     Tablet .. 650 milliGRAM(s) Oral every 6 hours PRN Temp greater or equal to 38C (100.4F), Mild Pain (1 - 3)  labetalol Injectable 10 milliGRAM(s) IV Push every 2 hours PRN SBP >150, HR > 70  QUEtiapine 12.5 milliGRAM(s) Oral at bedtime PRN agitation    RECENT LABS/IMAGING                        11.5   8.44  )-----------( 580      ( 08 Jul 2024 07:06 )             36.8     07-08    138  |  103  |  22<H>  ----------------------------<  121<H>  5.0   |  22  |  1.0    Ca    9.5      08 Jul 2024 07:06  Mg     2.2     07-08    TPro  6.8  /  Alb  3.8  /  TBili  0.3  /  DBili  x   /  AST  19  /  ALT  25  /  AlkPhos  99  07-08

## 2024-07-13 RX ORDER — CLOTRIMAZOLE 1 %
1 CREAM (GRAM) TOPICAL EVERY 12 HOURS
Refills: 0 | Status: DISCONTINUED | OUTPATIENT
Start: 2024-07-13 | End: 2024-07-17

## 2024-07-13 RX ORDER — HYDROCORTISONE VALERATE 0.2 %
1 CREAM (GRAM) TOPICAL EVERY 12 HOURS
Refills: 0 | Status: DISCONTINUED | OUTPATIENT
Start: 2024-07-13 | End: 2024-07-17

## 2024-07-13 RX ORDER — MAGNESIUM OXIDE 400 MG/1
400 TABLET ORAL DAILY
Refills: 0 | Status: DISCONTINUED | OUTPATIENT
Start: 2024-07-14 | End: 2024-07-17

## 2024-07-13 RX ADMIN — Medication 1 APPLICATION(S): at 21:46

## 2024-07-13 RX ADMIN — Medication 40 MILLIGRAM(S): at 21:44

## 2024-07-13 RX ADMIN — VENLAFAXINE 75 MILLIGRAM(S): 37.5 CAPSULE, EXTENDED RELEASE ORAL at 12:32

## 2024-07-13 RX ADMIN — Medication 2 GRAM(S): at 06:16

## 2024-07-13 RX ADMIN — Medication 10 MILLIGRAM(S): at 21:43

## 2024-07-13 RX ADMIN — MAGNESIUM OXIDE 400 MILLIGRAM(S): 400 TABLET ORAL at 08:55

## 2024-07-13 RX ADMIN — Medication 2 TABLET(S): at 21:43

## 2024-07-13 RX ADMIN — HEPARIN SODIUM 5000 UNIT(S): 50 INJECTION, SOLUTION INTRAVENOUS at 12:34

## 2024-07-13 RX ADMIN — MAGNESIUM OXIDE 400 MILLIGRAM(S): 400 TABLET ORAL at 12:35

## 2024-07-13 RX ADMIN — LOSARTAN POTASSIUM 100 MILLIGRAM(S): 100 TABLET, FILM COATED ORAL at 06:16

## 2024-07-13 RX ADMIN — ASPIRIN 81 MILLIGRAM(S): 325 TABLET, FILM COATED ORAL at 12:34

## 2024-07-13 RX ADMIN — Medication 2 GRAM(S): at 21:44

## 2024-07-13 RX ADMIN — AMLODIPINE BESYLATE 5 MILLIGRAM(S): 2.5 TABLET ORAL at 06:16

## 2024-07-13 RX ADMIN — HEPARIN SODIUM 5000 UNIT(S): 50 INJECTION, SOLUTION INTRAVENOUS at 21:43

## 2024-07-13 RX ADMIN — PANTOPRAZOLE SODIUM 40 MILLIGRAM(S): 40 INJECTION, POWDER, FOR SOLUTION INTRAVENOUS at 06:16

## 2024-07-13 RX ADMIN — POLYETHYLENE GLYCOL 3350 17 GRAM(S): 1 POWDER ORAL at 17:18

## 2024-07-13 RX ADMIN — HEPARIN SODIUM 5000 UNIT(S): 50 INJECTION, SOLUTION INTRAVENOUS at 06:16

## 2024-07-13 RX ADMIN — Medication 2 GRAM(S): at 12:35

## 2024-07-13 NOTE — PROGRESS NOTE ADULT - SUBJECTIVE AND OBJECTIVE BOX
T(C): 36.3 (07-13-24 @ 04:58), Max: 36.8 (07-12-24 @ 20:08)  HR: 62 (07-13-24 @ 04:58) (62 - 67)  BP: 157/89 (07-13-24 @ 04:58) (124/77 - 157/89)  RR: 20 (07-13-24 @ 04:58) (18 - 20)  SpO2: --      Patient was stable overnight and expresses no new complaints     PE: ptn seen and exam  at  bed  side  no new  c/o doing  well rt  side  head  wound  healing  well    Alert   LUNGS- clear  COR- RRR  ABD- SOFT, NT  EXTR- w/o edema  NEURO- stable  Rehab for Acute Right temporoparietal ICH, s/p  craniotomy with hematoma evacuation, left sided hemiparesis (nondominant), dysarthria  and cognitive deficits      Continue acute rehab program.and  cont currant care

## 2024-07-14 RX ORDER — SODIUM CHLORIDE 0.9 % (FLUSH) 0.9 %
1 SYRINGE (ML) INJECTION
Refills: 0 | Status: DISCONTINUED | OUTPATIENT
Start: 2024-07-14 | End: 2024-07-16

## 2024-07-14 RX ADMIN — POLYETHYLENE GLYCOL 3350 17 GRAM(S): 1 POWDER ORAL at 06:18

## 2024-07-14 RX ADMIN — HEPARIN SODIUM 5000 UNIT(S): 50 INJECTION, SOLUTION INTRAVENOUS at 12:09

## 2024-07-14 RX ADMIN — AMLODIPINE BESYLATE 5 MILLIGRAM(S): 2.5 TABLET ORAL at 06:11

## 2024-07-14 RX ADMIN — Medication 1 GRAM(S): at 17:21

## 2024-07-14 RX ADMIN — Medication 1 APPLICATION(S): at 17:18

## 2024-07-14 RX ADMIN — Medication 1 APPLICATION(S): at 06:17

## 2024-07-14 RX ADMIN — VENLAFAXINE 75 MILLIGRAM(S): 37.5 CAPSULE, EXTENDED RELEASE ORAL at 12:08

## 2024-07-14 RX ADMIN — HEPARIN SODIUM 5000 UNIT(S): 50 INJECTION, SOLUTION INTRAVENOUS at 06:11

## 2024-07-14 RX ADMIN — Medication 2 GRAM(S): at 12:10

## 2024-07-14 RX ADMIN — Medication 1 APPLICATION(S): at 06:12

## 2024-07-14 RX ADMIN — LOSARTAN POTASSIUM 100 MILLIGRAM(S): 100 TABLET, FILM COATED ORAL at 06:11

## 2024-07-14 RX ADMIN — PANTOPRAZOLE SODIUM 40 MILLIGRAM(S): 40 INJECTION, POWDER, FOR SOLUTION INTRAVENOUS at 06:11

## 2024-07-14 RX ADMIN — Medication 2 GRAM(S): at 06:11

## 2024-07-14 RX ADMIN — MAGNESIUM OXIDE 400 MILLIGRAM(S): 400 TABLET ORAL at 12:10

## 2024-07-14 RX ADMIN — HEPARIN SODIUM 5000 UNIT(S): 50 INJECTION, SOLUTION INTRAVENOUS at 21:06

## 2024-07-14 RX ADMIN — ASPIRIN 81 MILLIGRAM(S): 325 TABLET, FILM COATED ORAL at 12:10

## 2024-07-14 RX ADMIN — Medication 10 MILLIGRAM(S): at 21:07

## 2024-07-14 RX ADMIN — Medication 40 MILLIGRAM(S): at 21:06

## 2024-07-14 NOTE — PROGRESS NOTE ADULT - SUBJECTIVE AND OBJECTIVE BOX
Patient is a 64y old  Male who presents with a chief complaint of Rehab for Acute Right temporoparietal ICH, s/p  craniotomy with hematoma evacuation, left sided hemiparesis (nondominant), dysarthria  and cognitive deficits (05 Jul 2024 16:46)      HPI:  64M w/ PMHx of CVA 2020 and 2021 s/p intracranial angioplasty of right MCA and NORMAN w/ subsequent intra-stent occlusion of R MCA/NORMAN intracranial stent, aortic valve insufficiency, implantable loop recorder for prior episodes of syncope/NSVT, chronic anemia, CKD 2, psoriatic arthritis who presents for R STA to MCA bypass. Post-op was CTH stable. On assessment in PACU, noted to have new L sided weakness, confusion at SBP 120s. Started on Phenylephrine gtt for perfusion with some improvement of symptoms. Pt admitted to NSICU. On 6/22 he was noted to have R temporoparietal ICH, now s/p crani and hematoma evacuation. Repeat imaging has been stable, EEG negative, ASA restarted. BP better controlled. TTE with findings concerning for endocarditis, GIULIANA performed showed no signs of endocarditis. Patient with functional deficits relative to baseline, admitted to acute inpatient rehab.    The patient was evaluated by the PM&R team once medically stable. The patient was found to have functional limitations in terms of physical strength/mobility and ability to carry out ADLs (self care, transfers, ambulation). The patient was deemed to be a good candidate for admission for acute inpatient rehab.     Living Situation: Lives with Wife 1 VI[address], 12 Steps within home  PLOF: Rolling walker since 02/2024, Independent with ADLs and IADLs (05 Jul 2024 16:46)      TODAY'S SUBJECTIVE & REVIEW OF SYMPTOMS        Constitutional:    [ x  ] WNL           [   ] poor appetite   [   ] insomnia   [   ] tired   Cardio:                [ x  ] WNL           [   ] CP   [   ] MORALES   [   ] palpitations               Resp:                   [ X  ] WNL           [   ] SOB   [   ] cough   [   ] wheezing   GI:                        [ X  ] WNL           [   ] constipation   [   ] diarrhea   [   ] abdominal pain   [   ] nausea   [   ] emesis                                :                      [ X  ] WNL           [   ] GIBSON  [   ] dysuria   [   ] difficulty voiding             Endo:                   [ X  ] WNL          [   ] polyuria   [   ] temperature intolerance                 Skin:                     [   ] WNL          [   ] pain   [ X  ] wound right temporal craniotomy wound, stapled  [   ] rash [ X ]  bruising, right arm medial bicep   MSK:                    [ X  ] WNL          [   ] muscle pain   [   ] joint pain/ stiffness   [   ] muscle tenderness   [   ] swelling   Neuro:                 [   ] WNL          [   ] HA   [   ] change in vision   [   ] tremor   [ X  ] weakness   [   ]dysphagia              Cognitive:           [   ] WNL           [   ]confusion   [ X ] impulsiveness [ X ] slowed cognition   Psych:                  [ X  ] WNL           [   ] hallucinations   [   ]agitation   [   ] delusion   [   ]depression    CLOF:  Bed mobility min assist, transfer min assist, gait 20 feet RW min assist/ CG    PHYSICAL EXAM  T(C): 36.6 (07-14-24 @ 06:04), Max: 36.6 (07-14-24 @ 06:04)  T(F): 97.8 (07-14-24 @ 06:04), Max: 97.8 (07-14-24 @ 06:04)  HR: 54 (07-14-24 @ 06:04) (54 - 66)  BP: 150/80 (07-14-24 @ 06:04) (128/73 - 150/80)  ABP: --  ABP(mean): --  RR: 19 (07-14-24 @ 06:04) (17 - 19)  SpO2: --    General:[ X  ] NAD, Resting Comfortable,   [   ] other:                                HEENT: [   ] NC/AT, EOMI, PERRL , Normal Conjunctivae,   [ X  ] other: right sided temporal swelling markedly improved        Cardio: [ X  ] RRR, no murmur,   [   ] other:                              Pulm: [ X  ] No Respiratory Distress,  Lungs CTAB,   [   ] other:                       Abdomen: [ X ]ND/NT, Soft,   [   ] other:    : [ X ] NO GIBSON CATHETER, [   ] GIBSON CATHETER- no meatal tear, no discharge, [   ] other:                                            MSK: [ X  ] No joint swelling, Full ROM,   [   ] other:                                         Ext: [ X  ]No C/C/E, No calf tenderness,   [   ]other:    Skin: [   ]intact,   [ X  ] other: right forearm and medial bicep bruising. right sided temporal swelling markedly improved                                                 Neurological Examination:  Cognitive: [  X  ] AAO x 3,   [  X  ]  other:  slowed responses, poor insight into deficits                                                                    Attention:  [    ] intact serial additions, no hemineglect   [  x  ]  other:  right gaze preference, needs redirection                      Memory: [    ] intact,    [ x   ]  other: impaired    Mood/Affect: [    ] wnl,    [  X  ]  other: blunted                                                                        Communication: [  X  ]Fluent, no dysarthria, following commands:  [    ] other:   CN II - XII:  [    ] intact,  [  X  ] other: left sided facial droop                                                                                        Motor:   RIGHT UE: [ X  ] WNL,  [   ] other:  LEFT    UE: [   ] WNL,  [ X  ] other: Sh Flexion 4/5, EF 4/5, Elbow extension 4/5, finger  4/5. POSITIVE pronator drift  RIGHT LE: [ x  ] WNL,  [   ] other:   LEFT    LE: [   ] WNL,  [ x  ] other: HF 4/5, KF 4/5, KE 4/5, ADF 5/5, APF 5/5. POSITIVE clonus 3 beats left ankle    Tone: [  X  ] wnl,   [    ]  other:  DTRs: [  X ]symmetric, [   ] other:  Coordination:   [  X  ] intact,   [    ] other:                                                                           Sensory: [  X  ] Intact to light touch,   [    ] other:    MEDICATIONS  (STANDING):  amLODIPine   Tablet 5 milliGRAM(s) Oral daily  aspirin  chewable 81 milliGRAM(s) Oral daily  chlorhexidine 2% Cloths 1 Application(s) Topical <User Schedule>  heparin   Injectable 5000 Unit(s) SubCutaneous every 8 hours  losartan 100 milliGRAM(s) Oral daily  magnesium oxide 400 milliGRAM(s) Oral three times a day with meals  melatonin 5 milliGRAM(s) Oral at bedtime  pantoprazole    Tablet 40 milliGRAM(s) Oral before breakfast  polyethylene glycol 3350 17 Gram(s) Oral two times a day  senna 2 Tablet(s) Oral at bedtime  sodium chloride 3 Gram(s) Oral every 8 hours  venlafaxine 37.5 milliGRAM(s) Oral daily    MEDICATIONS  (PRN):  acetaminophen     Tablet .. 650 milliGRAM(s) Oral every 6 hours PRN Temp greater or equal to 38C (100.4F), Mild Pain (1 - 3)  labetalol Injectable 10 milliGRAM(s) IV Push every 2 hours PRN SBP >150, HR > 70  QUEtiapine 12.5 milliGRAM(s) Oral at bedtime PRN agitation    RECENT LABS/IMAGING                        11.5   8.44  )-----------( 580      ( 08 Jul 2024 07:06 )             36.8     07-08    138  |  103  |  22<H>  ----------------------------<  121<H>  5.0   |  22  |  1.0    Ca    9.5      08 Jul 2024 07:06  Mg     2.2     07-08    TPro  6.8  /  Alb  3.8  /  TBili  0.3  /  DBili  x   /  AST  19  /  ALT  25  /  AlkPhos  99  07-08     Patient is a 64y old  Male who presents with a chief complaint of Rehab for Acute Right temporoparietal ICH, s/p  craniotomy with hematoma evacuation, left sided hemiparesis (nondominant), dysarthria  and cognitive deficits (05 Jul 2024 16:46)      HPI:  64M w/ PMHx of CVA 2020 and 2021 s/p intracranial angioplasty of right MCA and NORMAN w/ subsequent intra-stent occlusion of R MCA/NORMAN intracranial stent, aortic valve insufficiency, implantable loop recorder for prior episodes of syncope/NSVT, chronic anemia, CKD 2, psoriatic arthritis who presents for R STA to MCA bypass. Post-op was CTH stable. On assessment in PACU, noted to have new L sided weakness, confusion at SBP 120s. Started on Phenylephrine gtt for perfusion with some improvement of symptoms. Pt admitted to NSICU. On 6/22 he was noted to have R temporoparietal ICH, now s/p crani and hematoma evacuation. Repeat imaging has been stable, EEG negative, ASA restarted. BP better controlled. TTE with findings concerning for endocarditis, GIULIANA performed showed no signs of endocarditis. Patient with functional deficits relative to baseline, admitted to acute inpatient rehab.    The patient was evaluated by the PM&R team once medically stable. The patient was found to have functional limitations in terms of physical strength/mobility and ability to carry out ADLs (self care, transfers, ambulation). The patient was deemed to be a good candidate for admission for acute inpatient rehab.     Living Situation: Lives with Wife 1 VIANEY, [address] within home  PLOF: Rolling walker since 02/2024, Independent with ADLs and IADLs (05 Jul 2024 16:46)      TODAY'S SUBJECTIVE & REVIEW OF SYMPTOMS  Patient was seen and assessed this morning.   No overnight events.   Patient denies any new complaints at this time.   Tolerating PT/OT.   Tolerating oral diet.   Voiding and passing stool spontaneously.   Vital signs reviewed.    Constitutional:    [ x  ] WNL           [   ] poor appetite   [   ] insomnia   [   ] tired   Cardio:                [ x  ] WNL           [   ] CP   [   ] MORALES   [   ] palpitations               Resp:                   [ X  ] WNL           [   ] SOB   [   ] cough   [   ] wheezing   GI:                        [ X  ] WNL           [   ] constipation   [   ] diarrhea   [   ] abdominal pain   [   ] nausea   [   ] emesis                                :                      [ X  ] WNL           [   ] GIBSON  [   ] dysuria   [   ] difficulty voiding             Endo:                   [ X  ] WNL          [   ] polyuria   [   ] temperature intolerance                 Skin:                     [   ] WNL          [   ] pain   [ X  ] wound right temporal craniotomy wound, stapled  [   ] rash [ X ]  bruising, right arm medial bicep   MSK:                    [ X  ] WNL          [   ] muscle pain   [   ] joint pain/ stiffness   [   ] muscle tenderness   [   ] swelling   Neuro:                 [   ] WNL          [   ] HA   [   ] change in vision   [   ] tremor   [ X  ] weakness   [   ]dysphagia              Cognitive:           [   ] WNL           [   ]confusion   [ X ] impulsiveness [ X ] slowed cognition   Psych:                  [ X  ] WNL           [   ] hallucinations   [   ]agitation   [   ] delusion   [   ]depression    CLOF:  Bed mobility min assist, transfer min assist, gait 20 feet RW min assist/ CG    PHYSICAL EXAM  T(C): 36.6 (07-14-24 @ 06:04), Max: 36.6 (07-14-24 @ 06:04)  T(F): 97.8 (07-14-24 @ 06:04), Max: 97.8 (07-14-24 @ 06:04)  HR: 54 (07-14-24 @ 06:04) (54 - 66)  BP: 150/80 (07-14-24 @ 06:04) (128/73 - 150/80)  ABP: --  ABP(mean): --  RR: 19 (07-14-24 @ 06:04) (17 - 19)  SpO2: --    General:[ X  ] NAD, Resting Comfortable,   [   ] other:                                HEENT: [   ] NC/AT, EOMI, PERRL , Normal Conjunctivae,   [ X  ] other: right sided temporal swelling markedly improved        Cardio: [ X  ] RRR, no murmur,   [   ] other:                              Pulm: [ X  ] No Respiratory Distress,  Lungs CTAB,   [   ] other:                       Abdomen: [ X ]ND/NT, Soft,   [   ] other:    : [ X ] NO GIBSON CATHETER, [   ] GIBSON CATHETER- no meatal tear, no discharge, [   ] other:                                            MSK: [ X  ] No joint swelling, Full ROM,   [   ] other:                                         Ext: [ X  ]No C/C/E, No calf tenderness,   [   ]other:    Skin: [   ]intact,   [ X  ] other: right forearm and medial bicep bruising. right sided temporal swelling markedly improved                                                 Neurological Examination:  Cognitive: [  X  ] AAO x 3,   [  X  ]  other:  slowed responses, poor insight into deficits                                                                    Attention:  [    ] intact serial additions, no hemineglect   [  x  ]  other:  right gaze preference, needs redirection                      Memory: [    ] intact,    [ x   ]  other: impaired    Mood/Affect: [    ] wnl,    [  X  ]  other: blunted                                                                        Communication: [  X  ]Fluent, no dysarthria, following commands:  [    ] other:   CN II - XII:  [    ] intact,  [  X  ] other: left sided facial droop                                                                                        Motor:   RIGHT UE: [ X  ] WNL,  [   ] other:  LEFT    UE: [   ] WNL,  [ X  ] other: Sh Flexion 4/5, EF 4/5, Elbow extension 4/5, finger  4/5. POSITIVE pronator drift  RIGHT LE: [ x  ] WNL,  [   ] other:   LEFT    LE: [   ] WNL,  [ x  ] other: HF 4/5, KF 4/5, KE 4/5, ADF 5/5, APF 5/5. POSITIVE clonus 3 beats left ankle    Tone: [  X  ] wnl,   [    ]  other:  DTRs: [  X ]symmetric, [   ] other:  Coordination:   [  X  ] intact,   [    ] other:                                                                           Sensory: [  X  ] Intact to light touch,   [    ] other:    MEDICATIONS  (STANDING):  amLODIPine   Tablet 5 milliGRAM(s) Oral daily  aspirin  chewable 81 milliGRAM(s) Oral daily  chlorhexidine 2% Cloths 1 Application(s) Topical <User Schedule>  heparin   Injectable 5000 Unit(s) SubCutaneous every 8 hours  losartan 100 milliGRAM(s) Oral daily  magnesium oxide 400 milliGRAM(s) Oral three times a day with meals  melatonin 5 milliGRAM(s) Oral at bedtime  pantoprazole    Tablet 40 milliGRAM(s) Oral before breakfast  polyethylene glycol 3350 17 Gram(s) Oral two times a day  senna 2 Tablet(s) Oral at bedtime  sodium chloride 3 Gram(s) Oral every 8 hours  venlafaxine 37.5 milliGRAM(s) Oral daily    MEDICATIONS  (PRN):  acetaminophen     Tablet .. 650 milliGRAM(s) Oral every 6 hours PRN Temp greater or equal to 38C (100.4F), Mild Pain (1 - 3)  labetalol Injectable 10 milliGRAM(s) IV Push every 2 hours PRN SBP >150, HR > 70  QUEtiapine 12.5 milliGRAM(s) Oral at bedtime PRN agitation    RECENT LABS/IMAGING                        11.5   8.44  )-----------( 580      ( 08 Jul 2024 07:06 )             36.8     07-08    138  |  103  |  22<H>  ----------------------------<  121<H>  5.0   |  22  |  1.0    Ca    9.5      08 Jul 2024 07:06  Mg     2.2     07-08    TPro  6.8  /  Alb  3.8  /  TBili  0.3  /  DBili  x   /  AST  19  /  ALT  25  /  AlkPhos  99  07-08     Patient is a 64y old  Male who presents with a chief complaint of Rehab for Acute Right temporoparietal ICH, s/p  craniotomy with hematoma evacuation, left sided hemiparesis (nondominant), dysarthria  and cognitive deficits (05 Jul 2024 16:46)      HPI:  64M w/ PMHx of CVA 2020 and 2021 s/p intracranial angioplasty of right MCA and NORMAN w/ subsequent intra-stent occlusion of R MCA/NORMAN intracranial stent, aortic valve insufficiency, implantable loop recorder for prior episodes of syncope/NSVT, chronic anemia, CKD 2, psoriatic arthritis who presents for R STA to MCA bypass. Post-op was CTH stable. On assessment in PACU, noted to have new L sided weakness, confusion at SBP 120s. Started on Phenylephrine gtt for perfusion with some improvement of symptoms. Pt admitted to NSICU. On 6/22 he was noted to have R temporoparietal ICH, now s/p crani and hematoma evacuation. Repeat imaging has been stable, EEG negative, ASA restarted. BP better controlled. TTE with findings concerning for endocarditis, GIULIANA performed showed no signs of endocarditis. Patient with functional deficits relative to baseline, admitted to acute inpatient rehab.    The patient was evaluated by the PM&R team once medically stable. The patient was found to have functional limitations in terms of physical strength/mobility and ability to carry out ADLs (self care, transfers, ambulation). The patient was deemed to be a good candidate for admission for acute inpatient rehab.     Living Situation: Lives with Wife 1 VIANEY, [address] within home  PLOF: Rolling walker since 02/2024, Independent with ADLs and IADLs (05 Jul 2024 16:46)      TODAY'S SUBJECTIVE & REVIEW OF SYMPTOMS  Patient was seen and assessed this morning.   No overnight events.   Patient denies any new complaints at this time.   Tolerating PT/OT.   Tolerating oral diet.   Voiding and passing stool spontaneously.   Vital signs reviewed.    Constitutional:    [ x  ] WNL           [   ] poor appetite   [   ] insomnia   [   ] tired   Cardio:                [ x  ] WNL           [   ] CP   [   ] MORALES   [   ] palpitations               Resp:                   [ X  ] WNL           [   ] SOB   [   ] cough   [   ] wheezing   GI:                        [ X  ] WNL           [   ] constipation   [   ] diarrhea   [   ] abdominal pain   [   ] nausea   [   ] emesis                                :                      [ X  ] WNL           [   ] GIBSON  [   ] dysuria   [   ] difficulty voiding             Endo:                   [ X  ] WNL          [   ] polyuria   [   ] temperature intolerance                 Skin:                     [   ] WNL          [   ] pain   [ X  ] wound right temporal craniotomy wound, stapled  [   ] rash [ X ]  bruising, right arm medial bicep   MSK:                    [ X  ] WNL          [   ] muscle pain   [   ] joint pain/ stiffness   [   ] muscle tenderness   [   ] swelling   Neuro:                 [   ] WNL          [   ] HA   [   ] change in vision   [   ] tremor   [ X  ] weakness   [   ]dysphagia              Cognitive:           [   ] WNL           [   ]confusion   [ X ] impulsiveness [ X ] slowed cognition   Psych:                  [ X  ] WNL           [   ] hallucinations   [   ]agitation   [   ] delusion   [   ]depression    CLOF:  Bed mobility min assist, transfer min assist, gait 20 feet RW min assist/ CG    PHYSICAL EXAM  T(C): 36.6 (07-14-24 @ 06:04), Max: 36.6 (07-14-24 @ 06:04)  T(F): 97.8 (07-14-24 @ 06:04), Max: 97.8 (07-14-24 @ 06:04)  HR: 54 (07-14-24 @ 06:04) (54 - 66)  BP: 150/80 (07-14-24 @ 06:04) (128/73 - 150/80)  ABP: --  ABP(mean): --  RR: 19 (07-14-24 @ 06:04) (17 - 19)  SpO2: --    General:[ X  ] NAD, Resting Comfortable,   [   ] other:                                HEENT: [   ] NC/AT, EOMI, PERRL , Normal Conjunctivae,   [ X  ] other: right sided temporal swelling markedly improved        Cardio: [ X  ] RRR, no murmur,   [   ] other:                              Pulm: [ X  ] No Respiratory Distress,  Lungs CTAB,   [   ] other:                       Abdomen: [ X ]ND/NT, Soft,   [   ] other:    : [ X ] NO GIBSON CATHETER, [   ] GIBSON CATHETER- no meatal tear, no discharge, [   ] other:                                            MSK: [ X  ] No joint swelling, Full ROM,   [   ] other:                                         Ext: [ X  ]No C/C/E, No calf tenderness,   [   ]other:    Skin: [   ]intact,   [ X  ] other: right forearm and medial bicep bruising. right sided temporal swelling markedly improved                                                 Neurological Examination:  Cognitive: [  X  ] AAO x 3,   [  X  ]  other:  slowed responses, poor insight into deficits                                                                    Attention:  [    ] intact serial additions, no hemineglect   [  x  ]  other:  right gaze preference, needs redirection                      Memory: [    ] intact,    [ x   ]  other: impaired    Mood/Affect: [    ] wnl,    [  X  ]  other: blunted                                                                        Communication: [  X  ]Fluent, no dysarthria, following commands:  [    ] other:   CN II - XII:  [    ] intact,  [  X  ] other: left sided facial droop                                                                                        Motor:   RIGHT UE: [ X  ] WNL,  [   ] other:  LEFT    UE: [   ] WNL,  [ X  ] other: Sh Flexion 4/5, EF 4/5, Elbow extension 4/5, finger  4/5. POSITIVE pronator drift  RIGHT LE: [ x  ] WNL,  [   ] other:   LEFT    LE: [   ] WNL,  [ x  ] other: HF 4/5, KF 4/5, KE 4/5, ADF 5/5, APF 5/5. POSITIVE clonus 3 beats left ankle    Tone: [  X  ] wnl,   [    ]  other:  DTRs: [  X ]symmetric, [   ] other:  Coordination:   [  X  ] intact,   [    ] other:                                                                           Sensory: [  X  ] Intact to light touch,   [    ] other:    MEDICATIONS  (STANDING):  amLODIPine   Tablet 5 milliGRAM(s) Oral daily  aspirin  chewable 81 milliGRAM(s) Oral daily  chlorhexidine 2% Cloths 1 Application(s) Topical <User Schedule>  clotrimazole 1% Cream 1 Application(s) Topical every 12 hours  heparin   Injectable 5000 Unit(s) SubCutaneous every 8 hours  hydrocortisone 1% Cream 1 Application(s) Topical every 12 hours  losartan 100 milliGRAM(s) Oral daily  magnesium oxide 400 milliGRAM(s) Oral daily  melatonin 10 milliGRAM(s) Oral at bedtime  pantoprazole    Tablet 40 milliGRAM(s) Oral before breakfast  polyethylene glycol 3350 17 Gram(s) Oral two times a day  senna 2 Tablet(s) Oral at bedtime  simvastatin 40 milliGRAM(s) Oral at bedtime  sodium chloride 2 Gram(s) Oral every 8 hours  venlafaxine 75 milliGRAM(s) Oral daily    MEDICATIONS  (PRN):  acetaminophen     Tablet .. 650 milliGRAM(s) Oral every 6 hours PRN Temp greater or equal to 38C (100.4F), Mild Pain (1 - 3)  labetalol Injectable 10 milliGRAM(s) IV Push every 2 hours PRN SBP >150, HR > 70  QUEtiapine 12.5 milliGRAM(s) Oral at bedtime PRN agitation      RECENT LABS/IMAGING                        11.5   8.44  )-----------( 580      ( 08 Jul 2024 07:06 )             36.8     07-08    138  |  103  |  22<H>  ----------------------------<  121<H>  5.0   |  22  |  1.0    Ca    9.5      08 Jul 2024 07:06  Mg     2.2     07-08    TPro  6.8  /  Alb  3.8  /  TBili  0.3  /  DBili  x   /  AST  19  /  ALT  25  /  AlkPhos  99  07-08

## 2024-07-14 NOTE — PROGRESS NOTE ADULT - ASSESSMENT
ASSESSMENT/PLAN    Rehab for Acute right temporoparietal ICH, s/p craniotomy  with hematoma evacuation, left sided hemiparesis (nondominant), dysarthria and cognitive deficits    64 M w/ PMHx of CVA 2020 and 2021 s/p intracranial angioplasty of right MCA and NORMAN w/ subsequent intra-stent occlusion of R MCA/NORMAN intracranial stent, aortic valve insufficiency, implantable loop recorder for prior episodes of syncope/NSVT, chronic anemia, CKD 2, psoriatic arthritis who underwent elective R STA to MCA bypass 06/20. On 6/22 noted to have new weakness and mental status change, CTH showed R temporoparietal ICH with midline shift. Patient underwent craniotomy with hematoma evacuation with improvement in midline shift. Repeat CTH stable, patient clinically improving. Admitted for inpatient rehabilitation for acute right temporoparietal ICH and craniotomy with hematoma evacuation, left sided hemiparesis and cognitive deficits    PLOF: Rolling walker since 02/2024, Independent with ADLs and IADLs    #Rehab of R temporoparietal ICH after elective R STA to MCA bypass  #Hx CVA 2020 and 2021 s/p intracranial angioplasty of right MCA and NORMAN w/ subsequent intra-stent occlusion of R  #Thrombocytosis likely reactive to surgery - stable   MCA/NORMAN intracranial stent  New left sided weakness and confusion post procedure  S/P Partial evac of R T/p ICH with improvement of R--L shift   CTH stable on 6/28  EEG negative for any seizure  - BP goal 110-150  - NSGY following - removed staples on 7/7  - Aspirin- 81 mg for bypass patency  - not on statin. Has history of diarrhea with Atorvastatin. Has reportedly been on Zocor, but refusing any statin at this time.  - Na goal 140- 150 for perihematomal edema   - on salt tabs since surgery. Will continue to wean to 1mg q 8hrs. BP and serum Na have been stable  - PT/OT/SLP    #History of depression  - Continue home Effexor 37.5mg qd  - Seroquel 12.5 mg q hs prn for agitation - has not been needed  - melatonin 5 mg  qhs  - Appears depressed and apathetic and unmotivated. 7/11 Psych consult recs appreciated. Increased Effexor to 75mg daily and increased Melatonin to 10mg qhs    #Mobile Echodensity on Mitral Valve   TTE Echo Complete w/o Contrast w/ Doppler (6/21/24) - EF 70 to 75%. Moderate (Grade 2) diastolic dysfunction. Asymmetric basal septal hypertrophy (1.6cm).  There is a small independently mobile echodensity attached to the anterior mitral valve leaflet. Thickening and calcification of the anterior and posterior mitral valve leaflets.  07/05 GIULIANA performed with evidence of MV Calcification rather than IE  - likely OP f/u Cardiology     #HTN  Goal - < 150  -Continue amlodipine 5 mg qd.   -c/w Losartan 100 mg qd  -labetalol prn for breakthrough  - controlled - monitor as NaCl weaned    #Stage II CKD   - Baseline 0.9-1.1 , but now around 1.3 ( has trended up to 1.3 prior as well )   - encourage PO intake today, and trend creatinine     #LUE + superficial thrombophlebitis - improving  - warmth and elevation  - conservative measures    #Psoriatic Arthritis  Humira last dosed on June 9th as per wife and as communicated by pt's rheumatologist; Discussed with rheumatologist Dr Andre, pt to hold Humira while hospitalized     -Pain control: Tylenol heat  -GI/Bowel Mgmt: Miralax  Precautions / PROPHYLAXIS:    - Falls  - DVT prophylaxis: Heparin 5000 units subq q8hrs  - Diet, Regular:   High Fiber (HIFIBER)  Supplement Feeding Modality:  Oral  Ensure Plus High Protein Cans or Servings Per Day:  1       Frequency:  Daily (07-08-24 @ 11:46) [Active] ASSESSMENT/PLAN    Rehab for Acute right temporoparietal ICH, s/p craniotomy  with hematoma evacuation, left sided hemiparesis (nondominant), dysarthria and cognitive deficits    64 M w/ PMHx of CVA 2020 and 2021 s/p intracranial angioplasty of right MCA and NORMAN w/ subsequent intra-stent occlusion of R MCA/NORMAN intracranial stent, aortic valve insufficiency, implantable loop recorder for prior episodes of syncope/NSVT, chronic anemia, CKD 2, psoriatic arthritis who underwent elective R STA to MCA bypass 06/20. On 6/22 noted to have new weakness and mental status change, CTH showed R temporoparietal ICH with midline shift. Patient underwent craniotomy with hematoma evacuation with improvement in midline shift. Repeat CTH stable, patient clinically improving. Admitted for inpatient rehabilitation for acute right temporoparietal ICH and craniotomy with hematoma evacuation, left sided hemiparesis and cognitive deficits    PLOF: Rolling walker since 02/2024, Independent with ADLs and IADLs    #Rehab of R temporoparietal ICH after elective R STA to MCA bypass  #Hx CVA 2020 and 2021 s/p intracranial angioplasty of right MCA and NORMAN w/ subsequent intra-stent occlusion of R  #Thrombocytosis likely reactive to surgery - stable   MCA/NORMAN intracranial stent  New left sided weakness and confusion post procedure  S/P Partial evac of R T/p ICH with improvement of R--L shift   CTH stable on 6/28  EEG negative for any seizure  - BP goal 110-150  - NSGY following - removed staples on 7/7  - Aspirin- 81 mg for bypass patency  - not on statin. Has history of diarrhea with Atorvastatin. Has reportedly been on Zocor, but refusing any statin at this time.  - Na goal 140- 150 for perihematomal edema   - on salt tabs since surgery. Will continue to wean to 1mg q 8hrs. BP and serum Na have been stable  - PT/OT/SLP    #History of depression  - Continue home Effexor 37.5mg qd  - Seroquel 12.5 mg q hs prn for agitation - has not been needed  - melatonin 5 mg  qhs  - Appears depressed and apathetic and unmotivated. 7/11 Psych consult recs appreciated. Increased Effexor to 75mg daily and increased Melatonin to 10mg qhs  - affect improving    #Mobile Echodensity on Mitral Valve   TTE Echo Complete w/o Contrast w/ Doppler (6/21/24) - EF 70 to 75%. Moderate (Grade 2) diastolic dysfunction. Asymmetric basal septal hypertrophy (1.6cm).  There is a small independently mobile echodensity attached to the anterior mitral valve leaflet. Thickening and calcification of the anterior and posterior mitral valve leaflets.  07/05 GIULIANA performed with evidence of MV Calcification rather than IE  - likely OP f/u Cardiology     #HTN  Goal - < 150  -Continue amlodipine 5 mg qd.   -c/w Losartan 100 mg qd  -labetalol prn for breakthrough  - controlled - monitor as NaCl weaned    #Stage II CKD   - Baseline 0.9-1.1 , but now around 1.3 ( has trended up to 1.3 prior as well )   - encourage PO intake today, and trend creatinine     #LUE + superficial thrombophlebitis - improving  - warmth and elevation  - conservative measures    #Psoriatic Arthritis  Humira last dosed on June 9th as per wife and as communicated by pt's rheumatologist; Discussed with rheumatologist Dr Andre, pt to hold Humira while hospitalized     -Pain control: Tylenol heat  -GI/Bowel Mgmt: Miralax  Precautions / PROPHYLAXIS:    - Falls  - DVT prophylaxis: Heparin 5000 units subq q8hrs  - Diet, Regular:   High Fiber (HIFIBER)  Supplement Feeding Modality:  Oral  Ensure Plus High Protein Cans or Servings Per Day:  1       Frequency:  Daily (07-08-24 @ 11:46) [Active]

## 2024-07-15 LAB
ACANTHOCYTES BLD QL SMEAR: SLIGHT — SIGNIFICANT CHANGE UP
ALBUMIN SERPL ELPH-MCNC: 3.8 G/DL — SIGNIFICANT CHANGE UP (ref 3.5–5.2)
ALP SERPL-CCNC: 97 U/L — SIGNIFICANT CHANGE UP (ref 30–115)
ALT FLD-CCNC: 18 U/L — SIGNIFICANT CHANGE UP (ref 0–41)
ANION GAP SERPL CALC-SCNC: 10 MMOL/L — SIGNIFICANT CHANGE UP (ref 7–14)
AST SERPL-CCNC: 21 U/L — SIGNIFICANT CHANGE UP (ref 0–41)
BASOPHILS # BLD AUTO: 0 K/UL — SIGNIFICANT CHANGE UP (ref 0–0.2)
BASOPHILS NFR BLD AUTO: 0 % — SIGNIFICANT CHANGE UP (ref 0–1)
BILIRUB SERPL-MCNC: 0.3 MG/DL — SIGNIFICANT CHANGE UP (ref 0.2–1.2)
BUN SERPL-MCNC: 29 MG/DL — HIGH (ref 10–20)
BURR CELLS BLD QL SMEAR: PRESENT — SIGNIFICANT CHANGE UP
CALCIUM SERPL-MCNC: 9.2 MG/DL — SIGNIFICANT CHANGE UP (ref 8.4–10.5)
CHLORIDE SERPL-SCNC: 106 MMOL/L — SIGNIFICANT CHANGE UP (ref 98–110)
CO2 SERPL-SCNC: 23 MMOL/L — SIGNIFICANT CHANGE UP (ref 17–32)
CREAT SERPL-MCNC: 1.2 MG/DL — SIGNIFICANT CHANGE UP (ref 0.7–1.5)
EGFR: 67 ML/MIN/1.73M2 — SIGNIFICANT CHANGE UP
ELLIPTOCYTES BLD QL SMEAR: SLIGHT — SIGNIFICANT CHANGE UP
EOSINOPHIL # BLD AUTO: 0.44 K/UL — SIGNIFICANT CHANGE UP (ref 0–0.7)
EOSINOPHIL NFR BLD AUTO: 5.2 % — SIGNIFICANT CHANGE UP (ref 0–8)
GIANT PLATELETS BLD QL SMEAR: PRESENT — SIGNIFICANT CHANGE UP
GLUCOSE SERPL-MCNC: 95 MG/DL — SIGNIFICANT CHANGE UP (ref 70–99)
HCT VFR BLD CALC: 39.4 % — LOW (ref 42–52)
HGB BLD-MCNC: 12.3 G/DL — LOW (ref 14–18)
HYPOCHROMIA BLD QL: SLIGHT — SIGNIFICANT CHANGE UP
LYMPHOCYTES # BLD AUTO: 3.31 K/UL — SIGNIFICANT CHANGE UP (ref 1.2–3.4)
LYMPHOCYTES # BLD AUTO: 39.1 % — SIGNIFICANT CHANGE UP (ref 20.5–51.1)
MAGNESIUM SERPL-MCNC: 2.1 MG/DL — SIGNIFICANT CHANGE UP (ref 1.8–2.4)
MANUAL SMEAR VERIFICATION: SIGNIFICANT CHANGE UP
MCHC RBC-ENTMCNC: 27.8 PG — SIGNIFICANT CHANGE UP (ref 27–31)
MCHC RBC-ENTMCNC: 31.2 G/DL — LOW (ref 32–37)
MCV RBC AUTO: 88.9 FL — SIGNIFICANT CHANGE UP (ref 80–94)
MONOCYTES # BLD AUTO: 0.44 K/UL — SIGNIFICANT CHANGE UP (ref 0.1–0.6)
MONOCYTES NFR BLD AUTO: 5.2 % — SIGNIFICANT CHANGE UP (ref 1.7–9.3)
NEUTROPHILS # BLD AUTO: 3.46 K/UL — SIGNIFICANT CHANGE UP (ref 1.4–6.5)
NEUTROPHILS NFR BLD AUTO: 40.9 % — LOW (ref 42.2–75.2)
PLAT MORPH BLD: NORMAL — SIGNIFICANT CHANGE UP
PLATELET # BLD AUTO: 264 K/UL — SIGNIFICANT CHANGE UP (ref 130–400)
PMV BLD: 12.7 FL — HIGH (ref 7.4–10.4)
POIKILOCYTOSIS BLD QL AUTO: SLIGHT — SIGNIFICANT CHANGE UP
POLYCHROMASIA BLD QL SMEAR: SLIGHT — SIGNIFICANT CHANGE UP
POTASSIUM SERPL-MCNC: 4.9 MMOL/L — SIGNIFICANT CHANGE UP (ref 3.5–5)
POTASSIUM SERPL-SCNC: 4.9 MMOL/L — SIGNIFICANT CHANGE UP (ref 3.5–5)
PROT SERPL-MCNC: 6.6 G/DL — SIGNIFICANT CHANGE UP (ref 6–8)
RBC # BLD: 4.43 M/UL — LOW (ref 4.7–6.1)
RBC # FLD: 15.8 % — HIGH (ref 11.5–14.5)
RBC BLD AUTO: ABNORMAL
SODIUM SERPL-SCNC: 139 MMOL/L — SIGNIFICANT CHANGE UP (ref 135–146)
VARIANT LYMPHS # BLD: 9.6 % — HIGH (ref 0–5)
WBC # BLD: 8.46 K/UL — SIGNIFICANT CHANGE UP (ref 4.8–10.8)
WBC # FLD AUTO: 8.46 K/UL — SIGNIFICANT CHANGE UP (ref 4.8–10.8)

## 2024-07-15 RX ADMIN — Medication 1 APPLICATION(S): at 18:39

## 2024-07-15 RX ADMIN — VENLAFAXINE 75 MILLIGRAM(S): 37.5 CAPSULE, EXTENDED RELEASE ORAL at 16:38

## 2024-07-15 RX ADMIN — AMLODIPINE BESYLATE 5 MILLIGRAM(S): 2.5 TABLET ORAL at 05:13

## 2024-07-15 RX ADMIN — POLYETHYLENE GLYCOL 3350 17 GRAM(S): 1 POWDER ORAL at 05:13

## 2024-07-15 RX ADMIN — MAGNESIUM OXIDE 400 MILLIGRAM(S): 400 TABLET ORAL at 16:36

## 2024-07-15 RX ADMIN — Medication 1 GRAM(S): at 18:41

## 2024-07-15 RX ADMIN — Medication 40 MILLIGRAM(S): at 21:46

## 2024-07-15 RX ADMIN — Medication 1 APPLICATION(S): at 05:17

## 2024-07-15 RX ADMIN — Medication 1 GRAM(S): at 05:13

## 2024-07-15 RX ADMIN — Medication 10 MILLIGRAM(S): at 21:46

## 2024-07-15 RX ADMIN — Medication 1 APPLICATION(S): at 05:13

## 2024-07-15 RX ADMIN — HEPARIN SODIUM 5000 UNIT(S): 50 INJECTION, SOLUTION INTRAVENOUS at 05:12

## 2024-07-15 RX ADMIN — ASPIRIN 81 MILLIGRAM(S): 325 TABLET, FILM COATED ORAL at 16:37

## 2024-07-15 RX ADMIN — LOSARTAN POTASSIUM 100 MILLIGRAM(S): 100 TABLET, FILM COATED ORAL at 05:13

## 2024-07-15 RX ADMIN — PANTOPRAZOLE SODIUM 40 MILLIGRAM(S): 40 INJECTION, POWDER, FOR SOLUTION INTRAVENOUS at 05:13

## 2024-07-15 RX ADMIN — Medication 2 TABLET(S): at 21:46

## 2024-07-15 RX ADMIN — HEPARIN SODIUM 5000 UNIT(S): 50 INJECTION, SOLUTION INTRAVENOUS at 21:46

## 2024-07-15 NOTE — PROGRESS NOTE ADULT - ASSESSMENT
ASSESSMENT/PLAN    Rehab for Acute right temporoparietal ICH, s/p craniotomy  with hematoma evacuation, left sided hemiparesis (nondominant), dysarthria and cognitive deficits    64 M w/ PMHx of CVA 2020 and 2021 s/p intracranial angioplasty of right MCA and NORMAN w/ subsequent intra-stent occlusion of R MCA/NORMAN intracranial stent, aortic valve insufficiency, implantable loop recorder for prior episodes of syncope/NSVT, chronic anemia, CKD 2, psoriatic arthritis who underwent elective R STA to MCA bypass 06/20. On 6/22 noted to have new weakness and mental status change, CTH showed R temporoparietal ICH with midline shift. Patient underwent craniotomy with hematoma evacuation with improvement in midline shift. Repeat CTH stable, patient clinically improving. Admitted for inpatient rehabilitation for acute right temporoparietal ICH and craniotomy with hematoma evacuation, left sided hemiparesis and cognitive deficits    PLOF: Rolling walker since 02/2024, Independent with ADLs and IADLs    #Rehab of R temporoparietal ICH after elective R STA to MCA bypass  #Hx CVA 2020 and 2021 s/p intracranial angioplasty of right MCA and NORMAN w/ subsequent intra-stent occlusion of R  #Thrombocytosis likely reactive to surgery - stable   MCA/NORMAN intracranial stent  New left sided weakness and confusion post procedure  S/P Partial evac of R T/p ICH with improvement of R--L shift   CTH stable on 6/28  EEG negative for any seizure  - BP goal 110-150  - NSGY following - removed staples on 7/7  - Aspirin- 81 mg for bypass patency  - not on statin. Has history of diarrhea with Atorvastatin. Has reportedly been on Zocor, but refusing any statin at this time.  - Na goal 140- 150 for perihematomal edema   - on salt tabs since surgery. Will continue to wean to 1mg q 8hrs. BP and serum Na have been stable  - PT/OT/SLP    #History of depression  - Seroquel 12.5 mg q hs prn for agitation - has not been needed  - Appears depressed and apathetic and unmotivated. 7/11 Psych consult recs appreciated. Increased Effexor to 75mg daily and increased Melatonin to 10mg qhs  - affect improving    #Mobile Echodensity on Mitral Valve   TTE Echo Complete w/o Contrast w/ Doppler (6/21/24) - EF 70 to 75%. Moderate (Grade 2) diastolic dysfunction. Asymmetric basal septal hypertrophy (1.6cm).  There is a small independently mobile echodensity attached to the anterior mitral valve leaflet. Thickening and calcification of the anterior and posterior mitral valve leaflets.  07/05 GIULIANA performed with evidence of MV Calcification rather than IE  - likely OP f/u Cardiology     #HTN  Goal - < 150  -Continue amlodipine 5 mg qd.   -c/w Losartan 100 mg qd  -labetalol prn for breakthrough  - controlled - monitor as NaCl weaned    #Stage II CKD   - Baseline 0.9-1.1 , but now around 1.3 ( has trended up to 1.3 prior as well )   - encourage PO intake today, and trend creatinine     #LUE + superficial thrombophlebitis - improving  - warmth and elevation  - conservative measures    #Psoriatic Arthritis  Humira last dosed on June 9th as per wife and as communicated by pt's rheumatologist; Discussed with rheumatologist Dr Andre, pt to hold Humira while hospitalized     -Pain control: Tylenol heat  -GI/Bowel Mgmt: Miralax  Precautions / PROPHYLAXIS:    - Falls  - DVT prophylaxis: Heparin 5000 units subq q8hrs  - Diet, Regular:   High Fiber (HIFIBER)  Supplement Feeding Modality:  Oral  Ensure Plus High Protein Cans or Servings Per Day:  1       Frequency:  Daily (07-08-24 @ 11:46) [Active]

## 2024-07-15 NOTE — PROGRESS NOTE ADULT - SUBJECTIVE AND OBJECTIVE BOX
Patient is a 64y old  Male who presents with a chief complaint of Rehab for Acute Right temporoparietal ICH, s/p  craniotomy with hematoma evacuation, left sided hemiparesis (nondominant), dysarthria  and cognitive deficits (05 Jul 2024 16:46)      HPI:  64M w/ PMHx of CVA 2020 and 2021 s/p intracranial angioplasty of right MCA and NORMAN w/ subsequent intra-stent occlusion of R MCA/NORMAN intracranial stent, aortic valve insufficiency, implantable loop recorder for prior episodes of syncope/NSVT, chronic anemia, CKD 2, psoriatic arthritis who presents for R STA to MCA bypass. Post-op was CTH stable. On assessment in PACU, noted to have new L sided weakness, confusion at SBP 120s. Started on Phenylephrine gtt for perfusion with some improvement of symptoms. Pt admitted to NSICU. On 6/22 he was noted to have R temporoparietal ICH, now s/p crani and hematoma evacuation. Repeat imaging has been stable, EEG negative, ASA restarted. BP better controlled. TTE with findings concerning for endocarditis, GIULIANA performed showed no signs of endocarditis. Patient with functional deficits relative to baseline, admitted to acute inpatient rehab.    The patient was evaluated by the PM&R team once medically stable. The patient was found to have functional limitations in terms of physical strength/mobility and ability to carry out ADLs (self care, transfers, ambulation). The patient was deemed to be a good candidate for admission for acute inpatient rehab.     Living Situation: Lives with Wife 1 VIANEY, [address] Steps within home  PLOF: Rolling walker since 02/2024, Independent with ADLs and IADLs (05 Jul 2024 16:46)      TODAY'S SUBJECTIVE & REVIEW OF SYMPTOMS  Patient was seen and assessed this morning.   No overnight events.   Patient denies any new complaints at this time.   Tolerating PT/OT.   Tolerating oral diet.   Voiding and passing stool spontaneously.   Vital signs reviewed.    This morning patient calm and comfortable sitting upright in chair eating breakfast independently. Patient more engaged, mood improved    Constitutional:    [ x  ] WNL           [   ] poor appetite   [   ] insomnia   [   ] tired   Cardio:                [ x  ] WNL           [   ] CP   [   ] MORALES   [   ] palpitations               Resp:                   [ X  ] WNL           [   ] SOB   [   ] cough   [   ] wheezing   GI:                        [ X  ] WNL           [   ] constipation   [   ] diarrhea   [   ] abdominal pain   [   ] nausea   [   ] emesis                                :                      [ X  ] WNL           [   ] GIBSON  [   ] dysuria   [   ] difficulty voiding             Endo:                   [ X  ] WNL          [   ] polyuria   [   ] temperature intolerance                 Skin:                     [   ] WNL          [   ] pain   [ X  ] wound right temporal craniotomy wound, stapled  [   ] rash [ X ]  bruising, right arm medial bicep   MSK:                    [ X  ] WNL          [   ] muscle pain   [   ] joint pain/ stiffness   [   ] muscle tenderness   [   ] swelling   Neuro:                 [   ] WNL          [   ] HA   [   ] change in vision   [   ] tremor   [ X  ] weakness   [   ]dysphagia              Cognitive:           [   ] WNL           [   ]confusion   [ X ] impulsiveness [ X ] slowed cognition   Psych:                  [ X  ] WNL           [   ] hallucinations   [   ]agitation   [   ] delusion   [   ]depression    CLOF:  Bed mobility min assist, transfer min assist, gait 20 feet RW min assist/ CG    PHYSICAL EXAM  T(C): 36.6 (07-14-24 @ 06:04), Max: 36.6 (07-14-24 @ 06:04)  T(F): 97.8 (07-14-24 @ 06:04), Max: 97.8 (07-14-24 @ 06:04)  HR: 54 (07-14-24 @ 06:04) (54 - 66)  BP: 150/80 (07-14-24 @ 06:04) (128/73 - 150/80)  ABP: --  ABP(mean): --  RR: 19 (07-14-24 @ 06:04) (17 - 19)  SpO2: --    General:[ X  ] NAD, Resting Comfortable,   [   ] other:                                HEENT: [   ] NC/AT, EOMI, PERRL , Normal Conjunctivae,   [ X  ] other: right sided temporal swelling markedly improved        Cardio: [ X  ] RRR, no murmur,   [   ] other:                              Pulm: [ X  ] No Respiratory Distress,  Lungs CTAB,   [   ] other:                       Abdomen: [ X ]ND/NT, Soft,   [   ] other:    : [ X ] NO GIBSON CATHETER, [   ] GIBSON CATHETER- no meatal tear, no discharge, [   ] other:                                            MSK: [ X  ] No joint swelling, Full ROM,   [   ] other:                                         Ext: [ X  ]No C/C/E, No calf tenderness,   [   ]other:    Skin: [   ]intact,   [ X  ] other: right forearm and medial bicep bruising. right sided temporal swelling markedly improved                                                 Neurological Examination:  Cognitive: [  X  ] AAO x 3,   [  X  ]  other:  slowed responses, poor insight into deficits                                                                    Attention:  [    ] intact serial additions, no hemineglect   [  x  ]  other:  right gaze preference, needs redirection                      Memory: [    ] intact,    [ x   ]  other: impaired    Mood/Affect: [    ] wnl,    [  X  ]  other: blunted                                                                        Communication: [  X  ]Fluent, no dysarthria, following commands:  [    ] other:   CN II - XII:  [    ] intact,  [  X  ] other: left sided facial droop                                                                                        Motor:   RIGHT UE: [ X  ] WNL,  [   ] other:  LEFT    UE: [   ] WNL,  [ X  ] other: Sh Flexion 4/5, EF 4/5, Elbow extension 4/5, finger  4/5. POSITIVE pronator drift  RIGHT LE: [ x  ] WNL,  [   ] other:   LEFT    LE: [   ] WNL,  [ x  ] other: HF 4/5, KF 4/5, KE 4/5, ADF 5/5, APF 5/5. POSITIVE clonus 3 beats left ankle    Tone: [  X  ] wnl,   [    ]  other:  DTRs: [  X ]symmetric, [   ] other:  Coordination:   [  X  ] intact,   [    ] other:                                                                           Sensory: [  X  ] Intact to light touch,   [    ] other:    MEDICATIONS  (STANDING):  amLODIPine   Tablet 5 milliGRAM(s) Oral daily  aspirin  chewable 81 milliGRAM(s) Oral daily  chlorhexidine 2% Cloths 1 Application(s) Topical <User Schedule>  clotrimazole 1% Cream 1 Application(s) Topical every 12 hours  heparin   Injectable 5000 Unit(s) SubCutaneous every 8 hours  hydrocortisone 1% Cream 1 Application(s) Topical every 12 hours  losartan 100 milliGRAM(s) Oral daily  magnesium oxide 400 milliGRAM(s) Oral daily  melatonin 10 milliGRAM(s) Oral at bedtime  pantoprazole    Tablet 40 milliGRAM(s) Oral before breakfast  polyethylene glycol 3350 17 Gram(s) Oral two times a day  senna 2 Tablet(s) Oral at bedtime  simvastatin 40 milliGRAM(s) Oral at bedtime  sodium chloride 1 Gram(s) Oral two times a day  venlafaxine 75 milliGRAM(s) Oral daily    MEDICATIONS  (PRN):  acetaminophen     Tablet .. 650 milliGRAM(s) Oral every 6 hours PRN Temp greater or equal to 38C (100.4F), Mild Pain (1 - 3)  labetalol Injectable 10 milliGRAM(s) IV Push every 2 hours PRN SBP >150, HR > 70  QUEtiapine 12.5 milliGRAM(s) Oral at bedtime PRN agitation                          12.3   8.46  )-----------( 264      ( 15 Jul 2024 06:33 )             39.4     07-15    139  |  106  |  29<H>  ----------------------------<  95  4.9   |  23  |  1.2    Ca    9.2      15 Jul 2024 06:33  Mg     2.1     07-15    TPro  6.6  /  Alb  3.8  /  TBili  0.3  /  DBili  x   /  AST  21  /  ALT  18  /  AlkPhos  97  07-15      Urinalysis Basic - ( 15 Jul 2024 06:33 )    Color: x / Appearance: x / SG: x / pH: x  Gluc: 95 mg/dL / Ketone: x  / Bili: x / Urobili: x   Blood: x / Protein: x / Nitrite: x   Leuk Esterase: x / RBC: x / WBC x   Sq Epi: x / Non Sq Epi: x / Bacteria: x         Patient is a 64y old  Male who presents with a chief complaint of Rehab for Acute Right temporoparietal ICH, s/p  craniotomy with hematoma evacuation, left sided hemiparesis (nondominant), dysarthria  and cognitive deficits (05 Jul 2024 16:46)      HPI:  64M w/ PMHx of CVA 2020 and 2021 s/p intracranial angioplasty of right MCA and NORMAN w/ subsequent intra-stent occlusion of R MCA/NORMAN intracranial stent, aortic valve insufficiency, implantable loop recorder for prior episodes of syncope/NSVT, chronic anemia, CKD 2, psoriatic arthritis who presents for R STA to MCA bypass. Post-op was CTH stable. On assessment in PACU, noted to have new L sided weakness, confusion at SBP 120s. Started on Phenylephrine gtt for perfusion with some improvement of symptoms. Pt admitted to NSICU. On 6/22 he was noted to have R temporoparietal ICH, now s/p crani and hematoma evacuation. Repeat imaging has been stable, EEG negative, ASA restarted. BP better controlled. TTE with findings concerning for endocarditis, GIULIANA performed showed no signs of endocarditis. Patient with functional deficits relative to baseline, admitted to acute inpatient rehab.    The patient was evaluated by the PM&R team once medically stable. The patient was found to have functional limitations in terms of physical strength/mobility and ability to carry out ADLs (self care, transfers, ambulation). The patient was deemed to be a good candidate for admission for acute inpatient rehab.     Living Situation: Lives with Wife 1 VIANEY, [address] within home  PLOF: Rolling walker since 02/2024, Independent with ADLs and IADLs (05 Jul 2024 16:46)      TODAY'S SUBJECTIVE & REVIEW OF SYMPTOMS  Patient was seen and assessed this morning.   No overnight events.   Patient denies any new complaints at this time.   Tolerating PT/OT.   Tolerating oral diet.   Voiding and passing stool spontaneously.   Vital and labs signs reviewed.    This morning patient calm and comfortable sitting upright in chair eating breakfast independently. Patient more engaged, mood improved    Constitutional:    [ x  ] WNL           [   ] poor appetite   [   ] insomnia   [   ] tired   Cardio:                [ x  ] WNL           [   ] CP   [   ] MORALES   [   ] palpitations               Resp:                   [ X  ] WNL           [   ] SOB   [   ] cough   [   ] wheezing   GI:                        [ X  ] WNL           [   ] constipation   [   ] diarrhea   [   ] abdominal pain   [   ] nausea   [   ] emesis                                :                      [ X  ] WNL           [   ] GIBSON  [   ] dysuria   [   ] difficulty voiding             Endo:                   [ X  ] WNL          [   ] polyuria   [   ] temperature intolerance                 Skin:                     [   ] WNL          [   ] pain   [ X  ] wound right temporal craniotomy wound, stapled  [   ] rash [ X ]  bruising, right arm medial bicep   MSK:                    [ X  ] WNL          [   ] muscle pain   [   ] joint pain/ stiffness   [   ] muscle tenderness   [   ] swelling   Neuro:                 [   ] WNL          [   ] HA   [   ] change in vision   [   ] tremor   [ X  ] weakness   [   ]dysphagia              Cognitive:           [   ] WNL           [   ]confusion   [ X ] impulsiveness [ X ] slowed cognition   Psych:                  [ X  ] WNL           [   ] hallucinations   [   ]agitation   [   ] delusion   [   ]depression    CLOF:  Bed mobility min assist, transfer min assist, gait 20 feet RW min assist/ CG    PHYSICAL EXAM    Vital Signs Last 24 Hrs  T(C): 36.9 (15 Jul 2024 13:27), Max: 36.9 (15 Jul 2024 04:30)  T(F): 98.4 (15 Jul 2024 13:27), Max: 98.4 (15 Jul 2024 04:30)  HR: 62 (15 Jul 2024 13:27) (62 - 75)  BP: 136/77 (15 Jul 2024 13:27) (122/73 - 143/81)  BP(mean): 96 (14 Jul 2024 19:47) (96 - 96)  RR: 18 (15 Jul 2024 13:27) (18 - 19)  SpO2: 97% (14 Jul 2024 19:05) (97% - 97%)      General:[ X  ] NAD, Resting Comfortable,   [   ] other:                                HEENT: [   ] NC/AT, EOMI, PERRL , Normal Conjunctivae,   [ X  ] other: right sided temporal swelling markedly improved        Cardio: [ X  ] RRR, no murmur,   [   ] other:                              Pulm: [ X  ] No Respiratory Distress,  Lungs CTAB,   [   ] other:                       Abdomen: [ X ]ND/NT, Soft,   [   ] other:    : [ X ] NO GIBSON CATHETER, [   ] GIBSON CATHETER- no meatal tear, no discharge, [   ] other:                                            MSK: [ X  ] No joint swelling, Full ROM,   [   ] other:                                         Ext: [ X  ]No C/C/E, No calf tenderness,   [   ]other:    Skin: [   ]intact,   [ X  ] other: right forearm and medial bicep bruising. right sided temporal swelling markedly improved                                                 Neurological Examination:  Cognitive: [  X  ] AAO x 3,   [  X  ]  other:  slowed responses, poor insight into deficits                                                                    Attention:  [    ] intact serial additions, no hemineglect   [  x  ]  other:  right gaze preference, needs redirection                      Memory: [    ] intact,    [ x   ]  other: impaired    Mood/Affect: [    ] wnl,    [  X  ]  other: blunted                                                                        Communication: [  X  ]Fluent, no dysarthria, following commands:  [    ] other:   CN II - XII:  [    ] intact,  [  X  ] other: left sided facial droop                                                                                        Motor:   RIGHT UE: [ X  ] WNL,  [   ] other:  LEFT    UE: [   ] WNL,  [ X  ] other: Sh Flexion 4/5, EF 4/5, Elbow extension 4/5, finger  4/5. POSITIVE pronator drift  RIGHT LE: [ x  ] WNL,  [   ] other:   LEFT    LE: [   ] WNL,  [ x  ] other: HF 4/5, KF 4/5, KE 4/5, ADF 5/5, APF 5/5. POSITIVE clonus 3 beats left ankle    Tone: [  X  ] wnl,   [    ]  other:  DTRs: [  X ]symmetric, [   ] other:  Coordination:   [  X  ] intact,   [    ] other:                                                                           Sensory: [  X  ] Intact to light touch,   [    ] other:    MEDICATIONS  (STANDING):  amLODIPine   Tablet 5 milliGRAM(s) Oral daily  aspirin  chewable 81 milliGRAM(s) Oral daily  chlorhexidine 2% Cloths 1 Application(s) Topical <User Schedule>  clotrimazole 1% Cream 1 Application(s) Topical every 12 hours  heparin   Injectable 5000 Unit(s) SubCutaneous every 8 hours  hydrocortisone 1% Cream 1 Application(s) Topical every 12 hours  losartan 100 milliGRAM(s) Oral daily  magnesium oxide 400 milliGRAM(s) Oral daily  melatonin 10 milliGRAM(s) Oral at bedtime  pantoprazole    Tablet 40 milliGRAM(s) Oral before breakfast  polyethylene glycol 3350 17 Gram(s) Oral two times a day  senna 2 Tablet(s) Oral at bedtime  simvastatin 40 milliGRAM(s) Oral at bedtime  sodium chloride 1 Gram(s) Oral two times a day  venlafaxine 75 milliGRAM(s) Oral daily    MEDICATIONS  (PRN):  acetaminophen     Tablet .. 650 milliGRAM(s) Oral every 6 hours PRN Temp greater or equal to 38C (100.4F), Mild Pain (1 - 3)  labetalol Injectable 10 milliGRAM(s) IV Push every 2 hours PRN SBP >150, HR > 70  QUEtiapine 12.5 milliGRAM(s) Oral at bedtime PRN agitation                          12.3   8.46  )-----------( 264      ( 15 Jul 2024 06:33 )             39.4     07-15    139  |  106  |  29<H>  ----------------------------<  95  4.9   |  23  |  1.2    Ca    9.2      15 Jul 2024 06:33  Mg     2.1     07-15    TPro  6.6  /  Alb  3.8  /  TBili  0.3  /  DBili  x   /  AST  21  /  ALT  18  /  AlkPhos  97  07-15

## 2024-07-16 RX ORDER — SODIUM CHLORIDE 0.9 % (FLUSH) 0.9 %
1 SYRINGE (ML) INJECTION DAILY
Refills: 0 | Status: DISCONTINUED | OUTPATIENT
Start: 2024-07-17 | End: 2024-07-17

## 2024-07-16 RX ADMIN — MAGNESIUM OXIDE 400 MILLIGRAM(S): 400 TABLET ORAL at 11:48

## 2024-07-16 RX ADMIN — Medication 1 GRAM(S): at 06:51

## 2024-07-16 RX ADMIN — HEPARIN SODIUM 5000 UNIT(S): 50 INJECTION, SOLUTION INTRAVENOUS at 22:20

## 2024-07-16 RX ADMIN — Medication 2 TABLET(S): at 22:20

## 2024-07-16 RX ADMIN — AMLODIPINE BESYLATE 5 MILLIGRAM(S): 2.5 TABLET ORAL at 06:51

## 2024-07-16 RX ADMIN — Medication 1 APPLICATION(S): at 17:22

## 2024-07-16 RX ADMIN — Medication 1 APPLICATION(S): at 17:23

## 2024-07-16 RX ADMIN — VENLAFAXINE 75 MILLIGRAM(S): 37.5 CAPSULE, EXTENDED RELEASE ORAL at 11:47

## 2024-07-16 RX ADMIN — LOSARTAN POTASSIUM 100 MILLIGRAM(S): 100 TABLET, FILM COATED ORAL at 06:51

## 2024-07-16 RX ADMIN — HEPARIN SODIUM 5000 UNIT(S): 50 INJECTION, SOLUTION INTRAVENOUS at 06:50

## 2024-07-16 RX ADMIN — Medication 40 MILLIGRAM(S): at 22:20

## 2024-07-16 RX ADMIN — Medication 10 MILLIGRAM(S): at 22:20

## 2024-07-16 RX ADMIN — PANTOPRAZOLE SODIUM 40 MILLIGRAM(S): 40 INJECTION, POWDER, FOR SOLUTION INTRAVENOUS at 06:52

## 2024-07-16 RX ADMIN — ASPIRIN 81 MILLIGRAM(S): 325 TABLET, FILM COATED ORAL at 11:48

## 2024-07-16 RX ADMIN — HEPARIN SODIUM 5000 UNIT(S): 50 INJECTION, SOLUTION INTRAVENOUS at 13:11

## 2024-07-16 NOTE — PROGRESS NOTE ADULT - ASSESSMENT
Neuropsychology Follow Up      Reassessment of cognitive functioning following cognitive deficits     Pain: No     Intervention: N/A     Orientation: Patient was oriented to person, place and time (however, used the board to identify day and date)     Arousal Level: Alert, slightly lethargic     Behavior: Cooperative      Affect Range: blunted     Attention: WNL     Insight into Illness/Deficits: Fair     Tests Administered: BDAE (selected subtests)     Neuropsychology Assessment:      Note: As the patient’s primary language is Maldivian, and comprehension and output were limited in English, a Maldivian  was used to promote understanding and optimal verbal output.      Attention: Qualitatively, the patient sustained attention and remained engaged throughout the evaluation.      Language: Confrontation naming was largely intact, though notable for one phonemic paraphasia (telescope instead of stethoscope). He could also appropriately name letters, numbers and colors when presented visually. Symbol recognition and number matching were intact. On a picture-word matching task, he was able to name the items verbally but had difficulty selecting the appropriate word from a set of visual prompts. Comprehension of, and ability to execute, both simple and complex commands was intact. Basic word repetition was intact, while repetition of complex phrases and sentences was notable for both semantic and phonemic paraphasias. Recitation of automatized sequences (days of week, numbers) was intact. Responsive naming was notable for perseverative responding, with the patient responding to all subsequent questions based on his answer to question #2. Oral reading was intact for basic words, but impaired for complex phrases and sentences. Writing was intact for name and number and the patient was able to write out dictated letters and numbers but was unable to copy a sentence presented to him visually.      Abstraction: Abstraction was mildly impaired, with some difficulty answering questions regarding complex ideational material.     Summary: Taken together, the patient’s presentation is reflective of the numerous neurological insults noted on imaging, both acute and chronic. His affect was blunted/flat and masked face was noted.       Overall, he displayed poor initiation and latency in his responses; however, he responded well to prompting and remained engaged throughout the assessment. This is likely due to chronic lacunar infarcts in the corona radiata and basal ganglia, disrupting the subcortical dopaminergic circuitry and impairing voluntary task initiation. As a result, the patient may appear disengaged, uninterested, or even lazy. Therefore, it is important to prompt him and assist with task initiation, as he is likely to respond and engage.    Additionally, his limited verbal expression and difficulty with more complex repetition and naming are likely affected by motor slowing secondary to a recent right temporoparietal intracerebral hemorrhage (ICH). His presentation is consistent with a major neurocognitive impairment of moderate severity, primarily of vascular etiology.    Recommendations:      Frequent verbal reorientation      Supervision with ADLs and IADLs upon discharge     Provides frequent prompts to encourage engagement in therapies     Prognosis: Guarded     Plan: Provide feedback to family and psychoeducation prior to discharge.

## 2024-07-16 NOTE — PROGRESS NOTE ADULT - SUBJECTIVE AND OBJECTIVE BOX
Patient is a 64y old  Male who presents with a chief complaint of Rehab for Acute Right temporoparietal ICH, s/p  craniotomy with hematoma evacuation, left sided hemiparesis (nondominant), dysarthria  and cognitive deficits (05 Jul 2024 16:46)      HPI:  64M w/ PMHx of CVA 2020 and 2021 s/p intracranial angioplasty of right MCA and NORMAN w/ subsequent intra-stent occlusion of R MCA/NORMAN intracranial stent, aortic valve insufficiency, implantable loop recorder for prior episodes of syncope/NSVT, chronic anemia, CKD 2, psoriatic arthritis who presents for R STA to MCA bypass. Post-op was CTH stable. On assessment in PACU, noted to have new L sided weakness, confusion at SBP 120s. Started on Phenylephrine gtt for perfusion with some improvement of symptoms. Pt admitted to NSICU. On 6/22 he was noted to have R temporoparietal ICH, now s/p crani and hematoma evacuation. Repeat imaging has been stable, EEG negative, ASA restarted. BP better controlled. TTE with findings concerning for endocarditis, GIULIANA performed showed no signs of endocarditis. Patient with functional deficits relative to baseline, admitted to acute inpatient rehab.    The patient was evaluated by the PM&R team once medically stable. The patient was found to have functional limitations in terms of physical strength/mobility and ability to carry out ADLs (self care, transfers, ambulation). The patient was deemed to be a good candidate for admission for acute inpatient rehab.     Living Situation: Lives with Wife 1 VIANEY, [address] within home  PLOF: Rolling walker since 02/2024, Independent with ADLs and IADLs (05 Jul 2024 16:46)      TODAY'S SUBJECTIVE & REVIEW OF SYMPTOMS  Patient was seen and assessed this morning.   No overnight events.   Patient denies any new complaints at this time.   Tolerating PT/OT.   Tolerating oral diet.   Voiding and passing stool spontaneously.   Vital and labs signs reviewed.    Patient seen in rehab gym this morning. Participating fully with therapies. Patient more expressive, in better mood. Expressed concern regarding how scar will appear after wound heals. Has no complaints. Eating well, looking forward to discharge to [facility]    Constitutional:    [ x  ] WNL           [   ] poor appetite   [   ] insomnia   [   ] tired   Cardio:                [ x  ] WNL           [   ] CP   [   ] MORALES   [   ] palpitations               Resp:                   [ X  ] WNL           [   ] SOB   [   ] cough   [   ] wheezing   GI:                        [ X  ] WNL           [   ] constipation   [   ] diarrhea   [   ] abdominal pain   [   ] nausea   [   ] emesis                                :                      [ X  ] WNL           [   ] GIBSON  [   ] dysuria   [   ] difficulty voiding             Endo:                   [ X  ] WNL          [   ] polyuria   [   ] temperature intolerance                 Skin:                     [   ] WNL          [   ] pain   [ X  ] wound right temporal craniotomy [   ] rash [ X ]  bruising, right arm medial bicep   MSK:                    [ X  ] WNL          [   ] muscle pain   [   ] joint pain/ stiffness   [   ] muscle tenderness   [   ] swelling   Neuro:                 [   ] WNL          [   ] HA   [   ] change in vision   [   ] tremor   [ X  ] weakness   [   ]dysphagia              Cognitive:           [   ] WNL           [   ]confusion   [ ] impulsiveness [ X ] slowed cognition   Psych:                  [ X  ] WNL           [   ] hallucinations   [   ]agitation   [   ] delusion   [   ]depression    CLOF:  Bed mobility min assist, transfer min assist, gait 20 feet RW min assist/ CG    PHYSICAL EXAM    Vital Signs Last 24 Hrs  Vital Signs Last 24 Hrs  T(C): 36.3 (16 Jul 2024 04:47), Max: 36.9 (15 Jul 2024 13:27)  T(F): 97.3 (16 Jul 2024 04:47), Max: 98.4 (15 Jul 2024 13:27)  HR: 75 (16 Jul 2024 04:47) (62 - 78)  BP: 143/89 (16 Jul 2024 04:47) (136/77 - 145/81)  BP(mean): --  RR: 18 (16 Jul 2024 04:47) (18 - 18)  SpO2: --    General:[ X  ] NAD, Resting Comfortable,   [   ] other:                                HEENT: [   ] NC/AT, EOMI, PERRL , Normal Conjunctivae,   [ X  ] other: right sided temporal swelling markedly improved        Cardio: [ X  ] RRR, no murmur,   [   ] other:                              Pulm: [ X  ] No Respiratory Distress,  Lungs CTAB,   [   ] other:                       Abdomen: [ X ]ND/NT, Soft,   [   ] other:    : [ X ] NO GIBSON CATHETER, [   ] GIBSON CATHETER- no meatal tear, no discharge, [   ] other:                                            MSK: [ X  ] No joint swelling, Full ROM,   [   ] other:                                         Ext: [ X  ]No C/C/E, No calf tenderness,   [   ]other:    Skin: [   ]intact,   [ X  ] other: right forearm and medial bicep bruising. right sided temporal swelling markedly improved                                                 Neurological Examination:  Cognitive: [  X  ] AAO x 3,   [  X  ]  other:  slowed responses, poor insight into deficits                                                                    Attention:  [    ] intact serial additions, no hemineglect   [  x  ]  other:  right gaze preference, needs redirection                      Memory: [    ] intact,    [ x   ]  other: impaired    Mood/Affect: [    ] wnl,    [  X  ]  other: blunted, improving                                                                       Communication: [  X  ]Fluent, no dysarthria, following commands:  [    ] other:   CN II - XII:  [    ] intact,  [  X  ] other: left sided facial droop                                                                                        Motor:   RIGHT UE: [ X  ] WNL,  [   ] other:  LEFT    UE: [   ] WNL,  [ X  ] other: Sh Flexion 4/5, EF 4/5, Elbow extension 4/5, finger  4/5. POSITIVE pronator drift  RIGHT LE: [ x  ] WNL,  [   ] other:   LEFT    LE: [   ] WNL,  [ x  ] other: HF 4/5, KF 4/5, KE 4/5, ADF 5/5, APF 5/5. POSITIVE clonus 3 beats left ankle    Tone: [  X  ] wnl,   [    ]  other:  DTRs: [  X ]symmetric, [   ] other:  Coordination:   [  X  ] intact,   [    ] other:                                                                           Sensory: [  X  ] Intact to light touch,   [    ] other:    MEDICATIONS  (STANDING):  amLODIPine   Tablet 5 milliGRAM(s) Oral daily  aspirin  chewable 81 milliGRAM(s) Oral daily  chlorhexidine 2% Cloths 1 Application(s) Topical <User Schedule>  clotrimazole 1% Cream 1 Application(s) Topical every 12 hours  heparin   Injectable 5000 Unit(s) SubCutaneous every 8 hours  hydrocortisone 1% Cream 1 Application(s) Topical every 12 hours  losartan 100 milliGRAM(s) Oral daily  magnesium oxide 400 milliGRAM(s) Oral daily  melatonin 10 milliGRAM(s) Oral at bedtime  pantoprazole    Tablet 40 milliGRAM(s) Oral before breakfast  polyethylene glycol 3350 17 Gram(s) Oral two times a day  senna 2 Tablet(s) Oral at bedtime  simvastatin 40 milliGRAM(s) Oral at bedtime  sodium chloride 1 Gram(s) Oral two times a day  venlafaxine 75 milliGRAM(s) Oral daily    MEDICATIONS  (PRN):  acetaminophen     Tablet .. 650 milliGRAM(s) Oral every 6 hours PRN Temp greater or equal to 38C (100.4F), Mild Pain (1 - 3)  labetalol Injectable 10 milliGRAM(s) IV Push every 2 hours PRN SBP >150, HR > 70  QUEtiapine 12.5 milliGRAM(s) Oral at bedtime PRN agitation                          12.3   8.46  )-----------( 264      ( 15 Jul 2024 06:33 )             39.4     07-15    139  |  106  |  29<H>  ----------------------------<  95  4.9   |  23  |  1.2    Ca    9.2      15 Jul 2024 06:33  Mg     2.1     07-15    TPro  6.6  /  Alb  3.8  /  TBili  0.3  /  DBili  x   /  AST  21  /  ALT  18  /  AlkPhos  97  07-15

## 2024-07-16 NOTE — PROGRESS NOTE ADULT - ASSESSMENT
ASSESSMENT/PLAN    Rehab for Acute right temporoparietal ICH, s/p craniotomy  with hematoma evacuation, left sided hemiparesis (nondominant), dysarthria and cognitive deficits    64 M w/ PMHx of CVA 2020 and 2021 s/p intracranial angioplasty of right MCA and NORMAN w/ subsequent intra-stent occlusion of R MCA/NORMAN intracranial stent, aortic valve insufficiency, implantable loop recorder for prior episodes of syncope/NSVT, chronic anemia, CKD 2, psoriatic arthritis who underwent elective R STA to MCA bypass 06/20. On 6/22 noted to have new weakness and mental status change, CTH showed R temporoparietal ICH with midline shift. Patient underwent craniotomy with hematoma evacuation with improvement in midline shift. Repeat CTH stable, patient clinically improving. Admitted for inpatient rehabilitation for acute right temporoparietal ICH and craniotomy with hematoma evacuation, left sided hemiparesis and cognitive deficits    PLOF: Rolling walker since 02/2024, Independent with ADLs and IADLs    #Rehab of R temporoparietal ICH after elective R STA to MCA bypass  #Hx CVA 2020 and 2021 s/p intracranial angioplasty of right MCA and NORMAN w/ subsequent intra-stent occlusion of R  #Thrombocytosis likely reactive to surgery - stable   MCA/NORMAN intracranial stent  New left sided weakness and confusion post procedure  S/P Partial evac of R T/p ICH with improvement of R--L shift   CTH stable on 6/28  EEG negative for any seizure  - BP goal 110-150  - NSGY following - removed staples on 7/7  - Aspirin- 81 mg for bypass patency  - simvastatin 40mg nightly, tolerating well (hx atorvastatin allergy)  - Na goal 140- 150 for perihematoma edema   - on salt tabs since surgery. Will continue to wean to 1mg q 8hrs. BP and serum Na have been stable  - PT/OT/SLP    #History of depression  - Seroquel 12.5 mg q hs prn for agitation - has not been needed  - Appears depressed and apathetic and unmotivated. 7/11 Psych consult recs appreciated. Increased Effexor to 75mg daily and increased Melatonin to 10mg qhs  - affect improving    #Mobile Echodensity on Mitral Valve   TTE Echo Complete w/o Contrast w/ Doppler (6/21/24) - EF 70 to 75%. Moderate (Grade 2) diastolic dysfunction. Asymmetric basal septal hypertrophy (1.6cm).  There is a small independently mobile echodensity attached to the anterior mitral valve leaflet. Thickening and calcification of the anterior and posterior mitral valve leaflets.  07/05 GIULIANA performed with evidence of MV Calcification rather than IE  - likely OP f/u Cardiology     #HTN  Goal - < 150  -Continue amlodipine 5 mg qd.   -c/w Losartan 100 mg qd  -labetalol prn for breakthrough  - controlled - monitor as NaCl weaned    #Stage II CKD   - Baseline 0.9-1.1 , but now around 1.3 ( has trended up to 1.3 prior as well )   - encourage PO intake today, and trend creatinine     #LUE + superficial thrombophlebitis - improving  - warmth and elevation  - conservative measures    #Psoriatic Arthritis  Humira last dosed on June 9th as per wife and as communicated by pt's rheumatologist; Discussed with rheumatologist Dr Andre, pt to hold Humira while hospitalized     -Pain control: Tylenol heat  -GI/Bowel Mgmt: Miralax  Precautions / PROPHYLAXIS:    - Falls  - DVT prophylaxis: Heparin 5000 units subq q8hrs  - Diet, Regular:   High Fiber (HIFIBER)  Supplement Feeding Modality:  Oral  Ensure Plus High Protein Cans or Servings Per Day:  1       Frequency:  Daily (07-08-24 @ 11:46) [Active] ASSESSMENT/PLAN    Rehab for Acute right temporoparietal ICH, s/p craniotomy  with hematoma evacuation, left sided hemiparesis (nondominant), dysarthria and cognitive deficits    64 M w/ PMHx of CVA 2020 and 2021 s/p intracranial angioplasty of right MCA and NORMAN w/ subsequent intra-stent occlusion of R MCA/NORMAN intracranial stent, aortic valve insufficiency, implantable loop recorder for prior episodes of syncope/NSVT, chronic anemia, CKD 2, psoriatic arthritis who underwent elective R STA to MCA bypass 06/20. On 6/22 noted to have new weakness and mental status change, CTH showed R temporoparietal ICH with midline shift. Patient underwent craniotomy with hematoma evacuation with improvement in midline shift. Repeat CTH stable, patient clinically improving. Admitted for inpatient rehabilitation for acute right temporoparietal ICH and craniotomy with hematoma evacuation, left sided hemiparesis and cognitive deficits    PLOF: Rolling walker since 02/2024, Independent with ADLs and IADLs    #Rehab of R temporoparietal ICH after elective R STA to MCA bypass  #Hx CVA 2020 and 2021 s/p intracranial angioplasty of right MCA and NORMAN w/ subsequent intra-stent occlusion of R  #Thrombocytosis likely reactive to surgery - stable   MCA/NORMAN intracranial stent  New left sided weakness and confusion post procedure  S/P Partial evac of R T/p ICH with improvement of R--L shift   CTH stable on 6/28  EEG negative for any seizure  - BP goal 110-150  - NSGY following - removed staples on 7/7  - Aspirin- 81 mg for bypass patency  - simvastatin 40mg nightly, tolerating well (hx atorvastatin allergy)  - Na goal 140- 150 for perihematoma edema   - d/c salt tabs  - Antic d/c to STR in am    #History of depression  - Seroquel 12.5 mg q hs prn for agitation - has not been needed  - Appears depressed and apathetic and unmotivated. 7/11 Psych consult recs appreciated. Increased Effexor to 75mg daily and increased Melatonin to 10mg qhs  - affect improving    #Mobile Echodensity on Mitral Valve   TTE Echo Complete w/o Contrast w/ Doppler (6/21/24) - EF 70 to 75%. Moderate (Grade 2) diastolic dysfunction. Asymmetric basal septal hypertrophy (1.6cm).  There is a small independently mobile echodensity attached to the anterior mitral valve leaflet. Thickening and calcification of the anterior and posterior mitral valve leaflets.  07/05 GIULIANA performed with evidence of MV Calcification rather than IE  - likely OP f/u Cardiology     #HTN  Goal - < 150  -Continue amlodipine 5 mg qd.   -c/w Losartan 100 mg qd  -labetalol prn for breakthrough  - controlled - monitor as NaCl weaned    #Stage II CKD   - Baseline 0.9-1.1 , but now around 1.3 ( has trended up to 1.3 prior as well )   - encourage PO intake today, and trend creatinine     #LUE + superficial thrombophlebitis - improving  - warmth and elevation  - conservative measures    #Psoriatic Arthritis  Humira last dosed on June 9th as per wife and as communicated by pt's rheumatologist; Discussed with rheumatologist Dr Andre, pt to hold Humira while hospitalized     -Pain control: Tylenol heat  -GI/Bowel Mgmt: Miralax  Precautions / PROPHYLAXIS:    - Falls  - DVT prophylaxis: Heparin 5000 units subq q8hrs  - Diet, Regular:   High Fiber (HIFIBER)  Supplement Feeding Modality:  Oral  Ensure Plus High Protein Cans or Servings Per Day:  1       Frequency:  Daily (07-08-24 @ 11:46) [Active]

## 2024-07-16 NOTE — PROGRESS NOTE ADULT - ATTENDING COMMENTS
I reviewed the chart and examined the patient with the resident and we discussed the findings and treatment plan.  The patient is tolerating the rehab program well. I agree with the findings and treatment plan above, which I modified as indicated. The patient requires 3 hrs a day of acute inpatient rehab. No new complaints. Alert. VSS. Appears depressed, withdrawn. Neuro exam stable and answers quicker in [place] than in English. On Effexor. Participating in rehab program. Continue rehab. Labs reviewed and stable.    I read, edited and agree with the Assessment:  #Rehab of R temporoparietal ICH after elective R STA to MCA bypass  #Hx CVA 2020 and 2021 s/p intracranial angioplasty of right MCA and NORMAN w/ subsequent intra-stent occlusion of R  #Thrombocytosis likely reactive to surgery - stable   MCA/NORMAN intracranial stent  New left sided weakness and confusion post procedure  S/P Partial evac of R T/p ICH with improvement of R--L shift   CTH stable on 6/28  EEG negative for any seizure  - f/up Stroke Neurology   - BP goal 110-150  - Q4h neuro checks  - NSGY following - removed staples on 7/7  - Aspirin- 81 mg for bypass patency   - Continue home Effexor qd  - Seroquel 25 mg q hs prn for agitation   - melatonin qhs  - PT/OT/SLP    #Mobile Echodensity on Mitral Valve   TTE Echo Complete w/o Contrast w/ Doppler (6/21/24) - EF 70 to 75%. Moderate (Grade 2) diastolic dysfunction. Asymmetric basal septal hypertrophy (1.6cm).  There is a small independently mobile echodensity attached to the anterior mitral valve leaflet. Thickening and calcification of the anterior and posterior mitral valve leaflets.  07/05 GIULIANA performed with evidence of MV Calcification rather than IE  - likely OP f/u Cardiology     #HTN  Goal - < 150  -Continue amlodipine 5 mg qd.   -c/w Losartan 100 mg qd  -labetalol prn for breakthrough    #Stage II CKD   - Baseline 0.9-1.1 , but now around 1.3 ( has trended up to 1.3 prior as well )   - encourage PO intake today, and trend creatinine   - Na goal 140- 150 for perihematomal edema   - on salt tabs 3 tabs q8hrs, goal Na 140-150, taper as tolerates    #LUE + superficial thrombophlebitis - improving  - warmth and elevation  - conservative measures    #Psoriatic Arthritis  Humira last dosed on June 9th as per wife and as communicated by pt's rheumatologist; Discussed with rheumatologist Dr Andre, pt to hold Humira while hospitalized
I reviewed the chart and examined the patient with the resident and we discussed the findings and treatment plan.  The patient is tolerating the rehab program well. I agree with the findings and treatment plan above, which I modified as indicated. The patient requires 3 hrs a day of acute inpatient rehab. No new complaints. Alert. VSS. Labs reviewed and stable. Neuro stable. Ambulates with RW with CG to min assist. Continue full rehab program.     I read, edited and agree with the H&P and assessment:  #Rehab of R temporoparietal ICH after elective R STA to MCA bypass  #Hx CVA 2020 and 2021 s/p intracranial angioplasty of right MCA and NORMAN w/ subsequent intra-stent occlusion of R  #Thrombocytosis likely reactive to surgery - stable   MCA/NORMAN intracranial stent  New left sided weakness and confusion post procedure  S/P Partial evac of R T/p ICH with improvement of R--L shift   CTH stable on 6/28  EEG negative for any seizure  - BP goal 110-150  - NSGY following - removed staples on 7/7  - Aspirin- 81 mg for bypass patency  - not on statin. Has history of diarrhea with Atorvastatin. Has reportedly been on Zocor, but refusing any statin at this time.  - Na goal 140- 150 for perihematomal edema   - on salt tabs since surgery. Will continue to wean to 1mg q 8hrs. BP and serum Na have been stable  - PT/OT/SLP    #History of depression  - Seroquel 12.5 mg q hs prn for agitation - has not been needed  - Appears depressed and apathetic and unmotivated. 7/11 Psych consult recs appreciated. Increased Effexor to 75mg daily and increased Melatonin to 10mg qhs  - affect improving    #Mobile Echodensity on Mitral Valve   TTE Echo Complete w/o Contrast w/ Doppler (6/21/24) - EF 70 to 75%. Moderate (Grade 2) diastolic dysfunction. Asymmetric basal septal hypertrophy (1.6cm).  There is a small independently mobile echodensity attached to the anterior mitral valve leaflet. Thickening and calcification of the anterior and posterior mitral valve leaflets.  07/05 GIULIANA performed with evidence of MV Calcification rather than IE  - likely OP f/u Cardiology     #HTN  Goal - < 150  -Continue amlodipine 5 mg qd.   -c/w Losartan 100 mg qd  -labetalol prn for breakthrough  - controlled - monitor as NaCl weaned    #Stage II CKD   - Baseline 0.9-1.1 , but now around 1.3 ( has trended up to 1.3 prior as well )   - encourage PO intake today, and trend creatinine     #LUE + superficial thrombophlebitis - improving  - warmth and elevation  - conservative measures    #Psoriatic Arthritis  Humira last dosed on June 9th as per wife and as communicated by pt's rheumatologist; Discussed with rheumatologist Dr Andre, pt to hold Humira while hospitalized     -Pain control: Tylenol heat  -GI/Bowel Mgmt: Miralax  Precautions / PROPHYLAXIS:    - Falls  - DVT prophylaxis: Heparin 5000 units subq q8hrs  - Diet, Regular:   High Fiber (HIFIBER)  Supplement Feeding Modality:  Oral  Ensure Plus High Protein Cans or Servings Per Day:  1       Frequency:  Daily (07-08-24 @ 11:46) [Active]
I reviewed the chart and examined the patient with the resident and we discussed the findings and treatment plan.  The patient is tolerating the rehab program well. I agree with the findings and treatment plan above, which I modified as indicated. The patient requires 3 hrs a day of acute inpatient rehab. No new complaints. VSS. Neuro stable. Transfers and ambulates with CG. For d/c to STR in am.    I read, edited and agree with the H&P and assessment:  #Rehab of R temporoparietal ICH after elective R STA to MCA bypass  #Hx CVA 2020 and 2021 s/p intracranial angioplasty of right MCA and NORMAN w/ subsequent intra-stent occlusion of R  #Thrombocytosis likely reactive to surgery - stable   MCA/NORMAN intracranial stent  New left sided weakness and confusion post procedure  S/P Partial evac of R T/p ICH with improvement of R--L shift   CTH stable on 6/28  EEG negative for any seizure  - BP goal 110-150  - NSGY following - removed staples on 7/7  - Aspirin- 81 mg for bypass patency  - simvastatin 40mg nightly, tolerating well (hx atorvastatin allergy)  - Na goal 140- 150 for perihematoma edema   - d/c salt tabs  - Antic d/c to STR in am    #History of depression  - Seroquel 12.5 mg q hs prn for agitation - has not been needed  - Appears depressed and apathetic and unmotivated. 7/11 Psych consult recs appreciated. Increased Effexor to 75mg daily and increased Melatonin to 10mg qhs  - affect improving    #Mobile Echodensity on Mitral Valve   TTE Echo Complete w/o Contrast w/ Doppler (6/21/24) - EF 70 to 75%. Moderate (Grade 2) diastolic dysfunction. Asymmetric basal septal hypertrophy (1.6cm).  There is a small independently mobile echodensity attached to the anterior mitral valve leaflet. Thickening and calcification of the anterior and posterior mitral valve leaflets.  07/05 GIULIANA performed with evidence of MV Calcification rather than IE  - likely OP f/u Cardiology     #HTN  Goal - < 150  -Continue amlodipine 5 mg qd.   -c/w Losartan 100 mg qd  -labetalol prn for breakthrough  - controlled - monitor as NaCl weaned    #Stage II CKD   - Baseline 0.9-1.1 , but now around 1.3 ( has trended up to 1.3 prior as well )   - encourage PO intake today, and trend creatinine     #LUE + superficial thrombophlebitis - improving  - warmth and elevation  - conservative measures    #Psoriatic Arthritis  Humira last dosed on June 9th as per wife and as communicated by pt's rheumatologist; Discussed with rheumatologist Dr Andre, pt to hold Humira while hospitalized
I reviewed the chart and examined the patient with the resident and we discussed the findings and treatment plan.  The patient is tolerating the rehab program well. I agree with the findings and treatment plan above, which I modified as indicated. The patient requires 3 hrs a day of acute inpatient rehab. No new complaints. Alert. Affect improved. VSS. Will continue to wean off NaCl tabs. Neuro stable. Incision C&D. Continue full rehab program.    I read, edited and agree with the H&P and assessment:  #Rehab of R temporoparietal ICH after elective R STA to MCA bypass  #Hx CVA 2020 and 2021 s/p intracranial angioplasty of right MCA and NORMAN w/ subsequent intra-stent occlusion of R  #Thrombocytosis likely reactive to surgery - stable   MCA/NORMAN intracranial stent  New left sided weakness and confusion post procedure  S/P Partial evac of R T/p ICH with improvement of R--L shift   CTH stable on 6/28  EEG negative for any seizure  - BP goal 110-150  - NSGY following - removed staples on 7/7  - Aspirin- 81 mg for bypass patency  - not on statin. Has history of diarrhea with Atorvastatin. Has reportedly been on Zocor, but refusing any statin at this time.  - Na goal 140- 150 for perihematomal edema   - on salt tabs since surgery. Will continue to wean to 1mg q 8hrs. BP and serum Na have been stable  - PT/OT/SLP    #History of depression  - Continue home Effexor 37.5mg qd  - Seroquel 12.5 mg q hs prn for agitation - has not been needed  - melatonin 5 mg  qhs  - Appears depressed and apathetic and unmotivated. 7/11 Psych consult recs appreciated. Increased Effexor to 75mg daily and increased Melatonin to 10mg qhs  - affect improving    #Mobile Echodensity on Mitral Valve   TTE Echo Complete w/o Contrast w/ Doppler (6/21/24) - EF 70 to 75%. Moderate (Grade 2) diastolic dysfunction. Asymmetric basal septal hypertrophy (1.6cm).  There is a small independently mobile echodensity attached to the anterior mitral valve leaflet. Thickening and calcification of the anterior and posterior mitral valve leaflets.  07/05 GIULIANA performed with evidence of MV Calcification rather than IE  - likely OP f/u Cardiology     #HTN  Goal - < 150  -Continue amlodipine 5 mg qd.   -c/w Losartan 100 mg qd  -labetalol prn for breakthrough  - controlled - monitor as NaCl weaned    #Stage II CKD   - Baseline 0.9-1.1 , but now around 1.3 ( has trended up to 1.3 prior as well )   - encourage PO intake today, and trend creatinine     #LUE + superficial thrombophlebitis - improving  - warmth and elevation  - conservative measures    #Psoriatic Arthritis  Humira last dosed on June 9th as per wife and as communicated by pt's rheumatologist; Discussed with rheumatologist Dr Andre, pt to hold Humira while hospitalized     -Pain control: Tylenol heat  -GI/Bowel Mgmt: Miralax  Precautions / PROPHYLAXIS:    - Falls  - DVT prophylaxis: Heparin 5000 units subq q8hrs  - Diet, Regular:   High Fiber (HIFIBER)  Supplement Feeding Modality:  Oral  Ensure Plus High Protein Cans or Servings Per Day:  1       Frequency:  Daily (07-08-24 @ 11:46) [Active]
Patient seen and examined with the resident. We discussed the case. I have directed the care. I edited the note. The patient requires acute rehab with 3 hours of daily therapies at least 5 out of 7 days and close physiatry follow up.
I reviewed the chart and examined the patient with the resident and we discussed the findings and treatment plan.  The patient is tolerating the rehab program well. I agree with the findings and treatment plan above, which I modified as indicated. The patient requires 3 hrs a day of acute inpatient rehab. No new complaints. Eating well today. States that he slept well. VSS. Participating well in therapies. CG most activities. NSGY notes patient should be on a statin, but he is not at this time. Had questionable history of 'allergy' on atorvastatin, but reportedly only GI upset. Will discuss with patient/ wife. Continue full rehab program.    I read, edited and agree with the Assessment:  #Rehab of R temporoparietal ICH after elective R STA to MCA bypass  #Hx CVA 2020 and 2021 s/p intracranial angioplasty of right MCA and NORMAN w/ subsequent intra-stent occlusion of R  #Thrombocytosis likely reactive to surgery - stable   MCA/NORMAN intracranial stent  New left sided weakness and confusion post procedure  S/P Partial evac of R T/p ICH with improvement of R--L shift   CTH stable on 6/28  EEG negative for any seizure  - BP goal 110-150  - NSGY following - removed staples on 7/7  - Aspirin- 81 mg for bypass patency  - not on statin. Has history of diarrhea with Atorvastatin. Has reportedly been on Zocor. Will discuss with patient.  - Continue home Effexor 37.5mg qd  - Seroquel 12.5 mg q hs prn for agitation - has not been needed  - melatonin qhs  - PT/OT/SLP    #Mobile Echodensity on Mitral Valve   TTE Echo Complete w/o Contrast w/ Doppler (6/21/24) - EF 70 to 75%. Moderate (Grade 2) diastolic dysfunction. Asymmetric basal septal hypertrophy (1.6cm).  There is a small independently mobile echodensity attached to the anterior mitral valve leaflet. Thickening and calcification of the anterior and posterior mitral valve leaflets.  07/05 GIULIANA performed with evidence of MV Calcification rather than IE  - likely OP f/u Cardiology     #HTN  Goal - < 150  -Continue amlodipine 5 mg qd.   -c/w Losartan 100 mg qd  -labetalol prn for breakthrough  - controlled    #Stage II CKD   - Baseline 0.9-1.1 , but now around 1.3 ( has trended up to 1.3 prior as well )   - encourage PO intake today, and trend creatinine   - Na goal 140- 150 for perihematomal edema   - on salt tabs 3 tabs q8hrs, goal Na 140-150, taper as tolerates    #LUE + superficial thrombophlebitis - improving  - warmth and elevation  - conservative measures    #Psoriatic Arthritis  Humira last dosed on June 9th as per wife and as communicated by pt's rheumatologist; Discussed with rheumatologist Dr Andre, pt to hold Humira while hospitalized
Patient seen and examined with the resident. We discussed the case. I have directed the care. I edited the note. The patient requires acute rehab with 3 hours of daily therapies at least 5 out of 7 days and close physiatry follow up.  #Rehab of R temporoparietal ICH after elective R STA to MCA bypass  #Hx CVA 2020 and 2021 s/p intracranial angioplasty of right MCA and NORMAN w/ subsequent intra-stent occlusion of R MCA/NORMAN intracranial stent  New left sided weakness and confusion post procedure  S/P Partial evac of R T/p ICH with improvement of R--L shift   CTH stable on 6/28  EEG negative for any seizure  - c/up Stroke Neurology   - BP goal 110-150  - Q4h neuro checks  - NSGY following - removed staples on 7/7  - Aspirin- 81 mg for bypass patency   - Continue home Effexor qd  - Seroquel 25 mg q hs prn for agitation   - melatonin qhs  - 1:1 observation for impulsivity  - PT/OT/SLP    #Mobile Echodensity on Mitral Valve   TTE Echo Complete w/o Contrast w/ Doppler (6/21/24) - EF 70 to 75%. Moderate (Grade 2) diastolic dysfunction. Asymmetric basal septal hypertrophy (1.6cm).  There is a small independently mobile echodensity attached to the anterior mitral valve leaflet. In the right clinical context this may represent endocarditis. Thickening and calcification of the anterior and posterior mitral valve leaflets.  07/05 GIULIANA performed with evidence of MV Calcification rather than IE  - likely OP f/u Cardiology     #HTN  Goal - < 150  -Continue amlodipine 5 mg qd.   -c/w Losartan 100 mg qd  -labetalol prn for breakthrough    #Stage II CKD   - Baseline 0.9-1.1 , but now around 1.3 ( has trended up to 1.3 prior as well )   - encourage PO intake today, and trend creatinine   - Na goal 140- 150 for perihematomal edema   - on salt tabs 3 tabs q8hrs, goal Na 140-150, taper as tolerates    #LUE + superficial thrombophlebitis  - warmth and elevation  - conservative measures    #Psoriatic Arthritis  Humira last dosed on June 9th as per wife and as communicated by pt's rheumatologist; Discussed with rheumatologist Dr Andre, pt to hold Humira while hospitalized     -Pain control: Tylenol heat  -GI/Bowel Mgmt: Miralax  -Skin:  No active issues at this time  -FEN   - Diet: DASH  Precautions / PROPHYLAXIS:    - Falls  - DVT prophylaxis: Heparin 5000 units subq q8hrs

## 2024-07-16 NOTE — PROGRESS NOTE ADULT - TIME BILLING
patient contact and cognitive follow up
patient contact and neuropsych consult
family contact
patient contact and cognitive assessment

## 2024-07-17 ENCOUNTER — TRANSCRIPTION ENCOUNTER (OUTPATIENT)
Age: 64
End: 2024-07-17

## 2024-07-17 VITALS
DIASTOLIC BLOOD PRESSURE: 73 MMHG | RESPIRATION RATE: 18 BRPM | HEART RATE: 80 BPM | SYSTOLIC BLOOD PRESSURE: 108 MMHG | TEMPERATURE: 98 F

## 2024-07-17 DIAGNOSIS — Z86.59 PERSONAL HISTORY OF OTHER MENTAL AND BEHAVIORAL DISORDERS: ICD-10-CM

## 2024-07-17 RX ORDER — LOSARTAN POTASSIUM 100 MG/1
1 TABLET, FILM COATED ORAL
Qty: 0 | Refills: 0 | DISCHARGE
Start: 2024-07-17

## 2024-07-17 RX ORDER — VENLAFAXINE 37.5 MG/1
1 CAPSULE, EXTENDED RELEASE ORAL
Qty: 0 | Refills: 0 | DISCHARGE
Start: 2024-07-17

## 2024-07-17 RX ORDER — PANTOPRAZOLE SODIUM 40 MG/10ML
1 INJECTION, POWDER, FOR SOLUTION INTRAVENOUS
Qty: 0 | Refills: 0 | DISCHARGE
Start: 2024-07-17

## 2024-07-17 RX ORDER — HYDROCORTISONE VALERATE 0.2 %
1 CREAM (GRAM) TOPICAL
Qty: 0 | Refills: 0 | DISCHARGE
Start: 2024-07-17

## 2024-07-17 RX ORDER — HEPARIN SODIUM 50 [USP'U]/ML
5000 INJECTION, SOLUTION INTRAVENOUS
Qty: 0 | Refills: 0 | DISCHARGE
Start: 2024-07-17

## 2024-07-17 RX ORDER — ACETAMINOPHEN 325 MG
2 TABLET ORAL
Qty: 0 | Refills: 0 | DISCHARGE
Start: 2024-07-17

## 2024-07-17 RX ORDER — MAGNESIUM OXIDE 400 MG/1
1 TABLET ORAL
Qty: 0 | Refills: 0 | DISCHARGE
Start: 2024-07-17

## 2024-07-17 RX ORDER — CLOTRIMAZOLE 1 %
1 CREAM (GRAM) TOPICAL
Qty: 0 | Refills: 0 | DISCHARGE
Start: 2024-07-17

## 2024-07-17 RX ORDER — SIMVASTATIN 40 MG
1 TABLET ORAL
Qty: 0 | Refills: 0 | DISCHARGE
Start: 2024-07-17

## 2024-07-17 RX ADMIN — PANTOPRAZOLE SODIUM 40 MILLIGRAM(S): 40 INJECTION, POWDER, FOR SOLUTION INTRAVENOUS at 06:37

## 2024-07-17 RX ADMIN — ASPIRIN 81 MILLIGRAM(S): 325 TABLET, FILM COATED ORAL at 12:26

## 2024-07-17 RX ADMIN — HEPARIN SODIUM 5000 UNIT(S): 50 INJECTION, SOLUTION INTRAVENOUS at 06:36

## 2024-07-17 RX ADMIN — LOSARTAN POTASSIUM 100 MILLIGRAM(S): 100 TABLET, FILM COATED ORAL at 06:37

## 2024-07-17 RX ADMIN — Medication 1 APPLICATION(S): at 06:38

## 2024-07-17 RX ADMIN — AMLODIPINE BESYLATE 5 MILLIGRAM(S): 2.5 TABLET ORAL at 06:37

## 2024-07-17 RX ADMIN — HEPARIN SODIUM 5000 UNIT(S): 50 INJECTION, SOLUTION INTRAVENOUS at 12:26

## 2024-07-17 RX ADMIN — Medication 1 APPLICATION(S): at 06:36

## 2024-07-17 RX ADMIN — VENLAFAXINE 75 MILLIGRAM(S): 37.5 CAPSULE, EXTENDED RELEASE ORAL at 12:24

## 2024-07-17 RX ADMIN — MAGNESIUM OXIDE 400 MILLIGRAM(S): 400 TABLET ORAL at 12:26

## 2024-07-17 NOTE — PROGRESS NOTE ADULT - SUBJECTIVE AND OBJECTIVE BOX
Patient is a 64y old  Male who presents with a chief complaint of Rehab for Acute Right temporoparietal ICH, s/p  craniotomy with hematoma evacuation, left sided hemiparesis (nondominant), dysarthria  and cognitive deficits (05 Jul 2024 16:46)      HPI:  64M w/ PMHx of CVA 2020 and 2021 s/p intracranial angioplasty of right MCA and NORMAN w/ subsequent intra-stent occlusion of R MCA/NORMAN intracranial stent, aortic valve insufficiency, implantable loop recorder for prior episodes of syncope/NSVT, chronic anemia, CKD 2, psoriatic arthritis who presents for R STA to MCA bypass. Post-op was CTH stable. On assessment in PACU, noted to have new L sided weakness, confusion at SBP 120s. Started on Phenylephrine gtt for perfusion with some improvement of symptoms. Pt admitted to NSICU. On 6/22 he was noted to have R temporoparietal ICH, now s/p crani and hematoma evacuation. Repeat imaging has been stable, EEG negative, ASA restarted. BP better controlled. TTE with findings concerning for endocarditis, GIULIANA performed showed no signs of endocarditis. Patient with functional deficits relative to baseline, admitted to acute inpatient rehab.    The patient was evaluated by the PM&R team once medically stable. The patient was found to have functional limitations in terms of physical strength/mobility and ability to carry out ADLs (self care, transfers, ambulation). The patient was deemed to be a good candidate for admission for acute inpatient rehab.     Living Situation: Lives with Wife 1 VIANEY, [address] within home  PLOF: Rolling walker since 02/2024, Independent with ADLs and IADLs (05 Jul 2024 16:46)      TODAY'S SUBJECTIVE & REVIEW OF SYMPTOMS  Patient was seen and assessed this morning.   No overnight events.   Patient denies any new complaints at this time.   Tolerating PT/OT.   Tolerating oral diet.   Voiding and passing stool spontaneously.   Vital and labs signs reviewed.  For d/c to STR today.    Constitutional:    [ x  ] WNL           [   ] poor appetite   [   ] insomnia   [   ] tired   Cardio:                [ x  ] WNL           [   ] CP   [   ] MORALES   [   ] palpitations               Resp:                   [ X  ] WNL           [   ] SOB   [   ] cough   [   ] wheezing   GI:                        [ X  ] WNL           [   ] constipation   [   ] diarrhea   [   ] abdominal pain   [   ] nausea   [   ] emesis                                :                      [ X  ] WNL           [   ] GIBSON  [   ] dysuria   [   ] difficulty voiding             Endo:                   [ X  ] WNL          [   ] polyuria   [   ] temperature intolerance                 Skin:                     [   ] WNL          [   ] pain   [ X  ] wound right temporal craniotomy [   ] rash [ X ]  bruising, right arm medial bicep   MSK:                    [ X  ] WNL          [   ] muscle pain   [   ] joint pain/ stiffness   [   ] muscle tenderness   [   ] swelling   Neuro:                 [   ] WNL          [   ] HA   [   ] change in vision   [   ] tremor   [ X  ] weakness   [   ]dysphagia              Cognitive:           [   ] WNL           [   ]confusion   [ ] impulsiveness [ X ] slowed cognition   Psych:                  [ X  ] WNL           [   ] hallucinations   [   ]agitation   [   ] delusion   [   ]depression    CLOF:  Bed mobility min assist, transfer min assist, gait 20 feet RW min assist/ CG    PHYSICAL EXAM  Vital Signs Last 24 Hrs  T(C): 36.7 (17 Jul 2024 05:18), Max: 36.9 (16 Jul 2024 21:22)  T(F): 98 (17 Jul 2024 05:18), Max: 98.4 (16 Jul 2024 21:22)  HR: 62 (17 Jul 2024 05:18) (60 - 77)  BP: 126/74 (17 Jul 2024 05:18) (114/73 - 132/74)  BP(mean): --  RR: 18 (17 Jul 2024 05:18) (18 - 18)  SpO2: 96% (16 Jul 2024 21:22) (96% - 96%)      General:[ X  ] NAD, Resting Comfortable,   [   ] other:                                HEENT: [   ] NC/AT, EOMI, PERRL , Normal Conjunctivae,   [ X  ] other: right sided temporal swelling markedly improved        Cardio: [ X  ] RRR, no murmur,   [   ] other:                              Pulm: [ X  ] No Respiratory Distress,  Lungs CTAB,   [   ] other:                       Abdomen: [ X ]ND/NT, Soft,   [   ] other:    : [ X ] NO GIBSON CATHETER, [   ] GIBSON CATHETER- no meatal tear, no discharge, [   ] other:                                            MSK: [ X  ] No joint swelling, Full ROM,   [   ] other:                                         Ext: [ X  ]No C/C/E, No calf tenderness,   [   ]other:    Skin: [   ]intact,   [ X  ] other: healing right crani scab/ scar, C&D                                           Neurological Examination:  Cognitive: [  X  ] AAO x 3,   [  X  ]  other:  slowed responses, poor insight into deficits                                                                    Attention:  [    ] intact serial additions, no hemineglect   [  x  ]  other:  right gaze preference, needs redirection                      Memory: [    ] intact,    [ x   ]  other: impaired    Mood/Affect: [    ] wnl,    [  X  ]  other: blunted, improving                                                                       Communication: [  X  ]Fluent, no dysarthria, following commands:  [    ] other:   CN II - XII:  [    ] intact,  [  X  ] other: left sided facial droop                                                                                        Motor:   RIGHT UE: [ X  ] WNL,  [   ] other:  LEFT    UE: [   ] WNL,  [ X  ] other: Sh Flexion 4/5, EF 4/5, Elbow extension 4/5, finger  4/5. POSITIVE pronator drift  RIGHT LE: [ x  ] WNL,  [   ] other:   LEFT    LE: [   ] WNL,  [ x  ] other: HF 4/5, KF 4/5, KE 4/5, ADF 5/5, APF 5/5. POSITIVE clonus 3 beats left ankle    Tone: [  X  ] wnl,   [    ]  other:  DTRs: [  X ]symmetric, [   ] other:  Coordination:   [  X  ] intact,   [    ] other:                                                                           Sensory: [  X  ] Intact to light touch,   [    ] other:      MEDICATIONS  (STANDING):  amLODIPine   Tablet 5 milliGRAM(s) Oral daily  aspirin  chewable 81 milliGRAM(s) Oral daily  chlorhexidine 2% Cloths 1 Application(s) Topical <User Schedule>  clotrimazole 1% Cream 1 Application(s) Topical every 12 hours  heparin   Injectable 5000 Unit(s) SubCutaneous every 8 hours  hydrocortisone 1% Cream 1 Application(s) Topical every 12 hours  losartan 100 milliGRAM(s) Oral daily  magnesium oxide 400 milliGRAM(s) Oral daily  melatonin 10 milliGRAM(s) Oral at bedtime  pantoprazole    Tablet 40 milliGRAM(s) Oral before breakfast  polyethylene glycol 3350 17 Gram(s) Oral two times a day  senna 2 Tablet(s) Oral at bedtime  simvastatin 40 milliGRAM(s) Oral at bedtime  sodium chloride 1 Gram(s) Oral daily  venlafaxine 75 milliGRAM(s) Oral daily    MEDICATIONS  (PRN):  acetaminophen     Tablet .. 650 milliGRAM(s) Oral every 6 hours PRN Temp greater or equal to 38C (100.4F), Mild Pain (1 - 3)  labetalol Injectable 10 milliGRAM(s) IV Push every 2 hours PRN SBP >150, HR > 70  QUEtiapine 12.5 milliGRAM(s) Oral at bedtime PRN agitation                          12.3   8.46  )-----------( 264      ( 15 Jul 2024 06:33 )             39.4     07-15    139  |  106  |  29<H>  ----------------------------<  95  4.9   |  23  |  1.2    Ca    9.2      15 Jul 2024 06:33  Mg     2.1     07-15    TPro  6.6  /  Alb  3.8  /  TBili  0.3  /  DBili  x   /  AST  21  /  ALT  18  /  AlkPhos  97  07-15

## 2024-07-17 NOTE — DISCHARGE NOTE PROVIDER - REASON FOR ADMISSION
Rehab for Acute Right temporoparietal ICH, s/p  craniotomy with hematoma evacuation, left sided hemiparesis (nondominant), dysarthria  and cognitive deficits

## 2024-07-17 NOTE — DISCHARGE NOTE PROVIDER - CARE PROVIDERS DIRECT ADDRESSES
,DirectAddress_Unknown,[email or web address],DirectAddress_Unknown,DirectAddress_Unknown

## 2024-07-17 NOTE — DISCHARGE NOTE PROVIDER - NSDCMRMEDTOKEN_GEN_ALL_CORE_FT
acetaminophen 325 mg oral tablet: 2 tab(s) orally every 6 hours As needed Temp greater or equal to 38C (100.4F), Mild Pain (1 - 3)  amLODIPine 5 mg oral tablet: 1 tab(s) orally once a day Please hold if sbp less than 110  aspirin 81 mg oral tablet: orally once a day  clotrimazole 1% topical cream: Apply topically to affected area 2 times a day 1 Apply topically to affected area every 12 hours  heparin: 5,000 unit(s) subcutaneous every 8 hours  hydrocortisone 1% topical cream: 1 Apply topically to affected area every 12 hours  losartan 100 mg oral tablet: 1 tab(s) orally once a day Please hold if  Systolic blood pressure  less than 110  magnesium oxide 400 mg oral tablet: 1 tab(s) orally once a day  melatonin 10 mg oral tablet: 1 tab(s) orally once a day (at bedtime)  pantoprazole 40 mg oral delayed release tablet: 1 tab(s) orally once a day (before a meal)  QUEtiapine: 12.5 milligram(s) orally once a day (at bedtime) as needed for  agitation  senna leaf extract oral tablet: 2 tab(s) orally once a day (at bedtime)  simvastatin 40 mg oral tablet: 1 tab(s) orally once a day (at bedtime)  venlafaxine 75 mg oral tablet: 1 tab(s) orally once a day

## 2024-07-17 NOTE — PROGRESS NOTE ADULT - PROVIDER SPECIALTY LIST ADULT
Physiatry
Rehab Medicine
Neuropsychology
Rehab Medicine
Neuropsychology
Rehab Medicine
Neuropsychology
Neuropsychology

## 2024-07-17 NOTE — DISCHARGE NOTE PROVIDER - PROVIDER TOKENS
PROVIDER:[TOKEN:[[id number]]],PROVIDER:[TOKEN:[[id number]]],PROVIDER:[TOKEN:[[id number]]],FREE:[LAST:[Your Rheumatologist],PHONE:[(   )    -],FAX:[(   )    -]],FREE:[LAST:[Your Cardiologist],PHONE:[(   )    -],FAX:[(   )    -]]

## 2024-07-17 NOTE — DISCHARGE NOTE PROVIDER - NSDCCPCAREPLAN_GEN_ALL_CORE_FT
PRINCIPAL DISCHARGE DIAGNOSIS  Diagnosis: ICH (intracerebral hemorrhage)  Assessment and Plan of Treatment: You were admitted with intra cranial hemorrhage  and after a n elective Rt Temporal artery to middle cerebral artery bypass for severe stenosis / occlusion , You also have hx of strokes in the past and intracranial angioplasty  with intra stent occlusion . post surgery you had elevate dwbc count from surgery , now  natalie left martha rivera , received rehab therapy and need to continue this with  speech therapy  at the snf facility , please continue all medication , need follow up with both neurologist and neuro surgery in 2 weeks , able to take simvastatin  stated allergy to atorvasttatin . can ambulate with CG and min assist , neuro stable .. Ther was mobile echodensity seen on echo on Mitral valve , on 6/21 ef was 70 to 75 % .  folow up with your cardiologist in 2 to 4 weeks , on 7/5 TT echo done with Mitral valve calcification.    continue high fiber diet , low sodium and low fat      SECONDARY DISCHARGE DIAGNOSES  Diagnosis: At risk for depression  Assessment and Plan of Treatment: on seroquel for agitaion , no episode orf agitation in rehab  , on effexor increased dos efrom 37.5 mg  to 75 mg after seen by a psychiatrist , miguel efollow up with psychiatry if needed , mobile echodensity    Diagnosis: Hypertension  Assessment and Plan of Treatment: Blood pressur eis stable on losartan and amlodipine . please follow up with your cardiologist/ pmd in 2 to 4 weeks You also have stge # ckd which is stable on these meds . base line creatinine 1.3    Diagnosis: Psoriatic arthritis  Assessment and Plan of Treatment: Your last dos eof Humira was on June 9 th  as per wife  as pe rhis Rheumatologist dr Hdz this is on hold for  now , please follow up wih his rheumatologist in 4 weeks .    Diagnosis: Cognitive deficits  Assessment and Plan of Treatment: from the stroke and ICH , miguel chase Pt , OT and speech therapy ,  follow up with  Neurologist

## 2024-07-17 NOTE — DISCHARGE NOTE PROVIDER - NSDCFUADDINST_GEN_ALL_CORE_FT
**Please carry these paperwork with you for all your medical follow ups *Activity - Ambulate with Assistance:

## 2024-07-17 NOTE — DISCHARGE NOTE PROVIDER - NSDCFUSCHEDAPPT_GEN_ALL_CORE_FT
[facility]  CARDIOLOGY [address]  Scheduled Appointment: 07/22/2024    Leah Chen  [facility]  NEUROLOGY [address]  Scheduled Appointment: 07/31/2024

## 2024-07-17 NOTE — DISCHARGE NOTE PROVIDER - INSTRUCTIONS
Please take high fiber diet ,  Now can have regular salted food , Please follow up with your cardio for more advise ,

## 2024-07-17 NOTE — PROGRESS NOTE ADULT - ASSESSMENT
ASSESSMENT/PLAN    Rehab for Acute right temporoparietal ICH, s/p craniotomy  with hematoma evacuation, left sided hemiparesis (nondominant), dysarthria and cognitive deficits    64 M w/ PMHx of CVA 2020 and 2021 s/p intracranial angioplasty of right MCA and NORMAN w/ subsequent intra-stent occlusion of R MCA/NORMAN intracranial stent, aortic valve insufficiency, implantable loop recorder for prior episodes of syncope/NSVT, chronic anemia, CKD 2, psoriatic arthritis who underwent elective R STA to MCA bypass 06/20. On 6/22 noted to have new weakness and mental status change, CTH showed R temporoparietal ICH with midline shift. Patient underwent craniotomy with hematoma evacuation with improvement in midline shift. Repeat CTH stable, patient clinically improving. Admitted for inpatient rehabilitation for acute right temporoparietal ICH and craniotomy with hematoma evacuation, left sided hemiparesis and cognitive deficits    PLOF: Rolling walker since 02/2024, Independent with ADLs and IADLs    #Rehab of R temporoparietal ICH after elective R STA to MCA bypass  #Hx CVA 2020 and 2021 s/p intracranial angioplasty of right MCA and NORMAN w/ subsequent intra-stent occlusion of R  #Thrombocytosis likely reactive to surgery - stable   MCA/NORMAN intracranial stent  New left sided weakness and confusion post procedure  S/P Partial evac of R T/p ICH with improvement of R--L shift   CTH stable on 6/28  EEG negative for any seizure  - BP goal 110-150  - NSGY following - removed staples on 7/7  - Aspirin- 81 mg for bypass patency  - simvastatin 40mg nightly, tolerating well (hx atorvastatin allergy)  - Na goal 140- 150 for perihematoma edema     - D/c to STR today. Continue rehab at [facility]  Ambulates with RW CG/ min assist  Medical f/u at [facility]  F/U with neurosurgery in 3 weeks  Med/ neuro stable    #History of depression  - Seroquel 12.5 mg q hs prn for agitation - has not been needed  - Appears depressed and apathetic and unmotivated. 7/11 Psych consult recs appreciated. Increased Effexor to 75mg daily and increased Melatonin to 10mg qhs  - affect improving    #Mobile Echodensity on Mitral Valve   TTE Echo Complete w/o Contrast w/ Doppler (6/21/24) - EF 70 to 75%. Moderate (Grade 2) diastolic dysfunction. Asymmetric basal septal hypertrophy (1.6cm).  There is a small independently mobile echodensity attached to the anterior mitral valve leaflet. Thickening and calcification of the anterior and posterior mitral valve leaflets.  07/05 GIULIANA performed with evidence of MV Calcification rather than IE  - OP f/u Cardiology     #HTN  Goal - < 150  -Continue amlodipine 5 mg qd.   -c/w Losartan 100 mg qd  -labetalol prn for breakthrough  - controlled - monitor as NaCl weaned off    #Stage II CKD   - Baseline 0.9-1.1 , but now around 1.3 ( has trended up to 1.3 prior as well )   - encourage PO intake today, and trend creatinine     #LUE + superficial thrombophlebitis - improving  - warmth and elevation  - conservative measures  healed    #Psoriatic Arthritis  Humira last dosed on June 9th as per wife and as communicated by pt's rheumatologist; Discussed with rheumatologist Dr Andre, pt to hold Humira while hospitalized  f/u with dermatologist       - Diet, Regular:   High Fiber (HIFIBER)  Supplement Feeding Modality:  Oral  Ensure Plus High Protein Cans or Servings Per Day:  1       Frequency:  Daily (07-08-24 @ 11:46) [Active]    For further summary of hospital course and d/c med rec and patient instructions, see Provider D/C Note, reviewed and signed by me.

## 2024-07-17 NOTE — DISCHARGE NOTE PROVIDER - HOSPITAL COURSE
HPI:  64M w/ PMHx of CVA 2020 and 2021 s/p intracranial angioplasty of right MCA and NORMAN w/ subsequent intra-stent occlusion of R MCA/NORMAN intracranial stent, aortic valve insufficiency, implantable loop recorder for prior episodes of syncope/NSVT, chronic anemia, CKD 2, psoriatic arthritis who presents for R STA to MCA bypass. Post-op was CTH stable. On assessment in PACU, noted to have new L sided weakness, confusion at SBP 120s. Started on Phenylephrine gtt for perfusion with some improvement of symptoms. Pt admitted to NSICU. On 6/22 he was noted to have R temporoparietal ICH, now s/p crani and hematoma evacuation. Repeat imaging has been stable, EEG negative, ASA restarted. BP better controlled. TTE with findings concerning for endocarditis, GIULIANA performed showed no signs of endocarditis. Patient with functional deficits relative to baseline, admitted to acute inpatient rehab.    The patient was evaluated by the PM&R team once medically stable. The patient was found to have functional limitations in terms of physical strength/mobility and ability to carry out ADLs (self care, transfers, ambulation). The patient was deemed to be a good candidate for admission for acute inpatient rehab. Patient denies any new complaints at this time.   Tolerating PT/OT.   Tolerating oral diet.   Voiding and passing stool spontaneously.   Vital and labs signs reviewed.  For d/c to STR today.  64 M w/ PMHx of CVA 2020 and 2021 s/p intracranial angioplasty of right MCA and NORMAN w/ subsequent intra-stent occlusion of R MCA/NORMAN intracranial stent, aortic valve insufficiency, implantable loop recorder for prior episodes of syncope/NSVT, chronic anemia, CKD 2, psoriatic arthritis who underwent elective R STA to MCA bypass 06/20. On 6/22 noted to have new weakness and mental status change, CTH showed R temporoparietal ICH with midline shift. Patient underwent craniotomy with hematoma evacuation with improvement in midline shift. Repeat CTH stable, patient clinically improving. Admitted for inpatient rehabilitation for acute right temporoparietal ICH and craniotomy with hematoma evacuation, left sided hemiparesis and cognitive deficits    PLOF: Rolling walker since 02/2024, Independent with ADLs and IADLs    #Rehab of R temporoparietal ICH after elective R STA to MCA bypass  #Hx CVA 2020 and 2021 s/p intracranial angioplasty of right MCA and NORMAN w/ subsequent intra-stent occlusion of R  #Thrombocytosis likely reactive to surgery - stable   MCA/NORMAN intracranial stent  New left sided weakness and confusion post procedure  S/P Partial evac of R T/p ICH with improvement of R--L shift   CTH stable on 6/28  EEG negative for any seizure  - BP goal 110-150  - NSGY following - removed staples on 7/7  - Aspirin- 81 mg for bypass patency  - simvastatin 40mg nightly, tolerating well (hx atorvastatin allergy)  - Na goal 140- 150 for perihematoma edema     - D/c to STR today. Continue rehab at [facility]  Ambulates with RW CG/ min assist  Medical f/u at [facility]  F/U with neurosurgery in 3 weeks  Med/ neuro stable    #History of depression  - Seroquel 12.5 mg q hs prn for agitation - has not been needed  - Appears depressed and apathetic and unmotivated. 7/11 Psych consult recs appreciated. Increased Effexor to 75mg daily and increased Melatonin to 10mg qhs  - affect improving    #Mobile Echodensity on Mitral Valve   TTE Echo Complete w/o Contrast w/ Doppler (6/21/24) - EF 70 to 75%. Moderate (Grade 2) diastolic dysfunction. Asymmetric basal septal hypertrophy (1.6cm).  There is a small independently mobile echodensity attached to the anterior mitral valve leaflet. Thickening and calcification of the anterior and posterior mitral valve leaflets.  07/05 GIULIANA performed with evidence of MV Calcification rather than IE  - OP f/u Cardiology     #HTN  Goal - < 150  -Continue amlodipine 5 mg qd.   -c/w Losartan 100 mg qd  -labetalol prn for breakthrough  - controlled - monitor as NaCl weaned off    #Stage II CKD   - Baseline 0.9-1.1 , but now around 1.3 ( has trended up to 1.3 prior as well )   - encourage PO intake today, and trend creatinine     #LUE + superficial thrombophlebitis - improving  - warmth and elevation  - conservative measures  healed    #Psoriatic Arthritis  Humira last dosed on June 9th as per wife and as communicated by pt's rheumatologist; Discussed with rheumatologist Dr Andre, pt to hold Humira while hospitalized  f/u with dermatologist       - Diet, Regular:   High Fiber (HIFIBER)  Supplement Feeding Modality:  Oral  Ensure Plus High Protein Cans or Servings Per Day:  1       Frequency:  Daily (07-08-24 @ 11:46) [Active]

## 2024-07-17 NOTE — DISCHARGE NOTE NURSING/CASE MANAGEMENT/SOCIAL WORK - NSDCPEFALRISK_GEN_ALL_CORE
For information on Fall & Injury Prevention, visit: [email or web address] OR  [email or web address] OR  https://www.cdc.gov/steadi/patient.html

## 2024-07-17 NOTE — DISCHARGE NOTE PROVIDER - CARE PROVIDER_API CALL
Nora Palencia  [facility]  [address]  [place] [address]  Phone: [phone]  Fax: [phone]  Follow Up Time:     Leah Chen  Neurology  [address] [place] [id number]  Phone: [phone]  Fax: [phone]  Follow Up Time:     Fernando Mckay  Neurosurgery  [address]  [place] [address]  Phone: [phone]  Fax: [phone]  Follow Up Time:     Your Rheumatologist,   Phone: (   )    -  Fax: (   )    -  Follow Up Time:     Your Cardiologist,   Phone: (   )    -  Fax: (   )    -  Follow Up Time:

## 2024-07-17 NOTE — DISCHARGE NOTE NURSING/CASE MANAGEMENT/SOCIAL WORK - PATIENT PORTAL LINK FT
You can access the FollowMyHealth Patient Portal offered by [facility] by registering at the following website: [email or web address]. By joining "[employer]’s FollowMyHealth portal, you will also be able to view your health information using other applications (apps) compatible with our system.

## 2024-07-18 DIAGNOSIS — D63.1 ANEMIA IN CHRONIC KIDNEY DISEASE: ICD-10-CM

## 2024-07-18 DIAGNOSIS — N18.2 CHRONIC KIDNEY DISEASE, STAGE 2 (MILD): ICD-10-CM

## 2024-07-18 DIAGNOSIS — I80.8 PHLEBITIS AND THROMBOPHLEBITIS OF OTHER SITES: ICD-10-CM

## 2024-07-18 DIAGNOSIS — I69.122 DYSARTHRIA FOLLOWING NONTRAUMATIC INTRACEREBRAL HEMORRHAGE: ICD-10-CM

## 2024-07-18 DIAGNOSIS — I69.118 OTHER SYMPTOMS AND SIGNS INVOLVING COGNITIVE FUNCTIONS FOLLOWING NONTRAUMATIC INTRACEREBRAL HEMORRHAGE: ICD-10-CM

## 2024-07-18 DIAGNOSIS — L40.52 PSORIATIC ARTHRITIS MUTILANS: ICD-10-CM

## 2024-07-18 DIAGNOSIS — I34.89 OTHER NONRHEUMATIC MITRAL VALVE DISORDERS: ICD-10-CM

## 2024-07-18 DIAGNOSIS — I69.254 HEMIPLEGIA AND HEMIPARESIS FOLLOWING OTHER NONTRAUMATIC INTRACRANIAL HEMORRHAGE AFFECTING LEFT NON-DOMINANT SIDE: ICD-10-CM

## 2024-07-18 DIAGNOSIS — F32.A DEPRESSION, UNSPECIFIED: ICD-10-CM

## 2024-07-18 DIAGNOSIS — I12.9 HYPERTENSIVE CHRONIC KIDNEY DISEASE WITH STAGE 1 THROUGH STAGE 4 CHRONIC KIDNEY DISEASE, OR UNSPECIFIED CHRONIC KIDNEY DISEASE: ICD-10-CM

## 2024-07-26 ENCOUNTER — NON-APPOINTMENT (OUTPATIENT)
Age: 64
End: 2024-07-26

## 2024-07-26 ENCOUNTER — APPOINTMENT (OUTPATIENT)
Dept: CARDIOLOGY | Facility: CLINIC | Age: 64
End: 2024-07-26
Payer: MEDICARE

## 2024-07-26 PROCEDURE — 93298 REM INTERROG DEV EVAL SCRMS: CPT | Mod: 26

## 2024-07-31 ENCOUNTER — APPOINTMENT (OUTPATIENT)
Dept: NEUROSURGERY | Facility: CLINIC | Age: 64
End: 2024-07-31
Payer: MEDICARE

## 2024-07-31 VITALS
SYSTOLIC BLOOD PRESSURE: 119 MMHG | DIASTOLIC BLOOD PRESSURE: 69 MMHG | WEIGHT: 166 LBS | BODY MASS INDEX: 26.68 KG/M2 | HEIGHT: 66 IN

## 2024-07-31 DIAGNOSIS — I67.5 MOYAMOYA DISEASE: ICD-10-CM

## 2024-07-31 DIAGNOSIS — Z98.890 OTHER SPECIFIED POSTPROCEDURAL STATES: ICD-10-CM

## 2024-07-31 PROCEDURE — 99214 OFFICE O/P EST MOD 30 MIN: CPT | Mod: 24

## 2024-07-31 NOTE — END OF VISIT
[FreeTextEntry3] :  I, Dr Mckay personally performed the evaluation and management (E/M) services for this established patient who presents today with (a) new problem(s)/exacerbation of (an) existing condition(s).  That E/M includes conducting the examination, assessing all new/exacerbated conditions, and establishing a new plan of care.  Today, my DAVE was here to observe my evaluation and management services for this new problem/exacerbated condition to be followed going forward.  I personally reviewed this gentleman who is now approximately a month and a half out from his surgery.  He is doing well overall and is able to ambulate independently with no appreciable deficits to his ability to communicate and cognition.  He has a very mild left upper hemiparesis but is antigravity and has good strength in his left upper arm.  He has no facial asymmetry.  His wound is healed well except for an eschar on the apex of the incision which we will monitor and dressed with Betadine.  My plan is to follow him up with another CT angiogram and CT perfusion scan within a week or 2 and if that is stable to consider doing a catheter angiogram and perhaps a repeat nuclear medicine study in about 6 months time.  He is currently in a nursing home mainly for convalescent purposes as he lives with his wife but he is at home for large parts of the day on his own and the hope is that he will be able to return home within the next 2 weeks or so.  I am pleased to report that his blood pressure has been successfully stabilized and that he has been able now to tolerate the blood pressure of below 130/120 mm systolic without any new neurological symptoms, something that he was unable to do prior to surgery.

## 2024-07-31 NOTE — ASSESSMENT
[FreeTextEntry1] : 64-year-old male status post direct STA to MCA bypass performed on 6/20/2024 overall doing well.  No acute concerns.  Patient is slowly improving.  Plan is to obtain a CTA head/neck and CT perfusion after which patient will return to the office to review results.  Diamox scan in addition to a diagnostic cerebral angiogram can be expected in the next 6 months or so.  All questions were answered today.  Misty Steinberg MS, FNP-BC Fernando Mckay MD, CS

## 2024-07-31 NOTE — HISTORY OF PRESENT ILLNESS
[FreeTextEntry1] : Mr. BRANDON is a 64-year-old male presenting to the neurosurgery office today for his second postoperative encounter status post direct STA to MCA bypass performed on 6/20/2024 for atherosclerotic moyamoya disease.  Patient is currently residing at [facility]. He is overall doing well with marked improvement to the left upper extremity. He is alert and oriented x 4 however his caregiver that is present today states that he often gets frustrated during therapy and is reluctant to participate or answer appropriate questions. Not noted during the visit today. Pulse palpable at site. Wound healing well with some residual eschar noted over incision, cleansed with Betadine and facility instructed to do so 2-3x/daily until eschar falls off. The caregiver's concern today is decreased concentration of the patient however she was educated that this should improve over the course of the next 6 months or so. Patient expresses that his right eye mild ptosis frustrates him but this is of no major concern at this time.   BP today 119/69. He remains on Amlodipine 5mg PO daily and Losartan 100mg PO daily. LUE tremors are not noted during today's encounter as they were prior when BP was low. ASA 81mg PO daily to remain, caretaker is aware.  Plan delineated today is to obtain a CTA head/neck in addition to a CT perfusion scan. He will return to the office after completion of such imaging to review results. Diamox scan will be in 6 months in addition to a Diagnostic Cerebral Angiogram, pending progress until then. All questions were answered today and no concerns were identified.   Physical Exam: Constitutional: Well appearing, no distress HEENT: Normocephalic Atraumatic Psychiatric: Awake, alert, oriented to person, place, situation and time. Flat affect. Follows commands. Language: Speech is clear, fluent with good repetition & comprehension. No dysarthria. Pulmonary: No respiratory distress   Neurologic: CN II-XII grossly intact; EOMI with no nystagmus, mild R eye ptosis noted. No facial asymmetry bilaterally, full eye closure strength bilaterally. Hearing grossly normal. Head turning & shoulder shrug intact, bilaterally. Tongue midline, normal movements, no atrophy. Smile symmetrical. Strength: Full strength in all major muscle groups Sensation: Full sensation to light touch in all extremities, V1-V3 sensation bilaterally intact. Motor: +L pronator drift, muscle strength of bilateral UE and bilateral LE: 5/5. Normal muscle tone. Reflexes: DTR of biceps, knee and ankle normal 2+ biceps 2+ patellar   No Clonus No Rowland's   ROM intact   Gait: No postural instability. Normal stance and walking without assistance. Wheelchair for transportation purposes.

## 2024-08-16 ENCOUNTER — RESULT REVIEW (OUTPATIENT)
Age: 64
End: 2024-08-16

## 2024-08-20 ENCOUNTER — NON-APPOINTMENT (OUTPATIENT)
Age: 64
End: 2024-08-20

## 2024-08-30 ENCOUNTER — APPOINTMENT (OUTPATIENT)
Dept: CARDIOLOGY | Facility: CLINIC | Age: 64
End: 2024-08-30

## 2024-08-30 ENCOUNTER — NON-APPOINTMENT (OUTPATIENT)
Age: 64
End: 2024-08-30

## 2024-08-30 PROCEDURE — 93298 REM INTERROG DEV EVAL SCRMS: CPT

## 2024-09-11 ENCOUNTER — APPOINTMENT (OUTPATIENT)
Dept: NEUROLOGY | Facility: CLINIC | Age: 64
End: 2024-09-11
Payer: MEDICARE

## 2024-09-11 VITALS
SYSTOLIC BLOOD PRESSURE: 161 MMHG | HEIGHT: 69 IN | BODY MASS INDEX: 27.25 KG/M2 | RESPIRATION RATE: 12 BRPM | OXYGEN SATURATION: 96 % | WEIGHT: 184 LBS | DIASTOLIC BLOOD PRESSURE: 83 MMHG | HEART RATE: 68 BPM

## 2024-09-11 DIAGNOSIS — F07.0 OTHER SEQUELAE OF CEREBRAL INFARCTION: ICD-10-CM

## 2024-09-11 DIAGNOSIS — I67.5 MOYAMOYA DISEASE: ICD-10-CM

## 2024-09-11 DIAGNOSIS — I63.9 CEREBRAL INFARCTION, UNSPECIFIED: ICD-10-CM

## 2024-09-11 DIAGNOSIS — I69.398 OTHER SEQUELAE OF CEREBRAL INFARCTION: ICD-10-CM

## 2024-09-11 PROCEDURE — 99214 OFFICE O/P EST MOD 30 MIN: CPT

## 2024-09-11 NOTE — HISTORY OF PRESENT ILLNESS
[FreeTextEntry1] : Interval History: Pt presents today with his wife for follow up. He underwent direct STA to MCA bypass on the right with Dr. Mckay on 6/20/24. Hospital course complicated by right hematoma/SDH, s/p evacuation. Pt has been home for 2-3 weeks from rehab. Wife notes changes to personality, depression, has aggressive physical tendencies without overt anger. had a brief spell of LLE freezing/weakness, now back to baseline.   HPI:  Pt is a 63 yo M with PMHx of right NORMAN ischemic stroke 04/2020, right frontal ischemic stroke 06/2021 s/p right supraclinoid ICA/MCA/NORMAN angioplasty and stenting 06/2021, left occipitoparietal and punctate right frontal ischemic stroke 02/2024, s/p ILR, syncope, anemia, CKD, diverticulosis, HTN, HLD, former smoker, psoriatic arthritis (left knee), who presents with his wife for stroke follow up. Pt was admitted with worsening of left sided weakness and syncope in 02/2024. Found to have bilateral strokes. Underwent diagnostic DSA which showed occlusion of the right intracranial stents, right anterior circulation being fed via R PCA and L NORMAN pial collaterals. Pt's wife works as a nurse/ at a nursing home. He is along much of the day. Tends to cook fried/fatty foods for himself, sits on the couch much of the day with limited exercise outside of his PT sessions. Doesn't use a cane but does lean on his wife for support when ambulating. Dresses himself and goes to the bathroom on his own. Limited verbal output overall but answers questions with simple responses and comprehension intact. Quit smoking after his first stroke, denies alcohol use. Most recent stroke occurred in the setting of holding ASA/plavix for colonoscopy. He had severe diarrhea and syncope when on atorvastatin, they are nervous to try rosuvastatin as he has been tolerating simvastatin.

## 2024-09-11 NOTE — DISCUSSION/SUMMARY
[Goals and Counseling] : I have reviewed the goals of stroke risk factor modification. I counseled the patient on measures to reduce stroke risk, including the importance of medication compliance, risk factor control, exercise, healthy diet and avoidance of smoking. I reviewed stroke warning signs and symptoms and appropriate actions to take if such occur. [FreeTextEntry1] : Pt is a 65 yo M with PMHx of right NORMAN ischemic stroke 04/2020, right frontal ischemic stroke 06/2021 s/p right supraclinoid ICA/MCA/NORMAN angioplasty and stenting 06/2021, s/p right direct STA-MCA bypass 06/2024, left occipitoparietal and punctate right frontal ischemic stroke 02/2024, s/p ILR, syncope, anemia, CKD, diverticulosis, HTN, HLD, former smoker, psoriatic arthritis (left knee), who presents with his wife for stroke follow up.  # Recurrent ischemic strokes: mostly in right anterior circulation, however most recently with left MCA inferior division strokes as well. Etiology of right sided strokes is ICAD, however the left side is ESUS (thromboembolic vs cardioembolic). S/p ILR placement. S/p direct R STA-MCA bypass. NIHSS 3, mRS 3. - PTA: ASA 81mg daily - Continue simvastatin 80mg daily.  goal LDL <50 - Continue tobacco cessation - Psychology referral for CBT for post stroke personality changes. Will try to find a [place]ian speaking therapist. - F/u with Dr. Mckay as directed  RTC in 8 mo.

## 2024-09-11 NOTE — PHYSICAL EXAM
[General Appearance - Alert] : alert [General Appearance - Well Nourished] : well nourished [General Appearance - Well Developed] : well developed [Person] : oriented to person [Place] : oriented to place [Time] : oriented to time [Naming Objects] : no difficulty naming common objects [Comprehension] : comprehension intact [Cranial Nerves Oculomotor (III)] : extraocular motion intact [Cranial Nerves Trigeminal (V)] : facial sensation intact symmetrically [Cranial Nerves Vestibulocochlear (VIII)] : hearing was intact bilaterally [Cranial Nerves Hypoglossal (XII)] : there was no tongue deviation with protrusion [Peripheral Vision Was Full To Confrontation Left Eye] : peripheral vision was full to confrontation on the left [Mouth Droop On The Left] : left-sided mouth droop [Paresis Pronator Drift Left-Sided] : a left-sided pronator drift was present [Sensation Tactile Decrease] : light touch was intact [FreeTextEntry1] : blunted affect [Paresis Pronator Drift Right-Sided] : no pronator drift on the right [Coordination - Dysmetria Impaired Finger-to-Nose Bilateral] : not present [FreeTextEntry4] : Poor verbal output however fluent when asked direct questions [FreeTextEntry6] : RUE/RLE 5/5 LUE and LLE 4+/5

## 2024-09-24 ENCOUNTER — NON-APPOINTMENT (OUTPATIENT)
Age: 64
End: 2024-09-24

## 2024-09-25 ENCOUNTER — APPOINTMENT (OUTPATIENT)
Dept: NEUROSURGERY | Facility: CLINIC | Age: 64
End: 2024-09-25
Payer: MEDICARE

## 2024-09-25 VITALS
SYSTOLIC BLOOD PRESSURE: 123 MMHG | HEIGHT: 69 IN | WEIGHT: 173 LBS | BODY MASS INDEX: 25.62 KG/M2 | DIASTOLIC BLOOD PRESSURE: 79 MMHG

## 2024-09-25 DIAGNOSIS — Z98.890 OTHER SPECIFIED POSTPROCEDURAL STATES: ICD-10-CM

## 2024-09-25 PROCEDURE — 99214 OFFICE O/P EST MOD 30 MIN: CPT

## 2024-09-26 NOTE — ED ADULT NURSE NOTE - NSIMPLEMENTINTERV_GEN_ALL_ED
Applied
Implemented All Fall Risk Interventions:  [place] to call system. Call bell, personal items and telephone within reach. Instruct patient to call for assistance. Room bathroom lighting operational. Non-slip footwear when patient is off stretcher. Physically safe environment: no spills, clutter or unnecessary equipment. Stretcher in lowest position, wheels locked, appropriate side rails in place. Provide visual cue, wrist band, yellow gown, etc. Monitor gait and stability. Monitor for mental status changes and reorient to person, place, and time. Review medications for side effects contributing to fall risk. Reinforce activity limits and safety measures with patient and family.

## 2024-10-02 NOTE — END OF VISIT
[FreeTextEntry3] :  I, Dr Mckay personally performed the evaluation and management (E/M) services for this established patient who presents today with (a) new problem(s)/exacerbation of (an) existing condition(s).  That E/M includes conducting the examination, assessing all new/exacerbated conditions, and establishing a new plan of care.  Today, my DAVE was here to observe my evaluation and management services for this new problem/exacerbated condition to be followed going forward.

## 2024-10-02 NOTE — HISTORY OF PRESENT ILLNESS
[FreeTextEntry1] : Mr. BRANDON is a 64-year-old male presenting to the neurosurgery office today as a follow up status post direct STA to MCA bypass performed on 2024 for atherosclerotic moyamoya disease, present with wife.  Patient ambulating with a rollator today steadily. He continues to have marked improvement to the left upper extremity. He is alert and oriented x 4 however has a flat affect, advised to see PCP or psychiatry for concerns of depression. Wound is well-healed.   BP today 123/79. He remains on Amlodipine 5mg PO daily and Losartan 100mg PO daily in addition to ASA 81mg PO daily.  IMAGIN2024 CTA w/wo IVC stable: Redemonstrated stent from the right ICA terminus extending into the MCA and NORMAN. Unchanged filling defects within the stent with stenosis. Unchanged occlusion of the MCA just beyond the stent, and severe stenosis of the NORMAN just distal to the stent; s/p right STA/MCA bypass with opacification of right distal MCA branches; unchanged stenoses of the left MCA M1 and M2 segments; previously seen right temporal lobe parenchymal hematoma and the right subdural hemorrhages appear grossly essentially resolved.  It was discussed today that there were no acute concerns on imaging. Patient is going to follow up with a delayed Diagnostic Cerebral Angiogram in approximately January, six months post-surgery. All questions answered.

## 2024-10-02 NOTE — ASSESSMENT
[FreeTextEntry1] : 65yo M status post direct STA to MCA bypass performed on 6/20/2024 for atherosclerotic moyamoya disease. Most recent CTA stable. He will continue on ASA 81mg PO daily. Plan is to perform a delayed Diagnostic Cerebral Angiogram in approximately January, six months post-surgery. All questions answered addressed.   In addition, patient recommended to see PCP or psychiatry for flat affect/depression.   Misty Steinberg MS, FNP-BC Fernando Mckay MD, [facility]

## 2024-10-04 ENCOUNTER — APPOINTMENT (OUTPATIENT)
Dept: CARDIOLOGY | Facility: CLINIC | Age: 64
End: 2024-10-04

## 2024-10-04 ENCOUNTER — NON-APPOINTMENT (OUTPATIENT)
Age: 64
End: 2024-10-04

## 2024-10-04 PROCEDURE — 93298 REM INTERROG DEV EVAL SCRMS: CPT

## 2024-11-07 ENCOUNTER — APPOINTMENT (OUTPATIENT)
Dept: CARDIOLOGY | Facility: CLINIC | Age: 64
End: 2024-11-07

## 2024-11-07 PROCEDURE — 93298 REM INTERROG DEV EVAL SCRMS: CPT

## 2024-11-26 NOTE — ED CDU PROVIDER INITIAL DAY NOTE - PROGRESS NOTE DETAILS
done
Consulted Dr eMmo Ying, states doesn't need another MRI, recommends switching to simvastatin 80mg and pt needs to call office to schedule telemedicine appt this week. Agrees w IVF, echo, repeat troponin. Possible mild BEBETO/dehydration due to diarrhea from atorvastatin.

## 2024-12-12 ENCOUNTER — APPOINTMENT (OUTPATIENT)
Dept: CARDIOLOGY | Facility: CLINIC | Age: 64
End: 2024-12-12
Payer: MEDICARE

## 2024-12-12 ENCOUNTER — NON-APPOINTMENT (OUTPATIENT)
Age: 64
End: 2024-12-12

## 2024-12-12 PROCEDURE — 93298 REM INTERROG DEV EVAL SCRMS: CPT

## 2025-01-16 ENCOUNTER — APPOINTMENT (OUTPATIENT)
Dept: CARDIOLOGY | Facility: CLINIC | Age: 65
End: 2025-01-16

## 2025-01-16 PROCEDURE — 93298 REM INTERROG DEV EVAL SCRMS: CPT

## 2025-02-20 ENCOUNTER — APPOINTMENT (OUTPATIENT)
Dept: CARDIOLOGY | Facility: CLINIC | Age: 65
End: 2025-02-20

## 2025-02-20 PROCEDURE — 93298 REM INTERROG DEV EVAL SCRMS: CPT

## 2025-03-27 ENCOUNTER — APPOINTMENT (OUTPATIENT)
Dept: CARDIOLOGY | Facility: CLINIC | Age: 65
End: 2025-03-27

## 2025-03-27 ENCOUNTER — NON-APPOINTMENT (OUTPATIENT)
Age: 65
End: 2025-03-27

## 2025-03-27 PROCEDURE — 93298 REM INTERROG DEV EVAL SCRMS: CPT

## 2025-04-10 NOTE — PROCEDURE NOTE - NSNUMATTEMPT_GEN_A_CORE
Patient contact and reminded that he still needs to schedule his PFT. The patient was given the number to [facility] [phone] and [facility] [phone]. The patient verbalized understanding of message with no further questions or concerns.    1

## 2025-04-21 ENCOUNTER — RESULT REVIEW (OUTPATIENT)
Age: 65
End: 2025-04-21

## 2025-04-21 ENCOUNTER — OUTPATIENT (OUTPATIENT)
Dept: OUTPATIENT SERVICES | Facility: HOSPITAL | Age: 65
LOS: 1 days | End: 2025-04-21
Payer: MEDICARE

## 2025-04-21 DIAGNOSIS — Z98.890 OTHER SPECIFIED POSTPROCEDURAL STATES: Chronic | ICD-10-CM

## 2025-04-21 DIAGNOSIS — Z92.89 PERSONAL HISTORY OF OTHER MEDICAL TREATMENT: Chronic | ICD-10-CM

## 2025-04-21 DIAGNOSIS — R51.9 HEADACHE, UNSPECIFIED: ICD-10-CM

## 2025-04-21 DIAGNOSIS — Z00.8 ENCOUNTER FOR OTHER GENERAL EXAMINATION: ICD-10-CM

## 2025-04-21 DIAGNOSIS — Z86.79 PERSONAL HISTORY OF OTHER DISEASES OF THE CIRCULATORY SYSTEM: Chronic | ICD-10-CM

## 2025-04-21 PROCEDURE — 70551 MRI BRAIN STEM W/O DYE: CPT | Mod: 26

## 2025-04-21 PROCEDURE — 70551 MRI BRAIN STEM W/O DYE: CPT

## 2025-04-22 DIAGNOSIS — R51.9 HEADACHE, UNSPECIFIED: ICD-10-CM

## 2025-05-01 ENCOUNTER — APPOINTMENT (OUTPATIENT)
Dept: CARDIOLOGY | Facility: CLINIC | Age: 65
End: 2025-05-01

## 2025-05-01 PROCEDURE — 93298 REM INTERROG DEV EVAL SCRMS: CPT

## 2025-05-05 ENCOUNTER — NON-APPOINTMENT (OUTPATIENT)
Age: 65
End: 2025-05-05

## 2025-05-07 ENCOUNTER — APPOINTMENT (OUTPATIENT)
Dept: NEUROLOGY | Facility: CLINIC | Age: 65
End: 2025-05-07
Payer: MEDICARE

## 2025-05-07 VITALS
HEIGHT: 69 IN | DIASTOLIC BLOOD PRESSURE: 88 MMHG | SYSTOLIC BLOOD PRESSURE: 143 MMHG | HEART RATE: 90 BPM | TEMPERATURE: 98.4 F | WEIGHT: 187 LBS | OXYGEN SATURATION: 100 % | BODY MASS INDEX: 27.7 KG/M2

## 2025-05-07 DIAGNOSIS — I63.9 CEREBRAL INFARCTION, UNSPECIFIED: ICD-10-CM

## 2025-05-07 PROCEDURE — 99213 OFFICE O/P EST LOW 20 MIN: CPT

## 2025-05-07 RX ORDER — AMLODIPINE BESYLATE 5 MG/1
5 TABLET ORAL DAILY
Refills: 0 | Status: ACTIVE | COMMUNITY

## 2025-05-07 RX ORDER — UBIDECARENONE/VIT E ACET 100MG-5
1000 CAPSULE ORAL
Refills: 0 | Status: ACTIVE | COMMUNITY

## 2025-05-07 RX ORDER — VENLAFAXINE 112.5 MG/1
112.5 TABLET, EXTENDED RELEASE ORAL DAILY
Refills: 0 | Status: ACTIVE | COMMUNITY

## 2025-06-05 ENCOUNTER — NON-APPOINTMENT (OUTPATIENT)
Age: 65
End: 2025-06-05

## 2025-06-05 ENCOUNTER — APPOINTMENT (OUTPATIENT)
Dept: CARDIOLOGY | Facility: CLINIC | Age: 65
End: 2025-06-05
Payer: MEDICARE

## 2025-06-05 PROCEDURE — 93298 REM INTERROG DEV EVAL SCRMS: CPT

## 2025-06-05 NOTE — H&P PST ADULT - NS ABD PE RECTAL EXAM
2025    TELEHEALTH EVALUATION -- Audio/Visual    HPI:    Roc Restrepo (:  1980) has requested an audio/video evaluation for the following concern(s):    Has surgery with Dr. Jo.    Has never had sleep study.  Wakes up with dry mouth, feels tired. Was told he sleeps really hard. Sometimes gasps for air in the middle of the night.     Needs CT scan.  When in [facility],was told he had enlarged heart and afib. EKG showed no afib. Out of breath and winded.   Has mild leg swelling.     Review of Systems    Prior to Visit Medications    Medication Sig Taking? Authorizing Provider   amitriptyline (ELAVIL) 10 MG tablet Take 1 tablet by mouth nightly  Alex Avendaño MD   prazosin (MINIPRESS) 1 MG capsule Take 1 capsule by mouth nightly  Alex Avendaño MD   amphetamine-dextroamphetamine (ADDERALL XR) 15 MG extended release capsule Take 1 capsule by mouth every morning.  ProviderAlex MD   FLUoxetine (PROZAC) 20 MG capsule Take 1 capsule by mouth daily  ProviderAlex MD   sildenafil (VIAGRA) 100 MG tablet Take 1 tablet by mouth as needed for Erectile Dysfunction  Chandni Paul MD       Social History     Tobacco Use    Smoking status: Never    Smokeless tobacco: Never   Vaping Use    Vaping status: Never Used   Substance Use Topics    Alcohol use: Not Currently     Comment: Quit     Drug use: Never            PHYSICAL EXAMINATION:  Virtual    ASSESSMENT/PLAN:  #1.  Sleep apnea referral to [facility]    2.  Dyspnea  Ordered transthoracic echo to evaluate  Roc Restrepo, was evaluated through a synchronous (real-time) audio-video encounter. The patient (or guardian if applicable) is aware that this is a billable service, which includes applicable co-pays. This Virtual Visit was conducted with patient's (and/or legal guardian's) consent. Patient identification was verified, and a caregiver was present when appropriate.   The patient was located at Home: [address] [place] [address] 
not examined

## 2025-06-25 ENCOUNTER — APPOINTMENT (OUTPATIENT)
Dept: CARDIOLOGY | Facility: CLINIC | Age: 65
End: 2025-06-25
Payer: MEDICARE

## 2025-06-25 PROCEDURE — 99214 OFFICE O/P EST MOD 30 MIN: CPT | Mod: 25

## 2025-06-25 PROCEDURE — 93306 TTE W/DOPPLER COMPLETE: CPT

## 2025-07-10 ENCOUNTER — NON-APPOINTMENT (OUTPATIENT)
Age: 65
End: 2025-07-10

## 2025-07-10 ENCOUNTER — APPOINTMENT (OUTPATIENT)
Dept: CARDIOLOGY | Facility: CLINIC | Age: 65
End: 2025-07-10

## 2025-07-10 PROCEDURE — 93298 REM INTERROG DEV EVAL SCRMS: CPT

## 2025-08-12 ENCOUNTER — APPOINTMENT (OUTPATIENT)
Dept: CARDIOLOGY | Facility: CLINIC | Age: 65
End: 2025-08-12